# Patient Record
Sex: MALE | Race: WHITE | NOT HISPANIC OR LATINO | Employment: OTHER | URBAN - METROPOLITAN AREA
[De-identification: names, ages, dates, MRNs, and addresses within clinical notes are randomized per-mention and may not be internally consistent; named-entity substitution may affect disease eponyms.]

---

## 2017-01-06 ENCOUNTER — TELEPHONE (OUTPATIENT)
Dept: TRANSPLANT | Facility: CLINIC | Age: 67
End: 2017-01-06

## 2017-01-06 NOTE — TELEPHONE ENCOUNTER
----- Message from Naomi Sullivan sent at 1/6/2017  9:17 AM CST -----  Contact: pt  Want to know if you have all the info needed, please call back [phone]

## 2017-01-06 NOTE — TELEPHONE ENCOUNTER
Chart reviewed , pt outstanding for stress test, echo and card clearance . Patient stated that he has been cleared by Dr. Bass. Will request records.

## 2017-01-20 ENCOUNTER — HISTORICAL (OUTPATIENT)
Dept: LAB | Facility: HOSPITAL | Age: 67
End: 2017-01-20

## 2017-02-03 ENCOUNTER — COMMITTEE REVIEW (OUTPATIENT)
Dept: TRANSPLANT | Facility: CLINIC | Age: 67
End: 2017-02-03

## 2017-02-03 NOTE — COMMITTEE REVIEW
Native Organ Dx: Polycystic Kidneys      SELECTION COMMITTEE NOTE    Ronal Mazariegos was presented at selection committee on 2/3/2017.  Patient met selection criteria for kidney transplant related to CKD, Stage 5 due to    Polycystic Kidneys.  No absolute contraindications to transplant at this time.  Patient will be placed on the cadaveric wait list pending final financial approval from insurance company.  Patient will return to clinic for routine appointment in 1 year.          Note written by  Melly Jolly RN    ===============================================    I was present at the meeting and attest to the decision of the committee.    Carlos Barcenas  02/03/2017

## 2017-02-03 NOTE — LETTER
February 3, 2017    Alexis Zamorano  [address] [facility] [place] [address]         Dear Dr. Alexis Zamorano:    Patient: Ronal Mazariegos   MR Number: [id number]   YOB: 1950     Your patient, Ronal Mazariegos, was recently discussed at the [facility] Selection Committee meeting on 2/3/2017. I am happy to inform you that Ronal has been approved for transplantation.  He has met selection criteria for a kidney transplant related to CKD stage 5 secondary to primary diagnosis of Polycystic Kidneys. Your patient will be placed on the cadaveric wait list pending final financial approval from insurance company.     We appreciate your confidence in allowing us to participate in your patients care.  If you have any questions or concerns, please do not hesitate to contact me.    Sincerely,      Jaimie Walters MD  Medical Director, Kidney & Kidney/Pancreas Transplantation    Cc:  Ronal Mazariegos

## 2017-02-23 DIAGNOSIS — Z76.82 AWAITING ORGAN TRANSPLANT STATUS: Primary | ICD-10-CM

## 2017-02-28 ENCOUNTER — HISTORICAL (OUTPATIENT)
Dept: LAB | Facility: HOSPITAL | Age: 67
End: 2017-02-28

## 2017-03-06 ENCOUNTER — LAB VISIT (OUTPATIENT)
Dept: LAB | Facility: HOSPITAL | Age: 67
End: 2017-03-06
Payer: COMMERCIAL

## 2017-03-06 DIAGNOSIS — Z76.82 AWAITING ORGAN TRANSPLANT STATUS: ICD-10-CM

## 2017-03-06 PROCEDURE — 86832 HLA CLASS I HIGH DEFIN QUAL: CPT | Mod: PO

## 2017-03-06 PROCEDURE — 86833 HLA CLASS II HIGH DEFIN QUAL: CPT | Mod: PO

## 2017-03-15 ENCOUNTER — HISTORICAL (OUTPATIENT)
Dept: LAB | Facility: HOSPITAL | Age: 67
End: 2017-03-15

## 2017-03-17 LAB — HPRA INTERPRETATION: NORMAL

## 2017-03-30 LAB
CLASS I ANTIBODIES - LUMINEX: NEGATIVE
CLASS II ANTIBODIES - LUMINEX: NEGATIVE
CPRA %: 0
SERUM COLLECTION DT - LUMINEX CLASS I: NORMAL
SERUM COLLECTION DT - LUMINEX CLASS II: NORMAL
SPCL1 TESTING DATE: NORMAL
SPCL2 TESTING DATE: NORMAL

## 2017-04-05 ENCOUNTER — LAB VISIT (OUTPATIENT)
Dept: LAB | Facility: HOSPITAL | Age: 67
End: 2017-04-05
Payer: COMMERCIAL

## 2017-04-05 DIAGNOSIS — Z76.82 AWAITING ORGAN TRANSPLANT STATUS: ICD-10-CM

## 2017-04-05 PROCEDURE — 86829 HLA CLASS I/II ANTIBODY QUAL: CPT | Mod: 91,PO,TXP

## 2017-04-05 PROCEDURE — 86829 HLA CLASS I/II ANTIBODY QUAL: CPT | Mod: PO,TXP

## 2017-04-25 LAB — HPRA INTERPRETATION: NORMAL

## 2017-04-26 ENCOUNTER — HISTORICAL (OUTPATIENT)
Dept: LAB | Facility: HOSPITAL | Age: 67
End: 2017-04-26

## 2017-04-28 ENCOUNTER — LAB VISIT (OUTPATIENT)
Dept: LAB | Facility: HOSPITAL | Age: 67
End: 2017-04-28
Payer: COMMERCIAL

## 2017-04-28 DIAGNOSIS — Z76.82 AWAITING ORGAN TRANSPLANT STATUS: ICD-10-CM

## 2017-04-28 PROCEDURE — 86829 HLA CLASS I/II ANTIBODY QUAL: CPT | Mod: 91,PO,TXP

## 2017-04-28 PROCEDURE — 86829 HLA CLASS I/II ANTIBODY QUAL: CPT | Mod: PO,TXP

## 2017-04-28 PROCEDURE — 86832 HLA CLASS I HIGH DEFIN QUAL: CPT | Mod: PO,TXP

## 2017-05-22 LAB — HPRA INTERPRETATION: NORMAL

## 2017-06-02 ENCOUNTER — LAB VISIT (OUTPATIENT)
Dept: LAB | Facility: HOSPITAL | Age: 67
End: 2017-06-02
Payer: COMMERCIAL

## 2017-06-02 DIAGNOSIS — Z76.82 AWAITING ORGAN TRANSPLANT STATUS: ICD-10-CM

## 2017-06-06 LAB
CLASS I ANTIBODIES - LUMINEX: NEGATIVE
CPRA %: 0
SERUM COLLECTION DT - LUMINEX CLASS I: NORMAL
SPCL1 TESTING DATE: NORMAL

## 2017-06-15 ENCOUNTER — HISTORICAL (OUTPATIENT)
Dept: LAB | Facility: HOSPITAL | Age: 67
End: 2017-06-15

## 2017-06-15 LAB
ABS NEUT (OLG): 3 X10(3)/MCL
BUN SERPL-MCNC: 77 MG/DL (ref 7–18)
CALCIUM SERPL-MCNC: 8.8 MG/DL (ref 8.5–10.1)
CHLORIDE SERPL-SCNC: 109 MMOL/L (ref 98–107)
CO2 SERPL-SCNC: 21 MMOL/L (ref 21–32)
CREAT SERPL-MCNC: 5.93 MG/DL (ref 0.7–1.3)
ERYTHROCYTE [DISTWIDTH] IN BLOOD BY AUTOMATED COUNT: 13.3 % (ref 11.5–14.8)
GLUCOSE SERPL-MCNC: 97 MG/DL (ref 74–106)
HCT VFR BLD AUTO: 39.8 % (ref 36.2–49.3)
HGB BLD-MCNC: 13.7 GM/DL (ref 12.6–17.3)
MCH RBC QN AUTO: 28.8 PG (ref 28.5–33.8)
MCHC RBC AUTO-ENTMCNC: 34.4 % (ref 32.6–37)
MCV RBC AUTO: 83.6 FL (ref 80–99)
PLATELET # BLD AUTO: 194 X10(3)/MCL (ref 136–369)
PMV BLD AUTO: 9.9 FL (ref 7.4–10.4)
POTASSIUM SERPL-SCNC: 4.1 MMOL/L (ref 3.5–5.1)
RBC # BLD AUTO: 4.76 X10(6)/MCL (ref 4.19–5.75)
SODIUM SERPL-SCNC: 141 MMOL/L (ref 136–145)
WBC # SPEC AUTO: 5 X10(3)/MCL (ref 4–10.5)

## 2017-07-03 ENCOUNTER — LAB VISIT (OUTPATIENT)
Dept: LAB | Facility: HOSPITAL | Age: 67
End: 2017-07-03
Payer: COMMERCIAL

## 2017-07-03 DIAGNOSIS — Z76.82 AWAITING ORGAN TRANSPLANT STATUS: ICD-10-CM

## 2017-07-07 PROCEDURE — 86829 HLA CLASS I/II ANTIBODY QUAL: CPT | Mod: PO,TXP

## 2017-07-07 PROCEDURE — 86829 HLA CLASS I/II ANTIBODY QUAL: CPT | Mod: 91,PO,TXP

## 2017-07-12 LAB — HPRA INTERPRETATION: NORMAL

## 2017-07-28 ENCOUNTER — LAB VISIT (OUTPATIENT)
Dept: LAB | Facility: HOSPITAL | Age: 67
End: 2017-07-28
Payer: COMMERCIAL

## 2017-07-28 DIAGNOSIS — Z76.82 AWAITING ORGAN TRANSPLANT STATUS: ICD-10-CM

## 2017-07-28 PROCEDURE — 86829 HLA CLASS I/II ANTIBODY QUAL: CPT | Mod: 91,PO,TXP

## 2017-07-28 PROCEDURE — 86977 RBC SERUM PRETX INCUBJ/INHIB: CPT | Mod: PO,TXP

## 2017-08-01 DIAGNOSIS — Z76.82 ORGAN TRANSPLANT CANDIDATE: ICD-10-CM

## 2017-08-03 LAB — HPRA INTERPRETATION: NORMAL

## 2017-08-03 PROCEDURE — 86829 HLA CLASS I/II ANTIBODY QUAL: CPT | Mod: 91,PO,TXP

## 2017-08-03 PROCEDURE — 86829 HLA CLASS I/II ANTIBODY QUAL: CPT | Mod: PO,TXP

## 2017-08-21 LAB — HPRA INTERPRETATION: NORMAL

## 2017-08-22 LAB — LUMINEX SCREEN INTERPRETATION: NORMAL

## 2017-09-06 ENCOUNTER — LAB VISIT (OUTPATIENT)
Dept: LAB | Facility: HOSPITAL | Age: 67
End: 2017-09-06
Payer: COMMERCIAL

## 2017-09-06 DIAGNOSIS — Z76.82 AWAITING ORGAN TRANSPLANT STATUS: ICD-10-CM

## 2017-09-06 LAB
CLASS I LUMINEX SCREEN RESULT - ADSORBED: NEGATIVE
CLASS II LUMINEX SCREEN RESULT - ADSORBED: NEGATIVE
SCRLA TESTING DATE: NORMAL
SERUM COLLECTION DT - SCRLA: NORMAL

## 2017-09-06 PROCEDURE — 86829 HLA CLASS I/II ANTIBODY QUAL: CPT | Mod: 91,PO,TXP

## 2017-09-06 PROCEDURE — 86829 HLA CLASS I/II ANTIBODY QUAL: CPT | Mod: PO,TXP

## 2017-09-18 LAB — HPRA INTERPRETATION: NORMAL

## 2017-09-20 ENCOUNTER — HISTORICAL (OUTPATIENT)
Dept: LAB | Facility: HOSPITAL | Age: 67
End: 2017-09-20

## 2017-09-20 LAB
ABS NEUT (OLG): 2.99 X10(3)/MCL (ref 2.1–9.2)
BUN SERPL-MCNC: 84 MG/DL (ref 7–18)
CALCIUM SERPL-MCNC: 8.7 MG/DL (ref 8.5–10.1)
CHLORIDE SERPL-SCNC: 107 MMOL/L (ref 98–107)
CO2 SERPL-SCNC: 20 MMOL/L (ref 21–32)
CREAT SERPL-MCNC: 6.28 MG/DL (ref 0.7–1.3)
ERYTHROCYTE [DISTWIDTH] IN BLOOD BY AUTOMATED COUNT: 13.2 % (ref 11.5–17)
GLUCOSE SERPL-MCNC: 96 MG/DL (ref 74–106)
HCT VFR BLD AUTO: 39.2 % (ref 42–52)
HGB BLD-MCNC: 13.2 GM/DL (ref 14–18)
MCH RBC QN AUTO: 28.3 PG (ref 27–31)
MCHC RBC AUTO-ENTMCNC: 33.7 GM/DL (ref 33–36)
MCV RBC AUTO: 84.1 FL (ref 80–94)
PHOSPHATE SERPL-MCNC: 5 MG/DL (ref 2.5–4.9)
PLATELET # BLD AUTO: 182 X10(3)/MCL (ref 130–400)
PMV BLD AUTO: 9.6 FL (ref 7.4–10.4)
POTASSIUM SERPL-SCNC: 4.2 MMOL/L (ref 3.5–5.1)
RBC # BLD AUTO: 4.66 X10(6)/MCL (ref 4.7–6.1)
SODIUM SERPL-SCNC: 139 MMOL/L (ref 136–145)
WBC # SPEC AUTO: 4.8 X10(3)/MCL (ref 4.5–11.5)

## 2017-10-02 ENCOUNTER — LAB VISIT (OUTPATIENT)
Dept: LAB | Facility: HOSPITAL | Age: 67
End: 2017-10-02
Payer: MEDICARE

## 2017-10-02 DIAGNOSIS — Z76.82 AWAITING ORGAN TRANSPLANT STATUS: ICD-10-CM

## 2017-10-02 PROCEDURE — 86829 HLA CLASS I/II ANTIBODY QUAL: CPT | Mod: 91,PO,TXP

## 2017-10-02 PROCEDURE — 86829 HLA CLASS I/II ANTIBODY QUAL: CPT | Mod: PO,TXP

## 2017-10-16 LAB — HPRA INTERPRETATION: NORMAL

## 2017-10-18 ENCOUNTER — HISTORICAL (OUTPATIENT)
Dept: LAB | Facility: HOSPITAL | Age: 67
End: 2017-10-18

## 2017-10-18 LAB
ABS NEUT (OLG): 2.66 X10(3)/MCL (ref 2.1–9.2)
BUN SERPL-MCNC: 80 MG/DL (ref 7–18)
CALCIUM SERPL-MCNC: 9.1 MG/DL (ref 8.5–10.1)
CHLORIDE SERPL-SCNC: 107 MMOL/L (ref 98–107)
CO2 SERPL-SCNC: 25 MMOL/L (ref 21–32)
CREAT SERPL-MCNC: 6.51 MG/DL (ref 0.7–1.3)
ERYTHROCYTE [DISTWIDTH] IN BLOOD BY AUTOMATED COUNT: 13.3 % (ref 11.5–17)
GLUCOSE SERPL-MCNC: 102 MG/DL (ref 74–106)
HCT VFR BLD AUTO: 39.1 % (ref 42–52)
HGB BLD-MCNC: 13.3 GM/DL (ref 14–18)
MCH RBC QN AUTO: 28.7 PG (ref 27–31)
MCHC RBC AUTO-ENTMCNC: 34 GM/DL (ref 33–36)
MCV RBC AUTO: 84.4 FL (ref 80–94)
PLATELET # BLD AUTO: 179 X10(3)/MCL (ref 130–400)
PMV BLD AUTO: 8.9 FL (ref 7.4–10.4)
POTASSIUM SERPL-SCNC: 4 MMOL/L (ref 3.5–5.1)
RBC # BLD AUTO: 4.63 X10(6)/MCL (ref 4.7–6.1)
SODIUM SERPL-SCNC: 140 MMOL/L (ref 136–145)
WBC # SPEC AUTO: 4.5 X10(3)/MCL (ref 4.5–11.5)

## 2017-11-06 ENCOUNTER — LAB VISIT (OUTPATIENT)
Dept: LAB | Facility: HOSPITAL | Age: 67
End: 2017-11-06
Payer: MEDICARE

## 2017-11-06 DIAGNOSIS — Z76.82 AWAITING ORGAN TRANSPLANT STATUS: ICD-10-CM

## 2017-11-06 PROCEDURE — 86829 HLA CLASS I/II ANTIBODY QUAL: CPT | Mod: 91,PO,TXP

## 2017-11-06 PROCEDURE — 86829 HLA CLASS I/II ANTIBODY QUAL: CPT | Mod: PO,TXP

## 2017-11-22 ENCOUNTER — HISTORICAL (OUTPATIENT)
Dept: LAB | Facility: HOSPITAL | Age: 67
End: 2017-11-22

## 2017-11-22 LAB
ABS NEUT (OLG): 2.95 X10(3)/MCL (ref 2.1–9.2)
BUN SERPL-MCNC: 83 MG/DL (ref 7–18)
CALCIUM SERPL-MCNC: 8.9 MG/DL (ref 8.5–10.1)
CHLORIDE SERPL-SCNC: 109 MMOL/L (ref 98–107)
CO2 SERPL-SCNC: 20 MMOL/L (ref 21–32)
CREAT SERPL-MCNC: 6.82 MG/DL (ref 0.7–1.3)
ERYTHROCYTE [DISTWIDTH] IN BLOOD BY AUTOMATED COUNT: 13.3 % (ref 11.5–17)
GLUCOSE SERPL-MCNC: 98 MG/DL (ref 74–106)
HCT VFR BLD AUTO: 39 % (ref 42–52)
HGB BLD-MCNC: 13 GM/DL (ref 14–18)
MCH RBC QN AUTO: 28.2 PG (ref 27–31)
MCHC RBC AUTO-ENTMCNC: 33.3 GM/DL (ref 33–36)
MCV RBC AUTO: 84.6 FL (ref 80–94)
PLATELET # BLD AUTO: 194 X10(3)/MCL (ref 130–400)
PMV BLD AUTO: 9.6 FL (ref 7.4–10.4)
POTASSIUM SERPL-SCNC: 4.3 MMOL/L (ref 3.5–5.1)
RBC # BLD AUTO: 4.61 X10(6)/MCL (ref 4.7–6.1)
SODIUM SERPL-SCNC: 141 MMOL/L (ref 136–145)
WBC # SPEC AUTO: 4.8 X10(3)/MCL (ref 4.5–11.5)

## 2017-11-30 LAB — HPRA INTERPRETATION: NORMAL

## 2017-12-04 ENCOUNTER — LAB VISIT (OUTPATIENT)
Dept: LAB | Facility: HOSPITAL | Age: 67
End: 2017-12-04
Payer: MEDICARE

## 2017-12-04 DIAGNOSIS — Z76.82 AWAITING ORGAN TRANSPLANT STATUS: ICD-10-CM

## 2017-12-04 PROCEDURE — 86829 HLA CLASS I/II ANTIBODY QUAL: CPT | Mod: 91,PO,TXP

## 2017-12-04 PROCEDURE — 86829 HLA CLASS I/II ANTIBODY QUAL: CPT | Mod: PO,TXP

## 2017-12-07 ENCOUNTER — HISTORICAL (OUTPATIENT)
Dept: LAB | Facility: HOSPITAL | Age: 67
End: 2017-12-07

## 2017-12-07 LAB
ABS NEUT (OLG): 3.17 X10(3)/MCL (ref 2.1–9.2)
ALBUMIN SERPL-MCNC: 3.7 GM/DL (ref 3.4–5)
ALBUMIN/GLOB SERPL: 1 {RATIO}
ALP SERPL-CCNC: 60 UNIT/L (ref 45–117)
ALT SERPL-CCNC: 16 UNIT/L (ref 16–61)
AST SERPL-CCNC: 7 UNIT/L (ref 15–37)
BILIRUB SERPL-MCNC: 0.6 MG/DL (ref 0.2–1)
BILIRUBIN DIRECT+TOT PNL SERPL-MCNC: 0.2 MG/DL (ref 0–0.2)
BILIRUBIN DIRECT+TOT PNL SERPL-MCNC: 0.4 MG/DL (ref 0–1)
BUN SERPL-MCNC: 79 MG/DL (ref 7–18)
CALCIUM SERPL-MCNC: 8.9 MG/DL (ref 8.5–10.1)
CHLORIDE SERPL-SCNC: 108 MMOL/L (ref 98–107)
CO2 SERPL-SCNC: 24 MMOL/L (ref 21–32)
CREAT SERPL-MCNC: 6.37 MG/DL (ref 0.7–1.3)
ERYTHROCYTE [DISTWIDTH] IN BLOOD BY AUTOMATED COUNT: 13.4 % (ref 11.5–17)
GLOBULIN SER-MCNC: 4 GM/DL (ref 2–4)
GLUCOSE SERPL-MCNC: 94 MG/DL (ref 74–106)
HCT VFR BLD AUTO: 38.9 % (ref 42–52)
HGB BLD-MCNC: 13 GM/DL (ref 14–18)
MCH RBC QN AUTO: 28.7 PG (ref 27–31)
MCHC RBC AUTO-ENTMCNC: 33.4 GM/DL (ref 33–36)
MCV RBC AUTO: 85.9 FL (ref 80–94)
PLATELET # BLD AUTO: 200 X10(3)/MCL (ref 130–400)
PMV BLD AUTO: 10.4 FL (ref 7.4–10.4)
POTASSIUM SERPL-SCNC: 4.1 MMOL/L (ref 3.5–5.1)
PROT SERPL-MCNC: 7.6 GM/DL (ref 6.4–8.2)
RBC # BLD AUTO: 4.53 X10(6)/MCL (ref 4.7–6.1)
SODIUM SERPL-SCNC: 142 MMOL/L (ref 136–145)
WBC # SPEC AUTO: 5.1 X10(3)/MCL (ref 4.5–11.5)

## 2017-12-20 LAB — HPRA INTERPRETATION: NORMAL

## 2018-01-02 ENCOUNTER — LAB VISIT (OUTPATIENT)
Dept: LAB | Facility: HOSPITAL | Age: 68
End: 2018-01-02
Payer: MEDICARE

## 2018-01-02 DIAGNOSIS — Z76.82 AWAITING ORGAN TRANSPLANT STATUS: ICD-10-CM

## 2018-01-02 PROCEDURE — 86833 HLA CLASS II HIGH DEFIN QUAL: CPT | Mod: PO,TXP

## 2018-01-02 PROCEDURE — 86832 HLA CLASS I HIGH DEFIN QUAL: CPT | Mod: PO,TXP

## 2018-01-02 PROCEDURE — 86829 HLA CLASS I/II ANTIBODY QUAL: CPT | Mod: PO,TXP

## 2018-01-02 PROCEDURE — 86829 HLA CLASS I/II ANTIBODY QUAL: CPT | Mod: 91,PO,TXP

## 2018-01-04 LAB — HPRA INTERPRETATION: NORMAL

## 2018-01-18 ENCOUNTER — HISTORICAL (OUTPATIENT)
Dept: LAB | Facility: HOSPITAL | Age: 68
End: 2018-01-18

## 2018-01-18 LAB
ABS NEUT (OLG): 3.33 X10(3)/MCL (ref 2.1–9.2)
ALBUMIN SERPL-MCNC: 3.7 GM/DL (ref 3.4–5)
ALBUMIN/GLOB SERPL: 1 {RATIO}
ALP SERPL-CCNC: 62 UNIT/L (ref 45–117)
ALT SERPL-CCNC: 19 UNIT/L (ref 16–61)
AST SERPL-CCNC: 7 UNIT/L (ref 15–37)
BILIRUB SERPL-MCNC: 0.5 MG/DL (ref 0.2–1)
BILIRUBIN DIRECT+TOT PNL SERPL-MCNC: 0.1 MG/DL (ref 0–0.2)
BILIRUBIN DIRECT+TOT PNL SERPL-MCNC: 0.4 MG/DL (ref 0–1)
BUN SERPL-MCNC: 88 MG/DL (ref 7–18)
CALCIUM SERPL-MCNC: 9 MG/DL (ref 8.5–10.1)
CHLORIDE SERPL-SCNC: 106 MMOL/L (ref 98–107)
CO2 SERPL-SCNC: 23 MMOL/L (ref 21–32)
CREAT SERPL-MCNC: 7.03 MG/DL (ref 0.7–1.3)
ERYTHROCYTE [DISTWIDTH] IN BLOOD BY AUTOMATED COUNT: 13.2 % (ref 11.5–17)
GLOBULIN SER-MCNC: 4 GM/DL (ref 2–4)
GLUCOSE SERPL-MCNC: 99 MG/DL (ref 74–106)
HCT VFR BLD AUTO: 38.5 % (ref 42–52)
HGB BLD-MCNC: 12.7 GM/DL (ref 14–18)
MAGNESIUM SERPL-MCNC: 2.4 MG/DL (ref 1.8–2.4)
MCH RBC QN AUTO: 28.7 PG (ref 27–31)
MCHC RBC AUTO-ENTMCNC: 33 GM/DL (ref 33–36)
MCV RBC AUTO: 87.1 FL (ref 80–94)
PHOSPHATE SERPL-MCNC: 4.9 MG/DL (ref 2.5–4.9)
PLATELET # BLD AUTO: 198 X10(3)/MCL (ref 130–400)
PMV BLD AUTO: 9.3 FL (ref 7.4–10.4)
POTASSIUM SERPL-SCNC: 4.1 MMOL/L (ref 3.5–5.1)
PROT SERPL-MCNC: 7.6 GM/DL (ref 6.4–8.2)
RBC # BLD AUTO: 4.42 X10(6)/MCL (ref 4.7–6.1)
SODIUM SERPL-SCNC: 141 MMOL/L (ref 136–145)
WBC # SPEC AUTO: 5.3 X10(3)/MCL (ref 4.5–11.5)

## 2018-01-23 DIAGNOSIS — Z76.82 ORGAN TRANSPLANT CANDIDATE: Primary | ICD-10-CM

## 2018-01-23 LAB
CLASS I ANTIBODIES - LUMINEX: NEGATIVE
CLASS II ANTIBODIES - LUMINEX: NEGATIVE
CPRA %: 0
SERUM COLLECTION DT - LUMINEX CLASS I: NORMAL
SERUM COLLECTION DT - LUMINEX CLASS II: NORMAL
SPCL1 TESTING DATE: NORMAL
SPCL2 TESTING DATE: NORMAL
SPCLU TESTING DATE: NORMAL

## 2018-02-09 ENCOUNTER — LAB VISIT (OUTPATIENT)
Dept: LAB | Facility: HOSPITAL | Age: 68
End: 2018-02-09
Payer: MEDICARE

## 2018-02-09 DIAGNOSIS — Z76.82 AWAITING ORGAN TRANSPLANT STATUS: ICD-10-CM

## 2018-02-09 PROCEDURE — 86829 HLA CLASS I/II ANTIBODY QUAL: CPT | Mod: PO,TXP

## 2018-02-09 PROCEDURE — 86829 HLA CLASS I/II ANTIBODY QUAL: CPT | Mod: 91,PO,TXP

## 2018-02-12 ENCOUNTER — TELEPHONE (OUTPATIENT)
Dept: TRANSPLANT | Facility: CLINIC | Age: 68
End: 2018-02-12

## 2018-02-12 NOTE — TELEPHONE ENCOUNTER
----- Message from Josette Ann sent at 2/12/2018  8:57 AM CST -----  Contact: Pt  Pt wanted to inform the office that he stated dialysis last Wednesday Feb 7th    Pt contact number [phone]  Thanks

## 2018-02-15 LAB — HPRA INTERPRETATION: NORMAL

## 2018-02-20 ENCOUNTER — TELEPHONE (OUTPATIENT)
Dept: TRANSPLANT | Facility: CLINIC | Age: 68
End: 2018-02-20

## 2018-02-20 NOTE — TELEPHONE ENCOUNTER
Requested 2728 form from DANIS. Sent email to HLA lab requesting HLA kits forwarded to DANIS. Pt reports that he will have surgery on dialysis access 2/21/18 at [facility].

## 2018-02-21 ENCOUNTER — HISTORICAL (OUTPATIENT)
Dept: ADMINISTRATIVE | Facility: HOSPITAL | Age: 68
End: 2018-02-21

## 2018-02-21 LAB
ABS NEUT (OLG): 4.81 X10(3)/MCL (ref 2.1–9.2)
BASOPHILS # BLD AUTO: 0 X10(3)/MCL (ref 0–0.2)
BASOPHILS NFR BLD AUTO: 0 %
BUN SERPL-MCNC: 64 MG/DL (ref 7–18)
CALCIUM SERPL-MCNC: 9.1 MG/DL (ref 8.5–10.1)
CHLORIDE SERPL-SCNC: 102 MMOL/L (ref 98–107)
CO2 SERPL-SCNC: 22 MMOL/L (ref 21–32)
CREAT SERPL-MCNC: 7.32 MG/DL (ref 0.7–1.3)
EOSINOPHIL # BLD AUTO: 0.1 X10(3)/MCL (ref 0–0.9)
EOSINOPHIL NFR BLD AUTO: 2 %
ERYTHROCYTE [DISTWIDTH] IN BLOOD BY AUTOMATED COUNT: 13 % (ref 11.5–17)
GLUCOSE SERPL-MCNC: 98 MG/DL (ref 74–106)
HCT VFR BLD AUTO: 34.2 % (ref 42–52)
HGB BLD-MCNC: 11 GM/DL (ref 14–18)
LYMPHOCYTES # BLD AUTO: 1 X10(3)/MCL (ref 0.6–4.6)
LYMPHOCYTES NFR BLD AUTO: 16 %
MCH RBC QN AUTO: 29.4 PG (ref 27–31)
MCHC RBC AUTO-ENTMCNC: 32.2 GM/DL (ref 33–36)
MCV RBC AUTO: 91.4 FL (ref 80–94)
MONOCYTES # BLD AUTO: 0.5 X10(3)/MCL (ref 0.1–1.3)
MONOCYTES NFR BLD AUTO: 8 %
NEUTROPHILS # BLD AUTO: 4.81 X10(3)/MCL (ref 1.4–7.9)
NEUTROPHILS NFR BLD AUTO: 73 %
PLATELET # BLD AUTO: 174 X10(3)/MCL (ref 130–400)
PMV BLD AUTO: 10.2 FL (ref 9.4–12.4)
POTASSIUM SERPL-SCNC: 3.5 MMOL/L (ref 3.5–5.1)
RBC # BLD AUTO: 3.74 X10(6)/MCL (ref 4.7–6.1)
SODIUM SERPL-SCNC: 139 MMOL/L (ref 136–145)
WBC # SPEC AUTO: 6.6 X10(3)/MCL (ref 4.5–11.5)

## 2018-03-15 ENCOUNTER — DOCUMENTATION ONLY (OUTPATIENT)
Dept: CARDIOLOGY | Facility: CLINIC | Age: 68
End: 2018-03-15

## 2018-03-15 DIAGNOSIS — Z76.82 ORGAN TRANSPLANT CANDIDATE: Primary | ICD-10-CM

## 2018-03-16 ENCOUNTER — HOSPITAL ENCOUNTER (OUTPATIENT)
Dept: RADIOLOGY | Facility: HOSPITAL | Age: 68
Discharge: HOME | End: 2018-03-16
Attending: NURSE PRACTITIONER
Payer: MEDICARE

## 2018-03-16 ENCOUNTER — HOSPITAL ENCOUNTER (OUTPATIENT)
Dept: CARDIOLOGY | Facility: CLINIC | Age: 68
Discharge: HOME | End: 2018-03-16
Attending: NURSE PRACTITIONER
Payer: MEDICARE

## 2018-03-16 ENCOUNTER — DOCUMENTATION ONLY (OUTPATIENT)
Dept: CARDIOLOGY | Facility: CLINIC | Age: 68
End: 2018-03-16

## 2018-03-16 ENCOUNTER — OFFICE VISIT (OUTPATIENT)
Dept: TRANSPLANT | Facility: CLINIC | Age: 68
End: 2018-03-16
Payer: MEDICARE

## 2018-03-16 ENCOUNTER — HOSPITAL ENCOUNTER (OUTPATIENT)
Dept: RADIOLOGY | Facility: HOSPITAL | Age: 68
Discharge: HOME | End: 2018-03-16
Attending: TRANSPLANT SURGERY
Payer: MEDICARE

## 2018-03-16 VITALS
HEART RATE: 60 BPM | DIASTOLIC BLOOD PRESSURE: 97 MMHG | BODY MASS INDEX: 29.02 KG/M2 | SYSTOLIC BLOOD PRESSURE: 167 MMHG | RESPIRATION RATE: 17 BRPM | TEMPERATURE: 97 F | WEIGHT: 218.94 LBS | OXYGEN SATURATION: 96 % | HEIGHT: 73 IN

## 2018-03-16 DIAGNOSIS — Z76.82 ORGAN TRANSPLANT CANDIDATE: ICD-10-CM

## 2018-03-16 DIAGNOSIS — Z99.2 ESRD ON DIALYSIS: ICD-10-CM

## 2018-03-16 DIAGNOSIS — Z79.01 LONG TERM (CURRENT) USE OF ANTICOAGULANTS: Chronic | ICD-10-CM

## 2018-03-16 DIAGNOSIS — D63.8 ANEMIA OF CHRONIC DISEASE: ICD-10-CM

## 2018-03-16 DIAGNOSIS — I48.20 CHRONIC ATRIAL FIBRILLATION: Chronic | ICD-10-CM

## 2018-03-16 DIAGNOSIS — N18.6 ESRD ON DIALYSIS: ICD-10-CM

## 2018-03-16 DIAGNOSIS — I10 HYPERTENSION, UNSPECIFIED TYPE: ICD-10-CM

## 2018-03-16 DIAGNOSIS — Q61.3 POLYCYSTIC KIDNEY DISEASE: Primary | ICD-10-CM

## 2018-03-16 DIAGNOSIS — N40.0 BENIGN PROSTATIC HYPERPLASIA WITHOUT LOWER URINARY TRACT SYMPTOMS: ICD-10-CM

## 2018-03-16 DIAGNOSIS — Z76.82 PATIENT ON WAITING LIST FOR KIDNEY TRANSPLANT: Chronic | ICD-10-CM

## 2018-03-16 LAB — DIASTOLIC DYSFUNCTION: NO

## 2018-03-16 PROCEDURE — A9502 TC99M TETROFOSMIN: HCPCS | Mod: TXP

## 2018-03-16 PROCEDURE — 93018 CV STRESS TEST I&R ONLY: CPT | Mod: S$PBB,TXP,, | Performed by: INTERNAL MEDICINE

## 2018-03-16 PROCEDURE — 76770 US EXAM ABDO BACK WALL COMP: CPT | Mod: 26,TXP,, | Performed by: INTERNAL MEDICINE

## 2018-03-16 PROCEDURE — 99215 OFFICE O/P EST HI 40 MIN: CPT | Mod: S$PBB,TXP,, | Performed by: INTERNAL MEDICINE

## 2018-03-16 PROCEDURE — 99214 OFFICE O/P EST MOD 30 MIN: CPT | Mod: PBBFAC,25,TXP

## 2018-03-16 PROCEDURE — 93016 CV STRESS TEST SUPVJ ONLY: CPT | Mod: S$PBB,TXP,, | Performed by: INTERNAL MEDICINE

## 2018-03-16 PROCEDURE — 71046 X-RAY EXAM CHEST 2 VIEWS: CPT | Mod: 26,TXP,, | Performed by: RADIOLOGY

## 2018-03-16 PROCEDURE — 78452 HT MUSCLE IMAGE SPECT MULT: CPT | Mod: 26,TXP,, | Performed by: RADIOLOGY

## 2018-03-16 PROCEDURE — 93978 VASCULAR STUDY: CPT | Mod: 26,TXP,, | Performed by: INTERNAL MEDICINE

## 2018-03-16 PROCEDURE — 99999 PR PBB SHADOW E&M-EST. PATIENT-LVL IV: CPT | Mod: PBBFAC,TXP,,

## 2018-03-16 PROCEDURE — 71046 X-RAY EXAM CHEST 2 VIEWS: CPT | Mod: TC,TXP

## 2018-03-16 PROCEDURE — 76770 US EXAM ABDO BACK WALL COMP: CPT | Mod: TC,TXP

## 2018-03-16 PROCEDURE — 93978 VASCULAR STUDY: CPT | Mod: TC,TXP

## 2018-03-16 RX ORDER — WARFARIN 2 MG/1
2 TABLET ORAL DAILY
Status: ON HOLD | COMMUNITY
End: 2021-05-06 | Stop reason: HOSPADM

## 2018-03-16 NOTE — PROGRESS NOTES
Patient verified by 2 identifiers and allergies reviewed.  22g IV in place to Rt AC.  Lexiscan testing explained to patient, patient verbalized understanding, consent obtained & testing completed.  Pt tolerated testing well without complaints.  IV removed post testing.  Post study discharge instructions reviewed with patient, patient verbalized understanding.  Patient taken to Nuclear Med by [facility] for completion of pictures.

## 2018-03-16 NOTE — PROGRESS NOTES
Transplant Recipient Adult Psychosocial Assessment    Ronal Mazariegos  [id number]  [place] [address]    Telephone Information:   Mobile [phone]   Home  [phone] (home)  Work  There is no work phone number on file.  E-mail  [email or web address]    Sex: male  YOB: 1950  Age: 67 y.o.    Encounter Date: 3/16/2018  [place] Citizen: yes  Primary Language: English   Needed: no    Emergency Contact:  Name: Becky Mazariegos  Relationship: wife  Address: Same as pt.  Phone Numbers:  [phone] (home), [phone] (work), [phone] (mobile)    Family/Social Support:   Number of dependents/: Pt reports no dependents.  Marital history: Pt reports 1 marriage of 42 years to Becky Mazariegos.  Other family dynamics: Pt reports 1 adult son: Bong; 2 sisters: Charlee Harrison (transplanted at [facility]) and Huong Mendez. Pt identifies all family members as supportive.    Household Composition:  Name: Ronal Mazariegos  Age: 66  Relationship: patient  Does person drive? yes    Name: Becky Mazariegos  Age: 65  Relationship: wife  Does person drive? yes    Do you and your caregivers have access to reliable transportation? yes     PRIMARY CAREGIVER: Becky Mazariegos (wife) will be primary caregiver, phone number [phone].      provided in-depth information to patient and caregiver regarding pre- and post-transplant caregiver role.   strongly encourages patient and caregiver to have concrete plan regarding post-transplant care giving, including back-up caregiver(s) to ensure care giving needs are met as needed.    Patient and Caregiver states understanding all aspects of caregiver role/commitment and is able/willing/committed to being caregiver to the fullest extent necessary.    Patient and Caregiver verbalizes understanding of the education provided today and caregiver responsibilities.         remains available. Patient and Caregiver agree to contact   in a timely manner if concerns arise.      Able to take time off work without financial concerns: yes.     Additional Significant Others who will Assist with Transplant:  Name: Bong Mazariegos  Age: 28  Phone: [phone]  City: [place] State: [place]  Relationship: son  Does person drive? yes    Name: Iván Ward(previously transplanted at [facility])  Age: 47  Phone: [phone]  City: [facility] State: [place]  Relationship: sister  Does person drive? yes    Living Will: no Education and information provided to pt.  Healthcare Power of : no Education and information provided to pt.  Advance Directives on file: <<no information> per medical record.  Verbally reviewed LW/HCPA information.   provided patient with copy of LW/HCPA documents and provided education on completion of forms.    Living Donors: No. Education and resource information given to patient.    Highest Education Level: Associate/Bachelor Degree  Reading Ability: college  Reports difficulty with: seeing and hearing. Pt reports wearing glasses and reports possibly having a cataract in right eye. Pt reports hard of hearing more in left ear than right and has to watch people's mouths.  Learns Best By:  Pt reports learning best by verbal and visual instruction.     Status: no  VA Benefits: no     Working for Income: No  If no, reason not working: Patient Choice - Retired.  Patient is retired from being the  for [employer].    Spouse/Significant Other Employment: Pt's wife reports that she works full-time as an  for [email or web address].    Disabled: no. Pt reports that he has applied for disability with the social security office.     Monthly Income:  Other: $8100 (gross from pt's shelter, wife's income, farm property, 401k, and wife's incentives at work     Able to afford all costs now and if transplanted, including medications: yes  Patient and Caregiver verbalizes  understanding of personal responsibilities related to transplant costs and the importance of having a financial plan to ensure that patients transplant costs are fully covered.       provided fundraising information/education. Patient and Caregiververbalizes understanding.   remains available.    Insurance:   Payor/Plan Subscr  Sex Relation Sub. Ins. ID Effective Group Num   1. MEDICARE - ME* ANUJ WARREN 1950 Male  [id number]                                    [address]   2. PHYSICIANS MU* ANUJ WARREN 1950 Male  [id number]                                    [address]     Primary Insurance (for UNOS reporting): Public Insurance - Medicare FFS (Fee For Service)  Secondary Insurance (for UNOS reporting): Private Insurance  Patient and Caregiver verbalizes clear understanding that patient may experience difficulty obtaining and/or be denied insurance coverage post-surgery. This includes and is not limited to disability insurance, life insurance, health insurance, burial insurance, long term care insurance, and other insurances.      Patient and Caregiver also reports understanding that future health concerns related to or unrelated to transplantation may not be covered by patient's insurance.  Resources and information provided and reviewed.     Patient and Caregiver provides verbal permission to release any necessary information to outside resources for patient care and discharge planning.  Resources and information provided are reviewed.      Dialysis Adherence: Patient reports he just started dialysis 4 weeks ago. Please see adherence form in separate note.    Infusion Service: patient utilizing? no  Home Health: patient utilizing? no  DME: yes BP Cuff and CPAP (pt says it gives him trouble and uses it rarely. Pt reports discussing with doctor)  Pulmonary/Cardiac Rehab: Pt denies.   ADLS:  Pt reports no difficulties with driving, walking, bathing,  cooking, housekeeping, eating, shopping, and taking medication.    Adherence:   Pt reports good adherence with medications, dialysis, and health regimen (except for CPAP machine).  Adherence education and counseling provided.     Per History Section:  Past Medical History:   Diagnosis Date    Anemia of chronic disease     Atrial fibrillation     BPH (benign prostatic hypertrophy)     HTN (hypertension)     Polycystic kidney disease      Social History   Substance Use Topics    Smoking status: Former Smoker     Packs/day: 2.00     Years: 10.00     Types: Cigarettes     Start date: 12/1/1972     Quit date: 12/7/1984    Smokeless tobacco: Never Used    Alcohol use No      Comment: Pt reportsbeing a former beer drinker (2-3 cans per day) and quitting in 2014.     History   Drug Use No     History   Sexual Activity    Sexual activity: Yes       Per Today's Psychosocial:  Tobacco: Pt reports quitting in 1984 and reports smoking 2 ppd for 10 years.  Alcohol: Pt reportsbeing a former beer drinker (2-3 cans per day) and quitting in 2014..  Illicit Drugs/Non-prescribed Medications: none, patient denies any use.    Patient and Caregiver states clear understanding of the potential impact of substance use as it relates to transplant candidacy and is aware of possible random substance screening.  Substance abstinence/cessation counseling, education and resources provided and reviewed.     Arrests/DWI/Treatment/Rehab: patient denies    Psychiatric History:    Mental Health: Pt reports feeling depressed at first, but now it is a way of life and realizes that it could be worse.  Psychiatrist/Counselor: Pt denies seeing a mental health professional and reports being open to seeing the psych department for talk therapy if necessary.  Medications:  Pt denies taking medications for mental health reasons.  Suicide/Homicide Issues: Pt denies any history of or current suicidal or homicidal ideations.    Safety at home: Pt reports  that his parents had a difficult relationship and stayed . Pt reports that his father  at a young age due to excessive smoking and drinking. Pt reports that he also had polycystic kidney disease.    Knowledge: Patient and Caregiver states having clear understanding and realistic expectations regarding the potential risks and potential benefits of organ transplantation and organ donation and agrees to discuss with health care team members and support system members, as well as to utilize available resources and express questions and/or concerns in order to further facilitate the pt informed decision-making.  Resources and information provided and reviewed.    Patient and Caregiver is aware of [facility]'s affiliation and/or partnership with agencies in home health care, LTAC, SNF, [facility], and other hospitals and clinics.    Understanding: Patient and Caregiver reports having a clear understanding of the many lifetime commitments involved with being a transplant recipient, including costs, compliance, medications, lab work, procedures, appointments, concrete and financial planning, preparedness, timely and appropriate communication of concerns, abstinence (ETOH, tobacco, illicit non-prescribed drugs), adherence to all health care team recommendations, support system and caregiver involvement, appropriate and timely resource utilization and follow-through, mental health counseling as needed/recommended, and patient and caregiver responsibilities.  Social Service Handbook, resources and detailed educational information provided and reviewed.  Educational information provided.    Patient and Caregiver also reports current and expected compliance with health care regime and states having a clear understanding of the importance of compliance.      Patient and Caregiver reports a clear understanding that risks and benefits may be involved with organ transplantation and with organ donation.       Patient and Caregiver  "also reports clear understanding that psychosocial risk factors may affect patient, and include but are not limited to feelings of depression, generalized anxiety, anxiety regarding dependence on others, post traumatic stress disorder, feelings of guilt and other emotional and/or mental concerns, and/or exacerbation of existing mental health concerns.  Detailed resources provided and discussed.      Patient and Caregiver agrees to access appropriate resources in a timely manner as needed and/or as recommended, and to communicate concerns appropriately.  Patient and Caregiver also reports a clear understanding of treatment options available.     Patient and Caregiver received education in a group setting.   reviewed education, provided additional information, and answered questions.    Feelings or Concerns: Patient and Caregiver did not express any concerns at this time.    Coping: Pt reports coping well with the transplant process at this time and reports taking a walk, hunting, fishing, camping, cooking, sitting in the woods, or on his boat as ways to cope. Pt also reports being a part of the [employer].    Goals: Pt reports "doing a better job of caring for myself",having a better quality of life, "live long for my son", not have to be on dialysis, and being healthy as goals for after transplant.    Interview Behavior: Patient and Caregiver presents as alert and oriented x 4, pleasant, good eye contact, well groomed, recall good, concentration/judgement good, average intelligence, calm, communicative, cooperative and asking and answering questions appropriately. Pt presents with wife: Becky Mazariegos at pt's request.         Transplant Social Work - Candidacy  Assessment/Plan:     Psychosocial Suitability: Patient presents as a suitable candidate for kidney transplant at this time. Based on psychosocial risk factors, patient presents as low risk, due to adequate caregiver plan, suitable " adherence, and financial plan in place..    Recommendations/Additional Comments: YOVANY recommends that pt conduct fundraising to assist pt with pay for cost of medication, food,gas, and other transplant related expenses. YOVANY recommends that pt remain free of all tobacco,ETOH, and substance use. SW remains available to assist with any concerns that may arise as pt navigates through the transplant process.    Sarita García LMSW

## 2018-03-16 NOTE — PROGRESS NOTES
Kidney Transplant Recipient Reevalulation    Referring Physician: Alexis Zamorano  Current Nephrologist: Alexis Zamorano  Waitlist Status: active  Dialysis Start Date: 2/7/2018    Subjective:     CC:  Six month reassessment of kidney transplant candidacy.    HPI:  Mr. Mazariegos is a 67 y.o. year old White male with ESRD secondary to polycystic kidney disease.  He has been on the wait list for a kidney transplant at [facility] since 2/20/2017. Patient is currently on hemodialysis started on 2/7/2018. Patient is dialyzing on MWF schedule.  Patient reports that he is tolerating dialysis well.. He has a LUE AV fistula and right chest catheter. Dialyzing for 4 hours.  Recent hospitalizations or ED visits.      Reports starting dialysis in 2/2017 while hospitalized at [facility]. After a few days he began to feel bad and was diagnosed with afib.Currently on Coumadin and ASA  He is followed b y DR. Valentine/ cardiology in [place]. See notes below:    Presented with C/O SOB , work up  Included CXR, TTE and EKG Holter Monitor   (Care every where)  XR Chest 1 View (02/09/2018 9:08 AM)  XR Chest 1 View (02/09/2018 9:08 AM)   Specimen Performing Laboratory     P[facility]ERS[facility] [address]       XR Chest 1 View (02/09/2018 9:08 AM)   Impressions   Poor inspiratory effort. No acute cardiopulmonary process detected.       XR Chest 1 View (02/09/2018 9:08 AM)   Narrative   Single view chest        INDICATION: Shortness of breath        COMPARISON: Chest x-ray 2/9/2018        FINDINGS: The cardiomediastinal shadow is midline and is unaltered in contour allowing for differences in pulmonary inflation. Low lung volumes. Accentuated interstitial markings. No effusion.         Interface, Rad Results In - 02/09/2018 9:16 AM CST    Single view chest    INDICATION: Shortness of breath  COMPARISON: Chest x-ray 2/9/2018  FINDINGS: The cardiomediastinal shadow is midline and is unaltered in contour allowing for differences in pulmonary inflation.  "Low lung volumes. Accentuated interstitial markings. No effusion.  IMPRESSION:  Poor inspiratory effort. No acute cardiopulmonary process detected.        2/27/2018  Holter monitor and TTE completed showing underlying AFIB      3/16/2018 NM stress  Impression     1.  Scintigraphically negative for ischemia or infarct.  2. The global left ventricular systolic function is normal with an LV ejection fraction of 77 % and no evidence of LV dilatation. Wall motion is normal.     Chest xray, Iliac doppler studies and renal US scheduled today       Review of Systems   Constitutional: Negative for activity change, appetite change, chills, fatigue, fever and unexpected weight change.   HENT: Negative for congestion, facial swelling, postnasal drip, rhinorrhea, sinus pressure, sore throat and trouble swallowing.    Eyes: Negative for pain, redness and visual disturbance.   Respiratory: Negative for cough, chest tightness, shortness of breath and wheezing.    Cardiovascular: Positive for palpitations. Negative for chest pain and leg swelling.        No problems since stating coumadin    Gastrointestinal: Negative for abdominal pain, diarrhea, nausea and vomiting.   Genitourinary: Negative for dysuria, flank pain and urgency.   Musculoskeletal: Negative for gait problem, neck pain and neck stiffness.   Skin: Negative for rash.   Allergic/Immunologic: Negative for environmental allergies, food allergies and immunocompromised state.   Neurological: Negative for dizziness, weakness, light-headedness and headaches.   Psychiatric/Behavioral: Negative for agitation and confusion. The patient is not nervous/anxious.        Objective:   body mass index is 28.67 kg/m².  BP (!) 167/97 (BP Location: Right arm, Patient Position: Sitting, BP Method: Medium (Automatic))   Pulse 60   Temp 97.2 °F (36.2 °C) (Oral)   Resp 17   Ht 6' 1.27" (1.861 m)   Wt 99.3 kg (218 lb 14.7 oz)   SpO2 96%   BMI 28.67 kg/m²     Physical Exam "   Constitutional: He is oriented to person, place, and time. He appears well-developed and well-nourished.   HENT:   Head: Normocephalic.   Mouth/Throat: Oropharynx is clear and moist. No oropharyngeal exudate.   Eyes: Conjunctivae and EOM are normal. Pupils are equal, round, and reactive to light. No scleral icterus.   Neck: Normal range of motion. Neck supple.   Cardiovascular: Normal rate, regular rhythm and normal heart sounds.    Pulmonary/Chest: Effort normal and breath sounds normal.       Abdominal: Soft. Normal appearance and bowel sounds are normal. He exhibits no distension and no mass. There is no splenomegaly or hepatomegaly. There is no tenderness. There is no rebound, no guarding, no CVA tenderness, no tenderness at McBurney's point and negative Mcclendon's sign.   Musculoskeletal: Normal range of motion. He exhibits edema.        Arms:  Bilateral trace edema    Lymphadenopathy:     He has no cervical adenopathy.   Neurological: He is alert and oriented to person, place, and time. He exhibits normal muscle tone. Coordination normal.   Skin: Skin is warm and dry.   Psychiatric: He has a normal mood and affect. His behavior is normal.   Vitals reviewed.      Labs:  Lab Results   Component Value Date    PSA 2.2 03/16/2018    PSA 1.7 12/07/2016       Lab Results   Component Value Date    WBC 4.29 12/07/2016    HGB 13.6 (L) 12/07/2016    HCT 40.8 12/07/2016     12/07/2016    K 4.4 12/07/2016     12/07/2016    CO2 23 12/07/2016    BUN 65 (H) 12/07/2016    CREATININE 4.7 (H) 12/07/2016    EGFRNONAA 12.2 (A) 12/07/2016    CALCIUM 9.4 12/07/2016    PHOS 4.3 12/07/2016    ALBUMIN 3.7 12/07/2016    AST 12 12/07/2016    ALT 11 12/07/2016    .0 (H) 12/07/2016       No results found for: PREALBUMIN, BILIRUBINUA, GGT, AMYLASE, LIPASE, PROTEINUA, NITRITE, RBCUA, WBCUA    No results found for: HLAABCTYPE    Lab Results   Component Value Date    CPRA 0 12/28/2017    BT8KPIU Negative 12/28/2017    CIIAB  Negative 12/28/2017       Labs were reviewed with the patient.    Pre-transplant Workup:   Reviewed with the patient.    Assessment:     1. Polycystic kidney disease    2. Patient on waiting list for kidney transplant    3. Anemia of chronic disease    4. Benign prostatic hyperplasia without lower urinary tract symptoms    5. ESRD on dialysis    6. Hypertension, unspecified type    7. Chronic atrial fibrillation    8. Long term (current) use of anticoagulants--coumadin, ASA        Plan:   Chest xray, Iliac doppler studies and renal US scheduled today     Needs cardiology f/u, Dr Bass in [place]  Recent diagnosis afib and on coumadin and ASA       Transplant Candidacy:   Mr. Mazariegos is a suitable kidney transplant candidate.  He remains in overall stable health, and will remain active on the transplant list.    Jessy England NP       Follow-up:   In addition to the tests noted in the plan, Mr. Mazariegos will continue to have reevaluation as per the standing pre-kidney transplant protocol:  1. Monthly blood for PRA  2. Annual return to clinic, except HIV positive, > 65 years of age, or pancreas transplant candidates who will be scheduled to see transplant every 6 months while in pre-transplant phase  3. Annual re-testing: CXR, EKG, yearly mammograms for women over 40 and PSA for males over 40, cardiology follow-up as recommended by initial cardiology pre-transplant evaluation  4. Renal ultrasound every 2 years  5. Baseline colonoscopy after age 50 and repeated as recommended    UNOS Patient Status  Functional Status: 60% - Requires occasional assistance but is able to care for needs  Physical Capacity: No Limitations

## 2018-03-16 NOTE — PROGRESS NOTES
PRE-TRANSPLANT NOTE:    This patient's medication therapy was evaluated as part of his pre-transplant evaluation.    The following pharmacologic concerns were noted: Patient is taking aspirin and warfarin.     Current Outpatient Prescriptions   Medication Sig Dispense Refill    aspirin (ECOTRIN) 81 MG EC tablet Take 81 mg by mouth once daily.      cyanocobalamin, vitamin B-12, (VITAMIN B-12) 50 mcg tablet Take 50 mcg by mouth once daily.      doxazosin (CARDURA) 4 MG tablet Take 4 mg by mouth every evening.      finasteride (PROSCAR) 5 mg tablet Take 5 mg by mouth once daily.      nebivolol (BYSTOLIC) 10 MG Tab Take 20 mg by mouth once daily.      omega-3 acid ethyl esters (LOVAZA) 1 gram capsule Take 2 g by mouth once daily.      sodium bicarbonate 325 MG tablet Take 325 mg by mouth 4 (four) times daily.      warfarin (COUMADIN) 2 MG tablet Take 2 mg by mouth Daily.       No current facility-administered medications for this visit.        I am available for consultation and can be contacted, as needed by the other members of the Kidney Transplant team.

## 2018-03-19 NOTE — PROGRESS NOTES
E-fax request sent to Dr. Bass's office regarding cardiology clearance, [phone]. Patient's chart reviewed today. Patient due for follow-up in September 2018. Appointments will be scheduled per protocol. HLA sample current, in lab, and being received on a regular basis.

## 2018-03-19 NOTE — PROGRESS NOTES
LISTED PATIENT EDUCATION NOTE    Mr. Ronal Mazariegos was seen in listed pre-kidney transplant clinic for evaluation for kidney, kidney/pancreas or pancreas only transplant.  The patient attended a group education session that discussed/reviewed the following aspects of transplantation: evaluation and selection committee process, UNOS waitlist management/multiple listings, types of organs offered (KDPI < 85%, KDPI > 85%, PHS increased risk, DCD), financial aspects, surgical procedures, dietary instruction pre- and post-transplant, health maintenance pre- and post-transplant, post-transplant hospitalization and outpatient follow-up, potential to participate in a research protocol, and medication management and side effects.  A question and answer session was provided after the presentation.    The patient was seen by all members of the multi-disciplinary team to include: Nephrologist/PA, Surgeon, , Transplant Coordinator, , Pharmacist and Dietician (if applicable).    The patient reviewed and signed all consents for evaluation which were witnessed and sent to scanning into the EPIC chart.    The patient was given an education book and plan for further evaluation based on his individual assessment.      The patient was encouraged to call with any questions or concerns.

## 2018-03-19 NOTE — LETTER
March 19, 2018    Ronal Mazariegos   [address]  [facility] [address]             Dear Dr. Bass:    Patient: Ronal Mazariegos   MR Number: [id number]   YOB: 1950     This patient is being considered for kidney transplantation at [facility].  As part of our protocol, we require that this patient receive a letter of clearance and/or recommendations from you regarding this patients disease process as it relates to recent diagnosis of afib, prescribed medications (Coumadin and aspirin) and transplantation.  If this patient is cleared from your standpoint to undergo transplantation, please fax to our office any test you performed including echocardiogram, most recent clinic note, and your letter of clearance to [phone].    If you have any questions or concerns, please feel free to contact us at [phone].    Sincerely,      Jung Whitmore RN  [facility]  [address] [place] [id number]  [phone]

## 2018-04-05 ENCOUNTER — LAB VISIT (OUTPATIENT)
Dept: LAB | Facility: HOSPITAL | Age: 68
End: 2018-04-05
Payer: MEDICARE

## 2018-04-05 DIAGNOSIS — Z76.82 ORGAN TRANSPLANT CANDIDATE: ICD-10-CM

## 2018-04-05 PROCEDURE — 86829 HLA CLASS I/II ANTIBODY QUAL: CPT | Mod: PO,TXP

## 2018-04-05 PROCEDURE — 86829 HLA CLASS I/II ANTIBODY QUAL: CPT | Mod: 91,PO,TXP

## 2018-04-10 LAB — HPRA INTERPRETATION: NORMAL

## 2018-08-30 DIAGNOSIS — Z76.82 ORGAN TRANSPLANT CANDIDATE: Primary | ICD-10-CM

## 2018-10-02 ENCOUNTER — OFFICE VISIT (OUTPATIENT)
Dept: TRANSPLANT | Facility: CLINIC | Age: 68
End: 2018-10-02
Payer: MEDICARE

## 2018-10-02 VITALS
SYSTOLIC BLOOD PRESSURE: 126 MMHG | BODY MASS INDEX: 28.78 KG/M2 | OXYGEN SATURATION: 96 % | RESPIRATION RATE: 18 BRPM | DIASTOLIC BLOOD PRESSURE: 72 MMHG | TEMPERATURE: 98 F | WEIGHT: 217.13 LBS | HEIGHT: 73 IN | HEART RATE: 53 BPM

## 2018-10-02 DIAGNOSIS — N18.6 ESRD ON DIALYSIS: Primary | ICD-10-CM

## 2018-10-02 DIAGNOSIS — Z99.2 ESRD ON DIALYSIS: Primary | ICD-10-CM

## 2018-10-02 DIAGNOSIS — Z79.01 LONG TERM (CURRENT) USE OF ANTICOAGULANTS: Chronic | ICD-10-CM

## 2018-10-02 DIAGNOSIS — Z76.82 AWAITING TRANSPLANTATION OF KIDNEY: ICD-10-CM

## 2018-10-02 DIAGNOSIS — Q61.3 POLYCYSTIC KIDNEY DISEASE: ICD-10-CM

## 2018-10-02 DIAGNOSIS — I48.20 CHRONIC ATRIAL FIBRILLATION: Chronic | ICD-10-CM

## 2018-10-02 PROCEDURE — 99215 OFFICE O/P EST HI 40 MIN: CPT | Mod: S$PBB,TXP,, | Performed by: INTERNAL MEDICINE

## 2018-10-02 PROCEDURE — 99999 PR PBB SHADOW E&M-EST. PATIENT-LVL III: CPT | Mod: PBBFAC,TXP,, | Performed by: INTERNAL MEDICINE

## 2018-10-02 PROCEDURE — 99213 OFFICE O/P EST LOW 20 MIN: CPT | Mod: PBBFAC,TXP | Performed by: INTERNAL MEDICINE

## 2018-10-02 RX ORDER — CINACALCET 30 MG/1
30 TABLET, FILM COATED ORAL
Status: ON HOLD | COMMUNITY
End: 2021-05-06 | Stop reason: HOSPADM

## 2018-10-02 RX ORDER — AMOXICILLIN 500 MG
1 CAPSULE ORAL NIGHTLY
COMMUNITY

## 2018-10-02 RX ORDER — AMLODIPINE BESYLATE 10 MG/1
10 TABLET ORAL DAILY
Status: ON HOLD | COMMUNITY
End: 2021-05-06 | Stop reason: HOSPADM

## 2018-10-02 NOTE — PROGRESS NOTES
Kidney Transplant Recipient Reevalulation    Referring Physician: Alexis Zamorano  Current Nephrologist: Alexis Zamorano  Waitlist Status: active  Dialysis Start Date: 2/7/2018    Subjective:     CC:  Six month reassessment of kidney transplant candidacy.    HPI:  Mr. Mazariegos is a 68 y.o. year old White male with advanced kidney disease secondary to polycystic kidney disease.  He has been on the wait list for a kidney transplant at [facility] since 2/20/2017. Patient is currently on hemodialysis started on Feb 2018. Patient is dialyzing on MWF schedule.  Patient reports that he is tolerating dialysis well.. He has a LUE AV fistula. Patient denies any recent hospitalizations or ED visits.    Ronal started dialysis in February 2000 18.  He initially felt rough, but has adapted well.  He states he looks forward his dialysis and usually feels better after the treatment.  He remains very active hunting and fishing, push mowing his yd and easily walks greater than 6 blocks.  He used to walk stairs, but has not done stairs of since starting dialysis.   He quit smoking decades ago.    Review of Systems   Respiratory: Negative for shortness of breath.    Cardiovascular: Negative for chest pain and leg swelling.   Gastrointestinal: Negative for abdominal pain, nausea and vomiting.   Genitourinary: Negative for difficulty urinating.   Skin: Negative for rash.       Objective:   body mass index is 28.47 kg/m².    Physical Exam   Constitutional: He is oriented to person, place, and time. He appears well-developed and well-nourished.   HENT:   Head: Normocephalic.   Eyes: Conjunctivae are normal.   Cardiovascular: Normal rate and regular rhythm.   Pulmonary/Chest: Effort normal and breath sounds normal.   Abdominal: Soft. He exhibits no mass. There is no tenderness.   Musculoskeletal: He exhibits no edema.   Neurological: He is alert and oriented to person, place, and time.   Skin: Skin is dry. No rash noted.   Psychiatric: He  has a normal mood and affect. Judgment normal.     Labs:  Lab Results   Component Value Date    WBC 4.29 2016    HGB 13.6 (L) 2016    HCT 40.8 2016     2016    K 4.4 2016     2016    CO2 23 2016    BUN 65 (H) 2016    CREATININE 4.7 (H) 2016    EGFRNONAA 12.2 (A) 2016    CALCIUM 9.4 2016    PHOS 4.3 2016    ALBUMIN 3.7 2016    AST 12 2016    ALT 11 2016    .0 (H) 2016     Lab Results   Component Value Date    CPRA 0 2017    SI2TJWQ Negative 2017    CIIAB Negative 2017     Labs were reviewed with the patient.    Pre-transplant Workup:   Reviewed with the patient.    Assessment:     1. ESRD on dialysis    2. Polycystic kidney disease    3. Chronic atrial fibrillation    4. Long term (current) use of anticoagulants--coumadin, ASA    5. Awaiting transplantation of kidney        Plan:     Transplant Candidacy:   Mr. Mazariegos is a suitable kidney transplant candidate. Patient is candidate for HCV+ Jerri donor offer - yes. He remains in overall stable health, and will remain active on the transplant list.  Management of kidney disease and related issues should continue by community nephrologist.   The difference between living and  donors was briefly discussed with him.   The process of evaluating a living donor was reviewed.   Importance of remaining physically active was reinforced.    Jaimie Menon MD       Follow-up:   In addition to the tests noted in the plan, Mr. Mazariegos will continue to have reevaluation as per the standing pre-kidney transplant protocol:  1. Monthly blood for PRA  2. Annual return to clinic, except HIV positive, > 65 years of age, or pancreas transplant candidates who will be scheduled to see transplant every 6 months while in pre-transplant phase  3. Annual re-testing: CXR, EKG, yearly mammograms for women over 40 and PSA for males over 40,  cardiology follow-up as recommended by initial cardiology pre-transplant evaluation  4. Renal ultrasound every 2 years  5. Baseline colonoscopy after age 50 and repeated as recommended    UNOS Patient Status  Functional Status: 60% - Requires occasional assistance but is able to care for needs  Physical Capacity: No Limitations

## 2018-10-02 NOTE — LETTER
October 2, 2018        Alexis Zamorano  [address] [place] [address]  Phone: [phone]  Fax: [phone]             [facility]  [address]  [facility] [address]  Phone: [phone]   Patient: Ronal Mazariegos   MR Number: [id number]   YOB: 1950   Date of Visit: 10/2/2018       Dear Dr. Alexis Zamorano    Thank you for referring Ronal Mazariegos to me for evaluation. Attached you will find relevant portions of my assessment and plan of care.    If you have questions, please do not hesitate to call me. I look forward to following Ronal Mazariegos along with you.    Sincerely,    Jaimie Menon MD    Enclosure    If you would like to receive this communication electronically, please contact [email or web address] or [phone] to request [employer] access.    [employer] is a tool which provides read-only access to select patient information with whom you have a relationship. Its easy to use and provides real time access to review your patients record including encounter summaries, notes, results, and demographic information.    If you feel you have received this communication in error or would no longer like to receive these types of communications, please e-mail [email or web address]

## 2018-10-02 NOTE — PATIENT INSTRUCTIONS
From your doctor:  Thank you for visiting us today and considering kidney transplantation.  Please feel free to contact us with any questions or concerns.  Regards,  Dr. Porsha Pereyra

## 2018-10-05 NOTE — PROGRESS NOTES
LISTED PATIENT EDUCATION NOTE    Mr. Ronal Mazariegos was seen in LISTED kidney transplant clinic for evaluation for kidney, kidney/pancreas or pancreas only transplant.  The patient attended a group education session that discussed/reviewed the following aspects of transplantation: evaluation and selection committee process, UNOS waitlist management/multiple listings, types of organs offered (KDPI < 85%, KDPI > 85%, PHS increased risk, DCD), financial aspects, surgical procedures, dietary instruction pre- and post-transplant, health maintenance pre- and post-transplant, post-transplant hospitalization and outpatient follow-up, potential to participate in a research protocol, and medication management and side effects.  A question and answer session was provided after the presentation.    The patient was seen by all members of the multi-disciplinary team to include: Nephrologist/PA, Surgeon, , Transplant Coordinator, , Pharmacist and Dietician (if applicable).    The patient reviewed and signed all consents for evaluation which were witnessed and sent to scanning into the EPIC chart.    The patient was given an education book and plan for further evaluation based on his individual assessment.      The patient was encouraged to call with any questions or concerns.

## 2018-10-10 DIAGNOSIS — Z76.82 ORGAN TRANSPLANT CANDIDATE: ICD-10-CM

## 2018-10-16 NOTE — PROGRESS NOTES
Transplant Recipient Adult Psychosocial Assessment (Last Assessment completed on 03/16/2018)    Ronal Mazariegos  [address]  [place] [address]    Telephone Information:   Mobile [phone]   Home  [phone] (home)  Work  There is no work phone number on file.  E-mail  [email or web address]    Sex: male  YOB: 1950  Age: 68 y.o.    Encounter Date: 10/2/2018  [place] Citizen: yes  Primary Language: English   Needed: no    Emergency Contact:  Name: Becky Mazariegos  Relationship: wife  Address: Same as pt.  Phone Numbers:  [phone] (home), [phone] (work), [phone] (mobile)    Family/Social Support:   Number of dependents/: Pt reports no dependents.  Marital history: Pt reports 1 marriage of 42 years to Becky Mazariegos.  Other family dynamics: Pt reports 1 adult son: Bong; 2 sisters: Charlee Harrison (transplanted at [facility]) and Huong Mendez. Pt also reports that his nephew: Keena was also transplanted at [facility] Pt identifies all family members as supportive.    Household Composition:  Name: Ronal Mazariegos  Age: 68  Relationship: patient  Does person drive? yes    Name: Becky Mazariegos  Age: 67  Relationship: wife  Does person drive? yes    Do you and your caregivers have access to reliable transportation? yes     PRIMARY CAREGIVER: Becky Mazariegos (wife) will be primary caregiver, phone number [phone].      provided in-depth information to patient and caregiver regarding pre- and post-transplant caregiver role.   strongly encourages patient and caregiver to have concrete plan regarding post-transplant care giving, including back-up caregiver(s) to ensure care giving needs are met as needed.    Patient and Caregiver states understanding all aspects of caregiver role/commitment and is able/willing/committed to being caregiver to the fullest extent necessary.    Patient and Caregiver verbalizes understanding of the education provided  today and caregiver responsibilities.         remains available. Patient and Caregiver agree to contact  in a timely manner if concerns arise.      Able to take time off work without financial concerns: yes.     Additional Significant Others who will Assist with Transplant:  Name: Bong Mazariegos  Age: 30  Phone: [phone]  City: [place] State: [place]  Relationship: son  Does person drive? yes    Name: Charlee Ward(previously transplanted at [facility])  Age: 49  Phone: [phone]  [facility]: [facility] State: [place]  Relationship: sister  Does person drive? yes    Living Will: no Education and information provided to pt.  Healthcare Power of : no Education and information provided to pt.  Advance Directives on file: <<no information> per medical record.  Verbally reviewed LW/HCPA information.   provided patient with copy of LW/HCPA documents and provided education on completion of forms.    Living Donors: No. Education and resource information given to patient.    Highest Education Level: Associate/Bachelor Degree  Reading Ability: college  Reports difficulty with: seeing and hearing. Pt reports wearing glasses and reports having implants in both eyes. Pt reports hard of hearing more in left ear than right and has to watch people's mouths.  Learns Best By:  Pt reports learning best by verbal and visual instruction.     Status: no  VA Benefits: no     Working for Income: No  If no, reason not working: Patient Choice - Retired.  Patient is retired from being the  for [employer].    Spouse/Significant Other Employment: Pt's wife reports that she works full-time as an  for [employer].    Disabled: yes: date disability began: Feb. 2016, due to: ESRD.     Monthly Income:  Other: $8300 (gross from pt's [facility], pt's SSI, and wife's income.     Able to afford all costs now and if transplanted, including medications:  yes  Patient and Caregiver verbalizes understanding of personal responsibilities related to transplant costs and the importance of having a financial plan to ensure that patients transplant costs are fully covered.       provided fundraising information/education. Patient and Caregiververbalizes understanding.   remains available.    Insurance:   Payor/Plan Subscr  Sex Relation Sub. Ins. ID Effective Group Num   1. MEDICARE - ME* ANUJ WARREN 1950 Male  [id number]                                    [address]   2. PHYSICIANS MU* ANUJ WARREN 1950 Male  [id number]                                    [address]     Primary Insurance (for UNOS reporting): Public Insurance - Medicare FFS (Fee For Service)  Secondary Insurance (for UNOS reporting): Private Insurance (Pt pays). Pt also reports having Part D with [employer]  Patient and Caregiver verbalizes clear understanding that patient may experience difficulty obtaining and/or be denied insurance coverage post-surgery. This includes and is not limited to disability insurance, life insurance, health insurance, burial insurance, long term care insurance, and other insurances.      Patient and Caregiver also reports understanding that future health concerns related to or unrelated to transplantation may not be covered by patient's insurance.  Resources and information provided and reviewed.     Patient and Caregiver provides verbal permission to release any necessary information to outside resources for patient care and discharge planning.  Resources and information provided are reviewed.      Dialysis Adherence: Patient reports being on hemodialysis in center, attending all dialysis appointments, and staying for the entire course of treatment. Pending adherence check    Infusion Service: patient utilizing? no  Home Health: patient utilizing? no  DME: yes BP Cuff  Pulmonary/Cardiac Rehab: Pt denies.   ADLS:   Pt reports no difficulties with driving, walking, bathing, cooking, housekeeping, eating, shopping, and taking medication.    Adherence:   Pt reports good adherence with medications, dialysis, and health regimen (except for CPAP machine).  Adherence education and counseling provided.     Per History Section:  Past Medical History:   Diagnosis Date    Anemia of chronic disease     Atrial fibrillation     BPH (benign prostatic hypertrophy)     HTN (hypertension)     Polycystic kidney disease      Social History     Tobacco Use    Smoking status: Former Smoker     Packs/day: 2.00     Years: 10.00     Pack years: 20.00     Types: Cigarettes     Start date: 1972     Last attempt to quit: 1984     Years since quittin.8    Smokeless tobacco: Never Used   Substance Use Topics    Alcohol use: No     Comment: Pt reportsbeing a former beer drinker (2-3 cans per day) and quitting in .     Social History     Substance and Sexual Activity   Drug Use No     Social History     Substance and Sexual Activity   Sexual Activity Yes       Per Today's Psychosocial:  Tobacco: Pt reports quitting in  and reports smoking 1-2 ppd for 6-8 years.  Alcohol: Pt reportsbeing a former beer drinker (2-3 cans per day) and quitting in ..  Illicit Drugs/Non-prescribed Medications: none, patient denies any use.    Patient and Caregiver states clear understanding of the potential impact of substance use as it relates to transplant candidacy and is aware of possible random substance screening.  Substance abstinence/cessation counseling, education and resources provided and reviewed.     Arrests/DWI/Treatment/Rehab: patient denies    Psychiatric History:    Mental Health: Pt reports feeling depressed at first, but now it is a way of life and realizes that it could be worse. Pt reports no current mental health issues or concerns.   Psychiatrist/Counselor: Pt denies seeing a mental health professional and reports being  open to seeing the psych department for talk therapy if necessary.  Medications:  Pt denies taking medications for mental health reasons.  Suicide/Homicide Issues: Pt denies any history of or current suicidal or homicidal ideations.    Safety at home: Pt reports that his parents had a difficult relationship and stayed . Pt reports that his father  at a young age due to excessive smoking and drinking. Pt reports that he also had polycystic kidney disease. Pt reports no current or history of safety concerns in household; including mental, physical, verbal, or sexual abuse.    Knowledge: Patient and Caregiver states having clear understanding and realistic expectations regarding the potential risks and potential benefits of organ transplantation and organ donation and agrees to discuss with health care team members and support system members, as well as to utilize available resources and express questions and/or concerns in order to further facilitate the pt informed decision-making.  Resources and information provided and reviewed.    Patient and Caregiver is aware of [facility]'s affiliation and/or partnership with agencies in home health care, LTAC, SNF, [facility], and other hospitals and clinics.    Understanding: Patient and Caregiver reports having a clear understanding of the many lifetime commitments involved with being a transplant recipient, including costs, compliance, medications, lab work, procedures, appointments, concrete and financial planning, preparedness, timely and appropriate communication of concerns, abstinence (ETOH, tobacco, illicit non-prescribed drugs), adherence to all health care team recommendations, support system and caregiver involvement, appropriate and timely resource utilization and follow-through, mental health counseling as needed/recommended, and patient and caregiver responsibilities.  Social Service Handbook, resources and detailed educational information provided and  "reviewed.  Educational information provided.    Patient and Caregiver also reports current and expected compliance with health care regime and states having a clear understanding of the importance of compliance.      Patient and Caregiver reports a clear understanding that risks and benefits may be involved with organ transplantation and with organ donation.       Patient and Caregiver also reports clear understanding that psychosocial risk factors may affect patient, and include but are not limited to feelings of depression, generalized anxiety, anxiety regarding dependence on others, post traumatic stress disorder, feelings of guilt and other emotional and/or mental concerns, and/or exacerbation of existing mental health concerns.  Detailed resources provided and discussed.      Patient and Caregiver agrees to access appropriate resources in a timely manner as needed and/or as recommended, and to communicate concerns appropriately.  Patient and Caregiver also reports a clear understanding of treatment options available.     Patient and Caregiver received education in a group setting.   reviewed education, provided additional information, and answered questions.    Feelings or Concerns: Patient and Caregiver did not express any concerns at this time.    Coping: Pt reports coping well with the transplant process at this time and reports taking a walk, hunting, fishing, camping, cooking, sitting in the woods, or on his boat as ways to cope. Pt also reports being a part of the [employer].    Goals: Pt reports "doing a better job of caring for myself",having a better quality of life, "live long for my grandson", getting off dialysis, and being healthy as goals for after transplant.    Interview Behavior: Patient and Caregiver presents as alert and oriented x 4, pleasant, good eye contact, well groomed, recall good, concentration/judgement good, average intelligence, calm, communicative, " cooperative and asking and answering questions appropriately. Pt presents with Becky Mazariegos, pt's wife at pt's request.         Transplant Social Work - Candidacy  Assessment/Plan:     Psychosocial Suitability: Patient presents as a suitable candidate for kidney transplant at this time. Based on psychosocial risk factors, patient presents as low risk, due to adequate caregiver plan, suitable adherence, and financial plan in place..    Recommendations/Additional Comments: SW recommends that pt remain aware of potential mental health concerns and contact the team if any concerns arise. SW recommends that pt remain abstinent from tobacco, ETOH, and drug use. SW supports pt's continued adherence. SW remains available to answer any questions or concerns that arise as the pt moves through the transplant process.   Verna Swan, VARUN

## 2018-10-29 ENCOUNTER — TELEPHONE (OUTPATIENT)
Dept: TRANSPLANT | Facility: CLINIC | Age: 68
End: 2018-10-29

## 2018-10-29 NOTE — TELEPHONE ENCOUNTER
Dialysis Adherence:     Mery, Clinic manager at [facility] dialysis center reports over last three months that the patient has had no AMAs, not had any no-shows, and no issues with caregivers, transportation, or any other psychosocial concerns.

## 2019-03-15 DIAGNOSIS — Z76.82 AWAITING ORGAN TRANSPLANT STATUS: Primary | ICD-10-CM

## 2019-04-16 DIAGNOSIS — Z76.82 ORGAN TRANSPLANT CANDIDATE: Primary | ICD-10-CM

## 2019-04-26 ENCOUNTER — NURSE TRIAGE (OUTPATIENT)
Dept: ADMINISTRATIVE | Facility: CLINIC | Age: 69
End: 2019-04-26

## 2019-04-26 ENCOUNTER — TELEPHONE (OUTPATIENT)
Dept: TRANSPLANT | Facility: CLINIC | Age: 69
End: 2019-04-26

## 2019-04-27 NOTE — TELEPHONE ENCOUNTER
Reason for Disposition   Caller has already spoken to PCP or another triager   [1] Follow-up call from patient regarding patient's clinical status AND [2] information urgent    Protocols used: INFORMATION ONLY CALL-A-AH, PCP CALL - NO TRIAGE-A-AH    Listed Kidney, NO BPA     Patient missed a call from the transplant coordinator about the availability of a kidney. The  paged her and will call the patient back when she returns the page. Patient verbalized understanding.

## 2019-04-27 NOTE — TELEPHONE ENCOUNTER
ON-CALL NOTE    UNOS# [id number]    Notified by Shaggy Hogue, , that Ronal Mazariegos is eligible for kidney offer.  Spoke with patient and identified no acute medical issues in telephone assessment. Protocol script read to patient regarding PHS increased risk and HCV+ donor offer. Patient verbalized understanding, all questions answered, patient declines organ offer.  Current sample of blood is available for crossmatch.  Patient reports no sensitizing event since last blood sample for PRA received.    As pt declined offer, I asked Dr Yen to call the pt. After considerable discussion, pt declines offer at this time.  Pt will RTC in the next few weeks and will discuss further with the team.

## 2019-04-28 ENCOUNTER — TELEPHONE (OUTPATIENT)
Dept: TRANSPLANT | Facility: CLINIC | Age: 69
End: 2019-04-28

## 2019-04-28 NOTE — TELEPHONE ENCOUNTER
4/27/19 at 0900:  Case assumed from Princess Gao.  Patient updated throughout day on progess of the case.  1700:  Notified patient that organ will now be going to a higher ranked recipient.  Pt advised to resume normal activities.  Verbalized understanding.

## 2019-05-09 ENCOUNTER — HOSPITAL ENCOUNTER (OUTPATIENT)
Dept: RADIOLOGY | Facility: HOSPITAL | Age: 69
Discharge: HOME | End: 2019-05-09
Attending: NURSE PRACTITIONER
Payer: MEDICARE

## 2019-05-09 ENCOUNTER — HOSPITAL ENCOUNTER (OUTPATIENT)
Dept: CARDIOLOGY | Facility: CLINIC | Age: 69
Discharge: HOME | End: 2019-05-09
Attending: NURSE PRACTITIONER
Payer: MEDICARE

## 2019-05-09 ENCOUNTER — OFFICE VISIT (OUTPATIENT)
Dept: TRANSPLANT | Facility: CLINIC | Age: 69
End: 2019-05-09
Payer: MEDICARE

## 2019-05-09 ENCOUNTER — HOSPITAL ENCOUNTER (OUTPATIENT)
Dept: RADIOLOGY | Facility: HOSPITAL | Age: 69
Discharge: HOME | End: 2019-05-09
Attending: TRANSPLANT SURGERY
Payer: MEDICARE

## 2019-05-09 ENCOUNTER — TELEPHONE (OUTPATIENT)
Dept: TRANSPLANT | Facility: CLINIC | Age: 69
End: 2019-05-09

## 2019-05-09 VITALS
BODY MASS INDEX: 27.59 KG/M2 | RESPIRATION RATE: 16 BRPM | SYSTOLIC BLOOD PRESSURE: 134 MMHG | TEMPERATURE: 98 F | DIASTOLIC BLOOD PRESSURE: 83 MMHG | OXYGEN SATURATION: 98 % | HEIGHT: 74 IN | WEIGHT: 214.94 LBS | HEART RATE: 60 BPM

## 2019-05-09 VITALS
WEIGHT: 215 LBS | SYSTOLIC BLOOD PRESSURE: 134 MMHG | HEART RATE: 60 BPM | HEIGHT: 74 IN | DIASTOLIC BLOOD PRESSURE: 83 MMHG | BODY MASS INDEX: 27.59 KG/M2

## 2019-05-09 DIAGNOSIS — Z76.82 PATIENT ON WAITING LIST FOR KIDNEY TRANSPLANT: Chronic | ICD-10-CM

## 2019-05-09 DIAGNOSIS — Z76.82 ORGAN TRANSPLANT CANDIDATE: ICD-10-CM

## 2019-05-09 DIAGNOSIS — I48.20 CHRONIC ATRIAL FIBRILLATION: Primary | Chronic | ICD-10-CM

## 2019-05-09 DIAGNOSIS — I10 HYPERTENSION, UNSPECIFIED TYPE: ICD-10-CM

## 2019-05-09 DIAGNOSIS — Z79.01 LONG TERM (CURRENT) USE OF ANTICOAGULANTS: Chronic | ICD-10-CM

## 2019-05-09 LAB
ASCENDING AORTA: 2.85 CM
AV INDEX (PROSTH): 0.58
AV MEAN GRADIENT: 5.54 MMHG
AV PEAK GRADIENT: 11.02 MMHG
AV VALVE AREA: 2.17 CM2
AV VELOCITY RATIO: 0.61
BSA FOR ECHO PROCEDURE: 2.26 M2
CV ECHO LV RWT: 0.35 CM
DOP CALC AO PEAK VEL: 1.66 M/S
DOP CALC AO VTI: 37.59 CM
DOP CALC LVOT AREA: 3.73 CM2
DOP CALC LVOT DIAMETER: 2.18 CM
DOP CALC LVOT PEAK VEL: 1.01 M/S
DOP CALC LVOT STROKE VOLUME: 81.4 CM3
DOP CALCLVOT PEAK VEL VTI: 21.82 CM
E WAVE DECELERATION TIME: 202.39 MSEC
E/A RATIO: 1.76
E/E' RATIO: 11.88
ECHO LV POSTERIOR WALL: 0.94 CM (ref 0.6–1.1)
FRACTIONAL SHORTENING: 32 % (ref 28–44)
INTERVENTRICULAR SEPTUM: 0.94 CM (ref 0.6–1.1)
LA MAJOR: 5.86 CM
LA MINOR: 6.23 CM
LA WIDTH: 4.57 CM
LEFT ATRIUM SIZE: 4.87 CM
LEFT ATRIUM VOLUME INDEX: 51 ML/M2
LEFT ATRIUM VOLUME: 114.25 CM3
LEFT INTERNAL DIMENSION IN SYSTOLE: 3.62 CM (ref 2.1–4)
LEFT VENTRICLE DIASTOLIC VOLUME INDEX: 61.4 ML/M2
LEFT VENTRICLE DIASTOLIC VOLUME: 137.59 ML
LEFT VENTRICLE MASS INDEX: 83.5 G/M2
LEFT VENTRICLE SYSTOLIC VOLUME INDEX: 24.7 ML/M2
LEFT VENTRICLE SYSTOLIC VOLUME: 55.32 ML
LEFT VENTRICULAR INTERNAL DIMENSION IN DIASTOLE: 5.34 CM (ref 3.5–6)
LEFT VENTRICULAR MASS: 187.05 G
LV LATERAL E/E' RATIO: 11.88
LV SEPTAL E/E' RATIO: 11.88
MV PEAK A VEL: 0.54 M/S
MV PEAK E VEL: 0.95 M/S
PISA TR MAX VEL: 3.07 M/S
PULM VEIN S/D RATIO: 1.27
PV PEAK D VEL: 0.55 M/S
PV PEAK S VEL: 0.7 M/S
RA MAJOR: 5.23 CM
RA PRESSURE: 3 MMHG
RA WIDTH: 3.54 CM
RIGHT VENTRICULAR END-DIASTOLIC DIMENSION: 3.66 CM
SINUS: 3.69 CM
STJ: 2.78 CM
TDI LATERAL: 0.08
TDI SEPTAL: 0.08
TDI: 0.08
TR MAX PG: 37.7 MMHG
TRICUSPID ANNULAR PLANE SYSTOLIC EXCURSION: 2.91 CM
TV REST PULMONARY ARTERY PRESSURE: 41 MMHG

## 2019-05-09 PROCEDURE — 99999 PR PBB SHADOW E&M-EST. PATIENT-LVL III: CPT | Mod: PBBFAC,TXP,, | Performed by: INTERNAL MEDICINE

## 2019-05-09 PROCEDURE — 99215 OFFICE O/P EST HI 40 MIN: CPT | Mod: S$PBB,TXP,, | Performed by: INTERNAL MEDICINE

## 2019-05-09 PROCEDURE — 76770 US EXAM ABDO BACK WALL COMP: CPT | Mod: TC,TXP

## 2019-05-09 PROCEDURE — 71046 X-RAY EXAM CHEST 2 VIEWS: CPT | Mod: 26,TXP,, | Performed by: RADIOLOGY

## 2019-05-09 PROCEDURE — 99215 PR OFFICE/OUTPT VISIT, EST, LEVL V, 40-54 MIN: ICD-10-PCS | Mod: S$PBB,TXP,, | Performed by: INTERNAL MEDICINE

## 2019-05-09 PROCEDURE — 71046 XR CHEST PA AND LATERAL: ICD-10-PCS | Mod: 26,TXP,, | Performed by: RADIOLOGY

## 2019-05-09 PROCEDURE — 72170 X-RAY EXAM OF PELVIS: CPT | Mod: 26,TXP,, | Performed by: RADIOLOGY

## 2019-05-09 PROCEDURE — 76770 US EXAM ABDO BACK WALL COMP: CPT | Mod: 26,TXP,, | Performed by: RADIOLOGY

## 2019-05-09 PROCEDURE — 72170 X-RAY EXAM OF PELVIS: CPT | Mod: TC,TXP

## 2019-05-09 PROCEDURE — 93306 TRANSTHORACIC ECHO (TTE) COMPLETE (CUPID ONLY): ICD-10-PCS | Mod: 26,S$PBB,TXP, | Performed by: INTERNAL MEDICINE

## 2019-05-09 PROCEDURE — 76770 US RETROPERITONEAL COMPLETE: ICD-10-PCS | Mod: 26,TXP,, | Performed by: RADIOLOGY

## 2019-05-09 PROCEDURE — 99999 PR PBB SHADOW E&M-EST. PATIENT-LVL III: ICD-10-PCS | Mod: PBBFAC,TXP,, | Performed by: INTERNAL MEDICINE

## 2019-05-09 PROCEDURE — 72170 XR PELVIS ROUTINE AP: ICD-10-PCS | Mod: 26,TXP,, | Performed by: RADIOLOGY

## 2019-05-09 PROCEDURE — 71046 X-RAY EXAM CHEST 2 VIEWS: CPT | Mod: TC,TXP

## 2019-05-09 PROCEDURE — 99213 OFFICE O/P EST LOW 20 MIN: CPT | Mod: PBBFAC,25,TXP | Performed by: INTERNAL MEDICINE

## 2019-05-09 PROCEDURE — 93306 TTE W/DOPPLER COMPLETE: CPT | Mod: PBBFAC,TXP | Performed by: INTERNAL MEDICINE

## 2019-05-09 RX ORDER — SEVELAMER CARBONATE 800 MG/1
TABLET, FILM COATED ORAL
Refills: 4 | Status: ON HOLD | COMMUNITY
Start: 2019-04-30 | End: 2021-05-06 | Stop reason: HOSPADM

## 2019-05-09 RX ORDER — DRONEDARONE 400 MG/1
1 TABLET, FILM COATED ORAL 2 TIMES DAILY
Refills: 5 | COMMUNITY
Start: 2019-04-26 | End: 2021-01-21

## 2019-05-09 NOTE — PROGRESS NOTES
Ronal Mazariegos medical records reviewed with Dr. Yen. Provider determined that due to having potential safety issues that the patient will be made inactive on waitlist at this time.     Ronal Mazariegos notified of safety concerns related to transplant at this time. Patients dialysis unit and Nephrologist notified of above concerns by letter. Pt states he will schedule a follow-up Urology consult & CT abd/pelvis with Dr. Bong Silva, P [phone], F [phone]. Pt also reports that he is scheduled for a hernia repair on 5/28/19 and will be restricted for at least 4 wks.

## 2019-05-09 NOTE — TELEPHONE ENCOUNTER
This  (YOVANY) was approached by the patients (pts) wife who frantically asked SW to fax over the pts ultra sound provided by Dr. Cronin.  The pt was accompanying his wife with the request.  The wife of the patient asked SW if the test required could be scheduled here.  YOVANY redirected the pt and his wife to his physician and his pre coordinator Jung.  YOVANY faxed results to Dr. Silva Office fax# [phone] at the request of the patient.  YOVANY will forward transmission of fax to the pts coordinator for review. SW remains available.

## 2019-05-09 NOTE — LETTER
May 9, 2019        Alexis Zamorano  [address] [place] [address]  Phone: [phone]  Fax: [phone]             [facility]  [address]  [facility] [address]  Phone: [phone]   Patient: Ronal Mazariegos   MR Number: [id number]   YOB: 1950   Date of Visit: 5/9/2019       Dear Dr. Alexis Zamorano    Thank you for referring Ronal Mazariegos to me for evaluation. Attached you will find relevant portions of my assessment and plan of care.    If you have questions, please do not hesitate to call me. I look forward to following Ronal Mazariegos along with you.    Sincerely,    Celeste Yen MD    Enclosure    If you would like to receive this communication electronically, please contact [email or web address] or [phone] to request [employer] access.    [employer] is a tool which provides read-only access to select patient information with whom you have a relationship. Its easy to use and provides real time access to review your patients record including encounter summaries, notes, results, and demographic information.    If you feel you have received this communication in error or would no longer like to receive these types of communications, please e-mail [email or web address]

## 2019-05-09 NOTE — PROGRESS NOTES
KIDNEY, KIDNEY/PANCREAS, PANCREAS TRANSPLANT RECIPIENT REEVALUATION    Requesting Physician: dr. Jaun FARRELL     CC:   Six monthly/Annual reassessment of kidney transplant candidacy.    HPI:  Mr. Mazariegos is a 68 y.o. year old White male with advanced kidney disease secondary to polycystic kidney disease.  He has been on the wait list for a kidney transplant at [facility] since 2/20/2017. Patient is currently on hemodialysis started on Feb 2018. Patient is dialyzing on MWF schedule.  Patient reports that he is tolerating dialysis well. He has a LUE AV fistula. Patient denies any recent hospitalizations or ED visits.     - Afib since 2017, on warfarin  - Plan for right inguinal hernia repair next week    Patient denies any recent history of coronary artery disease, stroke, seizure disorder,deep venous thrombosis, pulmonary embolism, recurrent urinary tract infections or malignancies. Patient states that he is doing well. he denies any CP,nausea, vomiting, diarrhea, abdominal pain,  cough, fever, chills, weight loss or night sweats.        Functional Status: He  Walks 2-3 blocks without any symptoms of chest pain, SOB, claudication. He does gardening, painting and cut trees  Previous Transplant: no  Previous Blood Transfusion: no  Anticoagulation/ antiplatelet therapy and reason: ASA 81 mg every other day, warfarin for afib   Potential Donor: no  High KDPI candidate: no  Meets center eligibility for accepting HCV+ donor offer: yes        Past Medical History:  Past Medical History:   Diagnosis Date    Anemia of chronic disease     Atrial fibrillation     BPH (benign prostatic hypertrophy)     HTN (hypertension)     Polycystic kidney disease        Past Surgical History:  Past Surgical History:   Procedure Laterality Date    AV FISTULA PLACEMENT Left 2016    CATARACT EXTRACTION, BILATERAL Bilateral          Family History:  Family History   Problem Relation Age of Onset    Polycystic kidney disease Father      Hypertension Father     Kidney disease Father     Polycystic kidney disease Sister     Hypertension Sister     Kidney disease Sister        Social History:  Social History     Socioeconomic History    Marital status:      Spouse name: Not on file    Number of children: 1    Years of education: Not on file    Highest education level: Not on file   Occupational History    Not on file   Social Needs    Financial resource strain: Not on file    Food insecurity:     Worry: Not on file     Inability: Not on file    Transportation needs:     Medical: Not on file     Non-medical: Not on file   Tobacco Use    Smoking status: Former Smoker     Packs/day: 2.00     Years: 10.00     Pack years: 20.00     Types: Cigarettes     Start date: 1972     Last attempt to quit: 1984     Years since quittin.4    Smokeless tobacco: Never Used   Substance and Sexual Activity    Alcohol use: No     Comment: Pt reportsbeing a former beer drinker (2-3 cans per day) and quitting in .    Drug use: No    Sexual activity: Yes   Lifestyle    Physical activity:     Days per week: Not on file     Minutes per session: Not on file    Stress: Not on file   Relationships    Social connections:     Talks on phone: Not on file     Gets together: Not on file     Attends [employer] service: Not on file     Active member of club or organization: Not on file     Attends meetings of clubs or organizations: Not on file     Relationship status: Not on file   Other Topics Concern    Not on file   Social History Narrative    Not on file           Current Medication  Current Outpatient Medications   Medication Sig Dispense Refill    amLODIPine (NORVASC) 10 MG tablet Take 10 mg by mouth once daily.      aspirin (ECOTRIN) 81 MG EC tablet Take 81 mg by mouth once daily.      cinacalcet (SENSIPAR) 30 MG Tab Take 30 mg by mouth daily with breakfast.      cyanocobalamin, vitamin B-12, (VITAMIN B-12) 50 mcg tablet Take  50 mcg by mouth once daily.      finasteride (PROSCAR) 5 mg tablet Take 5 mg by mouth once daily.      fish oil-omega-3 fatty acids 300-1,000 mg capsule Take by mouth once daily.      MULTAQ 400 mg Tab Take 1 tablet by mouth 2 (two) times daily.  5    multivitamin capsule Take 1 capsule by mouth once daily.      nebivolol (BYSTOLIC) 10 MG Tab Take 20 mg by mouth once daily.      RENVELA 800 mg Tab TAKE 2 TABLETS BY MOUTH WITH EVERY MEAL  4    sodium bicarbonate 325 MG tablet Take 325 mg by mouth 4 (four) times daily.      warfarin (COUMADIN) 2 MG tablet Take 2 mg by mouth Daily.      sucroferric oxyhydroxide (VELPHORO) 500 mg Chew Take by mouth.       No current facility-administered medications for this visit.              Review of Systems    Respiratory: Negative for shortness of breath.    Cardiovascular: Negative for chest pain and leg swelling.   Gastrointestinal: Negative for abdominal pain, nausea and vomiting.   Genitourinary: Negative for difficulty urinating.   Skin: Negative for rash.              OBJECTIVE       Body mass index is 27.6 kg/m².    Vitals:    05/09/19 1218   BP: 134/83   Pulse: 60   Resp: 16   Temp: 98.2 °F (36.8 °C)       Physical Exam  Constitutional: He is oriented to person, place, and time. He appears well-developed and well-nourished.   Eyes: Conjunctivae are normal.   Cardiovascular: Normal rate and regular rhythm.   Pulmonary/Chest: Effort normal and breath sounds normal.   Abdominal: Soft. He exhibits no mass. There is no tenderness.   Musculoskeletal: He exhibits no edema.   Neurological: He is alert and oriented to person, place, and time.   Skin: Skin is dry. No rash noted.   Psychiatric: He has a normal mood and affect. Judgment normal.             Labs:  Reviewed with the patient      ASSESSMENT     1. Chronic atrial fibrillation    2. Hypertension, unspecified type    3. Long term (current) use of anticoagulants--coumadin, ASA    4. Patient on waiting list for  kidney transplant        PLAN       Transplant Candidacy:    Mr. Mazariegos is a a suitable for kidney transplantation.   Meets center eligibility for accepting HCV+ donor offer - yes.  Patient educated on HCV+ donors. Ronal is willing to accept HCV+ donor offer - yes   Patient is a candidate for KDPI > 85 kidney donor offer - no.   He has experienced health changes that warrant further investigation.  Patient will be placed in an inactive status at present.      - Please Inactivate him till he gets his CT scan : CT per renal mass protocol and urology consult: 4.1 cm left mid renal complex lesion, not identified on prior imaging.   - Pending echocardiogram

## 2019-05-09 NOTE — LETTER
May 9, 2019    Ronal Yair  [address]  [place] [address]    Dear Ronal Mazariegos:  MRN: [id number]    The [facility] team reviewed your transplant candidacy.  It is our programs opinion that you are temporarily not a transplant candidate at our facility as of 5/9/19 because of renal lesion requiring further evaluation such as cat scan of abdomen and pelvis and Urology consult.  You will remain listed on the wait list, but will not be able to receive a transplant until this issue is resolved.      Attached is a letter from the [employer] Organ [employer] (UNOS).  It describes the services and information offered to patients by UNOS and the Organ Procurement and Transplant Network.    The [facility] team remains available to answer any questions.  Should you have any questions regarding this decision, please do not hesitate to contact our pre-transplant office.      Sincerely,        Jaimie Walters MD  Medical Director, Kidney & Kidney/Pancreas Transplantation      Faxed to:  Alexis Zamorano MD                   [facility]                                     OPTN/UNOS: Your Resource for Organ Transplant Information        If you have a question regarding your own medical care, you always should call your transplant center first. However, for general organ transplant-related information, you can call the [employer] (UNOS) toll-free patient services line at [phone].    Anyone, including potential transplant candidates, recipients, family members/friends, living donors, and/or donor family members can call this number to:    · talk about organ donation, living donation, how transplant and donation work, the donation process, transplant policies, and transplant/donor information;  · get a free patient information kit with helpful booklets, waiting list and transplant information, and a list of all transplant centers;  · ask questions  about the Organ Procurement and Transplantation Network (OPTN) web site (www.optn.transplant.hrsa.gov); the UNOS Web site (www.unos.org); or the UNOS web site for living donors and transplant recipients (www.transplantliving.org);  · learn how UNOS and the OPTN can help you;  · talk about any concerns that you may have with a transplant center and how they perform    [facility] is a not-for-profit organization that provides all of the administrative services for the national OPTN under federal contract to the Health Resources and Services Administration (HRSA), an agency under the U.S. Department of Health and Human Services (HHS).     UNOS and OPTN responsibilities include:    · writing educational material for patients, the public and professionals;  · helping to make people aware of the need for donated organs and tissue;  · writing organ transplant policy with help from doctors, nurses, transplant patients/candidates, donor families, living donors, and the public;  · coordinating the organ matching and placement process;  · collecting information about every organ transplant and donation that occurs in the [place].    Remember, you should contact your transplant center directly if you have questions or concerns about your own medical care including medical records, work-up progress and test reports. [facility] is not your transplant center, and staff at [facility] will not be able to transfer you to your transplant center, so keep your transplant centers phone number handy. But, while you research your transplant needs and learn as much as you can about transplantation and donation, we welcome your call to our toll-free patient services line at [phone].

## 2019-06-10 ENCOUNTER — TELEPHONE (OUTPATIENT)
Dept: TRANSPLANT | Facility: CLINIC | Age: 69
End: 2019-06-10

## 2019-06-10 NOTE — TELEPHONE ENCOUNTER
Returned call to Fernanda at Dr. Dudley's office & requested op report for hernia repair & progress note. Spoke w/pt who will have Dr. Silva (urology) fax clearance & CT abd/pelvis.

## 2019-06-24 ENCOUNTER — TELEPHONE (OUTPATIENT)
Dept: TRANSPLANT | Facility: CLINIC | Age: 69
End: 2019-06-24

## 2019-06-24 NOTE — PROGRESS NOTES
Ronal Mazariegos medical records reviewed with . Provider determined that the patient will be re-activated on waitlist at this time.       Ronal Mazariegos notified of re-activation. Patients dialysis unit and Nephrologist notified by letter.

## 2019-06-24 NOTE — LETTER
June 24, 2019    Ronal Mazariegos  [address]  [place] [address]    Dear Ronal Mazariegos:  MRN: [id number]    The [facility] transplant team reviewed your transplant candidacy.  It is our programs opinion that you are a transplant candidate and are re-activated at our facility as of 6/24/19.  You are now eligible to receive a transplant.  Your status is now Status 1.    Attached is a letter from the [employer] Organ [employer]aring (UNOS).  It describes the services and information offered to patients by UNOS and the Organ Procurement and Transplant Network.    The [facility] transplant team remains available to answer any questions.  Should you have any questions regarding this decision, please do not hesitate to contact our pre-transplant office.      Sincerely,    KAYLENE BarnhartN, RN, MPH  Pre-Kidney Coordinators  [facility]  [address] [place] [id number]  [phone]    Faxed to:  Dr. Alexis Zamorano                   [facility]           OPTN/UNOS: Your Resource for Organ Transplant Information        If you have a question regarding your own medical care, you always should call your transplant center first. However, for general organ transplant-related information, you can call the [employer] Organ [employer]aring (UNOS) toll-free patient services line at [phone].    Anyone, including potential transplant candidates, recipients, family members/friends, living donors, and/or donor family members can call this number to:    · talk about organ donation, living donation, how transplant and donation work, the donation process, transplant policies, and transplant/donor information;  · get a free patient information kit with helpful booklets, waiting list and transplant information, and a list of all transplant centers;  · ask questions about the Organ Procurement and Transplantation Network (OPTN) web site (www.optn.transplant.hrsa.gov); the UNOS  Web site (www.unos.org); or the UNOS web site for living donors and transplant recipients (www.transplantliving.org);  · learn how UNOS and the OPTN can help you;  · talk about any concerns that you may have with a transplant center and how they perform    UNOS is a not-for-profit organization that provides all of the administrative services for the national OPTN under federal contract to the Health Resources and Services Administration (HRSA), an agency under the U.S. Department of Health and Human Services (HHS).     UNOS and OPTN responsibilities include:    · writing educational material for patients, the public and professionals;  · helping to make people aware of the need for donated organs and tissue;  · writing organ transplant policy with help from doctors, nurses, transplant patients/candidates, donor families, living donors, and the public;  · coordinating the organ matching and placement process;  · collecting information about every organ transplant and donation that occurs in the [place].    Remember, you should contact your transplant center directly if you have questions or concerns about your own medical care including medical records, work-up progress and test reports. [facility] is not your transplant center, and staff at [facility] will not be able to transfer you to your transplant center, so keep your transplant centers phone number handy. But, while you research your transplant needs and learn as much as you can about transplantation and donation, we welcome your call to our toll-free patient services line at [phone].

## 2019-07-09 NOTE — PROGRESS NOTES
Transplant Recipient Adult Psychosocial Assessment (Last Assessment completed on 10/02/2018)    Ronal Mazariegos  [address]  [place] [address]  Telephone Information:   Mobile [phone]   Home  [phone] (home)  Work  There is no work phone number on file.  E-mail  [email or web address]    Sex: male  YOB: 1950  Age: 68 y.o.    Encounter Date: 5/9/2019  U[place] Citizen: yes  Primary Language: English   Needed: no    Emergency Contact:  Name: Becky Mazariegos  Relationship: wife  Address: Same as pt.  Phone Numbers:  [phone] (home), [phone] (work), [phone] (mobile)    Family/Social Support:   Number of dependents/: Pt reports no dependents.  Marital history: Pt reports 1 marriage of 42 years to Becky Mazariegos.  Other family dynamics: Pt reports 1 adult son: Bong; 2 sisters: Charlee Harrison (transplanted at [facility]) and Huong Mendez. Pt also reports that his nephew: Keena was also transplanted at [facility] Pt identifies all family members as supportive.    Household Composition:  Name: Ronal Mazariegos  Age: 68  Relationship: patient  Does person drive? yes    Name: Becky Mazariegos  Age: 67  Relationship: wife  Does person drive? yes    Do you and your caregivers have access to reliable transportation? yes     PRIMARY CAREGIVER: Becky Mazariegos (wife) will be primary caregiver, phone number [phone].      provided in-depth information to patient and caregiver regarding pre- and post-transplant caregiver role.   strongly encourages patient and caregiver to have concrete plan regarding post-transplant care giving, including back-up caregiver(s) to ensure care giving needs are met as needed.    Patient and Caregiver states understanding all aspects of caregiver role/commitment and is able/willing/committed to being caregiver to the fullest extent necessary.    Patient and Caregiver verbalizes understanding of the education provided today  and caregiver responsibilities.         remains available. Patient and Caregiver agree to contact  in a timely manner if concerns arise.      Able to take time off work without financial concerns: yes.     Additional Significant Others who will Assist with Transplant:  Name: Bong Mazariegos  Age: 30  Phone: [phone]  City: [place] State: [place]  Relationship: son  Does person drive? yes    Name: Charlee Ward(previously transplanted at [facility])  Age: 49  Phone: [phone]  City: [facility] State: [place]  Relationship: sister  Does person drive? yes    Living Will: no Education and information provided to pt.  Healthcare Power of : no Education and information provided to pt.  Advance Directives on file: <<no information> per medical record.  Verbally reviewed LW/HCPA information.   provided patient with copy of LW/HCPA documents and provided education on completion of forms.    Living Donors: No. Education and resource information given to patient.    Highest Education Level: Associate/Bachelor Degree  Reading Ability: college  Reports difficulty with: seeing and hearing. Pt reports wearing glasses and reports having implants in both eyes. Pt reports hard of hearing more in left ear than right and has to watch people's mouths.  Learns Best By:  Pt reports learning best by verbal and visual instruction.     Status: no  VA Benefits: no     Working for Income: No  If no, reason not working: Patient Choice - Retired.  Patient is retired from being the  for [email or web address].    Spouse/Significant Other Employment: Pt's wife reports that she works full-time as an  for [email or web address].    Disabled: yes: date disability began: Feb. 2016, due to: ESRD.     Monthly Income:  Other: $9000 (gross from pt's halfway, pt's SSI, and wife's income.     Able to afford all costs now and if transplanted, including medications: yes  Patient and  Caregiver verbalizes understanding of personal responsibilities related to transplant costs and the importance of having a financial plan to ensure that patients transplant costs are fully covered.       provided fundraising information/education. Patient and Caregiververbalizes understanding.   remains available.    Insurance:   Payor/Plan Subscr  Sex Relation Sub. Ins. ID Effective Group Num   1. MEDICARE - ME* ANUJ WARREN 1950 Male  [id number]                                    [address]   2. PHYSICIANS MU* ANUJ WARREN 1950 Male  [id number]                                    [address]     Primary Insurance (for UNOS reporting): Public Insurance - Medicare FFS (Fee For Service)  Secondary Insurance (for UNOS reporting): Private Insurance (Pt pays). Pt also reports having Part D with [employer]  Patient and Caregiver verbalizes clear understanding that patient may experience difficulty obtaining and/or be denied insurance coverage post-surgery. This includes and is not limited to disability insurance, life insurance, health insurance, burial insurance, long term care insurance, and other insurances.      Patient and Caregiver also reports understanding that future health concerns related to or unrelated to transplantation may not be covered by patient's insurance.  Resources and information provided and reviewed.     Patient and Caregiver provides verbal permission to release any necessary information to outside resources for patient care and discharge planning.  Resources and information provided are reviewed.      Dialysis Adherence: Patient reports being on hemodialysis in center, attending all dialysis appointments, and staying for the entire course of treatment. SW received a faxed adherence form from pt's dialysis center indicates over last three months that the patient has had no AMAs, nit had any no-shows, and no issues with caregivers,  transportation, or any other psychosocial concerns..    Infusion Service: patient utilizing? no  Home Health: patient utilizing? no  DME: yes BP Cuff  Pulmonary/Cardiac Rehab: Pt denies.   ADLS:  Pt reports no difficulties with driving, walking, bathing, cooking, housekeeping, eating, shopping, and taking medication.    Adherence:   Pt reports good adherence with medications, dialysis, and health regimen (except for CPAP machine).  Adherence education and counseling provided.     Per History Section:  Past Medical History:   Diagnosis Date    Anemia of chronic disease     Atrial fibrillation     BPH (benign prostatic hypertrophy)     HTN (hypertension)     Polycystic kidney disease      Social History     Tobacco Use    Smoking status: Former Smoker     Packs/day: 2.00     Years: 10.00     Pack years: 20.00     Types: Cigarettes     Start date: 1972     Last attempt to quit: 1984     Years since quittin.6    Smokeless tobacco: Never Used   Substance Use Topics    Alcohol use: No     Comment: Pt reportsbeing a former beer drinker (2-3 cans per day) and quitting in .     Social History     Substance and Sexual Activity   Drug Use No     Social History     Substance and Sexual Activity   Sexual Activity Yes       Per Today's Psychosocial:  Tobacco: Pt reports quitting in  and reports smoking 1-2 ppd for 6-8 years.  Alcohol: Pt reportsbeing a former beer drinker (2-3 cans per day) and quitting in ..  Illicit Drugs/Non-prescribed Medications: none, patient denies any use.    Patient and Caregiver states clear understanding of the potential impact of substance use as it relates to transplant candidacy and is aware of possible random substance screening.  Substance abstinence/cessation counseling, education and resources provided and reviewed.     Arrests/DWI/Treatment/Rehab: patient denies    Psychiatric History:    Mental Health: Pt reports feeling depressed at first, but now it is a  way of life and realizes that it could be worse. Pt reports no current mental health issues or concerns.   Psychiatrist/Counselor: Pt denies seeing a mental health professional and reports being open to seeing the psych department for talk therapy if necessary.  Medications:  Pt denies taking medications for mental health reasons.  Suicide/Homicide Issues: Pt denies any history of or current suicidal or homicidal ideations.    Safety at home: Pt reports that his parents had a difficult relationship and stayed . Pt reports that his father  at a young age due to excessive smoking and drinking. Pt reports that he also had polycystic kidney disease. Pt reports no current or history of safety concerns in household; including mental, physical, verbal, or sexual abuse.    Knowledge: Patient and Caregiver states having clear understanding and realistic expectations regarding the potential risks and potential benefits of organ transplantation and organ donation and agrees to discuss with health care team members and support system members, as well as to utilize available resources and express questions and/or concerns in order to further facilitate the pt informed decision-making.  Resources and information provided and reviewed.    Patient and Caregiver is aware of [facility]'s affiliation and/or partnership with agencies in home health care, LTAC, SNF, [facility], and other hospitals and clinics.    Understanding: Patient and Caregiver reports having a clear understanding of the many lifetime commitments involved with being a transplant recipient, including costs, compliance, medications, lab work, procedures, appointments, concrete and financial planning, preparedness, timely and appropriate communication of concerns, abstinence (ETOH, tobacco, illicit non-prescribed drugs), adherence to all health care team recommendations, support system and caregiver involvement, appropriate and timely resource utilization and  "follow-through, mental health counseling as needed/recommended, and patient and caregiver responsibilities.  Social Service Handbook, resources and detailed educational information provided and reviewed.  Educational information provided.    Patient and Caregiver also reports current and expected compliance with health care regime and states having a clear understanding of the importance of compliance.      Patient and Caregiver reports a clear understanding that risks and benefits may be involved with organ transplantation and with organ donation.       Patient and Caregiver also reports clear understanding that psychosocial risk factors may affect patient, and include but are not limited to feelings of depression, generalized anxiety, anxiety regarding dependence on others, post traumatic stress disorder, feelings of guilt and other emotional and/or mental concerns, and/or exacerbation of existing mental health concerns.  Detailed resources provided and discussed.      Patient and Caregiver agrees to access appropriate resources in a timely manner as needed and/or as recommended, and to communicate concerns appropriately.  Patient and Caregiver also reports a clear understanding of treatment options available.     Patient and Caregiver received education in a group setting.   reviewed education, provided additional information, and answered questions.    Feelings or Concerns: Patient and Caregiver did not express any concerns at this time.    Coping: Pt reports coping well with the transplant process at this time and reports taking a walk, hunting, fishing, camping, cooking, cleaning, sitting in the woods, or on his boat as ways to cope. Pt also reports being a part of the [employer].    Goals: Pt reports "doing a better job of caring for myself",having a better quality of life, "live long for my grandson", getting off dialysis, and being healthy as goals for after " transplant.    Interview Behavior: Patient and Caregiver presents as alert and oriented x 4, pleasant, good eye contact, well groomed, recall good, concentration/judgement good, average intelligence, calm, communicative, cooperative and asking and answering questions appropriately. Pt presents with Becky Mazariegos, pt's wife at pt's request.         Transplant Social Work - Candidacy  Assessment/Plan:     Psychosocial Suitability: Patient presents as a suitable candidate for kidney transplant at this time. Based on psychosocial risk factors, patient presents as low risk, due to adequate caregiver plan, suitable adherence, and financial plan in place..    Recommendations/Additional Comments: SW recommends that pt remain aware of potential mental health concerns and contact the team if any concerns arise. SW recommends that pt remain abstinent from tobacco, ETOH, and drug use. SW supports pt's continued adherence. SW remains available to answer any questions or concerns that arise as the pt moves through the transplant process.         Verna Swan LCSW

## 2019-10-02 ENCOUNTER — PATIENT MESSAGE (OUTPATIENT)
Dept: TRANSPLANT | Facility: CLINIC | Age: 69
End: 2019-10-02

## 2019-10-02 DIAGNOSIS — Z76.82 ORGAN TRANSPLANT CANDIDATE: Primary | ICD-10-CM

## 2019-12-10 ENCOUNTER — OFFICE VISIT (OUTPATIENT)
Dept: TRANSPLANT | Facility: CLINIC | Age: 69
End: 2019-12-10
Payer: MEDICARE

## 2019-12-10 VITALS
HEIGHT: 73 IN | DIASTOLIC BLOOD PRESSURE: 78 MMHG | BODY MASS INDEX: 27.41 KG/M2 | SYSTOLIC BLOOD PRESSURE: 150 MMHG | OXYGEN SATURATION: 99 % | HEART RATE: 52 BPM | TEMPERATURE: 98 F | RESPIRATION RATE: 18 BRPM | WEIGHT: 206.81 LBS

## 2019-12-10 DIAGNOSIS — Z76.82 PATIENT ON WAITING LIST FOR KIDNEY TRANSPLANT: Chronic | ICD-10-CM

## 2019-12-10 DIAGNOSIS — Z79.01 LONG TERM (CURRENT) USE OF ANTICOAGULANTS: Chronic | ICD-10-CM

## 2019-12-10 DIAGNOSIS — Q61.3 POLYCYSTIC KIDNEY DISEASE: ICD-10-CM

## 2019-12-10 DIAGNOSIS — Z99.2 DEPENDENCE ON RENAL DIALYSIS: ICD-10-CM

## 2019-12-10 DIAGNOSIS — N18.5 CHRONIC KIDNEY DISEASE (CKD), STAGE V: Primary | ICD-10-CM

## 2019-12-10 PROCEDURE — 1126F PR PAIN SEVERITY QUANTIFIED, NO PAIN PRESENT: ICD-10-PCS | Mod: TXP,,, | Performed by: INTERNAL MEDICINE

## 2019-12-10 PROCEDURE — 99999 PR PBB SHADOW E&M-EST. PATIENT-LVL IV: CPT | Mod: PBBFAC,TXP,, | Performed by: INTERNAL MEDICINE

## 2019-12-10 PROCEDURE — 99214 OFFICE O/P EST MOD 30 MIN: CPT | Mod: S$PBB,GC,TXP, | Performed by: INTERNAL MEDICINE

## 2019-12-10 PROCEDURE — 1159F MED LIST DOCD IN RCRD: CPT | Mod: TXP,,, | Performed by: INTERNAL MEDICINE

## 2019-12-10 PROCEDURE — 1159F PR MEDICATION LIST DOCUMENTED IN MEDICAL RECORD: ICD-10-PCS | Mod: TXP,,, | Performed by: INTERNAL MEDICINE

## 2019-12-10 PROCEDURE — 99214 OFFICE O/P EST MOD 30 MIN: CPT | Mod: PBBFAC,TXP | Performed by: INTERNAL MEDICINE

## 2019-12-10 PROCEDURE — 99214 PR OFFICE/OUTPT VISIT, EST, LEVL IV, 30-39 MIN: ICD-10-PCS | Mod: S$PBB,GC,TXP, | Performed by: INTERNAL MEDICINE

## 2019-12-10 PROCEDURE — 1126F AMNT PAIN NOTED NONE PRSNT: CPT | Mod: TXP,,, | Performed by: INTERNAL MEDICINE

## 2019-12-10 PROCEDURE — 99999 PR PBB SHADOW E&M-EST. PATIENT-LVL IV: ICD-10-PCS | Mod: PBBFAC,TXP,, | Performed by: INTERNAL MEDICINE

## 2019-12-10 RX ORDER — OXYBUTYNIN CHLORIDE 5 MG/1
10 TABLET ORAL DAILY
Status: ON HOLD | COMMUNITY
End: 2021-05-06 | Stop reason: HOSPADM

## 2019-12-10 NOTE — PROGRESS NOTES
PHARM.D. PRE-TRANSPLANT NOTE:    This patient's medication therapy was evaluated as part of his pre-transplant evaluation.      The following general pharmacologic concerns were noted: warfarin marni aspirin - patient currently anticoagulated for AFib    The following pharmacologic concerns related to HCV therapy were noted: see below (AFib)      This patient's medication profile was reviewed for considerations for DAA Hepatitis C therapy:    [x]  No current inducers of CYP 3A4 or PGP  [x]  No amiodarone on this patient's EMR profile in the last 24 months  [YES]  Past or current atrial fibrillation on this patient's EMR profile       Current Outpatient Medications   Medication Sig Dispense Refill    amLODIPine (NORVASC) 10 MG tablet Take 10 mg by mouth once daily.      aspirin (ECOTRIN) 81 MG EC tablet Take 81 mg by mouth every Mon, Wed, Fri.       cinacalcet (SENSIPAR) 30 MG Tab Take 30 mg by mouth daily with breakfast.      cyanocobalamin, vitamin B-12, (VITAMIN B-12) 50 mcg tablet Take 50 mcg by mouth once daily.      finasteride (PROSCAR) 5 mg tablet Take 5 mg by mouth once daily.      fish oil-omega-3 fatty acids 300-1,000 mg capsule Take by mouth once daily.      MULTAQ 400 mg Tab Take 1 tablet by mouth 2 (two) times daily.  5    nebivolol (BYSTOLIC) 10 MG Tab Take 10 mg by mouth once daily.       oxybutynin (DITROPAN) 5 MG Tab Take 5 mg by mouth once daily.      RENVELA 800 mg Tab TAKE 2 TABLETS BY MOUTH WITH EVERY MEAL  4    sodium bicarbonate 325 MG tablet Take 650 mg by mouth 2 (two) times daily.       warfarin (COUMADIN) 2 MG tablet Take 2 mg by mouth Daily.       No current facility-administered medications for this visit.          Currently Mr Mazariegos is responsible for preparing / administering this patient's medications on a daily basis.  I am available for consultation and can be contacted, as needed by the other members of the Kidney Transplant team.

## 2019-12-10 NOTE — LETTER
December 16, 2019        Alexis Zamorano  [address] [place] [address]  Phone: [phone]  Fax: [phone]             [facility]- Transplant  [address]  [facility] [address]  Phone: [phone]   Patient: Ronal Mazariegos   MR Number: [id number]   YOB: 1950   Date of Visit: 12/10/2019       Dear Dr. Alexis Zamorano    Thank you for referring Ronal Mazariegos to me for evaluation. Attached you will find relevant portions of my assessment and plan of care.    If you have questions, please do not hesitate to call me. I look forward to following Ronal Mazariegos along with you.    Sincerely,    Jaimie Menon MD    Enclosure    If you would like to receive this communication electronically, please contact [email or web address] or [phone] to request [employer] Link access.    [employer] Link is a tool which provides read-only access to select patient information with whom you have a relationship. Its easy to use and provides real time access to review your patients record including encounter summaries, notes, results, and demographic information.    If you feel you have received this communication in error or would no longer like to receive these types of communications, please e-mail [email or web address]

## 2019-12-10 NOTE — PROGRESS NOTES
KIDNEY, KIDNEY/PANCREAS, PANCREAS TRANSPLANT RECIPIENT REEVALUATION    Requesting Physician: dr. Jaun FARRELL     CC:   Six monthly/Annual reassessment of kidney transplant candidacy.    HPI:  Mr. Mazariegos is a 68 y.o. year old White male with advanced kidney disease secondary to polycystic kidney disease.  He has been on the wait list for a kidney transplant at [facility] since 2/20/2017. Patient is currently on hemodialysis started on Feb 2018. Patient is dialyzing on MWF schedule.  Patient reports that he is tolerating dialysis well. He has a LUE AV fistula. Patient denies any recent hospitalizations or ED visits.     - Afib since 2017, on warfarin  - s/p right inguinal hernia repair with out complications and he has healed.  - US with pancreatic was 4.1 cm reported and he was placed on Status 7. He was had CT scan with contrast which ruled out a mass that US had showed. He mentioned that he was told it could have been a ruptured cyst.  - pA-Fib on Multaq and Warfarin  - Last ECHO  Done 5/9/2019 with EF 65%, Grade II diastolic dysfunction. PAP 41 mmHg.   -  Last Ischemic work up Lexiscan neg 3/16/2019 up to date.  - Last Colonoscopy 3/2016 normal and recs for follow up in 10 years.    Patient denies any recent history of coronary artery disease, stroke, seizure disorder,deep venous thrombosis, pulmonary embolism, recurrent urinary tract infections or malignancies. Patient states that he is doing well. he denies any CP,nausea, vomiting, diarrhea, abdominal pain,  cough, fever, chills, weight loss or night sweats.        Functional Status: He is very active he belongs to a hunting club and goes hunting and fishing . He fishes and hunting and fishing. He has los 30 lbs since starting dialysis and feels better now than he did when he started dialysis. Walks about a mile now and he is doing his lawn care chores as well as some home chores without any symptoms of chest pain, SOB, claudication. He does gardening,  painting and cut trees and lawn care rides tractors   Previous Transplant: no  Previous Blood Transfusion: no  Anticoagulation/ antiplatelet therapy and reason: ASA 81 mg every other day, warfarin for afib   Potential Donor: no but he will try  High KDPI candidate: no  Meets center eligibility for accepting HCV+ donor offer: yes        Past Medical History:  Past Medical History:   Diagnosis Date    Anemia of chronic disease     Atrial fibrillation     BPH (benign prostatic hypertrophy)     HTN (hypertension)     Polycystic kidney disease        Past Surgical History:  Past Surgical History:   Procedure Laterality Date    AV FISTULA PLACEMENT Left 2016    CATARACT EXTRACTION, BILATERAL Bilateral          Family History:  Family History   Problem Relation Age of Onset    Polycystic kidney disease Father     Hypertension Father     Kidney disease Father     Polycystic kidney disease Sister     Hypertension Sister     Kidney disease Sister        Social History:  Social History     Socioeconomic History    Marital status:      Spouse name: Not on file    Number of children: 1    Years of education: Not on file    Highest education level: Not on file   Occupational History    Not on file   Social Needs    Financial resource strain: Not on file    Food insecurity:     Worry: Not on file     Inability: Not on file    Transportation needs:     Medical: Not on file     Non-medical: Not on file   Tobacco Use    Smoking status: Former Smoker     Packs/day: 2.00     Years: 10.00     Pack years: 20.00     Types: Cigarettes     Start date: 1972     Last attempt to quit: 1984     Years since quittin.0    Smokeless tobacco: Never Used   Substance and Sexual Activity    Alcohol use: No     Comment: Pt reportsbeing a former beer drinker (2-3 cans per day) and quitting in .    Drug use: No    Sexual activity: Yes   Lifestyle    Physical activity:     Days per week: Not on file      Minutes per session: Not on file    Stress: Not on file   Relationships    Social connections:     Talks on phone: Not on file     Gets together: Not on file     Attends Zoroastrianism service: Not on file     Active member of club or organization: Not on file     Attends meetings of clubs or organizations: Not on file     Relationship status: Not on file   Other Topics Concern    Not on file   Social History Narrative    Not on file           Current Medication  Current Outpatient Medications   Medication Sig Dispense Refill    amLODIPine (NORVASC) 10 MG tablet Take 10 mg by mouth once daily.      aspirin (ECOTRIN) 81 MG EC tablet Take 81 mg by mouth every Mon, Wed, Fri.       cinacalcet (SENSIPAR) 30 MG Tab Take 30 mg by mouth daily with breakfast.      cyanocobalamin, vitamin B-12, (VITAMIN B-12) 50 mcg tablet Take 50 mcg by mouth once daily.      finasteride (PROSCAR) 5 mg tablet Take 5 mg by mouth once daily.      fish oil-omega-3 fatty acids 300-1,000 mg capsule Take by mouth once daily.      MULTAQ 400 mg Tab Take 1 tablet by mouth 2 (two) times daily.  5    nebivolol (BYSTOLIC) 10 MG Tab Take 10 mg by mouth once daily.       oxybutynin (DITROPAN) 5 MG Tab Take 5 mg by mouth once daily.      RENVELA 800 mg Tab TAKE 2 TABLETS BY MOUTH WITH EVERY MEAL  4    sodium bicarbonate 325 MG tablet Take 650 mg by mouth 2 (two) times daily.       warfarin (COUMADIN) 2 MG tablet Take 2 mg by mouth Daily.       No current facility-administered medications for this visit.              Review of Systems    Respiratory: Negative for shortness of breath.    Cardiovascular: Negative for chest pain and leg swelling.   Gastrointestinal: Negative for abdominal pain, nausea and vomiting.   Genitourinary: Negative for difficulty urinating.   Skin: Negative for rash.              OBJECTIVE       Body mass index is 27.35 kg/m².    Vitals:    12/10/19 1223   BP: (!) 150/78   Pulse: (!) 52   Resp: 18   Temp: 98.1 °F (36.7 °C)        Physical Exam  Constitutional: He is oriented to person, place, and time. He appears well-developed and well-nourished.   Eyes: Conjunctivae are normal.   Cardiovascular: Normal rate and regular rhythm.   Pulmonary/Chest: Effort normal and breath sounds normal.   Abdominal: Soft. He exhibits no mass. There is no tenderness.   Musculoskeletal: He exhibits no edema.   Neurological: He is alert and oriented to person, place, and time.   Skin: Skin is dry. No rash noted.   Psychiatric: He has a normal mood and affect. Judgment normal.             Labs:  Reviewed with the patient      ASSESSMENT     1. Chronic kidney disease (CKD), stage V    2. Dependence on renal dialysis    3. Polycystic kidney disease    4. Long term (current) use of anticoagulants--coumadin, ASA    5. Patient on waiting list for kidney transplant        PLAN       Transplant Candidacy:    Mr. Mazariegos is a a suitable for kidney transplantation.   Meets center eligibility for accepting HCV+ donor offer - yes.  Patient educated on HCV+ donors. Ronal is willing to accept HCV+ donor offer - yes   Patient is a candidate for KDPI > 85 kidney donor offer - no. Secondary to weight and Afib on warfarin  He remains in overall stable health, and will remain active on the transplant list.    Discussed with Dr Pereyra-Lynsey Curry MD  Transplant Nephrology Fellow

## 2019-12-10 NOTE — PATIENT INSTRUCTIONS
Thank you very much for visiting us today. It is our privilege to participate in your transplant care!   We would like to recommend the followin. Keep up the good work  2. Stay active  3. Happy Holidays and Merry [place]   Please be sure to let us know if you have any questions or concerns about your health care - we cannot help you if we do not know. Don't forget we are on call  for any emergencies.    Best Wishes,  Dr Earnest Curry

## 2019-12-10 NOTE — PROGRESS NOTES
Seen and agree with  Dr. Curry as documented in his attached note.  I have verified the history and examined the patient.  I concur with the Assessment and plan as outlined.      Pertinent History:  ESRD from [facility] since 2/2018  Afib, on warfarin and Multaq  Grade II diastolic dysfunction  BPH  UO > 1 liter 12/2019    He remains medically active and w/o changes in health or hospitalizations.    1. Chronic kidney disease (CKD), stage V     2. Dependence on renal dialysis     3. Polycystic kidney disease     4. Long term (current) use of anticoagulants--coumadin, ASA     5. Patient on waiting list for kidney transplant        OK to keep actively listed.

## 2019-12-10 NOTE — LETTER
Date: 1/2/2020          Listed Patient      To: Dialysis Unit  and Charge RN From: Verna Swan LCSW    RE: Ronal Mazariegos, 1950, [id number]     At [facility], we conduct adherence checks as an important part of transplant care. Initial and listed patient assessments are not complete without adherence information.        Please complete the following information:     Current Dry Weight: ___________         Most Recent Pre-Treatment Weight: ___________ /date: _________                    Data from the last 3 months:  (data from last 3 months preferred):    Number of AMAs with dates, time, and reasons: ____________________________________________________    ______________________________________________________________________________________________    ______________________________________________________________________________________________    Number of No-Shows with dates and reasons: ______________________________________________________      ______________________________________________________________________________________________    Last intact PTH:  ___________/date: __________      Any concerns with Labs:  YES / NO      If yes, please explain:  ___________________________________________________________________________    ______________________________________________________________________________________________    Any concerns with Caregivers:  YES / NO    If yes, please explain:  ___________________________________________________________________________    ______________________________________________________________________________________________     Any concerns with Transportation:  YES / NO    If yes, please explain:  ___________________________________________________________________________    ______________________________________________________________________________________________    Any Psychiatric and/or Psychosocial concerns:  YES  / NO     If yes, please explain: ___________________________________________________________________________    ______________________________________________________________________________________________      PLEASE RETURN TO: FAX: [phone]     Thank you for collaborating with us in the care of this patient.           [address]  ?  [place] [address]  ?  phone [phone]  ?  fax [phone] ?  [email or web address]  Confidentiality notice: The accompanying facsimile is intended solely for the use of the recipient designated above. Document(s) transmitted herewith may contain information that is confidential and privileged. Delivery, distribution or dissemination of this communication other than to the intended recipient is strictly prohibited. If you have received this facsimile in error, please notify us immediately by telephone.

## 2019-12-17 ENCOUNTER — DOCUMENTATION ONLY (OUTPATIENT)
Dept: TRANSPLANT | Facility: CLINIC | Age: 69
End: 2019-12-17

## 2019-12-19 NOTE — PROGRESS NOTES
YEARLY LIST MANAGEMENT NOTE    Ronal Mazariegos's kidney transplant listing status reviewed.  Patient is due for follow-up appointments on 6/10/20.  Appointments will be scheduled per protocol.

## 2020-01-02 NOTE — PROGRESS NOTES
Transplant Recipient Adult Psychosocial Assessment (Last Assessment completed on 05/09/2019)    Ronal Mazariegos  [address]  [place] [address]  Telephone Information:   Mobile [phone]   Home  [phone] (home)  Work  There is no work phone number on file.  E-mail  [email or web address]    Sex: male  YOB: 1950  Age: 69 y.o.    Encounter Date: 12/10/2019  [place] Citizen: yes  Primary Language: English   Needed: no    Emergency Contact:  Name: Becky Mazariegos  Relationship: wife  Address: Same as pt.  Phone Numbers:  [phone] (home), [phone] (work), [phone] (mobile)    Family/Social Support:   Number of dependents/: Pt reports no dependents.  Marital history: Pt reports 1 marriage of 42 years to Becky Mazariegos.  Other family dynamics: Pt reports 1 adult son: Bong; 2 sisters: Charlee Harrison (transplanted at [facility]) and Huong Mendez. Pt also reports that his nephew: Keena was also transplanted at [facility] Pt identifies all family members as supportive.    Household Composition:  Name: Ronal Mazariegos  Age: 68  Relationship: patient  Does person drive? yes    Name: Becky Mazariegos  Age: 65  Relationship: wife  Does person drive? yes    Do you and your caregivers have access to reliable transportation? yes     PRIMARY CAREGIVER: Becky Mazariegos (wife) will be primary caregiver, phone number [phone].      provided in-depth information to patient and caregiver regarding pre- and post-transplant caregiver role.   strongly encourages patient and caregiver to have concrete plan regarding post-transplant care giving, including back-up caregiver(s) to ensure care giving needs are met as needed.    Patient and Caregiver states understanding all aspects of caregiver role/commitment and is able/willing/committed to being caregiver to the fullest extent necessary.    Patient and Caregiver verbalizes understanding of the education provided today  and caregiver responsibilities.         remains available. Patient and Caregiver agree to contact  in a timely manner if concerns arise.      Able to take time off work without financial concerns: yes.     Additional Significant Others who will Assist with Transplant:  Name: Bong Mazariegos  Age: 30  Phone: [phone]  City: [place] State: [place]  Relationship: son  Does person drive? yes    Name: Charlee Ward (son previously transplanted at [facility])  Age: 49  Phone: [phone]  City: [facility] State: [place]  Relationship: sister  Does person drive? yes    Name: Chalo Paniagua  Age: 50  Phone: [phone]  City: [place] State: [place]  Relationship: nephew  Does person drive? yes     Living Will: no Education and information provided to pt.  Healthcare Power of : no Education and information provided to pt.  Advance Directives on file: <<no information> per medical record.  Verbally reviewed LW/HCPA information.   provided patient with copy of LW/HCPA documents and provided education on completion of forms.    Living Donors: No. Education and resource information given to patient.    Highest Education Level: Associate/Bachelor Degree  Reading Ability: college  Reports difficulty with: seeing and hearing. Pt reports wearing glasses and reports having implants in both eyes. Pt reports hard of hearing more in left ear than right and has to watch people's mouths.  Learns Best By:  Pt reports learning best by verbal and visual instruction.     Status: no  VA Benefits: no     Working for Income: No  If no, reason not working: Patient Choice - Retired.  Patient is retired from being the  for [employer].    Spouse/Significant Other Employment: Pt's wife reports that she works full-time as an  for [employer].    Disabled: yes: date disability began: Feb. 2016, due to: ESRD.     Monthly Income:  Other: $7300 (net from pt's  long term, pt's SSI, and wife's income.     Able to afford all costs now and if transplanted, including medications: yes  Patient and Caregiver verbalizes understanding of personal responsibilities related to transplant costs and the importance of having a financial plan to ensure that patients transplant costs are fully covered.       provided fundraising information/education. Patient and Caregiververbalizes understanding.   remains available.    Insurance:   Payor/Plan Subscr  Sex Relation Sub. Ins. ID Effective Group Num   1. MEDICARE - ME* ANUJ WARREN 1950 Male  [id number]                                    [address]   2. PHYSICIANS MU* ANUJ WARREN 1950 Male  [id number]                                    [address]     Primary Insurance (for UNOS reporting): Public Insurance - Medicare FFS (Fee For Service)  Secondary Insurance (for UNOS reporting): Private Insurance (Pt pays). Pt also reports having Part D with [employer]  Patient and Caregiver verbalizes clear understanding that patient may experience difficulty obtaining and/or be denied insurance coverage post-surgery. This includes and is not limited to disability insurance, life insurance, health insurance, burial insurance, long term care insurance, and other insurances.      Patient and Caregiver also reports understanding that future health concerns related to or unrelated to transplantation may not be covered by patient's insurance.  Resources and information provided and reviewed.     Patient and Caregiver provides verbal permission to release any necessary information to outside resources for patient care and discharge planning.  Resources and information provided are reviewed.      Dialysis Adherence: Patient reports being on hemodialysis in center, attending all dialysis appointments, and staying for the entire course of treatment. SW was not able to complete an adherence check at  this time and will complete one at a later date. SW faxed an adherence form (see Letters section) and is awaiting a fax back.     Infusion Service: patient utilizing? no  Home Health: patient utilizing? no  DME: yes BP Cuff  Pulmonary/Cardiac Rehab: Pt denies.   ADLS:  Pt reports no difficulties with driving, walking, bathing, cooking, housekeeping, eating, shopping, and taking medication.    Adherence:   Pt reports good adherence with medications, dialysis, and health regimen (except for CPAP machine).  Adherence education and counseling provided.     Per History Section:  Past Medical History:   Diagnosis Date    Anemia of chronic disease     Atrial fibrillation     BPH (benign prostatic hypertrophy)     HTN (hypertension)     Polycystic kidney disease      Social History     Tobacco Use    Smoking status: Former Smoker     Packs/day: 2.00     Years: 10.00     Pack years: 20.00     Types: Cigarettes     Start date: 1972     Last attempt to quit: 1984     Years since quittin.0    Smokeless tobacco: Never Used   Substance Use Topics    Alcohol use: No     Comment: Pt reportsbeing a former beer drinker (2-3 cans per day) and quitting in .     Social History     Substance and Sexual Activity   Drug Use No     Social History     Substance and Sexual Activity   Sexual Activity Yes       Per Today's Psychosocial:  Tobacco: Pt reports quitting in  and reports smoking 1-2 ppd for 6-8 years.  Alcohol: Pt reportsbeing a former beer drinker (2-3 cans per day) and quitting in ..  Illicit Drugs/Non-prescribed Medications: none, patient denies any use.    Patient and Caregiver states clear understanding of the potential impact of substance use as it relates to transplant candidacy and is aware of possible random substance screening.  Substance abstinence/cessation counseling, education and resources provided and reviewed.     Arrests/DWI/Treatment/Rehab: patient denies    Psychiatric History:     Mental Health: Pt reports feeling depressed at first, but now it is a way of life and realizes that it could be worse. Pt reports no current mental health issues or concerns.   Psychiatrist/Counselor: Pt denies seeing a mental health professional and reports being open to seeing the psych department for talk therapy if necessary.  Medications:  Pt denies taking medications for mental health reasons.  Suicide/Homicide Issues: Pt denies any history of or current suicidal or homicidal ideations.    Safety at home: Pt reports that his parents had a difficult relationship and stayed . Pt reports that his father  at a young age due to excessive smoking and drinking. Pt reports that he also had polycystic kidney disease. Pt reports no current or history of safety concerns in household; including mental, physical, verbal, or sexual abuse.    Knowledge: Patient and Caregiver states having clear understanding and realistic expectations regarding the potential risks and potential benefits of organ transplantation and organ donation and agrees to discuss with health care team members and support system members, as well as to utilize available resources and express questions and/or concerns in order to further facilitate the pt informed decision-making.  Resources and information provided and reviewed.    Patient and Caregiver is aware of [facility]'s affiliation and/or partnership with agencies in home health care, LTAC, SNF, [facility], and other hospitals and clinics.    Understanding: Patient and Caregiver reports having a clear understanding of the many lifetime commitments involved with being a transplant recipient, including costs, compliance, medications, lab work, procedures, appointments, concrete and financial planning, preparedness, timely and appropriate communication of concerns, abstinence (ETOH, tobacco, illicit non-prescribed drugs), adherence to all health care team recommendations, support system and  "caregiver involvement, appropriate and timely resource utilization and follow-through, mental health counseling as needed/recommended, and patient and caregiver responsibilities.  Social Service Handbook, resources and detailed educational information provided and reviewed.  Educational information provided.    Patient and Caregiver also reports current and expected compliance with health care regime and states having a clear understanding of the importance of compliance.      Patient and Caregiver reports a clear understanding that risks and benefits may be involved with organ transplantation and with organ donation.       Patient and Caregiver also reports clear understanding that psychosocial risk factors may affect patient, and include but are not limited to feelings of depression, generalized anxiety, anxiety regarding dependence on others, post traumatic stress disorder, feelings of guilt and other emotional and/or mental concerns, and/or exacerbation of existing mental health concerns.  Detailed resources provided and discussed.      Patient and Caregiver agrees to access appropriate resources in a timely manner as needed and/or as recommended, and to communicate concerns appropriately.  Patient and Caregiver also reports a clear understanding of treatment options available.     Patient and Caregiver received education in a group setting.   reviewed education, provided additional information, and answered questions.    Feelings or Concerns: Patient and Caregiver did not express any concerns at this time.    Coping: Pt reports coping well with the transplant process at this time and reports taking a walk, hunting, fishing, camping, cooking, cleaning, sitting in the woods, or on his boat as ways to cope. Pt also reports being a part of the [employer].    Goals: Pt reports "doing a better job of caring for myself",having a better quality of life, "live long for my grandson", getting off " dialysis, and being healthy as goals for after transplant.    Interview Behavior: Patient and Caregiver presents as alert and oriented x 4, pleasant, good eye contact, well groomed, recall good, concentration/judgement good, average intelligence, calm, communicative, cooperative and asking and answering questions appropriately. Pt presents with Becky Mazariegos, pt's wife at pt's request.         Transplant Social Work - Candidacy  Assessment/Plan:     Psychosocial Suitability: Patient presents as a suitable candidate for kidney transplant at this time. Based on psychosocial risk factors, patient presents as low risk, due to adequate caregiver plan, suitable adherence, and financial plan in place..    Recommendations/Additional Comments: SW recommends that pt remain aware of potential mental health concerns and contact the team if any concerns arise. SW recommends that pt remain abstinent from tobacco, ETOH, and drug use. SW supports pt's continued adherence. SW remains available to answer any questions or concerns that arise as the pt moves through the transplant process.         Verna Swan LCSW

## 2020-01-14 ENCOUNTER — SOCIAL WORK (OUTPATIENT)
Dept: TRANSPLANT | Facility: CLINIC | Age: 70
End: 2020-01-14

## 2020-01-14 NOTE — PROGRESS NOTES
Dialysis Adherence:    SW received a faxed adherence form from 's dialysis center indicates over last three months that the patient has had no AMAs, not had any no-shows, and no issues with caregivers, transportation, or any other psychosocial concerns..          Form also indicates:    Last intact PTH from 12/9/19 is reported as 236.    Current dry weight is reported as 92.5kg and Most Recent Pre-treatment weight is reported as 94kg on 12/31/19.

## 2020-04-14 DIAGNOSIS — Z01.810 ENCOUNTER FOR PREPROCEDURAL CARDIOVASCULAR EXAMINATION: ICD-10-CM

## 2020-04-14 DIAGNOSIS — Z12.5 ENCOUNTER FOR SCREENING FOR MALIGNANT NEOPLASM OF PROSTATE: ICD-10-CM

## 2020-04-14 DIAGNOSIS — Z76.82 ORGAN TRANSPLANT CANDIDATE: Primary | ICD-10-CM

## 2020-04-24 ENCOUNTER — TELEPHONE (OUTPATIENT)
Dept: TRANSPLANT | Facility: CLINIC | Age: 70
End: 2020-04-24

## 2020-04-24 ENCOUNTER — PATIENT MESSAGE (OUTPATIENT)
Dept: TRANSPLANT | Facility: CLINIC | Age: 70
End: 2020-04-24

## 2020-05-11 DIAGNOSIS — Z76.82 ORGAN TRANSPLANT CANDIDATE: Primary | ICD-10-CM

## 2020-07-07 ENCOUNTER — TELEPHONE (OUTPATIENT)
Dept: CARDIOLOGY | Facility: CLINIC | Age: 70
End: 2020-07-07

## 2020-07-09 ENCOUNTER — HOSPITAL ENCOUNTER (OUTPATIENT)
Dept: RADIOLOGY | Facility: HOSPITAL | Age: 70
Discharge: HOME | End: 2020-07-09
Attending: NURSE PRACTITIONER
Payer: MEDICARE

## 2020-07-09 ENCOUNTER — CLINICAL SUPPORT (OUTPATIENT)
Dept: CARDIOLOGY | Facility: CLINIC | Age: 70
End: 2020-07-09
Attending: NURSE PRACTITIONER
Payer: MEDICARE

## 2020-07-09 ENCOUNTER — OFFICE VISIT (OUTPATIENT)
Dept: TRANSPLANT | Facility: CLINIC | Age: 70
End: 2020-07-09
Payer: MEDICARE

## 2020-07-09 VITALS
TEMPERATURE: 97 F | OXYGEN SATURATION: 100 % | BODY MASS INDEX: 25.88 KG/M2 | HEIGHT: 73 IN | WEIGHT: 195.31 LBS | DIASTOLIC BLOOD PRESSURE: 86 MMHG | RESPIRATION RATE: 16 BRPM | HEART RATE: 70 BPM | SYSTOLIC BLOOD PRESSURE: 141 MMHG

## 2020-07-09 VITALS — BODY MASS INDEX: 27.9 KG/M2 | WEIGHT: 206 LBS | HEIGHT: 72 IN

## 2020-07-09 DIAGNOSIS — Z76.82 ORGAN TRANSPLANT CANDIDATE: ICD-10-CM

## 2020-07-09 DIAGNOSIS — I48.20 CHRONIC ATRIAL FIBRILLATION: Chronic | ICD-10-CM

## 2020-07-09 DIAGNOSIS — N18.6 ESRD ON DIALYSIS: Primary | ICD-10-CM

## 2020-07-09 DIAGNOSIS — I10 ESSENTIAL HYPERTENSION: ICD-10-CM

## 2020-07-09 DIAGNOSIS — Q61.3 POLYCYSTIC KIDNEY DISEASE: ICD-10-CM

## 2020-07-09 DIAGNOSIS — Z79.01 LONG TERM (CURRENT) USE OF ANTICOAGULANTS: Chronic | ICD-10-CM

## 2020-07-09 DIAGNOSIS — N40.0 BENIGN PROSTATIC HYPERPLASIA WITHOUT LOWER URINARY TRACT SYMPTOMS: ICD-10-CM

## 2020-07-09 DIAGNOSIS — Z99.2 ESRD ON DIALYSIS: Primary | ICD-10-CM

## 2020-07-09 LAB
CV PHARM DOSE: 0.4 MG
CV STRESS BASE HR: 68 BPM
DIASTOLIC BLOOD PRESSURE: 83 MMHG
END DIASTOLIC INDEX-HIGH: 170 ML/M2
END SYSTOLIC INDEX-HIGH: 70 ML/M2
NUC REST DIASTOLIC VOLUME INDEX: 110
NUC REST EJECTION FRACTION: 61
NUC REST SYSTOLIC VOLUME INDEX: 43
NUC STRESS DIASTOLIC VOLUME INDEX: 125
NUC STRESS EJECTION FRACTION: 71 %
NUC STRESS SYSTOLIC VOLUME INDEX: 36
OHS CV CPX 85 PERCENT MAX PREDICTED HEART RATE MALE: 130
OHS CV CPX MAX PREDICTED HEART RATE: 153
OHS CV CPX PATIENT IS FEMALE: 0
OHS CV CPX PATIENT IS MALE: 1
OHS CV CPX PEAK DIASTOLIC BLOOD PRESSURE: 83 MMHG
OHS CV CPX PEAK HEAR RATE: 71 BPM
OHS CV CPX PEAK RATE PRESSURE PRODUCT: 9869
OHS CV CPX PEAK SYSTOLIC BLOOD PRESSURE: 139 MMHG
OHS CV CPX PERCENT MAX PREDICTED HEART RATE ACHIEVED: 46
OHS CV CPX RATE PRESSURE PRODUCT PRESENTING: 9452
RETIRED EF AND QEF - SEE NOTES: 51 %
SYSTOLIC BLOOD PRESSURE: 139 MMHG

## 2020-07-09 PROCEDURE — 71046 X-RAY EXAM CHEST 2 VIEWS: CPT | Mod: TC,TXP

## 2020-07-09 PROCEDURE — 99215 PR OFFICE/OUTPT VISIT, EST, LEVL V, 40-54 MIN: ICD-10-PCS | Mod: S$PBB,TXP,, | Performed by: INTERNAL MEDICINE

## 2020-07-09 PROCEDURE — 93018 CV STRESS TEST I&R ONLY: CPT | Mod: S$PBB,TXP,, | Performed by: INTERNAL MEDICINE

## 2020-07-09 PROCEDURE — 93018 STRESS TEST WITH MYOCARDIAL PERFUSION (CUPID ONLY): ICD-10-PCS | Mod: S$PBB,TXP,, | Performed by: INTERNAL MEDICINE

## 2020-07-09 PROCEDURE — 93016 CV STRESS TEST SUPVJ ONLY: CPT | Mod: S$PBB,TXP,, | Performed by: INTERNAL MEDICINE

## 2020-07-09 PROCEDURE — 78452 HT MUSCLE IMAGE SPECT MULT: CPT | Mod: PBBFAC,TXP | Performed by: INTERNAL MEDICINE

## 2020-07-09 PROCEDURE — 99215 OFFICE O/P EST HI 40 MIN: CPT | Mod: PBBFAC,25,27,TXP | Performed by: INTERNAL MEDICINE

## 2020-07-09 PROCEDURE — 78452 STRESS TEST WITH MYOCARDIAL PERFUSION (CUPID ONLY): ICD-10-PCS | Mod: 26,S$PBB,TXP, | Performed by: INTERNAL MEDICINE

## 2020-07-09 PROCEDURE — 99999 PR PBB SHADOW E&M-EST. PATIENT-LVL II: CPT | Mod: PBBFAC,TXP,,

## 2020-07-09 PROCEDURE — 99999 PR PBB SHADOW E&M-EST. PATIENT-LVL V: CPT | Mod: PBBFAC,TXP,, | Performed by: INTERNAL MEDICINE

## 2020-07-09 PROCEDURE — 99212 OFFICE O/P EST SF 10 MIN: CPT | Mod: PBBFAC,25,TXP

## 2020-07-09 PROCEDURE — 99999 PR PBB SHADOW E&M-EST. PATIENT-LVL II: ICD-10-PCS | Mod: PBBFAC,TXP,,

## 2020-07-09 PROCEDURE — 76700 US EXAM ABDOM COMPLETE: CPT | Mod: 26,TXP,, | Performed by: RADIOLOGY

## 2020-07-09 PROCEDURE — 93016 STRESS TEST WITH MYOCARDIAL PERFUSION (CUPID ONLY): ICD-10-PCS | Mod: S$PBB,TXP,, | Performed by: INTERNAL MEDICINE

## 2020-07-09 PROCEDURE — 71046 XR CHEST PA AND LATERAL: ICD-10-PCS | Mod: 26,TXP,, | Performed by: RADIOLOGY

## 2020-07-09 PROCEDURE — 71046 X-RAY EXAM CHEST 2 VIEWS: CPT | Mod: 26,TXP,, | Performed by: RADIOLOGY

## 2020-07-09 PROCEDURE — 76700 US EXAM ABDOM COMPLETE: CPT | Mod: TC,TXP

## 2020-07-09 PROCEDURE — 99999 PR PBB SHADOW E&M-EST. PATIENT-LVL V: ICD-10-PCS | Mod: PBBFAC,TXP,, | Performed by: INTERNAL MEDICINE

## 2020-07-09 PROCEDURE — 76700 US ABDOMEN COMPLETE: ICD-10-PCS | Mod: 26,TXP,, | Performed by: RADIOLOGY

## 2020-07-09 PROCEDURE — 99215 OFFICE O/P EST HI 40 MIN: CPT | Mod: S$PBB,TXP,, | Performed by: INTERNAL MEDICINE

## 2020-07-09 RX ORDER — REGADENOSON 0.08 MG/ML
0.4 INJECTION, SOLUTION INTRAVENOUS
Status: COMPLETED | OUTPATIENT
Start: 2020-07-09 | End: 2020-07-09

## 2020-07-09 RX ORDER — FLUTICASONE PROPIONATE 50 MCG
1 SPRAY, SUSPENSION (ML) NASAL DAILY PRN
Status: ON HOLD | COMMUNITY
Start: 2019-04-22 | End: 2022-02-23 | Stop reason: HOSPADM

## 2020-07-09 RX ORDER — AMINOPHYLLINE 25 MG/ML
75 INJECTION, SOLUTION INTRAVENOUS
Status: COMPLETED | OUTPATIENT
Start: 2020-07-09 | End: 2020-07-09

## 2020-07-09 RX ADMIN — AMINOPHYLLINE 75 MG: 25 INJECTION, SOLUTION INTRAVENOUS at 09:07

## 2020-07-09 RX ADMIN — REGADENOSON 0.4 MG: 0.08 INJECTION, SOLUTION INTRAVENOUS at 09:07

## 2020-07-09 NOTE — PROGRESS NOTES
INITIAL PATIENT EDUCATION NOTE    Mr. Ronal Mazariegos was seen in pre-kidney transplant clinic for evaluation for kidney, kidney/pancreas or pancreas only transplant.  The patient attended a an individual video education session that discussed/reviewed the following aspects of transplantation: evaluation and selection committee process, UNOS waitlist management/multiple listings, types of organs offered (KDPI < 85%, KDPI > 85%, PHS increased risk, DCD, HCV+, HIV+ for HIV+ recipients and enbloc/dual), financial aspects, surgical procedures, dietary instruction pre- and post-transplant, health maintenance pre- and post-transplant, post-transplant hospitalization and outpatient follow-up, potential to participate in a research protocol, and medication management and side effects.  A question and answer session was provided after the presentation.    The patient was seen by all members of the multi-disciplinary team to include: Nephrologist/PA, Surgeon, , Transplant Coordinator, , Pharmacist and Dietician (if applicable).    The patient reviewed and signed all consents for evaluation which were witnessed and sent to scanning into the [facility] chart.    The patient was given an education book and plan for further evaluation based on his individual assessment.      The patient was encouraged to call with any questions or concerns.

## 2020-07-09 NOTE — LETTER
July 12, 2020        Alexis Zamorano  [address] [place] [place] [address]  Phone: [phone]  Fax: [phone]             [facility]- Transplant  [address]  [facility] [address]  Phone: [phone]   Patient: Ronal Mazariegos   MR Number: [id number]   YOB: 1950   Date of Visit: 7/9/2020       Dear Dr. Alexis Zamorano    Thank you for referring Ronal Mazariegos to me for evaluation. Attached you will find relevant portions of my assessment and plan of care.    If you have questions, please do not hesitate to call me. I look forward to following Ronal Mazariegos along with you.    Sincerely,    Carlos Barcenas MD    Enclosure    If you would like to receive this communication electronically, please contact [email or web address] or [phone] to request [facility] [employer] access.    [facility] [employer] [facility] is a tool which provides read-only access to select patient information with whom you have a relationship. Its easy to use and provides real time access to review your patients record including encounter summaries, notes, results, and demographic information.    If you feel you have received this communication in error or would no longer like to receive these types of communications, please e-mail [email or web address]

## 2020-07-09 NOTE — LETTER
Date: 7/10/2020          Listed Patient      To: Dialysis Unit  and Charge RN From: [facility] Social Workers and      Kidney Transplant Nurse Coordinators    RE: Ronal Mazariegos, 1950, [id number]     At [facility], we conduct adherence checks as an important part of transplant care. Initial and listed patient assessments are not complete without adherence information.        Please complete the following information:     Current Dry Weight: ___________         Most Recent Pre-Treatment Weight: ___________ /date: _________                    Data from the last 3 months:  (data from last 3 months preferred):    Number of AMAs with dates, time, and reasons: ____________________________________________________    ______________________________________________________________________________________________    ______________________________________________________________________________________________    Number of No-Shows with dates and reasons: ______________________________________________________      ______________________________________________________________________________________________    Last intact PTH:  ___________/date: __________      Any concerns with Labs:  YES / NO      If yes, please explain:  ___________________________________________________________________________    ______________________________________________________________________________________________    Any concerns with Caregivers:  YES / NO    If yes, please explain:  ___________________________________________________________________________    ______________________________________________________________________________________________     Any concerns with Transportation:  YES / NO    If yes, please explain:   ___________________________________________________________________________    ______________________________________________________________________________________________    Any Psychiatric and/or Psychosocial concerns:  YES / NO     If yes, please explain: ___________________________________________________________________________    ______________________________________________________________________________________________      PLEASE RETURN TO: FAX: [phone]     Thank you for collaborating with us in the care of this patient.           [address]  ?  [place] [address]  ?  phone [phone]  ?  fax [phone]  ?  [email or web address]  Confidentiality notice: The accompanying facsimile is intended solely for the use of the recipient designated above. Document(s) transmitted herewith may contain information that is confidential and privileged. Delivery, distribution or dissemination of this communication other than to the intended recipient is strictly prohibited. If you have received this facsimile in error, please notify us immediately by telephone.

## 2020-07-09 NOTE — PROGRESS NOTES
Transplant Recipient Adult Psychosocial Assessment    Ronal Mazariegos  [address]  [facility] [address]  Telephone Information:   Mobile [phone]   Home  [phone] (home)  Work  There is no work phone number on file.  E-mail  [email or web address]    Sex: male  YOB: 1950  Age: 69 y.o.    Encounter Date: 7/9/2020  U[place] Citizen: yes   Primary Language: English   Needed: no    Emergency Contact:  Name: Becky Mazariegos  Relationship: wife  Address: [address] [place] [id number]  Phone Numbers:  [phone] (home), [phone](work), [phone] (mobile)    Family/Social Support:   Number of dependents/: None.   Marital history: Pt reported to his wife Becky for 46 years.   Other family dynamics: Pt reports has 1 adult son: Bong; 2 sister: Charlee Harrison (transplanted from [facility]) and Jose Mendez. Pt's nephew Keena was also transplanted at [facility]. Pt's sisters and nephew all reside in [place]. Pt expressed having a supportive family. Pt also reported having a pet dog that passed away a couple years ago     Household Composition:  Name: Ronal Mazariegos  Age: 69  Relationship: patient  Does person drive? yes    Name: Becky Mazariegos  Age: 67  Relationship: wife  Does person drive? yes      Do you and your caregivers have access to reliable transportation? yes  PRIMARY CAREGIVER: Becky Mazariegos will be primary caregiver, phone number [phone]. Caregiver reported does work full time, however has already discussed with employer time off for transplant. Caregiver reported was also offered the flexibility of working from home during pt's recovery period. Caregiver denied having prior committment or obligations that would conflict with assisting pt at the time of transplant. SW provided education on caregiver roles, caregiver verbalized understanding.      provided in-depth information to patient and caregiver regarding pre- and post-transplant caregiver  role.   strongly encourages patient and caregiver to have concrete plan regarding post-transplant care giving, including back-up caregiver(s) to ensure care giving needs are met as needed.    Patient and Caregiver states understanding all aspects of caregiver role/commitment and is able/willing/committed to being caregiver to the fullest extent necessary.    Patient and Caregiver verbalizes understanding of the education provided today and caregiver responsibilities.         remains available. Patient and Caregiver agree to contact  in a timely manner if concerns arise.      Able to take time off work without financial concerns: yes.     Additional Significant Others who will Assist with Transplant:  Name: Charlee Ward  Age: 69  City: [facility] State: [place]  Relationship: sister  Does person drive? yes    Name: Chalo Jade(SW contacted caregiver, unable to reach caregiver. SW left a voice message requesting call back)   Age: 50  City: [facility] State: [place]  Relationship: son  Does person drive? yes     Name: Bong Mazariegos  Age: 32  City: [place]  State: [place]  Relationship: Son  Does person drive? Yes    Living Will: no  Healthcare Power of : no  Advance Directives on file: <<no information> per medical record.  Verbally reviewed LW/HCPA information.   provided patient with copy of LW/HCPA documents and provided education on completion of forms.    Living Donors: N/A Education and resource information given to patient.    Highest Education Level: Associate/Bachelor Degree. Pt reported graduated with a bachelors degree from [employer](currently [employer]) with a degree in Business and Accounting.   Reading Ability: college  Reports difficulty with: seeing and hearing. Pt reported having cataract surgery couple of years ago and now has 20/20 vision. Both pt and pt's wife reported pt does have trouble hearing. Pt reported has informed his PCP, and was informed pt does  not have trouble with hearing.   Learns Best By: verbal and visual instruction.       Status: no  VA Benefits: no     Working for Income: No  If no, reason not working: Patient Choice - Retired  Patient is retired from being a  at [employer].    Spouse/Significant Other Employment: Pt's wife reports that works full-time as an  for [employer].     Disabled: yes: date disability began: 2016, due to: ESRD.    Monthly Income:  Other: $8,500-9,000(net from pt's longterm, pt's SSI, and wife's income)  Able to afford all costs now and if transplanted, including medications: yes  Patient and Caregiver verbalizes understanding of personal responsibilities related to transplant costs and the importance of having a financial plan to ensure that patients transplant costs are fully covered.       provided fundraising information/education. Patient and Caregiververbalizes understanding.   remains available.    Insurance:   Payor/Plan Subscr  Sex Relation Sub. Ins. ID Effective Group Num   1. MEDICARE - ME* ANUJ WARREN 1950 Male  [id number]                                    [address]   2. PHYSICIANS MU* ANUJ WARREN 1950 Male  [id number]                                    [address]     Primary Insurance (for UNOS reporting): Public Insurance - Medicare FFS (Fee For Service)  Secondary Insurance (for UNOS reporting): Private Insurance. Pt also reports having a Part D with [employer].   Patient and Caregiver verbalizes clear understanding that patient may experience difficulty obtaining and/or be denied insurance coverage post-surgery. This includes and is not limited to disability insurance, life insurance, health insurance, burial insurance, long term care insurance, and other insurances.      Patient and Caregiver also reports understanding that future health concerns related to or unrelated to  transplantation may not be covered by patient's insurance.  Resources and information provided and reviewed.     Patient and Caregiver provides verbal permission to release any necessary information to outside resources for patient care and discharge planning.  Resources and information provided are reviewed.      Dialysis Adherence: Patient and Caregiver reports adherent and compliant to dialysis treatment and appts. SW faxed compliance and adherence check to dialysis unit.     Infusion Service: patient utilizing? no  Home Health: patient utilizing? no  DME: yes, BP cuff.   Pulmonary/Cardiac Rehab: Pt denies utilizing any past or recent pulmonary/cardiac rehab.    ADLS:  Pt reports no difficulties with driving, walking, bathing, cooking, housekeeping, shopping, and taking medication. Pt reported goes fishing, hunting, and does recreational activities independently.     Adherence:   Pt reported does follow up with medical appts and takes his medication as needed.  Adherence education and counseling provided.     Per History Section:  Past Medical History:   Diagnosis Date    Anemia of chronic disease     Atrial fibrillation     BPH (benign prostatic hypertrophy)     HTN (hypertension)     Polycystic kidney disease      Social History     Tobacco Use    Smoking status: Former Smoker     Packs/day: 2.00     Years: 10.00     Pack years: 20.00     Types: Cigarettes     Start date: 1972     Quit date: 1984     Years since quittin.6    Smokeless tobacco: Never Used   Substance Use Topics    Alcohol use: No     Comment: Pt reportsbeing a former beer drinker (2-3 cans per day) and quitting in .     Social History     Substance and Sexual Activity   Drug Use No     Social History     Substance and Sexual Activity   Sexual Activity Yes       Per Today's Psychosocial:  Tobacco: none, patient denies any use. Pt reports quit 36 years ago and reports smoking 1-2 ppd for 6-8 years.   Alcohol: none,  patient denies any use. Pt reported quit drinking in 2014, prior to 2014 pt would drink 2-3 cans of beer a day.   Illicit Drugs/Non-prescribed Medications: none, patient denies any use.    Patient and Caregiver states clear understanding of the potential impact of substance use as it relates to transplant candidacy and is aware of possible random substance screening.  Substance abstinence/cessation counseling, education and resources provided and reviewed.     Arrests/DWI/Treatment/Rehab: patient denies    Psychiatric History:    Mental Health: Pt denies any symptoms or diagnosis pertaining to mental health.   Psychiatrist/Counselor: Pt denies seeing a mental health professional.   Medications:  Pt denies taking medication for mental health reasons.   Suicide/Homicide Issues: Pt denies having any past or recent suicidal/homicidal ideations.   Safety at home: Pt reports resides in a safe space and safe environment at home.     Knowledge: Patient and Caregiver states having clear understanding and realistic expectations regarding the potential risks and potential benefits of organ transplantation and organ donation and agrees to discuss with health care team members and support system members, as well as to utilize available resources and express questions and/or concerns in order to further facilitate the pt informed decision-making.  Resources and information provided and reviewed.    Patient and Caregiver is aware of [facility]'s affiliation and/or partnership with agencies in home health care, LTAC, SNF, [facility], and other hospitals and clinics.    Understanding: Patient and Caregiver reports having a clear understanding of the many lifetime commitments involved with being a transplant recipient, including costs, compliance, medications, lab work, procedures, appointments, concrete and financial planning, preparedness, timely and appropriate communication of concerns, abstinence (ETOH, tobacco, illicit non-prescribed  "drugs), adherence to all health care team recommendations, support system and caregiver involvement, appropriate and timely resource utilization and follow-through, mental health counseling as needed/recommended, and patient and caregiver responsibilities.  Social Service Handbook, resources and detailed educational information provided and reviewed.  Educational information provided.    Patient and Caregiver also reports current and expected compliance with health care regime and states having a clear understanding of the importance of compliance.      Patient and Caregiver reports a clear understanding that risks and benefits may be involved with organ transplantation and with organ donation.       Patient and Caregiver also reports clear understanding that psychosocial risk factors may affect patient, and include but are not limited to feelings of depression, generalized anxiety, anxiety regarding dependence on others, post traumatic stress disorder, feelings of guilt and other emotional and/or mental concerns, and/or exacerbation of existing mental health concerns.  Detailed resources provided and discussed.      Patient and Caregiver agrees to access appropriate resources in a timely manner as needed and/or as recommended, and to communicate concerns appropriately.  Patient and Caregiver also reports a clear understanding of treatment options available.     Patient and Caregiver received education in a group setting.   reviewed education, provided additional information, and answered questions.    Feelings or Concerns: Pt and caregiver reported only concerns are pertaining to the waiting time for transplant. Pt continued to stated "hopefully I'll get one, and take care of myself". Pt and caregiver present acceptance of transplant.    Coping: Pt presents coping well with social support and outdoor activities he partakes in. Such as hunting, fishing, etc.      Identify Patient Strategies to " Falls:   1. Currently & Pre-transplant -  Hunting, walking, fishing, camping, cooking, sitting in the woods or boat.    2. At the time of surgery - read, crossword puzzles, and have social support   3. During post-Transplant & Recovery Period - Same activities at the time of surgery.     Goals: Pt reports goals are to travel more often.  Patient referred to Vocational Rehabilitation.    Interview Behavior: Patient and Caregiver presents as alert and oriented x 4, pleasant, good eye contact, well groomed, recall good, concentration/judgement good, average intelligence, calm, communicative, cooperative and asking and answering questions appropriately.          Transplant Social Work - Candidacy  Assessment/Plan:     Psychosocial Suitability: Patient presents as a suitable candidate for kidney transplant at this time. Based on psychosocial risk factors, patient presents as medium risk, due to SW unable to reach secondary caregiver for further verification and provide education on caregiver roles. SW left caregiver voice message requesting call back. Once secondary caregiver is confirmed, pt is low risk. Protective factors: confirmed primary caregiver, adequate finances, insurance, supportive family, no reported mental health or substance usage.     Recommendations/Additional Comments:   -Local lodging   -To inform transplant and SW team of any new changes or updates.   -Fundraising   -Remaining abstinent from substance usage     Margie Jimenez LMSW

## 2020-07-09 NOTE — PROGRESS NOTES
Kidney Transplant Recipient Reevalulation    Referring Physician: Alexis Zamorano  Current Nephrologist: Alexis Zamorano  Waitlist Status: active  Dialysis Start Date: 2/7/2018    Subjective:     CC:  Annual reassessment of kidney transplant candidacy.    HPI:  Mr. Mazariegos is a 69 y.o. year old White male with ESRD secondary to polycystic kidney disease.  He has been on the wait list for a kidney transplant at [facility] since 2/20/2017. Patient is currently on hemodialysis started on 2017. Patient is dialyzing on MWF schedule.  Patient reports that he is tolerating dialysis well.. He has a LUE AV fistula. Patient denies any recent hospitalizations or ED visits.    Patient feels great. Very active. Belongs to a hunting club    He is here with his care giver    No problems with dialysis    No admissions    No chest pain syncope episodes no signs of heart failure no nausea vomit or diarrhea, still some urine output. No gross hematuria or UTI's.    Patient ws working on wt loss and has lost since initiation of dialysis. No constitutional signs concerning to me for [employer] loss     Current Outpatient Medications on File Prior to Visit   Medication Sig Dispense Refill    amLODIPine (NORVASC) 10 MG tablet Take 10 mg by mouth once daily.      aspirin (ECOTRIN) 81 MG EC tablet Take 81 mg by mouth every Mon, Wed, Fri.       cinacalcet (SENSIPAR) 30 MG Tab Take 30 mg by mouth daily with breakfast.      cyanocobalamin, vitamin B-12, (VITAMIN B-12) 50 mcg tablet Take 50 mcg by mouth once daily.      finasteride (PROSCAR) 5 mg tablet Take 5 mg by mouth once daily.      fish oil-omega-3 fatty acids 300-1,000 mg capsule Take by mouth once daily.      fluticasone propionate (FLONASE) 50 mcg/actuation nasal spray 1 spray by Each Nostril route daily as needed.      MULTAQ 400 mg Tab Take 1 tablet by mouth 2 (two) times daily.  5    nebivolol (BYSTOLIC) 10 MG Tab Take 10 mg by mouth once daily.       oxybutynin (DITROPAN) 5 MG  "Tab Take 10 mg by mouth once daily.       RENVELA 800 mg Tab Take by mouth 3 (three) times daily with meals. 3 tabs with each meal  4    SODIUM BICARBONATE ORAL Take 650 mg by mouth 2 (two) times daily.       warfarin (COUMADIN) 2 MG tablet Take 2 mg by mouth Daily.       No current facility-administered medications on file prior to visit.         Review of Systems     Skin: no skin rash  CNS; no headaches, blurred vision, seizure, or syncope  ENT: No JVD,  Adenopathies,  nasal congestion. No oral lesions  Cardiac: No chest pain, dyspnea, claudication, edema or palpitations  Respiratory: No SOB, cough, hemoptysis   Gastro-intestinal: No diarrhea, constipation, abdominal pain, nausea, vomit. No ascitis  Genitourinary: no hematuria, dysuria, frequency, frequency  Musculoskeletal: joint pain, arthritis or vasculitic changes  Psych: alert awake, oriented, No cranial nerves deficit.      Objective:   body mass index is 25.69 kg/m².    Physical Exam     BP (!) 141/86 (BP Location: Right arm, Patient Position: Sitting, BP Method: Medium (Automatic))   Pulse 70   Temp 96.9 °F (36.1 °C) (Oral)   Resp 16   Ht 6' 1.11" (1.857 m)   Wt 88.6 kg (195 lb 5.2 oz)   SpO2 100%   BMI 25.69 kg/m²       Head: normocephalic  Neck: No JVD, cervical axillary, or femoral adenopathies  Heart: no murmurs, Normal s1 and s2, No gallops, no rubs, No murmurs  Lungs; CTA, good respiratory effort, no crackles  Abdomen: soft, non tender, no splenomegaly or hepatomegaly, no massess, no bruits  Extremities: No edema, skin rash, joint pain  SNC: awake, alert oriented. Cranial nerves are intact, no focalized, sensitivity and strength preserved      Labs:  Lab Results   Component Value Date    WBC 4.29 12/07/2016    HGB 13.6 (L) 12/07/2016    HCT 40.8 12/07/2016     12/07/2016    K 4.4 12/07/2016     12/07/2016    CO2 23 12/07/2016    BUN 65 (H) 12/07/2016    CREATININE 4.7 (H) 12/07/2016    EGFRNONAA 12.2 (A) 12/07/2016    CALCIUM " 9.4 12/07/2016    PHOS 4.3 12/07/2016    ALBUMIN 3.7 12/07/2016    AST 12 12/07/2016    ALT 11 12/07/2016    .0 (H) 07/09/2020       No results found for: PREALBUMIN, BILIRUBINUA, GGT, AMYLASE, LIPASE, PROTEINUA, NITRITE, RBCUA, WBCUA    No results found for: HLAABCTYPE    Lab Results   Component Value Date    CPRA 0 01/06/2020    CPRA 0 01/06/2020    CPRA 0 01/06/2020    NM4ELIO Negative 01/06/2020    CIIAB Negative 01/06/2020       Labs were reviewed with the patient.    Pre-transplant Workup:   Reviewed with the patient.    Assessment:     1. ESRD on dialysis    2. Essential hypertension    3. Polycystic kidney disease    4. Long term (current) use of anticoagulants--coumadin, ASA    5. Chronic atrial fibrillation    6. Benign prostatic hyperplasia without lower urinary tract symptoms        Plan:     Transplant Candidacy:   Mr. Mazariegos is a suitable kidney transplant candidate.  Meets center eligibility for accepting HCV+ donor offer - yes.  Patient educated on HCV+ donors. Ronal is willing  to accept HCV+ donor offer -  yes   Patient is a candidate for KDPI > 85 kidney donor offer - no.  He remains in overall stable health, and will remain active on the transplant list.    Education provided    We discussed hep C kidney and willing to accept this offer    We also discussed KDPI concept. He does not qualify due to wt and use of coumadin.     We discussed living donation.    All questions answered. No contra-indication for kidney transplant        Carlos Barcenas MD       Follow-up:   In addition to the tests noted in the plan, Mr. Mazariegos will continue to have reevaluation as per the standing pre-kidney transplant protocol:  1. Monthly blood for PRA  2. Annual return to clinic, except HIV positive, > 65 years of age, or pancreas transplant candidates who will be scheduled to see transplant every 6 months while in pre-transplant phase  3. Annual re-testing: CXR, EKG, yearly mammograms for women over 40  and PSA for males over 40, cardiology follow-up as recommended by initial cardiology pre-transplant evaluation  4. Renal ultrasound every 2 years  5. Baseline colonoscopy after age 50 and repeated as recommended    UNOS Patient Status  Functional Status: 70% - Cares for self: unable to carry on normal activity or active work  Physical Capacity: No Limitations

## 2020-07-09 NOTE — LETTER
Date: 7/10/2020    To:  Dialysis Social Worker From:   Margie Jimenez LMSW          Thank you for your referral on Ronal Mazariegos [id number].  As part of the transplant assessment process, a transplant  conducts an adherence check.  Please provide the following information by completing and faxing this form back to us at [phone].    In the last 3 months:    Number of AMAs: ___     Number of No-Show appointments:___  Last intact PTH:  ____    Any concerns with Labs:  yes or no              If yes, please explain:  ________________________________________________________      __________________________________________________________________________      Any concerns with Caregivers:  yes or no    If yes, please explain:  ________________________________________________________       Any concerns with Transportations:  yes or no    If yes, please explain:  ________________________________________________________    __________________________________________________________________________      Any psychiatric and/or psychosocial concerns:  yes or no    If yes, please explain:  _________________________________________________________    ___________________________________________________________________________          [address]  ?  [place] [address]  ?  phone [phone]  ?  fax [phone]  ?  [email or web address]  Confidentiality notice: The accompanying facsimile is intended solely for the use of the recipient designated above. Document(s) transmitted herewith may contain information that is confidential and privileged. Delivery, distribution or dissemination of this communication other than to the intended recipient is strictly prohibited. If you have received this facsimile in error, please notify us immediately by telephone.

## 2020-07-09 NOTE — LETTER
Date: 7/10/2020          Listed Patient      To: Dialysis Unit  and Charge RN From: [facility] Workers and      Kidney Transplant Nurse Coordinators    RE: Ronal Mazariegos, 1950, [id number]     At [facility], we conduct adherence checks as an important part of transplant care. Initial and listed patient assessments are not complete without adherence information.        Please complete the following information:     Current Dry Weight: ___________         Most Recent Pre-Treatment Weight: ___________ /date: _________                    Data from the last 3 months:  (data from last 3 months preferred):    Number of AMAs with dates, time, and reasons: ____________________________________________________    ______________________________________________________________________________________________    ______________________________________________________________________________________________    Number of No-Shows with dates and reasons: ______________________________________________________      ______________________________________________________________________________________________    Last intact PTH:  ___________/date: __________      Any concerns with Labs:  YES / NO      If yes, please explain:  ___________________________________________________________________________    ______________________________________________________________________________________________    Any concerns with Caregivers:  YES / NO    If yes, please explain:  ___________________________________________________________________________    ______________________________________________________________________________________________     Any concerns with Transportation:  YES / NO    If yes, please explain:   ___________________________________________________________________________    ______________________________________________________________________________________________    Any Psychiatric and/or Psychosocial concerns:  YES / NO     If yes, please explain: ___________________________________________________________________________    ______________________________________________________________________________________________      PLEASE RETURN TO: FAX: [phone]     Thank you for collaborating with us in the care of this patient.           [address]  ?  [place] [address]  ?  phone [phone]  ?  fax [phone]  ?  [email or web address]  Confidentiality notice: The accompanying facsimile is intended solely for the use of the recipient designated above. Document(s) transmitted herewith may contain information that is confidential and privileged. Delivery, distribution or dissemination of this communication other than to the intended recipient is strictly prohibited. If you have received this facsimile in error, please notify us immediately by telephone.

## 2020-07-10 ENCOUNTER — TELEPHONE (OUTPATIENT)
Dept: TRANSPLANT | Facility: CLINIC | Age: 70
End: 2020-07-10

## 2020-07-10 NOTE — TELEPHONE ENCOUNTER
----- Message from Nicholas Whitmore RN sent at 7/10/2020  1:10 PM CDT -----  Contact: Mr Isabel Siva    ----- Message -----  From: Astrid Dawkins  Sent: 7/10/2020  12:30 PM CDT  To: Kidney Waitlisted Coordinator    Mr Isabel stated he is returning a call his voicemail isnt clear so he didn't get a name      Chalo contact [phone]

## 2020-07-10 NOTE — TELEPHONE ENCOUNTER
Returned call, voicemail picked up. Not sure who called patient initially, it was not transplant.

## 2020-07-13 ENCOUNTER — SOCIAL WORK (OUTPATIENT)
Dept: TRANSPLANT | Facility: HOSPITAL | Age: 70
End: 2020-07-13

## 2020-07-13 DIAGNOSIS — Z76.82 PRE-KIDNEY TRANSPLANT, LISTED: Primary | ICD-10-CM

## 2020-07-13 NOTE — PROGRESS NOTES
According to dialysis unit medical record, in the last three months pt has, 0 AMAs, 0  No Shows and denies issues with transportation and labs. Pt's last PTH was 73 on 6/8/2020.       Confirmed by dialysis unit-LMYOVANY or RN

## 2020-09-25 ENCOUNTER — PATIENT MESSAGE (OUTPATIENT)
Dept: TRANSPLANT | Facility: CLINIC | Age: 70
End: 2020-09-25

## 2020-10-26 DIAGNOSIS — Z76.82 PRE-KIDNEY TRANSPLANT, LISTED: Primary | ICD-10-CM

## 2020-10-26 DIAGNOSIS — Z76.82 ORGAN TRANSPLANT CANDIDATE: Primary | ICD-10-CM

## 2020-11-03 ENCOUNTER — TELEPHONE (OUTPATIENT)
Dept: TRANSPLANT | Facility: CLINIC | Age: 70
End: 2020-11-03

## 2021-01-08 ENCOUNTER — PATIENT MESSAGE (OUTPATIENT)
Dept: TRANSPLANT | Facility: CLINIC | Age: 71
End: 2021-01-08

## 2021-01-14 ENCOUNTER — PATIENT MESSAGE (OUTPATIENT)
Dept: TRANSPLANT | Facility: CLINIC | Age: 71
End: 2021-01-14

## 2021-01-21 ENCOUNTER — OFFICE VISIT (OUTPATIENT)
Dept: TRANSPLANT | Facility: CLINIC | Age: 71
End: 2021-01-21
Payer: MEDICARE

## 2021-01-21 ENCOUNTER — HOSPITAL ENCOUNTER (OUTPATIENT)
Dept: RADIOLOGY | Facility: HOSPITAL | Age: 71
Discharge: HOME | End: 2021-01-21
Attending: NURSE PRACTITIONER
Payer: MEDICARE

## 2021-01-21 ENCOUNTER — HOSPITAL ENCOUNTER (OUTPATIENT)
Dept: CARDIOLOGY | Facility: HOSPITAL | Age: 71
Discharge: HOME | End: 2021-01-21
Attending: NURSE PRACTITIONER
Payer: MEDICARE

## 2021-01-21 VITALS
HEIGHT: 73 IN | HEART RATE: 71 BPM | OXYGEN SATURATION: 99 % | BODY MASS INDEX: 25.56 KG/M2 | DIASTOLIC BLOOD PRESSURE: 79 MMHG | SYSTOLIC BLOOD PRESSURE: 132 MMHG | WEIGHT: 192.88 LBS | RESPIRATION RATE: 16 BRPM | TEMPERATURE: 98 F

## 2021-01-21 VITALS
SYSTOLIC BLOOD PRESSURE: 140 MMHG | BODY MASS INDEX: 25.84 KG/M2 | HEART RATE: 96 BPM | HEIGHT: 73 IN | WEIGHT: 195 LBS | DIASTOLIC BLOOD PRESSURE: 78 MMHG

## 2021-01-21 DIAGNOSIS — Z99.2 ESRD ON DIALYSIS: ICD-10-CM

## 2021-01-21 DIAGNOSIS — Z79.01 LONG TERM (CURRENT) USE OF ANTICOAGULANTS: Chronic | ICD-10-CM

## 2021-01-21 DIAGNOSIS — I48.91 ATRIAL FIBRILLATION, UNSPECIFIED TYPE: ICD-10-CM

## 2021-01-21 DIAGNOSIS — Q61.3 POLYCYSTIC KIDNEY DISEASE: ICD-10-CM

## 2021-01-21 DIAGNOSIS — N18.6 ESRD ON DIALYSIS: ICD-10-CM

## 2021-01-21 DIAGNOSIS — Z76.82 ORGAN TRANSPLANT CANDIDATE: ICD-10-CM

## 2021-01-21 DIAGNOSIS — I10 HYPERTENSION, UNSPECIFIED TYPE: ICD-10-CM

## 2021-01-21 DIAGNOSIS — D63.8 ANEMIA OF CHRONIC DISEASE: ICD-10-CM

## 2021-01-21 DIAGNOSIS — Z76.82 PATIENT ON WAITING LIST FOR KIDNEY TRANSPLANT: Primary | Chronic | ICD-10-CM

## 2021-01-21 LAB
ASCENDING AORTA: 3.17 CM
AV INDEX (PROSTH): 0.46
AV MEAN GRADIENT: 4 MMHG
AV PEAK GRADIENT: 9 MMHG
AV VALVE AREA: 1.88 CM2
AV VELOCITY RATIO: 0.46
BSA FOR ECHO PROCEDURE: 2.13 M2
CV ECHO LV RWT: 0.44 CM
DOP CALC AO PEAK VEL: 1.53 M/S
DOP CALC AO VTI: 28.77 CM
DOP CALC LVOT AREA: 4.1 CM2
DOP CALC LVOT DIAMETER: 2.29 CM
DOP CALC LVOT PEAK VEL: 0.7 M/S
DOP CALC LVOT STROKE VOLUME: 54.22 CM3
DOP CALCLVOT PEAK VEL VTI: 13.17 CM
E WAVE DECELERATION TIME: 174.45 MSEC
E/A RATIO: 2.71
E/E' RATIO: 13.73 M/S
ECHO LV POSTERIOR WALL: 1.04 CM (ref 0.6–1.1)
FRACTIONAL SHORTENING: 30 % (ref 28–44)
INTERVENTRICULAR SEPTUM: 1.02 CM (ref 0.6–1.1)
LA MAJOR: 7.41 CM
LA MINOR: 7.79 CM
LA WIDTH: 4.94 CM
LEFT ATRIUM SIZE: 5.7 CM
LEFT ATRIUM VOLUME INDEX MOD: 55 ML/M2
LEFT ATRIUM VOLUME INDEX: 85.4 ML/M2
LEFT ATRIUM VOLUME MOD: 117.1 CM3
LEFT ATRIUM VOLUME: 181.79 CM3
LEFT INTERNAL DIMENSION IN SYSTOLE: 3.36 CM (ref 2.1–4)
LEFT VENTRICLE DIASTOLIC VOLUME INDEX: 49.93 ML/M2
LEFT VENTRICLE DIASTOLIC VOLUME: 106.28 ML
LEFT VENTRICLE MASS INDEX: 83 G/M2
LEFT VENTRICLE SYSTOLIC VOLUME INDEX: 21.7 ML/M2
LEFT VENTRICLE SYSTOLIC VOLUME: 46.17 ML
LEFT VENTRICULAR INTERNAL DIMENSION IN DIASTOLE: 4.78 CM (ref 3.5–6)
LEFT VENTRICULAR MASS: 175.98 G
LV LATERAL E/E' RATIO: 11.44 M/S
LV SEPTAL E/E' RATIO: 17.17 M/S
MV PEAK A VEL: 0.38 M/S
MV PEAK E VEL: 1.03 M/S
PISA MRMAX VEL: 0.05 M/S
PISA TR MAX VEL: 2.65 M/S
RA MAJOR: 6.78 CM
RA PRESSURE: 8 MMHG
RA WIDTH: 4.24 CM
RIGHT VENTRICULAR END-DIASTOLIC DIMENSION: 4.21 CM
SINUS: 3.88 CM
STJ: 2.55 CM
TDI LATERAL: 0.09 M/S
TDI SEPTAL: 0.06 M/S
TDI: 0.08 M/S
TR MAX PG: 28 MMHG
TRICUSPID ANNULAR PLANE SYSTOLIC EXCURSION: 1.73 CM
TV REST PULMONARY ARTERY PRESSURE: 36 MMHG

## 2021-01-21 PROCEDURE — 93306 ECHO (CUPID ONLY): ICD-10-PCS | Mod: 26,TXP,, | Performed by: INTERNAL MEDICINE

## 2021-01-21 PROCEDURE — 76770 US EXAM ABDO BACK WALL COMP: CPT | Mod: TC,TXP

## 2021-01-21 PROCEDURE — 93306 TTE W/DOPPLER COMPLETE: CPT | Mod: TXP

## 2021-01-21 PROCEDURE — 76770 US EXAM ABDO BACK WALL COMP: CPT | Mod: 26,TXP,, | Performed by: RADIOLOGY

## 2021-01-21 PROCEDURE — 99215 OFFICE O/P EST HI 40 MIN: CPT | Mod: S$PBB,TXP,, | Performed by: NURSE PRACTITIONER

## 2021-01-21 PROCEDURE — 99214 OFFICE O/P EST MOD 30 MIN: CPT | Mod: PBBFAC,25,TXP | Performed by: NURSE PRACTITIONER

## 2021-01-21 PROCEDURE — 99999 PR PBB SHADOW E&M-EST. PATIENT-LVL IV: ICD-10-PCS | Mod: PBBFAC,TXP,, | Performed by: NURSE PRACTITIONER

## 2021-01-21 PROCEDURE — 76770 US RETROPERITONEAL COMPLETE: ICD-10-PCS | Mod: 26,TXP,, | Performed by: RADIOLOGY

## 2021-01-21 PROCEDURE — 99215 PR OFFICE/OUTPT VISIT, EST, LEVL V, 40-54 MIN: ICD-10-PCS | Mod: S$PBB,TXP,, | Performed by: NURSE PRACTITIONER

## 2021-01-21 PROCEDURE — 93306 TTE W/DOPPLER COMPLETE: CPT | Mod: 26,TXP,, | Performed by: INTERNAL MEDICINE

## 2021-01-21 PROCEDURE — 99999 PR PBB SHADOW E&M-EST. PATIENT-LVL IV: CPT | Mod: PBBFAC,TXP,, | Performed by: NURSE PRACTITIONER

## 2021-01-21 RX ORDER — BISOPROLOL FUMARATE 10 MG/1
10 TABLET, FILM COATED ORAL DAILY
Status: ON HOLD | COMMUNITY
End: 2021-05-06 | Stop reason: HOSPADM

## 2021-01-21 RX ORDER — DOXAZOSIN 2 MG/1
2 TABLET ORAL NIGHTLY
Status: ON HOLD | COMMUNITY
End: 2021-05-06 | Stop reason: HOSPADM

## 2021-01-22 DIAGNOSIS — Z76.82 ORGAN TRANSPLANT CANDIDATE: Primary | ICD-10-CM

## 2021-05-03 ENCOUNTER — TELEPHONE (OUTPATIENT)
Dept: TRANSPLANT | Facility: CLINIC | Age: 71
End: 2021-05-03

## 2021-05-03 PROCEDURE — 86829 HLA CLASS I/II ANTIBODY QUAL: CPT | Mod: 91,PO | Performed by: NURSE PRACTITIONER

## 2021-05-03 PROCEDURE — 86829 HLA CLASS I/II ANTIBODY QUAL: CPT | Mod: PO | Performed by: NURSE PRACTITIONER

## 2021-05-04 ENCOUNTER — ANESTHESIA EVENT (OUTPATIENT)
Dept: SURGERY | Facility: HOSPITAL | Age: 71
DRG: 652 | End: 2021-05-04
Payer: MEDICARE

## 2021-05-04 ENCOUNTER — TELEPHONE (OUTPATIENT)
Dept: TRANSPLANT | Facility: CLINIC | Age: 71
End: 2021-05-04

## 2021-05-04 ENCOUNTER — HOSPITAL ENCOUNTER (INPATIENT)
Facility: HOSPITAL | Age: 71
LOS: 3 days | Discharge: HOME | DRG: 652 | End: 2021-05-07
Attending: TRANSPLANT SURGERY | Admitting: TRANSPLANT SURGERY
Payer: MEDICARE

## 2021-05-04 ENCOUNTER — ANESTHESIA (OUTPATIENT)
Dept: SURGERY | Facility: HOSPITAL | Age: 71
DRG: 652 | End: 2021-05-04
Payer: MEDICARE

## 2021-05-04 DIAGNOSIS — Z94.0 S/P KIDNEY TRANSPLANT: ICD-10-CM

## 2021-05-04 DIAGNOSIS — I48.91 ATRIAL FIBRILLATION, UNSPECIFIED TYPE: ICD-10-CM

## 2021-05-04 DIAGNOSIS — Z79.60 LONG-TERM USE OF IMMUNOSUPPRESSANT MEDICATION: ICD-10-CM

## 2021-05-04 DIAGNOSIS — Z29.89 PROPHYLACTIC IMMUNOTHERAPY: ICD-10-CM

## 2021-05-04 DIAGNOSIS — R00.0 TACHYARRHYTHMIA: ICD-10-CM

## 2021-05-04 DIAGNOSIS — I48.91 A-FIB: ICD-10-CM

## 2021-05-04 DIAGNOSIS — R73.9 HYPERGLYCEMIA: ICD-10-CM

## 2021-05-04 DIAGNOSIS — I48.20 CHRONIC ATRIAL FIBRILLATION: ICD-10-CM

## 2021-05-04 DIAGNOSIS — Z91.89 AT RISK FOR OPPORTUNISTIC INFECTIONS: ICD-10-CM

## 2021-05-04 DIAGNOSIS — Z94.0 S/P KIDNEY TRANSPLANT: Primary | ICD-10-CM

## 2021-05-04 DIAGNOSIS — N18.6 END STAGE KIDNEY DISEASE: ICD-10-CM

## 2021-05-04 DIAGNOSIS — Z76.82 AWAITING ORGAN TRANSPLANT STATUS: Primary | ICD-10-CM

## 2021-05-04 DIAGNOSIS — Z01.811 PRE-OP CHEST EXAM: ICD-10-CM

## 2021-05-04 LAB
25(OH)D3+25(OH)D2 SERPL-MCNC: 26 NG/ML (ref 30–96)
ABO + RH BLD: NORMAL
ALBUMIN SERPL BCP-MCNC: 3.7 G/DL (ref 3.5–5.2)
ALP SERPL-CCNC: 65 U/L (ref 55–135)
ALT SERPL W/O P-5'-P-CCNC: 15 U/L (ref 10–44)
ANION GAP SERPL CALC-SCNC: 9 MMOL/L (ref 8–16)
APTT BLDCRRT: 39.5 SEC (ref 21–32)
AST SERPL-CCNC: 15 U/L (ref 10–40)
BASOPHILS # BLD AUTO: 0.03 K/UL (ref 0–0.2)
BASOPHILS NFR BLD: 1 % (ref 0–1.9)
BILIRUB SERPL-MCNC: 1.2 MG/DL (ref 0.1–1)
BLD GP AB SCN CELLS X3 SERPL QL: NORMAL
BUN SERPL-MCNC: 39 MG/DL (ref 8–23)
CALCIUM SERPL-MCNC: 10.1 MG/DL (ref 8.7–10.5)
CHLORIDE SERPL-SCNC: 96 MMOL/L (ref 95–110)
CHOLEST SERPL-MCNC: 107 MG/DL (ref 120–199)
CHOLEST/HDLC SERPL: 2.2 {RATIO} (ref 2–5)
CO2 SERPL-SCNC: 33 MMOL/L (ref 23–29)
CREAT SERPL-MCNC: 8.1 MG/DL (ref 0.5–1.4)
DIFFERENTIAL METHOD: ABNORMAL
EOSINOPHIL # BLD AUTO: 0.1 K/UL (ref 0–0.5)
EOSINOPHIL NFR BLD: 4.3 % (ref 0–8)
ERYTHROCYTE [DISTWIDTH] IN BLOOD BY AUTOMATED COUNT: 15.4 % (ref 11.5–14.5)
EST. GFR  (AFRICAN AMERICAN): 7.1 ML/MIN/1.73 M^2
EST. GFR  (NON AFRICAN AMERICAN): 6.1 ML/MIN/1.73 M^2
GLUCOSE SERPL-MCNC: 90 MG/DL (ref 70–110)
HCT VFR BLD AUTO: 37.6 % (ref 40–54)
HCT VFR BLD AUTO: 39.9 % (ref 40–54)
HDLC SERPL-MCNC: 48 MG/DL (ref 40–75)
HDLC SERPL: 44.9 % (ref 20–50)
HGB BLD-MCNC: 12.9 G/DL (ref 14–18)
IMM GRANULOCYTES # BLD AUTO: 0.01 K/UL (ref 0–0.04)
IMM GRANULOCYTES NFR BLD AUTO: 0.3 % (ref 0–0.5)
INR PPP: 2.2 (ref 0.8–1.2)
LDH SERPL L TO P-CCNC: 178 U/L (ref 110–260)
LDLC SERPL CALC-MCNC: 50 MG/DL (ref 63–159)
LYMPHOCYTES # BLD AUTO: 0.9 K/UL (ref 1–4.8)
LYMPHOCYTES NFR BLD: 30.2 % (ref 18–48)
MCH RBC QN AUTO: 28 PG (ref 27–31)
MCHC RBC AUTO-ENTMCNC: 32.3 G/DL (ref 32–36)
MCV RBC AUTO: 87 FL (ref 82–98)
MONOCYTES # BLD AUTO: 0.3 K/UL (ref 0.3–1)
MONOCYTES NFR BLD: 10.8 % (ref 4–15)
NEUTROPHILS # BLD AUTO: 1.6 K/UL (ref 1.8–7.7)
NEUTROPHILS NFR BLD: 53.4 % (ref 38–73)
NONHDLC SERPL-MCNC: 59 MG/DL
NRBC BLD-RTO: 0 /100 WBC
PHOSPHATE SERPL-MCNC: 4.3 MG/DL (ref 2.7–4.5)
PLATELET # BLD AUTO: 202 K/UL (ref 150–450)
PMV BLD AUTO: 10 FL (ref 9.2–12.9)
POTASSIUM SERPL-SCNC: 3.7 MMOL/L (ref 3.5–5.1)
PROT SERPL-MCNC: 7.5 G/DL (ref 6–8.4)
PROTHROMBIN TIME: 23.3 SEC (ref 9–12.5)
PTH-INTACT SERPL-MCNC: 216 PG/ML (ref 9–77)
RBC # BLD AUTO: 4.61 M/UL (ref 4.6–6.2)
SARS-COV-2 RDRP RESP QL NAA+PROBE: NEGATIVE
SODIUM SERPL-SCNC: 138 MMOL/L (ref 136–145)
TRIGL SERPL-MCNC: 45 MG/DL (ref 30–150)
URATE SERPL-MCNC: 4.1 MG/DL (ref 3.4–7)
WBC # BLD AUTO: 3.05 K/UL (ref 3.9–12.7)

## 2021-05-04 PROCEDURE — 80069 RENAL FUNCTION PANEL: CPT | Performed by: SURGERY

## 2021-05-04 PROCEDURE — 63600175 PHARM REV CODE 636 W HCPCS: Performed by: REGISTERED NURSE

## 2021-05-04 PROCEDURE — 25000003 PHARM REV CODE 250: Performed by: SURGERY

## 2021-05-04 PROCEDURE — 36415 COLL VENOUS BLD VENIPUNCTURE: CPT | Mod: NTX | Performed by: PHYSICIAN ASSISTANT

## 2021-05-04 PROCEDURE — 86704 HEP B CORE ANTIBODY TOTAL: CPT | Mod: NTX | Performed by: PHYSICIAN ASSISTANT

## 2021-05-04 PROCEDURE — 63600175 PHARM REV CODE 636 W HCPCS: Mod: JG,NTX | Performed by: PHYSICIAN ASSISTANT

## 2021-05-04 PROCEDURE — 84100 ASSAY OF PHOSPHORUS: CPT | Mod: NTX | Performed by: PHYSICIAN ASSISTANT

## 2021-05-04 PROCEDURE — 11000001 HC ACUTE MED/SURG PRIVATE ROOM

## 2021-05-04 PROCEDURE — 36000930 HC OR TIME LEV VII 1ST 15 MIN: Performed by: TRANSPLANT SURGERY

## 2021-05-04 PROCEDURE — 99223 PR INITIAL HOSPITAL CARE,LEVL III: ICD-10-PCS | Mod: NTX,,, | Performed by: PHYSICIAN ASSISTANT

## 2021-05-04 PROCEDURE — 83615 LACTATE (LD) (LDH) ENZYME: CPT | Mod: NTX | Performed by: PHYSICIAN ASSISTANT

## 2021-05-04 PROCEDURE — U0002 COVID-19 LAB TEST NON-CDC: HCPCS | Mod: NTX | Performed by: PHYSICIAN ASSISTANT

## 2021-05-04 PROCEDURE — 50360 PR TRANSPLANTATION OF KIDNEY: ICD-10-PCS | Mod: RT,GC,, | Performed by: TRANSPLANT SURGERY

## 2021-05-04 PROCEDURE — 86706 HEP B SURFACE ANTIBODY: CPT | Mod: NTX | Performed by: PHYSICIAN ASSISTANT

## 2021-05-04 PROCEDURE — 84550 ASSAY OF BLOOD/URIC ACID: CPT | Mod: NTX | Performed by: PHYSICIAN ASSISTANT

## 2021-05-04 PROCEDURE — 71000033 HC RECOVERY, INTIAL HOUR: Performed by: TRANSPLANT SURGERY

## 2021-05-04 PROCEDURE — 80053 COMPREHEN METABOLIC PANEL: CPT | Mod: NTX | Performed by: PHYSICIAN ASSISTANT

## 2021-05-04 PROCEDURE — 85025 COMPLETE CBC W/AUTO DIFF WBC: CPT | Mod: NTX | Performed by: PHYSICIAN ASSISTANT

## 2021-05-04 PROCEDURE — 86803 HEPATITIS C AB TEST: CPT | Mod: NTX | Performed by: PHYSICIAN ASSISTANT

## 2021-05-04 PROCEDURE — 27201423 OPTIME MED/SURG SUP & DEVICES STERILE SUPPLY: Performed by: TRANSPLANT SURGERY

## 2021-05-04 PROCEDURE — 85014 HEMATOCRIT: CPT | Performed by: SURGERY

## 2021-05-04 PROCEDURE — 85730 THROMBOPLASTIN TIME PARTIAL: CPT | Mod: NTX | Performed by: PHYSICIAN ASSISTANT

## 2021-05-04 PROCEDURE — 99222 PR INITIAL HOSPITAL CARE,LEVL II: ICD-10-PCS | Mod: NTX,,, | Performed by: NURSE PRACTITIONER

## 2021-05-04 PROCEDURE — 86703 HIV-1/HIV-2 1 RESULT ANTBDY: CPT | Mod: NTX | Performed by: PHYSICIAN ASSISTANT

## 2021-05-04 PROCEDURE — 87522 HEPATITIS C REVRS TRNSCRPJ: CPT | Mod: NTX | Performed by: PHYSICIAN ASSISTANT

## 2021-05-04 PROCEDURE — 99223 1ST HOSP IP/OBS HIGH 75: CPT | Mod: NTX,,, | Performed by: PHYSICIAN ASSISTANT

## 2021-05-04 PROCEDURE — 50605 INSERT URETERAL SUPPORT: CPT | Mod: 51,RT,GC, | Performed by: TRANSPLANT SURGERY

## 2021-05-04 PROCEDURE — 83970 ASSAY OF PARATHORMONE: CPT | Mod: NTX | Performed by: PHYSICIAN ASSISTANT

## 2021-05-04 PROCEDURE — S5010 5% DEXTROSE AND 0.45% SALINE: HCPCS | Performed by: SURGERY

## 2021-05-04 PROCEDURE — 71000016 HC POSTOP RECOV ADDL HR: Performed by: TRANSPLANT SURGERY

## 2021-05-04 PROCEDURE — 80061 LIPID PANEL: CPT | Mod: NTX | Performed by: PHYSICIAN ASSISTANT

## 2021-05-04 PROCEDURE — 50323 PR TRANSPLANT,PREP CADAVER RENAL GRAFT: ICD-10-PCS | Mod: 51,RT,GC, | Performed by: TRANSPLANT SURGERY

## 2021-05-04 PROCEDURE — 87340 HEPATITIS B SURFACE AG IA: CPT | Mod: NTX | Performed by: PHYSICIAN ASSISTANT

## 2021-05-04 PROCEDURE — 63600175 PHARM REV CODE 636 W HCPCS: Mod: NTX | Performed by: PHYSICIAN ASSISTANT

## 2021-05-04 PROCEDURE — 99222 1ST HOSP IP/OBS MODERATE 55: CPT | Mod: NTX,,, | Performed by: NURSE PRACTITIONER

## 2021-05-04 PROCEDURE — 50605 PR URETEROTOMY TO INSERT STENT: ICD-10-PCS | Mod: 51,RT,GC, | Performed by: TRANSPLANT SURGERY

## 2021-05-04 PROCEDURE — 63600175 PHARM REV CODE 636 W HCPCS: Performed by: TRANSPLANT SURGERY

## 2021-05-04 PROCEDURE — 71000015 HC POSTOP RECOV 1ST HR: Performed by: TRANSPLANT SURGERY

## 2021-05-04 PROCEDURE — 93005 ELECTROCARDIOGRAM TRACING: CPT | Mod: NTX

## 2021-05-04 PROCEDURE — D9220A PRA ANESTHESIA: ICD-10-PCS | Mod: ANES,,, | Performed by: ANESTHESIOLOGY

## 2021-05-04 PROCEDURE — 86900 BLOOD TYPING SEROLOGIC ABO: CPT | Mod: NTX | Performed by: PHYSICIAN ASSISTANT

## 2021-05-04 PROCEDURE — 37000009 HC ANESTHESIA EA ADD 15 MINS: Performed by: TRANSPLANT SURGERY

## 2021-05-04 PROCEDURE — 81200001 HC KIDNEY ACQUISITION - CADAVER

## 2021-05-04 PROCEDURE — 93010 ELECTROCARDIOGRAM REPORT: CPT | Mod: ,,, | Performed by: INTERNAL MEDICINE

## 2021-05-04 PROCEDURE — 25000003 PHARM REV CODE 250: Mod: NTX | Performed by: PHYSICIAN ASSISTANT

## 2021-05-04 PROCEDURE — 25000003 PHARM REV CODE 250: Performed by: REGISTERED NURSE

## 2021-05-04 PROCEDURE — C2617 STENT, NON-COR, TEM W/O DEL: HCPCS | Performed by: TRANSPLANT SURGERY

## 2021-05-04 PROCEDURE — 85610 PROTHROMBIN TIME: CPT | Mod: NTX | Performed by: PHYSICIAN ASSISTANT

## 2021-05-04 PROCEDURE — D9220A PRA ANESTHESIA: Mod: ANES,,, | Performed by: ANESTHESIOLOGY

## 2021-05-04 PROCEDURE — 86705 HEP B CORE ANTIBODY IGM: CPT | Mod: NTX | Performed by: PHYSICIAN ASSISTANT

## 2021-05-04 PROCEDURE — 93010 EKG 12-LEAD: ICD-10-PCS | Mod: ,,, | Performed by: INTERNAL MEDICINE

## 2021-05-04 PROCEDURE — 82306 VITAMIN D 25 HYDROXY: CPT | Mod: NTX | Performed by: PHYSICIAN ASSISTANT

## 2021-05-04 PROCEDURE — D9220A PRA ANESTHESIA: ICD-10-PCS | Mod: CRNA,,, | Performed by: REGISTERED NURSE

## 2021-05-04 PROCEDURE — 50360 RNL ALTRNSPLJ W/O RCP NFRCT: CPT | Mod: RT,GC,, | Performed by: TRANSPLANT SURGERY

## 2021-05-04 PROCEDURE — 37000008 HC ANESTHESIA 1ST 15 MINUTES: Performed by: TRANSPLANT SURGERY

## 2021-05-04 PROCEDURE — 36000931 HC OR TIME LEV VII EA ADD 15 MIN: Performed by: TRANSPLANT SURGERY

## 2021-05-04 PROCEDURE — 63600175 PHARM REV CODE 636 W HCPCS: Performed by: ANESTHESIOLOGY

## 2021-05-04 PROCEDURE — D9220A PRA ANESTHESIA: Mod: CRNA,,, | Performed by: REGISTERED NURSE

## 2021-05-04 DEVICE — STENT DOUBLE J 7FRX12CM
Type: IMPLANTABLE DEVICE | Site: URETER | Status: NON-FUNCTIONAL
Removed: 2021-05-20

## 2021-05-04 RX ORDER — PROPOFOL 10 MG/ML
VIAL (ML) INTRAVENOUS
Status: DISCONTINUED | OUTPATIENT
Start: 2021-05-04 | End: 2021-05-04

## 2021-05-04 RX ORDER — INSULIN ASPART 100 [IU]/ML
0-5 INJECTION, SOLUTION INTRAVENOUS; SUBCUTANEOUS EVERY 6 HOURS PRN
Status: DISCONTINUED | OUTPATIENT
Start: 2021-05-04 | End: 2021-05-05

## 2021-05-04 RX ORDER — MUPIROCIN 20 MG/G
OINTMENT TOPICAL
Status: DISCONTINUED | OUTPATIENT
Start: 2021-05-04 | End: 2021-05-07 | Stop reason: HOSPADM

## 2021-05-04 RX ORDER — ACETAMINOPHEN 650 MG/20.3ML
LIQUID ORAL
Status: DISCONTINUED | OUTPATIENT
Start: 2021-05-04 | End: 2021-05-04

## 2021-05-04 RX ORDER — IBUPROFEN 200 MG
24 TABLET ORAL
Status: DISCONTINUED | OUTPATIENT
Start: 2021-05-04 | End: 2021-05-05

## 2021-05-04 RX ORDER — DIPHENHYDRAMINE HYDROCHLORIDE 50 MG/ML
INJECTION INTRAMUSCULAR; INTRAVENOUS
Status: DISCONTINUED | OUTPATIENT
Start: 2021-05-04 | End: 2021-05-04

## 2021-05-04 RX ORDER — MUPIROCIN 20 MG/G
1 OINTMENT TOPICAL 2 TIMES DAILY
Status: DISCONTINUED | OUTPATIENT
Start: 2021-05-04 | End: 2021-05-07 | Stop reason: HOSPADM

## 2021-05-04 RX ORDER — PREGABALIN 75 MG/1
75 CAPSULE ORAL
Status: COMPLETED | OUTPATIENT
Start: 2021-05-04 | End: 2021-05-04

## 2021-05-04 RX ORDER — HEPARIN SODIUM 5000 [USP'U]/ML
5000 INJECTION, SOLUTION INTRAVENOUS; SUBCUTANEOUS EVERY 8 HOURS
Status: DISCONTINUED | OUTPATIENT
Start: 2021-05-05 | End: 2021-05-07

## 2021-05-04 RX ORDER — ONDANSETRON 2 MG/ML
INJECTION INTRAMUSCULAR; INTRAVENOUS
Status: DISCONTINUED | OUTPATIENT
Start: 2021-05-04 | End: 2021-05-04

## 2021-05-04 RX ORDER — TACROLIMUS 1 MG/1
3 CAPSULE ORAL 2 TIMES DAILY
Status: DISCONTINUED | OUTPATIENT
Start: 2021-05-05 | End: 2021-05-06

## 2021-05-04 RX ORDER — VALGANCICLOVIR 450 MG/1
450 TABLET, FILM COATED ORAL EVERY MORNING
Status: DISCONTINUED | OUTPATIENT
Start: 2021-05-14 | End: 2021-05-07 | Stop reason: HOSPADM

## 2021-05-04 RX ORDER — IBUPROFEN 200 MG
16 TABLET ORAL
Status: DISCONTINUED | OUTPATIENT
Start: 2021-05-04 | End: 2021-05-06

## 2021-05-04 RX ORDER — OXYCODONE HYDROCHLORIDE 5 MG/1
5 TABLET ORAL EVERY 6 HOURS PRN
Status: DISCONTINUED | OUTPATIENT
Start: 2021-05-04 | End: 2021-05-07 | Stop reason: HOSPADM

## 2021-05-04 RX ORDER — HEPARIN SODIUM 10000 [USP'U]/ML
INJECTION, SOLUTION INTRAVENOUS; SUBCUTANEOUS
Status: DISCONTINUED | OUTPATIENT
Start: 2021-05-04 | End: 2021-05-04 | Stop reason: HOSPADM

## 2021-05-04 RX ORDER — DOCUSATE SODIUM 100 MG/1
100 CAPSULE, LIQUID FILLED ORAL 3 TIMES DAILY
Status: DISCONTINUED | OUTPATIENT
Start: 2021-05-05 | End: 2021-05-07 | Stop reason: HOSPADM

## 2021-05-04 RX ORDER — SULFAMETHOXAZOLE AND TRIMETHOPRIM 400; 80 MG/1; MG/1
1 TABLET ORAL EVERY MORNING
Status: DISCONTINUED | OUTPATIENT
Start: 2021-05-05 | End: 2021-05-05

## 2021-05-04 RX ORDER — METOPROLOL TARTRATE 25 MG/1
12.5 TABLET ORAL 2 TIMES DAILY
Status: DISCONTINUED | OUTPATIENT
Start: 2021-05-04 | End: 2021-05-06

## 2021-05-04 RX ORDER — OXYBUTYNIN CHLORIDE 5 MG/1
5 TABLET ORAL DAILY
Status: DISCONTINUED | OUTPATIENT
Start: 2021-05-05 | End: 2021-05-05

## 2021-05-04 RX ORDER — KETAMINE HYDROCHLORIDE 100 MG/ML
INJECTION, SOLUTION INTRAMUSCULAR; INTRAVENOUS
Status: DISCONTINUED | OUTPATIENT
Start: 2021-05-04 | End: 2021-05-04

## 2021-05-04 RX ORDER — ACETAMINOPHEN 500 MG
1000 TABLET ORAL EVERY 8 HOURS
Status: COMPLETED | OUTPATIENT
Start: 2021-05-05 | End: 2021-05-06

## 2021-05-04 RX ORDER — LIDOCAINE HYDROCHLORIDE 20 MG/ML
INJECTION, SOLUTION EPIDURAL; INFILTRATION; INTRACAUDAL; PERINEURAL
Status: DISCONTINUED | OUTPATIENT
Start: 2021-05-04 | End: 2021-05-04

## 2021-05-04 RX ORDER — PREDNISONE 20 MG/1
20 TABLET ORAL DAILY
Status: DISCONTINUED | OUTPATIENT
Start: 2021-05-07 | End: 2021-05-07 | Stop reason: HOSPADM

## 2021-05-04 RX ORDER — FAMOTIDINE 20 MG/1
20 TABLET, FILM COATED ORAL NIGHTLY
Status: DISCONTINUED | OUTPATIENT
Start: 2021-05-04 | End: 2021-05-07 | Stop reason: HOSPADM

## 2021-05-04 RX ORDER — GLUCAGON 1 MG
1 KIT INJECTION CONTINUOUS PRN
Status: DISCONTINUED | OUTPATIENT
Start: 2021-05-04 | End: 2021-05-05

## 2021-05-04 RX ORDER — BISACODYL 5 MG
10 TABLET, DELAYED RELEASE (ENTERIC COATED) ORAL NIGHTLY
Status: DISCONTINUED | OUTPATIENT
Start: 2021-05-04 | End: 2021-05-07 | Stop reason: HOSPADM

## 2021-05-04 RX ORDER — NEOSTIGMINE METHYLSULFATE 0.5 MG/ML
INJECTION, SOLUTION INTRAVENOUS
Status: DISCONTINUED | OUTPATIENT
Start: 2021-05-04 | End: 2021-05-04

## 2021-05-04 RX ORDER — METHYLPREDNISOLONE SOD SUCC 125 MG
125 VIAL (EA) INJECTION ONCE
Status: COMPLETED | OUTPATIENT
Start: 2021-05-06 | End: 2021-05-06

## 2021-05-04 RX ORDER — SODIUM CHLORIDE 0.9 % (FLUSH) 0.9 %
10 SYRINGE (ML) INJECTION
Status: DISCONTINUED | OUTPATIENT
Start: 2021-05-04 | End: 2021-05-07 | Stop reason: HOSPADM

## 2021-05-04 RX ORDER — CISATRACURIUM BESYLATE 2 MG/ML
INJECTION, SOLUTION INTRAVENOUS
Status: DISCONTINUED | OUTPATIENT
Start: 2021-05-04 | End: 2021-05-04

## 2021-05-04 RX ORDER — MIDAZOLAM HYDROCHLORIDE 1 MG/ML
INJECTION, SOLUTION INTRAMUSCULAR; INTRAVENOUS
Status: DISCONTINUED | OUTPATIENT
Start: 2021-05-04 | End: 2021-05-04

## 2021-05-04 RX ORDER — HYDROMORPHONE HYDROCHLORIDE 2 MG/ML
INJECTION, SOLUTION INTRAMUSCULAR; INTRAVENOUS; SUBCUTANEOUS
Status: DISCONTINUED | OUTPATIENT
Start: 2021-05-04 | End: 2021-05-04

## 2021-05-04 RX ORDER — FENTANYL CITRATE 50 UG/ML
INJECTION, SOLUTION INTRAMUSCULAR; INTRAVENOUS
Status: DISCONTINUED | OUTPATIENT
Start: 2021-05-04 | End: 2021-05-04

## 2021-05-04 RX ORDER — NYSTATIN 100000 [USP'U]/ML
500000 SUSPENSION ORAL
Status: DISCONTINUED | OUTPATIENT
Start: 2021-05-05 | End: 2021-05-05

## 2021-05-04 RX ORDER — GLUCAGON 1 MG
1 KIT INJECTION
Status: DISCONTINUED | OUTPATIENT
Start: 2021-05-04 | End: 2021-05-05

## 2021-05-04 RX ORDER — CEFAZOLIN SODIUM 1 G/3ML
2 INJECTION, POWDER, FOR SOLUTION INTRAMUSCULAR; INTRAVENOUS
Status: COMPLETED | OUTPATIENT
Start: 2021-05-04 | End: 2021-05-04

## 2021-05-04 RX ORDER — TRAMADOL HYDROCHLORIDE 50 MG/1
50 TABLET ORAL EVERY 6 HOURS PRN
Status: DISCONTINUED | OUTPATIENT
Start: 2021-05-04 | End: 2021-05-07 | Stop reason: HOSPADM

## 2021-05-04 RX ORDER — PHENYLEPHRINE HYDROCHLORIDE 10 MG/ML
INJECTION INTRAVENOUS
Status: DISCONTINUED | OUTPATIENT
Start: 2021-05-04 | End: 2021-05-04

## 2021-05-04 RX ORDER — DEXTROSE MONOHYDRATE AND SODIUM CHLORIDE 5; .45 G/100ML; G/100ML
INJECTION, SOLUTION INTRAVENOUS CONTINUOUS
Status: DISCONTINUED | OUTPATIENT
Start: 2021-05-04 | End: 2021-05-05

## 2021-05-04 RX ORDER — MYCOPHENOLATE MOFETIL 250 MG/1
1000 CAPSULE ORAL 2 TIMES DAILY
Status: DISCONTINUED | OUTPATIENT
Start: 2021-05-04 | End: 2021-05-07 | Stop reason: HOSPADM

## 2021-05-04 RX ORDER — HYDROMORPHONE HYDROCHLORIDE 1 MG/ML
0.2 INJECTION, SOLUTION INTRAMUSCULAR; INTRAVENOUS; SUBCUTANEOUS EVERY 5 MIN PRN
Status: DISCONTINUED | OUTPATIENT
Start: 2021-05-04 | End: 2021-05-05

## 2021-05-04 RX ORDER — MANNITOL 250 MG/ML
INJECTION, SOLUTION INTRAVENOUS
Status: DISCONTINUED | OUTPATIENT
Start: 2021-05-04 | End: 2021-05-04

## 2021-05-04 RX ORDER — SODIUM CHLORIDE 9 MG/ML
INJECTION, SOLUTION INTRAVENOUS CONTINUOUS
Status: DISCONTINUED | OUTPATIENT
Start: 2021-05-04 | End: 2021-05-05

## 2021-05-04 RX ORDER — CEFAZOLIN SODIUM 1 G/3ML
2 INJECTION, POWDER, FOR SOLUTION INTRAMUSCULAR; INTRAVENOUS
Status: COMPLETED | OUTPATIENT
Start: 2021-05-05 | End: 2021-05-05

## 2021-05-04 RX ORDER — HEPARIN SODIUM 5000 [USP'U]/ML
5000 INJECTION, SOLUTION INTRAVENOUS; SUBCUTANEOUS ONCE
Status: COMPLETED | OUTPATIENT
Start: 2021-05-04 | End: 2021-05-04

## 2021-05-04 RX ORDER — FUROSEMIDE 10 MG/ML
INJECTION INTRAMUSCULAR; INTRAVENOUS
Status: DISCONTINUED | OUTPATIENT
Start: 2021-05-04 | End: 2021-05-04

## 2021-05-04 RX ORDER — ONDANSETRON 2 MG/ML
4 INJECTION INTRAMUSCULAR; INTRAVENOUS ONCE AS NEEDED
Status: COMPLETED | OUTPATIENT
Start: 2021-05-04 | End: 2021-05-05

## 2021-05-04 RX ORDER — DEXAMETHASONE SODIUM PHOSPHATE 4 MG/ML
INJECTION, SOLUTION INTRA-ARTICULAR; INTRALESIONAL; INTRAMUSCULAR; INTRAVENOUS; SOFT TISSUE
Status: DISCONTINUED | OUTPATIENT
Start: 2021-05-04 | End: 2021-05-04

## 2021-05-04 RX ADMIN — FUROSEMIDE 100 MG: 10 INJECTION, SOLUTION INTRAMUSCULAR; INTRAVENOUS at 08:05

## 2021-05-04 RX ADMIN — METHYLPREDNISOLONE SODIUM SUCCINATE 500 MG: 1 INJECTION, POWDER, LYOPHILIZED, FOR SOLUTION INTRAMUSCULAR; INTRAVENOUS at 06:05

## 2021-05-04 RX ADMIN — FENTANYL CITRATE 50 MCG: 50 INJECTION, SOLUTION INTRAMUSCULAR; INTRAVENOUS at 06:05

## 2021-05-04 RX ADMIN — SODIUM CHLORIDE 1000 ML: 0.9 INJECTION, SOLUTION INTRAVENOUS at 10:05

## 2021-05-04 RX ADMIN — CISATRACURIUM BESYLATE 2 MG: 2 INJECTION INTRAVENOUS at 09:05

## 2021-05-04 RX ADMIN — CEFAZOLIN 2 G: 330 INJECTION, POWDER, FOR SOLUTION INTRAMUSCULAR; INTRAVENOUS at 07:05

## 2021-05-04 RX ADMIN — DEXTROSE AND SODIUM CHLORIDE: 5; .45 INJECTION, SOLUTION INTRAVENOUS at 10:05

## 2021-05-04 RX ADMIN — KETAMINE HYDROCHLORIDE 10 MG: 100 INJECTION, SOLUTION, CONCENTRATE INTRAMUSCULAR; INTRAVENOUS at 09:05

## 2021-05-04 RX ADMIN — METOPROLOL TARTRATE 12.5 MG: 25 TABLET, FILM COATED ORAL at 11:05

## 2021-05-04 RX ADMIN — KETAMINE HYDROCHLORIDE 20 MG: 100 INJECTION, SOLUTION, CONCENTRATE INTRAMUSCULAR; INTRAVENOUS at 06:05

## 2021-05-04 RX ADMIN — PHENYLEPHRINE HYDROCHLORIDE 100 MCG: 10 INJECTION INTRAVENOUS at 08:05

## 2021-05-04 RX ADMIN — MUPIROCIN 1 G: 20 OINTMENT TOPICAL at 11:05

## 2021-05-04 RX ADMIN — NEOSTIGMINE METHYLSULFATE 5 MG: 0.5 INJECTION INTRAVENOUS at 10:05

## 2021-05-04 RX ADMIN — GLYCOPYRROLATE 0.2 MG: 0.2 INJECTION, SOLUTION INTRAMUSCULAR; INTRAVITREAL at 08:05

## 2021-05-04 RX ADMIN — HYDROMORPHONE HYDROCHLORIDE 0.2 MG: 2 INJECTION, SOLUTION INTRAMUSCULAR; INTRAVENOUS; SUBCUTANEOUS at 08:05

## 2021-05-04 RX ADMIN — MIDAZOLAM HYDROCHLORIDE 2 MG: 1 INJECTION, SOLUTION INTRAMUSCULAR; INTRAVENOUS at 06:05

## 2021-05-04 RX ADMIN — PHENYLEPHRINE HYDROCHLORIDE 100 MCG: 10 INJECTION INTRAVENOUS at 09:05

## 2021-05-04 RX ADMIN — MUPIROCIN: 20 OINTMENT TOPICAL at 05:05

## 2021-05-04 RX ADMIN — DEXAMETHASONE SODIUM PHOSPHATE 12 MG: 4 INJECTION, SOLUTION INTRAMUSCULAR; INTRAVENOUS at 07:05

## 2021-05-04 RX ADMIN — MANNITOL 25 G: 250 INJECTION, SOLUTION INTRAVENOUS at 08:05

## 2021-05-04 RX ADMIN — ONDANSETRON 4 MG: 2 INJECTION, SOLUTION INTRAMUSCULAR; INTRAVENOUS at 10:05

## 2021-05-04 RX ADMIN — CISATRACURIUM BESYLATE 10 MG: 2 INJECTION INTRAVENOUS at 06:05

## 2021-05-04 RX ADMIN — OXYCODONE 5 MG: 5 TABLET ORAL at 11:05

## 2021-05-04 RX ADMIN — ACETAMINOPHEN ORAL SOLUTION 650 MG: 650 SOLUTION ORAL at 07:05

## 2021-05-04 RX ADMIN — SODIUM CHLORIDE: 9 INJECTION, SOLUTION INTRAVENOUS at 06:05

## 2021-05-04 RX ADMIN — FENTANYL CITRATE 50 MCG: 50 INJECTION, SOLUTION INTRAMUSCULAR; INTRAVENOUS at 07:05

## 2021-05-04 RX ADMIN — KETAMINE HYDROCHLORIDE 10 MG: 100 INJECTION, SOLUTION, CONCENTRATE INTRAMUSCULAR; INTRAVENOUS at 08:05

## 2021-05-04 RX ADMIN — FAMOTIDINE 20 MG: 20 TABLET ORAL at 11:05

## 2021-05-04 RX ADMIN — TRAMADOL HYDROCHLORIDE 50 MG: 50 TABLET ORAL at 11:05

## 2021-05-04 RX ADMIN — HYDROMORPHONE HYDROCHLORIDE 0.2 MG: 1 INJECTION, SOLUTION INTRAMUSCULAR; INTRAVENOUS; SUBCUTANEOUS at 11:05

## 2021-05-04 RX ADMIN — CISATRACURIUM BESYLATE 4 MG: 2 INJECTION INTRAVENOUS at 09:05

## 2021-05-04 RX ADMIN — PHENYLEPHRINE HYDROCHLORIDE 150 MCG: 10 INJECTION INTRAVENOUS at 08:05

## 2021-05-04 RX ADMIN — GLYCOPYRROLATE 0.8 MG: 0.2 INJECTION, SOLUTION INTRAMUSCULAR; INTRAVITREAL at 10:05

## 2021-05-04 RX ADMIN — PHENYLEPHRINE HYDROCHLORIDE 100 MCG: 10 INJECTION INTRAVENOUS at 10:05

## 2021-05-04 RX ADMIN — PROPOFOL 200 MG: 10 INJECTION, EMULSION INTRAVENOUS at 06:05

## 2021-05-04 RX ADMIN — HYDROMORPHONE HYDROCHLORIDE 0.2 MG: 2 INJECTION, SOLUTION INTRAMUSCULAR; INTRAVENOUS; SUBCUTANEOUS at 09:05

## 2021-05-04 RX ADMIN — PREGABALIN 75 MG: 75 CAPSULE ORAL at 05:05

## 2021-05-04 RX ADMIN — LIDOCAINE HYDROCHLORIDE 100 MG: 20 INJECTION, SOLUTION EPIDURAL; INFILTRATION; INTRACAUDAL at 06:05

## 2021-05-04 RX ADMIN — CISATRACURIUM BESYLATE 4 MG: 2 INJECTION INTRAVENOUS at 08:05

## 2021-05-04 RX ADMIN — HEPARIN SODIUM 5000 UNITS: 5000 INJECTION INTRAVENOUS; SUBCUTANEOUS at 03:05

## 2021-05-04 RX ADMIN — DIPHENHYDRAMINE HYDROCHLORIDE 50 MG: 50 INJECTION, SOLUTION INTRAMUSCULAR; INTRAVENOUS at 07:05

## 2021-05-04 RX ADMIN — CISATRACURIUM BESYLATE 4 MG: 2 INJECTION INTRAVENOUS at 07:05

## 2021-05-04 RX ADMIN — PHENYLEPHRINE HYDROCHLORIDE 100 MCG: 10 INJECTION INTRAVENOUS at 07:05

## 2021-05-04 RX ADMIN — SODIUM CHLORIDE 20 MG: 9 INJECTION, SOLUTION INTRAVENOUS at 07:05

## 2021-05-05 PROBLEM — Z29.89 PROPHYLACTIC IMMUNOTHERAPY: Status: ACTIVE | Noted: 2021-05-05

## 2021-05-05 PROBLEM — Z94.0 S/P KIDNEY TRANSPLANT: Status: ACTIVE | Noted: 2021-05-05

## 2021-05-05 PROBLEM — Z79.60 LONG-TERM USE OF IMMUNOSUPPRESSANT MEDICATION: Status: ACTIVE | Noted: 2021-05-05

## 2021-05-05 LAB
ALBUMIN SERPL BCP-MCNC: 3.3 G/DL (ref 3.5–5.2)
ALBUMIN SERPL BCP-MCNC: 3.3 G/DL (ref 3.5–5.2)
ANION GAP SERPL CALC-SCNC: 15 MMOL/L (ref 8–16)
ANION GAP SERPL CALC-SCNC: 19 MMOL/L (ref 8–16)
ANION GAP SERPL CALC-SCNC: 21 MMOL/L (ref 8–16)
ANISOCYTOSIS BLD QL SMEAR: SLIGHT
APTT BLDCRRT: 31.2 SEC (ref 21–32)
BASOPHILS # BLD AUTO: 0 K/UL (ref 0–0.2)
BASOPHILS # BLD AUTO: 0.02 K/UL (ref 0–0.2)
BASOPHILS NFR BLD: 0 % (ref 0–1.9)
BASOPHILS NFR BLD: 0.3 % (ref 0–1.9)
BUN SERPL-MCNC: 35 MG/DL (ref 8–23)
BUN SERPL-MCNC: 40 MG/DL (ref 8–23)
BUN SERPL-MCNC: 41 MG/DL (ref 8–23)
BURR CELLS BLD QL SMEAR: ABNORMAL
CALCIUM SERPL-MCNC: 8.5 MG/DL (ref 8.7–10.5)
CALCIUM SERPL-MCNC: 8.7 MG/DL (ref 8.7–10.5)
CALCIUM SERPL-MCNC: 9 MG/DL (ref 8.7–10.5)
CHLORIDE SERPL-SCNC: 102 MMOL/L (ref 95–110)
CHLORIDE SERPL-SCNC: 98 MMOL/L (ref 95–110)
CHLORIDE SERPL-SCNC: 99 MMOL/L (ref 95–110)
CO2 SERPL-SCNC: 19 MMOL/L (ref 23–29)
CO2 SERPL-SCNC: 21 MMOL/L (ref 23–29)
CO2 SERPL-SCNC: 22 MMOL/L (ref 23–29)
CREAT SERPL-MCNC: 5.3 MG/DL (ref 0.5–1.4)
CREAT SERPL-MCNC: 7.2 MG/DL (ref 0.5–1.4)
CREAT SERPL-MCNC: 7.5 MG/DL (ref 0.5–1.4)
DIFFERENTIAL METHOD: ABNORMAL
DIFFERENTIAL METHOD: ABNORMAL
EOSINOPHIL # BLD AUTO: 0 K/UL (ref 0–0.5)
EOSINOPHIL # BLD AUTO: 0 K/UL (ref 0–0.5)
EOSINOPHIL NFR BLD: 0 % (ref 0–8)
EOSINOPHIL NFR BLD: 0.3 % (ref 0–8)
ERYTHROCYTE [DISTWIDTH] IN BLOOD BY AUTOMATED COUNT: 15.4 % (ref 11.5–14.5)
ERYTHROCYTE [DISTWIDTH] IN BLOOD BY AUTOMATED COUNT: 15.5 % (ref 11.5–14.5)
EST. GFR  (AFRICAN AMERICAN): 11.9 ML/MIN/1.73 M^2
EST. GFR  (AFRICAN AMERICAN): 7.8 ML/MIN/1.73 M^2
EST. GFR  (AFRICAN AMERICAN): 8.2 ML/MIN/1.73 M^2
EST. GFR  (NON AFRICAN AMERICAN): 10.3 ML/MIN/1.73 M^2
EST. GFR  (NON AFRICAN AMERICAN): 6.7 ML/MIN/1.73 M^2
EST. GFR  (NON AFRICAN AMERICAN): 7.1 ML/MIN/1.73 M^2
GLUCOSE SERPL-MCNC: 123 MG/DL (ref 70–110)
GLUCOSE SERPL-MCNC: 184 MG/DL (ref 70–110)
GLUCOSE SERPL-MCNC: 195 MG/DL (ref 70–110)
GLUCOSE SERPL-MCNC: 97 MG/DL (ref 70–110)
HBV CORE AB SERPL QL IA: NEGATIVE
HBV CORE IGM SERPL QL IA: NEGATIVE
HBV SURFACE AG SERPL QL IA: NEGATIVE
HCO3 UR-SCNC: 24.2 MMOL/L (ref 24–28)
HCT VFR BLD AUTO: 39.4 % (ref 40–54)
HCT VFR BLD AUTO: 41.2 % (ref 40–54)
HCT VFR BLD CALC: 32 %PCV (ref 36–54)
HCV AB SERPL QL IA: NEGATIVE
HGB BLD-MCNC: 12.7 G/DL (ref 14–18)
HGB BLD-MCNC: 12.8 G/DL (ref 14–18)
HIV 1+2 AB+HIV1 P24 AG SERPL QL IA: NEGATIVE
IMM GRANULOCYTES # BLD AUTO: 0.03 K/UL (ref 0–0.04)
IMM GRANULOCYTES # BLD AUTO: 0.05 K/UL (ref 0–0.04)
IMM GRANULOCYTES NFR BLD AUTO: 0.4 % (ref 0–0.5)
IMM GRANULOCYTES NFR BLD AUTO: 0.7 % (ref 0–0.5)
INR PPP: 2.6 (ref 0.8–1.2)
LYMPHOCYTES # BLD AUTO: 0.4 K/UL (ref 1–4.8)
LYMPHOCYTES # BLD AUTO: 0.4 K/UL (ref 1–4.8)
LYMPHOCYTES NFR BLD: 5.3 % (ref 18–48)
LYMPHOCYTES NFR BLD: 5.7 % (ref 18–48)
MAGNESIUM SERPL-MCNC: 1.7 MG/DL (ref 1.6–2.6)
MCH RBC QN AUTO: 28.3 PG (ref 27–31)
MCH RBC QN AUTO: 28.6 PG (ref 27–31)
MCHC RBC AUTO-ENTMCNC: 31.1 G/DL (ref 32–36)
MCHC RBC AUTO-ENTMCNC: 32.2 G/DL (ref 32–36)
MCV RBC AUTO: 89 FL (ref 82–98)
MCV RBC AUTO: 91 FL (ref 82–98)
MONOCYTES # BLD AUTO: 0.2 K/UL (ref 0.3–1)
MONOCYTES # BLD AUTO: 0.3 K/UL (ref 0.3–1)
MONOCYTES NFR BLD: 2.5 % (ref 4–15)
MONOCYTES NFR BLD: 3.7 % (ref 4–15)
NEUTROPHILS # BLD AUTO: 6.1 K/UL (ref 1.8–7.7)
NEUTROPHILS # BLD AUTO: 6.3 K/UL (ref 1.8–7.7)
NEUTROPHILS NFR BLD: 90.3 % (ref 38–73)
NEUTROPHILS NFR BLD: 90.8 % (ref 38–73)
NRBC BLD-RTO: 0 /100 WBC
NRBC BLD-RTO: 0 /100 WBC
OVALOCYTES BLD QL SMEAR: ABNORMAL
PCO2 BLDA: 35.7 MMHG (ref 35–45)
PH SMN: 7.44 [PH] (ref 7.35–7.45)
PHOSPHATE SERPL-MCNC: 4.2 MG/DL (ref 2.7–4.5)
PHOSPHATE SERPL-MCNC: 4.3 MG/DL (ref 2.7–4.5)
PHOSPHATE SERPL-MCNC: 4.3 MG/DL (ref 2.7–4.5)
PLATELET # BLD AUTO: 162 K/UL (ref 150–450)
PLATELET # BLD AUTO: 192 K/UL (ref 150–450)
PLATELET BLD QL SMEAR: ABNORMAL
PMV BLD AUTO: 10.2 FL (ref 9.2–12.9)
PMV BLD AUTO: 10.9 FL (ref 9.2–12.9)
PO2 BLDA: 123 MMHG (ref 80–100)
POC BE: 0 MMOL/L
POC IONIZED CALCIUM: 1.07 MMOL/L (ref 1.06–1.42)
POC SATURATED O2: 99 % (ref 95–100)
POC TCO2: 25 MMOL/L (ref 23–27)
POCT GLUCOSE: 191 MG/DL (ref 70–110)
POCT GLUCOSE: 203 MG/DL (ref 70–110)
POCT GLUCOSE: 207 MG/DL (ref 70–110)
POCT GLUCOSE: 223 MG/DL (ref 70–110)
POCT GLUCOSE: 232 MG/DL (ref 70–110)
POIKILOCYTOSIS BLD QL SMEAR: SLIGHT
POTASSIUM BLD-SCNC: 3.7 MMOL/L (ref 3.5–5.1)
POTASSIUM SERPL-SCNC: 3.7 MMOL/L (ref 3.5–5.1)
POTASSIUM SERPL-SCNC: 3.8 MMOL/L (ref 3.5–5.1)
POTASSIUM SERPL-SCNC: 3.8 MMOL/L (ref 3.5–5.1)
PROTHROMBIN TIME: 26.9 SEC (ref 9–12.5)
RBC # BLD AUTO: 4.44 M/UL (ref 4.6–6.2)
RBC # BLD AUTO: 4.52 M/UL (ref 4.6–6.2)
SAMPLE: ABNORMAL
SODIUM BLD-SCNC: 135 MMOL/L (ref 136–145)
SODIUM SERPL-SCNC: 136 MMOL/L (ref 136–145)
SODIUM SERPL-SCNC: 138 MMOL/L (ref 136–145)
SODIUM SERPL-SCNC: 142 MMOL/L (ref 136–145)
TACROLIMUS BLD-MCNC: <2 NG/ML (ref 5–15)
WBC # BLD AUTO: 6.74 K/UL (ref 3.9–12.7)
WBC # BLD AUTO: 6.88 K/UL (ref 3.9–12.7)

## 2021-05-05 PROCEDURE — 93010 EKG 12-LEAD: ICD-10-PCS | Mod: 76,,, | Performed by: INTERNAL MEDICINE

## 2021-05-05 PROCEDURE — 25000003 PHARM REV CODE 250: Performed by: SURGERY

## 2021-05-05 PROCEDURE — 99233 PR SUBSEQUENT HOSPITAL CARE,LEVL III: ICD-10-PCS | Mod: ,,, | Performed by: PHYSICIAN ASSISTANT

## 2021-05-05 PROCEDURE — 63600175 PHARM REV CODE 636 W HCPCS: Performed by: SURGERY

## 2021-05-05 PROCEDURE — 25000003 PHARM REV CODE 250: Performed by: PHYSICIAN ASSISTANT

## 2021-05-05 PROCEDURE — 94799 UNLISTED PULMONARY SVC/PX: CPT

## 2021-05-05 PROCEDURE — 93010 ELECTROCARDIOGRAM REPORT: CPT | Mod: ,,, | Performed by: INTERNAL MEDICINE

## 2021-05-05 PROCEDURE — 85730 THROMBOPLASTIN TIME PARTIAL: CPT | Performed by: TRANSPLANT SURGERY

## 2021-05-05 PROCEDURE — 93005 ELECTROCARDIOGRAM TRACING: CPT

## 2021-05-05 PROCEDURE — 80069 RENAL FUNCTION PANEL: CPT | Performed by: SURGERY

## 2021-05-05 PROCEDURE — 80197 ASSAY OF TACROLIMUS: CPT | Performed by: SURGERY

## 2021-05-05 PROCEDURE — 85025 COMPLETE CBC W/AUTO DIFF WBC: CPT | Mod: 91 | Performed by: SURGERY

## 2021-05-05 PROCEDURE — 80048 BASIC METABOLIC PNL TOTAL CA: CPT | Performed by: PHYSICIAN ASSISTANT

## 2021-05-05 PROCEDURE — 36415 COLL VENOUS BLD VENIPUNCTURE: CPT | Performed by: SURGERY

## 2021-05-05 PROCEDURE — 93010 ELECTROCARDIOGRAM REPORT: CPT | Mod: 76,,, | Performed by: INTERNAL MEDICINE

## 2021-05-05 PROCEDURE — 20600001 HC STEP DOWN PRIVATE ROOM

## 2021-05-05 PROCEDURE — 85025 COMPLETE CBC W/AUTO DIFF WBC: CPT | Performed by: PHYSICIAN ASSISTANT

## 2021-05-05 PROCEDURE — 99233 SBSQ HOSP IP/OBS HIGH 50: CPT | Mod: ,,, | Performed by: PHYSICIAN ASSISTANT

## 2021-05-05 PROCEDURE — 99900035 HC TECH TIME PER 15 MIN (STAT)

## 2021-05-05 PROCEDURE — 36415 COLL VENOUS BLD VENIPUNCTURE: CPT | Performed by: PHYSICIAN ASSISTANT

## 2021-05-05 PROCEDURE — 85610 PROTHROMBIN TIME: CPT | Performed by: TRANSPLANT SURGERY

## 2021-05-05 PROCEDURE — 63600175 PHARM REV CODE 636 W HCPCS: Performed by: PHYSICIAN ASSISTANT

## 2021-05-05 PROCEDURE — 83735 ASSAY OF MAGNESIUM: CPT | Performed by: SURGERY

## 2021-05-05 RX ORDER — PREDNISONE 5 MG/1
TABLET ORAL
Qty: 120 TABLET | Refills: 11 | Status: SHIPPED | OUTPATIENT
Start: 2021-05-05 | End: 2022-05-19 | Stop reason: SDUPTHER

## 2021-05-05 RX ORDER — IBUPROFEN 200 MG
16 TABLET ORAL
Status: DISCONTINUED | OUTPATIENT
Start: 2021-05-05 | End: 2021-05-07 | Stop reason: HOSPADM

## 2021-05-05 RX ORDER — VALGANCICLOVIR 450 MG/1
900 TABLET, FILM COATED ORAL EVERY MORNING
Qty: 60 TABLET | Refills: 5 | Status: SHIPPED | OUTPATIENT
Start: 2021-05-04 | End: 2021-08-02 | Stop reason: SINTOL

## 2021-05-05 RX ORDER — ERGOCALCIFEROL 1.25 MG/1
50000 CAPSULE ORAL
Qty: 4 CAPSULE | Refills: 5 | Status: SHIPPED | OUTPATIENT
Start: 2021-05-12 | End: 2021-10-28 | Stop reason: SDUPTHER

## 2021-05-05 RX ORDER — NYSTATIN 100000 [USP'U]/ML
500000 SUSPENSION ORAL
Status: DISCONTINUED | OUTPATIENT
Start: 2021-05-05 | End: 2021-05-07 | Stop reason: HOSPADM

## 2021-05-05 RX ORDER — IBUPROFEN 200 MG
24 TABLET ORAL
Status: DISCONTINUED | OUTPATIENT
Start: 2021-05-05 | End: 2021-05-07 | Stop reason: HOSPADM

## 2021-05-05 RX ORDER — CALCITRIOL 0.25 UG/1
0.25 CAPSULE ORAL DAILY
Status: DISCONTINUED | OUTPATIENT
Start: 2021-05-05 | End: 2021-05-07 | Stop reason: HOSPADM

## 2021-05-05 RX ORDER — SULFAMETHOXAZOLE AND TRIMETHOPRIM 400; 80 MG/1; MG/1
1 TABLET ORAL DAILY
Qty: 30 TABLET | Refills: 5 | Status: SHIPPED | OUTPATIENT
Start: 2021-05-04 | End: 2021-08-02 | Stop reason: SINTOL

## 2021-05-05 RX ORDER — MYCOPHENOLATE MOFETIL 250 MG/1
1000 CAPSULE ORAL 2 TIMES DAILY
Qty: 240 CAPSULE | Refills: 11 | Status: SHIPPED | OUTPATIENT
Start: 2021-05-05 | End: 2021-08-02 | Stop reason: SINTOL

## 2021-05-05 RX ORDER — BISACODYL 5 MG
10 TABLET, DELAYED RELEASE (ENTERIC COATED) ORAL DAILY PRN
Refills: 0 | COMMUNITY
Start: 2021-05-05 | End: 2021-06-09

## 2021-05-05 RX ORDER — ERGOCALCIFEROL 1.25 MG/1
50000 CAPSULE ORAL
Status: DISCONTINUED | OUTPATIENT
Start: 2021-05-05 | End: 2021-05-07 | Stop reason: HOSPADM

## 2021-05-05 RX ORDER — TACROLIMUS 1 MG/1
6 CAPSULE ORAL EVERY 12 HOURS
Qty: 360 CAPSULE | Refills: 11 | Status: SHIPPED | OUTPATIENT
Start: 2021-05-05 | End: 2021-05-07

## 2021-05-05 RX ORDER — CALCITRIOL 0.25 UG/1
0.25 CAPSULE ORAL DAILY
Qty: 30 CAPSULE | Refills: 11 | Status: SHIPPED | OUTPATIENT
Start: 2021-05-06 | End: 2021-09-13

## 2021-05-05 RX ORDER — SULFAMETHOXAZOLE AND TRIMETHOPRIM 400; 80 MG/1; MG/1
1 TABLET ORAL
Status: DISCONTINUED | OUTPATIENT
Start: 2021-05-07 | End: 2021-05-06

## 2021-05-05 RX ORDER — NYSTATIN 100000 [USP'U]/ML
500000 SUSPENSION ORAL
Qty: 400 ML | Refills: 0 | Status: SHIPPED | OUTPATIENT
Start: 2021-05-04 | End: 2021-06-04

## 2021-05-05 RX ORDER — SODIUM CHLORIDE 9 MG/ML
INJECTION, SOLUTION INTRAVENOUS CONTINUOUS
Status: CANCELLED | OUTPATIENT
Start: 2021-05-05

## 2021-05-05 RX ORDER — FAMOTIDINE 20 MG/1
20 TABLET, FILM COATED ORAL NIGHTLY
Qty: 30 TABLET | Refills: 1 | Status: SHIPPED | OUTPATIENT
Start: 2021-05-05 | End: 2022-05-05

## 2021-05-05 RX ORDER — DOCUSATE SODIUM 100 MG/1
100 CAPSULE, LIQUID FILLED ORAL 3 TIMES DAILY PRN
Refills: 0 | COMMUNITY
Start: 2021-05-05 | End: 2021-07-07

## 2021-05-05 RX ORDER — GLUCAGON 1 MG
1 KIT INJECTION
Status: DISCONTINUED | OUTPATIENT
Start: 2021-05-05 | End: 2021-05-07 | Stop reason: HOSPADM

## 2021-05-05 RX ORDER — INSULIN ASPART 100 [IU]/ML
0-5 INJECTION, SOLUTION INTRAVENOUS; SUBCUTANEOUS
Status: DISCONTINUED | OUTPATIENT
Start: 2021-05-05 | End: 2021-05-07 | Stop reason: HOSPADM

## 2021-05-05 RX ORDER — SODIUM CHLORIDE 9 MG/ML
INJECTION, SOLUTION INTRAVENOUS CONTINUOUS
Status: DISCONTINUED | OUTPATIENT
Start: 2021-05-05 | End: 2021-05-05

## 2021-05-05 RX ADMIN — MUPIROCIN 1 G: 20 OINTMENT TOPICAL at 09:05

## 2021-05-05 RX ADMIN — BISACODYL 10 MG: 5 TABLET, COATED ORAL at 09:05

## 2021-05-05 RX ADMIN — METOPROLOL TARTRATE 12.5 MG: 25 TABLET, FILM COATED ORAL at 09:05

## 2021-05-05 RX ADMIN — HEPARIN SODIUM 5000 UNITS: 5000 INJECTION INTRAVENOUS; SUBCUTANEOUS at 05:05

## 2021-05-05 RX ADMIN — SULFAMETHOXAZOLE AND TRIMETHOPRIM 1 TABLET: 400; 80 TABLET ORAL at 09:05

## 2021-05-05 RX ADMIN — MYCOPHENOLATE MOFETIL 1000 MG: 250 CAPSULE ORAL at 09:05

## 2021-05-05 RX ADMIN — ACETAMINOPHEN 1000 MG: 500 TABLET, FILM COATED ORAL at 05:05

## 2021-05-05 RX ADMIN — SODIUM CHLORIDE 1000 ML: 0.9 INJECTION, SOLUTION INTRAVENOUS at 12:05

## 2021-05-05 RX ADMIN — HEPARIN SODIUM 5000 UNITS: 5000 INJECTION INTRAVENOUS; SUBCUTANEOUS at 02:05

## 2021-05-05 RX ADMIN — MYCOPHENOLATE MOFETIL 1000 MG: 250 CAPSULE ORAL at 12:05

## 2021-05-05 RX ADMIN — SODIUM CHLORIDE: 0.9 INJECTION, SOLUTION INTRAVENOUS at 10:05

## 2021-05-05 RX ADMIN — HEPARIN SODIUM 5000 UNITS: 5000 INJECTION INTRAVENOUS; SUBCUTANEOUS at 09:05

## 2021-05-05 RX ADMIN — ERGOCALCIFEROL 50000 UNITS: 1.25 CAPSULE ORAL at 11:05

## 2021-05-05 RX ADMIN — INSULIN ASPART 2 UNITS: 100 INJECTION, SOLUTION INTRAVENOUS; SUBCUTANEOUS at 05:05

## 2021-05-05 RX ADMIN — TRAMADOL HYDROCHLORIDE 50 MG: 50 TABLET ORAL at 05:05

## 2021-05-05 RX ADMIN — NYSTATIN 500000 UNITS: 100000 SUSPENSION ORAL at 02:05

## 2021-05-05 RX ADMIN — DEXTROSE 250 MG: 50 INJECTION, SOLUTION INTRAVENOUS at 02:05

## 2021-05-05 RX ADMIN — FAMOTIDINE 20 MG: 20 TABLET ORAL at 09:05

## 2021-05-05 RX ADMIN — THERA TABS 1 TABLET: TAB at 09:05

## 2021-05-05 RX ADMIN — DOCUSATE SODIUM 100 MG: 100 CAPSULE, LIQUID FILLED ORAL at 09:05

## 2021-05-05 RX ADMIN — ONDANSETRON 4 MG: 2 INJECTION INTRAMUSCULAR; INTRAVENOUS at 01:05

## 2021-05-05 RX ADMIN — TRAMADOL HYDROCHLORIDE 50 MG: 50 TABLET ORAL at 08:05

## 2021-05-05 RX ADMIN — NYSTATIN 500000 UNITS: 100000 SUSPENSION ORAL at 09:05

## 2021-05-05 RX ADMIN — ACETAMINOPHEN 1000 MG: 500 TABLET, FILM COATED ORAL at 02:05

## 2021-05-05 RX ADMIN — SODIUM CHLORIDE 1000 ML: 0.9 INJECTION, SOLUTION INTRAVENOUS at 06:05

## 2021-05-05 RX ADMIN — SODIUM CHLORIDE 1000 ML: 0.9 INJECTION, SOLUTION INTRAVENOUS at 09:05

## 2021-05-05 RX ADMIN — DOCUSATE SODIUM 100 MG: 100 CAPSULE, LIQUID FILLED ORAL at 02:05

## 2021-05-05 RX ADMIN — ACETAMINOPHEN 1000 MG: 500 TABLET, FILM COATED ORAL at 09:05

## 2021-05-05 RX ADMIN — INSULIN ASPART 2 UNITS: 100 INJECTION, SOLUTION INTRAVENOUS; SUBCUTANEOUS at 02:05

## 2021-05-05 RX ADMIN — CEFAZOLIN 2 G: 330 INJECTION, POWDER, FOR SOLUTION INTRAMUSCULAR; INTRAVENOUS at 11:05

## 2021-05-05 RX ADMIN — TACROLIMUS 3 MG: 1 CAPSULE ORAL at 05:05

## 2021-05-05 RX ADMIN — SODIUM CHLORIDE 1000 ML: 0.9 INJECTION, SOLUTION INTRAVENOUS at 08:05

## 2021-05-05 RX ADMIN — INSULIN ASPART 1 UNITS: 100 INJECTION, SOLUTION INTRAVENOUS; SUBCUTANEOUS at 09:05

## 2021-05-05 RX ADMIN — BISACODYL 10 MG: 5 TABLET, COATED ORAL at 01:05

## 2021-05-05 RX ADMIN — CEFAZOLIN 2 G: 330 INJECTION, POWDER, FOR SOLUTION INTRAMUSCULAR; INTRAVENOUS at 02:05

## 2021-05-05 RX ADMIN — OXYBUTYNIN CHLORIDE 5 MG: 5 TABLET ORAL at 09:05

## 2021-05-05 RX ADMIN — CALCITRIOL CAPSULES 0.25 MCG 0.25 MCG: 0.25 CAPSULE ORAL at 11:05

## 2021-05-05 RX ADMIN — TACROLIMUS 3 MG: 1 CAPSULE ORAL at 08:05

## 2021-05-06 DIAGNOSIS — Z94.0 KIDNEY REPLACED BY TRANSPLANT: Primary | ICD-10-CM

## 2021-05-06 PROBLEM — R73.9 HYPERGLYCEMIA: Status: ACTIVE | Noted: 2021-05-06

## 2021-05-06 LAB
ALBUMIN SERPL BCP-MCNC: 2.9 G/DL (ref 3.5–5.2)
ANION GAP SERPL CALC-SCNC: 9 MMOL/L (ref 8–16)
BASOPHILS # BLD AUTO: 0.01 K/UL (ref 0–0.2)
BASOPHILS NFR BLD: 0.1 % (ref 0–1.9)
BUN SERPL-MCNC: 22 MG/DL (ref 8–23)
CALCIUM SERPL-MCNC: 9 MG/DL (ref 8.7–10.5)
CHLORIDE SERPL-SCNC: 101 MMOL/L (ref 95–110)
CO2 SERPL-SCNC: 28 MMOL/L (ref 23–29)
CREAT SERPL-MCNC: 1.8 MG/DL (ref 0.5–1.4)
DIFFERENTIAL METHOD: ABNORMAL
EOSINOPHIL # BLD AUTO: 0 K/UL (ref 0–0.5)
EOSINOPHIL NFR BLD: 0 % (ref 0–8)
ERYTHROCYTE [DISTWIDTH] IN BLOOD BY AUTOMATED COUNT: 15.1 % (ref 11.5–14.5)
EST. GFR  (AFRICAN AMERICAN): 43.7 ML/MIN/1.73 M^2
EST. GFR  (NON AFRICAN AMERICAN): 37.8 ML/MIN/1.73 M^2
GLUCOSE SERPL-MCNC: 128 MG/DL (ref 70–110)
HBV SURFACE AB SER QL IA: POSITIVE
HBV SURFACE AB SERPL IA-ACNC: 978 MIU/ML
HCT VFR BLD AUTO: 39.1 % (ref 40–54)
HEPATITIS C VIRUS (HCV) RNA DETECTION/QUANTIFICATION RT-PCR: NORMAL IU/ML
HGB BLD-MCNC: 12.5 G/DL (ref 14–18)
IMM GRANULOCYTES # BLD AUTO: 0.1 K/UL (ref 0–0.04)
IMM GRANULOCYTES NFR BLD AUTO: 0.9 % (ref 0–0.5)
INR PPP: 2.4 (ref 0.8–1.2)
LYMPHOCYTES # BLD AUTO: 0.8 K/UL (ref 1–4.8)
LYMPHOCYTES NFR BLD: 7.6 % (ref 18–48)
MAGNESIUM SERPL-MCNC: 1.7 MG/DL (ref 1.6–2.6)
MCH RBC QN AUTO: 28 PG (ref 27–31)
MCHC RBC AUTO-ENTMCNC: 32 G/DL (ref 32–36)
MCV RBC AUTO: 88 FL (ref 82–98)
MONOCYTES # BLD AUTO: 0.6 K/UL (ref 0.3–1)
MONOCYTES NFR BLD: 5.3 % (ref 4–15)
NEUTROPHILS # BLD AUTO: 9.1 K/UL (ref 1.8–7.7)
NEUTROPHILS NFR BLD: 86.1 % (ref 38–73)
NRBC BLD-RTO: 0 /100 WBC
PHOSPHATE SERPL-MCNC: 3 MG/DL (ref 2.7–4.5)
PLATELET # BLD AUTO: 197 K/UL (ref 150–450)
PMV BLD AUTO: 9.7 FL (ref 9.2–12.9)
POCT GLUCOSE: 141 MG/DL (ref 70–110)
POCT GLUCOSE: 166 MG/DL (ref 70–110)
POCT GLUCOSE: 167 MG/DL (ref 70–110)
POCT GLUCOSE: 197 MG/DL (ref 70–110)
POTASSIUM SERPL-SCNC: 3.8 MMOL/L (ref 3.5–5.1)
PROTHROMBIN TIME: 25.1 SEC (ref 9–12.5)
RBC # BLD AUTO: 4.46 M/UL (ref 4.6–6.2)
SODIUM SERPL-SCNC: 138 MMOL/L (ref 136–145)
TACROLIMUS BLD-MCNC: 3.3 NG/ML (ref 5–15)
WBC # BLD AUTO: 10.59 K/UL (ref 3.9–12.7)

## 2021-05-06 PROCEDURE — 20600001 HC STEP DOWN PRIVATE ROOM

## 2021-05-06 PROCEDURE — 99232 PR SUBSEQUENT HOSPITAL CARE,LEVL II: ICD-10-PCS | Mod: ,,, | Performed by: NURSE PRACTITIONER

## 2021-05-06 PROCEDURE — 99232 SBSQ HOSP IP/OBS MODERATE 35: CPT | Mod: ,,, | Performed by: NURSE PRACTITIONER

## 2021-05-06 PROCEDURE — 36415 COLL VENOUS BLD VENIPUNCTURE: CPT | Performed by: PHYSICIAN ASSISTANT

## 2021-05-06 PROCEDURE — 99233 PR SUBSEQUENT HOSPITAL CARE,LEVL III: ICD-10-PCS | Mod: ,,, | Performed by: CLINICAL NURSE SPECIALIST

## 2021-05-06 PROCEDURE — 25000003 PHARM REV CODE 250: Performed by: CLINICAL NURSE SPECIALIST

## 2021-05-06 PROCEDURE — 99233 SBSQ HOSP IP/OBS HIGH 50: CPT | Mod: ,,, | Performed by: CLINICAL NURSE SPECIALIST

## 2021-05-06 PROCEDURE — 25000003 PHARM REV CODE 250: Performed by: TRANSPLANT SURGERY

## 2021-05-06 PROCEDURE — 80197 ASSAY OF TACROLIMUS: CPT | Performed by: SURGERY

## 2021-05-06 PROCEDURE — 85025 COMPLETE CBC W/AUTO DIFF WBC: CPT | Performed by: SURGERY

## 2021-05-06 PROCEDURE — 94761 N-INVAS EAR/PLS OXIMETRY MLT: CPT

## 2021-05-06 PROCEDURE — 63600175 PHARM REV CODE 636 W HCPCS: Performed by: SURGERY

## 2021-05-06 PROCEDURE — 25000003 PHARM REV CODE 250: Performed by: PHYSICIAN ASSISTANT

## 2021-05-06 PROCEDURE — 83735 ASSAY OF MAGNESIUM: CPT | Performed by: SURGERY

## 2021-05-06 PROCEDURE — 63600175 PHARM REV CODE 636 W HCPCS: Performed by: CLINICAL NURSE SPECIALIST

## 2021-05-06 PROCEDURE — 94799 UNLISTED PULMONARY SVC/PX: CPT

## 2021-05-06 PROCEDURE — 80069 RENAL FUNCTION PANEL: CPT | Performed by: SURGERY

## 2021-05-06 PROCEDURE — 85610 PROTHROMBIN TIME: CPT | Performed by: PHYSICIAN ASSISTANT

## 2021-05-06 PROCEDURE — 86644 CMV ANTIBODY: CPT | Performed by: PHYSICIAN ASSISTANT

## 2021-05-06 PROCEDURE — 99900035 HC TECH TIME PER 15 MIN (STAT)

## 2021-05-06 PROCEDURE — 25000003 PHARM REV CODE 250: Performed by: SURGERY

## 2021-05-06 RX ORDER — SULFAMETHOXAZOLE AND TRIMETHOPRIM 400; 80 MG/1; MG/1
1 TABLET ORAL DAILY
Status: DISCONTINUED | OUTPATIENT
Start: 2021-05-06 | End: 2021-05-07 | Stop reason: HOSPADM

## 2021-05-06 RX ORDER — SODIUM CHLORIDE 9 MG/ML
INJECTION, SOLUTION INTRAVENOUS CONTINUOUS
Status: DISCONTINUED | OUTPATIENT
Start: 2021-05-06 | End: 2021-05-06

## 2021-05-06 RX ORDER — METOPROLOL TARTRATE 25 MG/1
12.5 TABLET ORAL ONCE
Status: COMPLETED | OUTPATIENT
Start: 2021-05-07 | End: 2021-05-06

## 2021-05-06 RX ORDER — TACROLIMUS 1 MG/1
2 CAPSULE ORAL ONCE
Status: COMPLETED | OUTPATIENT
Start: 2021-05-06 | End: 2021-05-06

## 2021-05-06 RX ORDER — POTASSIUM CHLORIDE 20 MEQ/1
20 TABLET, EXTENDED RELEASE ORAL ONCE
Status: COMPLETED | OUTPATIENT
Start: 2021-05-06 | End: 2021-05-06

## 2021-05-06 RX ORDER — SODIUM CHLORIDE 9 MG/ML
INJECTION, SOLUTION INTRAVENOUS
Status: DISCONTINUED | OUTPATIENT
Start: 2021-05-06 | End: 2021-05-07 | Stop reason: HOSPADM

## 2021-05-06 RX ORDER — MAGNESIUM SULFATE HEPTAHYDRATE 40 MG/ML
2 INJECTION, SOLUTION INTRAVENOUS ONCE
Status: COMPLETED | OUTPATIENT
Start: 2021-05-06 | End: 2021-05-06

## 2021-05-06 RX ORDER — METOPROLOL TARTRATE 25 MG/1
25 TABLET, FILM COATED ORAL 2 TIMES DAILY
Status: DISCONTINUED | OUTPATIENT
Start: 2021-05-07 | End: 2021-05-07

## 2021-05-06 RX ORDER — TACROLIMUS 1 MG/1
5 CAPSULE ORAL 2 TIMES DAILY
Status: DISCONTINUED | OUTPATIENT
Start: 2021-05-06 | End: 2021-05-07 | Stop reason: HOSPADM

## 2021-05-06 RX ORDER — LANOLIN ALCOHOL/MO/W.PET/CERES
400 CREAM (GRAM) TOPICAL 2 TIMES DAILY
Status: DISCONTINUED | OUTPATIENT
Start: 2021-05-06 | End: 2021-05-07 | Stop reason: HOSPADM

## 2021-05-06 RX ORDER — LIDOCAINE HYDROCHLORIDE 10 MG/ML
1 INJECTION INFILTRATION; PERINEURAL ONCE
Status: COMPLETED | OUTPATIENT
Start: 2021-05-06 | End: 2021-05-06

## 2021-05-06 RX ADMIN — MAGNESIUM SULFATE 2 G: 2 INJECTION INTRAVENOUS at 12:05

## 2021-05-06 RX ADMIN — NYSTATIN 500000 UNITS: 100000 SUSPENSION ORAL at 05:05

## 2021-05-06 RX ADMIN — THERA TABS 1 TABLET: TAB at 08:05

## 2021-05-06 RX ADMIN — FAMOTIDINE 20 MG: 20 TABLET ORAL at 09:05

## 2021-05-06 RX ADMIN — Medication 400 MG: at 11:05

## 2021-05-06 RX ADMIN — TACROLIMUS 3 MG: 1 CAPSULE ORAL at 08:05

## 2021-05-06 RX ADMIN — LIDOCAINE HYDROCHLORIDE 1 ML: 10 INJECTION, SOLUTION INFILTRATION; PERINEURAL at 02:05

## 2021-05-06 RX ADMIN — METOPROLOL TARTRATE 12.5 MG: 25 TABLET, FILM COATED ORAL at 08:05

## 2021-05-06 RX ADMIN — HEPARIN SODIUM 5000 UNITS: 5000 INJECTION INTRAVENOUS; SUBCUTANEOUS at 05:05

## 2021-05-06 RX ADMIN — METHYLPREDNISOLONE SODIUM SUCCINATE 125 MG: 125 INJECTION, POWDER, FOR SOLUTION INTRAMUSCULAR; INTRAVENOUS at 08:05

## 2021-05-06 RX ADMIN — DOCUSATE SODIUM 100 MG: 100 CAPSULE, LIQUID FILLED ORAL at 09:05

## 2021-05-06 RX ADMIN — SULFAMETHOXAZOLE AND TRIMETHOPRIM 1 TABLET: 400; 80 TABLET ORAL at 11:05

## 2021-05-06 RX ADMIN — ACETAMINOPHEN 1000 MG: 500 TABLET, FILM COATED ORAL at 03:05

## 2021-05-06 RX ADMIN — HEPARIN SODIUM 5000 UNITS: 5000 INJECTION INTRAVENOUS; SUBCUTANEOUS at 09:05

## 2021-05-06 RX ADMIN — METOPROLOL TARTRATE 12.5 MG: 25 TABLET, FILM COATED ORAL at 11:05

## 2021-05-06 RX ADMIN — MYCOPHENOLATE MOFETIL 1000 MG: 250 CAPSULE ORAL at 09:05

## 2021-05-06 RX ADMIN — SODIUM CHLORIDE: 0.9 INJECTION, SOLUTION INTRAVENOUS at 12:05

## 2021-05-06 RX ADMIN — DOCUSATE SODIUM 100 MG: 100 CAPSULE, LIQUID FILLED ORAL at 08:05

## 2021-05-06 RX ADMIN — NYSTATIN 500000 UNITS: 100000 SUSPENSION ORAL at 08:05

## 2021-05-06 RX ADMIN — MUPIROCIN 1 G: 20 OINTMENT TOPICAL at 08:05

## 2021-05-06 RX ADMIN — POTASSIUM CHLORIDE 20 MEQ: 1500 TABLET, EXTENDED RELEASE ORAL at 11:05

## 2021-05-06 RX ADMIN — DOCUSATE SODIUM 100 MG: 100 CAPSULE, LIQUID FILLED ORAL at 03:05

## 2021-05-06 RX ADMIN — METOPROLOL TARTRATE 12.5 MG: 25 TABLET, FILM COATED ORAL at 09:05

## 2021-05-06 RX ADMIN — BISACODYL 10 MG: 5 TABLET, COATED ORAL at 09:05

## 2021-05-06 RX ADMIN — TRAMADOL HYDROCHLORIDE 50 MG: 50 TABLET ORAL at 05:05

## 2021-05-06 RX ADMIN — MYCOPHENOLATE MOFETIL 1000 MG: 250 CAPSULE ORAL at 08:05

## 2021-05-06 RX ADMIN — HEPARIN SODIUM 5000 UNITS: 5000 INJECTION INTRAVENOUS; SUBCUTANEOUS at 03:05

## 2021-05-06 RX ADMIN — ACETAMINOPHEN 1000 MG: 500 TABLET, FILM COATED ORAL at 09:05

## 2021-05-06 RX ADMIN — MUPIROCIN 1 G: 20 OINTMENT TOPICAL at 09:05

## 2021-05-06 RX ADMIN — ACETAMINOPHEN 1000 MG: 500 TABLET, FILM COATED ORAL at 05:05

## 2021-05-06 RX ADMIN — NYSTATIN 500000 UNITS: 100000 SUSPENSION ORAL at 03:05

## 2021-05-06 RX ADMIN — CALCITRIOL CAPSULES 0.25 MCG 0.25 MCG: 0.25 CAPSULE ORAL at 08:05

## 2021-05-06 RX ADMIN — TACROLIMUS 5 MG: 1 CAPSULE ORAL at 05:05

## 2021-05-06 RX ADMIN — TACROLIMUS 2 MG: 1 CAPSULE ORAL at 11:05

## 2021-05-06 RX ADMIN — Medication 400 MG: at 09:05

## 2021-05-07 VITALS
DIASTOLIC BLOOD PRESSURE: 88 MMHG | OXYGEN SATURATION: 96 % | BODY MASS INDEX: 25.5 KG/M2 | HEART RATE: 97 BPM | TEMPERATURE: 98 F | HEIGHT: 73 IN | SYSTOLIC BLOOD PRESSURE: 158 MMHG | WEIGHT: 192.44 LBS | RESPIRATION RATE: 20 BRPM

## 2021-05-07 DIAGNOSIS — Z94.0 KIDNEY REPLACED BY TRANSPLANT: Primary | ICD-10-CM

## 2021-05-07 LAB
ALBUMIN SERPL BCP-MCNC: 3 G/DL (ref 3.5–5.2)
ANION GAP SERPL CALC-SCNC: 10 MMOL/L (ref 8–16)
BASOPHILS # BLD AUTO: 0.01 K/UL (ref 0–0.2)
BASOPHILS NFR BLD: 0.1 % (ref 0–1.9)
BUN SERPL-MCNC: 22 MG/DL (ref 8–23)
CALCIUM SERPL-MCNC: 9.5 MG/DL (ref 8.7–10.5)
CHLORIDE SERPL-SCNC: 101 MMOL/L (ref 95–110)
CMV IGG SERPL QL IA: NORMAL
CO2 SERPL-SCNC: 24 MMOL/L (ref 23–29)
CREAT SERPL-MCNC: 1.3 MG/DL (ref 0.5–1.4)
DIFFERENTIAL METHOD: ABNORMAL
EOSINOPHIL # BLD AUTO: 0 K/UL (ref 0–0.5)
EOSINOPHIL NFR BLD: 0.1 % (ref 0–8)
ERYTHROCYTE [DISTWIDTH] IN BLOOD BY AUTOMATED COUNT: 15.1 % (ref 11.5–14.5)
EST. GFR  (AFRICAN AMERICAN): >60 ML/MIN/1.73 M^2
EST. GFR  (NON AFRICAN AMERICAN): 56.1 ML/MIN/1.73 M^2
GLUCOSE SERPL-MCNC: 89 MG/DL (ref 70–110)
HCT VFR BLD AUTO: 41.1 % (ref 40–54)
HGB BLD-MCNC: 13.2 G/DL (ref 14–18)
IMM GRANULOCYTES # BLD AUTO: 0.07 K/UL (ref 0–0.04)
IMM GRANULOCYTES NFR BLD AUTO: 0.8 % (ref 0–0.5)
INR PPP: 1.4 (ref 0.8–1.2)
LYMPHOCYTES # BLD AUTO: 1 K/UL (ref 1–4.8)
LYMPHOCYTES NFR BLD: 10.7 % (ref 18–48)
MAGNESIUM SERPL-MCNC: 1.7 MG/DL (ref 1.6–2.6)
MCH RBC QN AUTO: 28.6 PG (ref 27–31)
MCHC RBC AUTO-ENTMCNC: 32.1 G/DL (ref 32–36)
MCV RBC AUTO: 89 FL (ref 82–98)
MONOCYTES # BLD AUTO: 0.4 K/UL (ref 0.3–1)
MONOCYTES NFR BLD: 4.8 % (ref 4–15)
NEUTROPHILS # BLD AUTO: 7.5 K/UL (ref 1.8–7.7)
NEUTROPHILS NFR BLD: 83.5 % (ref 38–73)
NRBC BLD-RTO: 0 /100 WBC
PHOSPHATE SERPL-MCNC: 1.5 MG/DL (ref 2.7–4.5)
PLATELET # BLD AUTO: 196 K/UL (ref 150–450)
PMV BLD AUTO: 10.8 FL (ref 9.2–12.9)
POCT GLUCOSE: 71 MG/DL (ref 70–110)
POTASSIUM SERPL-SCNC: 3.7 MMOL/L (ref 3.5–5.1)
PROTHROMBIN TIME: 15 SEC (ref 9–12.5)
RBC # BLD AUTO: 4.61 M/UL (ref 4.6–6.2)
SODIUM SERPL-SCNC: 135 MMOL/L (ref 136–145)
TACROLIMUS BLD-MCNC: 7 NG/ML (ref 5–15)
WBC # BLD AUTO: 8.96 K/UL (ref 3.9–12.7)

## 2021-05-07 PROCEDURE — 99239 HOSP IP/OBS DSCHRG MGMT >30: CPT | Mod: 24,,, | Performed by: PHYSICIAN ASSISTANT

## 2021-05-07 PROCEDURE — 63600175 PHARM REV CODE 636 W HCPCS: Performed by: SURGERY

## 2021-05-07 PROCEDURE — 83735 ASSAY OF MAGNESIUM: CPT | Performed by: SURGERY

## 2021-05-07 PROCEDURE — 25000003 PHARM REV CODE 250: Performed by: CLINICAL NURSE SPECIALIST

## 2021-05-07 PROCEDURE — 25000003 PHARM REV CODE 250: Performed by: PHYSICIAN ASSISTANT

## 2021-05-07 PROCEDURE — 99239 PR HOSPITAL DISCHARGE DAY,>30 MIN: ICD-10-PCS | Mod: 24,,, | Performed by: PHYSICIAN ASSISTANT

## 2021-05-07 PROCEDURE — 80069 RENAL FUNCTION PANEL: CPT | Performed by: SURGERY

## 2021-05-07 PROCEDURE — 85025 COMPLETE CBC W/AUTO DIFF WBC: CPT | Performed by: SURGERY

## 2021-05-07 PROCEDURE — 80197 ASSAY OF TACROLIMUS: CPT | Performed by: SURGERY

## 2021-05-07 PROCEDURE — 85610 PROTHROMBIN TIME: CPT | Performed by: PHYSICIAN ASSISTANT

## 2021-05-07 PROCEDURE — 63600175 PHARM REV CODE 636 W HCPCS: Mod: JG | Performed by: PHYSICIAN ASSISTANT

## 2021-05-07 PROCEDURE — 25000003 PHARM REV CODE 250: Performed by: SURGERY

## 2021-05-07 PROCEDURE — 63600175 PHARM REV CODE 636 W HCPCS: Performed by: CLINICAL NURSE SPECIALIST

## 2021-05-07 PROCEDURE — 36415 COLL VENOUS BLD VENIPUNCTURE: CPT | Performed by: PHYSICIAN ASSISTANT

## 2021-05-07 PROCEDURE — 25000003 PHARM REV CODE 250: Performed by: NURSE PRACTITIONER

## 2021-05-07 RX ORDER — TRAMADOL HYDROCHLORIDE 50 MG/1
50 TABLET ORAL EVERY 8 HOURS PRN
Qty: 21 TABLET | Refills: 0 | Status: SHIPPED | OUTPATIENT
Start: 2021-05-07 | End: 2021-06-09

## 2021-05-07 RX ORDER — METOPROLOL TARTRATE 25 MG/1
25 TABLET, FILM COATED ORAL ONCE
Status: COMPLETED | OUTPATIENT
Start: 2021-05-07 | End: 2021-05-07

## 2021-05-07 RX ORDER — METOPROLOL TARTRATE 25 MG/1
25 TABLET, FILM COATED ORAL 2 TIMES DAILY
Qty: 60 TABLET | Refills: 11 | Status: SHIPPED | OUTPATIENT
Start: 2021-05-07 | End: 2021-05-07

## 2021-05-07 RX ORDER — METOPROLOL TARTRATE 50 MG/1
50 TABLET ORAL 2 TIMES DAILY
Status: DISCONTINUED | OUTPATIENT
Start: 2021-05-07 | End: 2021-05-07 | Stop reason: HOSPADM

## 2021-05-07 RX ORDER — METOPROLOL TARTRATE 50 MG/1
50 TABLET ORAL 2 TIMES DAILY
Qty: 60 TABLET | Refills: 11 | Status: SHIPPED | OUTPATIENT
Start: 2021-05-07 | End: 2021-09-13

## 2021-05-07 RX ORDER — SIMETHICONE 80 MG
1 TABLET,CHEWABLE ORAL 3 TIMES DAILY PRN
Status: DISCONTINUED | OUTPATIENT
Start: 2021-05-07 | End: 2021-05-07 | Stop reason: HOSPADM

## 2021-05-07 RX ORDER — LANOLIN ALCOHOL/MO/W.PET/CERES
400 CREAM (GRAM) TOPICAL 2 TIMES DAILY
Qty: 60 TABLET | Refills: 11 | Status: SHIPPED | OUTPATIENT
Start: 2021-05-07 | End: 2021-05-10

## 2021-05-07 RX ORDER — TACROLIMUS 1 MG/1
5 CAPSULE ORAL EVERY 12 HOURS
Qty: 300 CAPSULE | Refills: 11 | Status: SHIPPED | OUTPATIENT
Start: 2021-05-07 | End: 2021-05-13

## 2021-05-07 RX ADMIN — Medication 400 MG: at 08:05

## 2021-05-07 RX ADMIN — HEPARIN SODIUM 5000 UNITS: 5000 INJECTION INTRAVENOUS; SUBCUTANEOUS at 05:05

## 2021-05-07 RX ADMIN — SULFAMETHOXAZOLE AND TRIMETHOPRIM 1 TABLET: 400; 80 TABLET ORAL at 08:05

## 2021-05-07 RX ADMIN — MYCOPHENOLATE MOFETIL 1000 MG: 250 CAPSULE ORAL at 08:05

## 2021-05-07 RX ADMIN — NYSTATIN 500000 UNITS: 100000 SUSPENSION ORAL at 08:05

## 2021-05-07 RX ADMIN — PREDNISONE 20 MG: 20 TABLET ORAL at 08:05

## 2021-05-07 RX ADMIN — DIBASIC SODIUM PHOSPHATE, MONOBASIC POTASSIUM PHOSPHATE AND MONOBASIC SODIUM PHOSPHATE 2 TABLET: 852; 155; 130 TABLET ORAL at 08:05

## 2021-05-07 RX ADMIN — SODIUM CHLORIDE 20 MG: 9 INJECTION, SOLUTION INTRAVENOUS at 01:05

## 2021-05-07 RX ADMIN — POTASSIUM PHOSPHATE, MONOBASIC AND POTASSIUM PHOSPHATE, DIBASIC 30 MMOL: 224; 236 INJECTION, SOLUTION, CONCENTRATE INTRAVENOUS at 10:05

## 2021-05-07 RX ADMIN — DOCUSATE SODIUM 100 MG: 100 CAPSULE, LIQUID FILLED ORAL at 08:05

## 2021-05-07 RX ADMIN — THERA TABS 1 TABLET: TAB at 08:05

## 2021-05-07 RX ADMIN — CALCITRIOL CAPSULES 0.25 MCG 0.25 MCG: 0.25 CAPSULE ORAL at 08:05

## 2021-05-07 RX ADMIN — APIXABAN 5 MG: 5 TABLET, FILM COATED ORAL at 10:05

## 2021-05-07 RX ADMIN — SIMETHICONE 80 MG: 80 TABLET, CHEWABLE ORAL at 05:05

## 2021-05-07 RX ADMIN — TACROLIMUS 5 MG: 1 CAPSULE ORAL at 08:05

## 2021-05-07 RX ADMIN — MUPIROCIN 1 G: 20 OINTMENT TOPICAL at 08:05

## 2021-05-07 RX ADMIN — METOPROLOL TARTRATE 25 MG: 25 TABLET, FILM COATED ORAL at 10:05

## 2021-05-07 RX ADMIN — METOPROLOL TARTRATE 25 MG: 25 TABLET, FILM COATED ORAL at 08:05

## 2021-05-10 ENCOUNTER — LAB VISIT (OUTPATIENT)
Dept: LAB | Facility: HOSPITAL | Age: 71
End: 2021-05-10
Attending: INTERNAL MEDICINE
Payer: MEDICARE

## 2021-05-10 ENCOUNTER — CLINICAL SUPPORT (OUTPATIENT)
Dept: TRANSPLANT | Facility: CLINIC | Age: 71
End: 2021-05-10
Attending: INTERNAL MEDICINE
Payer: MEDICARE

## 2021-05-10 ENCOUNTER — CLINICAL SUPPORT (OUTPATIENT)
Dept: TRANSPLANT | Facility: CLINIC | Age: 71
End: 2021-05-10
Payer: MEDICARE

## 2021-05-10 VITALS
OXYGEN SATURATION: 100 % | RESPIRATION RATE: 18 BRPM | TEMPERATURE: 98 F | WEIGHT: 180.56 LBS | RESPIRATION RATE: 18 BRPM | BODY MASS INDEX: 23.93 KG/M2 | HEART RATE: 56 BPM | DIASTOLIC BLOOD PRESSURE: 90 MMHG | HEIGHT: 73 IN | DIASTOLIC BLOOD PRESSURE: 90 MMHG | WEIGHT: 180.56 LBS | HEART RATE: 56 BPM | HEIGHT: 73 IN | SYSTOLIC BLOOD PRESSURE: 127 MMHG | OXYGEN SATURATION: 100 % | TEMPERATURE: 98 F | BODY MASS INDEX: 23.93 KG/M2 | SYSTOLIC BLOOD PRESSURE: 127 MMHG

## 2021-05-10 DIAGNOSIS — B25.9 CYTOMEGALOVIRUS INFECTION, UNSPECIFIED CYTOMEGALOVIRAL INFECTION TYPE: ICD-10-CM

## 2021-05-10 DIAGNOSIS — N25.81 SECONDARY HYPERPARATHYROIDISM OF RENAL ORIGIN: ICD-10-CM

## 2021-05-10 DIAGNOSIS — Z94.0 KIDNEY REPLACED BY TRANSPLANT: Primary | ICD-10-CM

## 2021-05-10 DIAGNOSIS — N28.9 KIDNEY DISEASE: ICD-10-CM

## 2021-05-10 DIAGNOSIS — Z94.0 IMMUNOSUPPRESSIVE MANAGEMENT ENCOUNTER FOLLOWING KIDNEY TRANSPLANT: ICD-10-CM

## 2021-05-10 DIAGNOSIS — Z79.899 IMMUNOSUPPRESSIVE MANAGEMENT ENCOUNTER FOLLOWING KIDNEY TRANSPLANT: ICD-10-CM

## 2021-05-10 DIAGNOSIS — Z20.6 EXPOSURE TO HIV: ICD-10-CM

## 2021-05-10 DIAGNOSIS — E83.52 HYPERCALCEMIA: ICD-10-CM

## 2021-05-10 DIAGNOSIS — Z57.8 EMPLOYEE EXPOSURE TO BLOOD: ICD-10-CM

## 2021-05-10 DIAGNOSIS — T38.0X5A STEROID-INDUCED HYPERLIPIDEMIA: ICD-10-CM

## 2021-05-10 DIAGNOSIS — T86.10 COMPLICATION OF TRANSPLANTED KIDNEY, UNSPECIFIED COMPLICATION: ICD-10-CM

## 2021-05-10 DIAGNOSIS — Z94.0 KIDNEY REPLACED BY TRANSPLANT: ICD-10-CM

## 2021-05-10 DIAGNOSIS — N39.0 URINARY TRACT INFECTION WITHOUT HEMATURIA, SITE UNSPECIFIED: ICD-10-CM

## 2021-05-10 DIAGNOSIS — E78.49 STEROID-INDUCED HYPERLIPIDEMIA: ICD-10-CM

## 2021-05-10 DIAGNOSIS — R80.9 PROTEINURIA, UNSPECIFIED TYPE: ICD-10-CM

## 2021-05-10 LAB
ALBUMIN SERPL BCP-MCNC: 3 G/DL (ref 3.5–5.2)
ANION GAP SERPL CALC-SCNC: 8 MMOL/L (ref 8–16)
BASOPHILS # BLD AUTO: 0.04 K/UL (ref 0–0.2)
BASOPHILS NFR BLD: 0.6 % (ref 0–1.9)
BUN SERPL-MCNC: 18 MG/DL (ref 8–23)
CALCIUM SERPL-MCNC: 9.8 MG/DL (ref 8.7–10.5)
CHLORIDE SERPL-SCNC: 105 MMOL/L (ref 95–110)
CO2 SERPL-SCNC: 25 MMOL/L (ref 23–29)
CREAT SERPL-MCNC: 1.2 MG/DL (ref 0.5–1.4)
DIFFERENTIAL METHOD: ABNORMAL
EOSINOPHIL # BLD AUTO: 0.5 K/UL (ref 0–0.5)
EOSINOPHIL NFR BLD: 6.9 % (ref 0–8)
ERYTHROCYTE [DISTWIDTH] IN BLOOD BY AUTOMATED COUNT: 15.3 % (ref 11.5–14.5)
EST. GFR  (AFRICAN AMERICAN): >60 ML/MIN/1.73 M^2
EST. GFR  (NON AFRICAN AMERICAN): >60 ML/MIN/1.73 M^2
GLUCOSE SERPL-MCNC: 106 MG/DL (ref 70–110)
HCT VFR BLD AUTO: 43.1 % (ref 40–54)
HGB BLD-MCNC: 13.9 G/DL (ref 14–18)
IMM GRANULOCYTES # BLD AUTO: 0.03 K/UL (ref 0–0.04)
IMM GRANULOCYTES NFR BLD AUTO: 0.5 % (ref 0–0.5)
LYMPHOCYTES # BLD AUTO: 1.5 K/UL (ref 1–4.8)
LYMPHOCYTES NFR BLD: 22.5 % (ref 18–48)
MAGNESIUM SERPL-MCNC: 1.4 MG/DL (ref 1.6–2.6)
MCH RBC QN AUTO: 28.7 PG (ref 27–31)
MCHC RBC AUTO-ENTMCNC: 32.3 G/DL (ref 32–36)
MCV RBC AUTO: 89 FL (ref 82–98)
MONOCYTES # BLD AUTO: 0.6 K/UL (ref 0.3–1)
MONOCYTES NFR BLD: 9.5 % (ref 4–15)
NEUTROPHILS # BLD AUTO: 3.9 K/UL (ref 1.8–7.7)
NEUTROPHILS NFR BLD: 60 % (ref 38–73)
NRBC BLD-RTO: 0 /100 WBC
PHOSPHATE SERPL-MCNC: 2.1 MG/DL (ref 2.7–4.5)
PLATELET # BLD AUTO: 249 K/UL (ref 150–450)
PMV BLD AUTO: 10.1 FL (ref 9.2–12.9)
POTASSIUM SERPL-SCNC: 4.2 MMOL/L (ref 3.5–5.1)
RBC # BLD AUTO: 4.85 M/UL (ref 4.6–6.2)
SODIUM SERPL-SCNC: 138 MMOL/L (ref 136–145)
TACROLIMUS BLD-MCNC: 11.7 NG/ML (ref 5–15)
WBC # BLD AUTO: 6.5 K/UL (ref 3.9–12.7)

## 2021-05-10 PROCEDURE — 99999 PR PBB SHADOW E&M-EST. PATIENT-LVL IV: CPT | Mod: PBBFAC,,,

## 2021-05-10 PROCEDURE — 99999 PR PBB SHADOW E&M-EST. PATIENT-LVL IV: ICD-10-PCS | Mod: PBBFAC,,,

## 2021-05-10 PROCEDURE — 80069 RENAL FUNCTION PANEL: CPT | Performed by: INTERNAL MEDICINE

## 2021-05-10 PROCEDURE — 99214 OFFICE O/P EST MOD 30 MIN: CPT | Mod: PBBFAC

## 2021-05-10 PROCEDURE — 36415 COLL VENOUS BLD VENIPUNCTURE: CPT | Performed by: INTERNAL MEDICINE

## 2021-05-10 PROCEDURE — 99999 PR PBB SHADOW E&M-EST. PATIENT-LVL III: CPT | Mod: PBBFAC,,,

## 2021-05-10 PROCEDURE — 83735 ASSAY OF MAGNESIUM: CPT | Performed by: INTERNAL MEDICINE

## 2021-05-10 PROCEDURE — 80197 ASSAY OF TACROLIMUS: CPT | Performed by: INTERNAL MEDICINE

## 2021-05-10 PROCEDURE — 99999 PR PBB SHADOW E&M-EST. PATIENT-LVL III: ICD-10-PCS | Mod: PBBFAC,,,

## 2021-05-10 PROCEDURE — 85025 COMPLETE CBC W/AUTO DIFF WBC: CPT | Performed by: INTERNAL MEDICINE

## 2021-05-10 PROCEDURE — 99213 OFFICE O/P EST LOW 20 MIN: CPT | Mod: PBBFAC,27

## 2021-05-10 RX ORDER — LANOLIN ALCOHOL/MO/W.PET/CERES
800 CREAM (GRAM) TOPICAL 2 TIMES DAILY
Qty: 120 TABLET | Refills: 11 | Status: SHIPPED | OUTPATIENT
Start: 2021-05-10 | End: 2021-05-19 | Stop reason: DRUGHIGH

## 2021-05-10 SDOH — SOCIAL DETERMINANTS OF HEALTH (SDOH): OCCUPATIONAL EXPOSURE TO OTHER RISK FACTORS: Z57.8

## 2021-05-10 NOTE — TELEPHONE ENCOUNTER
Notified patient of Dr. Yen's review of 5/10/21 lab results. Instructed patient to increase K Phos Neutral to 3 pills twice daily. Patient was instructed by Pharm D in clinic this AM to increase magnesium oxide dose to 800mg twice daily.     Instructed patient to decrease tacrolimus to 5mg in AM & 4mg in PM; next lab appointment is Thurs. 5/13/21. Patient verbalized understanding to all instructions given.       ----- Message from Celeste Yen MD sent at 5/10/2021  9:44 AM CDT -----  K -phos to 3 pills BID  Mg Oxide 800 mg BID  Pending tac level.

## 2021-05-10 NOTE — TELEPHONE ENCOUNTER
----- Message from Celeste Yen MD sent at 5/10/2021 10:23 AM CDT -----  Please tac to 5/4 if it is a true trough.

## 2021-05-11 ENCOUNTER — LAB VISIT (OUTPATIENT)
Dept: LAB | Facility: HOSPITAL | Age: 71
End: 2021-05-11
Payer: MEDICARE

## 2021-05-11 DIAGNOSIS — Z76.82 PRE-KIDNEY TRANSPLANT, LISTED: ICD-10-CM

## 2021-05-11 RX ORDER — TACROLIMUS 1 MG/1
CAPSULE ORAL
Qty: 270 CAPSULE | Refills: 11 | Status: CANCELLED | OUTPATIENT
Start: 2021-05-11 | End: 2022-05-11

## 2021-05-12 ENCOUNTER — CLINICAL SUPPORT (OUTPATIENT)
Dept: TRANSPLANT | Facility: CLINIC | Age: 71
End: 2021-05-12
Payer: MEDICARE

## 2021-05-13 ENCOUNTER — TELEPHONE (OUTPATIENT)
Dept: TRANSPLANT | Facility: CLINIC | Age: 71
End: 2021-05-13

## 2021-05-13 ENCOUNTER — LAB VISIT (OUTPATIENT)
Dept: LAB | Facility: HOSPITAL | Age: 71
End: 2021-05-13
Payer: MEDICARE

## 2021-05-13 ENCOUNTER — OFFICE VISIT (OUTPATIENT)
Dept: TRANSPLANT | Facility: CLINIC | Age: 71
End: 2021-05-13
Payer: MEDICARE

## 2021-05-13 VITALS
OXYGEN SATURATION: 99 % | HEIGHT: 74 IN | RESPIRATION RATE: 16 BRPM | TEMPERATURE: 98 F | DIASTOLIC BLOOD PRESSURE: 80 MMHG | HEART RATE: 98 BPM | BODY MASS INDEX: 22.75 KG/M2 | SYSTOLIC BLOOD PRESSURE: 111 MMHG | WEIGHT: 177.25 LBS

## 2021-05-13 DIAGNOSIS — I48.91 ATRIAL FIBRILLATION, UNSPECIFIED TYPE: ICD-10-CM

## 2021-05-13 DIAGNOSIS — Z94.0 KIDNEY REPLACED BY TRANSPLANT: ICD-10-CM

## 2021-05-13 DIAGNOSIS — Z79.01 LONG TERM (CURRENT) USE OF ANTICOAGULANTS: Chronic | ICD-10-CM

## 2021-05-13 DIAGNOSIS — Z94.0 S/P KIDNEY TRANSPLANT: Primary | ICD-10-CM

## 2021-05-13 DIAGNOSIS — Z79.60 LONG-TERM USE OF IMMUNOSUPPRESSANT MEDICATION: ICD-10-CM

## 2021-05-13 DIAGNOSIS — I10 HYPERTENSION, UNSPECIFIED TYPE: ICD-10-CM

## 2021-05-13 LAB
ALBUMIN SERPL BCP-MCNC: 3.1 G/DL (ref 3.5–5.2)
ANION GAP SERPL CALC-SCNC: 6 MMOL/L (ref 8–16)
BASOPHILS # BLD AUTO: 0.03 K/UL (ref 0–0.2)
BASOPHILS NFR BLD: 0.7 % (ref 0–1.9)
BUN SERPL-MCNC: 20 MG/DL (ref 8–23)
CALCIUM SERPL-MCNC: 9.7 MG/DL (ref 8.7–10.5)
CHLORIDE SERPL-SCNC: 104 MMOL/L (ref 95–110)
CO2 SERPL-SCNC: 26 MMOL/L (ref 23–29)
CREAT SERPL-MCNC: 1.3 MG/DL (ref 0.5–1.4)
DIFFERENTIAL METHOD: ABNORMAL
EOSINOPHIL # BLD AUTO: 0.2 K/UL (ref 0–0.5)
EOSINOPHIL NFR BLD: 5.3 % (ref 0–8)
ERYTHROCYTE [DISTWIDTH] IN BLOOD BY AUTOMATED COUNT: 15.8 % (ref 11.5–14.5)
EST. GFR  (AFRICAN AMERICAN): >60 ML/MIN/1.73 M^2
EST. GFR  (NON AFRICAN AMERICAN): 56.1 ML/MIN/1.73 M^2
GLUCOSE SERPL-MCNC: 106 MG/DL (ref 70–110)
HCT VFR BLD AUTO: 41.6 % (ref 40–54)
HGB BLD-MCNC: 13.3 G/DL (ref 14–18)
IMM GRANULOCYTES # BLD AUTO: 0.04 K/UL (ref 0–0.04)
IMM GRANULOCYTES NFR BLD AUTO: 1 % (ref 0–0.5)
LYMPHOCYTES # BLD AUTO: 1.2 K/UL (ref 1–4.8)
LYMPHOCYTES NFR BLD: 28.9 % (ref 18–48)
MAGNESIUM SERPL-MCNC: 1.5 MG/DL (ref 1.6–2.6)
MCH RBC QN AUTO: 27.9 PG (ref 27–31)
MCHC RBC AUTO-ENTMCNC: 32 G/DL (ref 32–36)
MCV RBC AUTO: 87 FL (ref 82–98)
MONOCYTES # BLD AUTO: 0.5 K/UL (ref 0.3–1)
MONOCYTES NFR BLD: 11.2 % (ref 4–15)
NEUTROPHILS # BLD AUTO: 2.2 K/UL (ref 1.8–7.7)
NEUTROPHILS NFR BLD: 52.9 % (ref 38–73)
NRBC BLD-RTO: 0 /100 WBC
PHOSPHATE SERPL-MCNC: 2.5 MG/DL (ref 2.7–4.5)
PLATELET # BLD AUTO: 264 K/UL (ref 150–450)
PMV BLD AUTO: 10 FL (ref 9.2–12.9)
POTASSIUM SERPL-SCNC: 4.5 MMOL/L (ref 3.5–5.1)
RBC # BLD AUTO: 4.76 M/UL (ref 4.6–6.2)
SODIUM SERPL-SCNC: 136 MMOL/L (ref 136–145)
TACROLIMUS BLD-MCNC: 12.1 NG/ML (ref 5–15)
WBC # BLD AUTO: 4.12 K/UL (ref 3.9–12.7)

## 2021-05-13 PROCEDURE — 99999 PR PBB SHADOW E&M-EST. PATIENT-LVL IV: CPT | Mod: PBBFAC,,, | Performed by: INTERNAL MEDICINE

## 2021-05-13 PROCEDURE — 99215 OFFICE O/P EST HI 40 MIN: CPT | Mod: S$PBB,,, | Performed by: INTERNAL MEDICINE

## 2021-05-13 PROCEDURE — 99999 PR PBB SHADOW E&M-EST. PATIENT-LVL IV: ICD-10-PCS | Mod: PBBFAC,,, | Performed by: INTERNAL MEDICINE

## 2021-05-13 PROCEDURE — 80197 ASSAY OF TACROLIMUS: CPT | Performed by: INTERNAL MEDICINE

## 2021-05-13 PROCEDURE — 36415 COLL VENOUS BLD VENIPUNCTURE: CPT | Performed by: INTERNAL MEDICINE

## 2021-05-13 PROCEDURE — 99215 PR OFFICE/OUTPT VISIT, EST, LEVL V, 40-54 MIN: ICD-10-PCS | Mod: S$PBB,,, | Performed by: INTERNAL MEDICINE

## 2021-05-13 PROCEDURE — 80069 RENAL FUNCTION PANEL: CPT | Performed by: INTERNAL MEDICINE

## 2021-05-13 PROCEDURE — 83735 ASSAY OF MAGNESIUM: CPT | Performed by: INTERNAL MEDICINE

## 2021-05-13 PROCEDURE — 85025 COMPLETE CBC W/AUTO DIFF WBC: CPT | Performed by: INTERNAL MEDICINE

## 2021-05-13 PROCEDURE — 99214 OFFICE O/P EST MOD 30 MIN: CPT | Mod: PBBFAC | Performed by: INTERNAL MEDICINE

## 2021-05-13 RX ORDER — TACROLIMUS 1 MG/1
4 CAPSULE ORAL EVERY 12 HOURS
Qty: 300 CAPSULE | Refills: 11 | Status: SHIPPED | OUTPATIENT
Start: 2021-05-13 | End: 2021-05-20 | Stop reason: DRUGHIGH

## 2021-05-17 ENCOUNTER — LAB VISIT (OUTPATIENT)
Dept: LAB | Facility: HOSPITAL | Age: 71
End: 2021-05-17
Payer: MEDICARE

## 2021-05-17 DIAGNOSIS — Z94.0 KIDNEY REPLACED BY TRANSPLANT: ICD-10-CM

## 2021-05-17 LAB
ALBUMIN SERPL BCP-MCNC: 3.3 G/DL (ref 3.5–5.2)
ANION GAP SERPL CALC-SCNC: 11 MMOL/L (ref 8–16)
BASOPHILS # BLD AUTO: 0.05 K/UL (ref 0–0.2)
BASOPHILS NFR BLD: 0.5 % (ref 0–1.9)
BUN SERPL-MCNC: 15 MG/DL (ref 8–23)
CALCIUM SERPL-MCNC: 9.7 MG/DL (ref 8.7–10.5)
CHLORIDE SERPL-SCNC: 103 MMOL/L (ref 95–110)
CO2 SERPL-SCNC: 20 MMOL/L (ref 23–29)
CREAT SERPL-MCNC: 1.2 MG/DL (ref 0.5–1.4)
DIFFERENTIAL METHOD: ABNORMAL
EOSINOPHIL # BLD AUTO: 0.2 K/UL (ref 0–0.5)
EOSINOPHIL NFR BLD: 1.8 % (ref 0–8)
ERYTHROCYTE [DISTWIDTH] IN BLOOD BY AUTOMATED COUNT: 15.9 % (ref 11.5–14.5)
EST. GFR  (AFRICAN AMERICAN): >60 ML/MIN/1.73 M^2
EST. GFR  (NON AFRICAN AMERICAN): >60 ML/MIN/1.73 M^2
GLUCOSE SERPL-MCNC: 107 MG/DL (ref 70–110)
HCT VFR BLD AUTO: 46 % (ref 40–54)
HGB BLD-MCNC: 14.6 G/DL (ref 14–18)
IMM GRANULOCYTES # BLD AUTO: 0.07 K/UL (ref 0–0.04)
IMM GRANULOCYTES NFR BLD AUTO: 0.7 % (ref 0–0.5)
LYMPHOCYTES # BLD AUTO: 1.1 K/UL (ref 1–4.8)
LYMPHOCYTES NFR BLD: 11.8 % (ref 18–48)
MAGNESIUM SERPL-MCNC: 1.3 MG/DL (ref 1.6–2.6)
MCH RBC QN AUTO: 28.1 PG (ref 27–31)
MCHC RBC AUTO-ENTMCNC: 31.7 G/DL (ref 32–36)
MCV RBC AUTO: 89 FL (ref 82–98)
MONOCYTES # BLD AUTO: 0.3 K/UL (ref 0.3–1)
MONOCYTES NFR BLD: 2.7 % (ref 4–15)
NEUTROPHILS # BLD AUTO: 7.7 K/UL (ref 1.8–7.7)
NEUTROPHILS NFR BLD: 82.5 % (ref 38–73)
NRBC BLD-RTO: 0 /100 WBC
PHOSPHATE SERPL-MCNC: 2.1 MG/DL (ref 2.7–4.5)
PLATELET # BLD AUTO: 264 K/UL (ref 150–450)
PMV BLD AUTO: 10.2 FL (ref 9.2–12.9)
POTASSIUM SERPL-SCNC: 4.8 MMOL/L (ref 3.5–5.1)
RBC # BLD AUTO: 5.19 M/UL (ref 4.6–6.2)
SODIUM SERPL-SCNC: 134 MMOL/L (ref 136–145)
TACROLIMUS BLD-MCNC: 8.9 NG/ML (ref 5–15)
TACROLIMUS, NORMALIZED: 7.9 NG/ML (ref 5–15)
WBC # BLD AUTO: 9.36 K/UL (ref 3.9–12.7)

## 2021-05-17 PROCEDURE — 83735 ASSAY OF MAGNESIUM: CPT | Performed by: INTERNAL MEDICINE

## 2021-05-17 PROCEDURE — 80197 ASSAY OF TACROLIMUS: CPT | Performed by: INTERNAL MEDICINE

## 2021-05-17 PROCEDURE — 85025 COMPLETE CBC W/AUTO DIFF WBC: CPT | Performed by: INTERNAL MEDICINE

## 2021-05-17 PROCEDURE — 36415 COLL VENOUS BLD VENIPUNCTURE: CPT | Performed by: INTERNAL MEDICINE

## 2021-05-17 PROCEDURE — 86803 HEPATITIS C AB TEST: CPT | Performed by: INTERNAL MEDICINE

## 2021-05-17 PROCEDURE — 80069 RENAL FUNCTION PANEL: CPT | Performed by: INTERNAL MEDICINE

## 2021-05-17 NOTE — PROGRESS NOTES
YEARLY LIST MANAGEMENT NOTE    Ronal Mazariegos's kidney transplant listing status reviewed.  Patient is due for follow-up appointments on 1/9/21.  Appointments will be scheduled per protocol.     yes

## 2021-05-18 ENCOUNTER — TELEPHONE (OUTPATIENT)
Dept: TRANSPLANT | Facility: CLINIC | Age: 71
End: 2021-05-18

## 2021-05-18 DIAGNOSIS — T86.19 RECEIVED KIDNEY FROM DONOR WITH HEPATITIS C: Primary | ICD-10-CM

## 2021-05-19 DIAGNOSIS — Z94.0 KIDNEY REPLACED BY TRANSPLANT: ICD-10-CM

## 2021-05-19 LAB — HCV AB SERPL QL IA: POSITIVE

## 2021-05-19 NOTE — TELEPHONE ENCOUNTER
----- Message from Celeste Yen MD sent at 5/17/2021  8:39 AM CDT -----  Please Mg oxide to 800 mg TID. Pending tac level.

## 2021-05-20 ENCOUNTER — PROCEDURE VISIT (OUTPATIENT)
Dept: UROLOGY | Facility: CLINIC | Age: 71
End: 2021-05-20
Payer: MEDICARE

## 2021-05-20 ENCOUNTER — LAB VISIT (OUTPATIENT)
Dept: LAB | Facility: HOSPITAL | Age: 71
End: 2021-05-20
Payer: MEDICARE

## 2021-05-20 VITALS
WEIGHT: 169.38 LBS | SYSTOLIC BLOOD PRESSURE: 128 MMHG | BODY MASS INDEX: 22.45 KG/M2 | TEMPERATURE: 98 F | DIASTOLIC BLOOD PRESSURE: 79 MMHG | HEART RATE: 100 BPM | HEIGHT: 73 IN | RESPIRATION RATE: 17 BRPM

## 2021-05-20 DIAGNOSIS — Z94.0 KIDNEY REPLACED BY TRANSPLANT: ICD-10-CM

## 2021-05-20 DIAGNOSIS — Z79.899 IMMUNOSUPPRESSIVE MANAGEMENT ENCOUNTER FOLLOWING KIDNEY TRANSPLANT: ICD-10-CM

## 2021-05-20 DIAGNOSIS — Z94.0 IMMUNOSUPPRESSIVE MANAGEMENT ENCOUNTER FOLLOWING KIDNEY TRANSPLANT: ICD-10-CM

## 2021-05-20 LAB
ALBUMIN SERPL BCP-MCNC: 3 G/DL (ref 3.5–5.2)
ALBUMIN SERPL BCP-MCNC: 3 G/DL (ref 3.5–5.2)
ALP SERPL-CCNC: 77 U/L (ref 55–135)
ALT SERPL W/O P-5'-P-CCNC: 17 U/L (ref 10–44)
ANION GAP SERPL CALC-SCNC: 8 MMOL/L (ref 8–16)
AST SERPL-CCNC: 14 U/L (ref 10–40)
BASOPHILS # BLD AUTO: 0.05 K/UL (ref 0–0.2)
BASOPHILS NFR BLD: 0.7 % (ref 0–1.9)
BILIRUB DIRECT SERPL-MCNC: 0.7 MG/DL (ref 0.1–0.3)
BILIRUB SERPL-MCNC: 1.6 MG/DL (ref 0.1–1)
BUN SERPL-MCNC: 22 MG/DL (ref 8–23)
CALCIUM SERPL-MCNC: 9.9 MG/DL (ref 8.7–10.5)
CHLORIDE SERPL-SCNC: 105 MMOL/L (ref 95–110)
CO2 SERPL-SCNC: 21 MMOL/L (ref 23–29)
CREAT SERPL-MCNC: 1.3 MG/DL (ref 0.5–1.4)
DIFFERENTIAL METHOD: ABNORMAL
EOSINOPHIL # BLD AUTO: 0.1 K/UL (ref 0–0.5)
EOSINOPHIL NFR BLD: 0.8 % (ref 0–8)
ERYTHROCYTE [DISTWIDTH] IN BLOOD BY AUTOMATED COUNT: 15.8 % (ref 11.5–14.5)
EST. GFR  (AFRICAN AMERICAN): >60 ML/MIN/1.73 M^2
EST. GFR  (NON AFRICAN AMERICAN): 56.1 ML/MIN/1.73 M^2
GLUCOSE SERPL-MCNC: 107 MG/DL (ref 70–110)
HCT VFR BLD AUTO: 43.2 % (ref 40–54)
HCV AB SERPL QL IA: POSITIVE
HGB BLD-MCNC: 13.7 G/DL (ref 14–18)
IMM GRANULOCYTES # BLD AUTO: 0.03 K/UL (ref 0–0.04)
IMM GRANULOCYTES NFR BLD AUTO: 0.4 % (ref 0–0.5)
LYMPHOCYTES # BLD AUTO: 1.1 K/UL (ref 1–4.8)
LYMPHOCYTES NFR BLD: 15 % (ref 18–48)
MAGNESIUM SERPL-MCNC: 1.6 MG/DL (ref 1.6–2.6)
MCH RBC QN AUTO: 27.4 PG (ref 27–31)
MCHC RBC AUTO-ENTMCNC: 31.7 G/DL (ref 32–36)
MCV RBC AUTO: 86 FL (ref 82–98)
MONOCYTES # BLD AUTO: 0.3 K/UL (ref 0.3–1)
MONOCYTES NFR BLD: 3.7 % (ref 4–15)
NEUTROPHILS # BLD AUTO: 5.6 K/UL (ref 1.8–7.7)
NEUTROPHILS NFR BLD: 79.4 % (ref 38–73)
NRBC BLD-RTO: 0 /100 WBC
PHOSPHATE SERPL-MCNC: 2.1 MG/DL (ref 2.7–4.5)
PLATELET # BLD AUTO: 251 K/UL (ref 150–450)
PMV BLD AUTO: 10.4 FL (ref 9.2–12.9)
POTASSIUM SERPL-SCNC: 5.4 MMOL/L (ref 3.5–5.1)
PROT SERPL-MCNC: 6.7 G/DL (ref 6–8.4)
RBC # BLD AUTO: 5 M/UL (ref 4.6–6.2)
SODIUM SERPL-SCNC: 134 MMOL/L (ref 136–145)
TACROLIMUS BLD-MCNC: 7.1 NG/ML (ref 5–15)
TACROLIMUS, NORMALIZED: 6.3 NG/ML (ref 5–15)
WBC # BLD AUTO: 7.07 K/UL (ref 3.9–12.7)

## 2021-05-20 PROCEDURE — 80069 RENAL FUNCTION PANEL: CPT | Performed by: INTERNAL MEDICINE

## 2021-05-20 PROCEDURE — 84075 ASSAY ALKALINE PHOSPHATASE: CPT | Performed by: INTERNAL MEDICINE

## 2021-05-20 PROCEDURE — 80197 ASSAY OF TACROLIMUS: CPT | Performed by: INTERNAL MEDICINE

## 2021-05-20 PROCEDURE — 82247 BILIRUBIN TOTAL: CPT | Performed by: INTERNAL MEDICINE

## 2021-05-20 PROCEDURE — 85025 COMPLETE CBC W/AUTO DIFF WBC: CPT | Performed by: INTERNAL MEDICINE

## 2021-05-20 PROCEDURE — 82960 TEST FOR G6PD ENZYME: CPT | Performed by: INTERNAL MEDICINE

## 2021-05-20 PROCEDURE — 86803 HEPATITIS C AB TEST: CPT | Performed by: INTERNAL MEDICINE

## 2021-05-20 PROCEDURE — 83735 ASSAY OF MAGNESIUM: CPT | Performed by: INTERNAL MEDICINE

## 2021-05-20 RX ORDER — TACROLIMUS 1 MG/1
CAPSULE ORAL
Qty: 270 CAPSULE | Refills: 11 | Status: SHIPPED | OUTPATIENT
Start: 2021-05-20 | End: 2021-05-27 | Stop reason: DRUGHIGH

## 2021-05-20 RX ORDER — LANOLIN ALCOHOL/MO/W.PET/CERES
800 CREAM (GRAM) TOPICAL 3 TIMES DAILY
Qty: 180 TABLET | Refills: 11 | Status: SHIPPED | OUTPATIENT
Start: 2021-05-20 | End: 2021-07-07

## 2021-05-20 RX ORDER — LIDOCAINE HYDROCHLORIDE 20 MG/ML
JELLY TOPICAL
Status: COMPLETED | OUTPATIENT
Start: 2021-05-20 | End: 2021-05-20

## 2021-05-20 RX ADMIN — LIDOCAINE HYDROCHLORIDE: 20 JELLY TOPICAL at 01:05

## 2021-05-20 NOTE — TELEPHONE ENCOUNTER
Notified patient of Dr. Pereyra's review of 5/20/21 lab results. Instructed patient to increase tacrolimus dose to 5mg in AM & 4mg in PM; maintain a low potassium diet due to elevated potassium. Patient verbalized understanding.   G6PD added to 5/20/21 labs; result pending.       ----- Message from Jaimie Menon MD sent at 5/20/2021  2:57 PM CDT -----  Potassium slightly elevated and trending upward.  Please emphasized importance of low-potassium diet.  Check G6PD qual as add on to these labs.  Tacrolimus has fallen a lot.  Please increase tacrolimus dose to 5 mg q.a.m. and 4 mg nightly

## 2021-05-20 NOTE — TELEPHONE ENCOUNTER
Coordinator returned patient's call. Patient called to report he noticed he's been coughing at night when sleeping in his [place] apt. Patient states when he's up out of the apt he doesn't have the coughing spells. Reports he's been taking Zyrtec & Mucinex for the cough & post nasal drip. Patient just wanted to inform transplant & will notify coordinator if symptoms are worse.     ----- Message from Nieves Landry sent at 5/20/2021  9:05 AM CDT -----  Regarding: Cough Advice  Contact: pt  Reason:Calling to speak with coordinator pertaining to cough he is experiencing that is not getting better    Communication:[phone]

## 2021-05-21 LAB — HEPATITIS C VIRUS (HCV) RNA DETECTION/QUANTIFICATION RT-PCR: NORMAL

## 2021-05-21 NOTE — TELEPHONE ENCOUNTER
Coordinator returned patient's call. Patient called to clarify medication doses. Informed patient of tacrolimus medication dose change to 5mg in AM & 4mg in PM. Reminded patient of magnesium oxide dose increase to 800mg 3 x daily. Informed patient he can take 1200mg (3 tabs) 2 x daily. Patient reports when he saw Dr. Yen on 5/13/21 she increased his KPN to 3 tabs 2 x daily. Patient verbalized understanding to all medication dose changes.     ----- Message from Tyrone Chan sent at 5/21/2021  8:45 AM CDT -----  Regarding: Medications  Contact: Ronal  Pt is calling about 3 Rx changes he was not aware of. Pt would like to speak with Tiara.      [phone]

## 2021-05-24 ENCOUNTER — TELEPHONE (OUTPATIENT)
Dept: TRANSPLANT | Facility: CLINIC | Age: 71
End: 2021-05-24

## 2021-05-24 ENCOUNTER — HISTORICAL (OUTPATIENT)
Dept: ADMINISTRATIVE | Facility: HOSPITAL | Age: 71
End: 2021-05-24

## 2021-05-24 LAB
ABS NEUT (OLG): 4.04 X10(3)/MCL (ref 2.1–9.2)
ALBUMIN SERPL-MCNC: 3 GM/DL (ref 3.4–4.8)
ALP SERPL-CCNC: 91 UNIT/L (ref 40–150)
ALT SERPL-CCNC: 25 UNIT/L (ref 0–55)
AST SERPL-CCNC: 19 UNIT/L (ref 5–34)
BASOPHILS # BLD AUTO: 0 X10(3)/MCL (ref 0–0.2)
BASOPHILS NFR BLD AUTO: 1 %
BILIRUB SERPL-MCNC: 1.2 MG/DL
BILIRUBIN DIRECT+TOT PNL SERPL-MCNC: 0.6 MG/DL (ref 0–0.5)
BILIRUBIN DIRECT+TOT PNL SERPL-MCNC: 0.6 MG/DL (ref 0–0.8)
BUN SERPL-MCNC: 16.1 MG/DL (ref 8.4–25.7)
CALCIUM SERPL-MCNC: 9.7 MG/DL (ref 8.8–10)
CHLORIDE SERPL-SCNC: 104 MMOL/L (ref 98–107)
CO2 SERPL-SCNC: 27 MMOL/L (ref 23–31)
CREAT SERPL-MCNC: 1.08 MG/DL (ref 0.73–1.18)
CREAT/UREA NIT SERPL: 15
EOSINOPHIL # BLD AUTO: 0.1 X10(3)/MCL (ref 0–0.9)
EOSINOPHIL NFR BLD AUTO: 1 %
ERYTHROCYTE [DISTWIDTH] IN BLOOD BY AUTOMATED COUNT: 15.5 % (ref 11.5–17)
GLUCOSE SERPL-MCNC: 91 MG/DL (ref 82–115)
GLUCOSE-6-PHOSPHATE DEHYDROGENASE QUAL: NORMAL
HCT VFR BLD AUTO: 44.5 % (ref 42–52)
HCV AB SERPL QL IA: REACTIVE
HGB BLD-MCNC: 13.7 GM/DL (ref 14–18)
LIVER PROFILE INTERP: ABNORMAL
LYMPHOCYTES # BLD AUTO: 1.1 X10(3)/MCL (ref 0.6–4.6)
LYMPHOCYTES NFR BLD AUTO: 20 %
MAGNESIUM SERPL-MCNC: 1.8 MG/DL (ref 1.6–2.6)
MCH RBC QN AUTO: 27.6 PG (ref 27–31)
MCHC RBC AUTO-ENTMCNC: 30.8 GM/DL (ref 33–36)
MCV RBC AUTO: 89.7 FL (ref 80–94)
MONOCYTES # BLD AUTO: 0.2 X10(3)/MCL (ref 0.1–1.3)
MONOCYTES NFR BLD AUTO: 4 %
NEUTROPHILS # BLD AUTO: 4.04 X10(3)/MCL (ref 2.1–9.2)
NEUTROPHILS NFR BLD AUTO: 73 %
PHOSPHATE SERPL-MCNC: 2.2 MG/DL (ref 2.3–4.7)
PLATELET # BLD AUTO: 408 X10(3)/MCL (ref 130–400)
PMV BLD AUTO: 9.9 FL (ref 9.4–12.4)
POTASSIUM SERPL-SCNC: 5.2 MMOL/L (ref 3.5–5.1)
PROT SERPL-MCNC: 6.1 GM/DL (ref 5.8–7.6)
RBC # BLD AUTO: 4.96 X10(6)/MCL (ref 4.7–6.1)
SODIUM SERPL-SCNC: 140 MMOL/L (ref 136–145)
WBC # SPEC AUTO: 5.6 X10(3)/MCL (ref 4.5–11.5)

## 2021-05-25 ENCOUNTER — TELEPHONE (OUTPATIENT)
Dept: TRANSPLANT | Facility: CLINIC | Age: 71
End: 2021-05-25

## 2021-05-25 LAB — HPRA INTERPRETATION: NORMAL

## 2021-05-26 ENCOUNTER — OFFICE VISIT (OUTPATIENT)
Dept: TRANSPLANT | Facility: CLINIC | Age: 71
End: 2021-05-26
Payer: MEDICARE

## 2021-05-26 ENCOUNTER — TELEPHONE (OUTPATIENT)
Dept: HEPATOLOGY | Facility: CLINIC | Age: 71
End: 2021-05-26

## 2021-05-26 ENCOUNTER — PATIENT MESSAGE (OUTPATIENT)
Dept: TRANSPLANT | Facility: CLINIC | Age: 71
End: 2021-05-26

## 2021-05-26 ENCOUNTER — DOCUMENTATION ONLY (OUTPATIENT)
Dept: TRANSPLANT | Facility: CLINIC | Age: 71
End: 2021-05-26

## 2021-05-26 ENCOUNTER — LAB VISIT (OUTPATIENT)
Dept: LAB | Facility: HOSPITAL | Age: 71
End: 2021-05-26
Payer: MEDICARE

## 2021-05-26 VITALS
BODY MASS INDEX: 22.1 KG/M2 | DIASTOLIC BLOOD PRESSURE: 78 MMHG | OXYGEN SATURATION: 98 % | SYSTOLIC BLOOD PRESSURE: 115 MMHG | TEMPERATURE: 98 F | RESPIRATION RATE: 20 BRPM | HEIGHT: 74 IN | WEIGHT: 172.19 LBS | HEART RATE: 108 BPM

## 2021-05-26 DIAGNOSIS — R76.8 HEPATITIS C ANTIBODY TEST POSITIVE: ICD-10-CM

## 2021-05-26 DIAGNOSIS — Z94.0 KIDNEY REPLACED BY TRANSPLANT: Primary | ICD-10-CM

## 2021-05-26 DIAGNOSIS — Z79.60 LONG-TERM USE OF IMMUNOSUPPRESSANT MEDICATION: ICD-10-CM

## 2021-05-26 DIAGNOSIS — T86.19 RECEIVED KIDNEY FROM DONOR WITH HEPATITIS C: ICD-10-CM

## 2021-05-26 DIAGNOSIS — T86.19 RECEIVED KIDNEY FROM DONOR WITH HEPATITIS C: Primary | ICD-10-CM

## 2021-05-26 LAB
EXT ALBUMIN: 3 (ref 3.4–4.8)
EXT ALKALINE PHOSPHATASE: 91 (ref 40–150)
EXT ALT: 25 (ref 0–55)
EXT ANC: ABNORMAL
EXT AST: 19 (ref 5–34)
EXT BILIRUBIN DIRECT: 0.6 MG/DL (ref 0–0.5)
EXT BILIRUBIN TOTAL: 1.2
EXT BUN: 16.1 (ref 8.4–25.7)
EXT CALCIUM: 9.7 (ref 8.8–10)
EXT CHLORIDE: 104 (ref 98–107)
EXT CO2: 27 (ref 23–31)
EXT CREATININE: 1.08 MG/DL (ref 0.73–1.18)
EXT EOSINOPHIL%: 1
EXT GFR MDRD NON AF AMER: >60
EXT GLUCOSE: 91 (ref 82–115)
EXT HCV AB: REACTIVE
EXT HCV QUANT: ABNORMAL
EXT HEMATOCRIT: 44.5 (ref 42–52)
EXT HEMOGLOBIN: 13.7 (ref 14–18)
EXT LYMPH%: 20
EXT MAGNESIUM: 1.8 (ref 1.6–2.6)
EXT MONOCYTES%: 4
EXT PHOSPHORUS: 2.2 (ref 2.3–4.7)
EXT PLATELETS: 408 (ref 130–408)
EXT POTASSIUM: 5.2 (ref 3.5–5.1)
EXT PROTEIN TOTAL: 6.1 (ref 5.8–7.6)
EXT SEGS%: 73
EXT SODIUM: 140 MMOL/L (ref 136–145)
EXT TACROLIMUS LVL: 5.2
EXT WBC: 5.6 (ref 4.5–11.5)

## 2021-05-26 PROCEDURE — 99999 PR PBB SHADOW E&M-EST. PATIENT-LVL IV: ICD-10-PCS | Mod: PBBFAC,,,

## 2021-05-26 PROCEDURE — 99214 OFFICE O/P EST MOD 30 MIN: CPT | Mod: PBBFAC

## 2021-05-26 PROCEDURE — 36415 COLL VENOUS BLD VENIPUNCTURE: CPT | Performed by: INTERNAL MEDICINE

## 2021-05-26 PROCEDURE — 99999 PR PBB SHADOW E&M-EST. PATIENT-LVL IV: CPT | Mod: PBBFAC,,,

## 2021-05-26 PROCEDURE — 99215 PR OFFICE/OUTPT VISIT, EST, LEVL V, 40-54 MIN: ICD-10-PCS | Mod: S$PBB,,, | Performed by: TRANSPLANT SURGERY

## 2021-05-26 PROCEDURE — 99215 OFFICE O/P EST HI 40 MIN: CPT | Mod: S$PBB,,, | Performed by: TRANSPLANT SURGERY

## 2021-05-26 PROCEDURE — 87902 NFCT AGT GNTYP ALYS HEP C: CPT | Performed by: INTERNAL MEDICINE

## 2021-05-26 RX ORDER — PROMETHAZINE HYDROCHLORIDE, PHENYLEPHRINE HYDROCHLORIDE AND CODEINE PHOSPHATE 6.25; 5; 1 MG/5ML; MG/5ML; MG/5ML
5 SOLUTION ORAL EVERY 6 HOURS PRN
COMMUNITY
Start: 2021-05-25 | End: 2021-06-01

## 2021-05-27 ENCOUNTER — HISTORICAL (OUTPATIENT)
Dept: ADMINISTRATIVE | Facility: HOSPITAL | Age: 71
End: 2021-05-27

## 2021-05-27 DIAGNOSIS — Z94.0 KIDNEY REPLACED BY TRANSPLANT: Primary | ICD-10-CM

## 2021-05-27 LAB
ABS NEUT (OLG): 4.02 X10(3)/MCL (ref 2.1–9.2)
ALBUMIN SERPL-MCNC: 2.9 GM/DL (ref 3.4–4.8)
BASOPHILS # BLD AUTO: 0 X10(3)/MCL (ref 0–0.2)
BASOPHILS NFR BLD AUTO: 1 %
BUN SERPL-MCNC: 20.5 MG/DL (ref 8.4–25.7)
CALCIUM SERPL-MCNC: 9.3 MG/DL (ref 8.8–10)
CHLORIDE SERPL-SCNC: 104 MMOL/L (ref 98–107)
CO2 SERPL-SCNC: 26 MMOL/L (ref 23–31)
CREAT SERPL-MCNC: 1.21 MG/DL (ref 0.73–1.18)
CREAT/UREA NIT SERPL: 17
EOSINOPHIL # BLD AUTO: 0.1 X10(3)/MCL (ref 0–0.9)
EOSINOPHIL NFR BLD AUTO: 1 %
ERYTHROCYTE [DISTWIDTH] IN BLOOD BY AUTOMATED COUNT: 15.6 % (ref 11.5–17)
GLUCOSE SERPL-MCNC: 88 MG/DL (ref 82–115)
HCT VFR BLD AUTO: 43 % (ref 42–52)
HGB BLD-MCNC: 13.3 GM/DL (ref 14–18)
LYMPHOCYTES # BLD AUTO: 1.4 X10(3)/MCL (ref 0.6–4.6)
LYMPHOCYTES NFR BLD AUTO: 24 %
MAGNESIUM SERPL-MCNC: 1.8 MG/DL (ref 1.6–2.6)
MCH RBC QN AUTO: 27.4 PG (ref 27–31)
MCHC RBC AUTO-ENTMCNC: 30.9 GM/DL (ref 33–36)
MCV RBC AUTO: 88.7 FL (ref 80–94)
MONOCYTES # BLD AUTO: 0.3 X10(3)/MCL (ref 0.1–1.3)
MONOCYTES NFR BLD AUTO: 5 %
NEUTROPHILS # BLD AUTO: 4.02 X10(3)/MCL (ref 2.1–9.2)
NEUTROPHILS NFR BLD AUTO: 68 %
PHOSPHATE SERPL-MCNC: 2.9 MG/DL (ref 2.3–4.7)
PLATELET # BLD AUTO: 454 X10(3)/MCL (ref 130–400)
PMV BLD AUTO: 9.7 FL (ref 9.4–12.4)
POTASSIUM SERPL-SCNC: 4.9 MMOL/L (ref 3.5–5.1)
RBC # BLD AUTO: 4.85 X10(6)/MCL (ref 4.7–6.1)
SODIUM SERPL-SCNC: 139 MMOL/L (ref 136–145)
WBC # SPEC AUTO: 5.9 X10(3)/MCL (ref 4.5–11.5)

## 2021-05-27 RX ORDER — TACROLIMUS 1 MG/1
5 CAPSULE ORAL EVERY 12 HOURS
Qty: 300 CAPSULE | Refills: 11 | Status: SHIPPED | OUTPATIENT
Start: 2021-05-27 | End: 2021-06-09 | Stop reason: DRUGHIGH

## 2021-05-27 NOTE — TELEPHONE ENCOUNTER
Notified patient of Dr. Yen's review of 5/24/21 lab results via [facility].     Patient read the message & verbalized understanding.   Hey Mr. Mazariegos     Dr. Yen reviewed your 5/24/21 tacrolimus level. The level is lower than it should be=5.2. She wants you to increase your tacrolimus/Prograf dose to 5mg twice daily starting with your dose tonight 5/26/21 (so please change it on your blue medication card to 5mg in AM & 5mg in PM).      Have a good evening!        Tiara     Last read by Ronal Mazariegos at 6:30 PM on 5/26/2021.      ----- Message from Celeste Yen MD sent at 5/26/2021  9:25 PM CDT -----  Please Tac to 5 mg BID if it is a true trough.

## 2021-05-31 ENCOUNTER — HISTORICAL (OUTPATIENT)
Dept: ADMINISTRATIVE | Facility: HOSPITAL | Age: 71
End: 2021-05-31

## 2021-05-31 ENCOUNTER — DOCUMENTATION ONLY (OUTPATIENT)
Dept: TRANSPLANT | Facility: CLINIC | Age: 71
End: 2021-05-31

## 2021-05-31 LAB
ABS NEUT (OLG): 4.28 X10(3)/MCL (ref 2.1–9.2)
ALBUMIN SERPL-MCNC: 3.1 GM/DL (ref 3.4–4.8)
ALP SERPL-CCNC: 88 UNIT/L (ref 40–150)
ALT SERPL-CCNC: 67 UNIT/L (ref 0–55)
AST SERPL-CCNC: 37 UNIT/L (ref 5–34)
BASOPHILS # BLD AUTO: 0 X10(3)/MCL (ref 0–0.2)
BASOPHILS NFR BLD AUTO: 1 %
BILIRUB SERPL-MCNC: 0.9 MG/DL
BILIRUBIN DIRECT+TOT PNL SERPL-MCNC: 0.4 MG/DL (ref 0–0.5)
BILIRUBIN DIRECT+TOT PNL SERPL-MCNC: 0.5 MG/DL (ref 0–0.8)
BUN SERPL-MCNC: 16.8 MG/DL (ref 8.4–25.7)
CALCIUM SERPL-MCNC: 9.4 MG/DL (ref 8.8–10)
CHLORIDE SERPL-SCNC: 102 MMOL/L (ref 98–107)
CO2 SERPL-SCNC: 24 MMOL/L (ref 23–31)
CREAT SERPL-MCNC: 1.14 MG/DL (ref 0.73–1.18)
CREAT/UREA NIT SERPL: 15
EOSINOPHIL # BLD AUTO: 0 X10(3)/MCL (ref 0–0.9)
EOSINOPHIL NFR BLD AUTO: 1 %
ERYTHROCYTE [DISTWIDTH] IN BLOOD BY AUTOMATED COUNT: 15.5 % (ref 11.5–17)
EXT ALBUMIN: 2.9 (ref 3.4–4.8)
EXT BUN: 20.5 (ref 8.4–25.7)
EXT CALCIUM: 9.3 (ref 8.8–10)
EXT CHLORIDE: 104 (ref 98–107)
EXT CO2: 26 (ref 23–31)
EXT CREATININE: 1.21 MG/DL (ref 0.73–1.18)
EXT EOSINOPHIL%: 1
EXT GFR MDRD NON AF AMER: >60
EXT HEMATOCRIT: 43 (ref 42–52)
EXT HEMOGLOBIN: 13.3 (ref 14–18)
EXT LYMPH%: 24
EXT MAGNESIUM: 1.8 (ref 1.6–2.6)
EXT MONOCYTES%: 5
EXT PHOSPHORUS: 2.9 (ref 2.3–4.7)
EXT PLATELETS: 454 (ref 130–400)
EXT POTASSIUM: 4.9 (ref 3.5–5.1)
EXT SEGS%: 68
EXT SODIUM: 139 MMOL/L (ref 136–145)
EXT TACROLIMUS LVL: 10.1
EXT WBC: 5.9 (ref 4.5–11.5)
GLUCOSE SERPL-MCNC: 91 MG/DL (ref 82–115)
HCT VFR BLD AUTO: 46.5 % (ref 42–52)
HCV AB SERPL QL IA: REACTIVE
HGB BLD-MCNC: 14.2 GM/DL (ref 14–18)
LIVER PROFILE INTERP: ABNORMAL
LYMPHOCYTES # BLD AUTO: 1.8 X10(3)/MCL (ref 0.6–4.6)
LYMPHOCYTES NFR BLD AUTO: 28 %
MAGNESIUM SERPL-MCNC: 1.7 MG/DL (ref 1.6–2.6)
MCH RBC QN AUTO: 26.9 PG (ref 27–31)
MCHC RBC AUTO-ENTMCNC: 30.5 GM/DL (ref 33–36)
MCV RBC AUTO: 88.2 FL (ref 80–94)
MONOCYTES # BLD AUTO: 0.3 X10(3)/MCL (ref 0.1–1.3)
MONOCYTES NFR BLD AUTO: 5 %
NEUTROPHILS # BLD AUTO: 4.28 X10(3)/MCL (ref 2.1–9.2)
NEUTROPHILS NFR BLD AUTO: 65 %
PHOSPHATE SERPL-MCNC: 2.9 MG/DL (ref 2.3–4.7)
PLATELET # BLD AUTO: 434 X10(3)/MCL (ref 130–400)
PMV BLD AUTO: 9 FL (ref 9.4–12.4)
POTASSIUM SERPL-SCNC: 4.7 MMOL/L (ref 3.5–5.1)
PROT SERPL-MCNC: 5.9 GM/DL (ref 5.8–7.6)
RBC # BLD AUTO: 5.27 X10(6)/MCL (ref 4.7–6.1)
SODIUM SERPL-SCNC: 135 MMOL/L (ref 136–145)
WBC # SPEC AUTO: 6.6 X10(3)/MCL (ref 4.5–11.5)

## 2021-06-02 ENCOUNTER — OFFICE VISIT (OUTPATIENT)
Dept: HEPATOLOGY | Facility: CLINIC | Age: 71
End: 2021-06-02
Payer: MEDICARE

## 2021-06-02 ENCOUNTER — PATIENT MESSAGE (OUTPATIENT)
Dept: HEPATOLOGY | Facility: CLINIC | Age: 71
End: 2021-06-02

## 2021-06-02 ENCOUNTER — SPECIALTY PHARMACY (OUTPATIENT)
Dept: PHARMACY | Facility: CLINIC | Age: 71
End: 2021-06-02

## 2021-06-02 VITALS
HEIGHT: 74 IN | RESPIRATION RATE: 16 BRPM | WEIGHT: 170.63 LBS | TEMPERATURE: 100 F | BODY MASS INDEX: 21.9 KG/M2 | HEART RATE: 107 BPM | DIASTOLIC BLOOD PRESSURE: 62 MMHG | SYSTOLIC BLOOD PRESSURE: 111 MMHG

## 2021-06-02 DIAGNOSIS — B19.20 HEPATITIS C VIRUS INFECTION WITHOUT HEPATIC COMA, UNSPECIFIED CHRONICITY: Primary | ICD-10-CM

## 2021-06-02 PROCEDURE — 99999 PR PBB SHADOW E&M-EST. PATIENT-LVL IV: ICD-10-PCS | Mod: PBBFAC,,, | Performed by: PHYSICIAN ASSISTANT

## 2021-06-02 PROCEDURE — 99214 OFFICE O/P EST MOD 30 MIN: CPT | Mod: PBBFAC | Performed by: PHYSICIAN ASSISTANT

## 2021-06-02 PROCEDURE — 99214 PR OFFICE/OUTPT VISIT, EST, LEVL IV, 30-39 MIN: ICD-10-PCS | Mod: S$PBB,,, | Performed by: PHYSICIAN ASSISTANT

## 2021-06-02 PROCEDURE — 99999 PR PBB SHADOW E&M-EST. PATIENT-LVL IV: CPT | Mod: PBBFAC,,, | Performed by: PHYSICIAN ASSISTANT

## 2021-06-02 PROCEDURE — 99214 OFFICE O/P EST MOD 30 MIN: CPT | Mod: S$PBB,,, | Performed by: PHYSICIAN ASSISTANT

## 2021-06-02 RX ORDER — VELPATASVIR AND SOFOSBUVIR 100; 400 MG/1; MG/1
1 TABLET, FILM COATED ORAL DAILY
Qty: 28 TABLET | Refills: 2 | Status: SHIPPED | OUTPATIENT
Start: 2021-06-02 | End: 2021-06-21

## 2021-06-03 ENCOUNTER — HISTORICAL (OUTPATIENT)
Dept: ADMINISTRATIVE | Facility: HOSPITAL | Age: 71
End: 2021-06-03

## 2021-06-03 ENCOUNTER — TELEPHONE (OUTPATIENT)
Dept: TRANSPLANT | Facility: CLINIC | Age: 71
End: 2021-06-03

## 2021-06-03 LAB
ABS NEUT (OLG): 3.45 X10(3)/MCL (ref 2.1–9.2)
ALBUMIN SERPL-MCNC: 3.2 GM/DL (ref 3.4–4.8)
ALP SERPL-CCNC: 89 UNIT/L (ref 40–150)
ALT SERPL-CCNC: 52 UNIT/L (ref 0–55)
APPEARANCE, UA: ABNORMAL
AST SERPL-CCNC: 27 UNIT/L (ref 5–34)
BACTERIA SPEC CULT: ABNORMAL /HPF
BASOPHILS # BLD AUTO: 0 X10(3)/MCL (ref 0–0.2)
BASOPHILS NFR BLD AUTO: 1 %
BILIRUB SERPL-MCNC: 0.9 MG/DL
BILIRUB UR QL STRIP: NEGATIVE
BILIRUBIN DIRECT+TOT PNL SERPL-MCNC: 0.4 MG/DL (ref 0–0.8)
BILIRUBIN DIRECT+TOT PNL SERPL-MCNC: 0.5 MG/DL (ref 0–0.5)
BUN SERPL-MCNC: 17.2 MG/DL (ref 8.4–25.7)
CALCIUM SERPL-MCNC: 9.5 MG/DL (ref 8.8–10)
CHLORIDE SERPL-SCNC: 104 MMOL/L (ref 98–107)
CO2 SERPL-SCNC: 26 MMOL/L (ref 23–31)
COLOR UR: YELLOW
CREAT SERPL-MCNC: 1.22 MG/DL (ref 0.73–1.18)
CREAT UR-MCNC: 90.3 MG/DL (ref 58–161)
CREAT/UREA NIT SERPL: 14
EOSINOPHIL # BLD AUTO: 0 X10(3)/MCL (ref 0–0.9)
EOSINOPHIL NFR BLD AUTO: 0 %
ERYTHROCYTE [DISTWIDTH] IN BLOOD BY AUTOMATED COUNT: 15.8 % (ref 11.5–17)
GLUCOSE (UA): NEGATIVE
GLUCOSE SERPL-MCNC: 91 MG/DL (ref 82–115)
HCT VFR BLD AUTO: 45.3 % (ref 42–52)
HCV GENTYP SERPL NAA+PROBE: ABNORMAL
HCV RNA SERPL NAA+PROBE-LOG IU: 6.11 LOG (10) IU/ML
HCV RNA SERPL QL NAA+PROBE: DETECTED
HCV RNA SPEC NAA+PROBE-ACNC: ABNORMAL IU/ML
HGB BLD-MCNC: 13.9 GM/DL (ref 14–18)
HGB UR QL STRIP: NEGATIVE
KETONES UR QL STRIP: NEGATIVE
LEUKOCYTE ESTERASE UR QL STRIP: NEGATIVE
LIVER PROFILE INTERP: ABNORMAL
LYMPHOCYTES # BLD AUTO: 1.8 X10(3)/MCL (ref 0.6–4.6)
LYMPHOCYTES NFR BLD AUTO: 32 %
MAGNESIUM SERPL-MCNC: 1.6 MG/DL (ref 1.6–2.6)
MCH RBC QN AUTO: 27.2 PG (ref 27–31)
MCHC RBC AUTO-ENTMCNC: 30.7 GM/DL (ref 33–36)
MCV RBC AUTO: 88.6 FL (ref 80–94)
MONOCYTES # BLD AUTO: 0.3 X10(3)/MCL (ref 0.1–1.3)
MONOCYTES NFR BLD AUTO: 6 %
NEUTROPHILS # BLD AUTO: 3.45 X10(3)/MCL (ref 2.1–9.2)
NEUTROPHILS NFR BLD AUTO: 60 %
NITRITE UR QL STRIP: NEGATIVE
PH UR STRIP: 7.5 [PH] (ref 5–9)
PHOSPHATE SERPL-MCNC: 2.5 MG/DL (ref 2.3–4.7)
PLATELET # BLD AUTO: 423 X10(3)/MCL (ref 130–400)
PMV BLD AUTO: 9.7 FL (ref 9.4–12.4)
POTASSIUM SERPL-SCNC: 4.7 MMOL/L (ref 3.5–5.1)
PROT SERPL-MCNC: 5.7 GM/DL (ref 5.8–7.6)
PROT UR QL STRIP: ABNORMAL
PROT UR STRIP-MCNC: 43.4 MG/DL
PROT/CREAT UR-RTO: 480.6 MG/GM CR
PTH-INTACT SERPL-MCNC: 195.3 PG/ML (ref 8.7–77.1)
RBC # BLD AUTO: 5.11 X10(6)/MCL (ref 4.7–6.1)
RBC #/AREA URNS HPF: ABNORMAL /[HPF]
SODIUM SERPL-SCNC: 139 MMOL/L (ref 136–145)
SP GR UR STRIP: 1.02 (ref 1–1.03)
SQUAMOUS EPITHELIAL, UA: ABNORMAL /HPF (ref 0–4)
URATE SERPL-MCNC: 4.9 MG/DL (ref 3.5–7.2)
UROBILINOGEN UR STRIP-ACNC: 1
WBC # SPEC AUTO: 5.7 X10(3)/MCL (ref 4.5–11.5)
WBC #/AREA URNS HPF: ABNORMAL /HPF

## 2021-06-03 NOTE — TELEPHONE ENCOUNTER
----- Message from Jake Otto sent at 6/3/2021  9:22 AM CDT -----  Regarding: Lab results  Contact: Pt  Pt would like to discuss recent blood work with coordinator      PT # [phone]

## 2021-06-04 ENCOUNTER — DOCUMENTATION ONLY (OUTPATIENT)
Dept: TRANSPLANT | Facility: CLINIC | Age: 71
End: 2021-06-04

## 2021-06-04 LAB
EXT ALBUMIN: 3.1 (ref 3.4–4.8)
EXT ALBUMIN: 3.2 (ref 3.4–4.8)
EXT ALKALINE PHOSPHATASE: 88 (ref 40–150)
EXT ALKALINE PHOSPHATASE: 89 (ref 40–150)
EXT ALT: 52 (ref 0–55)
EXT ALT: 67 (ref 0–55)
EXT ANC: ABNORMAL
EXT ANC: ABNORMAL
EXT AST: 27 (ref 5–34)
EXT AST: 37 (ref 5–34)
EXT BACTERIA UA: ABNORMAL
EXT BILIRUBIN DIRECT: 0.4 MG/DL (ref 0–0.5)
EXT BILIRUBIN DIRECT: 0.5 MG/DL (ref 0–0.8)
EXT BILIRUBIN TOTAL: 0.9
EXT BILIRUBIN TOTAL: 0.9
EXT BK VIRUS DNA QN PCR: ABNORMAL
EXT BUN: 16.8 (ref 8.4–25.7)
EXT BUN: 17.2 (ref 8.4–25.7)
EXT CALCIUM: 9.4 (ref 8.8–10)
EXT CALCIUM: 9.5 (ref 8.8–10)
EXT CHLORIDE: 102 (ref 98–107)
EXT CHLORIDE: 104 (ref 98–107)
EXT CO2: 24 (ref 23–31)
EXT CO2: 26 (ref 23–31)
EXT CREATININE: 1.14 MG/DL
EXT CREATININE: 1.22 MG/DL
EXT EOSINOPHIL%: 0
EXT EOSINOPHIL%: 1
EXT GFR MDRD NON AF AMER: >60
EXT GFR MDRD NON AF AMER: >60
EXT GLUCOSE UA: ABNORMAL
EXT GLUCOSE: 91 (ref 82–115)
EXT GLUCOSE: 91 (ref 82–115)
EXT HCV AB: REACTIVE
EXT HCV QUANT: ABNORMAL
EXT HEMATOCRIT: 45.3 (ref 42–52)
EXT HEMATOCRIT: 46.5 (ref 42–52)
EXT HEMOGLOBIN: 13.9 (ref 14–18)
EXT HEMOGLOBIN: 14.2 (ref 14–18)
EXT LYMPH%: 28
EXT LYMPH%: 32
EXT MAGNESIUM: 1.6 (ref 1.6–2.6)
EXT MAGNESIUM: 1.7 (ref 1.6–2.6)
EXT MONOCYTES%: 5
EXT MONOCYTES%: 6
EXT NITRITES UA: ABNORMAL
EXT PHOSPHORUS: 2.5 (ref 2.3–4.7)
EXT PHOSPHORUS: 2.9 (ref 2.3–4.7)
EXT PLATELETS: 423 (ref 130–400)
EXT PLATELETS: 434 (ref 130–400)
EXT POTASSIUM: 4.7 (ref 3.5–5.1)
EXT POTASSIUM: 4.7 (ref 3.5–5.1)
EXT PROT/CREAT RATIO UR: 0.48
EXT PROTEIN TOTAL: 5.7 (ref 5.8–7.6)
EXT PROTEIN TOTAL: 5.9 (ref 5.8–7.6)
EXT PROTEIN UA: ABNORMAL
EXT PTH, INTACT: 195.3 (ref 8.7–77.1)
EXT RBC UA: ABNORMAL
EXT SEGS%: 60
EXT SEGS%: 65
EXT SODIUM: 135 MMOL/L (ref 136–145)
EXT SODIUM: 139 MMOL/L (ref 136–145)
EXT TACROLIMUS LVL: 10.8
EXT TACROLIMUS LVL: 12.7
EXT URIC ACID: 4.9 (ref 3.5–7.2)
EXT WBC UA: ABNORMAL
EXT WBC: 5.7 (ref 4.5–11.5)
EXT WBC: 6.6 (ref 4.5–11.5)

## 2021-06-07 ENCOUNTER — HISTORICAL (OUTPATIENT)
Dept: ADMINISTRATIVE | Facility: HOSPITAL | Age: 71
End: 2021-06-07

## 2021-06-07 LAB
ABS NEUT (OLG): 3.31 X10(3)/MCL (ref 2.1–9.2)
ALBUMIN SERPL-MCNC: 3.2 GM/DL (ref 3.4–4.8)
ALP SERPL-CCNC: 81 UNIT/L (ref 40–150)
ALT SERPL-CCNC: 44 UNIT/L (ref 0–55)
AST SERPL-CCNC: 25 UNIT/L (ref 5–34)
BASOPHILS # BLD AUTO: 0 X10(3)/MCL (ref 0–0.2)
BASOPHILS NFR BLD AUTO: 1 %
BILIRUB SERPL-MCNC: 0.9 MG/DL
BILIRUBIN DIRECT+TOT PNL SERPL-MCNC: 0.4 MG/DL (ref 0–0.8)
BILIRUBIN DIRECT+TOT PNL SERPL-MCNC: 0.5 MG/DL (ref 0–0.5)
BUN SERPL-MCNC: 17.1 MG/DL (ref 8.4–25.7)
CALCIUM SERPL-MCNC: 9.4 MG/DL (ref 8.8–10)
CHLORIDE SERPL-SCNC: 105 MMOL/L (ref 98–107)
CO2 SERPL-SCNC: 27 MMOL/L (ref 23–31)
CREAT SERPL-MCNC: 1.26 MG/DL (ref 0.73–1.18)
CREAT/UREA NIT SERPL: 14
EOSINOPHIL # BLD AUTO: 0 X10(3)/MCL (ref 0–0.9)
EOSINOPHIL NFR BLD AUTO: 1 %
ERYTHROCYTE [DISTWIDTH] IN BLOOD BY AUTOMATED COUNT: 16.1 % (ref 11.5–17)
GLUCOSE SERPL-MCNC: 87 MG/DL (ref 82–115)
HCT VFR BLD AUTO: 45.5 % (ref 42–52)
HGB BLD-MCNC: 14.2 GM/DL (ref 14–18)
LIVER PROFILE INTERP: ABNORMAL
LYMPHOCYTES # BLD AUTO: 2.1 X10(3)/MCL (ref 0.6–4.6)
LYMPHOCYTES NFR BLD AUTO: 36 %
MAGNESIUM SERPL-MCNC: 1.6 MG/DL (ref 1.6–2.6)
MCH RBC QN AUTO: 27 PG (ref 27–31)
MCHC RBC AUTO-ENTMCNC: 31.2 GM/DL (ref 33–36)
MCV RBC AUTO: 86.5 FL (ref 80–94)
MONOCYTES # BLD AUTO: 0.3 X10(3)/MCL (ref 0.1–1.3)
MONOCYTES NFR BLD AUTO: 5 %
NEUTROPHILS # BLD AUTO: 3.31 X10(3)/MCL (ref 2.1–9.2)
NEUTROPHILS NFR BLD AUTO: 56 %
PHOSPHATE SERPL-MCNC: 2.9 MG/DL (ref 2.3–4.7)
PLATELET # BLD AUTO: 368 X10(3)/MCL (ref 130–400)
PMV BLD AUTO: 9.9 FL (ref 9.4–12.4)
POTASSIUM SERPL-SCNC: 4.8 MMOL/L (ref 3.5–5.1)
PROT SERPL-MCNC: 5.8 GM/DL (ref 5.8–7.6)
RBC # BLD AUTO: 5.26 X10(6)/MCL (ref 4.7–6.1)
SODIUM SERPL-SCNC: 141 MMOL/L (ref 136–145)
WBC # SPEC AUTO: 5.8 X10(3)/MCL (ref 4.5–11.5)

## 2021-06-08 ENCOUNTER — DOCUMENTATION ONLY (OUTPATIENT)
Dept: TRANSPLANT | Facility: CLINIC | Age: 71
End: 2021-06-08

## 2021-06-08 DIAGNOSIS — Z94.0 KIDNEY REPLACED BY TRANSPLANT: ICD-10-CM

## 2021-06-08 LAB
EXT ALBUMIN: 3.2 (ref 3.4–4.8)
EXT ALKALINE PHOSPHATASE: 81 (ref 40–150)
EXT ALT: 44 (ref 0–55)
EXT ANC: ABNORMAL
EXT AST: 25 (ref 5–34)
EXT BILIRUBIN DIRECT: 0.5 MG/DL (ref 0–0.5)
EXT BILIRUBIN TOTAL: 0.9
EXT BUN: 17.1 (ref 8.4–25.7)
EXT CALCIUM: 9.4 (ref 8.8–10)
EXT CHLORIDE: 105 (ref 98–107)
EXT CO2: 27 (ref 23–31)
EXT CREATININE: 1.26 MG/DL
EXT EOSINOPHIL%: 1
EXT GFR MDRD NON AF AMER: 60
EXT GLUCOSE: 87 (ref 82–115)
EXT HEMATOCRIT: 45.5 (ref 42–52)
EXT HEMOGLOBIN: 14.2 (ref 14–18)
EXT LYMPH%: 36
EXT MAGNESIUM: 1.6 (ref 1.6–2.6)
EXT MONOCYTES%: 5
EXT PHOSPHORUS: 2.9 (ref 2.3–4.7)
EXT PLATELETS: 368 (ref 130–400)
EXT POTASSIUM: 4.8 (ref 3.5–5.1)
EXT PROTEIN TOTAL: 5.8 (ref 5.8–7.6)
EXT SEGS%: 56
EXT SODIUM: 141 MMOL/L (ref 136–145)
EXT TACROLIMUS LVL: 14.6
EXT WBC: 5.8 (ref 4.5–11.5)

## 2021-06-08 NOTE — TELEPHONE ENCOUNTER
Notified patient of 's review of 6/3/21 lab results. Patient reports the Prograf level was a true 12 hour level. Instructed patient to HOLD tacro dose tonight & tonorrow AM & PM; resume on Thurs 6/10/21 with 4mg twice daily; next lab appointment is Mon 6/14/21. Patient verbalized understanding.     ----- Message from Celeste Yen MD sent at 6/8/2021  1:58 PM CDT -----  Tac level 12 from 8 days ago. Please ask him to hold progrf if it is a true trough. Resume at 4 mg BID at Thursday.   Any way we can get the result sooner than 8 days?

## 2021-06-09 ENCOUNTER — OFFICE VISIT (OUTPATIENT)
Dept: TRANSPLANT | Facility: CLINIC | Age: 71
End: 2021-06-09
Payer: MEDICARE

## 2021-06-09 VITALS
TEMPERATURE: 98 F | HEIGHT: 74 IN | HEART RATE: 79 BPM | OXYGEN SATURATION: 100 % | RESPIRATION RATE: 16 BRPM | WEIGHT: 177.94 LBS | DIASTOLIC BLOOD PRESSURE: 75 MMHG | SYSTOLIC BLOOD PRESSURE: 114 MMHG | BODY MASS INDEX: 22.84 KG/M2

## 2021-06-09 VITALS
RESPIRATION RATE: 16 BRPM | WEIGHT: 177.94 LBS | SYSTOLIC BLOOD PRESSURE: 114 MMHG | DIASTOLIC BLOOD PRESSURE: 75 MMHG | HEIGHT: 74 IN | OXYGEN SATURATION: 100 % | BODY MASS INDEX: 22.84 KG/M2 | TEMPERATURE: 98 F | HEART RATE: 79 BPM

## 2021-06-09 DIAGNOSIS — Z94.0 KIDNEY REPLACED BY TRANSPLANT: Primary | ICD-10-CM

## 2021-06-09 DIAGNOSIS — I48.91 ATRIAL FIBRILLATION, UNSPECIFIED TYPE: ICD-10-CM

## 2021-06-09 DIAGNOSIS — Z94.0 S/P KIDNEY TRANSPLANT: ICD-10-CM

## 2021-06-09 DIAGNOSIS — I10 HYPERTENSION, UNSPECIFIED TYPE: Primary | ICD-10-CM

## 2021-06-09 DIAGNOSIS — Z79.60 LONG-TERM USE OF IMMUNOSUPPRESSANT MEDICATION: ICD-10-CM

## 2021-06-09 PROBLEM — Z76.82 PATIENT ON WAITING LIST FOR KIDNEY TRANSPLANT: Chronic | Status: RESOLVED | Noted: 2018-03-16 | Resolved: 2021-06-09

## 2021-06-09 PROBLEM — Z79.01 LONG TERM (CURRENT) USE OF ANTICOAGULANTS: Chronic | Status: RESOLVED | Noted: 2018-03-16 | Resolved: 2021-06-09

## 2021-06-09 PROCEDURE — 99999 PR PBB SHADOW E&M-EST. PATIENT-LVL IV: ICD-10-PCS | Mod: PBBFAC,,, | Performed by: INTERNAL MEDICINE

## 2021-06-09 PROCEDURE — 99024 PR POST-OP FOLLOW-UP VISIT: ICD-10-PCS | Mod: POP,,, | Performed by: TRANSPLANT SURGERY

## 2021-06-09 PROCEDURE — 99215 OFFICE O/P EST HI 40 MIN: CPT | Mod: S$PBB,,, | Performed by: INTERNAL MEDICINE

## 2021-06-09 PROCEDURE — 99999 PR PBB SHADOW E&M-EST. PATIENT-LVL IV: CPT | Mod: PBBFAC,,, | Performed by: INTERNAL MEDICINE

## 2021-06-09 PROCEDURE — 99214 OFFICE O/P EST MOD 30 MIN: CPT | Mod: PBBFAC | Performed by: INTERNAL MEDICINE

## 2021-06-09 PROCEDURE — 99215 PR OFFICE/OUTPT VISIT, EST, LEVL V, 40-54 MIN: ICD-10-PCS | Mod: S$PBB,,, | Performed by: INTERNAL MEDICINE

## 2021-06-09 PROCEDURE — 99024 POSTOP FOLLOW-UP VISIT: CPT | Mod: POP,,, | Performed by: TRANSPLANT SURGERY

## 2021-06-09 RX ORDER — TACROLIMUS 1 MG/1
4 CAPSULE ORAL EVERY 12 HOURS
Qty: 240 CAPSULE | Refills: 11 | Status: SHIPPED | OUTPATIENT
Start: 2021-06-10 | End: 2021-07-09 | Stop reason: DRUGHIGH

## 2021-06-14 ENCOUNTER — HISTORICAL (OUTPATIENT)
Dept: ADMINISTRATIVE | Facility: HOSPITAL | Age: 71
End: 2021-06-14

## 2021-06-14 LAB
ABS NEUT (OLG): 3.38 X10(3)/MCL (ref 2.1–9.2)
ALBUMIN SERPL-MCNC: 2.9 GM/DL (ref 3.4–4.8)
BASOPHILS # BLD AUTO: 0 X10(3)/MCL (ref 0–0.2)
BASOPHILS NFR BLD AUTO: 1 %
BUN SERPL-MCNC: 15.9 MG/DL (ref 8.4–25.7)
CALCIUM SERPL-MCNC: 9.5 MG/DL (ref 8.8–10)
CHLORIDE SERPL-SCNC: 106 MMOL/L (ref 98–107)
CO2 SERPL-SCNC: 26 MMOL/L (ref 23–31)
CREAT SERPL-MCNC: 1.31 MG/DL (ref 0.73–1.18)
CREAT/UREA NIT SERPL: 12
EOSINOPHIL # BLD AUTO: 0.1 X10(3)/MCL (ref 0–0.9)
EOSINOPHIL NFR BLD AUTO: 1 %
ERYTHROCYTE [DISTWIDTH] IN BLOOD BY AUTOMATED COUNT: 16.4 % (ref 11.5–17)
GLUCOSE SERPL-MCNC: 101 MG/DL (ref 82–115)
HCT VFR BLD AUTO: 46.4 % (ref 42–52)
HGB BLD-MCNC: 14.3 GM/DL (ref 14–18)
LYMPHOCYTES # BLD AUTO: 2 X10(3)/MCL (ref 0.6–4.6)
LYMPHOCYTES NFR BLD AUTO: 34 %
MAGNESIUM SERPL-MCNC: 1.7 MG/DL (ref 1.6–2.6)
MCH RBC QN AUTO: 26.6 PG (ref 27–31)
MCHC RBC AUTO-ENTMCNC: 30.8 GM/DL (ref 33–36)
MCV RBC AUTO: 86.4 FL (ref 80–94)
MONOCYTES # BLD AUTO: 0.4 X10(3)/MCL (ref 0.1–1.3)
MONOCYTES NFR BLD AUTO: 6 %
NEUTROPHILS # BLD AUTO: 3.38 X10(3)/MCL (ref 2.1–9.2)
NEUTROPHILS NFR BLD AUTO: 58 %
PHOSPHATE SERPL-MCNC: 2.4 MG/DL (ref 2.3–4.7)
PLATELET # BLD AUTO: 267 X10(3)/MCL (ref 130–400)
PMV BLD AUTO: 9.6 FL (ref 9.4–12.4)
POTASSIUM SERPL-SCNC: 5 MMOL/L (ref 3.5–5.1)
RBC # BLD AUTO: 5.37 X10(6)/MCL (ref 4.7–6.1)
SODIUM SERPL-SCNC: 139 MMOL/L (ref 136–145)
WBC # SPEC AUTO: 5.8 X10(3)/MCL (ref 4.5–11.5)

## 2021-06-16 ENCOUNTER — DOCUMENTATION ONLY (OUTPATIENT)
Dept: TRANSPLANT | Facility: CLINIC | Age: 71
End: 2021-06-16

## 2021-06-16 LAB
EXT ALBUMIN: 2.9 (ref 3.4–4.8)
EXT BUN: 15.9 (ref 8.4–25.7)
EXT CALCIUM: 9.5 (ref 8.8–10)
EXT CHLORIDE: 106 (ref 98–107)
EXT CO2: 26 (ref 23–31)
EXT CREATININE: 1.3 MG/DL
EXT EOSINOPHIL%: 1
EXT GFR MDRD NON AF AMER: 58
EXT GLUCOSE: 101 (ref 82–115)
EXT HEMATOCRIT: 46.4 (ref 42–52)
EXT HEMOGLOBIN: 14.3 (ref 14–18)
EXT LYMPH%: 34
EXT MAGNESIUM: 1.7 (ref 1.6–2.6)
EXT MONOCYTES%: 6
EXT PHOSPHORUS: 2.4 (ref 2.3–4.7)
EXT PLATELETS: 267 (ref 130–400)
EXT POTASSIUM: 5 (ref 3.5–5.1)
EXT SEGS%: 58
EXT SODIUM: 139 MMOL/L (ref 136–145)
EXT TACROLIMUS LVL: 9.4
EXT WBC: 5.8 (ref 4.5–11.5)

## 2021-06-17 ENCOUNTER — PATIENT MESSAGE (OUTPATIENT)
Dept: TRANSPLANT | Facility: CLINIC | Age: 71
End: 2021-06-17

## 2021-06-17 ENCOUNTER — TELEPHONE (OUTPATIENT)
Dept: TRANSPLANT | Facility: CLINIC | Age: 71
End: 2021-06-17

## 2021-06-21 ENCOUNTER — SPECIALTY PHARMACY (OUTPATIENT)
Dept: PHARMACY | Facility: CLINIC | Age: 71
End: 2021-06-21

## 2021-06-21 ENCOUNTER — HISTORICAL (OUTPATIENT)
Dept: ADMINISTRATIVE | Facility: HOSPITAL | Age: 71
End: 2021-06-21

## 2021-06-21 DIAGNOSIS — B19.20 HEPATITIS C VIRUS INFECTION WITHOUT HEPATIC COMA, UNSPECIFIED CHRONICITY: Primary | ICD-10-CM

## 2021-06-21 LAB
ABS NEUT (OLG): 3.43 X10(3)/MCL (ref 2.1–9.2)
ALBUMIN SERPL-MCNC: 3 GM/DL (ref 3.4–4.8)
BASOPHILS NFR BLD AUTO: 1 %
BUN SERPL-MCNC: 16.1 MG/DL (ref 8.4–25.7)
CALCIUM SERPL-MCNC: 9.6 MG/DL (ref 8.8–10)
CHLORIDE SERPL-SCNC: 105 MMOL/L (ref 98–107)
CO2 SERPL-SCNC: 28 MMOL/L (ref 23–31)
CREAT SERPL-MCNC: 1.25 MG/DL (ref 0.73–1.18)
CREAT/UREA NIT SERPL: 13
EOSINOPHIL NFR BLD AUTO: 1 %
ERYTHROCYTE [DISTWIDTH] IN BLOOD BY AUTOMATED COUNT: 16.6 % (ref 11.5–17)
GLUCOSE SERPL-MCNC: 99 MG/DL (ref 82–115)
HCT VFR BLD AUTO: 44.7 % (ref 42–52)
HGB BLD-MCNC: 13.8 GM/DL (ref 14–18)
LYMPHOCYTES NFR BLD AUTO: 31 %
MAGNESIUM SERPL-MCNC: 1.7 MG/DL (ref 1.6–2.6)
MCH RBC QN AUTO: 26.5 PG (ref 27–31)
MCHC RBC AUTO-ENTMCNC: 30.9 GM/DL (ref 33–36)
MCV RBC AUTO: 85.8 FL (ref 80–94)
MONOCYTES NFR BLD AUTO: 6 %
NEUTROPHILS NFR BLD AUTO: 61 %
PHOSPHATE SERPL-MCNC: 2.5 MG/DL (ref 2.3–4.7)
PLATELET # BLD AUTO: 278 X10(3)/MCL (ref 130–400)
PMV BLD AUTO: 10.2 FL (ref 7.4–10.4)
POTASSIUM SERPL-SCNC: 4.9 MMOL/L (ref 3.5–5.1)
RBC # BLD AUTO: 5.21 X10(6)/MCL (ref 4.7–6.1)
SODIUM SERPL-SCNC: 137 MMOL/L (ref 136–145)
WBC # SPEC AUTO: 5.7 X10(3)/MCL (ref 4.5–11.5)

## 2021-06-21 RX ORDER — VELPATASVIR AND SOFOSBUVIR 100; 400 MG/1; MG/1
1 TABLET, FILM COATED ORAL DAILY
Qty: 28 TABLET | Refills: 2 | Status: SHIPPED | OUTPATIENT
Start: 2021-06-21 | End: 2021-10-01

## 2021-06-22 ENCOUNTER — DOCUMENTATION ONLY (OUTPATIENT)
Dept: TRANSPLANT | Facility: CLINIC | Age: 71
End: 2021-06-22

## 2021-06-22 LAB
EXT ALBUMIN: 3 (ref 3.4–4.8)
EXT ANC: ABNORMAL
EXT BUN: 16.1 (ref 8.4–25.7)
EXT CALCIUM: 9.6 (ref 8.8–10)
EXT CHLORIDE: 105 (ref 98–107)
EXT CO2: 28 (ref 23–31)
EXT CREATININE: 1.25 MG/DL
EXT EOSINOPHIL%: 1
EXT GFR MDRD NON AF AMER: >60
EXT GLUCOSE: 99 (ref 82–115)
EXT HEMATOCRIT: 44.7 (ref 42–52)
EXT HEMOGLOBIN: 13.8 (ref 14–18)
EXT LYMPH%: 31
EXT MAGNESIUM: 1.7 (ref 1.6–2.6)
EXT MONOCYTES%: 6
EXT PHOSPHORUS: 2.5 (ref 2.3–4.7)
EXT PLATELETS: 278 (ref 130–400)
EXT POTASSIUM: 4.9 (ref 3.5–5.1)
EXT SEGS%: 61
EXT SODIUM: 137 MMOL/L (ref 136–145)
EXT TACROLIMUS LVL: 9.3
EXT WBC: 5.7 (ref 4.5–11.5)

## 2021-06-23 ENCOUNTER — TELEPHONE (OUTPATIENT)
Dept: HEPATOLOGY | Facility: CLINIC | Age: 71
End: 2021-06-23

## 2021-06-23 DIAGNOSIS — B18.2 CHRONIC HEPATITIS C WITHOUT HEPATIC COMA: Primary | ICD-10-CM

## 2021-06-28 ENCOUNTER — HISTORICAL (OUTPATIENT)
Dept: ADMINISTRATIVE | Facility: HOSPITAL | Age: 71
End: 2021-06-28

## 2021-06-28 LAB
ABS NEUT (OLG): 3.1 X10(3)/MCL (ref 2.1–9.2)
ALBUMIN SERPL-MCNC: 3.1 GM/DL (ref 3.4–4.8)
BASOPHILS # BLD AUTO: 0 X10(3)/MCL (ref 0–0.2)
BASOPHILS NFR BLD AUTO: 1 %
BUN SERPL-MCNC: 17.6 MG/DL (ref 8.4–25.7)
CALCIUM SERPL-MCNC: 9.7 MG/DL (ref 8.8–10)
CHLORIDE SERPL-SCNC: 107 MMOL/L (ref 98–107)
CO2 SERPL-SCNC: 25 MMOL/L (ref 23–31)
CREAT SERPL-MCNC: 1.24 MG/DL (ref 0.73–1.18)
CREAT/UREA NIT SERPL: 14
EOSINOPHIL # BLD AUTO: 0 X10(3)/MCL (ref 0–0.9)
EOSINOPHIL NFR BLD AUTO: 1 %
ERYTHROCYTE [DISTWIDTH] IN BLOOD BY AUTOMATED COUNT: 16.5 % (ref 11.5–17)
GLUCOSE SERPL-MCNC: 91 MG/DL (ref 82–115)
HCT VFR BLD AUTO: 45 % (ref 42–52)
HGB BLD-MCNC: 13.7 GM/DL (ref 14–18)
LYMPHOCYTES # BLD AUTO: 2 X10(3)/MCL (ref 0.6–4.6)
LYMPHOCYTES NFR BLD AUTO: 36 %
MAGNESIUM SERPL-MCNC: 1.5 MG/DL (ref 1.6–2.6)
MCH RBC QN AUTO: 25.9 PG (ref 27–31)
MCHC RBC AUTO-ENTMCNC: 30.4 GM/DL (ref 33–36)
MCV RBC AUTO: 85.2 FL (ref 80–94)
MONOCYTES # BLD AUTO: 0.3 X10(3)/MCL (ref 0.1–1.3)
MONOCYTES NFR BLD AUTO: 6 %
NEUTROPHILS # BLD AUTO: 3.1 X10(3)/MCL (ref 2.1–9.2)
NEUTROPHILS NFR BLD AUTO: 56 %
PHOSPHATE SERPL-MCNC: 3 MG/DL (ref 2.3–4.7)
PLATELET # BLD AUTO: 309 X10(3)/MCL (ref 130–400)
PMV BLD AUTO: 10.1 FL (ref 9.4–12.4)
POTASSIUM SERPL-SCNC: 4.7 MMOL/L (ref 3.5–5.1)
RBC # BLD AUTO: 5.28 X10(6)/MCL (ref 4.7–6.1)
SODIUM SERPL-SCNC: 139 MMOL/L (ref 136–145)
WBC # SPEC AUTO: 5.5 X10(3)/MCL (ref 4.5–11.5)

## 2021-06-29 ENCOUNTER — DOCUMENTATION ONLY (OUTPATIENT)
Dept: TRANSPLANT | Facility: CLINIC | Age: 71
End: 2021-06-29

## 2021-06-29 LAB
EXT ALBUMIN: 3.1 (ref 3.4–4.8)
EXT ANC: ABNORMAL
EXT BUN: 17.6 (ref 8.4–25.7)
EXT CALCIUM: 9.7 (ref 8.8–10)
EXT CHLORIDE: 107 (ref 98–107)
EXT CO2: 25 (ref 23–31)
EXT CREATININE: 1.24 MG/DL
EXT EOSINOPHIL%: 1
EXT GFR MDRD NON AF AMER: >60
EXT GLUCOSE: 91 (ref 82–115)
EXT HBV DNA QUANT PCR: ABNORMAL
EXT HEMATOCRIT: 45 (ref 42–52)
EXT HEMOGLOBIN: 13.7 (ref 14–18)
EXT HIV RNA QUANT PCR: ABNORMAL
EXT LYMPH%: 36
EXT MAGNESIUM: 1.5 (ref 1.6–2.6)
EXT MONOCYTES%: 6
EXT PHOSPHORUS: 3 (ref 2.3–4.7)
EXT PLATELETS: 309 (ref 130–400)
EXT POTASSIUM: 4.7 (ref 3.5–5.1)
EXT SEGS%: 56
EXT SODIUM: 139 MMOL/L (ref 136–145)
EXT TACROLIMUS LVL: 11.3
EXT WBC: 5.5

## 2021-07-01 ENCOUNTER — SPECIALTY PHARMACY (OUTPATIENT)
Dept: PHARMACY | Facility: CLINIC | Age: 71
End: 2021-07-01

## 2021-07-06 ENCOUNTER — PATIENT MESSAGE (OUTPATIENT)
Dept: TRANSPLANT | Facility: CLINIC | Age: 71
End: 2021-07-06

## 2021-07-06 ENCOUNTER — HISTORICAL (OUTPATIENT)
Dept: ADMINISTRATIVE | Facility: HOSPITAL | Age: 71
End: 2021-07-06

## 2021-07-06 ENCOUNTER — TELEPHONE (OUTPATIENT)
Dept: TRANSPLANT | Facility: CLINIC | Age: 71
End: 2021-07-06

## 2021-07-06 LAB
ABS NEUT (OLG): 3.54 X10(3)/MCL (ref 2.1–9.2)
ALBUMIN SERPL-MCNC: 3.3 GM/DL (ref 3.4–4.8)
ALP SERPL-CCNC: 55 UNIT/L (ref 40–150)
ALT SERPL-CCNC: 19 UNIT/L (ref 0–55)
APPEARANCE, UA: ABNORMAL
AST SERPL-CCNC: 15 UNIT/L (ref 5–34)
BACTERIA #/AREA URNS AUTO: ABNORMAL /HPF
BASOPHILS # BLD AUTO: 0 X10(3)/MCL (ref 0–0.2)
BASOPHILS NFR BLD AUTO: 1 %
BILIRUB SERPL-MCNC: 1.1 MG/DL
BILIRUB UR QL STRIP: NEGATIVE
BILIRUBIN DIRECT+TOT PNL SERPL-MCNC: 0.5 MG/DL (ref 0–0.5)
BILIRUBIN DIRECT+TOT PNL SERPL-MCNC: 0.6 MG/DL (ref 0–0.8)
BUN SERPL-MCNC: 18.4 MG/DL (ref 8.4–25.7)
CALCIUM SERPL-MCNC: 9.6 MG/DL (ref 8.8–10)
CHLORIDE SERPL-SCNC: 107 MMOL/L (ref 98–107)
CO2 SERPL-SCNC: 23 MMOL/L (ref 23–31)
COLOR UR: YELLOW
CREAT SERPL-MCNC: 1.2 MG/DL (ref 0.73–1.18)
CREAT UR-MCNC: 80.1 MG/DL (ref 58–161)
CREAT/UREA NIT SERPL: 15
EOSINOPHIL # BLD AUTO: 0 X10(3)/MCL (ref 0–0.9)
EOSINOPHIL NFR BLD AUTO: 0 %
ERYTHROCYTE [DISTWIDTH] IN BLOOD BY AUTOMATED COUNT: 17 % (ref 11.5–17)
GLUCOSE (UA): NEGATIVE
GLUCOSE SERPL-MCNC: 91 MG/DL (ref 82–115)
HCT VFR BLD AUTO: 47.8 % (ref 42–52)
HGB BLD-MCNC: 14.8 GM/DL (ref 14–18)
HGB UR QL STRIP: NEGATIVE
KETONES UR QL STRIP: NEGATIVE
LEUKOCYTE ESTERASE UR QL STRIP: NEGATIVE
LIVER PROFILE INTERP: ABNORMAL
LYMPHOCYTES # BLD AUTO: 1.7 X10(3)/MCL (ref 0.6–4.6)
LYMPHOCYTES NFR BLD AUTO: 29 %
MAGNESIUM SERPL-MCNC: 1.6 MG/DL (ref 1.6–2.6)
MCH RBC QN AUTO: 26.3 PG (ref 27–31)
MCHC RBC AUTO-ENTMCNC: 31 GM/DL (ref 33–36)
MCV RBC AUTO: 85.1 FL (ref 80–94)
MONOCYTES # BLD AUTO: 0.3 X10(3)/MCL (ref 0.1–1.3)
MONOCYTES NFR BLD AUTO: 4 %
NEUTROPHILS # BLD AUTO: 3.54 X10(3)/MCL (ref 2.1–9.2)
NEUTROPHILS NFR BLD AUTO: 62 %
NITRITE UR QL STRIP.AUTO: NEGATIVE
PH UR STRIP: 7 [PH] (ref 5–9)
PHOSPHATE SERPL-MCNC: 2.9 MG/DL (ref 2.3–4.7)
PLATELET # BLD AUTO: 318 X10(3)/MCL (ref 130–400)
PMV BLD AUTO: 10.3 FL (ref 9.4–12.4)
POTASSIUM SERPL-SCNC: 4.6 MMOL/L (ref 3.5–5.1)
PROT SERPL-MCNC: 6.1 GM/DL (ref 5.8–7.6)
PROT UR QL STRIP: ABNORMAL
PROT UR STRIP-MCNC: 19.4 MG/DL
PROT/CREAT UR-RTO: 242.2 MG/GM CR
RBC # BLD AUTO: 5.62 X10(6)/MCL (ref 4.7–6.1)
RBC #/AREA URNS HPF: ABNORMAL /HPF
SODIUM SERPL-SCNC: 139 MMOL/L (ref 136–145)
SP GR UR STRIP: 1.01 (ref 1–1.03)
SQUAMOUS EPITHELIAL, UA: ABNORMAL /HPF (ref 0–4)
UROBILINOGEN UR STRIP-ACNC: 1
WBC # SPEC AUTO: 5.8 X10(3)/MCL (ref 4.5–11.5)
WBC #/AREA URNS AUTO: ABNORMAL /HPF

## 2021-07-07 ENCOUNTER — DOCUMENTATION ONLY (OUTPATIENT)
Dept: TRANSPLANT | Facility: CLINIC | Age: 71
End: 2021-07-07

## 2021-07-07 ENCOUNTER — LAB VISIT (OUTPATIENT)
Dept: LAB | Facility: HOSPITAL | Age: 71
End: 2021-07-07
Payer: MEDICARE

## 2021-07-07 ENCOUNTER — OFFICE VISIT (OUTPATIENT)
Dept: TRANSPLANT | Facility: CLINIC | Age: 71
End: 2021-07-07
Payer: MEDICARE

## 2021-07-07 VITALS
HEART RATE: 60 BPM | DIASTOLIC BLOOD PRESSURE: 76 MMHG | BODY MASS INDEX: 23.12 KG/M2 | OXYGEN SATURATION: 100 % | HEIGHT: 74 IN | TEMPERATURE: 98 F | SYSTOLIC BLOOD PRESSURE: 108 MMHG | WEIGHT: 180.13 LBS | RESPIRATION RATE: 16 BRPM

## 2021-07-07 DIAGNOSIS — Z57.8 EMPLOYEE EXPOSURE TO BLOOD: ICD-10-CM

## 2021-07-07 DIAGNOSIS — Z20.6 EXPOSURE TO HIV: ICD-10-CM

## 2021-07-07 DIAGNOSIS — Z94.0 KIDNEY REPLACED BY TRANSPLANT: ICD-10-CM

## 2021-07-07 LAB
EXT ALBUMIN: 3.3 (ref 3.4–4.8)
EXT ALKALINE PHOSPHATASE: 55 (ref 40–150)
EXT ALT: 19 (ref 0–55)
EXT ANC: ABNORMAL
EXT AST: 15 (ref 5–34)
EXT BACTERIA UA: ABNORMAL
EXT BILIRUBIN DIRECT: 0.5 MG/DL (ref 0–0.5)
EXT BILIRUBIN TOTAL: 1.1
EXT BK VIRUS DNA QN PCR: ABNORMAL
EXT BUN: 18.4 (ref 8.4–25.7)
EXT CALCIUM: 9.6 (ref 8.8–10)
EXT CHLORIDE: 107 (ref 98–107)
EXT CO2: 23 (ref 23–321)
EXT CREATININE: 1.2 MG/DL
EXT EOSINOPHIL%: 0
EXT GFR MDRD NON AF AMER: >60
EXT GLUCOSE UA: ABNORMAL
EXT GLUCOSE: 91 (ref 82–115)
EXT HEMATOCRIT: 47.8 (ref 42–52)
EXT HEMOGLOBIN: 14.8 (ref 14–18)
EXT LYMPH%: 29
EXT MAGNESIUM: 1.6 (ref 1.6–2.6)
EXT MONOCYTES%: 4
EXT NITRITES UA: ABNORMAL
EXT PHOSPHORUS: 2.9 (ref 2.3–4.7)
EXT PLATELETS: 318 (ref 130–400)
EXT POTASSIUM: 4.6 (ref 3.5–5.1)
EXT PROT/CREAT RATIO UR: 0.24
EXT PROTEIN TOTAL: 6.1 (ref 5.8–7.6)
EXT PROTEIN UA: ABNORMAL
EXT RBC UA: ABNORMAL
EXT SEGS%: 62
EXT SODIUM: 139 MMOL/L (ref 136–145)
EXT TACROLIMUS LVL: 14.7
EXT WBC UA: ABNORMAL
EXT WBC: 5.8 (ref 4.5–11.5)

## 2021-07-07 PROCEDURE — 99214 OFFICE O/P EST MOD 30 MIN: CPT | Mod: PBBFAC | Performed by: INTERNAL MEDICINE

## 2021-07-07 PROCEDURE — 87536 HIV-1 QUANT&REVRSE TRNSCRPJ: CPT | Performed by: INTERNAL MEDICINE

## 2021-07-07 PROCEDURE — 99215 OFFICE O/P EST HI 40 MIN: CPT | Mod: S$PBB,,, | Performed by: INTERNAL MEDICINE

## 2021-07-07 PROCEDURE — 99215 PR OFFICE/OUTPT VISIT, EST, LEVL V, 40-54 MIN: ICD-10-PCS | Mod: S$PBB,,, | Performed by: INTERNAL MEDICINE

## 2021-07-07 PROCEDURE — 99999 PR PBB SHADOW E&M-EST. PATIENT-LVL IV: CPT | Mod: PBBFAC,,, | Performed by: INTERNAL MEDICINE

## 2021-07-07 PROCEDURE — 36415 COLL VENOUS BLD VENIPUNCTURE: CPT | Performed by: INTERNAL MEDICINE

## 2021-07-07 PROCEDURE — 99999 PR PBB SHADOW E&M-EST. PATIENT-LVL IV: ICD-10-PCS | Mod: PBBFAC,,, | Performed by: INTERNAL MEDICINE

## 2021-07-07 PROCEDURE — 87517 HEPATITIS B DNA QUANT: CPT | Performed by: INTERNAL MEDICINE

## 2021-07-07 RX ORDER — LANOLIN ALCOHOL/MO/W.PET/CERES
1200 CREAM (GRAM) TOPICAL 3 TIMES DAILY
Qty: 270 TABLET | Refills: 11 | Status: ON HOLD | OUTPATIENT
Start: 2021-07-07 | End: 2022-02-23 | Stop reason: SDUPTHER

## 2021-07-07 SDOH — SOCIAL DETERMINANTS OF HEALTH (SDOH): OCCUPATIONAL EXPOSURE TO OTHER RISK FACTORS: Z57.8

## 2021-07-09 ENCOUNTER — PATIENT MESSAGE (OUTPATIENT)
Dept: TRANSPLANT | Facility: CLINIC | Age: 71
End: 2021-07-09

## 2021-07-09 DIAGNOSIS — Z94.0 KIDNEY REPLACED BY TRANSPLANT: ICD-10-CM

## 2021-07-09 LAB — HIV1 RNA # PLAS NAA DL=20: NORMAL COPIES/ML

## 2021-07-09 RX ORDER — TACROLIMUS 1 MG/1
3 CAPSULE ORAL EVERY 12 HOURS
Qty: 180 CAPSULE | Refills: 11 | Status: SHIPPED | OUTPATIENT
Start: 2021-07-09 | End: 2021-07-26 | Stop reason: DRUGHIGH

## 2021-07-09 NOTE — TELEPHONE ENCOUNTER
Notified patient of Dr. Yen's review of 7/6/21 lab results via telephone & [facility] message. Instructed patient to HOLD tacrolimus dose tonight & tomorrow AM & PM & restart Sunday on a lower dose of 3mg twice daily; next labs on 7/19/21. Patient verbalized understanding.       Hey Mr. Mazariegos,     Dr. Yen reviewed your 7/6/21 prograf level. Your level is elevated=14.7. She wants you to please HOLD your Prograf dose tonight & tomorrow AM & PM & restart Sunday on a lower dose of 3mg twice daily. Have labs drawn Monday after next, 7/19/21.    I'll also call you to make sure you received the instructions.     Tiara Anglin       ----- Message from Celeste Yen MD sent at 7/9/2021 11:36 AM CDT -----  Please hold tac and resume  tac at 3 mg BID on Sunday morning if it is a true trough.

## 2021-07-11 LAB
HBV DNA SERPL NAA+PROBE-ACNC: <10 IU/ML
HBV DNA SERPL NAA+PROBE-LOG IU: <1 LOG (10) IU/ML
HBV DNA SERPL QL NAA+PROBE: NOT DETECTED

## 2021-07-14 ENCOUNTER — SPECIALTY PHARMACY (OUTPATIENT)
Dept: PHARMACY | Facility: CLINIC | Age: 71
End: 2021-07-14

## 2021-07-16 ENCOUNTER — DOCUMENTATION ONLY (OUTPATIENT)
Dept: TRANSPLANT | Facility: CLINIC | Age: 71
End: 2021-07-16

## 2021-07-16 LAB
EXT ANC: NORMAL
EXT HBV DNA QUANT PCR: NOT DETECTED
EXT HIV RNA QUANT PCR: NOT DETECTED

## 2021-07-19 ENCOUNTER — HISTORICAL (OUTPATIENT)
Dept: ADMINISTRATIVE | Facility: HOSPITAL | Age: 71
End: 2021-07-19

## 2021-07-19 LAB
ABS NEUT (OLG): 2.65 X10(3)/MCL (ref 2.1–9.2)
ALBUMIN SERPL-MCNC: 3.1 GM/DL (ref 3.4–4.8)
BASOPHILS NFR BLD MANUAL: 2 % (ref 0–2)
BUN SERPL-MCNC: 18.1 MG/DL (ref 8.4–25.7)
CALCIUM SERPL-MCNC: 9.2 MG/DL (ref 8.8–10)
CHLORIDE SERPL-SCNC: 109 MMOL/L (ref 98–107)
CO2 SERPL-SCNC: 22 MMOL/L (ref 23–31)
CREAT SERPL-MCNC: 1.24 MG/DL (ref 0.73–1.18)
CREAT/UREA NIT SERPL: 15
EOSINOPHIL NFR BLD MANUAL: 2 % (ref 0–8)
ERYTHROCYTE [DISTWIDTH] IN BLOOD BY AUTOMATED COUNT: 17.2 % (ref 11.5–17)
GLUCOSE SERPL-MCNC: 89 MG/DL (ref 82–115)
HCT VFR BLD AUTO: 45.2 % (ref 42–52)
HGB BLD-MCNC: 13.4 GM/DL (ref 14–18)
LYMPHOCYTES NFR BLD MANUAL: 21 % (ref 13–40)
MACROCYTES BLD QL SMEAR: 1 /MCL
MAGNESIUM SERPL-MCNC: 1.6 MG/DL (ref 1.6–2.6)
MCH RBC QN AUTO: 25.5 PG (ref 27–31)
MCHC RBC AUTO-ENTMCNC: 29.6 GM/DL (ref 33–36)
MCV RBC AUTO: 85.9 FL (ref 80–94)
MONOCYTES NFR BLD MANUAL: 2 % (ref 2–11)
MYELOCYTES NFR BLD MANUAL: 1 %
NEUTROPHILS NFR BLD MANUAL: 72 % (ref 47–80)
PHOSPHATE SERPL-MCNC: 2.7 MG/DL (ref 2.3–4.7)
PLATELET # BLD AUTO: 268 X10(3)/MCL (ref 130–400)
PLATELET # BLD EST: NORMAL 10*3/UL
PMV BLD AUTO: 9.6 FL (ref 7.4–10.4)
POTASSIUM SERPL-SCNC: 5.2 MMOL/L (ref 3.5–5.1)
RBC # BLD AUTO: 5.26 X10(6)/MCL (ref 4.7–6.1)
SODIUM SERPL-SCNC: 141 MMOL/L (ref 136–145)
WBC # SPEC AUTO: 4.9 X10(3)/MCL (ref 4.5–11.5)

## 2021-07-21 ENCOUNTER — DOCUMENTATION ONLY (OUTPATIENT)
Dept: TRANSPLANT | Facility: CLINIC | Age: 71
End: 2021-07-21

## 2021-07-21 LAB
EXT ALBUMIN: 3.1 (ref 3.4–4.8)
EXT ANC: ABNORMAL
EXT ANC: NORMAL
EXT BUN: 89 (ref 8.4–25.7)
EXT CALCIUM: 9.2 (ref 8.8–10)
EXT CHLORIDE: 109 (ref 98–107)
EXT CO2: 22 (ref 23–31)
EXT CREATININE: 1.24 MG/DL
EXT EOSINOPHIL%: 2 (ref 0–8)
EXT GFR MDRD NON AF AMER: >60
EXT GLUCOSE: 89 (ref 82–115)
EXT HEMATOCRIT: 45.2 (ref 42–52)
EXT HEMOGLOBIN: 13.4 (ref 14–18)
EXT LYMPH%: 21 (ref 13–40)
EXT MAGNESIUM: 1.6 (ref 1.6–2.6)
EXT MONOCYTES%: 2 (ref 2–11)
EXT PHOSPHORUS: 2.7 (ref 2.3–4.7)
EXT PLATELETS: 268 (ref 130–400)
EXT POTASSIUM: 5.2 (ref 3.5–5.1)
EXT SEGS%: 72 (ref 47–80)
EXT SODIUM: 141 MMOL/L (ref 136–145)
EXT TACROLIMUS LVL: 4
EXT WBC: 4.9 (ref 4.5–11.5)

## 2021-07-26 ENCOUNTER — TELEPHONE (OUTPATIENT)
Dept: TRANSPLANT | Facility: CLINIC | Age: 71
End: 2021-07-26

## 2021-07-26 DIAGNOSIS — Z94.0 KIDNEY REPLACED BY TRANSPLANT: ICD-10-CM

## 2021-07-26 DIAGNOSIS — E87.5 HYPERKALEMIA: Primary | ICD-10-CM

## 2021-07-26 RX ORDER — TACROLIMUS 1 MG/1
CAPSULE ORAL
Qty: 210 CAPSULE | Refills: 11 | Status: SHIPPED | OUTPATIENT
Start: 2021-07-26 | End: 2021-07-26 | Stop reason: SDUPTHER

## 2021-07-26 RX ORDER — TACROLIMUS 1 MG/1
CAPSULE ORAL
Qty: 210 CAPSULE | Refills: 11 | Status: SHIPPED | OUTPATIENT
Start: 2021-07-26 | End: 2021-08-09 | Stop reason: SDUPTHER

## 2021-07-28 ENCOUNTER — LAB VISIT (OUTPATIENT)
Dept: LAB | Facility: HOSPITAL | Age: 71
End: 2021-07-28
Attending: INTERNAL MEDICINE
Payer: MEDICARE

## 2021-07-28 ENCOUNTER — OFFICE VISIT (OUTPATIENT)
Dept: TRANSPLANT | Facility: CLINIC | Age: 71
End: 2021-07-28
Payer: MEDICARE

## 2021-07-28 VITALS
TEMPERATURE: 97 F | HEART RATE: 90 BPM | DIASTOLIC BLOOD PRESSURE: 82 MMHG | HEIGHT: 74 IN | SYSTOLIC BLOOD PRESSURE: 122 MMHG | RESPIRATION RATE: 18 BRPM | OXYGEN SATURATION: 99 % | WEIGHT: 183.88 LBS | BODY MASS INDEX: 23.6 KG/M2

## 2021-07-28 DIAGNOSIS — Z94.0 S/P KIDNEY TRANSPLANT: Primary | ICD-10-CM

## 2021-07-28 DIAGNOSIS — I48.0 PAROXYSMAL ATRIAL FIBRILLATION: ICD-10-CM

## 2021-07-28 DIAGNOSIS — I15.0 RENOVASCULAR HYPERTENSION: ICD-10-CM

## 2021-07-28 DIAGNOSIS — Z29.89 PROPHYLACTIC IMMUNOTHERAPY: ICD-10-CM

## 2021-07-28 DIAGNOSIS — Z79.60 LONG-TERM USE OF IMMUNOSUPPRESSANT MEDICATION: ICD-10-CM

## 2021-07-28 DIAGNOSIS — E87.5 HYPERKALEMIA: ICD-10-CM

## 2021-07-28 DIAGNOSIS — Q61.3 POLYCYSTIC KIDNEY DISEASE: ICD-10-CM

## 2021-07-28 PROBLEM — N18.6 END STAGE KIDNEY DISEASE: Status: RESOLVED | Noted: 2021-05-04 | Resolved: 2021-07-28

## 2021-07-28 LAB — POTASSIUM SERPL-SCNC: 4.8 MMOL/L (ref 3.5–5.1)

## 2021-07-28 PROCEDURE — 84132 ASSAY OF SERUM POTASSIUM: CPT | Performed by: INTERNAL MEDICINE

## 2021-07-28 PROCEDURE — 99999 PR PBB SHADOW E&M-EST. PATIENT-LVL V: ICD-10-PCS | Mod: PBBFAC,,, | Performed by: NURSE PRACTITIONER

## 2021-07-28 PROCEDURE — 99215 PR OFFICE/OUTPT VISIT, EST, LEVL V, 40-54 MIN: ICD-10-PCS | Mod: S$PBB,,, | Performed by: NURSE PRACTITIONER

## 2021-07-28 PROCEDURE — 36415 COLL VENOUS BLD VENIPUNCTURE: CPT | Performed by: INTERNAL MEDICINE

## 2021-07-28 PROCEDURE — 99215 OFFICE O/P EST HI 40 MIN: CPT | Mod: PBBFAC | Performed by: NURSE PRACTITIONER

## 2021-07-28 PROCEDURE — 99215 OFFICE O/P EST HI 40 MIN: CPT | Mod: S$PBB,,, | Performed by: NURSE PRACTITIONER

## 2021-07-28 PROCEDURE — 99999 PR PBB SHADOW E&M-EST. PATIENT-LVL V: CPT | Mod: PBBFAC,,, | Performed by: NURSE PRACTITIONER

## 2021-08-02 ENCOUNTER — HISTORICAL (OUTPATIENT)
Dept: ADMINISTRATIVE | Facility: HOSPITAL | Age: 71
End: 2021-08-02

## 2021-08-02 ENCOUNTER — DOCUMENTATION ONLY (OUTPATIENT)
Dept: TRANSPLANT | Facility: CLINIC | Age: 71
End: 2021-08-02

## 2021-08-02 DIAGNOSIS — Z94.0 KIDNEY REPLACED BY TRANSPLANT: Primary | ICD-10-CM

## 2021-08-02 DIAGNOSIS — D70.2 DRUG-INDUCED NEUTROPENIA: ICD-10-CM

## 2021-08-02 LAB
ABS NEUT (OLG): 0.22 X10(3)/MCL (ref 2.1–9.2)
ALBUMIN SERPL-MCNC: 3.4 GM/DL (ref 3.4–4.8)
ALP SERPL-CCNC: 49 UNIT/L (ref 40–150)
ALT SERPL-CCNC: 14 UNIT/L (ref 0–55)
ANISOCYTOSIS BLD QL SMEAR: 1
APPEARANCE, UA: CLEAR
AST SERPL-CCNC: 14 UNIT/L (ref 5–34)
BACTERIA SPEC CULT: ABNORMAL /HPF
BASOPHILS NFR BLD MANUAL: 6 % (ref 0–2)
BILIRUB SERPL-MCNC: 1.4 MG/DL
BILIRUB UR QL STRIP: NEGATIVE
BILIRUBIN DIRECT+TOT PNL SERPL-MCNC: 0.6 MG/DL (ref 0–0.5)
BILIRUBIN DIRECT+TOT PNL SERPL-MCNC: 0.8 MG/DL (ref 0–0.8)
BUN SERPL-MCNC: 17.6 MG/DL (ref 8.4–25.7)
CALCIUM SERPL-MCNC: 9.4 MG/DL (ref 8.8–10)
CHLORIDE SERPL-SCNC: 108 MMOL/L (ref 98–107)
CHOLEST SERPL-MCNC: 157 MG/DL
CHOLEST/HDLC SERPL: 3 {RATIO} (ref 0–5)
CO2 SERPL-SCNC: 22 MMOL/L (ref 23–31)
COLOR UR: YELLOW
CREAT SERPL-MCNC: 1.18 MG/DL (ref 0.73–1.18)
CREAT UR-MCNC: 63.2 MG/DL (ref 58–161)
CREAT/UREA NIT SERPL: 15
DEPRECATED CALCIDIOL+CALCIFEROL SERPL-MC: 23.3 NG/ML (ref 30–80)
EOSINOPHIL NFR BLD MANUAL: 3 % (ref 0–8)
ERYTHROCYTE [DISTWIDTH] IN BLOOD BY AUTOMATED COUNT: 17.4 % (ref 11.5–17)
EXT ALBUMIN: 3.4
EXT ALKALINE PHOSPHATASE: 49
EXT ALT: 14
EXT AST: 14
EXT BACTERIA UA: ABNORMAL
EXT BILIRUBIN DIRECT: 0.6 MG/DL (ref 0–0.5)
EXT BILIRUBIN TOTAL: 1.4
EXT BK VIRUS DNA QN PCR: ABNORMAL
EXT BUN: 17.6
EXT CALCIUM: 9.4
EXT CHLORIDE: 108 (ref 98–107)
EXT CHOLESTEROL (LIPID PANEL): 157
EXT CO2: 22 (ref 23–31)
EXT CREATININE: 1.18 MG/DL
EXT EOSINOPHIL%: 3
EXT GFR MDRD NON AF AMER: >60
EXT GLUCOSE UA: ABNORMAL
EXT GLUCOSE: 90
EXT HCV RNA QUANT PCR: ABNORMAL
EXT HDL: 60
EXT HEMATOCRIT: 50.2
EXT HEMOGLOBIN: 14.8
EXT LDL CHOLESTEROL: 85
EXT LYMPH%: 64 (ref 13–40)
EXT MAGNESIUM: 1.5 (ref 1.6–2.6)
EXT MONOCYTES%: 5
EXT NITRITES UA: ABNORMAL
EXT PHOSPHORUS: 2.6
EXT PLATELETS: 296
EXT POTASSIUM: 4.7
EXT PROT/CREAT RATIO UR: 0.3
EXT PROTEIN TOTAL: 6
EXT PROTEIN UA: ABNORMAL
EXT PTH, INTACT: 244.9 (ref 8.7–77.1)
EXT RBC UA: ABNORMAL
EXT SEGS%: 22 (ref 47–80)
EXT SODIUM: 141 MMOL/L
EXT TACROLIMUS LVL: 14.2
EXT TRIGLYCERIDES: 60
EXT URIC ACID: 5.7
EXT VIT D 25 HYDROXY: 23.3 (ref 30–80)
EXT WBC UA: ABNORMAL
EXT WBC: 1.7 (ref 4.5–11.5)
GLUCOSE (UA): NEGATIVE
GLUCOSE SERPL-MCNC: 90 MG/DL (ref 82–115)
HCT VFR BLD AUTO: 50.2 % (ref 42–52)
HDLC SERPL-MCNC: 60 MG/DL (ref 35–60)
HGB BLD-MCNC: 14.8 GM/DL (ref 14–18)
HGB UR QL STRIP: NEGATIVE
KETONES UR QL STRIP: NEGATIVE
LDLC SERPL CALC-MCNC: 85 MG/DL (ref 50–140)
LEUKOCYTE ESTERASE UR QL STRIP: NEGATIVE
LIVER PROFILE INTERP: ABNORMAL
LYMPHOCYTES NFR BLD MANUAL: 64 % (ref 13–40)
MACROCYTES BLD QL SMEAR: 1 /MCL
MAGNESIUM SERPL-MCNC: 1.5 MG/DL (ref 1.6–2.6)
MCH RBC QN AUTO: 25 PG (ref 27–31)
MCHC RBC AUTO-ENTMCNC: 29.5 GM/DL (ref 33–36)
MCV RBC AUTO: 84.8 FL (ref 80–94)
MONOCYTES NFR BLD MANUAL: 5 % (ref 2–11)
NEUTROPHILS NFR BLD MANUAL: 22 % (ref 47–80)
NITRITE UR QL STRIP: NEGATIVE
NRBC BLD MANUAL-RTO: 1 %
PH UR STRIP: 6.5 [PH] (ref 5–9)
PHOSPHATE SERPL-MCNC: 2.6 MG/DL (ref 2.3–4.7)
PLATELET # BLD AUTO: 296 X10(3)/MCL (ref 130–400)
PLATELET # BLD EST: NORMAL 10*3/UL
PMV BLD AUTO: 9.6 FL (ref 9.4–12.4)
POLYCHROMASIA BLD QL SMEAR: 1
POTASSIUM SERPL-SCNC: 4.7 MMOL/L (ref 3.5–5.1)
PROT SERPL-MCNC: 6 GM/DL (ref 5.8–7.6)
PROT UR QL STRIP: ABNORMAL
PROT UR STRIP-MCNC: 18.7 MG/DL
PROT/CREAT UR-RTO: 295.9 MG/GM CR
PTH-INTACT SERPL-MCNC: 244.9 PG/ML (ref 8.7–77.1)
RBC # BLD AUTO: 5.92 X10(6)/MCL (ref 4.7–6.1)
RBC #/AREA URNS HPF: ABNORMAL /[HPF]
RBC MORPH BLD: ABNORMAL
SODIUM SERPL-SCNC: 141 MMOL/L (ref 136–145)
SP GR UR STRIP: 1.01 (ref 1–1.03)
SQUAMOUS EPITHELIAL, UA: ABNORMAL /HPF (ref 0–4)
TRIGL SERPL-MCNC: 60 MG/DL (ref 34–140)
URATE SERPL-MCNC: 5.7 MG/DL (ref 3.5–7.2)
UROBILINOGEN UR STRIP-ACNC: 0.2
VLDLC SERPL CALC-MCNC: 12 MG/DL
WBC # SPEC AUTO: 1.7 X10(3)/MCL (ref 4.5–11.5)
WBC #/AREA URNS HPF: ABNORMAL /HPF

## 2021-08-02 RX ORDER — ATOVAQUONE 750 MG/5ML
1500 SUSPENSION ORAL DAILY
Qty: 300 ML | Refills: 2 | Status: SHIPPED | OUTPATIENT
Start: 2021-08-02 | End: 2021-08-05 | Stop reason: SDUPTHER

## 2021-08-03 ENCOUNTER — TELEPHONE (OUTPATIENT)
Dept: TRANSPLANT | Facility: CLINIC | Age: 71
End: 2021-08-03

## 2021-08-03 ENCOUNTER — PATIENT MESSAGE (OUTPATIENT)
Dept: TRANSPLANT | Facility: CLINIC | Age: 71
End: 2021-08-03

## 2021-08-04 ENCOUNTER — INFUSION (OUTPATIENT)
Dept: INFUSION THERAPY | Facility: HOSPITAL | Age: 71
End: 2021-08-04
Attending: INTERNAL MEDICINE
Payer: MEDICARE

## 2021-08-04 VITALS
TEMPERATURE: 97 F | HEART RATE: 74 BPM | SYSTOLIC BLOOD PRESSURE: 133 MMHG | DIASTOLIC BLOOD PRESSURE: 90 MMHG | RESPIRATION RATE: 16 BRPM | OXYGEN SATURATION: 98 %

## 2021-08-04 DIAGNOSIS — D70.2 DRUG-INDUCED NEUTROPENIA: Primary | ICD-10-CM

## 2021-08-04 PROCEDURE — 96372 THER/PROPH/DIAG INJ SC/IM: CPT

## 2021-08-04 PROCEDURE — 63600175 PHARM REV CODE 636 W HCPCS: Mod: JG | Performed by: INTERNAL MEDICINE

## 2021-08-04 RX ADMIN — TBO-FILGRASTIM 480 MCG: 480 INJECTION, SOLUTION SUBCUTANEOUS at 02:08

## 2021-08-05 ENCOUNTER — TELEPHONE (OUTPATIENT)
Dept: PHARMACY | Facility: CLINIC | Age: 71
End: 2021-08-05

## 2021-08-05 ENCOUNTER — INFUSION (OUTPATIENT)
Dept: INFUSION THERAPY | Facility: HOSPITAL | Age: 71
End: 2021-08-05
Attending: INTERNAL MEDICINE
Payer: MEDICARE

## 2021-08-05 VITALS
TEMPERATURE: 98 F | SYSTOLIC BLOOD PRESSURE: 147 MMHG | DIASTOLIC BLOOD PRESSURE: 95 MMHG | OXYGEN SATURATION: 98 % | RESPIRATION RATE: 16 BRPM | HEART RATE: 97 BPM

## 2021-08-05 DIAGNOSIS — D70.2 DRUG-INDUCED NEUTROPENIA: Primary | ICD-10-CM

## 2021-08-05 DIAGNOSIS — Z94.0 KIDNEY REPLACED BY TRANSPLANT: ICD-10-CM

## 2021-08-05 PROCEDURE — 96372 THER/PROPH/DIAG INJ SC/IM: CPT

## 2021-08-05 PROCEDURE — 63600175 PHARM REV CODE 636 W HCPCS: Mod: JG | Performed by: INTERNAL MEDICINE

## 2021-08-05 RX ORDER — ATOVAQUONE 750 MG/5ML
1500 SUSPENSION ORAL DAILY
Qty: 300 ML | Refills: 2 | Status: SHIPPED | OUTPATIENT
Start: 2021-08-05 | End: 2021-11-03

## 2021-08-05 RX ADMIN — TBO-FILGRASTIM 480 MCG: 480 INJECTION, SOLUTION SUBCUTANEOUS at 01:08

## 2021-08-05 NOTE — TELEPHONE ENCOUNTER
----- Message from Harriett Hodgson sent at 8/5/2021  1:59 PM CDT -----  Regarding: coordinator  Pt would like to speak coordinator. Check on status of new rx      [phone]

## 2021-08-06 ENCOUNTER — PATIENT MESSAGE (OUTPATIENT)
Dept: HEPATOLOGY | Facility: CLINIC | Age: 71
End: 2021-08-06

## 2021-08-06 ENCOUNTER — HISTORICAL (OUTPATIENT)
Dept: ADMINISTRATIVE | Facility: HOSPITAL | Age: 71
End: 2021-08-06

## 2021-08-06 ENCOUNTER — TELEPHONE (OUTPATIENT)
Dept: HEPATOLOGY | Facility: CLINIC | Age: 71
End: 2021-08-06

## 2021-08-06 ENCOUNTER — SPECIALTY PHARMACY (OUTPATIENT)
Dept: PHARMACY | Facility: CLINIC | Age: 71
End: 2021-08-06

## 2021-08-06 LAB
ABS NEUT (OLG): 1.19 X10(3)/MCL (ref 2.1–9.2)
BASOPHILS NFR BLD MANUAL: 1 % (ref 0–2)
DACRYOCYTES BLD QL SMEAR: 1 /MCL (ref 0–1)
EOSINOPHIL NFR BLD MANUAL: 3 % (ref 0–8)
ERYTHROCYTE [DISTWIDTH] IN BLOOD BY AUTOMATED COUNT: 16.8 % (ref 11.5–17)
HCT VFR BLD AUTO: 46.9 % (ref 42–52)
HGB BLD-MCNC: 13.6 GM/DL (ref 14–18)
LYMPHOCYTES NFR BLD MANUAL: 33 % (ref 13–40)
MCH RBC QN AUTO: 24.6 PG (ref 27–31)
MCHC RBC AUTO-ENTMCNC: 29 GM/DL (ref 33–36)
MCV RBC AUTO: 85 FL (ref 80–94)
METAMYELOCYTES NFR BLD MANUAL: 10 %
MONOCYTES NFR BLD MANUAL: 23 % (ref 2–11)
MYELOCYTES NFR BLD MANUAL: 2 %
NEUTROPHILS NFR BLD MANUAL: 27 % (ref 47–80)
NRBC BLD MANUAL-RTO: 7 %
PLATELET # BLD AUTO: 234 X10(3)/MCL (ref 130–400)
PLATELET # BLD EST: NORMAL 10*3/UL
PMV BLD AUTO: 9.5 FL (ref 7.4–10.4)
POIKILOCYTOSIS BLD QL SMEAR: 1
RBC # BLD AUTO: 5.52 X10(6)/MCL (ref 4.7–6.1)
RBC MORPH BLD: ABNORMAL
WBC # SPEC AUTO: 4.2 X10(3)/MCL (ref 4.5–11.5)

## 2021-08-09 ENCOUNTER — TELEPHONE (OUTPATIENT)
Dept: TRANSPLANT | Facility: CLINIC | Age: 71
End: 2021-08-09

## 2021-08-09 ENCOUNTER — PATIENT MESSAGE (OUTPATIENT)
Dept: TRANSPLANT | Facility: CLINIC | Age: 71
End: 2021-08-09

## 2021-08-09 DIAGNOSIS — Z94.0 KIDNEY REPLACED BY TRANSPLANT: ICD-10-CM

## 2021-08-09 LAB
EXT ANC: 1134
EXT EOSINOPHIL%: 3
EXT HEMATOCRIT: 46.9
EXT HEMOGLOBIN: 13.6
EXT LYMPH%: 33
EXT MONOCYTES%: 23
EXT PLATELETS: 234
EXT SEGS%: 27
EXT WBC: 4.2

## 2021-08-09 RX ORDER — TACROLIMUS 1 MG/1
CAPSULE ORAL
Qty: 150 CAPSULE | Refills: 11 | Status: SHIPPED | OUTPATIENT
Start: 2021-08-09 | End: 2021-09-24 | Stop reason: DRUGHIGH

## 2021-08-09 NOTE — TELEPHONE ENCOUNTER
Notified patient of Dr. Yen's review of 8/2/21 lab results via telephone & [facility] message.     Hey Mr. Mazariegos,     Dr. Yen reviewed your 8/2/21 Prograf level. It was a little higher than it should be=14.2. She wants your to please decrease your Prograf dose to 3mg in AM & 2mg in PM, starting with your dose tonight.  Repeat your labs this week on Wed 8/11/21.     Thanks,     Tiara       ----- Message from Celeste Yen MD sent at 8/6/2021 12:49 PM CDT -----  CBC and CMV next week.  Tac to 3/2 if it is a true trough. Check tac level next week.

## 2021-08-11 ENCOUNTER — HISTORICAL (OUTPATIENT)
Dept: ADMINISTRATIVE | Facility: HOSPITAL | Age: 71
End: 2021-08-11

## 2021-08-11 LAB
ABS NEUT (OLG): 3.65 X10(3)/MCL (ref 2.1–9.2)
ACANTHOCYTES (OLG): 1 (ref 0–1)
ANISOCYTOSIS BLD QL SMEAR: 1
BASOPHILS NFR BLD MANUAL: 1 % (ref 0–2)
BURR CELLS BLD QL SMEAR: SLIGHT (ref 0–0)
EOSINOPHIL NFR BLD MANUAL: 2 % (ref 0–8)
ERYTHROCYTE [DISTWIDTH] IN BLOOD BY AUTOMATED COUNT: 17.5 % (ref 11.5–17)
HCT VFR BLD AUTO: 48 % (ref 42–52)
HGB BLD-MCNC: 13.8 GM/DL (ref 14–18)
LYMPHOCYTES NFR BLD MANUAL: 13 % (ref 13–40)
MACROCYTES BLD QL SMEAR: 1 /MCL
MCH RBC QN AUTO: 24 PG (ref 27–31)
MCHC RBC AUTO-ENTMCNC: 28.8 GM/DL (ref 33–36)
MCV RBC AUTO: 83.6 FL (ref 80–94)
MONOCYTES NFR BLD MANUAL: 8 % (ref 2–11)
MYELOCYTES NFR BLD MANUAL: 3 %
NEUTROPHILS NFR BLD MANUAL: 70 % (ref 47–80)
PLATELET # BLD AUTO: 226 X10(3)/MCL (ref 130–400)
PLATELET # BLD EST: NORMAL 10*3/UL
PMV BLD AUTO: 11.1 FL (ref 7.4–10.4)
POIKILOCYTOSIS BLD QL SMEAR: 1
RBC # BLD AUTO: 5.74 X10(6)/MCL (ref 4.7–6.1)
WBC # SPEC AUTO: 8.3 X10(3)/MCL (ref 4.5–11.5)

## 2021-08-14 ENCOUNTER — NURSE TRIAGE (OUTPATIENT)
Dept: ADMINISTRATIVE | Facility: CLINIC | Age: 71
End: 2021-08-14

## 2021-08-16 ENCOUNTER — TELEPHONE (OUTPATIENT)
Dept: TRANSPLANT | Facility: CLINIC | Age: 71
End: 2021-08-16

## 2021-08-16 ENCOUNTER — HISTORICAL (OUTPATIENT)
Dept: ADMINISTRATIVE | Facility: HOSPITAL | Age: 71
End: 2021-08-16

## 2021-08-16 LAB
ABS NEUT (OLG): 4.94 X10(3)/MCL (ref 2.1–9.2)
ALBUMIN SERPL-MCNC: 3.3 GM/DL (ref 3.4–4.8)
BASOPHILS # BLD AUTO: 0.1 X10(3)/MCL (ref 0–0.2)
BASOPHILS NFR BLD AUTO: 2 %
BUN SERPL-MCNC: 18.2 MG/DL (ref 8.4–25.7)
CALCIUM SERPL-MCNC: 9.2 MG/DL (ref 8.8–10)
CHLORIDE SERPL-SCNC: 104 MMOL/L (ref 98–107)
CO2 SERPL-SCNC: 26 MMOL/L (ref 23–31)
CREAT SERPL-MCNC: 1.49 MG/DL (ref 0.73–1.18)
CREAT/UREA NIT SERPL: 12
EOSINOPHIL # BLD AUTO: 0.1 X10(3)/MCL (ref 0–0.9)
EOSINOPHIL NFR BLD AUTO: 1 %
ERYTHROCYTE [DISTWIDTH] IN BLOOD BY AUTOMATED COUNT: 18.4 % (ref 11.5–17)
EXT ANC: ABNORMAL
EXT EOSINOPHIL%: 2 (ref 0–8)
EXT HEMATOCRIT: 48 (ref 42–52)
EXT HEMOGLOBIN: 13.8 (ref 14–18)
EXT LYMPH%: 13 (ref 13–40)
EXT MONOCYTES%: 8 (ref 2–11)
EXT PLATELETS: 226 (ref 130–400)
EXT SEGS%: 70 (ref 47–80)
EXT WBC: 8.3 (ref 4.5–11.5)
GLUCOSE SERPL-MCNC: 110 MG/DL (ref 82–115)
HCT VFR BLD AUTO: 46.8 % (ref 42–52)
HGB BLD-MCNC: 13.6 GM/DL (ref 14–18)
LYMPHOCYTES # BLD AUTO: 1.4 X10(3)/MCL (ref 0.6–4.6)
LYMPHOCYTES NFR BLD AUTO: 18 %
MAGNESIUM SERPL-MCNC: 1.5 MG/DL (ref 1.6–2.6)
MCH RBC QN AUTO: 24.6 PG (ref 27–31)
MCHC RBC AUTO-ENTMCNC: 29.1 GM/DL (ref 33–36)
MCV RBC AUTO: 84.6 FL (ref 80–94)
MONOCYTES # BLD AUTO: 0.8 X10(3)/MCL (ref 0.1–1.3)
MONOCYTES NFR BLD AUTO: 11 %
NEUTROPHILS # BLD AUTO: 4.94 X10(3)/MCL (ref 2.1–9.2)
NEUTROPHILS NFR BLD AUTO: 63 %
PHOSPHATE SERPL-MCNC: 3.1 MG/DL (ref 2.3–4.7)
PLATELET # BLD AUTO: 215 X10(3)/MCL (ref 130–400)
PMV BLD AUTO: 11.7 FL (ref 9.4–12.4)
POTASSIUM SERPL-SCNC: 4.9 MMOL/L (ref 3.5–5.1)
RBC # BLD AUTO: 5.53 X10(6)/MCL (ref 4.7–6.1)
SODIUM SERPL-SCNC: 141 MMOL/L (ref 136–145)
WBC # SPEC AUTO: 7.8 X10(3)/MCL (ref 4.5–11.5)

## 2021-08-19 ENCOUNTER — OFFICE VISIT (OUTPATIENT)
Dept: TRANSPLANT | Facility: CLINIC | Age: 71
End: 2021-08-19
Payer: MEDICARE

## 2021-08-19 ENCOUNTER — TELEPHONE (OUTPATIENT)
Dept: TRANSPLANT | Facility: CLINIC | Age: 71
End: 2021-08-19

## 2021-08-19 ENCOUNTER — DOCUMENTATION ONLY (OUTPATIENT)
Dept: TRANSPLANT | Facility: CLINIC | Age: 71
End: 2021-08-19

## 2021-08-19 ENCOUNTER — PATIENT MESSAGE (OUTPATIENT)
Dept: TRANSPLANT | Facility: CLINIC | Age: 71
End: 2021-08-19

## 2021-08-19 DIAGNOSIS — Z94.0 KIDNEY REPLACED BY TRANSPLANT: ICD-10-CM

## 2021-08-19 DIAGNOSIS — R80.9 PROTEINURIA, UNSPECIFIED TYPE: ICD-10-CM

## 2021-08-19 DIAGNOSIS — Z94.0 IMMUNOSUPPRESSIVE MANAGEMENT ENCOUNTER FOLLOWING KIDNEY TRANSPLANT: ICD-10-CM

## 2021-08-19 DIAGNOSIS — T86.10 COMPLICATION OF TRANSPLANTED KIDNEY, UNSPECIFIED COMPLICATION: Primary | ICD-10-CM

## 2021-08-19 DIAGNOSIS — Z94.0 KIDNEY REPLACED BY TRANSPLANT: Primary | ICD-10-CM

## 2021-08-19 DIAGNOSIS — Z79.899 IMMUNOSUPPRESSIVE MANAGEMENT ENCOUNTER FOLLOWING KIDNEY TRANSPLANT: ICD-10-CM

## 2021-08-19 LAB
EXT ALBUMIN: 3.3 (ref 3.4–4.8)
EXT ANC: ABNORMAL
EXT BK VIRUS DNA QN PCR: NORMAL
EXT BUN: 18.2 (ref 8.4–25.7)
EXT CALCIUM: 9.2 (ref 8.8–10)
EXT CHLORIDE: 104 (ref 98–107)
EXT CMV DNA QUANT. BY PCR: NORMAL
EXT CO2: 26 (ref 23–31)
EXT CREATININE: 1.49 MG/DL
EXT EOSINOPHIL%: 1
EXT GFR MDRD NON AF AMER: 50
EXT GLUCOSE: 110 (ref 82–115)
EXT HEMATOCRIT: 46.8 (ref 42–52)
EXT HEMOGLOBIN: 13.6 (ref 14–18)
EXT LYMPH%: 18
EXT MAGNESIUM: 1.5 (ref 1.6–2.6)
EXT MONOCYTES%: 11
EXT PHOSPHORUS: 3.1 (ref 2.3–4.7)
EXT PLATELETS: 215 (ref 130–400)
EXT POTASSIUM: 4.9 (ref 3.5–5.1)
EXT SEGS%: 63
EXT SODIUM: 141 MMOL/L (ref 136–145)
EXT TACROLIMUS LVL: 7.3
EXT WBC: 7.8 (ref 4.5–11.5)

## 2021-08-19 PROCEDURE — 99499 NO LOS: ICD-10-PCS | Mod: 95,,, | Performed by: INTERNAL MEDICINE

## 2021-08-19 PROCEDURE — 99499 UNLISTED E&M SERVICE: CPT | Mod: 95,,, | Performed by: INTERNAL MEDICINE

## 2021-08-24 ENCOUNTER — TELEPHONE (OUTPATIENT)
Dept: RADIOLOGY | Facility: HOSPITAL | Age: 71
End: 2021-08-24

## 2021-08-24 ENCOUNTER — TELEPHONE (OUTPATIENT)
Dept: TRANSPLANT | Facility: CLINIC | Age: 71
End: 2021-08-24

## 2021-08-24 NOTE — TELEPHONE ENCOUNTER
Coordinator returned patient's call. Patient called to inform coordinator he scheduled an appointment to see his general nephrologist 9/2/21. They are requesting Transplant records.     Coordinator contacted Dr. Ryan's office @ [phone] to obtain the fax # to send patient's medical records.  Records faxed to #[phone] as requested.     ----- Message from Harriett Hodgson sent at 8/24/2021  2:36 PM CDT -----  Regarding: coordinator  Contact: Ronal Iqbal would like to speak w coordinator regarding local nephrologist and records that are needed.    [phone] (O)

## 2021-08-25 ENCOUNTER — HOSPITAL ENCOUNTER (OUTPATIENT)
Dept: RADIOLOGY | Facility: HOSPITAL | Age: 71
Discharge: HOME | End: 2021-08-25
Attending: INTERNAL MEDICINE
Payer: MEDICARE

## 2021-08-25 DIAGNOSIS — T86.10 COMPLICATION OF TRANSPLANTED KIDNEY, UNSPECIFIED COMPLICATION: ICD-10-CM

## 2021-08-25 PROCEDURE — 76776 US EXAM K TRANSPL W/DOPPLER: CPT | Mod: 26,,, | Performed by: RADIOLOGY

## 2021-08-25 PROCEDURE — 76776 US EXAM K TRANSPL W/DOPPLER: CPT | Mod: TC

## 2021-08-25 PROCEDURE — 76776 US TRANSPLANT KIDNEY WITH DOPPLER: ICD-10-PCS | Mod: 26,,, | Performed by: RADIOLOGY

## 2021-08-27 ENCOUNTER — TELEPHONE (OUTPATIENT)
Dept: TRANSPLANT | Facility: CLINIC | Age: 71
End: 2021-08-27

## 2021-08-30 ENCOUNTER — NURSE TRIAGE (OUTPATIENT)
Dept: ADMINISTRATIVE | Facility: CLINIC | Age: 71
End: 2021-08-30

## 2021-08-30 ENCOUNTER — PATIENT MESSAGE (OUTPATIENT)
Dept: TRANSPLANT | Facility: CLINIC | Age: 71
End: 2021-08-30

## 2021-09-07 ENCOUNTER — HISTORICAL (OUTPATIENT)
Dept: ADMINISTRATIVE | Facility: HOSPITAL | Age: 71
End: 2021-09-07

## 2021-09-07 ENCOUNTER — TELEPHONE (OUTPATIENT)
Dept: TRANSPLANT | Facility: CLINIC | Age: 71
End: 2021-09-07

## 2021-09-07 LAB
ABS NEUT (OLG): 2.08 X10(3)/MCL (ref 2.1–9.2)
ALBUMIN SERPL-MCNC: 3 GM/DL (ref 3.4–4.8)
ANISOCYTOSIS BLD QL SMEAR: 1
BUN SERPL-MCNC: 17.9 MG/DL (ref 8.4–25.7)
BURR CELLS BLD QL SMEAR: 1 (ref 0–0)
CALCIUM SERPL-MCNC: 9.1 MG/DL (ref 8.8–10)
CHLORIDE SERPL-SCNC: 107 MMOL/L (ref 98–107)
CO2 SERPL-SCNC: 25 MMOL/L (ref 23–31)
CREAT SERPL-MCNC: 1.28 MG/DL (ref 0.73–1.18)
CREAT/UREA NIT SERPL: 14
EOSINOPHIL NFR BLD MANUAL: 2 % (ref 0–8)
ERYTHROCYTE [DISTWIDTH] IN BLOOD BY AUTOMATED COUNT: 18.5 % (ref 11.5–17)
GLUCOSE SERPL-MCNC: 110 MG/DL (ref 82–115)
HCT VFR BLD AUTO: 47.5 % (ref 42–52)
HGB BLD-MCNC: 13.6 GM/DL (ref 14–18)
LYMPHOCYTES NFR BLD MANUAL: 28 % (ref 13–40)
MAGNESIUM SERPL-MCNC: 1.6 MG/DL (ref 1.6–2.6)
MCH RBC QN AUTO: 23.2 PG (ref 27–31)
MCHC RBC AUTO-ENTMCNC: 28.6 GM/DL (ref 33–36)
MCV RBC AUTO: 81.2 FL (ref 80–94)
MONOCYTES NFR BLD MANUAL: 8 % (ref 2–11)
NEUTROPHILS NFR BLD MANUAL: 62 % (ref 47–80)
PHOSPHATE SERPL-MCNC: 2.7 MG/DL (ref 2.3–4.7)
PLATELET # BLD AUTO: 159 X10(3)/MCL (ref 130–400)
PLATELET # BLD EST: NORMAL 10*3/UL
PMV BLD AUTO: ABNORMAL FL (ref 7.4–10.4)
POIKILOCYTOSIS BLD QL SMEAR: 1
POTASSIUM SERPL-SCNC: 5 MMOL/L (ref 3.5–5.1)
RBC # BLD AUTO: 5.85 X10(6)/MCL (ref 4.7–6.1)
RBC MORPH BLD: ABNORMAL
SODIUM SERPL-SCNC: 140 MMOL/L (ref 136–145)
WBC # SPEC AUTO: 4.7 X10(3)/MCL (ref 4.5–11.5)

## 2021-09-08 ENCOUNTER — DOCUMENTATION ONLY (OUTPATIENT)
Dept: TRANSPLANT | Facility: CLINIC | Age: 71
End: 2021-09-08

## 2021-09-08 LAB
EXT ALBUMIN: 3 (ref 3.4–4.8)
EXT ANC: ABNORMAL
EXT BUN: 17.9 (ref 8.4–25.7)
EXT CALCIUM: 9.1 (ref 8.8–10)
EXT CHLORIDE: 107 (ref 98–107)
EXT CMV DNA QUANT. BY PCR: 702 IU/ML
EXT CO2: 25 (ref 23–31)
EXT CREATININE: 1.28 MG/DL
EXT EOSINOPHIL%: 2 (ref 0–8)
EXT GFR MDRD NON AF AMER: 59
EXT GLUCOSE: 110 (ref 82–115)
EXT HEMATOCRIT: 47.5 (ref 42–52)
EXT HEMOGLOBIN: 13.6 (ref 14–18)
EXT LYMPH%: 28 (ref 13–40)
EXT MAGNESIUM: 1.6 (ref 1.6–2.6)
EXT MONOCYTES%: 8 (ref 2–11)
EXT PHOSPHORUS: 2.7 (ref 2.3–4.7)
EXT PLATELETS: 159 (ref 130–400)
EXT POTASSIUM: 5 (ref 3.5–5.1)
EXT SEGS%: 62 (ref 47–80)
EXT SODIUM: 140 MMOL/L (ref 136–145)
EXT TACROLIMUS LVL: 7.3
EXT WBC: 4.7 (ref 4.5–11.5)

## 2021-09-10 ENCOUNTER — TELEPHONE (OUTPATIENT)
Dept: TRANSPLANT | Facility: CLINIC | Age: 71
End: 2021-09-10

## 2021-09-13 ENCOUNTER — TELEPHONE (OUTPATIENT)
Dept: TRANSPLANT | Facility: CLINIC | Age: 71
End: 2021-09-13

## 2021-09-14 ENCOUNTER — HISTORICAL (OUTPATIENT)
Dept: ADMINISTRATIVE | Facility: HOSPITAL | Age: 71
End: 2021-09-14

## 2021-09-14 ENCOUNTER — PATIENT MESSAGE (OUTPATIENT)
Dept: TRANSPLANT | Facility: CLINIC | Age: 71
End: 2021-09-14

## 2021-09-14 LAB
ABS NEUT (OLG): 2.16 X10(3)/MCL (ref 2.1–9.2)
ALBUMIN SERPL-MCNC: 3.2 GM/DL (ref 3.4–4.8)
BUN SERPL-MCNC: 20.5 MG/DL (ref 8.4–25.7)
BURR CELLS BLD QL SMEAR: 1 (ref 0–0)
CALCIUM SERPL-MCNC: 9.4 MG/DL (ref 8.8–10)
CHLORIDE SERPL-SCNC: 107 MMOL/L (ref 98–107)
CO2 SERPL-SCNC: 25 MMOL/L (ref 23–31)
CREAT SERPL-MCNC: 1.36 MG/DL (ref 0.73–1.18)
CREAT/UREA NIT SERPL: 15
EOSINOPHIL NFR BLD MANUAL: 1 % (ref 0–8)
ERYTHROCYTE [DISTWIDTH] IN BLOOD BY AUTOMATED COUNT: 20.3 % (ref 11.5–17)
GLUCOSE SERPL-MCNC: 112 MG/DL (ref 82–115)
HCT VFR BLD AUTO: 46.4 % (ref 42–52)
HGB BLD-MCNC: 13.5 GM/DL (ref 14–18)
LYMPHOCYTES NFR BLD MANUAL: 24 % (ref 13–40)
MAGNESIUM SERPL-MCNC: 1.9 MG/DL (ref 1.6–2.6)
MCH RBC QN AUTO: 23.4 PG (ref 27–31)
MCHC RBC AUTO-ENTMCNC: 29.1 GM/DL (ref 33–36)
MCV RBC AUTO: 80.3 FL (ref 80–94)
MONOCYTES NFR BLD MANUAL: 13 % (ref 2–11)
NEUTROPHILS NFR BLD MANUAL: 62 % (ref 47–80)
PHOSPHATE SERPL-MCNC: 2.6 MG/DL (ref 2.3–4.7)
PLATELET # BLD AUTO: 258 X10(3)/MCL (ref 130–400)
PLATELET # BLD EST: NORMAL 10*3/UL
PMV BLD AUTO: 10.7 FL (ref 7.4–10.4)
POIKILOCYTOSIS BLD QL SMEAR: 1
POTASSIUM SERPL-SCNC: 4.7 MMOL/L (ref 3.5–5.1)
RBC # BLD AUTO: 5.78 X10(6)/MCL (ref 4.7–6.1)
RBC MORPH BLD: ABNORMAL
SODIUM SERPL-SCNC: 140 MMOL/L (ref 136–145)
WBC # SPEC AUTO: 5.7 X10(3)/MCL (ref 4.5–11.5)

## 2021-09-15 ENCOUNTER — DOCUMENTATION ONLY (OUTPATIENT)
Dept: TRANSPLANT | Facility: CLINIC | Age: 71
End: 2021-09-15

## 2021-09-15 ENCOUNTER — TELEPHONE (OUTPATIENT)
Dept: HEPATOLOGY | Facility: CLINIC | Age: 71
End: 2021-09-15

## 2021-09-15 ENCOUNTER — PATIENT MESSAGE (OUTPATIENT)
Dept: HEPATOLOGY | Facility: CLINIC | Age: 71
End: 2021-09-15

## 2021-09-15 LAB
EXT ALBUMIN: 3.2 (ref 3.4–4.8)
EXT ANC: ABNORMAL
EXT BUN: 20.5 (ref 8.4–25.7)
EXT CALCIUM: 9.4 (ref 8.8–10)
EXT CHLORIDE: 107 (ref 98–107)
EXT CMV DNA QUANT. BY PCR: 4090 IU/ML
EXT CO2: 25 (ref 23–31)
EXT CREATININE: 1.36 MG/DL
EXT EOSINOPHIL%: 1 (ref 0–8)
EXT GFR MDRD NON AF AMER: 55
EXT GLUCOSE: 112 (ref 82–115)
EXT HEMATOCRIT: 46.4 (ref 42–52)
EXT HEMOGLOBIN: 13.5 (ref 14–18)
EXT LYMPH%: 24 (ref 13–40)
EXT MAGNESIUM: 1.9 (ref 1.6–2.6)
EXT MONOCYTES%: 13 (ref 2–11)
EXT PHOSPHORUS: 2.6 (ref 2.3–4.7)
EXT PLATELETS: 258 (ref 130–400)
EXT POTASSIUM: 4.7 (ref 3.5–5.1)
EXT SEGS%: 62 (ref 47–80)
EXT SODIUM: 140 MMOL/L (ref 136–145)
EXT TACROLIMUS LVL: 6.6
EXT WBC: 5.7

## 2021-09-15 RX ORDER — IRON,CARBONYL/ASCORBIC ACID 100-250 MG
1 TABLET ORAL DAILY
Status: ON HOLD | COMMUNITY
End: 2022-02-23 | Stop reason: HOSPADM

## 2021-09-15 RX ORDER — CARVEDILOL 25 MG/1
25 TABLET ORAL 2 TIMES DAILY
COMMUNITY
End: 2021-12-28

## 2021-09-16 ENCOUNTER — TELEPHONE (OUTPATIENT)
Dept: TRANSPLANT | Facility: CLINIC | Age: 71
End: 2021-09-16

## 2021-09-16 DIAGNOSIS — T86.10 COMPLICATION OF TRANSPLANTED KIDNEY, UNSPECIFIED COMPLICATION: Primary | ICD-10-CM

## 2021-09-20 ENCOUNTER — DOCUMENTATION ONLY (OUTPATIENT)
Dept: TRANSPLANT | Facility: CLINIC | Age: 71
End: 2021-09-20

## 2021-09-20 ENCOUNTER — HISTORICAL (OUTPATIENT)
Dept: ADMINISTRATIVE | Facility: HOSPITAL | Age: 71
End: 2021-09-20

## 2021-09-20 DIAGNOSIS — B25.9 CYTOMEGALOVIRUS INFECTION, UNSPECIFIED CYTOMEGALOVIRAL INFECTION TYPE: Primary | ICD-10-CM

## 2021-09-20 LAB
ABS NEUT (OLG): 1.32 X10(3)/MCL (ref 2.1–9.2)
ALBUMIN SERPL-MCNC: 2.9 GM/DL (ref 3.4–4.8)
BASOPHILS NFR BLD MANUAL: 1 % (ref 0–2)
BUN SERPL-MCNC: 18.6 MG/DL (ref 8.4–25.7)
CALCIUM SERPL-MCNC: 9.1 MG/DL (ref 8.8–10)
CHLORIDE SERPL-SCNC: 106 MMOL/L (ref 98–107)
CO2 SERPL-SCNC: 24 MMOL/L (ref 23–31)
CREAT SERPL-MCNC: 1.25 MG/DL (ref 0.73–1.18)
CREAT/UREA NIT SERPL: 15
EOSINOPHIL NFR BLD MANUAL: 2 % (ref 0–8)
ERYTHROCYTE [DISTWIDTH] IN BLOOD BY AUTOMATED COUNT: 21.8 % (ref 11.5–17)
EXT ALBUMIN: 2.9
EXT ANC: NORMAL
EXT BUN: 18.6
EXT CALCIUM: 9.1
EXT CHLORIDE: 106
EXT CMV DNA QUANT. BY PCR: NORMAL
EXT CO2: 24
EXT CREATININE: 1.25 MG/DL
EXT EOSINOPHIL%: 2
EXT GFR MDRD NON AF AMER: >60
EXT GLUCOSE: 109
EXT HEMATOCRIT: 49.6
EXT HEMOGLOBIN: 14.2
EXT LYMPH%: 30
EXT MAGNESIUM: 1.9
EXT MONOCYTES%: 12
EXT PHOSPHORUS: 2.7
EXT PLATELETS: 175
EXT POTASSIUM: 4.6
EXT SEGS%: 54
EXT SODIUM: 139 MMOL/L
EXT TACROLIMUS LVL: 11.8
EXT WBC: 6 (ref 4.5–?)
GLUCOSE SERPL-MCNC: 109 MG/DL (ref 82–115)
HCT VFR BLD AUTO: 49.6 % (ref 42–52)
HGB BLD-MCNC: 14.2 GM/DL (ref 14–18)
HYPOCHROMIA BLD QL SMEAR: 1
LYMPHOCYTES NFR BLD MANUAL: 30 % (ref 13–40)
MAGNESIUM SERPL-MCNC: 1.9 MG/DL (ref 1.6–2.6)
MCH RBC QN AUTO: 23.7 PG (ref 27–31)
MCHC RBC AUTO-ENTMCNC: 28.6 GM/DL (ref 33–36)
MCV RBC AUTO: 82.8 FL (ref 80–94)
MONOCYTES NFR BLD MANUAL: 12 % (ref 2–11)
NEUTROPHILS NFR BLD MANUAL: 54 % (ref 47–80)
PHOSPHATE SERPL-MCNC: 2.7 MG/DL (ref 2.3–4.7)
PLATELET # BLD AUTO: 175 X10(3)/MCL (ref 130–400)
PLATELET # BLD EST: NORMAL 10*3/UL
PMV BLD AUTO: ABNORMAL FL (ref 7.4–10.4)
POTASSIUM SERPL-SCNC: 4.6 MMOL/L (ref 3.5–5.1)
RBC # BLD AUTO: 5.99 X10(6)/MCL (ref 4.7–6.1)
RBC MORPH BLD: ABNORMAL
SODIUM SERPL-SCNC: 139 MMOL/L (ref 136–145)
WBC # SPEC AUTO: 6 X10(3)/MCL (ref 4.5–11.5)

## 2021-09-20 RX ORDER — VALGANCICLOVIR 450 MG/1
900 TABLET, FILM COATED ORAL 2 TIMES DAILY
Qty: 120 TABLET | Refills: 3 | Status: SHIPPED | OUTPATIENT
Start: 2021-09-20 | End: 2021-12-06

## 2021-09-20 NOTE — TELEPHONE ENCOUNTER
Coordinator returned patient's call. Notified patient of Dr. Yen's review of 9/14/21 lab results. Instructed patient to start Valcyte 900mg twice daily; take 1st dose now.Patient states he has enough Valcyte to take until new prescription is sent to him. Inquired if patient experiencing any symptoms/feeling bad. Patient reports he noticed he's been fatique, denies fever, diarrhea or joint pain. Instructed patient to have labs (CMV, pcr, cylex & CBC) drawn at an [facility] facility this week. Patient reports he will have labs drawn this Thurs 9/23/21. Patient verbalized understanding to all instructions given.     ---- Message from Harriett Hodgson sent at 9/20/2021 11:47 AM CDT -----  Regarding: coordinator  Pt would like to speak with coordinator regarding some health issues that he is experiencing.    [phone]

## 2021-09-23 ENCOUNTER — LAB VISIT (OUTPATIENT)
Dept: LAB | Facility: HOSPITAL | Age: 71
End: 2021-09-23
Attending: INTERNAL MEDICINE
Payer: MEDICARE

## 2021-09-23 DIAGNOSIS — T86.10 COMPLICATION OF TRANSPLANTED KIDNEY, UNSPECIFIED COMPLICATION: ICD-10-CM

## 2021-09-23 DIAGNOSIS — Z94.0 KIDNEY REPLACED BY TRANSPLANT: ICD-10-CM

## 2021-09-23 LAB
ANISOCYTOSIS BLD QL SMEAR: SLIGHT
BASOPHILS # BLD AUTO: 0.02 K/UL (ref 0–0.2)
BASOPHILS NFR BLD: 0.3 % (ref 0–1.9)
DIFFERENTIAL METHOD: ABNORMAL
EOSINOPHIL # BLD AUTO: 0.1 K/UL (ref 0–0.5)
EOSINOPHIL NFR BLD: 1.9 % (ref 0–8)
ERYTHROCYTE [DISTWIDTH] IN BLOOD BY AUTOMATED COUNT: 22.5 % (ref 11.5–14.5)
HCT VFR BLD AUTO: 49.9 % (ref 40–54)
HGB BLD-MCNC: 14.4 G/DL (ref 14–18)
IMM GRANULOCYTES # BLD AUTO: 0.01 K/UL (ref 0–0.04)
IMM GRANULOCYTES NFR BLD AUTO: 0.2 % (ref 0–0.5)
LYMPHOCYTES # BLD AUTO: 3.7 K/UL (ref 1–4.8)
LYMPHOCYTES NFR BLD: 62.6 % (ref 18–48)
MCH RBC QN AUTO: 23.8 PG (ref 27–31)
MCHC RBC AUTO-ENTMCNC: 28.9 G/DL (ref 32–36)
MCV RBC AUTO: 82 FL (ref 82–98)
MONOCYTES # BLD AUTO: 0.4 K/UL (ref 0.3–1)
MONOCYTES NFR BLD: 6.2 % (ref 4–15)
NEUTROPHILS # BLD AUTO: 1.7 K/UL (ref 1.8–7.7)
NEUTROPHILS NFR BLD: 28.8 % (ref 38–73)
NRBC BLD-RTO: 0 /100 WBC
OVALOCYTES BLD QL SMEAR: ABNORMAL
PLATELET # BLD AUTO: 187 K/UL (ref 150–450)
PLATELET BLD QL SMEAR: ABNORMAL
PMV BLD AUTO: 9.6 FL (ref 9.2–12.9)
POIKILOCYTOSIS BLD QL SMEAR: SLIGHT
RBC # BLD AUTO: 6.06 M/UL (ref 4.6–6.2)
WBC # BLD AUTO: 5.85 K/UL (ref 3.9–12.7)

## 2021-09-23 PROCEDURE — 85025 COMPLETE CBC W/AUTO DIFF WBC: CPT | Performed by: INTERNAL MEDICINE

## 2021-09-23 PROCEDURE — 86352 CELL FUNCTION ASSAY W/STIM: CPT | Performed by: INTERNAL MEDICINE

## 2021-09-23 PROCEDURE — 36415 COLL VENOUS BLD VENIPUNCTURE: CPT | Performed by: INTERNAL MEDICINE

## 2021-09-24 ENCOUNTER — PATIENT MESSAGE (OUTPATIENT)
Dept: TRANSPLANT | Facility: CLINIC | Age: 71
End: 2021-09-24

## 2021-09-24 DIAGNOSIS — Z94.0 KIDNEY REPLACED BY TRANSPLANT: ICD-10-CM

## 2021-09-25 LAB — CMV DNA SERPL NAA+PROBE-ACNC: ABNORMAL IU/ML

## 2021-09-27 ENCOUNTER — TELEPHONE (OUTPATIENT)
Dept: TRANSPLANT | Facility: CLINIC | Age: 71
End: 2021-09-27

## 2021-09-27 LAB — IMMUNKNOW (STIMULATED): 126 NG/ML (ref 226–524)

## 2021-09-27 RX ORDER — TACROLIMUS 1 MG/1
2 CAPSULE ORAL EVERY 12 HOURS
Qty: 120 CAPSULE | Refills: 11 | Status: SHIPPED | OUTPATIENT
Start: 2021-09-27 | End: 2021-10-06 | Stop reason: DRUGHIGH

## 2021-09-28 ENCOUNTER — PATIENT MESSAGE (OUTPATIENT)
Dept: TRANSPLANT | Facility: CLINIC | Age: 71
End: 2021-09-28

## 2021-09-29 ENCOUNTER — TELEPHONE (OUTPATIENT)
Dept: TRANSPLANT | Facility: CLINIC | Age: 71
End: 2021-09-29

## 2021-09-29 DIAGNOSIS — T86.10 COMPLICATION OF TRANSPLANTED KIDNEY, UNSPECIFIED COMPLICATION: Primary | ICD-10-CM

## 2021-09-30 ENCOUNTER — DOCUMENTATION ONLY (OUTPATIENT)
Dept: TRANSPLANT | Facility: CLINIC | Age: 71
End: 2021-09-30

## 2021-09-30 ENCOUNTER — TELEPHONE (OUTPATIENT)
Dept: TRANSPLANT | Facility: CLINIC | Age: 71
End: 2021-09-30

## 2021-09-30 ENCOUNTER — HISTORICAL (OUTPATIENT)
Dept: ADMINISTRATIVE | Facility: HOSPITAL | Age: 71
End: 2021-09-30

## 2021-09-30 DIAGNOSIS — R06.02 SOB (SHORTNESS OF BREATH): Primary | ICD-10-CM

## 2021-09-30 LAB
ABS NEUT (OLG): 1.5 X10(3)/MCL (ref 2.1–9.2)
ALBUMIN SERPL-MCNC: 2.9 GM/DL (ref 3.4–4.8)
ANISOCYTOSIS BLD QL SMEAR: 1
BASOPHILS NFR BLD MANUAL: 2 % (ref 0–2)
BUN SERPL-MCNC: 20 MG/DL (ref 8.4–25.7)
BURR CELLS BLD QL SMEAR: 1 (ref 0–0)
CALCIUM SERPL-MCNC: 9.7 MG/DL (ref 8.8–10)
CHLORIDE SERPL-SCNC: 106 MMOL/L (ref 98–107)
CO2 SERPL-SCNC: 24 MMOL/L (ref 23–31)
CREAT SERPL-MCNC: 1.38 MG/DL (ref 0.73–1.18)
CREAT UR-MCNC: 130.3 MG/DL (ref 58–161)
CREAT/UREA NIT SERPL: 14
EOSINOPHIL NFR BLD MANUAL: 2 % (ref 0–8)
ERYTHROCYTE [DISTWIDTH] IN BLOOD BY AUTOMATED COUNT: 22.8 % (ref 11.5–17)
EXT ALBUMIN: 2.9 (ref 3.4–4.8)
EXT ANC: ABNORMAL
EXT BK VIRUS DNA QN PCR: ABNORMAL
EXT BUN: 20 (ref 8.4–25.7)
EXT CALCIUM: 9.7 (ref 8.8–10)
EXT CHLORIDE: 106 (ref 98–107)
EXT CMV DNA QUANT. BY PCR: 5420 IU/ML
EXT CO2: 24 (ref 23–31)
EXT CREATININE: 1.38 MG/DL
EXT EOSINOPHIL%: 2 (ref 0–8)
EXT GFR MDRD NON AF AMER: 54
EXT GLUCOSE: 115 (ref 82–115)
EXT HEMATOCRIT: 47.2 (ref 42–52)
EXT HEMOGLOBIN: 13.8 (ref 14–18)
EXT LYMPH%: 24 (ref 13–40)
EXT MAGNESIUM: 1.5 (ref 1.6–2.6)
EXT MONOCYTES%: 1 (ref 2–11)
EXT PHOSPHORUS: 2.8 (ref 2.3–4.7)
EXT PLATELETS: 230 (ref 130–400)
EXT POTASSIUM: 4.2 (ref 3.5–5.1)
EXT PROT/CREAT RATIO UR: 0.2
EXT SEGS%: 70 (ref 47–80)
EXT SODIUM: 139 MMOL/L (ref 136–145)
EXT WBC: 3.2 (ref 4.5–11.5)
GLUCOSE SERPL-MCNC: 115 MG/DL (ref 82–115)
HCT VFR BLD AUTO: 47.2 % (ref 42–52)
HGB BLD-MCNC: 13.8 GM/DL (ref 14–18)
LYMPHOCYTES NFR BLD MANUAL: 24 % (ref 13–40)
MAGNESIUM SERPL-MCNC: 1.5 MG/DL (ref 1.6–2.6)
MCH RBC QN AUTO: 24.2 PG (ref 27–31)
MCHC RBC AUTO-ENTMCNC: 29.2 GM/DL (ref 33–36)
MCV RBC AUTO: 82.7 FL (ref 80–94)
MONOCYTES NFR BLD MANUAL: 1 % (ref 2–11)
NEUTROPHILS NFR BLD MANUAL: 70 % (ref 47–80)
PHOSPHATE SERPL-MCNC: 2.8 MG/DL (ref 2.3–4.7)
PLATELET # BLD AUTO: 230 X10(3)/MCL (ref 130–400)
PLATELET # BLD EST: NORMAL 10*3/UL
PMV BLD AUTO: 10.1 FL (ref 7.4–10.4)
POIKILOCYTOSIS BLD QL SMEAR: 1
POTASSIUM SERPL-SCNC: 4.2 MMOL/L (ref 3.5–5.1)
PROT UR STRIP-MCNC: 25.9 MG/DL
PROT/CREAT UR-RTO: 198.8 MG/GM CR
RBC # BLD AUTO: 5.71 X10(6)/MCL (ref 4.7–6.1)
RBC MORPH BLD: ABNORMAL
SODIUM SERPL-SCNC: 139 MMOL/L (ref 136–145)
WBC # SPEC AUTO: 3.2 X10(3)/MCL (ref 4.5–11.5)

## 2021-10-01 ENCOUNTER — HOSPITAL ENCOUNTER (OUTPATIENT)
Dept: CARDIOLOGY | Facility: HOSPITAL | Age: 71
Discharge: HOME | End: 2021-10-01
Attending: INTERNAL MEDICINE
Payer: MEDICARE

## 2021-10-01 ENCOUNTER — OFFICE VISIT (OUTPATIENT)
Dept: TRANSPLANT | Facility: CLINIC | Age: 71
End: 2021-10-01
Payer: MEDICARE

## 2021-10-01 ENCOUNTER — HOSPITAL ENCOUNTER (OUTPATIENT)
Dept: RADIOLOGY | Facility: HOSPITAL | Age: 71
Discharge: HOME | End: 2021-10-01
Attending: INTERNAL MEDICINE
Payer: MEDICARE

## 2021-10-01 VITALS
BODY MASS INDEX: 26.51 KG/M2 | OXYGEN SATURATION: 98 % | HEART RATE: 100 BPM | TEMPERATURE: 97 F | SYSTOLIC BLOOD PRESSURE: 132 MMHG | HEIGHT: 74 IN | DIASTOLIC BLOOD PRESSURE: 89 MMHG | WEIGHT: 206.56 LBS | RESPIRATION RATE: 16 BRPM

## 2021-10-01 VITALS
HEART RATE: 97 BPM | HEIGHT: 74 IN | WEIGHT: 183.88 LBS | DIASTOLIC BLOOD PRESSURE: 84 MMHG | SYSTOLIC BLOOD PRESSURE: 136 MMHG | BODY MASS INDEX: 23.6 KG/M2

## 2021-10-01 DIAGNOSIS — I15.8 OTHER SECONDARY HYPERTENSION: ICD-10-CM

## 2021-10-01 DIAGNOSIS — Z79.60 LONG-TERM USE OF IMMUNOSUPPRESSANT MEDICATION: ICD-10-CM

## 2021-10-01 DIAGNOSIS — Z94.0 S/P KIDNEY TRANSPLANT: ICD-10-CM

## 2021-10-01 DIAGNOSIS — R60.1 ANASARCA: ICD-10-CM

## 2021-10-01 DIAGNOSIS — R06.02 SOB (SHORTNESS OF BREATH): ICD-10-CM

## 2021-10-01 DIAGNOSIS — R16.0 HEPATOMEGALY: Primary | ICD-10-CM

## 2021-10-01 DIAGNOSIS — T86.10 COMPLICATION OF TRANSPLANTED KIDNEY, UNSPECIFIED COMPLICATION: ICD-10-CM

## 2021-10-01 DIAGNOSIS — Z91.89 AT RISK FOR OPPORTUNISTIC INFECTIONS: ICD-10-CM

## 2021-10-01 DIAGNOSIS — Q61.3 POLYCYSTIC KIDNEY DISEASE: ICD-10-CM

## 2021-10-01 DIAGNOSIS — I50.22 CHRONIC SYSTOLIC HEART FAILURE: ICD-10-CM

## 2021-10-01 LAB
ASCENDING AORTA: 3.25 CM
AV INDEX (PROSTH): 0.5
AV MEAN GRADIENT: 5 MMHG
AV PEAK GRADIENT: 8 MMHG
AV VALVE AREA: 1.73 CM2
AV VELOCITY RATIO: 0.51
BSA FOR ECHO PROCEDURE: 2.09 M2
CV ECHO LV RWT: 0.55 CM
DOP CALC AO PEAK VEL: 1.38 M/S
DOP CALC AO VTI: 23.74 CM
DOP CALC LVOT AREA: 3.4 CM2
DOP CALC LVOT DIAMETER: 2.09 CM
DOP CALC LVOT PEAK VEL: 0.71 M/S
DOP CALC LVOT STROKE VOLUME: 41.01 CM3
DOP CALCLVOT PEAK VEL VTI: 11.96 CM
E WAVE DECELERATION TIME: 147.85 MSEC
E/A RATIO: 3.24
E/E' RATIO: 13.43 M/S
ECHO LV POSTERIOR WALL: 1.32 CM (ref 0.6–1.1)
EJECTION FRACTION: 45 %
FRACTIONAL SHORTENING: 28 % (ref 28–44)
INTERVENTRICULAR SEPTUM: 1.28 CM (ref 0.6–1.1)
IVRT: 91.34 MSEC
LA MAJOR: 7.25 CM
LA MINOR: 8.13 CM
LA WIDTH: 4.29 CM
LEFT ATRIUM SIZE: 5.68 CM
LEFT ATRIUM VOLUME INDEX: 75.6 ML/M2
LEFT ATRIUM VOLUME: 158.75 CM3
LEFT INTERNAL DIMENSION IN SYSTOLE: 3.44 CM (ref 2.1–4)
LEFT VENTRICLE DIASTOLIC VOLUME INDEX: 50.81 ML/M2
LEFT VENTRICLE DIASTOLIC VOLUME: 106.7 ML
LEFT VENTRICLE MASS INDEX: 116 G/M2
LEFT VENTRICLE SYSTOLIC VOLUME INDEX: 23.3 ML/M2
LEFT VENTRICLE SYSTOLIC VOLUME: 48.86 ML
LEFT VENTRICULAR INTERNAL DIMENSION IN DIASTOLE: 4.78 CM (ref 3.5–6)
LEFT VENTRICULAR MASS: 244.15 G
LV LATERAL E/E' RATIO: 10.44 M/S
LV SEPTAL E/E' RATIO: 18.8 M/S
MV PEAK A VEL: 0.29 M/S
MV PEAK E VEL: 0.94 M/S
MV STENOSIS PRESSURE HALF TIME: 42.88 MS
MV VALVE AREA P 1/2 METHOD: 5.13 CM2
PISA MRMAX VEL: 0.06 M/S
PISA TR MAX VEL: 3.31 M/S
QEF: 45 %
RA MAJOR: 7.01 CM
RA PRESSURE: 8 MMHG
RA WIDTH: 4.83 CM
RIGHT VENTRICULAR END-DIASTOLIC DIMENSION: 3.65 CM
SINUS: 4.06 CM
STJ: 2.95 CM
TDI LATERAL: 0.09 M/S
TDI SEPTAL: 0.05 M/S
TDI: 0.07 M/S
TR MAX PG: 44 MMHG
TRICUSPID ANNULAR PLANE SYSTOLIC EXCURSION: 1.44 CM
TV REST PULMONARY ARTERY PRESSURE: 52 MMHG

## 2021-10-01 PROCEDURE — 71046 X-RAY EXAM CHEST 2 VIEWS: CPT | Mod: 26,,, | Performed by: RADIOLOGY

## 2021-10-01 PROCEDURE — 71046 XR CHEST PA AND LATERAL: ICD-10-PCS | Mod: 26,,, | Performed by: RADIOLOGY

## 2021-10-01 PROCEDURE — 99215 OFFICE O/P EST HI 40 MIN: CPT | Mod: PBBFAC,25 | Performed by: INTERNAL MEDICINE

## 2021-10-01 PROCEDURE — 76776 US TRANSPLANT KIDNEY WITH DOPPLER: ICD-10-PCS | Mod: 26,,, | Performed by: RADIOLOGY

## 2021-10-01 PROCEDURE — 93356 ECHO (CUPID ONLY): ICD-10-PCS | Mod: ,,, | Performed by: INTERNAL MEDICINE

## 2021-10-01 PROCEDURE — 99999 PR PBB SHADOW E&M-EST. PATIENT-LVL V: CPT | Mod: PBBFAC,,, | Performed by: INTERNAL MEDICINE

## 2021-10-01 PROCEDURE — 93356 MYOCRD STRAIN IMG SPCKL TRCK: CPT | Mod: ,,, | Performed by: INTERNAL MEDICINE

## 2021-10-01 PROCEDURE — 99215 PR OFFICE/OUTPT VISIT, EST, LEVL V, 40-54 MIN: ICD-10-PCS | Mod: S$PBB,,, | Performed by: INTERNAL MEDICINE

## 2021-10-01 PROCEDURE — 93306 TTE W/DOPPLER COMPLETE: CPT

## 2021-10-01 PROCEDURE — 76776 US EXAM K TRANSPL W/DOPPLER: CPT | Mod: TC

## 2021-10-01 PROCEDURE — 76776 US EXAM K TRANSPL W/DOPPLER: CPT | Mod: 26,,, | Performed by: RADIOLOGY

## 2021-10-01 PROCEDURE — 99999 PR PBB SHADOW E&M-EST. PATIENT-LVL V: ICD-10-PCS | Mod: PBBFAC,,, | Performed by: INTERNAL MEDICINE

## 2021-10-01 PROCEDURE — 99215 OFFICE O/P EST HI 40 MIN: CPT | Mod: S$PBB,,, | Performed by: INTERNAL MEDICINE

## 2021-10-01 PROCEDURE — 93306 ECHO (CUPID ONLY): ICD-10-PCS | Mod: 26,,, | Performed by: INTERNAL MEDICINE

## 2021-10-01 PROCEDURE — 93306 TTE W/DOPPLER COMPLETE: CPT | Mod: 26,,, | Performed by: INTERNAL MEDICINE

## 2021-10-01 PROCEDURE — 71046 X-RAY EXAM CHEST 2 VIEWS: CPT | Mod: TC

## 2021-10-01 RX ORDER — DOXAZOSIN 2 MG/1
2 TABLET ORAL NIGHTLY
Status: ON HOLD | COMMUNITY
Start: 2021-09-14 | End: 2022-02-23 | Stop reason: HOSPADM

## 2021-10-02 LAB — FINAL CULTURE: NORMAL

## 2021-10-04 ENCOUNTER — HISTORICAL (OUTPATIENT)
Dept: ADMINISTRATIVE | Facility: HOSPITAL | Age: 71
End: 2021-10-04

## 2021-10-04 LAB
ABS NEUT (OLG): 2.28 X10(3)/MCL (ref 2.1–9.2)
ALBUMIN SERPL-MCNC: 3.1 GM/DL (ref 3.4–4.8)
BASOPHILS # BLD AUTO: 0 X10(3)/MCL (ref 0–0.2)
BASOPHILS NFR BLD AUTO: 0 %
BUN SERPL-MCNC: 20.1 MG/DL (ref 8.4–25.7)
CALCIUM SERPL-MCNC: 9 MG/DL (ref 8.8–10)
CHLORIDE SERPL-SCNC: 103 MMOL/L (ref 98–107)
CO2 SERPL-SCNC: 25 MMOL/L (ref 23–31)
CREAT SERPL-MCNC: 1.23 MG/DL (ref 0.73–1.18)
CREAT/UREA NIT SERPL: 16
EOSINOPHIL # BLD AUTO: 0.1 X10(3)/MCL (ref 0–0.9)
EOSINOPHIL NFR BLD AUTO: 2 %
ERYTHROCYTE [DISTWIDTH] IN BLOOD BY AUTOMATED COUNT: 24.2 % (ref 11.5–17)
GLUCOSE SERPL-MCNC: 115 MG/DL (ref 82–115)
HCT VFR BLD AUTO: 52.3 % (ref 42–52)
HGB BLD-MCNC: 15 GM/DL (ref 14–18)
LYMPHOCYTES # BLD AUTO: 1.4 X10(3)/MCL (ref 0.6–4.6)
LYMPHOCYTES NFR BLD AUTO: 37 %
MAGNESIUM SERPL-MCNC: 1.5 MG/DL (ref 1.6–2.6)
MCH RBC QN AUTO: 24.4 PG (ref 27–31)
MCHC RBC AUTO-ENTMCNC: 28.7 GM/DL (ref 33–36)
MCV RBC AUTO: 84.9 FL (ref 80–94)
MONOCYTES # BLD AUTO: 0.1 X10(3)/MCL (ref 0.1–1.3)
MONOCYTES NFR BLD AUTO: 2 %
NEUTROPHILS # BLD AUTO: 2.28 X10(3)/MCL (ref 2.1–9.2)
NEUTROPHILS NFR BLD AUTO: 59 %
PHOSPHATE SERPL-MCNC: 2.9 MG/DL (ref 2.3–4.7)
PLATELET # BLD AUTO: 213 X10(3)/MCL (ref 130–400)
PMV BLD AUTO: 9.6 FL (ref 9.4–12.4)
POTASSIUM SERPL-SCNC: 3.9 MMOL/L (ref 3.5–5.1)
RBC # BLD AUTO: 6.16 X10(6)/MCL (ref 4.7–6.1)
SODIUM SERPL-SCNC: 141 MMOL/L (ref 136–145)
WBC # SPEC AUTO: 3.9 X10(3)/MCL (ref 4.5–11.5)

## 2021-10-06 ENCOUNTER — DOCUMENTATION ONLY (OUTPATIENT)
Dept: TRANSPLANT | Facility: CLINIC | Age: 71
End: 2021-10-06

## 2021-10-06 DIAGNOSIS — Z94.0 KIDNEY REPLACED BY TRANSPLANT: ICD-10-CM

## 2021-10-06 LAB
EXT ALBUMIN: 3.1 (ref 3.4–4.8)
EXT BUN: 20.1 (ref 8.4–25.7)
EXT CHLORIDE: 103 (ref 98–107)
EXT CMV DNA QUANT. BY PCR: ABNORMAL
EXT CO2: 25 (ref 23–31)
EXT CREATININE: 1.23 MG/DL
EXT EOSINOPHIL%: 2
EXT GFR MDRD NON AF AMER: >60
EXT GLUCOSE: 115 (ref 82–115)
EXT HEMATOCRIT: 52.3 (ref 42–52)
EXT HEMOGLOBIN: 15 (ref 14–18)
EXT LYMPH%: 37
EXT MAGNESIUM: 1.5 (ref 1.6–2.6)
EXT MONOCYTES%: 2
EXT PHOSPHORUS: 2.9 (ref 2.3–4.7)
EXT PLATELETS: 213 (ref 130–400)
EXT POTASSIUM: 3.9 (ref 3.5–5.1)
EXT SEGS%: 59
EXT SODIUM: 141 MMOL/L (ref 136–145)
EXT TACROLIMUS LVL: 11.2
EXT WBC: 3.9 (ref 4.5–11.5)

## 2021-10-06 RX ORDER — TACROLIMUS 1 MG/1
1 CAPSULE ORAL EVERY 12 HOURS
Qty: 60 CAPSULE | Refills: 11 | Status: SHIPPED | OUTPATIENT
Start: 2021-10-06 | End: 2021-10-26 | Stop reason: SDUPTHER

## 2021-10-06 NOTE — TELEPHONE ENCOUNTER
Notified patient of Dr. Yen's review of 10/4/21 lab results via [facility].     That's good the swelling is going down. Dr. Yen reviewed your lab results. Your kidney function looks better. She wants you to continue taking the lasix.   Your Prograf level is higher than it should be. She wants you to decrease your Prograf dose to 1mg twice daily. We'll recheck your labs next Wed 10/13/21.     Thanks,     Tiara       ----- Message from Celeste Yen MD sent at 10/6/2021  3:48 PM CDT -----  WBC improving. Pending CMV PCR.   Please Tac to 1 mg BID if it is a true trough. Check tac level in a week

## 2021-10-11 ENCOUNTER — TELEPHONE (OUTPATIENT)
Dept: HEPATOLOGY | Facility: CLINIC | Age: 71
End: 2021-10-11

## 2021-10-11 ENCOUNTER — TELEPHONE (OUTPATIENT)
Dept: TRANSPLANT | Facility: CLINIC | Age: 71
End: 2021-10-11

## 2021-10-11 DIAGNOSIS — B19.20 HEPATITIS C VIRUS INFECTION WITHOUT HEPATIC COMA, UNSPECIFIED CHRONICITY: Primary | ICD-10-CM

## 2021-10-11 NOTE — TELEPHONE ENCOUNTER
Coordinator contacted patient to review results of 10/1/21 fibrosure results.     ----- Message from Sherry Garcia MD sent at 10/8/2021  5:48 PM CDT -----  Regarding: Lab follow Up - fibrosure  This patient was seen in clinic earlier with Dr. Barcenas. He had some hepatomegaly and swelling. So we ordered fibrosure to check status on any underlying cirrhosis. It is elevated . He will need followup with Hepatology to give some direction to what we should be doing moving forward .     Thanks

## 2021-10-13 ENCOUNTER — PATIENT MESSAGE (OUTPATIENT)
Dept: TRANSPLANT | Facility: CLINIC | Age: 71
End: 2021-10-13
Payer: MEDICARE

## 2021-10-14 ENCOUNTER — HISTORICAL (OUTPATIENT)
Dept: ADMINISTRATIVE | Facility: HOSPITAL | Age: 71
End: 2021-10-14

## 2021-10-14 LAB
ABS NEUT (OLG): 0.81 X10(3)/MCL (ref 2.1–9.2)
ALBUMIN SERPL-MCNC: 3.3 GM/DL (ref 3.4–4.8)
BASOPHILS # BLD AUTO: 0 X10(3)/MCL (ref 0–0.2)
BASOPHILS NFR BLD AUTO: 2 %
BUN SERPL-MCNC: 23.9 MG/DL (ref 8.4–25.7)
CALCIUM SERPL-MCNC: 10.1 MG/DL (ref 8.8–10)
CHLORIDE SERPL-SCNC: 104 MMOL/L (ref 98–107)
CO2 SERPL-SCNC: 33 MMOL/L (ref 23–31)
CREAT SERPL-MCNC: 1.38 MG/DL (ref 0.73–1.18)
CREAT/UREA NIT SERPL: 17
EOSINOPHIL # BLD AUTO: 0 X10(3)/MCL (ref 0–0.9)
EOSINOPHIL NFR BLD AUTO: 1 %
ERYTHROCYTE [DISTWIDTH] IN BLOOD BY AUTOMATED COUNT: 26.5 % (ref 11.5–17)
GLUCOSE SERPL-MCNC: 108 MG/DL (ref 82–115)
HCT VFR BLD AUTO: 51.7 % (ref 42–52)
HGB BLD-MCNC: 15.4 GM/DL (ref 14–18)
LYMPHOCYTES # BLD AUTO: 1.3 X10(3)/MCL (ref 0.6–4.6)
LYMPHOCYTES NFR BLD AUTO: 59 %
MAGNESIUM SERPL-MCNC: 2.1 MG/DL (ref 1.6–2.6)
MCH RBC QN AUTO: 25.7 PG (ref 27–31)
MCHC RBC AUTO-ENTMCNC: 29.8 GM/DL (ref 33–36)
MCV RBC AUTO: 86.2 FL (ref 80–94)
MONOCYTES # BLD AUTO: 0 X10(3)/MCL (ref 0.1–1.3)
MONOCYTES NFR BLD AUTO: 2 %
NEUTROPHILS # BLD AUTO: 0.81 X10(3)/MCL (ref 2.1–9.2)
NEUTROPHILS NFR BLD AUTO: 36 %
PHOSPHATE SERPL-MCNC: 3 MG/DL (ref 2.3–4.7)
PLATELET # BLD AUTO: 217 X10(3)/MCL (ref 130–400)
PMV BLD AUTO: 9.5 FL (ref 9.4–12.4)
POTASSIUM SERPL-SCNC: 4.1 MMOL/L (ref 3.5–5.1)
RBC # BLD AUTO: 6 X10(6)/MCL (ref 4.7–6.1)
SODIUM SERPL-SCNC: 142 MMOL/L (ref 136–145)
WBC # SPEC AUTO: 2.3 X10(3)/MCL (ref 4.5–11.5)

## 2021-10-18 ENCOUNTER — PROCEDURE VISIT (OUTPATIENT)
Dept: HEPATOLOGY | Facility: CLINIC | Age: 71
End: 2021-10-18
Payer: MEDICARE

## 2021-10-18 ENCOUNTER — DOCUMENTATION ONLY (OUTPATIENT)
Dept: TRANSPLANT | Facility: CLINIC | Age: 71
End: 2021-10-18

## 2021-10-18 ENCOUNTER — HISTORICAL (OUTPATIENT)
Dept: ADMINISTRATIVE | Facility: HOSPITAL | Age: 71
End: 2021-10-18

## 2021-10-18 ENCOUNTER — OFFICE VISIT (OUTPATIENT)
Dept: HEPATOLOGY | Facility: CLINIC | Age: 71
End: 2021-10-18
Payer: MEDICARE

## 2021-10-18 VITALS
SYSTOLIC BLOOD PRESSURE: 138 MMHG | HEART RATE: 79 BPM | HEIGHT: 74 IN | DIASTOLIC BLOOD PRESSURE: 85 MMHG | RESPIRATION RATE: 18 BRPM | TEMPERATURE: 98 F | BODY MASS INDEX: 23.06 KG/M2 | OXYGEN SATURATION: 98 % | WEIGHT: 179.69 LBS

## 2021-10-18 DIAGNOSIS — K74.00 LIVER FIBROSIS: Primary | ICD-10-CM

## 2021-10-18 DIAGNOSIS — B19.20 HEPATITIS C VIRUS INFECTION WITHOUT HEPATIC COMA, UNSPECIFIED CHRONICITY: ICD-10-CM

## 2021-10-18 LAB
ABS NEUT (OLG): 0.85 X10(3)/MCL (ref 2.1–9.2)
ALBUMIN SERPL-MCNC: 3.2 GM/DL (ref 3.4–4.8)
BASOPHILS # BLD AUTO: 0.1 X10(3)/MCL (ref 0–0.2)
BASOPHILS NFR BLD AUTO: 3 %
BUN SERPL-MCNC: 19 MG/DL (ref 8.4–25.7)
CALCIUM SERPL-MCNC: 9.7 MG/DL (ref 8.8–10)
CHLORIDE SERPL-SCNC: 106 MMOL/L (ref 98–107)
CO2 SERPL-SCNC: 28 MMOL/L (ref 23–31)
CREAT SERPL-MCNC: 1.19 MG/DL (ref 0.73–1.18)
CREAT/UREA NIT SERPL: 16
EOSINOPHIL # BLD AUTO: 0 X10(3)/MCL (ref 0–0.9)
EOSINOPHIL NFR BLD AUTO: 2 %
ERYTHROCYTE [DISTWIDTH] IN BLOOD BY AUTOMATED COUNT: 26.9 % (ref 11.5–17)
EXT ALBUMIN: 3.2 (ref 3.4–4.8)
EXT ALBUMIN: 3.3 (ref 3.4–4.8)
EXT ANC: ABNORMAL
EXT ANC: ABNORMAL
EXT BUN: 19 (ref 8.4–25.7)
EXT BUN: 23.9 (ref 8.4–25.7)
EXT CALCIUM: 10.1 (ref 8.8–10)
EXT CALCIUM: 9.7 (ref 8.8–10)
EXT CHLORIDE: 104 (ref 98–107)
EXT CHLORIDE: 106 (ref 98–107)
EXT CMV DNA QUANT. BY PCR: 220 IU/ML
EXT CMV DNA QUANT. BY PCR: ABNORMAL
EXT CO2: 28 (ref 23–31)
EXT CO2: 33 (ref 23–31)
EXT CREATININE: 1.19 MG/DL
EXT CREATININE: 1.38 MG/DL
EXT EOSINOPHIL%: 1
EXT EOSINOPHIL%: 2
EXT GFR MDRD NON AF AMER: 54
EXT GFR MDRD NON AF AMER: >60
EXT GLUCOSE: 100 (ref 82–115)
EXT GLUCOSE: 108 (ref 82–115)
EXT HEMATOCRIT: 51.7 (ref 42–52)
EXT HEMATOCRIT: 54.7 (ref 42–52)
EXT HEMOGLOBIN: 15.4 (ref 14–18)
EXT HEMOGLOBIN: 16.4 (ref 14–18)
EXT LYMPH%: 55
EXT LYMPH%: 59
EXT MAGNESIUM: 2 (ref 1.6–2.6)
EXT MAGNESIUM: 2.1 (ref 1.6–2.6)
EXT MONOCYTES%: 2
EXT MONOCYTES%: 4
EXT PHOSPHORUS: 2.8 (ref 2.3–4.7)
EXT PHOSPHORUS: 3 (ref 2.3–4.7)
EXT PLATELETS: 217 (ref 130–400)
EXT PLATELETS: 233 (ref 130–400)
EXT POTASSIUM: 4.1 (ref 3.5–5.1)
EXT POTASSIUM: 4.2 (ref 3.5–5.1)
EXT SEGS%: 36
EXT SEGS%: 37
EXT SODIUM: 141 MMOL/L (ref 136–145)
EXT SODIUM: 142 MMOL/L (ref 136–145)
EXT TACROLIMUS LVL: 5
EXT TACROLIMUS LVL: ABNORMAL
EXT WBC: 2.3 (ref 4.5–11.5)
EXT WBC: 2.3 (ref 4.5–11.5)
GLUCOSE SERPL-MCNC: 100 MG/DL (ref 82–115)
HCT VFR BLD AUTO: 54.7 % (ref 42–52)
HGB BLD-MCNC: 16.4 GM/DL (ref 14–18)
LYMPHOCYTES # BLD AUTO: 1.3 X10(3)/MCL (ref 0.6–4.6)
LYMPHOCYTES NFR BLD AUTO: 55 %
MAGNESIUM SERPL-MCNC: 2 MG/DL (ref 1.6–2.6)
MCH RBC QN AUTO: 26.2 PG (ref 27–31)
MCHC RBC AUTO-ENTMCNC: 30 GM/DL (ref 33–36)
MCV RBC AUTO: 87.4 FL (ref 80–94)
MONOCYTES # BLD AUTO: 0.1 X10(3)/MCL (ref 0.1–1.3)
MONOCYTES NFR BLD AUTO: 4 %
NEUTROPHILS # BLD AUTO: 0.85 X10(3)/MCL (ref 2.1–9.2)
NEUTROPHILS NFR BLD AUTO: 37 %
PHOSPHATE SERPL-MCNC: 2.8 MG/DL (ref 2.3–4.7)
PLATELET # BLD AUTO: 233 X10(3)/MCL (ref 130–400)
PMV BLD AUTO: 9.5 FL (ref 9.4–12.4)
POTASSIUM SERPL-SCNC: 4.2 MMOL/L (ref 3.5–5.1)
RBC # BLD AUTO: 6.26 X10(6)/MCL (ref 4.7–6.1)
SODIUM SERPL-SCNC: 141 MMOL/L (ref 136–145)
WBC # SPEC AUTO: 2.3 X10(3)/MCL (ref 4.5–11.5)

## 2021-10-18 PROCEDURE — 91200 FIBROSCAN (VIBRATION CONTROLLED TRANSIENT ELASTOGRAPHY): ICD-10-PCS | Mod: 26,S$PBB,, | Performed by: PHYSICIAN ASSISTANT

## 2021-10-18 PROCEDURE — 99214 PR OFFICE/OUTPT VISIT, EST, LEVL IV, 30-39 MIN: ICD-10-PCS | Mod: S$PBB,,, | Performed by: PHYSICIAN ASSISTANT

## 2021-10-18 PROCEDURE — 99214 OFFICE O/P EST MOD 30 MIN: CPT | Mod: S$PBB,,, | Performed by: PHYSICIAN ASSISTANT

## 2021-10-18 PROCEDURE — 99999 PR PBB SHADOW E&M-EST. PATIENT-LVL V: CPT | Mod: PBBFAC,,, | Performed by: PHYSICIAN ASSISTANT

## 2021-10-18 PROCEDURE — 91200 LIVER ELASTOGRAPHY: CPT | Mod: PBBFAC | Performed by: PHYSICIAN ASSISTANT

## 2021-10-18 PROCEDURE — 99999 PR PBB SHADOW E&M-EST. PATIENT-LVL V: ICD-10-PCS | Mod: PBBFAC,,, | Performed by: PHYSICIAN ASSISTANT

## 2021-10-18 PROCEDURE — 91200 LIVER ELASTOGRAPHY: CPT | Mod: 26,S$PBB,, | Performed by: PHYSICIAN ASSISTANT

## 2021-10-18 PROCEDURE — 99215 OFFICE O/P EST HI 40 MIN: CPT | Mod: PBBFAC | Performed by: PHYSICIAN ASSISTANT

## 2021-10-18 RX ORDER — FUROSEMIDE 40 MG/1
40 TABLET ORAL DAILY
Status: ON HOLD | COMMUNITY
Start: 2021-09-27 | End: 2022-02-23 | Stop reason: HOSPADM

## 2021-10-26 ENCOUNTER — TELEPHONE (OUTPATIENT)
Dept: TRANSPLANT | Facility: CLINIC | Age: 71
End: 2021-10-26
Payer: MEDICARE

## 2021-10-26 ENCOUNTER — PATIENT MESSAGE (OUTPATIENT)
Dept: TRANSPLANT | Facility: CLINIC | Age: 71
End: 2021-10-26
Payer: MEDICARE

## 2021-10-26 DIAGNOSIS — Z94.0 KIDNEY REPLACED BY TRANSPLANT: ICD-10-CM

## 2021-10-27 ENCOUNTER — PATIENT MESSAGE (OUTPATIENT)
Dept: TRANSPLANT | Facility: CLINIC | Age: 71
End: 2021-10-27
Payer: MEDICARE

## 2021-10-28 ENCOUNTER — PATIENT MESSAGE (OUTPATIENT)
Dept: TRANSPLANT | Facility: CLINIC | Age: 71
End: 2021-10-28
Payer: MEDICARE

## 2021-10-29 RX ORDER — TACROLIMUS 1 MG/1
CAPSULE ORAL
Qty: 90 CAPSULE | Refills: 11 | Status: SHIPPED | OUTPATIENT
Start: 2021-10-29 | End: 2021-12-16 | Stop reason: DRUGHIGH

## 2021-11-01 ENCOUNTER — HISTORICAL (OUTPATIENT)
Dept: ADMINISTRATIVE | Facility: HOSPITAL | Age: 71
End: 2021-11-01

## 2021-11-01 LAB
ABS NEUT (OLG): 2.27 X10(3)/MCL (ref 2.1–9.2)
ALBUMIN SERPL-MCNC: 3.5 GM/DL (ref 3.4–4.8)
ALP SERPL-CCNC: 62 UNIT/L (ref 40–150)
ALT SERPL-CCNC: 40 UNIT/L (ref 0–55)
APPEARANCE, UA: CLEAR
AST SERPL-CCNC: 27 UNIT/L (ref 5–34)
BACTERIA SPEC CULT: ABNORMAL /HPF
BASOPHILS # BLD AUTO: 0 X10(3)/MCL (ref 0–0.2)
BASOPHILS NFR BLD AUTO: 0 %
BILIRUB SERPL-MCNC: 2.1 MG/DL
BILIRUB UR QL STRIP: NEGATIVE
BILIRUBIN DIRECT+TOT PNL SERPL-MCNC: 0.8 MG/DL (ref 0–0.5)
BILIRUBIN DIRECT+TOT PNL SERPL-MCNC: 1.3 MG/DL (ref 0–0.8)
BUN SERPL-MCNC: 24.5 MG/DL (ref 8.4–25.7)
CALCIUM SERPL-MCNC: 9.7 MG/DL (ref 8.7–10.5)
CHLORIDE SERPL-SCNC: 105 MMOL/L (ref 98–107)
CO2 SERPL-SCNC: 27 MMOL/L (ref 23–31)
COLOR UR: ABNORMAL
CREAT SERPL-MCNC: 0.97 MG/DL (ref 0.73–1.18)
CREAT UR-MCNC: 82.3 MG/DL (ref 58–161)
CREAT/UREA NIT SERPL: 25
ERYTHROCYTE [DISTWIDTH] IN BLOOD BY AUTOMATED COUNT: 27.3 % (ref 11.5–17)
GLUCOSE (UA): ABNORMAL
GLUCOSE SERPL-MCNC: 155 MG/DL (ref 82–115)
HCT VFR BLD AUTO: 53.6 % (ref 42–52)
HGB BLD-MCNC: 16.8 GM/DL (ref 14–18)
HGB UR QL STRIP: NEGATIVE
KETONES UR QL STRIP: NEGATIVE
LEUKOCYTE ESTERASE UR QL STRIP: NEGATIVE
LIVER PROFILE INTERP: ABNORMAL
LYMPHOCYTES # BLD AUTO: 1 X10(3)/MCL (ref 0.6–4.6)
LYMPHOCYTES NFR BLD AUTO: 29 %
MAGNESIUM SERPL-MCNC: 2.1 MG/DL (ref 1.6–2.6)
MCH RBC QN AUTO: 28 PG (ref 27–31)
MCHC RBC AUTO-ENTMCNC: 31.3 GM/DL (ref 33–36)
MCV RBC AUTO: 89.5 FL (ref 80–94)
MONOCYTES # BLD AUTO: 0.1 X10(3)/MCL (ref 0.1–1.3)
MONOCYTES NFR BLD AUTO: 3 %
NEUTROPHILS # BLD AUTO: 2.27 X10(3)/MCL (ref 2.1–9.2)
NEUTROPHILS NFR BLD AUTO: 66 %
NITRITE UR QL STRIP: NEGATIVE
PH UR STRIP: 7 [PH] (ref 5–9)
PHOSPHATE SERPL-MCNC: 3.2 MG/DL (ref 2.3–4.7)
PLATELET # BLD AUTO: 197 X10(3)/MCL (ref 130–400)
PMV BLD AUTO: 9.6 FL (ref 9.4–12.4)
POTASSIUM SERPL-SCNC: 4.8 MMOL/L (ref 3.5–5.1)
PROT SERPL-MCNC: 6.1 GM/DL (ref 5.8–7.6)
PROT UR QL STRIP: ABNORMAL
PROT UR STRIP-MCNC: 30.5 MG/DL
PROT/CREAT UR-RTO: 370.6 MG/GM CR
RBC # BLD AUTO: 5.99 X10(6)/MCL (ref 4.7–6.1)
RBC #/AREA URNS HPF: ABNORMAL /[HPF]
SODIUM SERPL-SCNC: 140 MMOL/L (ref 136–145)
SP GR UR STRIP: 1.02 (ref 1–1.03)
SQUAMOUS EPITHELIAL, UA: ABNORMAL /HPF (ref 0–4)
UROBILINOGEN UR STRIP-ACNC: 1
WBC # SPEC AUTO: 3.4 X10(3)/MCL (ref 4.5–11.5)
WBC #/AREA URNS HPF: ABNORMAL /HPF

## 2021-11-01 RX ORDER — ERGOCALCIFEROL 1.25 MG/1
50000 CAPSULE ORAL
Qty: 4 CAPSULE | Refills: 5 | Status: ON HOLD | OUTPATIENT
Start: 2021-11-01 | End: 2022-02-23 | Stop reason: HOSPADM

## 2021-11-03 ENCOUNTER — TELEPHONE (OUTPATIENT)
Dept: TRANSPLANT | Facility: CLINIC | Age: 71
End: 2021-11-03
Payer: MEDICARE

## 2021-11-07 ENCOUNTER — PATIENT MESSAGE (OUTPATIENT)
Dept: TRANSPLANT | Facility: CLINIC | Age: 71
End: 2021-11-07
Payer: MEDICARE

## 2021-11-08 ENCOUNTER — HISTORICAL (OUTPATIENT)
Dept: ADMINISTRATIVE | Facility: HOSPITAL | Age: 71
End: 2021-11-08

## 2021-11-08 LAB
ABS NEUT (OLG): 2.5 X10(3)/MCL (ref 2.1–9.2)
ALBUMIN SERPL-MCNC: 3.2 GM/DL (ref 3.4–4.8)
BASOPHILS # BLD AUTO: 0 X10(3)/MCL (ref 0–0.2)
BASOPHILS NFR BLD AUTO: 1 %
BUN SERPL-MCNC: 19 MG/DL (ref 8.4–25.7)
CALCIUM SERPL-MCNC: 9.2 MG/DL (ref 8.7–10.5)
CHLORIDE SERPL-SCNC: 107 MMOL/L (ref 98–107)
CO2 SERPL-SCNC: 26 MMOL/L (ref 23–31)
CREAT SERPL-MCNC: 1.11 MG/DL (ref 0.73–1.18)
CREAT/UREA NIT SERPL: 17
EOSINOPHIL # BLD AUTO: 0 X10(3)/MCL (ref 0–0.9)
EOSINOPHIL NFR BLD AUTO: 0 %
ERYTHROCYTE [DISTWIDTH] IN BLOOD BY AUTOMATED COUNT: 27 % (ref 11.5–17)
GLUCOSE SERPL-MCNC: 125 MG/DL (ref 82–115)
HCT VFR BLD AUTO: 51.7 % (ref 42–52)
HGB BLD-MCNC: 16.4 GM/DL (ref 14–18)
LYMPHOCYTES # BLD AUTO: 1.2 X10(3)/MCL (ref 0.6–4.6)
LYMPHOCYTES NFR BLD AUTO: 30 %
MAGNESIUM SERPL-MCNC: 1.9 MG/DL (ref 1.6–2.6)
MCH RBC QN AUTO: 28.8 PG (ref 27–31)
MCHC RBC AUTO-ENTMCNC: 31.7 GM/DL (ref 33–36)
MCV RBC AUTO: 90.7 FL (ref 80–94)
MONOCYTES # BLD AUTO: 0.2 X10(3)/MCL (ref 0.1–1.3)
MONOCYTES NFR BLD AUTO: 6 %
NEUTROPHILS # BLD AUTO: 2.5 X10(3)/MCL (ref 2.1–9.2)
NEUTROPHILS NFR BLD AUTO: 61 %
PHOSPHATE SERPL-MCNC: 2.5 MG/DL (ref 2.3–4.7)
PLATELET # BLD AUTO: 131 X10(3)/MCL (ref 130–400)
PMV BLD AUTO: 10.1 FL (ref 9.4–12.4)
POTASSIUM SERPL-SCNC: 4.7 MMOL/L (ref 3.5–5.1)
RBC # BLD AUTO: 5.7 X10(6)/MCL (ref 4.7–6.1)
SODIUM SERPL-SCNC: 140 MMOL/L (ref 136–145)
WBC # SPEC AUTO: 4.1 X10(3)/MCL (ref 4.5–11.5)

## 2021-11-10 ENCOUNTER — PATIENT MESSAGE (OUTPATIENT)
Dept: TRANSPLANT | Facility: CLINIC | Age: 71
End: 2021-11-10
Payer: MEDICARE

## 2021-11-10 ENCOUNTER — DOCUMENTATION ONLY (OUTPATIENT)
Dept: TRANSPLANT | Facility: CLINIC | Age: 71
End: 2021-11-10
Payer: MEDICARE

## 2021-11-10 LAB
EXT ALBUMIN: 3.2 (ref 3.4–4.8)
EXT ANC: ABNORMAL
EXT BUN: 19 (ref 8.4–25.7)
EXT CALCIUM: 9.2 (ref 8.7–10.5)
EXT CHLORIDE: 107 (ref 98–107)
EXT CMV DNA QUANT. BY PCR: ABNORMAL
EXT CO2: 26 (ref 23–31)
EXT CREATININE: 1.1 MG/DL
EXT EOSINOPHIL%: 0
EXT GFR MDRD NON AF AMER: >60
EXT GLUCOSE: 125 (ref 82–115)
EXT HEMATOCRIT: 51.7 (ref 42–52)
EXT HEMOGLOBIN: 16.4 (ref 14–18)
EXT LYMPH%: 30
EXT MAGNESIUM: 1.9 (ref 1.6–2.6)
EXT MONOCYTES%: 6
EXT PHOSPHORUS: 2.5 (ref 2.3–4.7)
EXT PLATELETS: 131 (ref 130–400)
EXT POTASSIUM: 4.7 (ref 3.5–5.1)
EXT SEGS%: 61
EXT SODIUM: 140 MMOL/L (ref 136–145)
EXT TACROLIMUS LVL: 8
EXT WBC: 4.1 (ref 4.5–11.5)

## 2021-11-11 ENCOUNTER — OFFICE VISIT (OUTPATIENT)
Dept: TRANSPLANT | Facility: CLINIC | Age: 71
End: 2021-11-11
Payer: MEDICARE

## 2021-11-11 ENCOUNTER — PATIENT MESSAGE (OUTPATIENT)
Dept: TRANSPLANT | Facility: CLINIC | Age: 71
End: 2021-11-11

## 2021-11-11 DIAGNOSIS — Z94.0 S/P KIDNEY TRANSPLANT: ICD-10-CM

## 2021-11-11 DIAGNOSIS — Z79.60 LONG-TERM USE OF IMMUNOSUPPRESSANT MEDICATION: ICD-10-CM

## 2021-11-11 DIAGNOSIS — I15.8 OTHER SECONDARY HYPERTENSION: Primary | ICD-10-CM

## 2021-11-11 DIAGNOSIS — I48.0 PAROXYSMAL ATRIAL FIBRILLATION: ICD-10-CM

## 2021-11-11 DIAGNOSIS — B25.9 CYTOMEGALOVIRUS INFECTION, UNSPECIFIED CYTOMEGALOVIRAL INFECTION TYPE: ICD-10-CM

## 2021-11-11 DIAGNOSIS — R73.9 HYPERGLYCEMIA: ICD-10-CM

## 2021-11-11 PROCEDURE — 99215 OFFICE O/P EST HI 40 MIN: CPT | Mod: 95,,, | Performed by: INTERNAL MEDICINE

## 2021-11-11 PROCEDURE — 99215 PR OFFICE/OUTPT VISIT, EST, LEVL V, 40-54 MIN: ICD-10-PCS | Mod: 95,,, | Performed by: INTERNAL MEDICINE

## 2021-11-15 ENCOUNTER — HISTORICAL (OUTPATIENT)
Dept: ADMINISTRATIVE | Facility: HOSPITAL | Age: 71
End: 2021-11-15

## 2021-11-15 LAB
ABS NEUT (OLG): 2.58 X10(3)/MCL (ref 2.1–9.2)
ALBUMIN SERPL-MCNC: 3.2 GM/DL (ref 3.4–4.8)
ANISOCYTOSIS BLD QL SMEAR: 1
BASOPHILS NFR BLD MANUAL: 1 % (ref 0–2)
BUN SERPL-MCNC: 25 MG/DL (ref 8.4–25.7)
BURR CELLS BLD QL SMEAR: 1 (ref 0–0)
CALCIUM SERPL-MCNC: 9.5 MG/DL (ref 8.7–10.5)
CHLORIDE SERPL-SCNC: 105 MMOL/L (ref 98–107)
CO2 SERPL-SCNC: 30 MMOL/L (ref 23–31)
CREAT SERPL-MCNC: 1.23 MG/DL (ref 0.73–1.18)
CREAT/UREA NIT SERPL: 20
EOSINOPHIL NFR BLD MANUAL: 2 % (ref 0–8)
ERYTHROCYTE [DISTWIDTH] IN BLOOD BY AUTOMATED COUNT: 26.8 % (ref 11.5–17)
GLUCOSE SERPL-MCNC: 134 MG/DL (ref 82–115)
HCT VFR BLD AUTO: 53.6 % (ref 42–52)
HGB BLD-MCNC: 16.7 GM/DL (ref 14–18)
LYMPHOCYTES NFR BLD MANUAL: 21 % (ref 13–40)
MACROCYTES BLD QL SMEAR: 1 /MCL
MAGNESIUM SERPL-MCNC: 2 MG/DL (ref 1.6–2.6)
MCH RBC QN AUTO: 29.6 PG (ref 27–31)
MCHC RBC AUTO-ENTMCNC: 31.2 GM/DL (ref 33–36)
MCV RBC AUTO: 95 FL (ref 80–94)
MONOCYTES NFR BLD MANUAL: 1 % (ref 2–11)
NEUTROPHILS NFR BLD MANUAL: 75 % (ref 47–80)
PHOSPHATE SERPL-MCNC: 3 MG/DL (ref 2.3–4.7)
PLATELET # BLD AUTO: 122 X10(3)/MCL (ref 130–400)
PLATELET # BLD EST: ABNORMAL 10*3/UL
PMV BLD AUTO: 10.1 FL (ref 7.4–10.4)
POIKILOCYTOSIS BLD QL SMEAR: 1
POTASSIUM SERPL-SCNC: 5.2 MMOL/L (ref 3.5–5.1)
RBC # BLD AUTO: 5.64 X10(6)/MCL (ref 4.7–6.1)
RBC MORPH BLD: ABNORMAL
SODIUM SERPL-SCNC: 143 MMOL/L (ref 136–145)
WBC # SPEC AUTO: 3.9 X10(3)/MCL (ref 4.5–11.5)

## 2021-11-17 ENCOUNTER — PATIENT MESSAGE (OUTPATIENT)
Dept: TRANSPLANT | Facility: CLINIC | Age: 71
End: 2021-11-17
Payer: MEDICARE

## 2021-11-17 ENCOUNTER — DOCUMENTATION ONLY (OUTPATIENT)
Dept: TRANSPLANT | Facility: CLINIC | Age: 71
End: 2021-11-17
Payer: MEDICARE

## 2021-11-17 LAB
EXT ALBUMIN: 3.2 (ref 3.4–4.8)
EXT ANC: ABNORMAL
EXT BUN: 25 (ref 8.4–25.7)
EXT CHLORIDE: 105 (ref 98–107)
EXT CMV DNA QUANT. BY PCR: ABNORMAL
EXT CO2: 30 (ref 23–31)
EXT CREATININE: 1.23 MG/DL
EXT EOSINOPHIL%: 2 (ref 0–8)
EXT GFR MDRD AF AMER: >60
EXT GLUCOSE: 134 (ref 82–115)
EXT HEMATOCRIT: 53.6 (ref 42–52)
EXT HEMOGLOBIN: 16.7 (ref 14–18)
EXT LYMPH%: 21 (ref 13–40)
EXT MAGNESIUM: 2 (ref 1.6–2.6)
EXT MONOCYTES%: 1 (ref 2–11)
EXT PHOSPHORUS: 3 (ref 2.3–4.7)
EXT PLATELETS: 122 (ref 130–400)
EXT POTASSIUM: 5.2 (ref 3.5–5.1)
EXT SEGS%: 75 (ref 47–80)
EXT SODIUM: 143 MMOL/L (ref 136–145)
EXT TACROLIMUS LVL: 6.5
EXT WBC: 3.9 (ref 4.5–11.5)

## 2021-11-22 ENCOUNTER — HISTORICAL (OUTPATIENT)
Dept: ADMINISTRATIVE | Facility: HOSPITAL | Age: 71
End: 2021-11-22

## 2021-11-22 LAB
ABS NEUT (OLG): 1.54 X10(3)/MCL (ref 2.1–9.2)
ALBUMIN SERPL-MCNC: 3.4 GM/DL (ref 3.4–4.8)
ANISOCYTOSIS BLD QL SMEAR: 1
BASOPHILS NFR BLD MANUAL: 2 % (ref 0–2)
BUN SERPL-MCNC: 20.1 MG/DL (ref 8.4–25.7)
CALCIUM SERPL-MCNC: 9.4 MG/DL (ref 8.7–10.5)
CHLORIDE SERPL-SCNC: 106 MMOL/L (ref 98–107)
CO2 SERPL-SCNC: 28 MMOL/L (ref 23–31)
CREAT SERPL-MCNC: 1.22 MG/DL (ref 0.73–1.18)
CREAT/UREA NIT SERPL: 16
EOSINOPHIL NFR BLD MANUAL: 6 % (ref 0–8)
ERYTHROCYTE [DISTWIDTH] IN BLOOD BY AUTOMATED COUNT: 25.1 % (ref 11.5–17)
GLUCOSE SERPL-MCNC: 117 MG/DL (ref 82–115)
HCT VFR BLD AUTO: 55 % (ref 42–52)
HGB BLD-MCNC: 17.3 GM/DL (ref 14–18)
LYMPHOCYTES NFR BLD MANUAL: 28 % (ref 13–40)
MAGNESIUM SERPL-MCNC: 1.9 MG/DL (ref 1.6–2.6)
MCH RBC QN AUTO: 30.4 PG (ref 27–31)
MCHC RBC AUTO-ENTMCNC: 31.5 GM/DL (ref 33–36)
MCV RBC AUTO: 96.7 FL (ref 80–94)
MICROCYTES BLD QL SMEAR: 1
MONOCYTES NFR BLD MANUAL: 3 % (ref 2–11)
NEUTROPHILS NFR BLD MANUAL: 61 % (ref 47–80)
OVALOCYTES BLD QL SMEAR: 1 (ref 0–0)
PHOSPHATE SERPL-MCNC: 2.6 MG/DL (ref 2.3–4.7)
PLATELET # BLD AUTO: 132 X10(3)/MCL (ref 130–400)
PLATELET # BLD EST: ABNORMAL 10*3/UL
PMV BLD AUTO: 9.6 FL (ref 7.4–10.4)
POIKILOCYTOSIS BLD QL SMEAR: 1
POLYCHROMASIA BLD QL SMEAR: 1
POTASSIUM SERPL-SCNC: 5.1 MMOL/L (ref 3.5–5.1)
RBC # BLD AUTO: 5.69 X10(6)/MCL (ref 4.7–6.1)
RBC MORPH BLD: ABNORMAL
SODIUM SERPL-SCNC: 141 MMOL/L (ref 136–145)
WBC # SPEC AUTO: 2.9 X10(3)/MCL (ref 4.5–11.5)

## 2021-11-23 ENCOUNTER — PATIENT MESSAGE (OUTPATIENT)
Dept: TRANSPLANT | Facility: CLINIC | Age: 71
End: 2021-11-23
Payer: MEDICARE

## 2021-11-23 ENCOUNTER — DOCUMENTATION ONLY (OUTPATIENT)
Dept: TRANSPLANT | Facility: CLINIC | Age: 71
End: 2021-11-23
Payer: MEDICARE

## 2021-11-23 LAB
EXT ALBUMIN: 3.4 (ref 3.4–4.8)
EXT ANC: ABNORMAL
EXT BUN: 20.1 (ref 8.4–25.7)
EXT CALCIUM: 9.4 (ref 8.7–10.5)
EXT CHLORIDE: 106 (ref 98–107)
EXT CMV DNA QUANT. BY PCR: ABNORMAL
EXT CO2: 28 (ref 23–31)
EXT CREATININE: 1.2 MG/DL
EXT EOSINOPHIL%: 6 (ref 0–8)
EXT GFR MDRD NON AF AMER: >60
EXT GLUCOSE: 117 (ref 82–115)
EXT HEMATOCRIT: 55 (ref 42–52)
EXT HEMOGLOBIN: 17.3 (ref 14–18)
EXT LYMPH%: 28 (ref 13–40)
EXT MAGNESIUM: 1.9 (ref 1.6–2.6)
EXT MONOCYTES%: 3 (ref 2–11)
EXT PHOSPHORUS: 2.6 (ref 2.3–4.7)
EXT PLATELETS: 132 (ref 130–400)
EXT POTASSIUM: 5.1 (ref 3.5–5.1)
EXT SEGS%: 61 (ref 47–80)
EXT SODIUM: 141 MMOL/L (ref 136–145)
EXT TACROLIMUS LVL: 6.2
EXT WBC: 2.9 (ref 4.5–11.5)

## 2021-11-26 ENCOUNTER — TELEPHONE (OUTPATIENT)
Dept: TRANSPLANT | Facility: CLINIC | Age: 71
End: 2021-11-26
Payer: MEDICARE

## 2021-11-29 ENCOUNTER — HISTORICAL (OUTPATIENT)
Dept: ADMINISTRATIVE | Facility: HOSPITAL | Age: 71
End: 2021-11-29

## 2021-11-29 LAB
ABS NEUT (OLG): 1.2 X10(3)/MCL (ref 2.1–9.2)
ALBUMIN SERPL-MCNC: 3.5 GM/DL (ref 3.4–4.8)
ANISOCYTOSIS BLD QL SMEAR: 1
BASOPHILS NFR BLD MANUAL: 3 % (ref 0–2)
BUN SERPL-MCNC: 19.1 MG/DL (ref 8.4–25.7)
CALCIUM SERPL-MCNC: 9.4 MG/DL (ref 8.7–10.5)
CHLORIDE SERPL-SCNC: 106 MMOL/L (ref 98–107)
CO2 SERPL-SCNC: 26 MMOL/L (ref 23–31)
CREAT SERPL-MCNC: 1.19 MG/DL (ref 0.73–1.18)
CREAT/UREA NIT SERPL: 16
DACRYOCYTES BLD QL SMEAR: 1 /MCL (ref 0–1)
EOSINOPHIL NFR BLD MANUAL: 1 % (ref 0–8)
ERYTHROCYTE [DISTWIDTH] IN BLOOD BY AUTOMATED COUNT: 23 % (ref 11.5–17)
GLUCOSE SERPL-MCNC: 104 MG/DL (ref 82–115)
HCT VFR BLD AUTO: 57.4 % (ref 42–52)
HGB BLD-MCNC: 18.2 GM/DL (ref 14–18)
LYMPHOCYTES NFR BLD MANUAL: 32 % (ref 13–40)
MACROCYTES BLD QL SMEAR: 2 /MCL
MAGNESIUM SERPL-MCNC: 1.9 MG/DL (ref 1.6–2.6)
MCH RBC QN AUTO: 30.7 PG (ref 27–31)
MCHC RBC AUTO-ENTMCNC: 31.7 GM/DL (ref 33–36)
MCV RBC AUTO: 97 FL (ref 80–94)
MONOCYTES NFR BLD MANUAL: 4 % (ref 2–11)
NEUTROPHILS NFR BLD MANUAL: 60 % (ref 47–80)
PHOSPHATE SERPL-MCNC: 2.4 MG/DL (ref 2.3–4.7)
PLATELET # BLD AUTO: 145 X10(3)/MCL (ref 130–400)
PLATELET # BLD EST: NORMAL 10*3/UL
PMV BLD AUTO: 9.2 FL (ref 7.4–10.4)
POIKILOCYTOSIS BLD QL SMEAR: 1
POTASSIUM SERPL-SCNC: 5 MMOL/L (ref 3.5–5.1)
RBC # BLD AUTO: 5.92 X10(6)/MCL (ref 4.7–6.1)
RBC MORPH BLD: ABNORMAL
SODIUM SERPL-SCNC: 141 MMOL/L (ref 136–145)
WBC # SPEC AUTO: 2.5 X10(3)/MCL (ref 4.5–11.5)

## 2021-12-01 ENCOUNTER — DOCUMENTATION ONLY (OUTPATIENT)
Dept: TRANSPLANT | Facility: CLINIC | Age: 71
End: 2021-12-01
Payer: MEDICARE

## 2021-12-01 LAB
EXT ALBUMIN: 3.5 (ref 3.4–4.8)
EXT ANC: ABNORMAL
EXT BUN: 19.1 (ref 8.4–25.7)
EXT CALCIUM: 9.4 (ref 8.7–10.5)
EXT CHLORIDE: 106 (ref 98–107)
EXT CMV DNA QUANT. BY PCR: ABNORMAL
EXT CO2: 26 (ref 23–31)
EXT CREATININE: 1.19 MG/DL
EXT EOSINOPHIL%: 1 (ref 0–8)
EXT GFR MDRD NON AF AMER: >60
EXT GLUCOSE: 104 (ref 82–115)
EXT HEMATOCRIT: 57.4 (ref 42–52)
EXT HEMOGLOBIN: 18.2 (ref 14–18)
EXT LYMPH%: 32 (ref 13–40)
EXT MAGNESIUM: 1.9 (ref 1.6–2.6)
EXT MONOCYTES%: 4 (ref 2–11)
EXT PHOSPHORUS: 2.4 (ref 2.3–4.7)
EXT PLATELETS: 145 (ref 130–400)
EXT POTASSIUM: 5 (ref 3.5–5.1)
EXT SEGS%: 60 (ref 47–80)
EXT SODIUM: 141 MMOL/L (ref 136–145)
EXT TACROLIMUS LVL: 6.3
EXT WBC: 2.5 (ref 4.5–11.5)

## 2021-12-06 ENCOUNTER — HISTORICAL (OUTPATIENT)
Dept: ADMINISTRATIVE | Facility: HOSPITAL | Age: 71
End: 2021-12-06

## 2021-12-06 ENCOUNTER — PATIENT MESSAGE (OUTPATIENT)
Dept: TRANSPLANT | Facility: CLINIC | Age: 71
End: 2021-12-06
Payer: MEDICARE

## 2021-12-06 ENCOUNTER — DOCUMENTATION ONLY (OUTPATIENT)
Dept: TRANSPLANT | Facility: CLINIC | Age: 71
End: 2021-12-06
Payer: MEDICARE

## 2021-12-06 ENCOUNTER — TELEPHONE (OUTPATIENT)
Dept: TRANSPLANT | Facility: CLINIC | Age: 71
End: 2021-12-06

## 2021-12-06 LAB
ABS NEUT (OLG): 0.54 X10(3)/MCL (ref 2.1–9.2)
ALBUMIN SERPL-MCNC: 3.3 GM/DL (ref 3.4–4.8)
ANISOCYTOSIS BLD QL SMEAR: 1
BUN SERPL-MCNC: 14.8 MG/DL (ref 8.4–25.7)
BURR CELLS BLD QL SMEAR: 2 (ref 0–0)
CALCIUM SERPL-MCNC: 10.2 MG/DL (ref 8.7–10.5)
CHLORIDE SERPL-SCNC: 105 MMOL/L (ref 98–107)
CO2 SERPL-SCNC: 26 MMOL/L (ref 23–31)
CREAT SERPL-MCNC: 0.94 MG/DL (ref 0.73–1.18)
CREAT/UREA NIT SERPL: 16
ERYTHROCYTE [DISTWIDTH] IN BLOOD BY AUTOMATED COUNT: 21.3 % (ref 11.5–17)
EXT ALBUMIN: 3.3 (ref 3.4–4.8)
EXT ANC: 702
EXT BUN: 14.8 (ref 8.4–25.7)
EXT CHLORIDE: 105 (ref 98–107)
EXT CMV DNA QUANT. BY PCR: ABNORMAL
EXT CO2: 26 (ref 23–31)
EXT CREATININE: 0.94 MG/DL
EXT GLUCOSE: 115 (ref 82–115)
EXT HEMATOCRIT: 56.8 (ref 42–52)
EXT HEMOGLOBIN: 18 (ref 14–18)
EXT LYMPH%: 60 (ref 13–40)
EXT MONOCYTES%: 2 (ref 2–11)
EXT PHOSPHORUS: 2.2 (ref 2.3–4.7)
EXT PLATELETS: 173 (ref 130–400)
EXT POTASSIUM: 4.8 (ref 3.5–5.1)
EXT SEGS%: 39 (ref 47–80)
EXT SODIUM: 140 MMOL/L (ref 136–145)
EXT TACROLIMUS LVL: 7.7
EXT WBC: 1.8 (ref 4.5–11.5)
GLUCOSE SERPL-MCNC: 115 MG/DL (ref 82–115)
HCT VFR BLD AUTO: 56.8 % (ref 42–52)
HGB BLD-MCNC: 18 GM/DL (ref 14–18)
LYMPHOCYTES NFR BLD MANUAL: 60 % (ref 13–40)
MACROCYTES BLD QL SMEAR: 1 /MCL
MAGNESIUM SERPL-MCNC: 2 MG/DL (ref 1.6–2.6)
MCH RBC QN AUTO: 30.8 PG (ref 27–31)
MCHC RBC AUTO-ENTMCNC: 31.7 GM/DL (ref 33–36)
MCV RBC AUTO: 97.1 FL (ref 80–94)
MONOCYTES NFR BLD MANUAL: 2 % (ref 2–11)
NEUTROPHILS # BLD AUTO: 0.56 X10(3)/MCL (ref 2.1–9.2)
NEUTROPHILS NFR BLD MANUAL: 39 % (ref 47–80)
NRBC BLD MANUAL-RTO: 2 %
PHOSPHATE SERPL-MCNC: 2.2 MG/DL (ref 2.3–4.7)
PLATELET # BLD AUTO: 173 X10(3)/MCL (ref 130–400)
PLATELET # BLD EST: NORMAL 10*3/UL
PMV BLD AUTO: 9.1 FL (ref 9.4–12.4)
POIKILOCYTOSIS BLD QL SMEAR: 1
POLYCHROMASIA BLD QL SMEAR: 1
POTASSIUM SERPL-SCNC: 4.8 MMOL/L (ref 3.5–5.1)
RBC # BLD AUTO: 5.85 X10(6)/MCL (ref 4.7–6.1)
RBC MORPH BLD: ABNORMAL
SODIUM SERPL-SCNC: 140 MMOL/L (ref 136–145)
WBC # SPEC AUTO: 1.8 X10(3)/MCL (ref 4.5–11.5)

## 2021-12-06 NOTE — TELEPHONE ENCOUNTER
Notified patient of Dr. Yen's review of 12/6/21 lab results via [facility].     Hey Mr. Mazariegos,      Please disregard the 1st message I sent you.     I meant to tell you to STOP taking your Valcyte. I have documented on your medication list that your Cellcept is already on HOLD.   Dr. Yen reviewed the 12/6/21 lab results we received. Your white blood cell count is lower than 2.  She wants to recheck your CBC this Thursday.      I'll fax a lab order for 12/9/21 to [facility].      Thanks,      Tiara   ______________________________________________________    Romero Mazariegos,    Dr. Yen reviewed the 12/6/21 lab results we received. Your white blood cell count is lower than 2. She wants you to please STOP taking your Cellcept for now until we instruct you to restart. She wants to recheck your CBC this Thursday.      No need to be alarmed, sometimes that happens when you're on Valcyte & Cellcept for a while. If your 11/29/21 CMV level comes back negative then she'll stop the Valcyte. But for now continue taking the Valcyte.     I'll fax a lab order for 12/9/21 to [facility].     Thanks,     Tiara       ---- Message from Celeste Yen MD sent at 12/6/2021 12:36 PM CST -----  Please hold MMF, repeat CBC on Thursday. Check CMV PCR with next lab,  ----- Message -----  From: Naomi Anthony RN  Sent: 12/6/2021  12:19 PM CST  To: Tiara Ferrell RN, #    This pt had a critical WBC count of 1.8 called in from [facility] lab.  Documented in epic.  Thanks.

## 2021-12-09 ENCOUNTER — TELEPHONE (OUTPATIENT)
Dept: TRANSPLANT | Facility: CLINIC | Age: 71
End: 2021-12-09
Payer: MEDICARE

## 2021-12-09 ENCOUNTER — HISTORICAL (OUTPATIENT)
Dept: ADMINISTRATIVE | Facility: HOSPITAL | Age: 71
End: 2021-12-09

## 2021-12-09 DIAGNOSIS — D75.1 POLYCYTHEMIA: Primary | ICD-10-CM

## 2021-12-09 DIAGNOSIS — D72.9 ABNORMAL WBC COUNT: ICD-10-CM

## 2021-12-09 LAB
ABS NEUT (OLG): 0.55 X10(3)/MCL (ref 2.1–9.2)
ANISOCYTOSIS BLD QL SMEAR: 1
EOSINOPHIL NFR BLD MANUAL: 1 % (ref 0–8)
ERYTHROCYTE [DISTWIDTH] IN BLOOD BY AUTOMATED COUNT: 20.6 % (ref 11.5–17)
HCT VFR BLD AUTO: 59 % (ref 42–52)
HGB BLD-MCNC: 18.5 GM/DL (ref 14–18)
LYMPHOCYTES NFR BLD MANUAL: 56 % (ref 13–40)
MACROCYTES BLD QL SMEAR: 1 /MCL
MCH RBC QN AUTO: 30.7 PG (ref 27–31)
MCHC RBC AUTO-ENTMCNC: 31.4 GM/DL (ref 33–36)
MCV RBC AUTO: 98 FL (ref 80–94)
MONOCYTES NFR BLD MANUAL: 7 % (ref 2–11)
NEUTROPHILS NFR BLD MANUAL: 36 % (ref 47–80)
OVALOCYTES BLD QL SMEAR: 1 (ref 0–0)
PLATELET # BLD AUTO: 185 X10(3)/MCL (ref 130–400)
PLATELET # BLD EST: NORMAL 10*3/UL
PMV BLD AUTO: 9.4 FL (ref 7.4–10.4)
POIKILOCYTOSIS BLD QL SMEAR: 1
RBC # BLD AUTO: 6.02 X10(6)/MCL (ref 4.7–6.1)
WBC # SPEC AUTO: 2.1 X10(3)/MCL (ref 4.5–11.5)

## 2021-12-09 NOTE — TELEPHONE ENCOUNTER
Please neupogen 480 mcg daily X 2 times. Off MMF and valcyte. CMV PCR was negative on Monday. Check CBC  and EBV PCR On Monday   Erythrocytosis: please phlebotomy as needed. Native kidney US to r/o kidney mass

## 2021-12-10 ENCOUNTER — PATIENT MESSAGE (OUTPATIENT)
Dept: TRANSPLANT | Facility: CLINIC | Age: 71
End: 2021-12-10
Payer: MEDICARE

## 2021-12-13 ENCOUNTER — TELEPHONE (OUTPATIENT)
Dept: TRANSPLANT | Facility: CLINIC | Age: 71
End: 2021-12-13
Payer: MEDICARE

## 2021-12-13 ENCOUNTER — HISTORICAL (OUTPATIENT)
Dept: ADMINISTRATIVE | Facility: HOSPITAL | Age: 71
End: 2021-12-13

## 2021-12-13 ENCOUNTER — DOCUMENTATION ONLY (OUTPATIENT)
Dept: TRANSPLANT | Facility: CLINIC | Age: 71
End: 2021-12-13
Payer: MEDICARE

## 2021-12-13 DIAGNOSIS — D45 PRIMARY POLYCYTHEMIA: Primary | ICD-10-CM

## 2021-12-13 LAB
ABS NEUT (OLG): 0.7 X10(3)/MCL (ref 2.1–9.2)
ALBUMIN SERPL-MCNC: 3.6 GM/DL (ref 3.4–4.8)
ALBUMIN SERPL-MCNC: 3.6 GM/DL (ref 3.4–4.8)
ALP SERPL-CCNC: 64 UNIT/L (ref 40–150)
ALT SERPL-CCNC: 33 UNIT/L (ref 0–55)
ANISOCYTOSIS BLD QL SMEAR: 1
AST SERPL-CCNC: 22 UNIT/L (ref 5–34)
BASOPHILS NFR BLD MANUAL: 3 % (ref 0–2)
BILIRUB SERPL-MCNC: 1.5 MG/DL
BILIRUBIN DIRECT+TOT PNL SERPL-MCNC: 0.7 MG/DL (ref 0–0.5)
BILIRUBIN DIRECT+TOT PNL SERPL-MCNC: 0.8 MG/DL (ref 0–0.8)
BUN SERPL-MCNC: 16.6 MG/DL (ref 8.4–25.7)
CALCIUM SERPL-MCNC: 9.6 MG/DL (ref 8.7–10.5)
CHLORIDE SERPL-SCNC: 105 MMOL/L (ref 98–107)
CO2 SERPL-SCNC: 27 MMOL/L (ref 23–31)
CREAT SERPL-MCNC: 1.05 MG/DL (ref 0.73–1.18)
CREAT/UREA NIT SERPL: 16
ERYTHROCYTE [DISTWIDTH] IN BLOOD BY AUTOMATED COUNT: 19.7 % (ref 11.5–17)
EXT ALBUMIN: NORMAL
EXT ALKALINE PHOSPHATASE: NORMAL
EXT ALLOSURE: NORMAL
EXT ALT: NORMAL
EXT AMYLASE: NORMAL
EXT ANC: NORMAL
EXT AST: NORMAL
EXT BACTERIA UA: NORMAL
EXT BANDS%: NORMAL
EXT BILIRUBIN DIRECT: NORMAL
EXT BILIRUBIN TOTAL: NORMAL
EXT BK VIRUS DNA QN PCR: NORMAL
EXT BK VIRUS DNA QUANT, PCR, URINE: NORMAL
EXT BUN: NORMAL
EXT C PEPTIDE: NORMAL
EXT CALCIUM: NORMAL
EXT CHLORIDE: NORMAL
EXT CHOLESTEROL (LIPID PANEL): NORMAL
EXT CHOLESTEROL: NORMAL
EXT CMV DNA QUANT. BY PCR: NORMAL
EXT CO2: NORMAL
EXT CREATININE: NORMAL
EXT CYCLOSPORINE LVL: NORMAL
EXT EBV DNA BY PCR: NORMAL
EXT EBV DNA-COPIES/ML: NORMAL
EXT EOSINOPHIL%: 1
EXT FERRITIN: NORMAL
EXT GFR MDRD AF AMER: NORMAL
EXT GFR MDRD NON AF AMER: NORMAL
EXT GLUCOSE UA: NORMAL
EXT GLUCOSE: NORMAL
EXT HBV DNA QUANT PCR: NORMAL
EXT HCV QUANT: NORMAL
EXT HDL: NORMAL
EXT HEMATOCRIT: 59
EXT HEMOGLOBIN A1C: NORMAL
EXT HEMOGLOBIN: 18.5
EXT HEP B S AG: NORMAL
EXT HIV RNA QUANT PCR: NORMAL
EXT HIV: NORMAL
EXT IMMUNKNOW (STIMULATED): NORMAL
EXT INR: NORMAL
EXT IRON SATURATION: NORMAL
EXT LDH, TOTAL: NORMAL
EXT LDL CHOLESTEROL: NORMAL
EXT LIPASE: NORMAL
EXT LYMPH%: 56
EXT MAGNESIUM: NORMAL
EXT MONOCYTES%: 7
EXT NITRITES UA: NORMAL
EXT PHOSPHORUS: NORMAL
EXT PLATELETS: 185
EXT POTASSIUM: NORMAL
EXT PROT/CREAT RATIO UR: NORMAL
EXT PROTEIN TOTAL: NORMAL
EXT PROTEIN UA: NORMAL
EXT PT: NORMAL
EXT PTH, INTACT: NORMAL
EXT RBC UA: NORMAL
EXT SARS COV-2 (COVID-19): NORMAL
EXT SEGS%: 36
EXT SERUM IRON: NORMAL
EXT SIROLIMUS LVL: NORMAL
EXT SODIUM: NORMAL
EXT STOOL CDIFF: NORMAL
EXT STOOL CMV: NORMAL
EXT STOOL CULTURE: NORMAL
EXT STOOL OCP: NORMAL
EXT TACROLIMUS LVL: NORMAL
EXT TIBC: NORMAL
EXT TRIGLYCERIDES: NORMAL
EXT UNSATURATED IRON BINDING CAP.: NORMAL
EXT URIC ACID: NORMAL
EXT URINE CULTURE: NORMAL
EXT VIT D 25 HYDROXY: NORMAL
EXT WBC UA: NORMAL
EXT WBC: 2.1
GLUCOSE SERPL-MCNC: 112 MG/DL (ref 82–115)
HCT VFR BLD AUTO: 59 % (ref 42–52)
HGB BLD-MCNC: 18.9 GM/DL (ref 14–18)
LIVER PROFILE INTERP: ABNORMAL
LYMPHOCYTES NFR BLD MANUAL: 40 % (ref 13–40)
MACROCYTES BLD QL SMEAR: 1 /MCL
MAGNESIUM SERPL-MCNC: 2.1 MG/DL (ref 1.6–2.6)
MCH RBC QN AUTO: 31 PG (ref 27–31)
MCHC RBC AUTO-ENTMCNC: 32 GM/DL (ref 33–36)
MCV RBC AUTO: 96.9 FL (ref 80–94)
METAMYELOCYTES NFR BLD MANUAL: 3 %
MONOCYTES NFR BLD MANUAL: 26 % (ref 2–11)
NEUTROPHILS NFR BLD MANUAL: 28 % (ref 47–80)
PHOSPHATE SERPL-MCNC: 2.6 MG/DL (ref 2.3–4.7)
PLATELET # BLD AUTO: 183 X10(3)/MCL (ref 130–400)
PLATELET # BLD EST: NORMAL 10*3/UL
PMV BLD AUTO: 9.4 FL (ref 9.4–12.4)
POTASSIUM SERPL-SCNC: 4.9 MMOL/L (ref 3.5–5.1)
PROT SERPL-MCNC: 6 GM/DL (ref 5.8–7.6)
RBC # BLD AUTO: 6.09 X10(6)/MCL (ref 4.7–6.1)
RBC MORPH BLD: ABNORMAL
SODIUM SERPL-SCNC: 140 MMOL/L (ref 136–145)
WBC # SPEC AUTO: 2.5 X10(3)/MCL (ref 4.5–11.5)

## 2021-12-15 ENCOUNTER — INFUSION (OUTPATIENT)
Dept: INFUSION THERAPY | Facility: HOSPITAL | Age: 71
End: 2021-12-15
Attending: INTERNAL MEDICINE
Payer: MEDICARE

## 2021-12-15 ENCOUNTER — PATIENT MESSAGE (OUTPATIENT)
Dept: TRANSPLANT | Facility: CLINIC | Age: 71
End: 2021-12-15
Payer: MEDICARE

## 2021-12-15 VITALS
SYSTOLIC BLOOD PRESSURE: 140 MMHG | DIASTOLIC BLOOD PRESSURE: 95 MMHG | OXYGEN SATURATION: 98 % | RESPIRATION RATE: 16 BRPM | TEMPERATURE: 98 F | HEART RATE: 91 BPM

## 2021-12-15 DIAGNOSIS — D70.2 DRUG-INDUCED NEUTROPENIA: Primary | ICD-10-CM

## 2021-12-15 PROCEDURE — 63600175 PHARM REV CODE 636 W HCPCS: Mod: JG | Performed by: INTERNAL MEDICINE

## 2021-12-15 PROCEDURE — 96372 THER/PROPH/DIAG INJ SC/IM: CPT

## 2021-12-15 RX ADMIN — FILGRASTIM-SNDZ 480 MCG: 480 INJECTION, SOLUTION INTRAVENOUS; SUBCUTANEOUS at 02:12

## 2021-12-16 ENCOUNTER — PATIENT MESSAGE (OUTPATIENT)
Dept: TRANSPLANT | Facility: CLINIC | Age: 71
End: 2021-12-16
Payer: MEDICARE

## 2021-12-16 ENCOUNTER — DOCUMENTATION ONLY (OUTPATIENT)
Dept: TRANSPLANT | Facility: CLINIC | Age: 71
End: 2021-12-16
Payer: MEDICARE

## 2021-12-16 ENCOUNTER — INFUSION (OUTPATIENT)
Dept: INFUSION THERAPY | Facility: HOSPITAL | Age: 71
End: 2021-12-16
Attending: INTERNAL MEDICINE
Payer: MEDICARE

## 2021-12-16 VITALS
OXYGEN SATURATION: 99 % | DIASTOLIC BLOOD PRESSURE: 79 MMHG | SYSTOLIC BLOOD PRESSURE: 127 MMHG | HEART RATE: 99 BPM | RESPIRATION RATE: 14 BRPM | TEMPERATURE: 98 F

## 2021-12-16 DIAGNOSIS — D70.2 DRUG-INDUCED NEUTROPENIA: Primary | ICD-10-CM

## 2021-12-16 DIAGNOSIS — Z94.0 KIDNEY REPLACED BY TRANSPLANT: ICD-10-CM

## 2021-12-16 DIAGNOSIS — D63.8 ANEMIA OF CHRONIC DISEASE: ICD-10-CM

## 2021-12-16 LAB
EXT ALBUMIN: 3.6 (ref 3.4–4.8)
EXT ALKALINE PHOSPHATASE: 64 (ref 40–150)
EXT ALT: 33 (ref 0–55)
EXT ANC: 700
EXT AST: 22 (ref 5–34)
EXT BILIRUBIN DIRECT: 0.7 MG/DL (ref 0–0.5)
EXT BILIRUBIN TOTAL: 1.5
EXT BUN: 16.6 (ref 8.4–25.7)
EXT CALCIUM: 9.6 (ref 8.07–10.5)
EXT CHLORIDE: 105 (ref 98–107)
EXT CMV DNA QUANT. BY PCR: ABNORMAL
EXT CO2: 27 (ref 23–31)
EXT CREATININE: 1.05 MG/DL
EXT GFR MDRD NON AF AMER: >60
EXT GLUCOSE: 112 (ref 82–115)
EXT HEMATOCRIT: 59 (ref 42–52)
EXT HEMOGLOBIN: 18.9 (ref 14–18)
EXT LYMPH%: 40
EXT MAGNESIUM: 2.1 (ref 1.6–2.6)
EXT MONOCYTES%: 26
EXT PHOSPHORUS: 2.6 (ref 2.3–4.7)
EXT PLATELETS: 183
EXT POTASSIUM: 4.9 (ref 3.5–5.1)
EXT PROTEIN TOTAL: 6 (ref 5.8–7.6)
EXT SEGS%: 28 (ref 47–80)
EXT SODIUM: 140 MMOL/L (ref 136–145)
EXT TACROLIMUS LVL: 5.6
EXT WBC: 2.5 (ref 4.5–11.5)

## 2021-12-16 PROCEDURE — 96372 THER/PROPH/DIAG INJ SC/IM: CPT

## 2021-12-16 PROCEDURE — 63600175 PHARM REV CODE 636 W HCPCS: Mod: JG | Performed by: INTERNAL MEDICINE

## 2021-12-16 PROCEDURE — 99195 PHLEBOTOMY: CPT

## 2021-12-16 RX ADMIN — FILGRASTIM-SNDZ 480 MCG: 480 INJECTION, SOLUTION INTRAVENOUS; SUBCUTANEOUS at 02:12

## 2021-12-16 NOTE — TELEPHONE ENCOUNTER
Notified patient of Dr. Barcenas review of 12/13/21 lab results via [facility]. Instructions given to patient.     Hey Mr. Villeda,     Dr. Barcenas reviewed your 12/13/21 lab results. Your Prograf level is lower than it should be. He wants you to increase your Prograf dose to 2mg twice daily starting with tonight's dose.     I hope you made out OK with getting your injection & phlebotomy. We'll review your labs on Tues 12/21/21 when you have labs for Hepatology too.     Thanks,     Tiara       ----- Message from Carlos Barcenas MD sent at 12/16/2021  2:46 PM CST -----  Please increase prograf to 2 mg PO bid repeat a level in 2 weeks

## 2021-12-17 ENCOUNTER — TELEPHONE (OUTPATIENT)
Dept: HEPATOLOGY | Facility: CLINIC | Age: 71
End: 2021-12-17
Payer: MEDICARE

## 2021-12-17 ENCOUNTER — PATIENT MESSAGE (OUTPATIENT)
Dept: HEPATOLOGY | Facility: CLINIC | Age: 71
End: 2021-12-17
Payer: MEDICARE

## 2021-12-17 DIAGNOSIS — Z86.19 HEPATITIS C VIRUS INFECTION CURED AFTER ANTIVIRAL DRUG THERAPY: Primary | ICD-10-CM

## 2021-12-17 LAB — EXT HCV RNA QUANT PCR: <15 IU/ML

## 2021-12-17 RX ORDER — TACROLIMUS 1 MG/1
2 CAPSULE ORAL EVERY 12 HOURS
Qty: 120 CAPSULE | Refills: 11 | Status: SHIPPED | OUTPATIENT
Start: 2021-12-17 | End: 2022-06-13 | Stop reason: DRUGHIGH

## 2021-12-20 ENCOUNTER — HISTORICAL (OUTPATIENT)
Dept: ADMINISTRATIVE | Facility: HOSPITAL | Age: 71
End: 2021-12-20

## 2021-12-20 LAB
ABS NEUT (OLG): 4.26 X10(3)/MCL (ref 2.1–9.2)
ALBUMIN SERPL-MCNC: 4 GM/DL (ref 3.4–4.8)
BUN SERPL-MCNC: 12.3 MG/DL (ref 8.4–25.7)
CALCIUM SERPL-MCNC: 10.4 MG/DL (ref 8.7–10.5)
CHLORIDE SERPL-SCNC: 103 MMOL/L (ref 98–107)
CO2 SERPL-SCNC: 29 MMOL/L (ref 23–31)
CREAT SERPL-MCNC: 1.27 MG/DL (ref 0.73–1.18)
CREAT/UREA NIT SERPL: 10
ERYTHROCYTE [DISTWIDTH] IN BLOOD BY AUTOMATED COUNT: 18.7 % (ref 11.5–17)
GLUCOSE SERPL-MCNC: 117 MG/DL (ref 82–115)
HCT VFR BLD AUTO: 61.2 % (ref 42–52)
HGB BLD-MCNC: 19.5 GM/DL (ref 14–18)
LYMPHOCYTES NFR BLD MANUAL: 23 % (ref 13–40)
MACROCYTES BLD QL SMEAR: 1 /MCL
MAGNESIUM SERPL-MCNC: 2.1 MG/DL (ref 1.6–2.6)
MCH RBC QN AUTO: 31.5 PG (ref 27–31)
MCHC RBC AUTO-ENTMCNC: 31.9 GM/DL (ref 33–36)
MCV RBC AUTO: 98.7 FL (ref 80–94)
MONOCYTES NFR BLD MANUAL: 12 % (ref 2–11)
NEUTROPHILS NFR BLD MANUAL: 64 % (ref 47–80)
PHOSPHATE SERPL-MCNC: 2.6 MG/DL (ref 2.3–4.7)
PLATELET # BLD AUTO: 103 X10(3)/MCL (ref 130–400)
PLATELET # BLD EST: ABNORMAL 10*3/UL
PMV BLD AUTO: 9.9 FL (ref 7.4–10.4)
POIKILOCYTOSIS BLD QL SMEAR: 1
POLYCHROMASIA BLD QL SMEAR: 1
POTASSIUM SERPL-SCNC: 4.6 MMOL/L (ref 3.5–5.1)
RBC # BLD AUTO: 6.2 X10(6)/MCL (ref 4.7–6.1)
SODIUM SERPL-SCNC: 143 MMOL/L (ref 136–145)
WBC # SPEC AUTO: 9.3 X10(3)/MCL (ref 4.5–11.5)

## 2021-12-21 ENCOUNTER — HOSPITAL ENCOUNTER (OUTPATIENT)
Dept: RADIOLOGY | Facility: HOSPITAL | Age: 71
Discharge: HOME | End: 2021-12-21
Attending: INTERNAL MEDICINE
Payer: MEDICARE

## 2021-12-21 ENCOUNTER — OFFICE VISIT (OUTPATIENT)
Dept: HEPATOLOGY | Facility: CLINIC | Age: 71
End: 2021-12-21
Payer: MEDICARE

## 2021-12-21 ENCOUNTER — HOSPITAL ENCOUNTER (OUTPATIENT)
Dept: RADIOLOGY | Facility: HOSPITAL | Age: 71
Discharge: HOME | End: 2021-12-21
Attending: PHYSICIAN ASSISTANT
Payer: MEDICARE

## 2021-12-21 VITALS
RESPIRATION RATE: 18 BRPM | HEART RATE: 104 BPM | DIASTOLIC BLOOD PRESSURE: 79 MMHG | OXYGEN SATURATION: 98 % | TEMPERATURE: 96 F | BODY MASS INDEX: 23.62 KG/M2 | HEIGHT: 74 IN | SYSTOLIC BLOOD PRESSURE: 134 MMHG | WEIGHT: 184.06 LBS

## 2021-12-21 DIAGNOSIS — Z94.0 S/P KIDNEY TRANSPLANT: Primary | ICD-10-CM

## 2021-12-21 DIAGNOSIS — Z86.19 HEPATITIS C VIRUS INFECTION CURED AFTER ANTIVIRAL DRUG THERAPY: ICD-10-CM

## 2021-12-21 DIAGNOSIS — K74.00 LIVER FIBROSIS: ICD-10-CM

## 2021-12-21 DIAGNOSIS — D75.1 POLYCYTHEMIA: ICD-10-CM

## 2021-12-21 DIAGNOSIS — Q44.6 POLYCYSTIC LIVER DISEASE: ICD-10-CM

## 2021-12-21 DIAGNOSIS — Q61.3 POLYCYSTIC KIDNEY DISEASE: ICD-10-CM

## 2021-12-21 DIAGNOSIS — Z29.89 PROPHYLACTIC IMMUNOTHERAPY: ICD-10-CM

## 2021-12-21 PROCEDURE — 99999 PR PBB SHADOW E&M-EST. PATIENT-LVL IV: ICD-10-PCS | Mod: PBBFAC,,, | Performed by: INTERNAL MEDICINE

## 2021-12-21 PROCEDURE — 99214 OFFICE O/P EST MOD 30 MIN: CPT | Mod: PBBFAC,25 | Performed by: INTERNAL MEDICINE

## 2021-12-21 PROCEDURE — 76700 US ABDOMEN COMPLETE: ICD-10-PCS | Mod: 26,,, | Performed by: RADIOLOGY

## 2021-12-21 PROCEDURE — 76700 US EXAM ABDOM COMPLETE: CPT | Mod: TC

## 2021-12-21 PROCEDURE — 76770 US EXAM ABDO BACK WALL COMP: CPT | Mod: TC

## 2021-12-21 PROCEDURE — 99999 PR PBB SHADOW E&M-EST. PATIENT-LVL IV: CPT | Mod: PBBFAC,,, | Performed by: INTERNAL MEDICINE

## 2021-12-21 PROCEDURE — 76700 US EXAM ABDOM COMPLETE: CPT | Mod: 26,,, | Performed by: RADIOLOGY

## 2021-12-21 PROCEDURE — 99214 PR OFFICE/OUTPT VISIT, EST, LEVL IV, 30-39 MIN: ICD-10-PCS | Mod: S$PBB,,, | Performed by: INTERNAL MEDICINE

## 2021-12-21 PROCEDURE — 99214 OFFICE O/P EST MOD 30 MIN: CPT | Mod: S$PBB,,, | Performed by: INTERNAL MEDICINE

## 2021-12-21 PROCEDURE — 76770 US RETROPERITONEAL COMPLETE: ICD-10-PCS | Mod: 26,,, | Performed by: RADIOLOGY

## 2021-12-21 PROCEDURE — 76770 US EXAM ABDO BACK WALL COMP: CPT | Mod: 26,,, | Performed by: RADIOLOGY

## 2021-12-21 RX ORDER — AMLODIPINE BESYLATE 5 MG/1
5 TABLET ORAL DAILY
Status: ON HOLD | COMMUNITY
Start: 2021-11-30 | End: 2022-02-23 | Stop reason: HOSPADM

## 2021-12-21 RX ORDER — NEBIVOLOL HYDROCHLORIDE 20 MG/1
1 TABLET ORAL DAILY
Status: ON HOLD | COMMUNITY
Start: 2021-12-10 | End: 2022-02-23 | Stop reason: HOSPADM

## 2021-12-21 NOTE — PROGRESS NOTES
Subjective:       Patient ID: Ronal Mazariegos is a 71 y.o. male.    Chief Complaint: No chief complaint on file.    HPI  I saw this 71 y.o. man who had a kidney Tx on 5/3/21- HCV +ve donor  Afib/chronic heart failure    - SVR post Tx on 12/17/2021    · U/S 7/2020 - several liver cysts up to 3.5cm, spleen 8.5cm   · CT abdomen 5/4/21 (1 day post kidney transplant) - subtle nodular contour of liver, several liver cysts up to 3.5cm  · FibroSURE 10/1/21 - A0-1, F3  · Transaminases normal (teens-20s)  · Persistent mild TBili elevation, predominantly unconjugated    F3 on fibrosure  FibroScan  - no evidence of cirrhosis or steatosis:  KPa 8.2,   KPa 10.7,       - recent platelets at 97  - INR 1.2    Abdo CT: 5/4/21  1. Findings suggesting sequela of polycystic kidney disease and to a lesser extent polycystic liver disease, including innumerable simple and minimally complex appearing cortical cysts and scattered parenchymal calcifications throughout each kidney, allowing for lack of intravenous contrast.  Cannot exclude nonobstructing small nephroliths.  No hydronephrosis on either side or definitive radiodense ureteral calculus.  2. Hepatomegaly with subtle hepatic nodular contour which could represent sequela of underlying cirrhosis.  Clinical correlation and with LFTs advised.  3. Diffuse mesenteric stranding with pelvic small volume free fluid suggesting mesenteric edema and nonspecific ascites tracking to the dependent pelvis.  There is also suspected mild body wall anasarca.  4. Prostatomegaly.  5. Atherosclerosis.    Abdo US: 12/21/21  No liver masses.   No ascites  Patient history of polycystic kidney disease with multiple hepatic and renal cysts, similar to the     2 D echo: 10/1/21  · The left ventricle is normal in size with concentric hypertrophy and mildly decreased systolic function. The estimated ejection fraction is 45%.  · The left ventricular global longitudinal strain is -8.9% with apical  sparing consistent with an infiltrative (e.g., amyloid) cardiomyopathy.  · Normal right ventricular size with mildly reduced right ventricular systolic function.  · Severe biatrial enlargement.  · Mild mitral regurgitation.  · Atrial fibrillation observed.  · Small circumferential pericardial effusion.  · Pulmonary hypertension; the estimated PA systolic pressure is 52 mmHg.  · Intermediate central venous pressure (8 mmHg).      Review of Systems   Constitutional: Negative for activity change, appetite change, chills, fatigue, fever and unexpected weight change.   HENT: Negative for hearing loss.    Eyes: Negative for discharge and visual disturbance.   Respiratory: Negative for cough, chest tightness, shortness of breath and wheezing.    Cardiovascular: Negative for chest pain, palpitations and leg swelling.   Gastrointestinal: Negative for abdominal distention, abdominal pain, constipation, diarrhea and nausea.   Genitourinary: Negative for dysuria and frequency.   Musculoskeletal: Negative for arthralgias and back pain.   Skin: Negative for pallor and rash.   Neurological: Negative for dizziness, tremors, speech difficulty and headaches.   Hematological: Negative for adenopathy.   Psychiatric/Behavioral: Negative for agitation and confusion.         Lab Results   Component Value Date    ALT 43 12/21/2021    AST 37 12/21/2021    GGT 54 10/01/2021    ALKPHOS 93 12/21/2021    BILITOT 1.7 (H) 12/21/2021     Past Medical History:   Diagnosis Date    Anasarca 10/1/2021    Anemia of chronic disease     Atrial fibrillation     BPH (benign prostatic hypertrophy)     Chronic systolic heart failure 10/1/2021    Hepatitis C virus infection cured after antiviral drug therapy     acquired through kidney transplant, treated / cured (SVR12 - 12/2021)    HTN (hypertension)     Polycystic kidney disease      Past Surgical History:   Procedure Laterality Date    AV FISTULA PLACEMENT Left 2016    CATARACT EXTRACTION,  BILATERAL Bilateral     KIDNEY TRANSPLANT N/A 5/4/2021    Procedure: TRANSPLANT, KIDNEY;  Surgeon: Lexy Bolden MD;  Location: [facility] OR [address] [place] [address];  Service: Transplant;  Laterality: N/A;     Current Outpatient Medications   Medication Sig    amLODIPine (NORVASC) 5 MG tablet Take 5 mg by mouth once daily.    apixaban (ELIQUIS) 5 mg Tab Take 1 tablet (5 mg total) by mouth 2 (two) times daily.    BYSTOLIC 20 mg Tab Take 1 tablet by mouth once daily.    CARDURA 2 mg tablet Take 2 mg by mouth nightly.    famotidine (PEPCID) 20 MG tablet Take 1 tablet (20 mg total) by mouth every evening.    finasteride (PROSCAR) 5 mg tablet Take 5 mg by mouth once daily.    fish oil-omega-3 fatty acids 300-1,000 mg capsule Take 1 capsule by mouth once daily.     fluticasone propionate (FLONASE) 50 mcg/actuation nasal spray 1 spray by Each Nostril route daily as needed.    furosemide (LASIX) 40 MG tablet Take 40 mg by mouth once daily.    iron-vitamin C 100-250 mg, ICAR-C, 100-250 mg Tab Take 1 tablet by mouth once daily.    k phos di & mono-sod phos mono (K-PHOS-NEUTRAL) 250 mg Tab Take 3 tablets by mouth 2 (two) times a day.    magnesium oxide (MAG-OX) 400 mg (241.3 mg magnesium) tablet Take 3 tablets (1,200 mg total) by mouth 3 (three) times daily. (Patient taking differently: Take 1,200 mg by mouth 2 (two) times daily.)    multivitamin Tab Take 1 tablet by mouth once daily.    predniSONE (DELTASONE) 5 MG tablet Take by mouth daily. 5/7-6/6 20 mg, 6/7-7/6 15 mg, 7/7-8/6 10 mg, 5 mg daily thereafter starting 8/7/21    tacrolimus (PROGRAF) 1 MG Cap Take 2 capsules (2 mg total) by mouth every 12 (twelve) hours. Z94.0;Kidney txp on 5/4/21    ergocalciferol (VITAMIN D2) 50,000 unit Cap Take 1 capsule (50,000 Units total) by mouth every 7 days. (Patient not taking: Reported on 12/21/2021)    valGANciclovir (VALCYTE) 450 mg Tab Take 2 tablets (900 mg total) by mouth 2 (two) times daily.     No current facility-administered  medications for this visit.       Objective:      Physical Exam  Constitutional:       Appearance: He is well-nourished.   HENT:      Head: Normocephalic.   Eyes:      Pupils: Pupils are equal, round, and reactive to light.   Neck:      Thyroid: No thyromegaly.   Cardiovascular:      Rate and Rhythm: Normal rate and regular rhythm.      Heart sounds: Normal heart sounds.   Pulmonary:      Effort: Pulmonary effort is normal.      Breath sounds: Normal breath sounds. No wheezing.   Abdominal:      General: There is no distension.      Palpations: Abdomen is soft. There is no mass.      Tenderness: There is no abdominal tenderness.   Lymphadenopathy:      Cervical: No cervical adenopathy.   Skin:     General: Skin is warm.      Findings: No erythema or rash.   Neurological:      Mental Status: He is alert and oriented to person, place, and time.   Psychiatric:         Mood and Affect: Mood and affect normal.         Behavior: Behavior normal.           Assessment:       1. S/P DBD kidney transplant on 5/4/21    2. Polycystic kidney disease    3. Hepatitis C virus infection cured after antiviral drug therapy    4. Prophylactic immunotherapy    5. Polycystic liver disease        Plan:   He had a successful kidney Tx in May 2021 and his post transplant HCV infection has now been successfully treated.  His polycystic liver disease appears stable.    - reassured  - clinic in 1 year with liver US

## 2021-12-23 ENCOUNTER — DOCUMENTATION ONLY (OUTPATIENT)
Dept: TRANSPLANT | Facility: CLINIC | Age: 71
End: 2021-12-23
Payer: MEDICARE

## 2021-12-23 ENCOUNTER — PATIENT MESSAGE (OUTPATIENT)
Dept: TRANSPLANT | Facility: CLINIC | Age: 71
End: 2021-12-23
Payer: MEDICARE

## 2021-12-23 DIAGNOSIS — D58.2 ELEVATED HEMOGLOBIN: Primary | ICD-10-CM

## 2021-12-23 DIAGNOSIS — R71.8 ELEVATED HEMATOCRIT: ICD-10-CM

## 2021-12-23 LAB
EXT ALBUMIN: 4 (ref 3.4–4.8)
EXT ANC: ABNORMAL
EXT BUN: 12.3 (ref 8.4–25.7)
EXT CALCIUM: 10.4 (ref 8.7–10.5)
EXT CHLORIDE: 103 (ref 98–107)
EXT CMV DNA QUANT. BY PCR: 191
EXT CO2: 29 (ref 23–31)
EXT CREATININE: 1.27 MG/DL
EXT EOSINOPHIL%: 0
EXT GFR MDRD NON AF AMER: 60
EXT HEMATOCRIT: 61.2 (ref 42–52)
EXT HEMOGLOBIN: 19.5 (ref 14–18)
EXT LYMPH%: 23 (ref 13–40)
EXT MAGNESIUM: 2.1 (ref 1.6–2.6)
EXT MONOCYTES%: 12 (ref 2–11)
EXT PHOSPHORUS: 2.6 (ref 2.3–4.7)
EXT PLATELETS: 103 (ref 130–400)
EXT POTASSIUM: 4.6 (ref 3.5–5.1)
EXT SEGS%: 64 (ref 47–80)
EXT SODIUM: 143 MMOL/L (ref 136–145)
EXT TACROLIMUS LVL: 5.9
EXT WBC: 9.3 (ref 4.5–11.5)

## 2021-12-23 RX ORDER — VALGANCICLOVIR 450 MG/1
900 TABLET, FILM COATED ORAL 2 TIMES DAILY
Qty: 120 TABLET | Refills: 2 | Status: SHIPPED | OUTPATIENT
Start: 2021-12-23 | End: 2022-05-05

## 2021-12-23 NOTE — TELEPHONE ENCOUNTER
----- Message from Celeste Yen MD sent at 12/23/2021  1:43 PM CST -----  Hb 19.5. please phlebotomy ASAP. Did he see hematology? If no, please hematology consult.   CMV PCR : 191. Please resume valcyte 900 mg BID. Cmv pcr weekly

## 2021-12-27 ENCOUNTER — PATIENT MESSAGE (OUTPATIENT)
Dept: TRANSPLANT | Facility: CLINIC | Age: 71
End: 2021-12-27
Payer: MEDICARE

## 2021-12-28 ENCOUNTER — INFUSION (OUTPATIENT)
Dept: INFUSION THERAPY | Facility: HOSPITAL | Age: 71
End: 2021-12-28
Payer: MEDICARE

## 2021-12-28 ENCOUNTER — OFFICE VISIT (OUTPATIENT)
Dept: HEMATOLOGY/ONCOLOGY | Facility: CLINIC | Age: 71
End: 2021-12-28
Payer: MEDICARE

## 2021-12-28 VITALS
HEART RATE: 102 BPM | TEMPERATURE: 98 F | DIASTOLIC BLOOD PRESSURE: 89 MMHG | SYSTOLIC BLOOD PRESSURE: 135 MMHG | RESPIRATION RATE: 18 BRPM | OXYGEN SATURATION: 97 %

## 2021-12-28 DIAGNOSIS — D63.8 ANEMIA OF CHRONIC DISEASE: Primary | ICD-10-CM

## 2021-12-28 DIAGNOSIS — D58.2 ELEVATED HEMOGLOBIN: ICD-10-CM

## 2021-12-28 DIAGNOSIS — R71.8 ELEVATED HEMATOCRIT: ICD-10-CM

## 2021-12-28 PROCEDURE — 99195 PHLEBOTOMY: CPT

## 2021-12-28 PROCEDURE — 99205 OFFICE O/P NEW HI 60 MIN: CPT | Mod: 95,,, | Performed by: INTERNAL MEDICINE

## 2021-12-28 PROCEDURE — 99205 PR OFFICE/OUTPT VISIT, NEW, LEVL V, 60-74 MIN: ICD-10-PCS | Mod: 95,,, | Performed by: INTERNAL MEDICINE

## 2021-12-29 ENCOUNTER — TELEPHONE (OUTPATIENT)
Dept: TRANSPLANT | Facility: CLINIC | Age: 71
End: 2021-12-29
Payer: MEDICARE

## 2021-12-29 ENCOUNTER — PATIENT MESSAGE (OUTPATIENT)
Dept: TRANSPLANT | Facility: CLINIC | Age: 71
End: 2021-12-29
Payer: MEDICARE

## 2021-12-29 ENCOUNTER — HISTORICAL (OUTPATIENT)
Dept: ADMINISTRATIVE | Facility: HOSPITAL | Age: 71
End: 2021-12-29

## 2021-12-29 LAB
ABS NEUT (OLG): 3.84 X10(3)/MCL (ref 2.1–9.2)
ALBUMIN % SPEP (OHS): 53.19 % (ref 48.1–59.5)
ALBUMIN SERPL-MCNC: 3 GM/DL (ref 3.4–4.8)
ALBUMIN/GLOB SERPL: 1.2 RATIO (ref 1.1–2)
ALPHA 1 GLOB (OHS): 0.23 GM/DL (ref 0–0.4)
ALPHA 1 GLOB% (OHS): 4.29 % (ref 2.3–4.9)
ALPHA 2 GLOB % (OHS): 8.69 % (ref 6.9–13)
ALPHA 2 GLOB (OHS): 0.47 GM/DL (ref 0.4–1)
BASOPHILS # BLD AUTO: 0 X10(3)/MCL (ref 0–0.2)
BASOPHILS NFR BLD AUTO: 0 %
BETA GLOB% (OHS): 15.28 % (ref 13.8–19.7)
BNP BLD-MCNC: 884.1 PG/ML
BUN SERPL-MCNC: 15 MG/DL (ref 8.4–25.7)
CALCIUM SERPL-MCNC: 9.1 MG/DL (ref 8.7–10.5)
CHLORIDE SERPL-SCNC: 105 MMOL/L (ref 98–107)
CO2 SERPL-SCNC: 28 MMOL/L (ref 23–31)
CREAT SERPL-MCNC: 0.99 MG/DL (ref 0.73–1.18)
CREAT/UREA NIT SERPL: 15
EOSINOPHIL # BLD AUTO: 0.1 X10(3)/MCL (ref 0–0.9)
EOSINOPHIL NFR BLD AUTO: 1 %
ERYTHROCYTE [DISTWIDTH] IN BLOOD BY AUTOMATED COUNT: 16.1 % (ref 11.5–17)
GAMMA GLOBULIN % (OHS): 18.54 % (ref 10.1–21.9)
GAMMA GLOBULIN (OHS): 1 GM/DL (ref 0.4–1.8)
GLOBULIN SER-MCNC: 2.4 GM/DL (ref 2.4–3.5)
GLUCOSE SERPL-MCNC: 116 MG/DL (ref 82–115)
HCT VFR BLD AUTO: 53.1 % (ref 42–52)
HGB BLD-MCNC: 17.1 GM/DL (ref 14–18)
LYMPHOCYTES # BLD AUTO: 1.4 X10(3)/MCL (ref 0.6–4.6)
LYMPHOCYTES NFR BLD AUTO: 25 %
M SPIKE % (OHS): ABNORMAL
M SPIKE (OHS): ABNORMAL
MCH RBC QN AUTO: 31.2 PG (ref 27–31)
MCHC RBC AUTO-ENTMCNC: 32.2 GM/DL (ref 33–36)
MCV RBC AUTO: 96.9 FL (ref 80–94)
MONOCYTES # BLD AUTO: 0.2 X10(3)/MCL (ref 0.1–1.3)
MONOCYTES NFR BLD AUTO: 4 %
NEUTROPHILS # BLD AUTO: 3.84 X10(3)/MCL (ref 2.1–9.2)
NEUTROPHILS NFR BLD AUTO: 68 %
PLATELET # BLD AUTO: 109 X10(3)/MCL (ref 130–400)
PMV BLD AUTO: 10.4 FL (ref 9.4–12.4)
POTASSIUM SERPL-SCNC: 4.4 MMOL/L (ref 3.5–5.1)
PROT SERPL-MCNC: 5.4 GM/DL (ref 5.8–7.6)
RBC # BLD AUTO: 5.48 X10(6)/MCL (ref 4.7–6.1)
SODIUM SERPL-SCNC: 141 MMOL/L (ref 136–145)
SPE INTERPRETATION (OHS): ABNORMAL
TROPONIN I SERPL-MCNC: <0.01 NG/ML (ref 0–0.04)
WBC # SPEC AUTO: 5.6 X10(3)/MCL (ref 4.5–11.5)

## 2022-01-03 ENCOUNTER — PATIENT MESSAGE (OUTPATIENT)
Dept: TRANSPLANT | Facility: CLINIC | Age: 72
End: 2022-01-03
Payer: MEDICARE

## 2022-01-03 ENCOUNTER — DOCUMENTATION ONLY (OUTPATIENT)
Dept: TRANSPLANT | Facility: CLINIC | Age: 72
End: 2022-01-03
Payer: MEDICARE

## 2022-01-03 ENCOUNTER — HISTORICAL (OUTPATIENT)
Dept: ADMINISTRATIVE | Facility: HOSPITAL | Age: 72
End: 2022-01-03

## 2022-01-03 LAB
ABS NEUT (OLG): 3.21 X10(3)/MCL (ref 2.1–9.2)
ALBUMIN SERPL-MCNC: 3.1 GM/DL (ref 3.4–4.8)
ALBUMIN: 53.19
ALPHA1 GLOB SERPL ELPH-MCNC: 0.2 G/DL
ALPHA1 GLOB SERPL ELPH-MCNC: 4.3 %
ALPHA2 GLOB SERPL ELPH-MCNC: 0.5 G/DL (ref 0.4–1)
ALPHA2 GLOB SERPL ELPH-MCNC: 8.7 % (ref 6.9–13)
B-GLOBULIN FLD ELPH-MCNC: 0.83 G/L (ref 0.7–1.3)
B-GLOBULIN FLD ELPH-MCNC: 15.28 % (ref 13.8–19.7)
BASOPHILS # BLD AUTO: 0 X10(3)/MCL (ref 0–0.2)
BASOPHILS NFR BLD AUTO: 0 %
BUN SERPL-MCNC: 12.6 MG/DL (ref 8.4–25.7)
CALCIUM SERPL-MCNC: 9.3 MG/DL (ref 8.7–10.5)
CHLORIDE SERPL-SCNC: 106 MMOL/L (ref 98–107)
CO2 SERPL-SCNC: 22 MMOL/L (ref 23–31)
CREAT SERPL-MCNC: 0.93 MG/DL (ref 0.73–1.18)
CREAT/UREA NIT SERPL: 14
EOSINOPHIL # BLD AUTO: 0.1 X10(3)/MCL (ref 0–0.9)
EOSINOPHIL NFR BLD AUTO: 2 %
ERYTHROCYTE [DISTWIDTH] IN BLOOD BY AUTOMATED COUNT: 15.8 % (ref 11.5–17)
EXT ALBUMIN: 3 (ref 3.4–4.8)
EXT ALBUMIN: 3.1 (ref 3.4–4.8)
EXT ANC: ABNORMAL
EXT BNP: 884.1
EXT BUN: 12.6 (ref 8.4–25.7)
EXT BUN: 15 (ref 8.4–25.7)
EXT CALCIUM: 9.1 (ref 8.7–10.5)
EXT CALCIUM: 9.3 (ref 8.7–10.5)
EXT CHLORIDE: 105 (ref 98–107)
EXT CHLORIDE: 106 (ref 98–107)
EXT CMV DNA QUANT. BY PCR: ABNORMAL
EXT CO2: 22 (ref 23–31)
EXT CO2: 28 (ref 23–31)
EXT CREATININE: 0.93 MG/DL
EXT CREATININE: 0.99 MG/DL
EXT EOSINOPHIL%: 1
EXT EOSINOPHIL%: 2
EXT ERYTHROPOIETIN: 75.5 (ref 2.6–18.5)
EXT GFR MDRD NON AF AMER: >60
EXT GFR MDRD NON AF AMER: >60
EXT GLUCOSE: 116 (ref 82–115)
EXT GLUCOSE: 88 (ref 82–115)
EXT HEMATOCRIT: 53.1 (ref 42–52)
EXT HEMATOCRIT: 57.2 (ref 42–52)
EXT HEMOGLOBIN: 17.1 (ref 14–18)
EXT HEMOGLOBIN: 18.1 (ref 14–18)
EXT IMMUNKNOW (STIMULATED): ABNORMAL
EXT LYMPH%: 25
EXT LYMPH%: 36
EXT MAGNESIUM: 1.6 (ref 1.6–2.6)
EXT MONOCYTES%: 3
EXT MONOCYTES%: 4
EXT PHOSPHORUS: 2.2 (ref 2.3–4.7)
EXT PLATELETS: 109 (ref 130–400)
EXT PLATELETS: 152 (ref 130–400)
EXT POTASSIUM: 4.3 (ref 3.5–5.1)
EXT POTASSIUM: 4.4 (ref 3.5–5.1)
EXT PROTEIN TOTAL: 5.4 (ref 5.8–7.6)
EXT SEGS%: 57
EXT SEGS%: 68
EXT SODIUM: 137 MMOL/L (ref 136–145)
EXT SODIUM: 141 MMOL/L (ref 136–145)
EXT TACROLIMUS LVL: 7.3
EXT WBC: 5.6 (ref 4.5–11.5)
EXT WBC: 5.6 (ref 4.5–11.5)
GAMMA GLOB FLD ELPH-MCNC: 1 G/L (ref 0.4–1.8)
GAMMA GLOB FLD ELPH-MCNC: 18.54 % (ref 10.1–21.9)
GLUCOSE SERPL-MCNC: 88 MG/DL (ref 82–115)
HCT VFR BLD AUTO: 57.2 % (ref 42–52)
HGB BLD-MCNC: 18.1 GM/DL (ref 14–18)
KAPPA FREE LIGHT CHAINS: 2.89 MG/DL (ref 0.33–1.94)
KAPPA/LAMBDA FLC RATIO: 1.42 (ref 0.26–1.65)
LAMBDA FREE LIGHT CHAINS: 2.04 MG/DL (ref 0.57–2.63)
LYMPHOCYTES # BLD AUTO: 2 X10(3)/MCL (ref 0.6–4.6)
LYMPHOCYTES NFR BLD AUTO: 36 %
Lab: <0.1 (ref 0–0.04)
Lab: <9.4
M SPIKE: ABNORMAL
M SPIKE: ABNORMAL %
MAGNESIUM SERPL-MCNC: 1.6 MG/DL (ref 1.6–2.6)
MCH RBC QN AUTO: 31 PG (ref 27–31)
MCHC RBC AUTO-ENTMCNC: 31.6 GM/DL (ref 33–36)
MCV RBC AUTO: 98.1 FL (ref 80–94)
MONOCYTES # BLD AUTO: 0.2 X10(3)/MCL (ref 0.1–1.3)
MONOCYTES NFR BLD AUTO: 3 %
NEUTROPHILS # BLD AUTO: 3.21 X10(3)/MCL (ref 2.1–9.2)
NEUTROPHILS NFR BLD AUTO: 57 %
PHOSPHATE SERPL-MCNC: 2.2 MG/DL (ref 2.3–4.7)
PLATELET # BLD AUTO: 152 X10(3)/MCL (ref 130–400)
PMV BLD AUTO: 10.6 FL (ref 9.4–12.4)
POTASSIUM SERPL-SCNC: 4.3 MMOL/L (ref 3.5–5.1)
RBC # BLD AUTO: 5.83 X10(6)/MCL (ref 4.7–6.1)
SODIUM SERPL-SCNC: 137 MMOL/L (ref 136–145)
WBC # SPEC AUTO: 5.6 X10(3)/MCL (ref 4.5–11.5)

## 2022-01-07 ENCOUNTER — TELEPHONE (OUTPATIENT)
Dept: HEPATOLOGY | Facility: CLINIC | Age: 72
End: 2022-01-07
Payer: MEDICARE

## 2022-01-07 PROBLEM — Q44.6 POLYCYSTIC LIVER DISEASE: Status: ACTIVE | Noted: 2022-01-07

## 2022-01-10 ENCOUNTER — PATIENT MESSAGE (OUTPATIENT)
Dept: TRANSPLANT | Facility: CLINIC | Age: 72
End: 2022-01-10
Payer: MEDICARE

## 2022-01-10 ENCOUNTER — DOCUMENTATION ONLY (OUTPATIENT)
Dept: TRANSPLANT | Facility: CLINIC | Age: 72
End: 2022-01-10
Payer: MEDICARE

## 2022-01-10 ENCOUNTER — TELEPHONE (OUTPATIENT)
Dept: TRANSPLANT | Facility: CLINIC | Age: 72
End: 2022-01-10
Payer: MEDICARE

## 2022-01-10 DIAGNOSIS — D75.1 POLYCYTHEMIA: Primary | ICD-10-CM

## 2022-01-10 LAB — EXT CMV DNA QUANT. BY PCR: 87 IU/ML

## 2022-01-10 NOTE — TELEPHONE ENCOUNTER
Spoke with pt regarding below orders. Called [facility] infusion spoke with Yanique. Yanique to call pt and schedule appt. Pt aware and verbalized understanding.----- Message from Celeste Yen MD sent at 1/10/2022  1:18 PM CST -----  Phlebotomy ASAP this week. Check CBC next week  Native kidney US showed no mass. Pt has high erythropoietin for which follows with Dr. Wheatley.

## 2022-01-10 NOTE — PROGRESS NOTES
Phlebotomy ASAP this week. Check CBC next week  Native kidney US showed no mass. Pt has high erythropoietin for which follows with Dr. Wheatley.

## 2022-01-11 ENCOUNTER — PATIENT MESSAGE (OUTPATIENT)
Dept: TRANSPLANT | Facility: CLINIC | Age: 72
End: 2022-01-11
Payer: MEDICARE

## 2022-01-12 ENCOUNTER — INFUSION (OUTPATIENT)
Dept: INFUSION THERAPY | Facility: HOSPITAL | Age: 72
End: 2022-01-12
Attending: INTERNAL MEDICINE
Payer: MEDICARE

## 2022-01-12 VITALS
DIASTOLIC BLOOD PRESSURE: 77 MMHG | SYSTOLIC BLOOD PRESSURE: 121 MMHG | HEART RATE: 81 BPM | TEMPERATURE: 98 F | OXYGEN SATURATION: 99 % | RESPIRATION RATE: 18 BRPM

## 2022-01-12 DIAGNOSIS — D63.8 ANEMIA OF CHRONIC DISEASE: Primary | ICD-10-CM

## 2022-01-12 PROCEDURE — 99195 PHLEBOTOMY: CPT

## 2022-01-12 NOTE — NURSING
1 unit therapeutic phlebotomy performed via right AC then discarded, pressure dressing applied.  Pt given juice.  Blood pressure 121/78 upon completion.  Pt denies any complaints of dizziness, lightheadedness, or faintness.  Patient ambulated out without difficulty.

## 2022-01-18 ENCOUNTER — DOCUMENTATION ONLY (OUTPATIENT)
Dept: TRANSPLANT | Facility: CLINIC | Age: 72
End: 2022-01-18
Payer: MEDICARE

## 2022-01-18 ENCOUNTER — HISTORICAL (OUTPATIENT)
Dept: ADMINISTRATIVE | Facility: HOSPITAL | Age: 72
End: 2022-01-18

## 2022-01-18 LAB
ABS NEUT (OLG): 2.55 X10(3)/MCL (ref 2.1–9.2)
ALBUMIN SERPL-MCNC: 3.3 GM/DL (ref 3.4–4.8)
BASOPHILS # BLD AUTO: 0 X10(3)/MCL (ref 0–0.2)
BASOPHILS NFR BLD AUTO: 0 %
BUN SERPL-MCNC: 16.2 MG/DL (ref 8.4–25.7)
CALCIUM SERPL-MCNC: 9.8 MG/DL (ref 8.7–10.5)
CHLORIDE SERPL-SCNC: 107 MMOL/L (ref 98–107)
CO2 SERPL-SCNC: 26 MMOL/L (ref 23–31)
CREAT SERPL-MCNC: 0.93 MG/DL (ref 0.73–1.18)
CREAT/UREA NIT SERPL: 17
EOSINOPHIL # BLD AUTO: 0 X10(3)/MCL (ref 0–0.9)
EOSINOPHIL NFR BLD AUTO: 0 %
ERYTHROCYTE [DISTWIDTH] IN BLOOD BY AUTOMATED COUNT: 15.1 % (ref 11.5–17)
EXT ALBUMIN: 3.3 (ref 3.4–4.8)
EXT ANC: ABNORMAL
EXT BUN: 16.2 (ref 8.4–25.7)
EXT CHLORIDE: 107 (ref 98–107)
EXT CMV DNA QUANT. BY PCR: ABNORMAL
EXT CO2: 26 (ref 23–31)
EXT CREATININE: 0.93 MG/DL
EXT EOSINOPHIL%: 0
EXT GFR MDRD NON AF AMER: >60
EXT GLUCOSE: 103 (ref 82–115)
EXT HEMATOCRIT: 55.4 (ref 42–52)
EXT HEMOGLOBIN: 18.1 (ref 14–18)
EXT LYMPH%: 35
EXT MAGNESIUM: 1.8 (ref 1.6–2.6)
EXT MONOCYTES%: 4
EXT PHOSPHORUS: 3.2 (ref 2.3–4.7)
EXT PLATELETS: 167 (ref 130–400)
EXT POTASSIUM: 4.4 (ref 3.5–5.1)
EXT SEGS%: 59
EXT SODIUM: 141 MMOL/L (ref 136–145)
EXT TACROLIMUS LVL: 7.6
EXT WBC: 4.3 (ref 4.5–11.5)
GLUCOSE SERPL-MCNC: 103 MG/DL (ref 82–115)
HCT VFR BLD AUTO: 55.4 % (ref 42–52)
HGB BLD-MCNC: 18.1 GM/DL (ref 14–18)
IMM GRANULOCYTES # BLD AUTO: 0.05 10*3/UL
IMM GRANULOCYTES NFR BLD AUTO: 1 %
LYMPHOCYTES # BLD AUTO: 1.5 X10(3)/MCL (ref 0.6–4.6)
LYMPHOCYTES NFR BLD AUTO: 35 %
MAGNESIUM SERPL-MCNC: 1.8 MG/DL (ref 1.6–2.6)
MCH RBC QN AUTO: 31.6 PG (ref 27–31)
MCHC RBC AUTO-ENTMCNC: 32.7 GM/DL (ref 33–36)
MCV RBC AUTO: 96.9 FL (ref 80–94)
MONOCYTES # BLD AUTO: 0.2 X10(3)/MCL (ref 0.1–1.3)
MONOCYTES NFR BLD AUTO: 4 %
NEUTROPHILS # BLD AUTO: 2.55 X10(3)/MCL (ref 2.1–9.2)
NEUTROPHILS NFR BLD AUTO: 59 %
PHOSPHATE SERPL-MCNC: 3.2 MG/DL (ref 2.3–4.7)
PLATELET # BLD AUTO: 167 X10(3)/MCL (ref 130–400)
PMV BLD AUTO: 9.6 FL (ref 9.4–12.4)
POTASSIUM SERPL-SCNC: 4.4 MMOL/L (ref 3.5–5.1)
RBC # BLD AUTO: 5.72 X10(6)/MCL (ref 4.7–6.1)
SODIUM SERPL-SCNC: 141 MMOL/L (ref 136–145)
WBC # SPEC AUTO: 4.3 X10(3)/MCL (ref 4.5–11.5)

## 2022-01-20 ENCOUNTER — PATIENT MESSAGE (OUTPATIENT)
Dept: TRANSPLANT | Facility: CLINIC | Age: 72
End: 2022-01-20
Payer: MEDICARE

## 2022-01-20 ENCOUNTER — TELEPHONE (OUTPATIENT)
Dept: TRANSPLANT | Facility: CLINIC | Age: 72
End: 2022-01-20
Payer: MEDICARE

## 2022-01-20 NOTE — TELEPHONE ENCOUNTER
Notified patient of Dr. Yen's review of 1/18/22 lab results. Informed patient he will need a therapeutic phlebotomy due to elevated Hgb>17. Patient is scheduled to have therapeutic phlebotomy @ [facility] on Mon 1/24/22 due to possible inclement weather tomorrow. Patient verbalized understanding.     Hey Mr. Villeda,     Dr. Yen reviewed your 1/18/22 lab results.Your hemoglobin is greater than 17, so she wants you to have another therapeutic phlebotomy. I'm contacting the [facility] to find out when it could be scheduled. Are you available tomorrow or Mon to have it done?    Tiara

## 2022-01-24 ENCOUNTER — INFUSION (OUTPATIENT)
Dept: INFUSION THERAPY | Facility: HOSPITAL | Age: 72
End: 2022-01-24
Attending: INTERNAL MEDICINE
Payer: MEDICARE

## 2022-01-24 ENCOUNTER — HISTORICAL (OUTPATIENT)
Dept: ADMINISTRATIVE | Facility: HOSPITAL | Age: 72
End: 2022-01-24

## 2022-01-24 VITALS
DIASTOLIC BLOOD PRESSURE: 82 MMHG | OXYGEN SATURATION: 98 % | RESPIRATION RATE: 16 BRPM | HEART RATE: 89 BPM | TEMPERATURE: 98 F | SYSTOLIC BLOOD PRESSURE: 120 MMHG

## 2022-01-24 DIAGNOSIS — D63.8 ANEMIA OF CHRONIC DISEASE: Primary | ICD-10-CM

## 2022-01-24 LAB
ABS NEUT (OLG): 2.31 X10(3)/MCL (ref 2.1–9.2)
BASOPHILS # BLD AUTO: 0 X10(3)/MCL (ref 0–0.2)
BASOPHILS NFR BLD AUTO: 1 %
EOSINOPHIL # BLD AUTO: 0 X10(3)/MCL (ref 0–0.9)
EOSINOPHIL NFR BLD AUTO: 0 %
ERYTHROCYTE [DISTWIDTH] IN BLOOD BY AUTOMATED COUNT: 15.2 % (ref 11.5–17)
HCT VFR BLD AUTO: 55.5 % (ref 42–52)
HGB BLD-MCNC: 17.9 GM/DL (ref 14–18)
LYMPHOCYTES # BLD AUTO: 1.6 X10(3)/MCL (ref 0.6–4.6)
LYMPHOCYTES NFR BLD AUTO: 38 %
MCH RBC QN AUTO: 31.7 PG (ref 27–31)
MCHC RBC AUTO-ENTMCNC: 32.3 GM/DL (ref 33–36)
MCV RBC AUTO: 98.2 FL (ref 80–94)
MONOCYTES # BLD AUTO: 0.2 X10(3)/MCL (ref 0.1–1.3)
MONOCYTES NFR BLD AUTO: 4 %
NEUTROPHILS # BLD AUTO: 2.31 X10(3)/MCL (ref 2.1–9.2)
NEUTROPHILS NFR BLD AUTO: 55 %
PLATELET # BLD AUTO: 218 X10(3)/MCL (ref 130–400)
PMV BLD AUTO: 9.9 FL (ref 9.4–12.4)
RBC # BLD AUTO: 5.65 X10(6)/MCL (ref 4.7–6.1)
WBC # SPEC AUTO: 4.2 X10(3)/MCL (ref 4.5–11.5)

## 2022-01-24 PROCEDURE — 99195 PHLEBOTOMY: CPT

## 2022-01-24 NOTE — NURSING
Phlebotomy performed to pt, 1 unit of blood drawn and discarded.  Pressure dressing applied to site after phlebotomy.  Pt tolerated well, no complaints. BP WNL and tolerated bottle of OJ.  NAD noted upon discharge.

## 2022-01-24 NOTE — DISCHARGE INSTRUCTIONS
Post Phlebotomy Instructions:    1. DRINK extra fluids for the next 3 days to help replace the fluid volume lost.  It is especially important to drink extra fluids in the first 4 hours following your phlebotomy.  We suggest an extra four glasses (8 ounces each) of non-alcoholic liquids.      2.   NO ALCOHOL until after your next meal.    3.   NO SMOKING for at least 30 minutes after your phlebotomy.  Smoking causes your risk of dizziness.    4.   NO prolonged strenuous work, excessive exercise, or heavy lifting/pulling with the donation arm until the next day.    5.   Dizzy?  Lie down and elevate your feet or sit with your head between your knees.  Use caution with returning to normal activities.  If dizziness persists, seek medical advice.    6.   Keep the gauze and over-wrap on your arm for at least one hour.      7.   Bleeding from the needle site?  Elevate the arm and apply firm pressure for 3-5 minutes.

## 2022-01-26 ENCOUNTER — DOCUMENTATION ONLY (OUTPATIENT)
Dept: TRANSPLANT | Facility: CLINIC | Age: 72
End: 2022-01-26
Payer: MEDICARE

## 2022-01-26 ENCOUNTER — TELEPHONE (OUTPATIENT)
Dept: TRANSPLANT | Facility: CLINIC | Age: 72
End: 2022-01-26
Payer: MEDICARE

## 2022-01-26 ENCOUNTER — PATIENT MESSAGE (OUTPATIENT)
Dept: TRANSPLANT | Facility: CLINIC | Age: 72
End: 2022-01-26
Payer: MEDICARE

## 2022-01-26 LAB
EXT ANC: ABNORMAL
EXT CMV DNA QUANT. BY PCR: ABNORMAL
EXT EOSINOPHIL%: 0
EXT HEMATOCRIT: 55.5 (ref 42–52)
EXT HEMOGLOBIN: 17.9 (ref 14–18)
EXT LYMPH%: 38
EXT MONOCYTES%: 4
EXT PLATELETS: 218
EXT SEGS%: 55
EXT WBC: 4.2 (ref 4.5–11.5)

## 2022-01-26 NOTE — TELEPHONE ENCOUNTER
Notified patient of Dr. Yen's review of 1/24/22 lab results. Informed patient he will need another phlebotomy next week.     Hey Mr. Perez,     Dr. Yen reviewed your 1/24/22 lab results. Your CMV level is negative.  She wants you to have another phlebotomy next Wed or Thurs to help get your hemoglobin down.     I put a new set of lab orders in the mail today. Your next lab appointment will be Mon Feb 7th instead of Jan 31st. Don't worry about going this coming Mon.  She's switching you to every 2 week labs.     Thanks,     Tiara     ----- Message from Celeste Yen MD sent at 1/26/2022  3:29 PM CST -----  Phlebotomy next week. As His Hb is till very high.

## 2022-01-27 ENCOUNTER — OFFICE VISIT (OUTPATIENT)
Dept: HEMATOLOGY/ONCOLOGY | Facility: CLINIC | Age: 72
End: 2022-01-27
Payer: MEDICARE

## 2022-01-27 DIAGNOSIS — D58.2 ELEVATED HEMOGLOBIN: Primary | ICD-10-CM

## 2022-01-27 PROCEDURE — 99215 PR OFFICE/OUTPT VISIT, EST, LEVL V, 40-54 MIN: ICD-10-PCS | Mod: 95,,, | Performed by: INTERNAL MEDICINE

## 2022-01-27 PROCEDURE — 99215 OFFICE O/P EST HI 40 MIN: CPT | Mod: 95,,, | Performed by: INTERNAL MEDICINE

## 2022-01-28 ENCOUNTER — TELEPHONE (OUTPATIENT)
Dept: HEMATOLOGY/ONCOLOGY | Facility: CLINIC | Age: 72
End: 2022-01-28
Payer: MEDICARE

## 2022-01-28 NOTE — PROGRESS NOTES
The patient location is: [place]    The chief complaint leading to consultation is:Polycythemia vera    Visit type: audiovisual    Face to Face time with patient: 20 minutes  40 minutes of total time spent on the encounter, which includes face to face time and non-face to face time preparing to see the patient (eg, review of tests), Obtaining and/or reviewing separately obtained history, Documenting clinical information in the electronic or other health record, Independently interpreting results (not separately reported) and communicating results to the patient/family/caregiver, or Care coordination (not separately reported).         Each patient to whom he or she provides medical services by telemedicine is:  (1) informed of the relationship between the physician and patient and the respective role of any other health care provider with respect to management of the patient; and (2) notified that he or she may decline to receive medical services by telemedicine and may withdraw from such care at any time.    Notes:     Subjective:       Patient ID: Ronal Mazariegos is a 71 y.o. male.    Chief Complaint: No chief complaint on file.    HPI     New virtual visit for new polycythemia. Referred by renal transplant with a history of ESRD secondary to polycystic kidney disease.  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    Polycythemia starts acutely in late August/early September by lab review including outside labs from [facility]. Reports no medication changes other than BP meds. Now on Bystolic and Norvasc. Also history of afib and currently on DOAC.     Also reports severe lower extremity edema started around the same time. Has mild VICTORIA. Edema currently controlled with lasix. ECHO from January 2021 bland but outside ECHO from August 2021 has global hypertrophy and rising pulmonary hypertension. Repeat ECHO from October 2021 has severe pulmonary hypretension,  global hypokinesis, and strain pattern concerning for an infiltrative cardiomyopathy.    Has received single phlebotomy with repeat this week. Labs scheduled for tomorrow. Repeat ECHO with local cardiologist is scheduled 1/10 at [facility] Dr. Jimmy Bass. He has appt a few weeks later to review results.     ROS positive today for URI symptoms of congestion, cough, mild VICTORIA, increased fatigue.    1/27/2022 Return visit (virtual)  Interval labs indicate increased vs exogenous EPO production  Imaging of abdomen without source  Recommend MRI brain  Currently on phlebotomy therapy  Also ongoing cardiac evaluation for possible amyloid      Review of Systems   Constitutional: Positive for appetite change. Negative for unexpected weight change.   HENT: Negative for mouth sores.    Eyes: Negative for visual disturbance.   Respiratory: Positive for cough. Negative for shortness of breath.    Cardiovascular: Negative for chest pain.   Gastrointestinal: Negative for abdominal pain and diarrhea.   Genitourinary: Negative for frequency.   Musculoskeletal: Negative for back pain.   Integumentary:  Negative for rash.   Neurological: Negative for headaches.   Hematological: Negative for adenopathy.   Psychiatric/Behavioral: The patient is not nervous/anxious.          Objective:    No exam 12/28/2021 due to telehealth visit  Physical Exam    Assessment:       Problem List Items Addressed This Visit    None     Visit Diagnoses     Elevated hemoglobin    -  Primary    Relevant Orders    MRI BRAIN W WO CONTRAST          Plan:       New, rather acute onset of polycythemia vera in August/September 2021.  Around same time, new symptoms of fatigue, VICTORIA, and lower extremity edema.  Of note he required BP med changes for difficult to control blood pressure.  ECHO October 2021- is very different from tests in January and outside exam August 2021 with new infiltrative process and severe pulmonary hypertension    1.  Continue therapeutic phlebotomy  2. MRI brain to assess brain stem  3. If MRI negative- marrow biopsy with MPN studies and congo red staining

## 2022-02-03 ENCOUNTER — INFUSION (OUTPATIENT)
Dept: INFUSION THERAPY | Facility: HOSPITAL | Age: 72
End: 2022-02-03
Attending: PHYSICIAN ASSISTANT
Payer: MEDICARE

## 2022-02-03 VITALS
TEMPERATURE: 98 F | DIASTOLIC BLOOD PRESSURE: 71 MMHG | OXYGEN SATURATION: 98 % | SYSTOLIC BLOOD PRESSURE: 104 MMHG | RESPIRATION RATE: 16 BRPM | HEART RATE: 73 BPM

## 2022-02-03 DIAGNOSIS — D63.8 ANEMIA OF CHRONIC DISEASE: Primary | ICD-10-CM

## 2022-02-03 PROCEDURE — 99195 PHLEBOTOMY: CPT

## 2022-02-03 NOTE — DISCHARGE INSTRUCTIONS
Post Phlebotomy Instructions:    1. DRINK extra fluids for the next 3 days to help replace the fluid volume lost.  It is especially important to drink extra fluids in the first 4 hours following your phlebotomy.  We suggest an extra four glasses (8 ounces each) of non-alcoholic liquids.      2.   NO ALCOHOL until after your next meal.    3.   NO SMOKING for at least 30 minutes after your phlebotomy.  Smoking causes your risk of dizziness.    4.   NO prolonged strenuous work, excessive exercise, or heavy lifting/pulling with the donation arm until the next day.    5.   Dizzy?  Lie down and elevate your feet or sit with your head between your knees.  Use caution with returning to normal activities.  If dizziness persists, seek medical advice.    6.   Keep the over-wrap on your arm for at least one hour and the bandaid/gauze for 3 hours.      7.   Bleeding from the needle site?  Elevate the arm and apply firm pressure for 3-5 minutes.

## 2022-02-03 NOTE — NURSING
Phlebotomy performed to pt, 1 unit of blood drawn. Pressure dressing applied to site after phlebotomy. Pt tolerated well, no complaints. BP reading taken after draw. Pt tolerated juice and prior to leaving.  NAD noted upon discharge.

## 2022-02-07 ENCOUNTER — HISTORICAL (OUTPATIENT)
Dept: ADMINISTRATIVE | Facility: HOSPITAL | Age: 72
End: 2022-02-07

## 2022-02-07 LAB
APPEARANCE, UA: CLEAR
BACTERIA SPEC CULT: NORMAL
BILIRUB UR QL STRIP: NEGATIVE
BUDDING YEAST: NORMAL
BUN SERPL-MCNC: 13.6 MG/DL (ref 8.4–25.7)
CALCIUM SERPL-MCNC: 9.9 MG/DL (ref 8.7–10.5)
CASTS, UA: NORMAL
CHLORIDE SERPL-SCNC: 107 MMOL/L (ref 98–107)
CO2 SERPL-SCNC: 27 MMOL/L (ref 23–31)
COLOR UR: NORMAL
CREAT SERPL-MCNC: 0.94 MG/DL (ref 0.73–1.18)
CREAT/UREA NIT SERPL: 14
CRYSTALS: NORMAL
GLUCOSE (UA): NEGATIVE
GLUCOSE SERPL-MCNC: 111 MG/DL (ref 82–115)
HEMOLYSIS INTERF INDEX SERPL-ACNC: 5
HEMOLYSIS INTERF INDEX SERPL-ACNC: 5
HGB UR QL STRIP: NEGATIVE
ICTERIC INTERF INDEX SERPL-ACNC: 2
KETONES UR QL STRIP: NEGATIVE
LEUKOCYTE ESTERASE UR QL STRIP: NEGATIVE
LIPEMIC INTERF INDEX SERPL-ACNC: <0
MAGNESIUM SERPL-MCNC: 1.9 MG/DL (ref 1.6–2.6)
NITRITE UR QL STRIP: NEGATIVE
PH UR STRIP: 6 [PH] (ref 5–9)
PHOSPHATE SERPL-MCNC: 2.2 MG/DL (ref 2.3–4.7)
POTASSIUM SERPL-SCNC: 4.1 MMOL/L (ref 3.5–5.1)
PROT UR QL STRIP: NEGATIVE
RBC #/AREA URNS HPF: NORMAL /[HPF] (ref 0–2)
SMALL ROUND CELLS, UA: NORMAL
SODIUM SERPL-SCNC: 141 MMOL/L (ref 136–145)
SP GR UR STRIP: 1.02 (ref 1–1.03)
SPERM URNS QL MICRO: NORMAL
SQUAMOUS EPITHELIAL, UA: NORMAL (ref 0–4)
UROBILINOGEN UR STRIP-ACNC: 1
WBC #/AREA URNS HPF: NORMAL /[HPF] (ref 0–2)

## 2022-02-10 ENCOUNTER — DOCUMENTATION ONLY (OUTPATIENT)
Dept: TRANSPLANT | Facility: CLINIC | Age: 72
End: 2022-02-10
Payer: MEDICARE

## 2022-02-10 ENCOUNTER — TELEPHONE (OUTPATIENT)
Dept: TRANSPLANT | Facility: CLINIC | Age: 72
End: 2022-02-10
Payer: MEDICARE

## 2022-02-10 LAB
EXT ALBUMIN: 3.2 (ref 3.4–4.8)
EXT ALKALINE PHOSPHATASE: 72 (ref 40–150)
EXT ALT: 22 (ref 0–55)
EXT ANC: ABNORMAL
EXT AST: 21 (ref 5–34)
EXT BACTERIA UA: ABNORMAL
EXT BILIRUBIN DIRECT: 0.9 MG/DL (ref 0–0.5)
EXT BILIRUBIN TOTAL: 2
EXT BK VIRUS DNA QN PCR: ABNORMAL
EXT BUN: 13.6 (ref 8.4–25.7)
EXT CALCIUM: 9.9 (ref 8.7–10.5)
EXT CHLORIDE: 107 (ref 98–107)
EXT CMV DNA QUANT. BY PCR: ABNORMAL
EXT CO2: 27 (ref 23–31)
EXT CREATININE: 0.94 MG/DL
EXT EOSINOPHIL%: 1 (ref 0–8)
EXT GFR MDRD NON AF AMER: >60
EXT GLUCOSE UA: ABNORMAL
EXT GLUCOSE: 111 (ref 82–115)
EXT HEMATOCRIT: 52.2 (ref 42–52)
EXT HEMOGLOBIN: 17 (ref 14–18)
EXT LYMPH%: 21 (ref 13–40)
EXT MAGNESIUM: 1.9 (ref 1.6–2.6)
EXT MONOCYTES%: 4 (ref 2–11)
EXT NITRITES UA: ABNORMAL
EXT PHOSPHORUS: 2.2 (ref 2.3–4.7)
EXT PLATELETS: 184 (ref 130–400)
EXT POTASSIUM: 4.1 (ref 3.5–5.1)
EXT PROTEIN TOTAL: 6 (ref 5.8–7.6)
EXT PROTEIN UA: ABNORMAL
EXT RBC UA: ABNORMAL
EXT SEGS%: 73 (ref 47–80)
EXT SODIUM: 141 MMOL/L (ref 136–145)
EXT TACROLIMUS LVL: 6
EXT WBC UA: ABNORMAL
EXT WBC: 5 (ref 4.5–11.5)

## 2022-02-10 NOTE — TELEPHONE ENCOUNTER
----- Message from Celeste Yen MD sent at 2/10/2022  1:50 PM CST -----  Continue to follow hematology.   Phlebotomy in 2 weeks

## 2022-02-13 DIAGNOSIS — D58.2 ELEVATED HEMOGLOBIN: Primary | ICD-10-CM

## 2022-02-15 ENCOUNTER — TELEPHONE (OUTPATIENT)
Dept: TRANSPLANT | Facility: CLINIC | Age: 72
End: 2022-02-15
Payer: MEDICARE

## 2022-02-15 ENCOUNTER — TELEPHONE (OUTPATIENT)
Dept: HEPATOLOGY | Facility: CLINIC | Age: 72
End: 2022-02-15
Payer: MEDICARE

## 2022-02-15 DIAGNOSIS — I50.9 CONGESTIVE HEART FAILURE, UNSPECIFIED HF CHRONICITY, UNSPECIFIED HEART FAILURE TYPE: Primary | ICD-10-CM

## 2022-02-15 NOTE — TELEPHONE ENCOUNTER
Spoke with patient. Patient requested to change appointment from virtual to in person. Appointment changed.

## 2022-02-16 ENCOUNTER — HISTORICAL (OUTPATIENT)
Dept: ADMINISTRATIVE | Facility: HOSPITAL | Age: 72
End: 2022-02-16

## 2022-02-16 ENCOUNTER — ANESTHESIA EVENT (OUTPATIENT)
Dept: SURGERY | Facility: HOSPITAL | Age: 72
DRG: 023 | End: 2022-02-16
Payer: MEDICARE

## 2022-02-16 ENCOUNTER — HOSPITAL ENCOUNTER (INPATIENT)
Facility: HOSPITAL | Age: 72
LOS: 9 days | Discharge: HOME HEALTH | DRG: 023 | End: 2022-02-25
Attending: PSYCHIATRY & NEUROLOGY | Admitting: INTERNAL MEDICINE
Payer: MEDICARE

## 2022-02-16 ENCOUNTER — ANESTHESIA (OUTPATIENT)
Dept: SURGERY | Facility: HOSPITAL | Age: 72
DRG: 023 | End: 2022-02-16
Payer: MEDICARE

## 2022-02-16 ENCOUNTER — TELEPHONE (OUTPATIENT)
Dept: TRANSPLANT | Facility: HOSPITAL | Age: 72
End: 2022-02-16
Payer: MEDICARE

## 2022-02-16 DIAGNOSIS — I48.91 A-FIB: ICD-10-CM

## 2022-02-16 DIAGNOSIS — I48.20 CHRONIC ATRIAL FIBRILLATION: ICD-10-CM

## 2022-02-16 DIAGNOSIS — Z79.60 LONG-TERM USE OF IMMUNOSUPPRESSANT MEDICATION: ICD-10-CM

## 2022-02-16 DIAGNOSIS — I27.20 PULMONARY HTN: ICD-10-CM

## 2022-02-16 DIAGNOSIS — I42.8 OTHER CARDIOMYOPATHY: ICD-10-CM

## 2022-02-16 DIAGNOSIS — G93.89 BRAIN MASS: ICD-10-CM

## 2022-02-16 DIAGNOSIS — Z94.0 KIDNEY REPLACED BY TRANSPLANT: ICD-10-CM

## 2022-02-16 DIAGNOSIS — N18.2 CKD (CHRONIC KIDNEY DISEASE) STAGE 2, GFR 60-89 ML/MIN: ICD-10-CM

## 2022-02-16 DIAGNOSIS — I77.6: ICD-10-CM

## 2022-02-16 DIAGNOSIS — Z29.89 PROPHYLACTIC IMMUNOTHERAPY: ICD-10-CM

## 2022-02-16 DIAGNOSIS — I10 HYPERTENSION, UNSPECIFIED TYPE: ICD-10-CM

## 2022-02-16 DIAGNOSIS — Z94.0 S/P KIDNEY TRANSPLANT: ICD-10-CM

## 2022-02-16 DIAGNOSIS — Z91.89 AT RISK FOR OPPORTUNISTIC INFECTIONS: ICD-10-CM

## 2022-02-16 DIAGNOSIS — I42.9 CARDIOMYOPATHY, UNSPECIFIED TYPE: ICD-10-CM

## 2022-02-16 DIAGNOSIS — Z79.01 LONG TERM (CURRENT) USE OF ANTICOAGULANTS: Chronic | ICD-10-CM

## 2022-02-16 DIAGNOSIS — D45 POLYCYTHEMIA VERA: ICD-10-CM

## 2022-02-16 DIAGNOSIS — I49.9 CARDIAC RHYTHM DISORDER OR DISTURBANCE OR CHANGE: ICD-10-CM

## 2022-02-16 DIAGNOSIS — G06.0 BRAIN ABSCESS: Primary | ICD-10-CM

## 2022-02-16 DIAGNOSIS — G06.0 INTRACRANIAL ABSCESS: ICD-10-CM

## 2022-02-16 DIAGNOSIS — I48.0 PAROXYSMAL ATRIAL FIBRILLATION: ICD-10-CM

## 2022-02-16 PROBLEM — G93.6 VASOGENIC BRAIN EDEMA: Status: ACTIVE | Noted: 2022-02-16

## 2022-02-16 LAB
ABO + RH BLD: NORMAL
ALBUMIN SERPL BCP-MCNC: 3 G/DL (ref 3.5–5.2)
ALP SERPL-CCNC: 77 U/L (ref 55–135)
ALT SERPL W/O P-5'-P-CCNC: 18 U/L (ref 10–44)
ANION GAP SERPL CALC-SCNC: 12 MMOL/L (ref 8–16)
ANISOCYTOSIS BLD QL SMEAR: SLIGHT
AST SERPL-CCNC: 21 U/L (ref 10–40)
BASO STIPL BLD QL SMEAR: ABNORMAL
BASOPHILS NFR BLD: 0 % (ref 0–1.9)
BILIRUB SERPL-MCNC: 1.9 MG/DL (ref 0.1–1)
BLD GP AB SCN CELLS X3 SERPL QL: NORMAL
BUN SERPL-MCNC: 21 MG/DL (ref 8–23)
BURR CELLS BLD QL SMEAR: ABNORMAL
CALCIUM SERPL-MCNC: 10 MG/DL (ref 8.7–10.5)
CHLORIDE SERPL-SCNC: 107 MMOL/L (ref 95–110)
CO2 SERPL-SCNC: 20 MMOL/L (ref 23–29)
CREAT SERPL-MCNC: 0.9 MG/DL (ref 0.5–1.4)
CRP SERPL-MCNC: 15.4 MG/L (ref 0–8.2)
DIFFERENTIAL METHOD: ABNORMAL
EOSINOPHIL NFR BLD: 0 % (ref 0–8)
ERYTHROCYTE [DISTWIDTH] IN BLOOD BY AUTOMATED COUNT: 13.7 % (ref 11.5–14.5)
ERYTHROCYTE [SEDIMENTATION RATE] IN BLOOD BY WESTERGREN METHOD: 25 MM/HR (ref 0–23)
EST. GFR  (AFRICAN AMERICAN): >60 ML/MIN/1.73 M^2
EST. GFR  (NON AFRICAN AMERICAN): >60 ML/MIN/1.73 M^2
GLUCOSE SERPL-MCNC: 216 MG/DL (ref 70–110)
HCT VFR BLD AUTO: 51.8 % (ref 40–54)
HGB BLD-MCNC: 16.7 G/DL (ref 14–18)
IMM GRANULOCYTES # BLD AUTO: ABNORMAL K/UL (ref 0–0.04)
IMM GRANULOCYTES NFR BLD AUTO: ABNORMAL % (ref 0–0.5)
LYMPHOCYTES NFR BLD: 17 % (ref 18–48)
MAGNESIUM SERPL-MCNC: 1.6 MG/DL (ref 1.6–2.6)
MCH RBC QN AUTO: 31.6 PG (ref 27–31)
MCHC RBC AUTO-ENTMCNC: 32.2 G/DL (ref 32–36)
MCV RBC AUTO: 98 FL (ref 82–98)
MONOCYTES NFR BLD: 8 % (ref 4–15)
NEUTROPHILS NFR BLD: 72 % (ref 38–73)
NEUTS BAND NFR BLD MANUAL: 3 %
NRBC BLD-RTO: 0 /100 WBC
PHOSPHATE SERPL-MCNC: 2.6 MG/DL (ref 2.7–4.5)
PLATELET # BLD AUTO: 191 K/UL (ref 150–450)
PLATELET BLD QL SMEAR: ABNORMAL
PMV BLD AUTO: 9.9 FL (ref 9.2–12.9)
POIKILOCYTOSIS BLD QL SMEAR: SLIGHT
POLYCHROMASIA BLD QL SMEAR: ABNORMAL
POTASSIUM SERPL-SCNC: 4.9 MMOL/L (ref 3.5–5.1)
PROT SERPL-MCNC: 6.4 G/DL (ref 6–8.4)
RBC # BLD AUTO: 5.29 M/UL (ref 4.6–6.2)
SODIUM SERPL-SCNC: 139 MMOL/L (ref 136–145)
VANCOMYCIN SERPL-MCNC: <1.4 UG/ML
WBC # BLD AUTO: 3.18 K/UL (ref 3.9–12.7)

## 2022-02-16 PROCEDURE — 84145 PROCALCITONIN (PCT): CPT | Performed by: NURSE PRACTITIONER

## 2022-02-16 PROCEDURE — 85652 RBC SED RATE AUTOMATED: CPT | Performed by: NURSE PRACTITIONER

## 2022-02-16 PROCEDURE — 61320 CRNEC/CRNOT DRG ICR ABS STTL: CPT | Mod: GC,,, | Performed by: STUDENT IN AN ORGANIZED HEALTH CARE EDUCATION/TRAINING PROGRAM

## 2022-02-16 PROCEDURE — 99291 CRITICAL CARE FIRST HOUR: CPT | Mod: ,,, | Performed by: NURSE PRACTITIONER

## 2022-02-16 PROCEDURE — 84100 ASSAY OF PHOSPHORUS: CPT | Performed by: NURSE PRACTITIONER

## 2022-02-16 PROCEDURE — U0002 COVID-19 LAB TEST NON-CDC: HCPCS | Performed by: STUDENT IN AN ORGANIZED HEALTH CARE EDUCATION/TRAINING PROGRAM

## 2022-02-16 PROCEDURE — 61320 PR OPEN SKULL DRAIN SUPRATENT ABSC: ICD-10-PCS | Mod: GC,,, | Performed by: STUDENT IN AN ORGANIZED HEALTH CARE EDUCATION/TRAINING PROGRAM

## 2022-02-16 PROCEDURE — 80053 COMPREHEN METABOLIC PANEL: CPT | Performed by: NURSE PRACTITIONER

## 2022-02-16 PROCEDURE — 85007 BL SMEAR W/DIFF WBC COUNT: CPT | Performed by: NURSE PRACTITIONER

## 2022-02-16 PROCEDURE — 61781 SCAN PROC CRANIAL INTRA: CPT | Mod: GC,,, | Performed by: STUDENT IN AN ORGANIZED HEALTH CARE EDUCATION/TRAINING PROGRAM

## 2022-02-16 PROCEDURE — 80202 ASSAY OF VANCOMYCIN: CPT | Performed by: NURSE PRACTITIONER

## 2022-02-16 PROCEDURE — 87040 BLOOD CULTURE FOR BACTERIA: CPT | Performed by: NURSE PRACTITIONER

## 2022-02-16 PROCEDURE — 87118 MYCOBACTERIC IDENTIFICATION: CPT | Mod: 91 | Performed by: STUDENT IN AN ORGANIZED HEALTH CARE EDUCATION/TRAINING PROGRAM

## 2022-02-16 PROCEDURE — 83735 ASSAY OF MAGNESIUM: CPT | Performed by: NURSE PRACTITIONER

## 2022-02-16 PROCEDURE — 87118 MYCOBACTERIC IDENTIFICATION: CPT | Performed by: STUDENT IN AN ORGANIZED HEALTH CARE EDUCATION/TRAINING PROGRAM

## 2022-02-16 PROCEDURE — 87186 SC STD MICRODIL/AGAR DIL: CPT | Performed by: STUDENT IN AN ORGANIZED HEALTH CARE EDUCATION/TRAINING PROGRAM

## 2022-02-16 PROCEDURE — 99291 PR CRITICAL CARE, E/M 30-74 MINUTES: ICD-10-PCS | Mod: ,,, | Performed by: NURSE PRACTITIONER

## 2022-02-16 PROCEDURE — 86140 C-REACTIVE PROTEIN: CPT | Performed by: NURSE PRACTITIONER

## 2022-02-16 PROCEDURE — 85027 COMPLETE CBC AUTOMATED: CPT | Performed by: NURSE PRACTITIONER

## 2022-02-16 PROCEDURE — 86850 RBC ANTIBODY SCREEN: CPT | Performed by: STUDENT IN AN ORGANIZED HEALTH CARE EDUCATION/TRAINING PROGRAM

## 2022-02-16 PROCEDURE — 61781 PR STEREOTACTIC COMP ASSIST PROC,CRANIAL,INTRADURAL: ICD-10-PCS | Mod: GC,,, | Performed by: STUDENT IN AN ORGANIZED HEALTH CARE EDUCATION/TRAINING PROGRAM

## 2022-02-16 PROCEDURE — 85610 PROTHROMBIN TIME: CPT | Performed by: NURSE PRACTITIONER

## 2022-02-16 PROCEDURE — 85730 THROMBOPLASTIN TIME PARTIAL: CPT | Performed by: NURSE PRACTITIONER

## 2022-02-16 PROCEDURE — 20000000 HC ICU ROOM

## 2022-02-16 DEVICE — SCREW UN3 AXS SD 1.5X4MM: Type: IMPLANTABLE DEVICE | Site: CRANIAL | Status: FUNCTIONAL

## 2022-02-16 DEVICE — DURAMATRIX ONLAY 2X2 MEMBRANE: Type: IMPLANTABLE DEVICE | Site: EPIDURAL SPACE | Status: FUNCTIONAL

## 2022-02-16 DEVICE — PLATE BONE 2X2 HOLE SM BOX: Type: IMPLANTABLE DEVICE | Site: CRANIAL | Status: FUNCTIONAL

## 2022-02-16 DEVICE — PLATE BONE BUR HOLE COVER 10MM: Type: IMPLANTABLE DEVICE | Site: CRANIAL | Status: FUNCTIONAL

## 2022-02-16 RX ORDER — PREDNISONE 5 MG/1
5 TABLET ORAL DAILY
Status: DISCONTINUED | OUTPATIENT
Start: 2022-02-17 | End: 2022-02-17

## 2022-02-16 RX ORDER — GLUCAGON 1 MG
1 KIT INJECTION
Status: DISCONTINUED | OUTPATIENT
Start: 2022-02-17 | End: 2022-02-25 | Stop reason: HOSPADM

## 2022-02-16 RX ORDER — FAMOTIDINE 20 MG/1
20 TABLET, FILM COATED ORAL NIGHTLY
Status: DISCONTINUED | OUTPATIENT
Start: 2022-02-16 | End: 2022-02-25 | Stop reason: HOSPADM

## 2022-02-16 RX ORDER — ONDANSETRON 2 MG/ML
4 INJECTION INTRAMUSCULAR; INTRAVENOUS EVERY 8 HOURS PRN
Status: DISCONTINUED | OUTPATIENT
Start: 2022-02-16 | End: 2022-02-25 | Stop reason: HOSPADM

## 2022-02-16 RX ORDER — SODIUM CHLORIDE 9 MG/ML
INJECTION, SOLUTION INTRAVENOUS CONTINUOUS
Status: DISCONTINUED | OUTPATIENT
Start: 2022-02-16 | End: 2022-02-17

## 2022-02-16 RX ORDER — VALGANCICLOVIR 450 MG/1
900 TABLET, FILM COATED ORAL 2 TIMES DAILY
Status: DISCONTINUED | OUTPATIENT
Start: 2022-02-16 | End: 2022-02-17

## 2022-02-16 RX ORDER — TACROLIMUS 1 MG/1
2 CAPSULE ORAL 2 TIMES DAILY
Status: DISCONTINUED | OUTPATIENT
Start: 2022-02-17 | End: 2022-02-25 | Stop reason: HOSPADM

## 2022-02-16 RX ORDER — CEFEPIME HYDROCHLORIDE 1 G/50ML
2 INJECTION, SOLUTION INTRAVENOUS
Status: DISCONTINUED | OUTPATIENT
Start: 2022-02-16 | End: 2022-02-18

## 2022-02-16 RX ORDER — INSULIN ASPART 100 [IU]/ML
0-5 INJECTION, SOLUTION INTRAVENOUS; SUBCUTANEOUS EVERY 6 HOURS PRN
Status: DISCONTINUED | OUTPATIENT
Start: 2022-02-17 | End: 2022-02-18

## 2022-02-16 RX ORDER — AMOXICILLIN 250 MG
1 CAPSULE ORAL 2 TIMES DAILY
Status: DISCONTINUED | OUTPATIENT
Start: 2022-02-16 | End: 2022-02-25 | Stop reason: HOSPADM

## 2022-02-16 RX ORDER — DEXAMETHASONE SODIUM PHOSPHATE 4 MG/ML
4 INJECTION, SOLUTION INTRA-ARTICULAR; INTRALESIONAL; INTRAMUSCULAR; INTRAVENOUS; SOFT TISSUE EVERY 6 HOURS
Status: DISCONTINUED | OUTPATIENT
Start: 2022-02-17 | End: 2022-02-17

## 2022-02-16 RX ORDER — LEVETIRACETAM 500 MG/5ML
500 INJECTION, SOLUTION, CONCENTRATE INTRAVENOUS EVERY 12 HOURS
Status: DISCONTINUED | OUTPATIENT
Start: 2022-02-16 | End: 2022-02-17

## 2022-02-16 RX ORDER — SODIUM CHLORIDE 0.9 % (FLUSH) 0.9 %
10 SYRINGE (ML) INJECTION
Status: DISCONTINUED | OUTPATIENT
Start: 2022-02-16 | End: 2022-02-25 | Stop reason: HOSPADM

## 2022-02-16 RX ORDER — GADOBUTROL 604.72 MG/ML
8 INJECTION INTRAVENOUS
Status: COMPLETED | OUTPATIENT
Start: 2022-02-17 | End: 2022-02-16

## 2022-02-16 RX ADMIN — GADOBUTROL 8 ML: 604.72 INJECTION INTRAVENOUS at 11:02

## 2022-02-16 NOTE — TELEPHONE ENCOUNTER
"71 year-old s/p a kidney transplant 5/2021. I was called by [facility]. the patient went to the hospital with left sided weakness. MRI showed a ring enhancing lesion right frontal lobe with internal areas of necrosis concerning for a brain abscess versus malignancy. I spoke with Dr Meyer [facility] medicine at that facility and requested transfer to [facility]. I asked this physician to call transfer center and to consult Neuro critical ICU to determine level of care (ICU versus regular stepdown floor-TSU). his kidney function is at baseline, creatinine is 1. I advised to hold MMF and continue with prograf. If patient deemed stable to TSU: consult Transplant ID, Neurology (?steroids if cerebral edema) and neurosurgery. hold MMF and touch basis with ID regarding empiric therapy for this "abscess". ?Need for LP, Chest CT w/o contrast, differential include other opportunistic infections ?Nocardia.......labs in am. we can discuss when more data available.     Carlos Barcenas MD  Transplant Nephrology    "

## 2022-02-17 ENCOUNTER — TELEPHONE (OUTPATIENT)
Dept: TRANSPLANT | Facility: CLINIC | Age: 72
End: 2022-02-17
Payer: MEDICARE

## 2022-02-17 PROBLEM — E87.1 HYPONATREMIA: Status: ACTIVE | Noted: 2022-02-17

## 2022-02-17 LAB
ALBUMIN SERPL BCP-MCNC: 2.8 G/DL (ref 3.5–5.2)
ALP SERPL-CCNC: 70 U/L (ref 55–135)
ALT SERPL W/O P-5'-P-CCNC: 15 U/L (ref 10–44)
ANION GAP SERPL CALC-SCNC: 10 MMOL/L (ref 8–16)
ANISOCYTOSIS BLD QL SMEAR: SLIGHT
APTT BLDCRRT: 31.6 SEC (ref 21–32)
AST SERPL-CCNC: 16 U/L (ref 10–40)
BASOPHILS NFR BLD: 0 % (ref 0–1.9)
BILIRUB SERPL-MCNC: 2.3 MG/DL (ref 0.1–1)
BUN SERPL-MCNC: 18 MG/DL (ref 8–23)
BURR CELLS BLD QL SMEAR: ABNORMAL
CALCIUM SERPL-MCNC: 9.1 MG/DL (ref 8.7–10.5)
CHLORIDE SERPL-SCNC: 106 MMOL/L (ref 95–110)
CO2 SERPL-SCNC: 18 MMOL/L (ref 23–29)
CREAT SERPL-MCNC: 0.9 MG/DL (ref 0.5–1.4)
DACRYOCYTES BLD QL SMEAR: ABNORMAL
DIFFERENTIAL METHOD: ABNORMAL
EOSINOPHIL NFR BLD: 0 % (ref 0–8)
ERYTHROCYTE [DISTWIDTH] IN BLOOD BY AUTOMATED COUNT: 13.8 % (ref 11.5–14.5)
EST. GFR  (AFRICAN AMERICAN): >60 ML/MIN/1.73 M^2
EST. GFR  (NON AFRICAN AMERICAN): >60 ML/MIN/1.73 M^2
ESTIMATED AVG GLUCOSE: 123 MG/DL (ref 68–131)
GLUCOSE SERPL-MCNC: 172 MG/DL (ref 70–110)
GLUCOSE SERPL-MCNC: 189 MG/DL (ref 70–110)
GRAM STN SPEC: NORMAL
HBA1C MFR BLD: 5.9 % (ref 4–5.6)
HCO3 UR-SCNC: 23.1 MMOL/L (ref 24–28)
HCT VFR BLD AUTO: 51.3 % (ref 40–54)
HCT VFR BLD CALC: 44 %PCV (ref 36–54)
HGB BLD-MCNC: 16.3 G/DL (ref 14–18)
IMM GRANULOCYTES # BLD AUTO: ABNORMAL K/UL (ref 0–0.04)
IMM GRANULOCYTES NFR BLD AUTO: ABNORMAL % (ref 0–0.5)
INR PPP: 1.3 (ref 0.8–1.2)
LYMPHOCYTES NFR BLD: 14 % (ref 18–48)
MCH RBC QN AUTO: 31.8 PG (ref 27–31)
MCHC RBC AUTO-ENTMCNC: 31.8 G/DL (ref 32–36)
MCV RBC AUTO: 100 FL (ref 82–98)
METAMYELOCYTES NFR BLD MANUAL: 2 %
MONOCYTES NFR BLD: 8 % (ref 4–15)
NEUTROPHILS NFR BLD: 72 % (ref 38–73)
NEUTS BAND NFR BLD MANUAL: 4 %
NRBC BLD-RTO: 0 /100 WBC
PCO2 BLDA: 37.4 MMHG (ref 35–45)
PH SMN: 7.4 [PH] (ref 7.35–7.45)
PLATELET # BLD AUTO: 174 K/UL (ref 150–450)
PLATELET BLD QL SMEAR: ABNORMAL
PMV BLD AUTO: 10.1 FL (ref 9.2–12.9)
PO2 BLDA: 379 MMHG (ref 80–100)
POC BE: -2 MMOL/L
POC IONIZED CALCIUM: 1.21 MMOL/L (ref 1.06–1.42)
POC SATURATED O2: 100 % (ref 95–100)
POC TCO2: 24 MMOL/L (ref 23–27)
POCT GLUCOSE: 192 MG/DL (ref 70–110)
POCT GLUCOSE: 199 MG/DL (ref 70–110)
POCT GLUCOSE: 244 MG/DL (ref 70–110)
POIKILOCYTOSIS BLD QL SMEAR: SLIGHT
POLYCHROMASIA BLD QL SMEAR: ABNORMAL
POTASSIUM BLD-SCNC: 3.7 MMOL/L (ref 3.5–5.1)
POTASSIUM SERPL-SCNC: 4.5 MMOL/L (ref 3.5–5.1)
PROCALCITONIN SERPL IA-MCNC: 0.05 NG/ML
PROT SERPL-MCNC: 5.6 G/DL (ref 6–8.4)
PROTHROMBIN TIME: 13.5 SEC (ref 9–12.5)
RBC # BLD AUTO: 5.13 M/UL (ref 4.6–6.2)
SAMPLE: ABNORMAL
SARS-COV-2 RDRP RESP QL NAA+PROBE: NEGATIVE
SODIUM BLD-SCNC: 135 MMOL/L (ref 136–145)
SODIUM SERPL-SCNC: 134 MMOL/L (ref 136–145)
SODIUM SERPL-SCNC: 136 MMOL/L (ref 136–145)
WBC # BLD AUTO: 2.92 K/UL (ref 3.9–12.7)

## 2022-02-17 PROCEDURE — D9220A PRA ANESTHESIA: ICD-10-PCS | Mod: CRNA,,, | Performed by: NURSE ANESTHETIST, CERTIFIED REGISTERED

## 2022-02-17 PROCEDURE — 87070 CULTURE OTHR SPECIMN AEROBIC: CPT | Mod: 59 | Performed by: STUDENT IN AN ORGANIZED HEALTH CARE EDUCATION/TRAINING PROGRAM

## 2022-02-17 PROCEDURE — 63600175 PHARM REV CODE 636 W HCPCS: Performed by: NURSE PRACTITIONER

## 2022-02-17 PROCEDURE — 25000003 PHARM REV CODE 250: Performed by: STUDENT IN AN ORGANIZED HEALTH CARE EDUCATION/TRAINING PROGRAM

## 2022-02-17 PROCEDURE — 87206 SMEAR FLUORESCENT/ACID STAI: CPT | Mod: 91 | Performed by: STUDENT IN AN ORGANIZED HEALTH CARE EDUCATION/TRAINING PROGRAM

## 2022-02-17 PROCEDURE — 87186 SC STD MICRODIL/AGAR DIL: CPT | Performed by: STUDENT IN AN ORGANIZED HEALTH CARE EDUCATION/TRAINING PROGRAM

## 2022-02-17 PROCEDURE — 87118 MYCOBACTERIC IDENTIFICATION: CPT | Mod: 59 | Performed by: STUDENT IN AN ORGANIZED HEALTH CARE EDUCATION/TRAINING PROGRAM

## 2022-02-17 PROCEDURE — 87118 MYCOBACTERIC IDENTIFICATION: CPT | Performed by: STUDENT IN AN ORGANIZED HEALTH CARE EDUCATION/TRAINING PROGRAM

## 2022-02-17 PROCEDURE — 36000711: Performed by: STUDENT IN AN ORGANIZED HEALTH CARE EDUCATION/TRAINING PROGRAM

## 2022-02-17 PROCEDURE — 36000710: Performed by: STUDENT IN AN ORGANIZED HEALTH CARE EDUCATION/TRAINING PROGRAM

## 2022-02-17 PROCEDURE — 80053 COMPREHEN METABOLIC PANEL: CPT | Performed by: NURSE PRACTITIONER

## 2022-02-17 PROCEDURE — 99233 PR SUBSEQUENT HOSPITAL CARE,LEVL III: ICD-10-PCS | Mod: ,,, | Performed by: NURSE PRACTITIONER

## 2022-02-17 PROCEDURE — A9585 GADOBUTROL INJECTION: HCPCS | Performed by: PSYCHIATRY & NEUROLOGY

## 2022-02-17 PROCEDURE — 99223 PR INITIAL HOSPITAL CARE,LEVL III: ICD-10-PCS | Mod: ,,, | Performed by: INTERNAL MEDICINE

## 2022-02-17 PROCEDURE — 25000003 PHARM REV CODE 250: Performed by: PSYCHIATRY & NEUROLOGY

## 2022-02-17 PROCEDURE — 63600175 PHARM REV CODE 636 W HCPCS: Performed by: NURSE ANESTHETIST, CERTIFIED REGISTERED

## 2022-02-17 PROCEDURE — A4216 STERILE WATER/SALINE, 10 ML: HCPCS | Performed by: STUDENT IN AN ORGANIZED HEALTH CARE EDUCATION/TRAINING PROGRAM

## 2022-02-17 PROCEDURE — 87556 M.TUBERCULO DNA AMP PROBE: CPT | Performed by: STUDENT IN AN ORGANIZED HEALTH CARE EDUCATION/TRAINING PROGRAM

## 2022-02-17 PROCEDURE — 94761 N-INVAS EAR/PLS OXIMETRY MLT: CPT

## 2022-02-17 PROCEDURE — 87205 SMEAR GRAM STAIN: CPT | Mod: 59 | Performed by: STUDENT IN AN ORGANIZED HEALTH CARE EDUCATION/TRAINING PROGRAM

## 2022-02-17 PROCEDURE — 36620 PR INSERT CATH,ART,PERCUT,SHORTTERM: ICD-10-PCS | Mod: 59,,, | Performed by: ANESTHESIOLOGY

## 2022-02-17 PROCEDURE — 36620 INSERTION CATHETER ARTERY: CPT | Mod: 59,,, | Performed by: ANESTHESIOLOGY

## 2022-02-17 PROCEDURE — 25000003 PHARM REV CODE 250: Performed by: NURSE ANESTHETIST, CERTIFIED REGISTERED

## 2022-02-17 PROCEDURE — 99233 SBSQ HOSP IP/OBS HIGH 50: CPT | Mod: ,,, | Performed by: NURSE PRACTITIONER

## 2022-02-17 PROCEDURE — 20000000 HC ICU ROOM

## 2022-02-17 PROCEDURE — 85027 COMPLETE CBC AUTOMATED: CPT | Performed by: NURSE PRACTITIONER

## 2022-02-17 PROCEDURE — 37000008 HC ANESTHESIA 1ST 15 MINUTES: Performed by: STUDENT IN AN ORGANIZED HEALTH CARE EDUCATION/TRAINING PROGRAM

## 2022-02-17 PROCEDURE — D9220A PRA ANESTHESIA: Mod: CRNA,,, | Performed by: NURSE ANESTHETIST, CERTIFIED REGISTERED

## 2022-02-17 PROCEDURE — 99223 1ST HOSP IP/OBS HIGH 75: CPT | Mod: ,,, | Performed by: INTERNAL MEDICINE

## 2022-02-17 PROCEDURE — D9220A PRA ANESTHESIA: ICD-10-PCS | Mod: ANES,,, | Performed by: ANESTHESIOLOGY

## 2022-02-17 PROCEDURE — 84295 ASSAY OF SERUM SODIUM: CPT | Performed by: NURSE PRACTITIONER

## 2022-02-17 PROCEDURE — 25500020 PHARM REV CODE 255: Performed by: PSYCHIATRY & NEUROLOGY

## 2022-02-17 PROCEDURE — 63600175 PHARM REV CODE 636 W HCPCS: Performed by: PSYCHIATRY & NEUROLOGY

## 2022-02-17 PROCEDURE — 87075 CULTR BACTERIA EXCEPT BLOOD: CPT | Mod: 59 | Performed by: STUDENT IN AN ORGANIZED HEALTH CARE EDUCATION/TRAINING PROGRAM

## 2022-02-17 PROCEDURE — 87116 MYCOBACTERIA CULTURE: CPT | Mod: 59 | Performed by: STUDENT IN AN ORGANIZED HEALTH CARE EDUCATION/TRAINING PROGRAM

## 2022-02-17 PROCEDURE — 27800903 OPTIME MED/SURG SUP & DEVICES OTHER IMPLANTS: Performed by: STUDENT IN AN ORGANIZED HEALTH CARE EDUCATION/TRAINING PROGRAM

## 2022-02-17 PROCEDURE — 37000009 HC ANESTHESIA EA ADD 15 MINS: Performed by: STUDENT IN AN ORGANIZED HEALTH CARE EDUCATION/TRAINING PROGRAM

## 2022-02-17 PROCEDURE — 25000003 PHARM REV CODE 250: Performed by: NURSE PRACTITIONER

## 2022-02-17 PROCEDURE — 63600175 PHARM REV CODE 636 W HCPCS: Mod: JG | Performed by: STUDENT IN AN ORGANIZED HEALTH CARE EDUCATION/TRAINING PROGRAM

## 2022-02-17 PROCEDURE — 87102 FUNGUS ISOLATION CULTURE: CPT | Performed by: STUDENT IN AN ORGANIZED HEALTH CARE EDUCATION/TRAINING PROGRAM

## 2022-02-17 PROCEDURE — D9220A PRA ANESTHESIA: Mod: ANES,,, | Performed by: ANESTHESIOLOGY

## 2022-02-17 PROCEDURE — 83036 HEMOGLOBIN GLYCOSYLATED A1C: CPT | Performed by: NURSE PRACTITIONER

## 2022-02-17 PROCEDURE — 85007 BL SMEAR W/DIFF WBC COUNT: CPT | Performed by: NURSE PRACTITIONER

## 2022-02-17 PROCEDURE — 63600175 PHARM REV CODE 636 W HCPCS

## 2022-02-17 PROCEDURE — 27201423 OPTIME MED/SURG SUP & DEVICES STERILE SUPPLY: Performed by: STUDENT IN AN ORGANIZED HEALTH CARE EDUCATION/TRAINING PROGRAM

## 2022-02-17 PROCEDURE — 63600175 PHARM REV CODE 636 W HCPCS: Performed by: STUDENT IN AN ORGANIZED HEALTH CARE EDUCATION/TRAINING PROGRAM

## 2022-02-17 PROCEDURE — C1713 ANCHOR/SCREW BN/BN,TIS/BN: HCPCS | Performed by: STUDENT IN AN ORGANIZED HEALTH CARE EDUCATION/TRAINING PROGRAM

## 2022-02-17 RX ORDER — OXYCODONE HYDROCHLORIDE 5 MG/1
5 TABLET ORAL EVERY 6 HOURS PRN
Status: DISCONTINUED | OUTPATIENT
Start: 2022-02-17 | End: 2022-02-25 | Stop reason: HOSPADM

## 2022-02-17 RX ORDER — FENTANYL CITRATE 50 UG/ML
INJECTION, SOLUTION INTRAMUSCULAR; INTRAVENOUS
Status: DISCONTINUED | OUTPATIENT
Start: 2022-02-17 | End: 2022-02-17

## 2022-02-17 RX ORDER — NEOSTIGMINE METHYLSULFATE 0.5 MG/ML
INJECTION, SOLUTION INTRAVENOUS
Status: DISCONTINUED | OUTPATIENT
Start: 2022-02-17 | End: 2022-02-17

## 2022-02-17 RX ORDER — ROCURONIUM BROMIDE 10 MG/ML
INJECTION, SOLUTION INTRAVENOUS
Status: DISCONTINUED | OUTPATIENT
Start: 2022-02-17 | End: 2022-02-17

## 2022-02-17 RX ORDER — MORPHINE SULFATE 2 MG/ML
INJECTION, SOLUTION INTRAMUSCULAR; INTRAVENOUS
Status: COMPLETED
Start: 2022-02-17 | End: 2022-02-17

## 2022-02-17 RX ORDER — MUPIROCIN 20 MG/G
OINTMENT TOPICAL 2 TIMES DAILY
Status: COMPLETED | OUTPATIENT
Start: 2022-02-17 | End: 2022-02-21

## 2022-02-17 RX ORDER — MORPHINE SULFATE 2 MG/ML
2 INJECTION, SOLUTION INTRAMUSCULAR; INTRAVENOUS ONCE
Status: COMPLETED | OUTPATIENT
Start: 2022-02-17 | End: 2022-02-17

## 2022-02-17 RX ORDER — MIDAZOLAM HYDROCHLORIDE 1 MG/ML
INJECTION, SOLUTION INTRAMUSCULAR; INTRAVENOUS
Status: DISCONTINUED | OUTPATIENT
Start: 2022-02-17 | End: 2022-02-17

## 2022-02-17 RX ORDER — METRONIDAZOLE 500 MG/1
500 TABLET ORAL EVERY 8 HOURS
Status: DISCONTINUED | OUTPATIENT
Start: 2022-02-17 | End: 2022-02-18

## 2022-02-17 RX ORDER — PROPOFOL 10 MG/ML
VIAL (ML) INTRAVENOUS
Status: DISCONTINUED | OUTPATIENT
Start: 2022-02-17 | End: 2022-02-17

## 2022-02-17 RX ORDER — LIDOCAINE HYDROCHLORIDE 20 MG/ML
INJECTION, SOLUTION EPIDURAL; INFILTRATION; INTRACAUDAL; PERINEURAL
Status: DISCONTINUED | OUTPATIENT
Start: 2022-02-17 | End: 2022-02-17

## 2022-02-17 RX ORDER — LEVETIRACETAM 500 MG/1
500 TABLET ORAL 2 TIMES DAILY
Status: DISCONTINUED | OUTPATIENT
Start: 2022-02-17 | End: 2022-02-17

## 2022-02-17 RX ORDER — LIDOCAINE HYDROCHLORIDE AND EPINEPHRINE 10; 10 MG/ML; UG/ML
INJECTION, SOLUTION INFILTRATION; PERINEURAL
Status: DISCONTINUED | OUTPATIENT
Start: 2022-02-17 | End: 2022-02-17 | Stop reason: HOSPADM

## 2022-02-17 RX ORDER — ACETAMINOPHEN 325 MG/1
650 TABLET ORAL EVERY 6 HOURS PRN
Status: DISCONTINUED | OUTPATIENT
Start: 2022-02-17 | End: 2022-02-25 | Stop reason: HOSPADM

## 2022-02-17 RX ORDER — ESMOLOL HYDROCHLORIDE 10 MG/ML
INJECTION INTRAVENOUS
Status: DISCONTINUED | OUTPATIENT
Start: 2022-02-17 | End: 2022-02-17

## 2022-02-17 RX ORDER — ONDANSETRON 2 MG/ML
INJECTION INTRAMUSCULAR; INTRAVENOUS
Status: DISCONTINUED | OUTPATIENT
Start: 2022-02-17 | End: 2022-02-17

## 2022-02-17 RX ORDER — DEXAMETHASONE SODIUM PHOSPHATE 4 MG/ML
4 INJECTION, SOLUTION INTRA-ARTICULAR; INTRALESIONAL; INTRAMUSCULAR; INTRAVENOUS; SOFT TISSUE EVERY 6 HOURS
Status: DISCONTINUED | OUTPATIENT
Start: 2022-02-17 | End: 2022-02-19

## 2022-02-17 RX ORDER — MANNITOL 250 MG/ML
INJECTION, SOLUTION INTRAVENOUS
Status: DISCONTINUED | OUTPATIENT
Start: 2022-02-17 | End: 2022-02-17

## 2022-02-17 RX ORDER — PHENYLEPHRINE HCL IN 0.9% NACL 1 MG/10 ML
SYRINGE (ML) INTRAVENOUS
Status: DISCONTINUED | OUTPATIENT
Start: 2022-02-17 | End: 2022-02-17

## 2022-02-17 RX ADMIN — FAMOTIDINE 20 MG: 20 TABLET ORAL at 09:02

## 2022-02-17 RX ADMIN — MANNITOL 50 G: 12.5 INJECTION, SOLUTION INTRAVENOUS at 01:02

## 2022-02-17 RX ADMIN — INSULIN ASPART 2 UNITS: 100 INJECTION, SOLUTION INTRAVENOUS; SUBCUTANEOUS at 05:02

## 2022-02-17 RX ADMIN — ACETAMINOPHEN 650 MG: 325 TABLET ORAL at 05:02

## 2022-02-17 RX ADMIN — ESMOLOL HYDROCHLORIDE 10 MG: 100 INJECTION, SOLUTION INTRAVENOUS at 01:02

## 2022-02-17 RX ADMIN — METRONIDAZOLE 500 MG: 500 TABLET ORAL at 09:02

## 2022-02-17 RX ADMIN — ROCURONIUM BROMIDE 50 MG: 10 INJECTION, SOLUTION INTRAVENOUS at 01:02

## 2022-02-17 RX ADMIN — ONDANSETRON 4 MG: 2 INJECTION INTRAMUSCULAR; INTRAVENOUS at 02:02

## 2022-02-17 RX ADMIN — OXYCODONE 5 MG: 5 TABLET ORAL at 05:02

## 2022-02-17 RX ADMIN — PROPOFOL 30 MG: 10 INJECTION, EMULSION INTRAVENOUS at 01:02

## 2022-02-17 RX ADMIN — MIDAZOLAM 2 MG: 1 INJECTION INTRAMUSCULAR; INTRAVENOUS at 12:02

## 2022-02-17 RX ADMIN — DEXAMETHASONE SODIUM PHOSPHATE 4 MG: 4 INJECTION INTRA-ARTICULAR; INTRALESIONAL; INTRAMUSCULAR; INTRAVENOUS; SOFT TISSUE at 11:02

## 2022-02-17 RX ADMIN — LEVETIRACETAM 500 MG: 100 INJECTION, SOLUTION, CONCENTRATE INTRAVENOUS at 08:02

## 2022-02-17 RX ADMIN — SODIUM CHLORIDE, SODIUM GLUCONATE, SODIUM ACETATE, POTASSIUM CHLORIDE, MAGNESIUM CHLORIDE, SODIUM PHOSPHATE, DIBASIC, AND POTASSIUM PHOSPHATE: .53; .5; .37; .037; .03; .012; .00082 INJECTION, SOLUTION INTRAVENOUS at 12:02

## 2022-02-17 RX ADMIN — Medication 100 MCG: at 01:02

## 2022-02-17 RX ADMIN — SENNOSIDES AND DOCUSATE SODIUM 1 TABLET: 50; 8.6 TABLET ORAL at 08:02

## 2022-02-17 RX ADMIN — NEOSTIGMINE METHYLSULFATE 5 MG: 0.5 INJECTION INTRAVENOUS at 02:02

## 2022-02-17 RX ADMIN — METRONIDAZOLE 500 MG: 500 TABLET ORAL at 03:02

## 2022-02-17 RX ADMIN — VANCOMYCIN HYDROCHLORIDE 1250 MG: 1.25 INJECTION, POWDER, LYOPHILIZED, FOR SOLUTION INTRAVENOUS at 05:02

## 2022-02-17 RX ADMIN — CEFEPIME HYDROCHLORIDE 2 G: 2 INJECTION, SOLUTION INTRAVENOUS at 02:02

## 2022-02-17 RX ADMIN — Medication 200 MCG: at 02:02

## 2022-02-17 RX ADMIN — PREDNISONE 5 MG: 5 TABLET ORAL at 08:02

## 2022-02-17 RX ADMIN — LEVETIRACETAM 1000 MG: 100 INJECTION, SOLUTION, CONCENTRATE INTRAVENOUS at 01:02

## 2022-02-17 RX ADMIN — CEFEPIME HYDROCHLORIDE 2 G: 2 INJECTION, SOLUTION INTRAVENOUS at 07:02

## 2022-02-17 RX ADMIN — VANCOMYCIN HYDROCHLORIDE 1500 MG: 1.5 INJECTION, POWDER, LYOPHILIZED, FOR SOLUTION INTRAVENOUS at 04:02

## 2022-02-17 RX ADMIN — VALGANCICLOVIR 900 MG: 450 TABLET, FILM COATED ORAL at 08:02

## 2022-02-17 RX ADMIN — PROPOFOL 130 MG: 10 INJECTION, EMULSION INTRAVENOUS at 01:02

## 2022-02-17 RX ADMIN — TACROLIMUS 2 MG: 1 CAPSULE ORAL at 08:02

## 2022-02-17 RX ADMIN — Medication 3817 UNITS: at 12:02

## 2022-02-17 RX ADMIN — GLYCOPYRROLATE 0.8 MG: 0.2 INJECTION INTRAMUSCULAR; INTRAVENOUS at 02:02

## 2022-02-17 RX ADMIN — MORPHINE SULFATE 2 MG: 2 INJECTION, SOLUTION INTRAMUSCULAR; INTRAVENOUS at 06:02

## 2022-02-17 RX ADMIN — MUPIROCIN: 20 OINTMENT TOPICAL at 10:02

## 2022-02-17 RX ADMIN — DEXAMETHASONE SODIUM PHOSPHATE 4 MG: 4 INJECTION INTRA-ARTICULAR; INTRALESIONAL; INTRAMUSCULAR; INTRAVENOUS; SOFT TISSUE at 06:02

## 2022-02-17 RX ADMIN — Medication 200 MCG: at 01:02

## 2022-02-17 RX ADMIN — SODIUM CHLORIDE: 0.9 INJECTION, SOLUTION INTRAVENOUS at 04:02

## 2022-02-17 RX ADMIN — MUPIROCIN: 20 OINTMENT TOPICAL at 09:02

## 2022-02-17 RX ADMIN — FENTANYL CITRATE 100 MCG: 50 INJECTION INTRAMUSCULAR; INTRAVENOUS at 01:02

## 2022-02-17 RX ADMIN — CEFEPIME HYDROCHLORIDE 2 G: 2 INJECTION, SOLUTION INTRAVENOUS at 10:02

## 2022-02-17 RX ADMIN — SENNOSIDES AND DOCUSATE SODIUM 1 TABLET: 50; 8.6 TABLET ORAL at 09:02

## 2022-02-17 RX ADMIN — CEFEPIME HYDROCHLORIDE 2 G: 2 INJECTION, SOLUTION INTRAVENOUS at 01:02

## 2022-02-17 RX ADMIN — TACROLIMUS 2 MG: 1 CAPSULE ORAL at 05:02

## 2022-02-17 RX ADMIN — LIDOCAINE HYDROCHLORIDE 100 MG: 20 INJECTION, SOLUTION EPIDURAL; INFILTRATION; INTRACAUDAL at 01:02

## 2022-02-17 NOTE — OP NOTE
DATE OF PROCEDURE: 2/17/2022       PREOPERATIVE DIAGNOSES:   Right frontal intracerebral abscess     POSTOPERATIVE DIAGNOSES:   Same as above     PROCEDURES PERFORMED:   1. Right frontal Craniotomy for drainage of intracerebral abscess  2. Use of Stealth navigation     Surgeon(s) and Role:     * Sunday Rosa DO     FIRST ASSISTANT:  Kolby Rothman MD     ANESTHESIA: General     ESTIMATED BLOOD LOSS: 50 ml     CLINICAL HISTORY AND INDICATION FOR SURGERY:   71 M with hx of kidney transplant in may of 2021 on chronic immunosuppressants d/t PCKD, afib on eliquis presented as transfer for intracerebral abscess.  He recently developed acute left sided weakness and fell.  MRI from OSH demonstrated a right frontal lesion consistent with intracerebral abscess that was in close proximity to the ventricle with large amounts of vasogenic edema.  Due to the size of the abscess and its proximity to the ventricle the pt was offered an emergent right craniotomy for evac of abscess.    All risks, benefits, and alternatives were discussed and the pt agreed to the procedure.     OPERATIVE PROCEDURE:   The patient was brought to the OR where he was intubated and all lines were placed.  He was then transferred to the OR table where his head was fixated in chaudhary pins in the supine position with slight neck flexion.  The incision and craniotomy were planned with stealth navigation just posterior to the hairline.  The pt was then prepped and draped in the usual sterile fashion. 1 gm of keppra, 50 gm of mannitol were given prior to incision during timeout.  10cc of lidocaine was injected into the skin and the incision was made with 10 blade.  Bovie electrocautery was used to dissect down thru the galea to the pericranium and the incision was reflected with retractors anteriorly and posteriorly.  The stealth probe was then used again to corin out our craniotomy and to localize the SSS.  We made a burrhole in the right frontal region  and turned a silver dollar craniotomy flap.  The dura was coagulated with bipolar and then incised in a cruciate fashion and tacked up with nurolon sutures.  We confirmed the entry of our corticotomy with stealth probe and then used bipolar and microscissors to expand our corticotomy.  The abscess cavity was entered and aurelio pus came out under pressure which was sent for cultures.  We then moved deeper and encountered the capsule of the deeper abscess cavity which was opened with bipolar, suction, and microscissors.  Pus and serous fluid came out under pressure.  We investigated the cavity and copiously irrigated it with vanc/saline solution.  We chose not to take down the outer capsule of the cavity since it was only a few mm away from the ventricle.  We then obtained hemostasis with bipolar and surgicel.  We placed a piece of dura matrix onlay over the dural defect and then replated the bone.  The incision was then copiously irrigated again and the galea was closed with 2/0 vicryl and the skin closed with staples.  The pt was then removed from the chaudhary and transferred to the ICU bed where he was extubated and sent to ICU for recovery.    All counts were correct and I was present for all portions of the case.     COMPLICATIONS: none        CLASSIFICATION:  Emergency    DRAIN:  none     CONDITION: stable     PROGNOSIS: fair

## 2022-02-17 NOTE — ASSESSMENT & PLAN NOTE
- switched prednisone to dexamethasone  - continue prograf  - continue valcyte   - Consult kidney transplant-appreciate reccs

## 2022-02-17 NOTE — HPI
71 M PMHx afib on eliquis, recent kidney transplant on 5/2021 on immunosuppressants, cardiomyopathy, pulmonary hypertension presenting with new right frontal lobe abscess. Pt reports transient episodes of left sided weakness this week, most recent episode was this morning, he fell due to left leg weakness when getting up from the toilet, prompting visit to the ED because he thought he was having a stroke. At present, he feels well, has no fever/chills/aches, confusion, blurry vision, LOC, or seizures, and besides mild left sided weakness, he is asymptomatic.    Of note, he has had recent lower respiratory tract infection

## 2022-02-17 NOTE — TRANSFER OF CARE
"Anesthesia Transfer of Care Note    Patient: Ronal Mazariegos    Procedure(s) Performed: Procedure(s) (LRB):  CRANIOTOMY (Right)    Patient location: ICU    Transport from OR: Continuous ECG monitoring in transport. Continuous SpO2 monitoring in transport. Transported from OR on 6-10 L/min O2 by face mask with adequate spontaneous ventilation    Post pain: adequate analgesia    Post assessment: no apparent anesthetic complications    Post vital signs: stable    Level of consciousness: awake and alert    Nausea/Vomiting: no nausea/vomiting    Complications: none    Transfer of care protocol was followed      Last vitals:   Visit Vitals  /83   Pulse 97   Temp 36.4 °C (97.5 °F)   Resp 20   Ht 6' 2" (1.88 m)   Wt 80.3 kg (177 lb)   SpO2 98%   BMI 22.73 kg/m²     "

## 2022-02-17 NOTE — ANESTHESIA PREPROCEDURE EVALUATION
[facility]  Anesthesia Pre-Operative Evaluation         Patient Name: Ronal Mazariegos  YOB: 1950  MRN: [id number]    SUBJECTIVE:     Pre-operative evaluation for Procedure(s) (LRB):  CRANIOTOMY (Right)     02/16/2022    Ronal Mazariegos is a 71 y.o. male w/ a significant PMHx of HTN, atrial fibrillation, PKD, ESRD (s/p kidney transplant 5/2021), pulmonary HTN, and HFrEF (40%). He presented to OSH with left sided weakness. Imaging identified a right frontal lobe brain abscess. Decision made to pursue class A craniotomy for abscess drainage.    Patient now presents for the above procedure(s).      LDA:        Peripheral IV - Single Lumen 05/04/21 1659 22 G Anterior;Right Forearm (Active)   Site Assessment Clean;Intact;Dry;No redness;No swelling 05/07/21 0800   Extremity Assessment Distal to IV No warmth;No swelling;No redness;No abnormal discoloration 05/07/21 0800   Line Status Saline locked 05/07/21 0800   Dressing Status Clean;Dry;Intact 05/07/21 0800   Dressing Intervention Integrity maintained 05/07/21 0800   Dressing Change Due 05/08/21 05/06/21 2103   Site Change Due 05/08/21 05/06/21 2103   Reason Not Rotated Not due 05/06/21 2103   Number of days: 288            Hemodialysis AV Fistula Left forearm (Active)   Site Assessment Clean;Dry;Intact;No redness;No swelling 05/07/21 0800   Patency Present;Thrill;Bruit 05/07/21 0800   Status Deaccessed 05/07/21 0800   Dressing Open to air (None) 05/07/21 0800   Number of days:      Prev airway: 05/04/21; Method of Intubation: Direct laryngoscopy; Airway Device: Endotracheal Tube; Mask Ventilation: Easy - oral; Intubated: Postinduction; Blade: Jeoy #3; Airway Device Size: 7.5; Grade: Grade II; Complicating Factors: Anterior larynx    Drips:    sodium chloride 0.9%         Patient Active Problem List   Diagnosis    Polycystic kidney disease    HTN (hypertension)    Anemia of chronic disease    Benign prostatic hyperplasia    A-fib     Long term (current) use of anticoagulants--Eliquis    At risk for opportunistic infections    S/P DBD kidney transplant on 5/4/21    Long-term use of immunosuppressant medication    Prophylactic immunotherapy    Hyperglycemia    Drug-induced neutropenia    Chronic systolic heart failure    Anasarca    Hepatitis C virus infection cured after antiviral drug therapy    Polycystic liver disease       Review of patient's allergies indicates:  No Known Allergies    Current Inpatient Medications:   ceFEPime (MAXIPIME) IVPB  2 g Intravenous Q8H    [START ON 2/17/2022] dexamethasone  4 mg Intravenous Q6H    famotidine  20 mg Oral QHS    levetiracetam IV  500 mg Intravenous Q12H    [START ON 2/17/2022] predniSONE  5 mg Oral Daily    senna-docusate 8.6-50 mg  1 tablet Oral BID    [START ON 2/17/2022] tacrolimus  2 mg Oral BID    valGANciclovir  900 mg Oral BID       No current facility-administered medications on file prior to encounter.     Current Outpatient Medications on File Prior to Encounter   Medication Sig Dispense Refill    amLODIPine (NORVASC) 5 MG tablet Take 5 mg by mouth once daily.      apixaban (ELIQUIS) 5 mg Tab Take 1 tablet (5 mg total) by mouth 2 (two) times daily. 60 tablet 11    BYSTOLIC 20 mg Tab Take 1 tablet by mouth once daily.      CARDURA 2 mg tablet Take 2 mg by mouth nightly.      ergocalciferol (VITAMIN D2) 50,000 unit Cap Take 1 capsule (50,000 Units total) by mouth every 7 days. (Patient not taking: Reported on 12/21/2021) 4 capsule 5    famotidine (PEPCID) 20 MG tablet Take 1 tablet (20 mg total) by mouth every evening. 30 tablet 1    finasteride (PROSCAR) 5 mg tablet Take 5 mg by mouth once daily.      fish oil-omega-3 fatty acids 300-1,000 mg capsule Take 1 capsule by mouth once daily.       fluticasone propionate (FLONASE) 50 mcg/actuation nasal spray 1 spray by Each Nostril route daily as needed.      furosemide (LASIX) 40 MG tablet Take 40 mg by mouth once  daily.      iron-vitamin C 100-250 mg, ICAR-C, 100-250 mg Tab Take 1 tablet by mouth once daily.      k phos di & mono-sod phos mono (K-PHOS-NEUTRAL) 250 mg Tab Take 3 tablets by mouth 2 (two) times a day. 180 tablet 11    magnesium oxide (MAG-OX) 400 mg (241.3 mg magnesium) tablet Take 3 tablets (1,200 mg total) by mouth 3 (three) times daily. (Patient taking differently: Take 1,200 mg by mouth 2 (two) times daily.) 270 tablet 11    multivitamin Tab Take 1 tablet by mouth once daily.      predniSONE (DELTASONE) 5 MG tablet Take by mouth daily. 5/7-6/6 20 mg, 6/7-7/6 15 mg, 7/7-8/6 10 mg, 5 mg daily thereafter starting 8/7/21 120 tablet 11    tacrolimus (PROGRAF) 1 MG Cap Take 2 capsules (2 mg total) by mouth every 12 (twelve) hours. Z94.0;Kidney txp on 5/4/21 120 capsule 11    valGANciclovir (VALCYTE) 450 mg Tab Take 2 tablets (900 mg total) by mouth 2 (two) times daily. 120 tablet 2       Past Surgical History:   Procedure Laterality Date    AV FISTULA PLACEMENT Left 2016    CATARACT EXTRACTION, BILATERAL Bilateral     KIDNEY TRANSPLANT N/A 5/4/2021    Procedure: TRANSPLANT, KIDNEY;  Surgeon: Lexy Bolden MD;  Location: [address];  Service: Transplant;  Laterality: N/A;       Social History:  Tobacco Use: Medium Risk    Smoking Tobacco Use: Former Smoker    Smokeless Tobacco Use: Never Used      Alcohol Use: Not on file        OBJECTIVE:     Vital Signs Range (Last 24H):  Temp:  [36.5 °C (97.7 °F)]   Pulse:  [99]   Resp:  [17]   BP: (116)/(86)   SpO2:  [98 %]       Significant Labs:  Lab Results   Component Value Date    WBC 8.34 12/21/2021    HGB 19.8 (H) 12/21/2021    HCT 63.2 (H) 12/21/2021    PLT 97 (L) 12/21/2021    CHOL 107 (L) 05/04/2021    TRIG 45 05/04/2021    HDL 48 05/04/2021    ALT 43 12/21/2021    AST 37 12/21/2021     12/21/2021    K 4.6 12/21/2021     12/21/2021    CREATININE 1.3 12/21/2021    BUN 14 12/21/2021    CO2 29 12/21/2021    PSA 1.4 07/09/2020    INR 1.2  12/21/2021       Diagnostic Studies: No relevant studies.    EKG:   Results for orders placed or performed during the hospital encounter of 05/04/21   EKG 12-lead    Collection Time: 05/05/21  8:51 AM    Narrative    Test Reason : R00.0,    Vent. Rate : 094 BPM     Atrial Rate : 000 BPM     P-R Int : 000 ms          QRS Dur : 090 ms      QT Int : 380 ms       P-R-T Axes : 000 020 024 degrees     QTc Int : 475 ms    Atrial fibrillation with premature ventricular or aberrantly conducted  complexes  Abnormal ECG  When compared with ECG of 05-MAY-2021 01:26,  Atrial fibrillation has replaced Sinus rhythm  T wave amplitude has decreased in Lateral leads  Confirmed by Milton Marrufo MD ([id number]) on 5/5/2021 4:20:29 PM    Referred By: ROBINSON MATA           Confirmed By:Milton Marrufo MD       2D ECHO:  TTE:  Results for orders placed or performed during the hospital encounter of 10/01/21   Echo   Result Value Ref Range    BSA 2.09 m2    TDI SEPTAL 0.05 m/s    LV LATERAL E/E' RATIO 10.44 m/s    LV SEPTAL E/E' RATIO 18.80 m/s    LA WIDTH 4.29 cm    TDI LATERAL 0.09 m/s    LVIDd 4.78 3.5 - 6.0 cm    IVS 1.28 (A) 0.6 - 1.1 cm    Posterior Wall 1.32 (A) 0.6 - 1.1 cm    LVIDs 3.44 2.1 - 4.0 cm    FS 28 28 - 44 %    LA volume 158.75 cm3    Sinus 4.06 cm    STJ 2.95 cm    Ascending aorta 3.25 cm    LV mass 244.15 g    LA size 5.68 cm    RVDD 3.65 cm    TAPSE 1.44 cm    Left Ventricle Relative Wall Thickness 0.55 cm    AV mean gradient 5 mmHg    AV valve area 1.73 cm2    AV Velocity Ratio 0.51     AV index (prosthetic) 0.50     MV valve area p 1/2 method 5.13 cm2    E/A ratio 3.24     Mean e' 0.07 m/s    E wave deceleration time 147.85 msec    IVRT 91.34 msec    LVOT diameter 2.09 cm    LVOT area 3.4 cm2    LVOT peak bruce 0.71 m/s    LVOT peak VTI 11.96 cm    Ao peak bruce 1.38 m/s    Ao VTI 23.74 cm    Mr max bruce 0.06 m/s    LVOT stroke volume 41.01 cm3    AV peak gradient 8 mmHg    E/E' ratio 13.43 m/s    MV Peak E Bruce 0.94 m/s    TR Max  Bruce 3.31 m/s    MV stenosis pressure 1/2 time 42.88 ms    MV Peak A Bruce 0.29 m/s    LV Systolic Volume 48.86 mL    LV Systolic Volume Index 23.3 mL/m2    LV Diastolic Volume 106.70 mL    LV Diastolic Volume Index 50.81 mL/m2    LA Volume Index 75.6 mL/m2    LV Mass Index 116 g/m2    RA Major Axis 7.01 cm    Left Atrium Minor Axis 8.13 cm    Left Atrium Major Axis 7.25 cm    Triscuspid Valve Regurgitation Peak Gradient 44 mmHg    RA Width 4.83 cm    Right Atrial Pressure (from IVC) 8 mmHg    QEF 45 %    EF 45 %    TV rest pulmonary artery pressure 52 mmHg    Narrative    · The left ventricle is normal in size with concentric hypertrophy and   mildly decreased systolic function. The estimated ejection fraction is   45%.  · The left ventricular global longitudinal strain is -8.9% with apical   sparing consistent with an infiltrative (e.g., amyloid) cardiomyopathy.  · Normal right ventricular size with mildly reduced right ventricular   systolic function.  · Severe biatrial enlargement.  · Mild mitral regurgitation.  · Atrial fibrillation observed.  · Small circumferential pericardial effusion.  · Pulmonary hypertension; the estimated PA systolic pressure is 52 mmHg.  · Intermediate central venous pressure (8 mmHg).          VERITO:  No results found for this or any previous visit.     Regadenoson Stress Test (7/9/2020):    Normal myocardial perfusion study.    The perfusion scan is free of evidence from myocardial ischemia or injury.    Gated perfusion images showed an ejection fraction of 61% at rest and 71% post stress. Normal ejection fraction is greater than 51%.    There is normal wall motion at rest and post stress.    LV cavity size is normal at rest and normal at stress.    The EKG portion of this study is negative for ischemia.    The patient reported no chest pain during the stress test.    There were no arrhythmias during stress.    When compared to the previous study from 1/23/2018, there are no  significant changes.    ASSESSMENT/PLAN:         Anesthesia Evaluation    I have reviewed the Patient Summary Reports.    I have reviewed the Nursing Notes. I have reviewed the NPO Status.   I have reviewed the Medications.   Steroids Taken In Past Year: Prednisone, Decadron    Review of Systems  Anesthesia Hx:  Denies Hx of Anesthetic complications  History of prior surgery of interest to airway management or planning: Denies Family Hx of Anesthesia complications.   Denies Personal Hx of Anesthesia complications.   Social:  Former Smoker    Hematology/Oncology:        Hematology Comments: New diagnosis of PCV 9/2021   Cardiovascular:   Hypertension Dysrhythmias atrial fibrillation CHF    Pulmonary:  Pulmonary Normal    Renal/:   Chronic Renal Disease, ESRD    Hepatic/GI:  Hepatic/GI Normal    Neurological:  Neurology Normal    Endocrine:  Endocrine Normal        Physical Exam  General:  Well nourished    Airway/Jaw/Neck:  Airway Findings: Mouth Opening: Normal Tongue: Normal  General Airway Assessment: Adult, Average  Mallampati: III  Improves to II with phonation.  TM Distance: Normal, at least 6 cm  Jaw/Neck Findings:  Neck ROM: Normal ROM      Dental:  Dental Findings: In tact, Periodontal disease, Mild   Chest/Lungs:  Chest/Lungs Findings: Clear to auscultation, Normal Respiratory Rate     Heart/Vascular:  Heart Findings: Rate: Tachycardia  Rhythm: Irregularly Irregular  Sounds: Normal        Mental Status:  Mental Status Findings:  Alert and Oriented, Cooperative         Anesthesia Plan  Type of Anesthesia, risks & benefits discussed:  Anesthesia Type:  general    Patient's Preference:   Plan Factors:          Intra-op Monitoring Plan: standard ASA monitors and arterial line  Intra-op Monitoring Plan Comments:   Post Op Pain Control Plan: per primary service following discharge from PACU, IV/PO Opioids PRN and multimodal analgesia  Post Op Pain Control Plan Comments:     Induction:   IV  Beta Blocker:   Patient is not currently on a Beta-Blocker (No further documentation required).       Informed Consent: Patient understands risks and agrees with Anesthesia plan.  Questions answered. Anesthesia consent signed with patient.  ASA Score: 3  emergent   Day of Surgery Review of History & Physical:    H&P update referred to the surgeon.         Ready For Surgery From Anesthesia Perspective.

## 2022-02-17 NOTE — HPI
71 M PMHx afib on eliquis, recent kidney transplant on 5/2021 on immunosuppressants, cardiomyopathy, pulmonary hypertension presenting to Neuro Surgery with new right frontal lobe abscess. Pt reports transient episodes of left sided weakness this week, most recent episode was this morning, he fell due to left leg weakness when getting up from the toilet, prompting visit to the ED because he thought he was having a stroke. At present, he feels well, has no fever/chills/aches, confusion, blurry vision, LOC, or seizures, and besides mild left sided weakness, he is asymptomatic.

## 2022-02-17 NOTE — NURSING
Patient arrived to [facility] from [facility] by ambulance    Type of stroke/diagnosis: R frontal tumor vs abscess    TPA start and end time N/A    Thrombectomy start and end time NA    Current symptoms: L sided weakness    Skin assessment done: Y  Wounds noted: Large skin tear to L arm, bruising/ scabs to BUE     *If wounds noted, was Wound Care consulted? Y    [place] Completed? Y    Patient Belongings on Admit: Cell phone, Blue and black suitcase, grey shirt, navy pajama pants, white briefs, white socks    NCC notified: Sunday Redding NP

## 2022-02-17 NOTE — ASSESSMENT & PLAN NOTE
11 M PMHx afib on eliquis, recent kidney transplant on 5/2021 on immunosuppressants, cardiomyopathy, pulmonary hypertension presenting with new right frontal lobe abscess. He has mild left sided weakness, but is otherwise intact. MRI demonstrates right frontal 2.2 cm ring enhancing lesion, restricting on DWI    -- Plan for OR Class A for craniotomy for abscess drainage.   -- MRI w/wo with stealth protocol for preop planning STAT  -- Consented for OR  -- Preop labs T/S, Coags, CBC pending  -- PCC for eliquis reversal stat  -- Vanc/Cefepime  -- Full infectious workup: blood cultures, ESR/CRP/Procal  -- Further recs to follow OR

## 2022-02-17 NOTE — ANESTHESIA PROCEDURE NOTES
Arterial    Diagnosis: Brain abscess    Patient location during procedure: done in OR  Procedure start time: 2/17/2022 1:15 AM  Timeout: 2/17/2022 1:14 AM  Procedure end time: 2/17/2022 1:17 AM    Staffing  Authorizing Provider: Phoebe Aleman MD  Performing Provider: Duke Caicedo MD    Anesthesiologist was present at the time of the procedure.    Preanesthetic Checklist  Completed: patient identified, IV checked, site marked, risks and benefits discussed, surgical consent, monitors and equipment checked, pre-op evaluation, timeout performed and anesthesia consent givenArterial  Skin Prep: chlorhexidine gluconate  Local Infiltration: lidocaine  Orientation: right  Location: radial    Catheter Size: 20 G  Catheter placement by Ultrasound guidance. Heme positive aspiration all ports.  Vessel Caliber: patent  Needle advanced into vessel with real time Ultrasound guidance.Insertion Attempts: 1  Assessment  Dressing: secured with tape and tegaderm  Patient: Tolerated well

## 2022-02-17 NOTE — BRIEF OP NOTE
[facility]  Brief Operative Note    SUMMARY     Surgery Date: 2/16/2022     Surgeon(s) and Role:     * Sunday Rosa,  - Primary     * Leif Rothman MD - Resident - Assisting     * Chaim Yeung MD - Resident - Assisting        Pre-op Diagnosis:  Brain abscess [G06.0]    Post-op Diagnosis:  Post-Op Diagnosis Codes:     * Brain abscess [G06.0]    Procedure(s) (LRB):  CRANIOTOMY (Right)    Anesthesia: General    Operative Findings: Abscess drained and sent for culture    Estimated Blood Loss: 100 mL    Estimated Blood Loss has been documented.         Specimens:   Specimen (24h ago, onward)            None          [id number]

## 2022-02-17 NOTE — ANESTHESIA PROCEDURE NOTES
Intubation    Date/Time: 2/17/2022 1:20 AM  Performed by: Lisa Conn CRNA  Authorized by: Phoebe Aleman MD     Intubation:     Induction:  Intravenous    Intubated:  Postinduction    Mask Ventilation:  Easy mask    Attempts:  1    Attempted By:  CRNA    Method of Intubation:  Direct    Blade:  Ponce 2    Laryngeal View Grade: Grade I - full view of cords      Difficult Airway Encountered?: No      Complications:  None    Airway Device:  Oral endotracheal tube    Airway Device Size:  7.5    Style/Cuff Inflation:  Cuffed (inflated to minimal occlusive pressure)    Tube secured:  23    Secured at:  The teeth    Placement Verified By:  Capnometry    Complicating Factors:  Anterior larynx    Findings Post-Intubation:  BS equal bilateral and atraumatic/condition of teeth unchanged

## 2022-02-17 NOTE — PROGRESS NOTES
Pharmacokinetic Initial Assessment: IV Vancomycin    Assessment/Plan:  Initiate intravenous vancomycin with loading dose of 1500 mg once followed by a maintenance dose of vancomycin 1250mg IV every 12 hours  Desired empiric serum trough concentration is 15 to 20 mcg/mL  Draw vancomycin trough level 60 min prior to fourth dose on 12/18 at approximately 1130    Pharmacy will continue to follow and monitor vancomycin.      Please contact pharmacy at extension [phone] with any questions regarding this assessment.     Thank you for the consult,   Ashleigh Liz       Patient brief summary:  Ronal Mazariegos is a 71 y.o. male initiated on antimicrobial therapy with IV Vancomycin for treatment of suspected cranial abscess    Drug Allergies:   Review of patient's allergies indicates:  No Known Allergies    Actual Body Weight:   80.6 kg    Renal Function:   Estimated CrCl 90 mL/min based on SCr of 0.9 mg/dL    Dialysis Method (if applicable):  N/A    CBC (last 72 hours):  Recent Labs   Lab Result Units 02/16/22  2246   WBC K/uL 3.18*   Hemoglobin g/dL 16.7   Hematocrit % 51.8   Platelets K/uL 191   Gran % % 72.0   Lymph % % 17.0*   Mono % % 8.0   Eosinophil % % 0.0   Basophil % % 0.0   Differential Method  Manual       Metabolic Panel (last 72 hours):  Recent Labs   Lab Result Units 02/16/22  2246   Sodium mmol/L 139   Potassium mmol/L 4.9   Chloride mmol/L 107   CO2 mmol/L 20*   Glucose mg/dL 216*   BUN mg/dL 21   Creatinine mg/dL 0.9   Albumin g/dL 3.0*   Total Bilirubin mg/dL 1.9*   Alkaline Phosphatase U/L 77   AST U/L 21   ALT U/L 18   Magnesium mg/dL 1.6   Phosphorus mg/dL 2.6*       Drug levels (last 3 results):  Recent Labs   Lab Result Units 02/16/22  2246   Vancomycin, Random ug/mL <1.4       Microbiologic Results:  Microbiology Results (last 7 days)     Procedure Component Value Units Date/Time    Blood culture [[id number]] Collected: 02/16/22 2307    Order Status: Sent Specimen: Blood from Peripheral, Ankle, Right Updated:  02/16/22 2311    Blood culture [[id number]] Collected: 02/16/22 4175    Order Status: Sent Specimen: Blood from Peripheral, Hand, Right Updated: 02/16/22 4756

## 2022-02-17 NOTE — SUBJECTIVE & OBJECTIVE
Review of Systems  Unable to obtain a complete ROS due to level of consciousness.  Objective:     Vitals:  Temp: 97.8 °F (36.6 °C)  Pulse: 101  Rhythm: atrial rhythm  BP: 120/63  MAP (mmHg): 83  Resp: 19  SpO2: 96 %  O2 Device (Oxygen Therapy): room air    Temp  Min: 97.5 °F (36.4 °C)  Max: 97.8 °F (36.6 °C)  Pulse  Min: 94  Max: 109  BP  Min: 113/86  Max: 158/84  MAP (mmHg)  Min: 83  Max: 115  Resp  Min: 17  Max: 24  SpO2  Min: 95 %  Max: 98 %    02/16 0701 - 02/17 0700  In: 1770.2 [I.V.:120.2]  Out: 1760 [Urine:1660]   Unmeasured Output  Stool Occurrence: 0       Physical Exam  GA: Alert, comfortable, no acute distress.   HEENT: No scleral icterus or JVD.   Pulmonary: Clear to auscultation A/P/L. No wheezing, crackles, or rhonchi.  Cardiac: RRR S1 & S2 w/o rubs/murmurs/gallops.   Abdominal: Bowel sounds present x 4. No appreciable hepatosplenomegaly.  Skin: No jaundice, rashes, or visible lesions.  Neuro:  --GCS: E4 V5 M6  --Mental Status:  Awake, oriented X4, follows simple command,s up in bed eating breakfast, fluent speech  --CN II-XII grossly intact.   --Xkgofn5dz, PERRL.   --Corneal reflex, gag, cough intact.  --MULLIGAN spont       Medications:  Continuous ScheduledceFEPime (MAXIPIME) IVPB, 2 g, Q8H  dexamethasone, 4 mg, Q6H  famotidine, 20 mg, QHS  mupirocin, , BID  senna-docusate 8.6-50 mg, 1 tablet, BID  tacrolimus, 2 mg, BID  valGANciclovir, 900 mg, BID  vancomycin (VANCOCIN) IVPB, 15 mg/kg (Dosing Weight), Q12H    PRNacetaminophen, 650 mg, Q6H PRN  dextrose 10%, 12.5 g, PRN  glucagon (human recombinant), 1 mg, PRN  insulin aspart U-100, 0-5 Units, Q6H PRN  ondansetron, 4 mg, Q8H PRN  oxyCODONE, 5 mg, Q6H PRN  sodium chloride 0.9%, 10 mL, PRN      Today I personally reviewed pertinent medications, lines/drains/airways, imaging, cardiology results, laboratory results,     Diet  Diet Adult Regular (IDDSI Level 7)

## 2022-02-17 NOTE — PROGRESS NOTES
[facility] - Neuro Critical Care  Neurosurgery  Progress Note    Subjective:     History of Present Illness: 71 M PMHx afib on eliquis, recent kidney transplant on 5/2021 on immunosuppressants, cardiomyopathy, pulmonary hypertension presenting with new right frontal lobe abscess. Pt reports transient episodes of left sided weakness this week, most recent episode was this morning, he fell due to left leg weakness when getting up from the toilet, prompting visit to the ED because he thought he was having a stroke. At present, he feels well, has no fever/chills/aches, confusion, blurry vision, LOC, or seizures, and besides mild left sided weakness, he is asymptomatic.    Of note, he has had recent lower respiratory tract infection      Post-Op Info:  Procedure(s) (LRB):  CRANIOTOMY (Right)   1 Day Post-Op     Interval History: OR yesterday for crani for abscess drainage. Tolerated procedure well, headaches this AM, but back to baseline strength and mentation    Medications:  Continuous Infusions:   sodium chloride 0.9% 75 mL/hr at 02/17/22 0705     Scheduled Meds:   ceFEPime (MAXIPIME) IVPB  2 g Intravenous Q8H    famotidine  20 mg Oral QHS    levetiracetam IV  500 mg Intravenous Q12H    predniSONE  5 mg Oral Daily    senna-docusate 8.6-50 mg  1 tablet Oral BID    tacrolimus  2 mg Oral BID    valGANciclovir  900 mg Oral BID    vancomycin (VANCOCIN) IVPB  15 mg/kg (Dosing Weight) Intravenous Q12H     PRN Meds:acetaminophen, dextrose 10%, glucagon (human recombinant), insulin aspart U-100, ondansetron, oxyCODONE, sodium chloride 0.9%     Review of Systems   Constitutional: Negative for chills and fever.   Respiratory: Negative for shortness of breath.    Gastrointestinal: Negative for nausea and vomiting.   Musculoskeletal: Negative for back pain.   Neurological: Positive for weakness. Negative for headaches.   Psychiatric/Behavioral: Negative for confusion.     Objective:     Weight: 80.3 kg (177 lb)  Body mass  "index is 22.73 kg/m².  Vital Signs (Most Recent):  Temp: 97.8 °F (36.6 °C) (02/17/22 0705)  Pulse: 103 (02/17/22 0705)  Resp: (!) 21 (02/17/22 0705)  BP: (!) 144/95 (02/17/22 0705)  SpO2: 95 % (02/17/22 0705) Vital Signs (24h Range):  Temp:  [97.5 °F (36.4 °C)-97.8 °F (36.6 °C)] 97.8 °F (36.6 °C)  Pulse:  [] 103  Resp:  [17-24] 21  SpO2:  [95 %-98 %] 95 %  BP: (113-158)/(75-95) 144/95  Arterial Line BP: (145-158)/(75-84) 145/76     Date 02/17/22 0700 - 02/18/22 0659   Shift 8748-7424 8771-6434 4416-7208 24 Hour Total   INTAKE   I.V.(mL/kg) 80(1)   80(1)   IV Piggyback 50   50   Shift Total(mL/kg) 130(1.6)   130(1.6)   OUTPUT   Urine(mL/kg/hr) 130   130   Shift Total(mL/kg) 130(1.6)   130(1.6)   Weight (kg) 80.3 80.3 80.3 80.3                        Urethral Catheter 02/17/22 0111 Non-latex;Straight-tip 16 Fr. (Active)   Site Assessment Clean;Intact 02/17/22 0301   Collection Container Urimeter 02/17/22 0301   Securement Method secured to top of thigh w/ adhesive device 02/17/22 0301   Catheter Care Performed yes 02/17/22 0301   Reason for Continuing Urinary Catheterization Post operative 02/17/22 0301   CAUTI Prevention Bundle Securement Device in place with 1" slack;Drainage bag/urimeter off the floor;Sheeting clip in use;Intact seal between catheter & drainage tubing;No dependent loops or kinks;Drainage bag/urimeter not overfilled (<2/3 full);Drainage bag/urimeter below bladder 02/17/22 0301   Output (mL) 130 mL 02/17/22 0705            Hemodialysis AV Fistula Left forearm (Active)   Site Assessment Clean;Dry 02/17/22 0301   Patency Thrill;Bruit;Present 02/17/22 0301   Status Deaccessed 02/17/22 0301   Site Condition No complications 02/17/22 0301   Dressing Open to air (None) 02/17/22 0301       Physical Exam  Constitutional:       General: He is not in acute distress.  Eyes:      Extraocular Movements: EOM normal.      Pupils: Pupils are equal, round, and reactive to light.   Cardiovascular:      Rate and " Rhythm: Normal rate and regular rhythm.   Neurological:      Mental Status: He is alert.         Physical Exam:    Constitutional: No distress.     Eyes: Pupils are equal, round, and reactive to light. EOM are normal.     Cardiovascular: Normal rate and regular rhythm.     Psych/Behavior: He is alert.     E4V5M6  AOx3  PERRL  EOMI  Face Symmetric  Right 5/5  Left 4+/5      Significant Labs:  Recent Labs   Lab 02/16/22 2246 02/17/22 0430   * 172*    134*   K 4.9 4.5    106   CO2 20* 18*   BUN 21 18   CREATININE 0.9 0.9   CALCIUM 10.0 9.1   MG 1.6  --      Recent Labs   Lab 02/16/22 2246 02/17/22 0430   WBC 3.18* 2.92*   HGB 16.7 16.3   HCT 51.8 51.3    174     Recent Labs   Lab 02/16/22 2246   INR 1.3*   APTT 31.6     Microbiology Results (last 7 days)     Procedure Component Value Units Date/Time    Gram stain [[id number]] Collected: 02/17/22 0238    Order Status: Completed Specimen: Abscess from Brain Updated: 02/17/22 0753     Gram Stain Result Rare WBC's      No organisms seen      No epithelial cells    Narrative:      1) Intracerebral Abscess    Blood culture [[id number]] Collected: 02/16/22 2307    Order Status: Completed Specimen: Blood from Peripheral, Ankle, Right Updated: 02/17/22 0545     Blood Culture, Routine No Growth to date    Narrative:      Blood cultures from 2 different sites. 4 bottles total.  Please draw before starting antibiotics.    Blood culture [[id number]] Collected: 02/16/22 2247    Order Status: Completed Specimen: Blood from Peripheral, Hand, Right Updated: 02/17/22 0545     Blood Culture, Routine No Growth to date    Narrative:      Blood cultures x 2 different sites. 4 bottles total. Please  draw cultures before administering antibiotics.    AFB Culture & Smear [[id number]] Collected: 02/17/22 0238    Order Status: Sent Specimen: Abscess from Brain Updated: 02/17/22 0303    Culture, Anaerobe [[id number]] Collected: 02/17/22 0238    Order Status: Sent  Specimen: Abscess from Brain Updated: 02/17/22 0303    Fungus culture [[id number]] Collected: 02/17/22 0238    Order Status: Sent Specimen: Abscess from Brain Updated: 02/17/22 0303    Gram stain [[id number]] Collected: 02/17/22 0238    Order Status: Sent Specimen: Abscess from Brain Updated: 02/17/22 0303    Aerobic culture [[id number]] Collected: 02/17/22 0238    Order Status: Sent Specimen: Abscess from Brain Updated: 02/17/22 0303    Culture, Anaerobe [[id number]] Collected: 02/17/22 0238    Order Status: Sent Specimen: Abscess from Brain Updated: 02/17/22 0301    AFB Culture & Smear [[id number]] Collected: 02/17/22 0238    Order Status: Sent Specimen: Abscess from Brain Updated: 02/17/22 0301    Aerobic culture [[id number]] Collected: 02/17/22 0238    Order Status: Sent Specimen: Abscess from Brain Updated: 02/17/22 0301    Fungus culture [[id number]] Collected: 02/17/22 0238    Order Status: Sent Specimen: Abscess from Brain Updated: 02/17/22 0301        All pertinent labs from the last 24 hours have been reviewed.    Significant Diagnostics:  I have reviewed and interpreted all pertinent imaging results/findings within the past 24 hours.    Assessment/Plan:     * Intracranial abscess  11 M PMHx afib on eliquis, recent kidney transplant on 5/2021 on immunosuppressants, cardiomyopathy, pulmonary hypertension presenting with new right frontal lobe abscess. He has mild left sided weakness, but is otherwise intact. MRI demonstrates right frontal 2.2 cm ring enhancing lesion, restricting on DWI    Now s/p R frontal crani for abscess drainage 2/17/22    --ICU status  -- q1 hour vitals/neurochecks  -- Post op CT reviewed: no detrimental changes  -- MRI today for evaluation of abscess post op  -- PCC for eliquis reversal pre op: continue to hold now post op   -- Vanc/Cefepime  -- Recommend ID consult  -- Recommend transplant nephro consult for immunosuppresive medication recs         Chaim Yeung MD  Neurosurgery  [facility]  - Neuro Critical Care

## 2022-02-17 NOTE — PLAN OF CARE
[facility] - Neuro Critical Care  Initial Discharge Assessment       Primary Care Provider: Primary Doctor No    Admission Diagnosis: Brain mass [G93.89]    Admission Date: 2/16/2022  Expected Discharge Date: 2/24/2022    Discharge Barriers Identified: (P) None    Payor: MEDICARE / Plan: MEDICARE PART A & B / Product Type: Government /     Extended Emergency Contact Information  Primary Emergency Contact: Becky Mazariegos  Address: [address] [place] [address] [facility]  Home Phone: [phone]  Mobile Phone: [phone]  Relation: Spouse  Secondary Emergency Contact: Brown Mazariegos   [facility]  Relation: Spouse    Discharge Plan A: (P) Home  Discharge Plan B: (P) Home Health      [facility] [place]  [address]  [facility] [address]  Phone: [phone] Fax: [phone]    [id number] - [place], [place] - [address]  [address] [facility] [address]  Phone: [phone] Fax: [phone]    CM completed pt's assessment at the bedside. His wife was present. Pt alert and oriented. Pt plans to return home with his wife who can drive him home and assist after discharge. Prior to admit, he was ind with ADLs, and used no DME. Their home is a 1 story with 0 KENNY.     CM will cont to follow for discharge needs.     Initial Assessment (most recent)       Adult Discharge Assessment - 02/17/22 0954          Discharge Assessment    Assessment Type Discharge Planning Assessment (P)      Confirmed/corrected address, phone number and insurance Yes (P)      Confirmed Demographics Correct on Facesheet (P)      Source of Information patient;family (P)    sposue at bedside at time of assessment    Communicated ADRIAN with patient/caregiver Yes (P)      Reason For Admission Intracranial abscess (P)      Lives With spouse (P)      Do you expect to return to your current living situation? Yes (P)      Do you have help at home or someone to help you manage your care at home?  Yes (P)      Who are your caregiver(s) and their phone number(s)? Becky Mazariegos (spouse) [phone] (P)      Current cognitive status: Alert/Oriented (P)      Home Layout Able to live on 1st floor (P)    1 story home with 0 KENNY    Equipment Currently Used at Home none (P)      Readmission within 30 days? No (P)      Do you currently have service(s) that help you manage your care at home? No (P)      Do you take prescription medications? Yes (P)      Do you have prescription coverage? Yes (P)      Coverage MEDICARE/[employer] (P)      Do you have any problems affording any of your prescribed medications? No (P)      Is the patient taking medications as prescribed? yes (P)      Who is going to help you get home at discharge? Becky Mazariegos (spouse) [phone] (P)      How do you get to doctors appointments? family or friend will provide (P)      Are you on dialysis? No (P)      Do you take coumadin? No (P)      Discharge Plan A Home (P)      Discharge Plan B Home Health (P)      DME Needed Upon Discharge  -- (P)    TBD    Discharge Plan discussed with: Spouse/sig other;Patient (P)      Name(s) and Number(s) Becky Mazariegos (spouse) [phone] (P)      Discharge Barriers Identified None (P)         Relationship/Environment    Name(s) of Who Lives With Patient Becky Mazariegos (spouse) [phone] (P)                    Norma Delgadillo, RN Case Manager  [facility]  [phone]

## 2022-02-17 NOTE — HOSPITAL COURSE
02/17/2022: s/p  abscess drainage, consult KTM and ID - appreciate reccs. Switch prednisone to dexamethaosne, d/c IVF, plan to repeat Na, d/c dawson cath  02/18/2022: HERMINIO, plan to stepdwon to NSGY team, d/c A-line

## 2022-02-17 NOTE — ASSESSMENT & PLAN NOTE
11 M PMHx afib on eliquis, recent kidney transplant on 5/2021 on immunosuppressants, cardiomyopathy, pulmonary hypertension presenting with new right frontal lobe abscess. He has mild left sided weakness, but is otherwise intact. MRI demonstrates right frontal 2.2 cm ring enhancing lesion, restricting on DWI    Now s/p R frontal crani for abscess drainage 2/17/22    --ICU status  -- q1 hour vitals/neurochecks  -- Post op CT reviewed: no detrimental changes  -- MRI today for evaluation of abscess post op  -- PCC for eliquis reversal pre op: continue to hold now post op   -- Vanc/Cefepime  -- Recommend ID consult  -- Recommend transplant nephro consult for immunosuppresive medication recs

## 2022-02-17 NOTE — PLAN OF CARE
[facility] Care Plan    POC reviewed with Ronal Mazariegos and family at 0300. Pt verbalized understanding. Questions and concerns addressed. No acute events overnight. Pt progressing toward goals. Will continue to monitor. See below and flowsheets for full assessment and VS info.   - Blood cx drawn, vanc/ cefepime started  - MRI w stealth completed pre op   - pt went to OR ->abscess drained and cultured   - Morphine and Tylenolx 1 for pain control post op  - Post op CT complete   - NS @ 75  - Virk in place  - MRI scheduled for 1400     Neuro:  [place] Coma Scale  Best Eye Response: 3-->(E3) to speech  Best Motor Response: 6-->(M6) obeys commands  Best Verbal Response: 5-->(V5) oriented  [place]rice Coma Scale Score: 14  Assessment Qualifiers: patient not sedated/intubated        24hr Temp:  [97.5 °F (36.4 °C)-97.7 °F (36.5 °C)]     CV:   Rhythm: atrial rhythm  BP goals:   SBP < 180  MAP > 65    Resp:   O2 Device (Oxygen Therapy): room air       Plan: N/A    GI/:     Diet/Nutrition Received: NPO  Last Bowel Movement: 02/16/22  Voiding Characteristics: urethral catheter (bladder)    Intake/Output Summary (Last 24 hours) at 2/17/2022 0742  Last data filed at 2/17/2022 0601  Gross per 24 hour   Intake 1770.22 ml   Output 1760 ml   Net 10.22 ml     Unmeasured Output  Stool Occurrence: 0    Labs/Accuchecks:  Recent Labs   Lab 02/17/22  0430   WBC 2.92*   RBC 5.13   HGB 16.3   HCT 51.3         Recent Labs   Lab 02/17/22  0430   *   K 4.5   CO2 18*      BUN 18   CREATININE 0.9   ALKPHOS 70   ALT 15   AST 16   BILITOT 2.3*      Recent Labs   Lab 02/16/22  2246   INR 1.3*   APTT 31.6    No results for input(s): CPK, CPKMB, TROPONINI, MB in the last 168 hours.    Electrolytes: No replacement orders  Accuchecks: Q6H    Gtts:   sodium chloride 0.9% 75 mL/hr at 02/17/22 0601       LDA/Wounds:  Lines/Drains/Airways       Drain                   Hemodialysis AV Fistula Left forearm -- days         Urethral Catheter  02/17/22 0111 Non-latex;Straight-tip 16 Fr. <1 day              Arterial Line              Arterial Line 02/17/22 0115 Right Radial <1 day              Peripheral Intravenous Line                   Peripheral IV - Single Lumen 05/04/21 1659 22 G Anterior;Right Forearm 288 days         Peripheral IV - Single Lumen 02/17/22 0124 18 G Left Foot <1 day                  Wounds: Yes  Wound care consulted: Yes

## 2022-02-17 NOTE — ANESTHESIA POSTPROCEDURE EVALUATION
Anesthesia Post Evaluation    Patient: Ronal Mazariegos    Procedure(s) Performed: Procedure(s) (LRB):  CRANIOTOMY (Right)    Final Anesthesia Type: general      Patient location during evaluation: ICU  Patient participation: No - Unable to Participate, Sedation  Level of consciousness: sedated  Post-procedure vital signs: reviewed and stable  Pain management: adequate  Airway patency: patent    PONV status at discharge: No PONV  Anesthetic complications: no      Cardiovascular status: hemodynamically stable  Respiratory status: intubated            Vitals Value Taken Time   /80 02/17/22 0407   Temp 98.0 02/17/22 0553   Pulse 92 02/17/22 0552   Resp 20 02/17/22 0552   SpO2 98 % 02/17/22 0552   Vitals shown include unvalidated device data.      No case tracking events are documented in the log.      Pain/Marva Score: Pain Rating Prior to Med Admin: 6 (2/17/2022  5:46 AM)

## 2022-02-17 NOTE — CONSULTS
[facility] - Neuro Critical Care  Wound Care    Patient Name:  Ronal Mazariegos   MRN:  [id number]  Date: 2022  Diagnosis: Intracranial abscess    History:     Past Medical History:   Diagnosis Date    Anasarca 10/1/2021    Anemia of chronic disease     Atrial fibrillation     BPH (benign prostatic hypertrophy)     Chronic systolic heart failure 10/1/2021    Hepatitis C virus infection cured after antiviral drug therapy     acquired through kidney transplant, treated / cured (SVR12 - 2021)    HTN (hypertension)     Polycystic kidney disease        Social History     Socioeconomic History    Marital status:     Number of children: 1   Tobacco Use    Smoking status: Former Smoker     Packs/day: 2.00     Years: 10.00     Pack years: 20.00     Types: Cigarettes     Start date: 1972     Quit date: 1984     Years since quittin.2    Smokeless tobacco: Never Used   Substance and Sexual Activity    Alcohol use: No     Comment: Pt reportsbeing a former beer drinker (2-3 cans per day) and quitting in .    Drug use: No    Sexual activity: Yes       Precautions:     Allergies as of 2022    (No Known Allergies)       [facility] Assessment Details/Treatment     Wound care consult received for assessment of left arm. Patient present to OSH ED with L sided weakness s/p fall from standing position. MRI at OSH showed R sided Abscess vs mass with vasogenic edema. Patient was admitted to [facility] for a higher level of care. Spoke with nursing at bedside who reported that patient has a skin tear to left arm. Approved standing skin tear orders placed by nursing staff. Wound care to sign off. Please re-consult for any concerns.     2022

## 2022-02-17 NOTE — HPI
Mr Mazariegos is a 72 y/o M with a PMH of HTN, Afib on eliquis and polycystic kidney disease s/p renal transplant 5/21 who presents to [facility] with Brain abscess. He presented to OSH ED with L sided weakness s/p fall from standing position. MRI osh  showed R sided Abscess vs mass with vasogenic edema. He was admitted to [facility] for a higher level of care.

## 2022-02-17 NOTE — PROGRESS NOTES
[facility] - Neuro Critical Care  Neurocritical Care  Progress Note    Admit Date: 2/16/2022  Service Date: 02/17/2022  Length of Stay: 1    Subjective:     Chief Complaint: Intracranial abscess    History of Present Illness: Mr Mazariegos is a 70 y/o M with a PMH of HTN, Afib on eliquis and polycystic kidney disease s/p renal transplant 5/21 who presents to [facility] with Brain abscess. He presented to OSH ED with L sided weakness s/p fall from standing position. MRI osh  showed R sided Abscess vs mass with vasogenic edema. He was admitted to [facility] for a higher level of care.         Hospital Course: 02/17/2022: s/p  abscess drainage, consult KTM and ID - appreciate reccs. Switch prednisone to dexamethaosne, d/c IVF, plan to repeat Na, d/c dawson cath          Review of Systems  Unable to obtain a complete ROS due to level of consciousness.  Objective:     Vitals:  Temp: 97.8 °F (36.6 °C)  Pulse: 101  Rhythm: atrial rhythm  BP: 120/63  MAP (mmHg): 83  Resp: 19  SpO2: 96 %  O2 Device (Oxygen Therapy): room air    Temp  Min: 97.5 °F (36.4 °C)  Max: 97.8 °F (36.6 °C)  Pulse  Min: 94  Max: 109  BP  Min: 113/86  Max: 158/84  MAP (mmHg)  Min: 83  Max: 115  Resp  Min: 17  Max: 24  SpO2  Min: 95 %  Max: 98 %    02/16 0701 - 02/17 0700  In: 1770.2 [I.V.:120.2]  Out: 1760 [Urine:1660]   Unmeasured Output  Stool Occurrence: 0       Physical Exam  GA: Alert, comfortable, no acute distress.   HEENT: No scleral icterus or JVD.   Pulmonary: Clear to auscultation A/P/L. No wheezing, crackles, or rhonchi.  Cardiac: RRR S1 & S2 w/o rubs/murmurs/gallops.   Abdominal: Bowel sounds present x 4. No appreciable hepatosplenomegaly.  Skin: No jaundice, rashes, or visible lesions.  Neuro:  --GCS: E4 V5 M6  --Mental Status:  Awake, oriented X4, follows simple command,s up in bed eating breakfast, fluent speech  --CN II-XII grossly intact.   --[id number], PERRL.   --Corneal reflex, gag, cough intact.  --MULLIGAN spont       Medications:  Continuous  ScheduledceFEPime (MAXIPIME) IVPB, 2 g, Q8H  dexamethasone, 4 mg, Q6H  famotidine, 20 mg, QHS  mupirocin, , BID  senna-docusate 8.6-50 mg, 1 tablet, BID  tacrolimus, 2 mg, BID  valGANciclovir, 900 mg, BID  vancomycin (VANCOCIN) IVPB, 15 mg/kg (Dosing Weight), Q12H    PRNacetaminophen, 650 mg, Q6H PRN  dextrose 10%, 12.5 g, PRN  glucagon (human recombinant), 1 mg, PRN  insulin aspart U-100, 0-5 Units, Q6H PRN  ondansetron, 4 mg, Q8H PRN  oxyCODONE, 5 mg, Q6H PRN  sodium chloride 0.9%, 10 mL, PRN      Today I personally reviewed pertinent medications, lines/drains/airways, imaging, cardiology results, laboratory results,     Diet  Diet Adult Regular (IDDSI Level 7)        Assessment/Plan:     Neuro  Vasogenic brain edema  -monitor exam  -dexamethasone 4 mg q6h    Cardiac/Vascular  A-fib  - metoprolol for rate control   - hold anticoagulation    Renal/  Hyponatremia  Na 134 today- plan to repeat NA this afternoon    S/P DBD kidney transplant on 5/4/21  - switched prednisone to dexamethasone  - continue prograf  - continue valcyte   - Consult kidney transplant-appreciate [facility]    ID  * Intracranial abscess  - NSGY following   - Q 1 vitals   - Q 1 neuro checks   - Vanc and cefepime  - BC and inflammatory markers  - PCC for anticoagulation reversal prior to OR per nsgy  - MRI stealth per nsgy   2/17/2022: pending repeat MRI brain w w/o con today per NSGY team  -ID team was consulted- appreciate rec        Hematology  Long term (current) use of anticoagulants--Eliquis  - hold in setting of OR last dose 2/16 AM  - Received PCC for reversal prior to OR           The patient is being Prophylaxed for:  Venous Thromboembolism with: Mechanical  Stress Ulcer with: H2B  Ventilator Pneumonia with: not applicable    Activity Orders          Diet Adult Regular (IDDSI Level 7): Regular starting at 02/17 0903    Progressive Mobility Protocol (mobilize patient to their highest level of functioning at least twice daily) starting at  02/17 0800        Full Code    Linda Martinez NP  Neurocritical Care  [facility] - Neuro Critical Care

## 2022-02-17 NOTE — H&P
[facility] Neuro Critical Care  Neurocritical Care  History & Physical    Admit Date: 2/16/2022  Service Date: 02/16/2022  Length of Stay: 0    Subjective:     Chief Complaint: Intracranial abscess    History of Present Illness: Mr Mazariegos is a 70 y/o M with a PMH of HTN, Afib on eliquis and polycystic kidney disease s/p renal transplant 5/21 who presents to [facility] with Brain abscess. He presented to OSH ED with L sided weakness s/p fall from standing position. MRI osh  showed R sided Abscess vs mass with vasogenic edema. He was admitted to [facility] for a higher level of care.         Past Medical History:   Diagnosis Date    Anasarca 10/1/2021    Anemia of chronic disease     Atrial fibrillation     BPH (benign prostatic hypertrophy)     Chronic systolic heart failure 10/1/2021    Hepatitis C virus infection cured after antiviral drug therapy     acquired through kidney transplant, treated / cured (SVR12 - 12/2021)    HTN (hypertension)     Polycystic kidney disease      Past Surgical History:   Procedure Laterality Date    AV FISTULA PLACEMENT Left 2016    CATARACT EXTRACTION, BILATERAL Bilateral     KIDNEY TRANSPLANT N/A 5/4/2021    Procedure: TRANSPLANT, KIDNEY;  Surgeon: Lexy Bolden MD;  Location: [facility] OR [address] [place] [address];  Service: Transplant;  Laterality: N/A;      No current facility-administered medications on file prior to encounter.     Current Outpatient Medications on File Prior to Encounter   Medication Sig Dispense Refill    amLODIPine (NORVASC) 5 MG tablet Take 5 mg by mouth once daily.      apixaban (ELIQUIS) 5 mg Tab Take 1 tablet (5 mg total) by mouth 2 (two) times daily. 60 tablet 11    BYSTOLIC 20 mg Tab Take 1 tablet by mouth once daily.      CARDURA 2 mg tablet Take 2 mg by mouth nightly.      ergocalciferol (VITAMIN D2) 50,000 unit Cap Take 1 capsule (50,000 Units total) by mouth every 7 days. (Patient not taking: Reported on 12/21/2021) 4 capsule 5    famotidine (PEPCID) 20 MG tablet  Take 1 tablet (20 mg total) by mouth every evening. 30 tablet 1    finasteride (PROSCAR) 5 mg tablet Take 5 mg by mouth once daily.      fish oil-omega-3 fatty acids 300-1,000 mg capsule Take 1 capsule by mouth once daily.       fluticasone propionate (FLONASE) 50 mcg/actuation nasal spray 1 spray by Each Nostril route daily as needed.      furosemide (LASIX) 40 MG tablet Take 40 mg by mouth once daily.      iron-vitamin C 100-250 mg, ICAR-C, 100-250 mg Tab Take 1 tablet by mouth once daily.      k phos di & mono-sod phos mono (K-PHOS-NEUTRAL) 250 mg Tab Take 3 tablets by mouth 2 (two) times a day. 180 tablet 11    magnesium oxide (MAG-OX) 400 mg (241.3 mg magnesium) tablet Take 3 tablets (1,200 mg total) by mouth 3 (three) times daily. (Patient taking differently: Take 1,200 mg by mouth 2 (two) times daily.) 270 tablet 11    multivitamin Tab Take 1 tablet by mouth once daily.      predniSONE (DELTASONE) 5 MG tablet Take by mouth daily. -6/ 20 mg, -7/6 15 mg, -8/ 10 mg, 5 mg daily thereafter starting 21 120 tablet 11    tacrolimus (PROGRAF) 1 MG Cap Take 2 capsules (2 mg total) by mouth every 12 (twelve) hours. Z94.0;Kidney txp on 21 120 capsule 11    valGANciclovir (VALCYTE) 450 mg Tab Take 2 tablets (900 mg total) by mouth 2 (two) times daily. 120 tablet 2      Allergies: Patient has no known allergies.    Family History   Problem Relation Age of Onset    Polycystic kidney disease Father     Hypertension Father     Kidney disease Father     Polycystic kidney disease Sister     Hypertension Sister     Kidney disease Sister      Social History     Tobacco Use    Smoking status: Former Smoker     Packs/day: 2.00     Years: 10.00     Pack years: 20.00     Types: Cigarettes     Start date: 1972     Quit date: 1984     Years since quittin.2    Smokeless tobacco: Never Used   Substance Use Topics    Alcohol use: No     Comment: Pt reportsbeing a former beer drinker  (2-3 cans per day) and quitting in 2014.    Drug use: No     Review of Systems     Review of Symptoms:  Constitutional: Denies fevers, weight loss, chills, or weakness.  Eyes: Denies changes in vision.  ENT: Denies dysphagia, nasal discharge, ear pain or discharge.  Cardiovascular: Denies chest pain, palpitations, orthopnea, or claudication.  Respiratory: Denies shortness of breath, cough, hemoptysis, or wheezing.  GI: Denies nausea/vomitting, hematochezia, melena, abd pain, or changes in appetite.  : Denies dysuria, incontinence, or hematuria.  Musculoskeletal: Denies joint pain or myalgias. L sided weakness   Skin/breast: Denies rashes, lumps, lesions, or discharge.  Neurologic: Denies headache, dizziness, vertigo, or paresthesias.  Psychiatric: Denies changes in mood or hallucinations.  Endocrine: Denies polyuria, polydipsia, heat/cold intolerance.  Hematologic/Lymph: Denies lymphadenopathy, easy bruising or easy bleeding.  Allergic/Immunologic: Denies rash, rhinitis.     Objective:     Vitals:         Temp  Min: 97.7 °F (36.5 °C)  Max: 97.7 °F (36.5 °C)  Pulse  Min: 99  Max: 99  BP  Min: 116/86  Max: 116/86  Resp  Min: 17  Max: 17  SpO2  Min: 98 %  Max: 98 %    No intake/output data recorded.           Physical Exam      Physical Exam:  GA: Alert, comfortable, no acute distress.   HEENT: No scleral icterus or JVD.   Pulmonary: Clear to auscultation A/P/L.   Cardiac: RRR S1 & S2 w/o rubs/murmurs/gallops.   Abdominal: Bowel sounds present x 4.   Skin: No jaundice, rashes, or visible lesions.  Neuro:  --GCS: E4 V5 M6  --Mental Status:  Alert oriented follows pleasant   --CN II-XII grossly intact.   --Pupils 3mm, PERRL.   --Corneal reflex, gag, cough intact.  --LUE strength: 4/5  --RUE strength: 5/5  --LLE strength: 3/5  --RLE strength: 5/5      Unable to test gait due to level of consciousness.    Today I personally reviewed pertinent medications, lines/drains/airways, imaging, laboratory results, notably: MRI  brain     Assessment/Plan:     Neuro  Vasogenic brain edema  - dex 8 mg osh   - to OR     Cardiac/Vascular  A-fib  - metoprolol for rate control   - hold anticoagulation    Renal/  S/P DBD kidney transplant on 5/4/21  - continue prednisone  - continue prograf  - continue valcyte   - Consult transplant medicine     ID  * Intracranial abscess  - NSGY following   - Q 1 vitals   - Q 1 neuro checks   - Vanc and cefepime  - BC and inflammatory markers  - PCC for anticoagulation reversal prior to OR per nsgy  - MRI stealth per nsgy       Hematology  Long term (current) use of anticoagulants--Eliquis  - hold in setting of OR last dose 2/16 AM  - Received PCC for reversal prior to OR           The patient is being Prophylaxed for:  Venous Thromboembolism with: Mechanical  Stress Ulcer with: H2B  Ventilator Pneumonia with: not applicable    Activity Orders          Progressive Mobility Protocol (mobilize patient to their highest level of functioning at least twice daily) starting at 02/17 0800    Diet NPO: NPO starting at 02/16 2127        Full Code    Critical condition in that Patient has a condition that poses threat to life and bodily function: intracranial abscess, vasogenic brain edema, long term anticoagulation, A fib, renal tx hx     50 minutes of Critical care time was spent personally by me on the following activities: development of treatment plan with patient or surrogate and bedside caregivers, discussions with consultants, evaluation of patient's response to treatment, examination of patient, ordering and performing treatments and interventions, ordering and review of laboratory studies, ordering and review of radiographic studies, pulse oximetry, antibiotic titration if applicable, vasopressor titration if applicable, re-evaluation of patient's condition. This critical care time did not overlap with that of any other provider or involve time for any procedures. There is high probability for acute neurological  change leading to clinical and possibly life-threatening deterioration requiring highest level of physician preparedness for urgent intervention.      Sunday Redding, RENAN  Neurocritical Care  [facility] - Neuro Critical Care

## 2022-02-17 NOTE — SUBJECTIVE & OBJECTIVE
Interval History: OR yesterday for crani for abscess drainage. Tolerated procedure well, headaches this AM, but back to baseline strength and mentation    Medications:  Continuous Infusions:   sodium chloride 0.9% 75 mL/hr at 02/17/22 0705     Scheduled Meds:   ceFEPime (MAXIPIME) IVPB  2 g Intravenous Q8H    famotidine  20 mg Oral QHS    levetiracetam IV  500 mg Intravenous Q12H    predniSONE  5 mg Oral Daily    senna-docusate 8.6-50 mg  1 tablet Oral BID    tacrolimus  2 mg Oral BID    valGANciclovir  900 mg Oral BID    vancomycin (VANCOCIN) IVPB  15 mg/kg (Dosing Weight) Intravenous Q12H     PRN Meds:acetaminophen, dextrose 10%, glucagon (human recombinant), insulin aspart U-100, ondansetron, oxyCODONE, sodium chloride 0.9%     Review of Systems   Constitutional: Negative for chills and fever.   Respiratory: Negative for shortness of breath.    Gastrointestinal: Negative for nausea and vomiting.   Musculoskeletal: Negative for back pain.   Neurological: Positive for weakness. Negative for headaches.   Psychiatric/Behavioral: Negative for confusion.     Objective:     Weight: 80.3 kg (177 lb)  Body mass index is 22.73 kg/m².  Vital Signs (Most Recent):  Temp: 97.8 °F (36.6 °C) (02/17/22 0705)  Pulse: 103 (02/17/22 0705)  Resp: (!) 21 (02/17/22 0705)  BP: (!) 144/95 (02/17/22 0705)  SpO2: 95 % (02/17/22 0705) Vital Signs (24h Range):  Temp:  [97.5 °F (36.4 °C)-97.8 °F (36.6 °C)] 97.8 °F (36.6 °C)  Pulse:  [] 103  Resp:  [17-24] 21  SpO2:  [95 %-98 %] 95 %  BP: (113-158)/(75-95) 144/95  Arterial Line BP: (145-158)/(75-84) 145/76     Date 02/17/22 0700 - 02/18/22 0659   Shift 5045-2756 0125-8629 6402-7124 24 Hour Total   INTAKE   I.V.(mL/kg) 80(1)   80(1)   IV Piggyback 50   50   Shift Total(mL/kg) 130(1.6)   130(1.6)   OUTPUT   Urine(mL/kg/hr) 130   130   Shift Total(mL/kg) 130(1.6)   130(1.6)   Weight (kg) 80.3 80.3 80.3 80.3                        Urethral Catheter 02/17/22 0111  "Non-latex;Straight-tip 16 Fr. (Active)   Site Assessment Clean;Intact 02/17/22 0301   Collection Container Urimeter 02/17/22 0301   Securement Method secured to top of thigh w/ adhesive device 02/17/22 0301   Catheter Care Performed yes 02/17/22 0301   Reason for Continuing Urinary Catheterization Post operative 02/17/22 0301   CAUTI Prevention Bundle Securement Device in place with 1" slack;Drainage bag/urimeter off the floor;Sheeting clip in use;Intact seal between catheter & drainage tubing;No dependent loops or kinks;Drainage bag/urimeter not overfilled (<2/3 full);Drainage bag/urimeter below bladder 02/17/22 0301   Output (mL) 130 mL 02/17/22 0705            Hemodialysis AV Fistula Left forearm (Active)   Site Assessment Clean;Dry 02/17/22 0301   Patency Thrill;Bruit;Present 02/17/22 0301   Status Deaccessed 02/17/22 0301   Site Condition No complications 02/17/22 0301   Dressing Open to air (None) 02/17/22 0301       Physical Exam  Constitutional:       General: He is not in acute distress.  Eyes:      Extraocular Movements: EOM normal.      Pupils: Pupils are equal, round, and reactive to light.   Cardiovascular:      Rate and Rhythm: Normal rate and regular rhythm.   Neurological:      Mental Status: He is alert.         Physical Exam:    Constitutional: No distress.     Eyes: Pupils are equal, round, and reactive to light. EOM are normal.     Cardiovascular: Normal rate and regular rhythm.     Psych/Behavior: He is alert.     E4V5M6  AOx3  PERRL  EOMI  Face Symmetric  Right 5/5  Left 4+/5      Significant Labs:  Recent Labs   Lab 02/16/22 2246 02/17/22  0430   * 172*    134*   K 4.9 4.5    106   CO2 20* 18*   BUN 21 18   CREATININE 0.9 0.9   CALCIUM 10.0 9.1   MG 1.6  --      Recent Labs   Lab 02/16/22 2246 02/17/22  0430   WBC 3.18* 2.92*   HGB 16.7 16.3   HCT 51.8 51.3    174     Recent Labs   Lab 02/16/22 2246   INR 1.3*   APTT 31.6     Microbiology Results (last 7 days)     " Procedure Component Value Units Date/Time    Gram stain [[id number]] Collected: 02/17/22 0238    Order Status: Completed Specimen: Abscess from Brain Updated: 02/17/22 0753     Gram Stain Result Rare WBC's      No organisms seen      No epithelial cells    Narrative:      1) Intracerebral Abscess    Blood culture [[id number]] Collected: 02/16/22 2307    Order Status: Completed Specimen: Blood from Peripheral, Ankle, Right Updated: 02/17/22 0545     Blood Culture, Routine No Growth to date    Narrative:      Blood cultures from 2 different sites. 4 bottles total.  Please draw before starting antibiotics.    Blood culture [[id number]] Collected: 02/16/22 2247    Order Status: Completed Specimen: Blood from Peripheral, Hand, Right Updated: 02/17/22 0545     Blood Culture, Routine No Growth to date    Narrative:      Blood cultures x 2 different sites. 4 bottles total. Please  draw cultures before administering antibiotics.    AFB Culture & Smear [[id number]] Collected: 02/17/22 0238    Order Status: Sent Specimen: Abscess from Brain Updated: 02/17/22 0303    Culture, Anaerobe [[id number]] Collected: 02/17/22 0238    Order Status: Sent Specimen: Abscess from Brain Updated: 02/17/22 0303    Fungus culture [[id number]] Collected: 02/17/22 0238    Order Status: Sent Specimen: Abscess from Brain Updated: 02/17/22 0303    Gram stain [[id number]] Collected: 02/17/22 0238    Order Status: Sent Specimen: Abscess from Brain Updated: 02/17/22 0303    Aerobic culture [[id number]] Collected: 02/17/22 0238    Order Status: Sent Specimen: Abscess from Brain Updated: 02/17/22 0303    Culture, Anaerobe [[id number]] Collected: 02/17/22 0238    Order Status: Sent Specimen: Abscess from Brain Updated: 02/17/22 0301    AFB Culture & Smear [[id number]] Collected: 02/17/22 0238    Order Status: Sent Specimen: Abscess from Brain Updated: 02/17/22 0301    Aerobic culture [[id number]] Collected: 02/17/22 0238    Order Status: Sent Specimen: Abscess from  Brain Updated: 02/17/22 0301    Fungus culture [[id number]] Collected: 02/17/22 0238    Order Status: Sent Specimen: Abscess from Brain Updated: 02/17/22 0301        All pertinent labs from the last 24 hours have been reviewed.    Significant Diagnostics:  I have reviewed and interpreted all pertinent imaging results/findings within the past 24 hours.

## 2022-02-17 NOTE — PLAN OF CARE
[facility] Care Plan    POC reviewed with Ronal Mazariegos and family at 1400. Pt verbalized understanding. Questions and concerns addressed. No acute events today. Pt s/p brain abscess drainage POD 1. Diet advanced. Discontinued dawson. Pain management. PT/OT. Pt progressing toward goals. Will continue to monitor. See below and flowsheets for full assessment and VS info.       Neuro:  Beauty Coma Scale  Best Eye Response: 4-->(E4) spontaneous  Best Motor Response: 6-->(M6) obeys commands  Best Verbal Response: 5-->(V5) oriented  Patrice Coma Scale Score: 15  Assessment Qualifiers: patient not sedated/intubated        24 hr Temp:  [97.5 °F (36.4 °C)-98.2 °F (36.8 °C)]     CV:   Rhythm: atrial rhythm  BP goals:   SBP < 160  MAP > 65    Resp:   O2 Device (Oxygen Therapy): room air       Plan: N/A    GI/:     Diet/Nutrition Received: regular  Last Bowel Movement: 02/16/22  Voiding Characteristics: urethral catheter (bladder)    Intake/Output Summary (Last 24 hours) at 2/17/2022 1555  Last data filed at 2/17/2022 1505  Gross per 24 hour   Intake 2227.56 ml   Output 2250 ml   Net -22.44 ml     Unmeasured Output  Stool Occurrence: 0    Labs/Accuchecks:  Recent Labs   Lab 02/17/22  0430   WBC 2.92*   RBC 5.13   HGB 16.3   HCT 51.3         Recent Labs   Lab 02/17/22  0430 02/17/22  0430 02/17/22  1117   *   < > 136   K 4.5  --   --    CO2 18*  --   --      --   --    BUN 18  --   --    CREATININE 0.9  --   --    ALKPHOS 70  --   --    ALT 15  --   --    AST 16  --   --    BILITOT 2.3*  --   --     < > = values in this interval not displayed.      Recent Labs   Lab 02/16/22  2246   INR 1.3*   APTT 31.6    No results for input(s): CPK, CPKMB, TROPONINI, MB in the last 168 hours.    Electrolytes: N/A - electrolytes WDL  Accuchecks: Q6hr    Gtts:       LDA/Wounds:  Lines/Drains/Airways       Drain                   Hemodialysis AV Fistula Left forearm -- days              Arterial Line              Arterial Line  02/17/22 0115 Right Radial <1 day              Peripheral Intravenous Line                   Peripheral IV - Single Lumen 05/04/21 1659 22 G Anterior;Right Forearm 288 days         Peripheral IV - Single Lumen 02/17/22 0124 18 G Left Foot <1 day                  Wounds: Yes, skin tears  Wound care consulted: No

## 2022-02-17 NOTE — CONSULTS
[facility] - Neuro Critical Care  Neurosurgery  Consult Note    Subjective:     History of Present Illness: 71 M PMHx afib on eliquis, recent kidney transplant on 2021 on immunosuppressants, cardiomyopathy, pulmonary hypertension presenting with new right frontal lobe abscess. Pt reports transient episodes of left sided weakness this week, most recent episode was this morning, he fell due to left leg weakness when getting up from the toilet, prompting visit to the ED because he thought he was having a stroke. At present, he feels well, has no fever/chills/aches, confusion, blurry vision, LOC, or seizures, and besides mild left sided weakness, he is asymptomatic.    Of note, he has had recent lower respiratory tract infection      Post-Op Info:  Procedure(s) (LRB):  CRANIOTOMY (Right)   Day of Surgery     Past Medical History:   Diagnosis Date    Anasarca 10/1/2021    Anemia of chronic disease     Atrial fibrillation     BPH (benign prostatic hypertrophy)     Chronic systolic heart failure 10/1/2021    Hepatitis C virus infection cured after antiviral drug therapy     acquired through kidney transplant, treated / cured (SVR12 - 2021)    HTN (hypertension)     Polycystic kidney disease        Past Surgical History:   Procedure Laterality Date    AV FISTULA PLACEMENT Left 2016    CATARACT EXTRACTION, BILATERAL Bilateral     KIDNEY TRANSPLANT N/A 2021    Procedure: TRANSPLANT, KIDNEY;  Surgeon: Lexy Bolden MD;  Location: [facility] OR [address] [place] [address];  Service: Transplant;  Laterality: N/A;       Social History     Socioeconomic History    Marital status:     Number of children: 1   Tobacco Use    Smoking status: Former Smoker     Packs/day: 2.00     Years: 10.00     Pack years: 20.00     Types: Cigarettes     Start date: 1972     Quit date: 1984     Years since quittin.2    Smokeless tobacco: Never Used   Substance and Sexual Activity    Alcohol use: No     Comment: Pt reportsbeing  a former beer drinker (2-3 cans per day) and quitting in 2014.    Drug use: No    Sexual activity: Yes       Family History   Problem Relation Age of Onset    Polycystic kidney disease Father     Hypertension Father     Kidney disease Father     Polycystic kidney disease Sister     Hypertension Sister     Kidney disease Sister        Review of patient's allergies indicates:  No Known Allergies      Medications:  Continuous Infusions:   sodium chloride 0.9%       Scheduled Meds:   ceFEPime (MAXIPIME) IVPB  2 g Intravenous Q8H    [START ON 2/17/2022] dexamethasone  4 mg Intravenous Q6H    famotidine  20 mg Oral QHS    human prothrombin complex (PCC)  25 Units/kg (Dosing Weight) Intravenous Once    levetiracetam IV  500 mg Intravenous Q12H    [START ON 2/17/2022] predniSONE  5 mg Oral Daily    senna-docusate 8.6-50 mg  1 tablet Oral BID    [START ON 2/17/2022] tacrolimus  2 mg Oral BID    valGANciclovir  900 mg Oral BID     PRN Meds:ondansetron, sodium chloride 0.9%, Pharmacy to dose Vancomycin consult **AND** vancomycin - pharmacy to dose     Review of Systems   Constitutional: Negative for chills and fever.   Respiratory: Negative for shortness of breath.    Gastrointestinal: Negative for nausea and vomiting.   Musculoskeletal: Negative for back pain.   Neurological: Positive for weakness. Negative for headaches.   Psychiatric/Behavioral: Negative for confusion.     Objective:        There is no height or weight on file to calculate BMI.  Vital Signs (Most Recent):    Vital Signs (24h Range):  Temp:  [97.7 °F (36.5 °C)] 97.7 °F (36.5 °C)  Pulse:  [99] 99  Resp:  [17] 17  SpO2:  [98 %] 98 %  BP: (116)/(86) 116/86                          Hemodialysis AV Fistula Left forearm (Active)   Site Assessment Clean;Dry;Intact;No redness;No swelling 05/07/21 0800   Patency Present;Thrill;Bruit 05/07/21 0800   Status Deaccessed 05/07/21 0800   Dressing Open to air (None) 05/07/21 0800       Physical  Exam  Constitutional:       General: He is not in acute distress.  Eyes:      Extraocular Movements: EOM normal.      Pupils: Pupils are equal, round, and reactive to light.   Cardiovascular:      Rate and Rhythm: Normal rate and regular rhythm.   Abdominal:      Palpations: Abdomen is soft.   Neurological:      Mental Status: He is alert.       E4V5M6  AOx3  PERRL  EOMI  Face Symmetric  Tongue midline  Right 5/5  Left 4+/5  No drift      Significant Labs:  No results for input(s): GLU, NA, K, CL, CO2, BUN, CREATININE, CALCIUM, MG in the last 48 hours.  No results for input(s): WBC, HGB, HCT, PLT in the last 48 hours.  No results for input(s): LABPT, INR, APTT in the last 48 hours.  Microbiology Results (last 7 days)     Procedure Component Value Units Date/Time    Blood culture [[id number]]     Order Status: Sent Specimen: Blood     Blood culture [[id number]]     Order Status: Sent Specimen: Blood         All pertinent labs from the last 24 hours have been reviewed.    Significant Diagnostics:  I have reviewed and interpreted all pertinent imaging results/findings within the past 24 hours.    Assessment/Plan:     Intracranial abscess  11 M PMHx afib on eliquis, recent kidney transplant on 5/2021 on immunosuppressants, cardiomyopathy, pulmonary hypertension presenting with new right frontal lobe abscess. He has mild left sided weakness, but is otherwise intact. MRI demonstrates right frontal 2.2 cm ring enhancing lesion, restricting on DWI    -- Plan for OR Class A for craniotomy for abscess drainage.   -- MRI w/wo with [facility] protocol for preop planning STAT  -- Consented for OR  -- Preop labs T/S, Coags, CBC pending  -- PCC for eliquis reversal stat  -- Vanc/Cefepime  -- Full infectious workup: blood cultures, ESR/CRP/Procal  -- Further recs to follow OR        Chaim Yeung MD  Neurosurgery  [facility] - Neuro Critical Care

## 2022-02-17 NOTE — SUBJECTIVE & OBJECTIVE
Subjective:     History of Present Illness:   71 year old male with ESRD secondary to polycystic kidney disease.  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  Pt reports transient episodes of left sided weakness this week, most recent episode 1 day ago, he fell due to left leg weakness when getting up from the toilet, prompting visit to the ED because he thought he was having a stroke.  In ED, has no fever/chills/aches, confusion, blurry vision, LOC, or seizures, and besides mild left sided weakness, he is asymptomatic.  MRI osh showed R sided Abscess vs mass with vasogenic edema. Transferred here for higher level of care.   KTM consulted given history of renal transplant.     BL renal functions Cr 1 to 1.3. He is not on MMF  He has had prior CMV positivity as well.   He had phlebotomy last week for polycythemia.   Native kidney US showed no mass.    Interval History:  S/p right craniotomy last night   Doing ok overall     Past Medical and Surgical History: Mr. Mazariegos has a past medical history of Anasarca (10/1/2021), Anemia of chronic disease, Atrial fibrillation, BPH (benign prostatic hypertrophy), Chronic systolic heart failure (10/1/2021), Hepatitis C virus infection cured after antiviral drug therapy, HTN (hypertension), and Polycystic kidney disease.  He has a past surgical history that includes AV fistula placement (Left, 2016); Cataract extraction, bilateral (Bilateral); and Kidney transplant (N/A, 5/4/2021).    Past Social and Family History: Mr. Mazariegos reports that he quit smoking about 37 years ago. His smoking use included cigarettes. He started smoking about 49 years ago. He has a 20.00 pack-year smoking history. He has never used smokeless tobacco. He reports that he does not drink alcohol and does not use drugs.His family history includes Hypertension in his father and sister; Kidney disease in his father and sister;  "Polycystic kidney disease in his father and sister.    Intake/Output - Last 3 Shifts       02/15 0700  02/16 0659 02/16 0700  02/17 0659 02/17 0700 02/18 0659    P.O.   30    I.V. (mL/kg)  120.2 (1.5) 265.1 (3.3)    IV Piggyback  1650 99.8    Total Intake(mL/kg)  1770.2 (22) 394.9 (4.9)    Urine (mL/kg/hr)  1660 465 (1.3)    Stool  0     Blood  100     Total Output  1760 465    Net  +10.2 -70.1           Stool Occurrence  0 x            Review of Systems   Constitutional: Positive for activity change. Negative for fever.   HENT: Negative for facial swelling.    Eyes: Negative for itching.   Respiratory: Negative for shortness of breath and wheezing.    Cardiovascular: Negative for chest pain and palpitations.   Gastrointestinal: Negative for abdominal distention, abdominal pain, diarrhea and nausea.   Genitourinary: Negative for decreased urine volume, difficulty urinating and dysuria.   Skin: Negative for color change.   Allergic/Immunologic: Positive for immunocompromised state.   Neurological:        Left sided weakness in upper and lower extremity - improving    Psychiatric/Behavioral: Negative for agitation and confusion.     Objective:     Vital Signs (Most Recent):  Temp: 97.7 °F (36.5 °C) (02/17/22 1105)  Pulse: 101 (02/17/22 0905)  Resp: 19 (02/17/22 0905)  BP: 120/63 (02/17/22 1005)  SpO2: 96 % (02/17/22 0905) Vital Signs (24h Range):  Temp:  [97.5 °F (36.4 °C)-97.8 °F (36.6 °C)] 97.7 °F (36.5 °C)  Pulse:  [] 101  Resp:  [17-24] 19  SpO2:  [95 %-98 %] 96 %  BP: (113-158)/(63-95) 120/63  Arterial Line BP: (144-158)/(75-84) 144/79     Weight: 80.3 kg (177 lb)  Height: 6' 2" (188 cm)  Body mass index is 22.73 kg/m².    Physical Exam  Constitutional:       General: He is not in acute distress.     Appearance: He is not ill-appearing or toxic-appearing.   HENT:      Head:      Comments: Dressing right side of skull - post surgery   Eyes:      Extraocular Movements: Extraocular movements intact.      " Conjunctiva/sclera: Conjunctivae normal.   Cardiovascular:      Rate and Rhythm: Normal rate and regular rhythm.   Pulmonary:      Effort: No respiratory distress.      Breath sounds: No wheezing.   Abdominal:      General: There is no distension.      Palpations: Abdomen is soft.      Tenderness: There is no abdominal tenderness.   Musculoskeletal:      Right lower leg: No edema.      Left lower leg: No edema.   Skin:     General: Skin is warm.      Coloration: Skin is not jaundiced.   Neurological:      Mental Status: He is oriented to person, place, and time.      Comments: Left sided weakness upper and lower extremity - improving          Significant Labs:  BMP:   Recent Labs   Lab 02/16/22  2246 02/17/22  0430   * 172*    134*   K 4.9 4.5    106   CO2 20* 18*   BUN 21 18   CREATININE 0.9 0.9   CALCIUM 10.0 9.1       Diagnostics:  Reviewed

## 2022-02-17 NOTE — SUBJECTIVE & OBJECTIVE
Past Medical History:   Diagnosis Date    Anasarca 10/1/2021    Anemia of chronic disease     Atrial fibrillation     BPH (benign prostatic hypertrophy)     Chronic systolic heart failure 10/1/2021    Hepatitis C virus infection cured after antiviral drug therapy     acquired through kidney transplant, treated / cured (SVR12 - 12/2021)    HTN (hypertension)     Polycystic kidney disease      Past Surgical History:   Procedure Laterality Date    AV FISTULA PLACEMENT Left 2016    CATARACT EXTRACTION, BILATERAL Bilateral     KIDNEY TRANSPLANT N/A 5/4/2021    Procedure: TRANSPLANT, KIDNEY;  Surgeon: Lexy Bolden MD;  Location: [facility] OR [address] [place] [address];  Service: Transplant;  Laterality: N/A;      No current facility-administered medications on file prior to encounter.     Current Outpatient Medications on File Prior to Encounter   Medication Sig Dispense Refill    amLODIPine (NORVASC) 5 MG tablet Take 5 mg by mouth once daily.      apixaban (ELIQUIS) 5 mg Tab Take 1 tablet (5 mg total) by mouth 2 (two) times daily. 60 tablet 11    BYSTOLIC 20 mg Tab Take 1 tablet by mouth once daily.      CARDURA 2 mg tablet Take 2 mg by mouth nightly.      ergocalciferol (VITAMIN D2) 50,000 unit Cap Take 1 capsule (50,000 Units total) by mouth every 7 days. (Patient not taking: Reported on 12/21/2021) 4 capsule 5    famotidine (PEPCID) 20 MG tablet Take 1 tablet (20 mg total) by mouth every evening. 30 tablet 1    finasteride (PROSCAR) 5 mg tablet Take 5 mg by mouth once daily.      fish oil-omega-3 fatty acids 300-1,000 mg capsule Take 1 capsule by mouth once daily.       fluticasone propionate (FLONASE) 50 mcg/actuation nasal spray 1 spray by Each Nostril route daily as needed.      furosemide (LASIX) 40 MG tablet Take 40 mg by mouth once daily.      iron-vitamin C 100-250 mg, ICAR-C, 100-250 mg Tab Take 1 tablet by mouth once daily.      k phos di & mono-sod phos mono (K-PHOS-NEUTRAL) 250 mg Tab Take 3 tablets by  mouth 2 (two) times a day. 180 tablet 11    magnesium oxide (MAG-OX) 400 mg (241.3 mg magnesium) tablet Take 3 tablets (1,200 mg total) by mouth 3 (three) times daily. (Patient taking differently: Take 1,200 mg by mouth 2 (two) times daily.) 270 tablet 11    multivitamin Tab Take 1 tablet by mouth once daily.      predniSONE (DELTASONE) 5 MG tablet Take by mouth daily. - 20 mg, - 15 mg, - 10 mg, 5 mg daily thereafter starting 21 120 tablet 11    tacrolimus (PROGRAF) 1 MG Cap Take 2 capsules (2 mg total) by mouth every 12 (twelve) hours. Z94.0;Kidney txp on 21 120 capsule 11    valGANciclovir (VALCYTE) 450 mg Tab Take 2 tablets (900 mg total) by mouth 2 (two) times daily. 120 tablet 2      Allergies: Patient has no known allergies.    Family History   Problem Relation Age of Onset    Polycystic kidney disease Father     Hypertension Father     Kidney disease Father     Polycystic kidney disease Sister     Hypertension Sister     Kidney disease Sister      Social History     Tobacco Use    Smoking status: Former Smoker     Packs/day: 2.00     Years: 10.00     Pack years: 20.00     Types: Cigarettes     Start date: 1972     Quit date: 1984     Years since quittin.2    Smokeless tobacco: Never Used   Substance Use Topics    Alcohol use: No     Comment: Pt reportsbeing a former beer drinker (2-3 cans per day) and quitting in .    Drug use: No     Review of Systems     Review of Symptoms:  Constitutional: Denies fevers, weight loss, chills, or weakness.  Eyes: Denies changes in vision.  ENT: Denies dysphagia, nasal discharge, ear pain or discharge.  Cardiovascular: Denies chest pain, palpitations, orthopnea, or claudication.  Respiratory: Denies shortness of breath, cough, hemoptysis, or wheezing.  GI: Denies nausea/vomitting, hematochezia, melena, abd pain, or changes in appetite.  : Denies dysuria, incontinence, or hematuria.  Musculoskeletal: Denies joint  pain or myalgias. L sided weakness   Skin/breast: Denies rashes, lumps, lesions, or discharge.  Neurologic: Denies headache, dizziness, vertigo, or paresthesias.  Psychiatric: Denies changes in mood or hallucinations.  Endocrine: Denies polyuria, polydipsia, heat/cold intolerance.  Hematologic/Lymph: Denies lymphadenopathy, easy bruising or easy bleeding.  Allergic/Immunologic: Denies rash, rhinitis.     Objective:     Vitals:         Temp  Min: 97.7 °F (36.5 °C)  Max: 97.7 °F (36.5 °C)  Pulse  Min: 99  Max: 99  BP  Min: 116/86  Max: 116/86  Resp  Min: 17  Max: 17  SpO2  Min: 98 %  Max: 98 %    No intake/output data recorded.           Physical Exam      Physical Exam:  GA: Alert, comfortable, no acute distress.   HEENT: No scleral icterus or JVD.   Pulmonary: Clear to auscultation A/P/L.   Cardiac: RRR S1 & S2 w/o rubs/murmurs/gallops.   Abdominal: Bowel sounds present x 4.   Skin: No jaundice, rashes, or visible lesions.  Neuro:  --GCS: E4 V5 M6  --Mental Status:  Alert oriented follows pleasant   --CN II-XII grossly intact.   --Pupils 3mm, PERRL.   --Corneal reflex, gag, cough intact.  --LUE strength: 4/5  --RUE strength: 5/5  --LLE strength: 3/5  --RLE strength: 5/5      Unable to test gait due to level of consciousness.    Today I personally reviewed pertinent medications, lines/drains/airways, imaging, laboratory results, notably: MRI brain

## 2022-02-17 NOTE — CONSULTS
[facility]  Kidney Transplant  Consult Note    Inpatient consult to Kidney/Pancreas Transplant Medicine  Consult performed by: Sherry Garcia MD  Consult ordered by: Linda Martinez NP            Subjective:     History of Present Illness:   71 year old male with ESRD secondary to polycystic kidney disease.  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  Pt reports transient episodes of left sided weakness this week, most recent episode 1 day ago, he fell due to left leg weakness when getting up from the toilet, prompting visit to the ED because he thought he was having a stroke.  In ED, has no fever/chills/aches, confusion, blurry vision, LOC, or seizures, and besides mild left sided weakness, he is asymptomatic.  MRI osh showed R sided Abscess vs mass with vasogenic edema. Transferred here for higher level of care.   KTM consulted given history of renal transplant.     BL renal functions Cr 1 to 1.3. He is not on MMF  He has had prior CMV positivity as well.   He had phlebotomy last week for polycythemia.   Native kidney US showed no mass.    Interval History:  S/p right craniotomy last night   Doing ok overall     Past Medical and Surgical History: Mr. Mazariegos has a past medical history of Anasarca (10/1/2021), Anemia of chronic disease, Atrial fibrillation, BPH (benign prostatic hypertrophy), Chronic systolic heart failure (10/1/2021), Hepatitis C virus infection cured after antiviral drug therapy, HTN (hypertension), and Polycystic kidney disease.  He has a past surgical history that includes AV fistula placement (Left, 2016); Cataract extraction, bilateral (Bilateral); and Kidney transplant (N/A, 5/4/2021).    Past Social and Family History: Mr. Mazariegos reports that he quit smoking about 37 years ago. His smoking use included cigarettes. He started smoking about 49 years ago. He has a 20.00 pack-year smoking history.  "He has never used smokeless tobacco. He reports that he does not drink alcohol and does not use drugs.His family history includes Hypertension in his father and sister; Kidney disease in his father and sister; Polycystic kidney disease in his father and sister.    Intake/Output - Last 3 Shifts       02/15 0700  02/16 0659 02/16 0700  02/17 0659 02/17 0700  02/18 0659    P.O.   30    I.V. (mL/kg)  120.2 (1.5) 265.1 (3.3)    IV Piggyback  1650 99.8    Total Intake(mL/kg)  1770.2 (22) 394.9 (4.9)    Urine (mL/kg/hr)  1660 465 (1.3)    Stool  0     Blood  100     Total Output  1760 465    Net  +10.2 -70.1           Stool Occurrence  0 x            Review of Systems   Constitutional: Positive for activity change. Negative for fever.   HENT: Negative for facial swelling.    Eyes: Negative for itching.   Respiratory: Negative for shortness of breath and wheezing.    Cardiovascular: Negative for chest pain and palpitations.   Gastrointestinal: Negative for abdominal distention, abdominal pain, diarrhea and nausea.   Genitourinary: Negative for decreased urine volume, difficulty urinating and dysuria.   Skin: Negative for color change.   Allergic/Immunologic: Positive for immunocompromised state.   Neurological:        Left sided weakness in upper and lower extremity - improving    Psychiatric/Behavioral: Negative for agitation and confusion.     Objective:     Vital Signs (Most Recent):  Temp: 97.7 °F (36.5 °C) (02/17/22 1105)  Pulse: 101 (02/17/22 0905)  Resp: 19 (02/17/22 0905)  BP: 120/63 (02/17/22 1005)  SpO2: 96 % (02/17/22 0905) Vital Signs (24h Range):  Temp:  [97.5 °F (36.4 °C)-97.8 °F (36.6 °C)] 97.7 °F (36.5 °C)  Pulse:  [] 101  Resp:  [17-24] 19  SpO2:  [95 %-98 %] 96 %  BP: (113-158)/(63-95) 120/63  Arterial Line BP: (144-158)/(75-84) 144/79     Weight: 80.3 kg (177 lb)  Height: 6' 2" (188 cm)  Body mass index is 22.73 kg/m².    Physical Exam  Constitutional:       General: He is not in acute distress.     " Appearance: He is not ill-appearing or toxic-appearing.   HENT:      Head:      Comments: Dressing right side of skull - post surgery   Eyes:      Extraocular Movements: Extraocular movements intact.      Conjunctiva/sclera: Conjunctivae normal.   Cardiovascular:      Rate and Rhythm: Normal rate and regular rhythm.   Pulmonary:      Effort: No respiratory distress.      Breath sounds: No wheezing.   Abdominal:      General: There is no distension.      Palpations: Abdomen is soft.      Tenderness: There is no abdominal tenderness.   Musculoskeletal:      Right lower leg: No edema.      Left lower leg: No edema.   Skin:     General: Skin is warm.      Coloration: Skin is not jaundiced.   Neurological:      Mental Status: He is oriented to person, place, and time.      Comments: Left sided weakness upper and lower extremity - improving          Significant Labs:  BMP:   Recent Labs   Lab 02/16/22  2246 02/17/22  0430   * 172*    134*   K 4.9 4.5    106   CO2 20* 18*   BUN 21 18   CREATININE 0.9 0.9   CALCIUM 10.0 9.1       Diagnostics:  Reviewed     Assessment/Plan:     * Intracranial abscess  S/p craniotomy now   Cultures pending   On cefepime and vancomycin   ID eval       Drug-induced neutropenia  noticed leukopenia - likely related to valcyte   Last few cmv negative  Can stop valcyte now.       Long-term use of immunosuppressant medication  Continue prograf at current dose   Check levels daily to assess for adverse events   Hold prednisone while on dexamethasone   Continue holding MMF       S/P DBD kidney transplant on 5/4/21  Renal functions currently at baseline             Sherry Garcia MD  Kidney Transplant  [facility]

## 2022-02-17 NOTE — ASSESSMENT & PLAN NOTE
- NSGY following   - Q 1 vitals   - Q 1 neuro checks   - Vanc and cefepime  - BC and inflammatory markers  - PCC for anticoagulation reversal prior to OR per nsgy  - MRI stealth per nsgy

## 2022-02-17 NOTE — TELEPHONE ENCOUNTER
Coordinator received a call from [facility] Dr. Victoria; call was directed to in-patient physician on [facility].     ----- Message from Rey Domingo RN sent at 2/16/2022  4:51 PM CST -----  Regarding: FW: Speak to coordinator    ----- Message -----  From: Annamarie Swan  Sent: 2/16/2022   9:16 AM CST  To: [facility] Post-Kidney Transplant Clinical  Subject: Speak to coordinator                             Eliana at [facility] calling concerning patient status. Dr. Victoria would like to speak to coordinator regarding this patient.    Call: [phone]

## 2022-02-17 NOTE — SUBJECTIVE & OBJECTIVE
Past Medical History:   Diagnosis Date    Anasarca 10/1/2021    Anemia of chronic disease     Atrial fibrillation     BPH (benign prostatic hypertrophy)     Chronic systolic heart failure 10/1/2021    Hepatitis C virus infection cured after antiviral drug therapy     acquired through kidney transplant, treated / cured (SVR12 - 2021)    HTN (hypertension)     Polycystic kidney disease        Past Surgical History:   Procedure Laterality Date    AV FISTULA PLACEMENT Left 2016    CATARACT EXTRACTION, BILATERAL Bilateral     KIDNEY TRANSPLANT N/A 2021    Procedure: TRANSPLANT, KIDNEY;  Surgeon: Lexy Bolden MD;  Location: [facility] OR [address] [place] [address];  Service: Transplant;  Laterality: N/A;       Social History     Socioeconomic History    Marital status:     Number of children: 1   Tobacco Use    Smoking status: Former Smoker     Packs/day: 2.00     Years: 10.00     Pack years: 20.00     Types: Cigarettes     Start date: 1972     Quit date: 1984     Years since quittin.2    Smokeless tobacco: Never Used   Substance and Sexual Activity    Alcohol use: No     Comment: Pt reportsbeing a former beer drinker (2-3 cans per day) and quitting in .    Drug use: No    Sexual activity: Yes       Family History   Problem Relation Age of Onset    Polycystic kidney disease Father     Hypertension Father     Kidney disease Father     Polycystic kidney disease Sister     Hypertension Sister     Kidney disease Sister        Review of patient's allergies indicates:  No Known Allergies      Medications:  Continuous Infusions:   sodium chloride 0.9%       Scheduled Meds:   ceFEPime (MAXIPIME) IVPB  2 g Intravenous Q8H    [START ON 2022] dexamethasone  4 mg Intravenous Q6H    famotidine  20 mg Oral QHS    human prothrombin complex (PCC)  25 Units/kg (Dosing Weight) Intravenous Once    levetiracetam IV  500 mg Intravenous Q12H    [START ON 2022] predniSONE  5 mg Oral Daily     senna-docusate 8.6-50 mg  1 tablet Oral BID    [START ON 2/17/2022] tacrolimus  2 mg Oral BID    valGANciclovir  900 mg Oral BID     PRN Meds:ondansetron, sodium chloride 0.9%, Pharmacy to dose Vancomycin consult **AND** vancomycin - pharmacy to dose     Review of Systems   Constitutional: Negative for chills and fever.   Respiratory: Negative for shortness of breath.    Gastrointestinal: Negative for nausea and vomiting.   Musculoskeletal: Negative for back pain.   Neurological: Positive for weakness. Negative for headaches.   Psychiatric/Behavioral: Negative for confusion.     Objective:        There is no height or weight on file to calculate BMI.  Vital Signs (Most Recent):    Vital Signs (24h Range):  Temp:  [97.7 °F (36.5 °C)] 97.7 °F (36.5 °C)  Pulse:  [99] 99  Resp:  [17] 17  SpO2:  [98 %] 98 %  BP: (116)/(86) 116/86                          Hemodialysis AV Fistula Left forearm (Active)   Site Assessment Clean;Dry;Intact;No redness;No swelling 05/07/21 0800   Patency Present;Thrill;Bruit 05/07/21 0800   Status Deaccessed 05/07/21 0800   Dressing Open to air (None) 05/07/21 0800       Physical Exam  Constitutional:       General: He is not in acute distress.  Eyes:      Extraocular Movements: EOM normal.      Pupils: Pupils are equal, round, and reactive to light.   Cardiovascular:      Rate and Rhythm: Normal rate and regular rhythm.   Abdominal:      Palpations: Abdomen is soft.   Neurological:      Mental Status: He is alert.       E4V5M6  AOx3  PERRL  EOMI  Face Symmetric  Tongue midline  Right 5/5  Left 4+/5  No drift      Significant Labs:  No results for input(s): GLU, NA, K, CL, CO2, BUN, CREATININE, CALCIUM, MG in the last 48 hours.  No results for input(s): WBC, HGB, HCT, PLT in the last 48 hours.  No results for input(s): LABPT, INR, APTT in the last 48 hours.  Microbiology Results (last 7 days)     Procedure Component Value Units Date/Time    Blood culture [[id number]]     Order Status: Sent  Specimen: Blood     Blood culture [[id number]]     Order Status: Sent Specimen: Blood         All pertinent labs from the last 24 hours have been reviewed.    Significant Diagnostics:  I have reviewed and interpreted all pertinent imaging results/findings within the past 24 hours.

## 2022-02-17 NOTE — ASSESSMENT & PLAN NOTE
- NSGY following   - Q 1 vitals   - Q 1 neuro checks   - Vanc and cefepime  - BC and inflammatory markers  - PCC for anticoagulation reversal prior to OR per nsgy  - MRI stealth per nsgy   2/17/2022: pending repeat MRI brain w w/o con today per NSGY team  -ID team was consulted- appreciate reccs

## 2022-02-18 ENCOUNTER — TELEPHONE (OUTPATIENT)
Dept: TRANSPLANT | Facility: CLINIC | Age: 72
End: 2022-02-18
Payer: MEDICARE

## 2022-02-18 ENCOUNTER — PATIENT MESSAGE (OUTPATIENT)
Dept: TRANSPLANT | Facility: CLINIC | Age: 72
End: 2022-02-18
Payer: MEDICARE

## 2022-02-18 PROBLEM — I27.20 PULMONARY HTN: Status: ACTIVE | Noted: 2022-02-18

## 2022-02-18 PROBLEM — I42.9 CARDIOMYOPATHY: Status: ACTIVE | Noted: 2022-02-18

## 2022-02-18 LAB
ACID FAST MOD KINY STN SPEC: NORMAL
ALBUMIN SERPL BCP-MCNC: 2.7 G/DL (ref 3.5–5.2)
ALP SERPL-CCNC: 67 U/L (ref 55–135)
ALT SERPL W/O P-5'-P-CCNC: 17 U/L (ref 10–44)
ANION GAP SERPL CALC-SCNC: 7 MMOL/L (ref 8–16)
ANISOCYTOSIS BLD QL SMEAR: SLIGHT
AST SERPL-CCNC: 16 U/L (ref 10–40)
BASO STIPL BLD QL SMEAR: ABNORMAL
BASOPHILS # BLD AUTO: ABNORMAL K/UL (ref 0–0.2)
BASOPHILS NFR BLD: 0 % (ref 0–1.9)
BILIRUB SERPL-MCNC: 1.6 MG/DL (ref 0.1–1)
BUN SERPL-MCNC: 18 MG/DL (ref 8–23)
CALCIUM SERPL-MCNC: 9.4 MG/DL (ref 8.7–10.5)
CHLORIDE SERPL-SCNC: 105 MMOL/L (ref 95–110)
CO2 SERPL-SCNC: 22 MMOL/L (ref 23–29)
CREAT SERPL-MCNC: 0.8 MG/DL (ref 0.5–1.4)
DIFFERENTIAL METHOD: ABNORMAL
EOSINOPHIL # BLD AUTO: ABNORMAL K/UL (ref 0–0.5)
EOSINOPHIL NFR BLD: 0 % (ref 0–8)
ERYTHROCYTE [DISTWIDTH] IN BLOOD BY AUTOMATED COUNT: 13.6 % (ref 11.5–14.5)
EST. GFR  (AFRICAN AMERICAN): >60 ML/MIN/1.73 M^2
EST. GFR  (NON AFRICAN AMERICAN): >60 ML/MIN/1.73 M^2
GIANT PLATELETS BLD QL SMEAR: PRESENT
GLUCOSE SERPL-MCNC: 217 MG/DL (ref 70–110)
HCT VFR BLD AUTO: 50 % (ref 40–54)
HGB BLD-MCNC: 16.4 G/DL (ref 14–18)
HYPOCHROMIA BLD QL SMEAR: ABNORMAL
IMM GRANULOCYTES # BLD AUTO: ABNORMAL K/UL (ref 0–0.04)
IMM GRANULOCYTES NFR BLD AUTO: ABNORMAL % (ref 0–0.5)
LYMPHOCYTES # BLD AUTO: ABNORMAL K/UL (ref 1–4.8)
LYMPHOCYTES NFR BLD: 12 % (ref 18–48)
MCH RBC QN AUTO: 31.7 PG (ref 27–31)
MCHC RBC AUTO-ENTMCNC: 32.8 G/DL (ref 32–36)
MCV RBC AUTO: 97 FL (ref 82–98)
METAMYELOCYTES NFR BLD MANUAL: 1 %
MONOCYTES # BLD AUTO: ABNORMAL K/UL (ref 0.3–1)
MONOCYTES NFR BLD: 9 % (ref 4–15)
NEUTROPHILS NFR BLD: 69 % (ref 38–73)
NEUTS BAND NFR BLD MANUAL: 9 %
NRBC BLD-RTO: 0 /100 WBC
PLATELET # BLD AUTO: 168 K/UL (ref 150–450)
PLATELET BLD QL SMEAR: ABNORMAL
PMV BLD AUTO: 10.1 FL (ref 9.2–12.9)
POCT GLUCOSE: 193 MG/DL (ref 70–110)
POCT GLUCOSE: 231 MG/DL (ref 70–110)
POCT GLUCOSE: 251 MG/DL (ref 70–110)
POCT GLUCOSE: 259 MG/DL (ref 70–110)
POCT GLUCOSE: 274 MG/DL (ref 70–110)
POCT GLUCOSE: 319 MG/DL (ref 70–110)
POIKILOCYTOSIS BLD QL SMEAR: SLIGHT
POLYCHROMASIA BLD QL SMEAR: ABNORMAL
POTASSIUM SERPL-SCNC: 4.4 MMOL/L (ref 3.5–5.1)
PROT SERPL-MCNC: 5.6 G/DL (ref 6–8.4)
RBC # BLD AUTO: 5.17 M/UL (ref 4.6–6.2)
SODIUM SERPL-SCNC: 134 MMOL/L (ref 136–145)
TACROLIMUS BLD-MCNC: 6.9 NG/ML (ref 5–15)
WBC # BLD AUTO: 2.87 K/UL (ref 3.9–12.7)

## 2022-02-18 PROCEDURE — 99233 PR SUBSEQUENT HOSPITAL CARE,LEVL III: ICD-10-PCS | Mod: ,,, | Performed by: NURSE PRACTITIONER

## 2022-02-18 PROCEDURE — 80197 ASSAY OF TACROLIMUS: CPT | Performed by: STUDENT IN AN ORGANIZED HEALTH CARE EDUCATION/TRAINING PROGRAM

## 2022-02-18 PROCEDURE — 63600175 PHARM REV CODE 636 W HCPCS: Performed by: NURSE PRACTITIONER

## 2022-02-18 PROCEDURE — 25000003 PHARM REV CODE 250: Performed by: NURSE PRACTITIONER

## 2022-02-18 PROCEDURE — 87305 ASPERGILLUS AG IA: CPT | Performed by: STUDENT IN AN ORGANIZED HEALTH CARE EDUCATION/TRAINING PROGRAM

## 2022-02-18 PROCEDURE — 30000890 MAYO MISCELLANEOUS TEST (REFLEX): Performed by: NURSE PRACTITIONER

## 2022-02-18 PROCEDURE — 99233 PR SUBSEQUENT HOSPITAL CARE,LEVL III: ICD-10-PCS | Mod: ,,, | Performed by: INTERNAL MEDICINE

## 2022-02-18 PROCEDURE — 99233 SBSQ HOSP IP/OBS HIGH 50: CPT | Mod: ,,, | Performed by: INTERNAL MEDICINE

## 2022-02-18 PROCEDURE — 87798 DETECT AGENT NOS DNA AMP: CPT | Performed by: STUDENT IN AN ORGANIZED HEALTH CARE EDUCATION/TRAINING PROGRAM

## 2022-02-18 PROCEDURE — 36415 COLL VENOUS BLD VENIPUNCTURE: CPT | Performed by: INTERNAL MEDICINE

## 2022-02-18 PROCEDURE — 80053 COMPREHEN METABOLIC PANEL: CPT | Performed by: NURSE PRACTITIONER

## 2022-02-18 PROCEDURE — 63600175 PHARM REV CODE 636 W HCPCS: Performed by: STUDENT IN AN ORGANIZED HEALTH CARE EDUCATION/TRAINING PROGRAM

## 2022-02-18 PROCEDURE — 25000003 PHARM REV CODE 250: Performed by: STUDENT IN AN ORGANIZED HEALTH CARE EDUCATION/TRAINING PROGRAM

## 2022-02-18 PROCEDURE — 25000003 PHARM REV CODE 250: Performed by: PSYCHIATRY & NEUROLOGY

## 2022-02-18 PROCEDURE — 11000001 HC ACUTE MED/SURG PRIVATE ROOM

## 2022-02-18 PROCEDURE — 86403 PARTICLE AGGLUT ANTBDY SCRN: CPT | Performed by: INTERNAL MEDICINE

## 2022-02-18 PROCEDURE — A9585 GADOBUTROL INJECTION: HCPCS | Performed by: PSYCHIATRY & NEUROLOGY

## 2022-02-18 PROCEDURE — 94761 N-INVAS EAR/PLS OXIMETRY MLT: CPT

## 2022-02-18 PROCEDURE — 63600175 PHARM REV CODE 636 W HCPCS: Performed by: PSYCHIATRY & NEUROLOGY

## 2022-02-18 PROCEDURE — 85027 COMPLETE CBC AUTOMATED: CPT | Performed by: NURSE PRACTITIONER

## 2022-02-18 PROCEDURE — 85007 BL SMEAR W/DIFF WBC COUNT: CPT | Performed by: NURSE PRACTITIONER

## 2022-02-18 PROCEDURE — 99233 SBSQ HOSP IP/OBS HIGH 50: CPT | Mod: ,,, | Performed by: NURSE PRACTITIONER

## 2022-02-18 PROCEDURE — 87449 NOS EACH ORGANISM AG IA: CPT | Performed by: STUDENT IN AN ORGANIZED HEALTH CARE EDUCATION/TRAINING PROGRAM

## 2022-02-18 PROCEDURE — 25500020 PHARM REV CODE 255: Performed by: PSYCHIATRY & NEUROLOGY

## 2022-02-18 RX ORDER — INSULIN ASPART 100 [IU]/ML
1-10 INJECTION, SOLUTION INTRAVENOUS; SUBCUTANEOUS
Status: DISCONTINUED | OUTPATIENT
Start: 2022-02-18 | End: 2022-02-19

## 2022-02-18 RX ORDER — HEPARIN SODIUM 5000 [USP'U]/ML
5000 INJECTION, SOLUTION INTRAVENOUS; SUBCUTANEOUS EVERY 8 HOURS
Status: DISCONTINUED | OUTPATIENT
Start: 2022-02-18 | End: 2022-02-25 | Stop reason: HOSPADM

## 2022-02-18 RX ORDER — GADOBUTROL 604.72 MG/ML
10 INJECTION INTRAVENOUS
Status: COMPLETED | OUTPATIENT
Start: 2022-02-18 | End: 2022-02-18

## 2022-02-18 RX ORDER — METOPROLOL TARTRATE 25 MG/1
25 TABLET, FILM COATED ORAL 2 TIMES DAILY
Status: DISCONTINUED | OUTPATIENT
Start: 2022-02-18 | End: 2022-02-22

## 2022-02-18 RX ADMIN — GADOBUTROL 10 ML: 604.72 INJECTION INTRAVENOUS at 12:02

## 2022-02-18 RX ADMIN — HEPARIN SODIUM 5000 UNITS: 5000 INJECTION INTRAVENOUS; SUBCUTANEOUS at 09:02

## 2022-02-18 RX ADMIN — INSULIN ASPART 6 UNITS: 100 INJECTION, SOLUTION INTRAVENOUS; SUBCUTANEOUS at 11:02

## 2022-02-18 RX ADMIN — MUPIROCIN: 20 OINTMENT TOPICAL at 09:02

## 2022-02-18 RX ADMIN — INSULIN ASPART 1 UNITS: 100 INJECTION, SOLUTION INTRAVENOUS; SUBCUTANEOUS at 12:02

## 2022-02-18 RX ADMIN — SENNOSIDES AND DOCUSATE SODIUM 1 TABLET: 50; 8.6 TABLET ORAL at 09:02

## 2022-02-18 RX ADMIN — SENNOSIDES AND DOCUSATE SODIUM 1 TABLET: 50; 8.6 TABLET ORAL at 08:02

## 2022-02-18 RX ADMIN — VANCOMYCIN HYDROCHLORIDE 1250 MG: 1.25 INJECTION, POWDER, LYOPHILIZED, FOR SOLUTION INTRAVENOUS at 06:02

## 2022-02-18 RX ADMIN — DEXAMETHASONE SODIUM PHOSPHATE 4 MG: 4 INJECTION INTRA-ARTICULAR; INTRALESIONAL; INTRAMUSCULAR; INTRAVENOUS; SOFT TISSUE at 11:02

## 2022-02-18 RX ADMIN — TACROLIMUS 2 MG: 1 CAPSULE ORAL at 06:02

## 2022-02-18 RX ADMIN — FAMOTIDINE 20 MG: 20 TABLET ORAL at 09:02

## 2022-02-18 RX ADMIN — DEXAMETHASONE SODIUM PHOSPHATE 4 MG: 4 INJECTION INTRA-ARTICULAR; INTRALESIONAL; INTRAMUSCULAR; INTRAVENOUS; SOFT TISSUE at 05:02

## 2022-02-18 RX ADMIN — VANCOMYCIN HYDROCHLORIDE 1250 MG: 1.25 INJECTION, POWDER, LYOPHILIZED, FOR SOLUTION INTRAVENOUS at 05:02

## 2022-02-18 RX ADMIN — OXYCODONE 5 MG: 5 TABLET ORAL at 01:02

## 2022-02-18 RX ADMIN — METOPROLOL TARTRATE 25 MG: 25 TABLET, FILM COATED ORAL at 09:02

## 2022-02-18 RX ADMIN — CEFEPIME HYDROCHLORIDE 2 G: 2 INJECTION, SOLUTION INTRAVENOUS at 08:02

## 2022-02-18 RX ADMIN — MUPIROCIN: 20 OINTMENT TOPICAL at 08:02

## 2022-02-18 RX ADMIN — MEROPENEM 2 G: 1 INJECTION INTRAVENOUS at 11:02

## 2022-02-18 RX ADMIN — MEROPENEM 2 G: 1 INJECTION INTRAVENOUS at 04:02

## 2022-02-18 RX ADMIN — TACROLIMUS 2 MG: 1 CAPSULE ORAL at 08:02

## 2022-02-18 RX ADMIN — INSULIN ASPART 4 UNITS: 100 INJECTION, SOLUTION INTRAVENOUS; SUBCUTANEOUS at 08:02

## 2022-02-18 RX ADMIN — METRONIDAZOLE 500 MG: 500 TABLET ORAL at 05:02

## 2022-02-18 RX ADMIN — DEXAMETHASONE SODIUM PHOSPHATE 4 MG: 4 INJECTION INTRA-ARTICULAR; INTRALESIONAL; INTRAMUSCULAR; INTRAVENOUS; SOFT TISSUE at 06:02

## 2022-02-18 RX ADMIN — INSULIN ASPART 3 UNITS: 100 INJECTION, SOLUTION INTRAVENOUS; SUBCUTANEOUS at 11:02

## 2022-02-18 RX ADMIN — INSULIN ASPART 8 UNITS: 100 INJECTION, SOLUTION INTRAVENOUS; SUBCUTANEOUS at 07:02

## 2022-02-18 NOTE — SUBJECTIVE & OBJECTIVE
Review of Systems  Unable to obtain a complete ROS due to level of consciousness.  Objective:     Vitals:  Temp: 98.2 °F (36.8 °C)  Pulse: 97  Rhythm: atrial rhythm  BP: 133/78  MAP (mmHg): 98  Resp: 20  SpO2: 98 %  O2 Device (Oxygen Therapy): room air    Temp  Min: 97.5 °F (36.4 °C)  Max: 98.5 °F (36.9 °C)  Pulse  Min: 91  Max: 119  BP  Min: 100/62  Max: 152/88  MAP (mmHg)  Min: 73  Max: 108  Resp  Min: 16  Max: 30  SpO2  Min: 95 %  Max: 99 %    02/17 0701 - 02/18 0700  In: 1718.7 [P.O.:870; I.V.:247.6]  Out: 1685 [Urine:1685]   Unmeasured Output  Urine Occurrence: 1  Stool Occurrence: 0       Physical Exam  GA:comfortable, no acute distress.   HEENT: No scleral icterus or JVD.   Pulmonary: Clear to auscultation A/L.  Cardiac: RRR S1 & S2 w/o rubs/murmurs/gallops.   Abdominal: Bowel sounds present x 4. No appreciable hepatosplenomegaly.  Skin: No jaundice, rashes, or visible lesions.  Neuro:  --GCS: E4 V5M6  --Mental Status:  Awake, oriented X4, follows commands, fluent speech  --CN II-XII grossly intact.   --Pupils 3mm, PERRL.   --Corneal reflex, gag, cough intact.  --MULLIGAN spont  Medications:  Continuous ScheduledceFEPime (MAXIPIME) IVPB, 2 g, Q8H  dexamethasone, 4 mg, Q6H  famotidine, 20 mg, QHS  heparin (porcine), 5,000 Units, Q8H  metroNIDAZOLE, 500 mg, Q8H  mupirocin, , BID  senna-docusate 8.6-50 mg, 1 tablet, BID  tacrolimus, 2 mg, BID  vancomycin (VANCOCIN) IVPB, 15 mg/kg (Dosing Weight), Q12H    PRNacetaminophen, 650 mg, Q6H PRN  dextrose 10%, 12.5 g, PRN  glucagon (human recombinant), 1 mg, PRN  insulin aspart U-100, 1-10 Units, QID (AC + HS) PRN  ondansetron, 4 mg, Q8H PRN  oxyCODONE, 5 mg, Q6H PRN  sodium chloride 0.9%, 10 mL, PRN      Today I personally reviewed pertinent medications, lines/drains/airways, imaging, cardiology results, laboratory results,    Diet  Diet Adult Regular (IDDSI Level 7)

## 2022-02-18 NOTE — ASSESSMENT & PLAN NOTE
11 M PMHx afib on eliquis, recent kidney transplant on 5/2021 on immunosuppressants, cardiomyopathy, pulmonary hypertension presenting with new right frontal lobe abscess. He has mild left sided weakness, but is otherwise intact. MRI demonstrates right frontal 2.2 cm ring enhancing lesion, restricting on DWI    Now s/p R frontal crani for abscess drainage 2/17/22    --ICU status  -- q1 hour vitals/neurochecks  -- Post op CT reviewed: no detrimental changes  -- Post op MRI reviewed: good drainage of abscess  -- PCC for eliquis reversal pre op: continue to hold now post op   -- Vanc/Cefepime/flagyl  -- OR cultures: no growth on gram stain, pending final cultures  -- Recommend stop dexamethasone and resume home prednisone dosage for transplant  -- transplant nephro on board for immunosuppresive medication recs

## 2022-02-18 NOTE — PROGRESS NOTES
[facility]  Neurocritical Care  Progress Note    Admit Date: 2/16/2022  Service Date: 02/18/2022  Length of Stay: 2    Subjective:     Chief Complaint: Intracranial abscess    History of Present Illness: Mr Mazariegos is a 70 y/o M with a PMH of HTN, Afib on eliquis and polycystic kidney disease s/p renal transplant 5/21 who presents to [facility] with Brain abscess. He presented to OSH ED with L sided weakness s/p fall from standing position. MRI osh  showed R sided Abscess vs mass with vasogenic edema. He was admitted to [facility] for a higher level of care.         Hospital Course: 02/17/2022: s/p  abscess drainage, consult KTM and ID - appreciate reccs. Switch prednisone to dexamethaosne, d/c IVF, plan to repeat Na, d/c dawson cath  02/18/2022: NAEON, plan to stepdwon to NSGY team, d/c A-line        Review of Systems  Unable to obtain a complete ROS due to level of consciousness.  Objective:     Vitals:  Temp: 98.2 °F (36.8 °C)  Pulse: 97  Rhythm: atrial rhythm  BP: 133/78  MAP (mmHg): 98  Resp: 20  SpO2: 98 %  O2 Device (Oxygen Therapy): room air    Temp  Min: 97.5 °F (36.4 °C)  Max: 98.5 °F (36.9 °C)  Pulse  Min: 91  Max: 119  BP  Min: 100/62  Max: 152/88  MAP (mmHg)  Min: 73  Max: 108  Resp  Min: 16  Max: 30  SpO2  Min: 95 %  Max: 99 %    02/17 0701 - 02/18 0700  In: 1718.7 [P.O.:870; I.V.:247.6]  Out: 1685 [Urine:1685]   Unmeasured Output  Urine Occurrence: 1  Stool Occurrence: 0       Physical Exam  GA:comfortable, no acute distress.   HEENT: No scleral icterus or JVD.   Pulmonary: Clear to auscultation A/L.  Cardiac: RRR S1 & S2 w/o rubs/murmurs/gallops.   Abdominal: Bowel sounds present x 4. No appreciable hepatosplenomegaly.  Skin: No jaundice, rashes, or visible lesions.  Neuro:  --GCS: E4 V5M6  --Mental Status:  Awake, oriented X4, follows commands, fluent speech  --CN II-XII grossly intact.   --Pupils 3mm, PERRL.   --Corneal reflex, gag, cough intact.  --MULLIGAN spont  Medications:  Continuous  ScheduledceFEPime (MAXIPIME) IVPB, 2 g, Q8H  dexamethasone, 4 mg, Q6H  famotidine, 20 mg, QHS  heparin (porcine), 5,000 Units, Q8H  metroNIDAZOLE, 500 mg, Q8H  mupirocin, , BID  senna-docusate 8.6-50 mg, 1 tablet, BID  tacrolimus, 2 mg, BID  vancomycin (VANCOCIN) IVPB, 15 mg/kg (Dosing Weight), Q12H    PRNacetaminophen, 650 mg, Q6H PRN  dextrose 10%, 12.5 g, PRN  glucagon (human recombinant), 1 mg, PRN  insulin aspart U-100, 1-10 Units, QID (AC + HS) PRN  ondansetron, 4 mg, Q8H PRN  oxyCODONE, 5 mg, Q6H PRN  sodium chloride 0.9%, 10 mL, PRN      Today I personally reviewed pertinent medications, lines/drains/airways, imaging, cardiology results, laboratory results,    Diet  Diet Adult Regular (IDDSI Level 7)        Assessment/Plan:     Neuro  Vasogenic brain edema  -monitor exam  -dexamethasone 4 mg q6h    Cardiac/Vascular  A-fib  - metoprolol for rate control   - hold anticoagulation    Renal/  S/P DBD kidney transplant on 5/4/21  - switched prednisone to dexamethasone  - continue prograf  - continue valcyte   -  kidney transplant- following    ID  * Intracranial abscess  - NSGY following   - Q 1 vitals   - Q 1 neuro checks   - Vanc and cefepime  - BC and inflammatory markers  - PCC for anticoagulation reversal prior to OR per nsgy  - MRI stealth per nsgy   2/18/2022: MRI brain reviewed  -ID team following  -stepdown to NSGY team         Hematology  Long term (current) use of anticoagulants--Eliquis  - hold in setting of OR last dose 2/16 AM  - Received PCC for reversal prior to OR           The patient is being Prophylaxed for:  Venous Thromboembolism with: Chemical  Stress Ulcer with: H2B  Ventilator Pneumonia with: not applicable    Activity Orders          Diet Adult Regular (IDDSI Level 7): Regular starting at 02/17 0903    Progressive Mobility Protocol (mobilize patient to their highest level of functioning at least twice daily) starting at 02/17 0800        Full Code    Linda Martinez NP  Neurocritical  Care  [facility]

## 2022-02-18 NOTE — ASSESSMENT & PLAN NOTE
Takes nebivolol and eliquis at home.  CHADSVASC 3, ok to hold anticoagulation temporarily in post-op period    - Continue lopressor 25 mg BID   - Will consider transition back to Nebivolol prior to discharge  - Hold eliquis, resume per primary team when safe from post-op standpoint

## 2022-02-18 NOTE — SUBJECTIVE & OBJECTIVE
Subjective:   History of Present Illness:  71 year old male with ESRD secondary to polycystic kidney disease.  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  Pt reports transient episodes of left sided weakness this week, most recent episode 1 day ago, he fell due to left leg weakness when getting up from the toilet, prompting visit to the ED because he thought he was having a stroke.  In ED, has no fever/chills/aches, confusion, blurry vision, LOC, or seizures, and besides mild left sided weakness, he is asymptomatic.  MRI osh showed R sided Abscess vs mass with vasogenic edema. Transferred here for higher level of care.   KTM consulted given history of renal transplant.     BL renal functions Cr 1 to 1.3. He is not on MMF  He has had prior CMV positivity as well.   He had phlebotomy last week for polycythemia.   Native kidney US showed no mass.      Hospital Course:  S/p craniotomy now    Interval History:  Comfortable in the chair  Denies any acute issues   Repeat MRI showing improvement  BP steady, good urine output     Past Medical, Surgical, Family, and Social History:   Unchanged from H&P.    Scheduled Meds:   ceFEPime (MAXIPIME) IVPB  2 g Intravenous Q8H    dexamethasone  4 mg Intravenous Q6H    famotidine  20 mg Oral QHS    heparin (porcine)  5,000 Units Subcutaneous Q8H    metroNIDAZOLE  500 mg Oral Q8H    mupirocin   Nasal BID    senna-docusate 8.6-50 mg  1 tablet Oral BID    tacrolimus  2 mg Oral BID    vancomycin (VANCOCIN) IVPB  15 mg/kg (Dosing Weight) Intravenous Q12H     Continuous Infusions:  PRN Meds:acetaminophen, dextrose 10%, glucagon (human recombinant), insulin aspart U-100, ondansetron, oxyCODONE, sodium chloride 0.9%    Intake/Output - Last 3 Shifts       02/16 0700  02/17 0659 02/17 0700  02/18 0659 02/18 0700  02/19 0659    P.O.  630 240    I.V. (mL/kg) 120.2 (1.5) 247.6 (3.1) 10 (0.1)    IV Piggyback  "1650 462.5 188.3    Total Intake(mL/kg) 1770.2 (22) 1340 (16.7) 438.3 (5.5)    Urine (mL/kg/hr) 1660 1685 (0.9) 200 (0.4)    Stool 0 0     Blood 100      Total Output 1760 1685 200    Net +10.2 -345 +238.3           Urine Occurrence  1 x     Stool Occurrence 0 x 0 x            Review of Systems   Constitutional: Positive for activity change. Negative for appetite change.   HENT: Negative for facial swelling.    Respiratory: Negative for shortness of breath and wheezing.    Cardiovascular: Negative for chest pain and palpitations.   Gastrointestinal: Negative for abdominal distention, diarrhea and nausea.   Genitourinary: Negative for difficulty urinating and dysuria.   Skin: Negative for color change.   Allergic/Immunologic: Positive for immunocompromised state.   Neurological: Positive for weakness.   Psychiatric/Behavioral: Negative for agitation and confusion.      Objective:     Vital Signs (Most Recent):  Temp: 98.2 °F (36.8 °C) (02/18/22 1105)  Pulse: 97 (02/18/22 1105)  Resp: 20 (02/18/22 1105)  BP: 133/78 (02/18/22 1105)  SpO2: 98 % (02/18/22 1105) Vital Signs (24h Range):  Temp:  [97.5 °F (36.4 °C)-98.5 °F (36.9 °C)] 98.2 °F (36.8 °C)  Pulse:  [] 97  Resp:  [16-30] 20  SpO2:  [95 %-99 %] 98 %  BP: (100-152)/() 133/78  Arterial Line BP: (110-154)/(52-93) 125/63     Weight: 80.3 kg (177 lb)  Height: 6' 2" (188 cm)  Body mass index is 22.73 kg/m².    Physical Exam  Constitutional:       General: He is not in acute distress.     Appearance: He is not toxic-appearing.   Eyes:      Conjunctiva/sclera: Conjunctivae normal.   Cardiovascular:      Rate and Rhythm: Normal rate and regular rhythm.   Pulmonary:      Effort: No respiratory distress.      Breath sounds: Normal breath sounds. No wheezing.   Abdominal:      General: There is no distension.      Palpations: Abdomen is soft.      Tenderness: There is no abdominal tenderness.   Musculoskeletal:      Right lower leg: No edema.      Left lower leg: " No edema.   Neurological:      Mental Status: He is oriented to person, place, and time.      Comments: Strength and speech improving overall          Laboratory:  BMP:   Recent Labs   Lab 02/16/22 2246 02/16/22  2246 02/17/22  0430 02/17/22  1117 02/18/22  0234   *  --  172*  --  217*      < > 134* 136 134*   K 4.9  --  4.5  --  4.4     --  106  --  105   CO2 20*  --  18*  --  22*   BUN 21  --  18  --  18   CREATININE 0.9  --  0.9  --  0.8   CALCIUM 10.0  --  9.1  --  9.4    < > = values in this interval not displayed.       Diagnostic Results:  Reviewed

## 2022-02-18 NOTE — PROGRESS NOTES
[facility]  Neurosurgery  Progress Note    Subjective:     History of Present Illness: 71 M PMHx afib on eliquis, recent kidney transplant on 5/2021 on immunosuppressants, cardiomyopathy, pulmonary hypertension presenting with new right frontal lobe abscess. Pt reports transient episodes of left sided weakness this week, most recent episode was this morning, he fell due to left leg weakness when getting up from the toilet, prompting visit to the ED because he thought he was having a stroke. At present, he feels well, has no fever/chills/aches, confusion, blurry vision, LOC, or seizures, and besides mild left sided weakness, he is asymptomatic.    Of note, he has had recent lower respiratory tract infection      Post-Op Info:  Procedure(s) (LRB):  CRANIOTOMY (Right)   2 Days Post-Op     Interval History:     2/18: NAEON, strength on left side continuing to improve, up to EOB this morning. AF. MRI with good drainage of abscess.     Medications:  Continuous Infusions:    Scheduled Meds:   ceFEPime (MAXIPIME) IVPB  2 g Intravenous Q8H    dexamethasone  4 mg Intravenous Q6H    famotidine  20 mg Oral QHS    heparin (porcine)  5,000 Units Subcutaneous Q8H    metroNIDAZOLE  500 mg Oral Q8H    mupirocin   Nasal BID    senna-docusate 8.6-50 mg  1 tablet Oral BID    tacrolimus  2 mg Oral BID    vancomycin (VANCOCIN) IVPB  15 mg/kg (Dosing Weight) Intravenous Q12H     PRN Meds:acetaminophen, dextrose 10%, glucagon (human recombinant), insulin aspart U-100, ondansetron, oxyCODONE, sodium chloride 0.9%     Review of Systems   Constitutional: Negative for chills and fever.   Respiratory: Negative for shortness of breath.    Gastrointestinal: Negative for nausea and vomiting.   Musculoskeletal: Negative for back pain.   Neurological: Positive for weakness. Negative for headaches.   Psychiatric/Behavioral: Negative for confusion.     Objective:     Weight: 80.3 kg (177 lb)  Body mass index is 22.73  "kg/m².  Vital Signs (Most Recent):  Temp: 98 °F (36.7 °C) (02/18/22 0705)  Pulse: 92 (02/18/22 1000)  Resp: 20 (02/18/22 1000)  BP: 113/83 (02/18/22 1000)  SpO2: 98 % (02/18/22 1000) Vital Signs (24h Range):  Temp:  [97.5 °F (36.4 °C)-98.5 °F (36.9 °C)] 98 °F (36.7 °C)  Pulse:  [] 92  Resp:  [16-30] 20  SpO2:  [95 %-99 %] 98 %  BP: (100-152)/() 113/83  Arterial Line BP: (110-154)/(52-93) 129/67     Date 02/18/22 0700 - 02/19/22 0659   Shift 0522-1783 9388-6020 0650-7803 24 Hour Total   INTAKE   P.O. 240   240   IV Piggyback 188.3   188.3   Shift Total(mL/kg) 428.3(5.3)   428.3(5.3)   OUTPUT   Shift Total(mL/kg)       Weight (kg) 80.3 80.3 80.3 80.3                        Urethral Catheter 02/17/22 0111 Non-latex;Straight-tip 16 Fr. (Active)   Site Assessment Clean;Intact 02/17/22 0301   Collection Container Urimeter 02/17/22 0301   Securement Method secured to top of thigh w/ adhesive device 02/17/22 0301   Catheter Care Performed yes 02/17/22 0301   Reason for Continuing Urinary Catheterization Post operative 02/17/22 0301   CAUTI Prevention Bundle Securement Device in place with 1" slack;Drainage bag/urimeter off the floor;Sheeting clip in use;Intact seal between catheter & drainage tubing;No dependent loops or kinks;Drainage bag/urimeter not overfilled (<2/3 full);Drainage bag/urimeter below bladder 02/17/22 0301   Output (mL) 130 mL 02/17/22 0705            Hemodialysis AV Fistula Left forearm (Active)   Site Assessment Clean;Dry 02/17/22 0301   Patency Thrill;Bruit;Present 02/17/22 0301   Status Deaccessed 02/17/22 0301   Site Condition No complications 02/17/22 0301   Dressing Open to air (None) 02/17/22 0301       Physical Exam  Constitutional:       General: He is not in acute distress.  Eyes:      Extraocular Movements: EOM normal.      Pupils: Pupils are equal, round, and reactive to light.   Cardiovascular:      Rate and Rhythm: Normal rate and regular rhythm.   Neurological:      Mental " Status: He is alert.         Physical Exam:    Constitutional: No distress.     Eyes: Pupils are equal, round, and reactive to light. EOM are normal.     Cardiovascular: Normal rate and regular rhythm.     Psych/Behavior: He is alert.     E4V5M6  AOx3  PERRL  EOMI  Face Symmetric  Right 5/5  Left 4+/5      Significant Labs:  Recent Labs   Lab 02/16/22 2246 02/16/22 2246 02/17/22  0430 02/17/22  1117 02/18/22  0234   *  --  172*  --  217*      < > 134* 136 134*   K 4.9  --  4.5  --  4.4     --  106  --  105   CO2 20*  --  18*  --  22*   BUN 21  --  18  --  18   CREATININE 0.9  --  0.9  --  0.8   CALCIUM 10.0  --  9.1  --  9.4   MG 1.6  --   --   --   --     < > = values in this interval not displayed.     Recent Labs   Lab 02/16/22 2246 02/17/22 0211 02/17/22 0430 02/18/22 0234   WBC 3.18*  --  2.92* 2.87*   HGB 16.7  --  16.3 16.4   HCT 51.8 44 51.3 50.0     --  174 168     Recent Labs   Lab 02/16/22 2246   INR 1.3*   APTT 31.6     Microbiology Results (last 7 days)     Procedure Component Value Units Date/Time    AFB Culture & Smear [[id number]] Collected: 02/17/22 0238    Order Status: Completed Specimen: Abscess from Brain Updated: 02/18/22 0927     AFB Culture & Smear Culture in progress    Narrative:      1) Intracerebral Abscess    AFB Culture & Smear [[id number]] Collected: 02/17/22 0238    Order Status: Completed Specimen: Abscess from Brain Updated: 02/18/22 0927     AFB Culture & Smear Culture in progress    Narrative:      2) Intracerebral Abscess    Aerobic culture [[id number]] Collected: 02/17/22 0238    Order Status: Completed Specimen: Abscess from Brain Updated: 02/18/22 0900     Aerobic Bacterial Culture No growth    Narrative:      1) Intracerebral Abscess    Aerobic culture [[id number]] Collected: 02/17/22 0238    Order Status: Completed Specimen: Abscess from Brain Updated: 02/18/22 0858     Aerobic Bacterial Culture No growth    Narrative:      2) Intracerebral  Abscess    Culture, Anaerobe [[id number]] Collected: 02/17/22 0238    Order Status: Completed Specimen: Abscess from Brain Updated: 02/18/22 0854     Anaerobic Culture Culture in progress    Narrative:      1) Intracerebral Abscess    Culture, Anaerobe [[id number]] Collected: 02/17/22 0238    Order Status: Completed Specimen: Abscess from Brain Updated: 02/18/22 0854     Anaerobic Culture Culture in progress    Narrative:      2) Intracerebral Abscess    Blood culture [[id number]] Collected: 02/16/22 2307    Order Status: Completed Specimen: Blood from Peripheral, Ankle, Right Updated: 02/18/22 0613     Blood Culture, Routine No Growth to date      No Growth to date    Narrative:      Blood cultures from 2 different sites. 4 bottles total.  Please draw before starting antibiotics.    Blood culture [[id number]] Collected: 02/16/22 2247    Order Status: Completed Specimen: Blood from Peripheral, Hand, Right Updated: 02/18/22 0613     Blood Culture, Routine No Growth to date      No Growth to date    Narrative:      Blood cultures x 2 different sites. 4 bottles total. Please  draw cultures before administering antibiotics.    Toxoplasma Gondii, DNA (Qual) [[id number]] Collected: 02/18/22 0603    Order Status: Completed Specimen: Blood Updated: 02/18/22 0609     Toxoplasma gondii DNA, Source blood    Modified Acid Fast Stain For Nocardia [[id number]]     Order Status: Completed Specimen: Abscess     Gram stain [[id number]] Collected: 02/17/22 0238    Order Status: Completed Specimen: Abscess from Brain Updated: 02/17/22 1031     Gram Stain Result Many WBC's      No epithelial cells      No organisms seen    Narrative:      2) Intracerebral Abscess    Gram stain [[id number]] Collected: 02/17/22 0238    Order Status: Completed Specimen: Abscess from Brain Updated: 02/17/22 0753     Gram Stain Result Rare WBC's      No organisms seen      No epithelial cells    Narrative:      1) Intracerebral Abscess    Fungus culture [[id number]]  Collected: 02/17/22 0238    Order Status: Sent Specimen: Abscess from Brain Updated: 02/17/22 0303    Fungus culture [[id number]] Collected: 02/17/22 0238    Order Status: Sent Specimen: Abscess from Brain Updated: 02/17/22 0301        All pertinent labs from the last 24 hours have been reviewed.    Significant Diagnostics:  I have reviewed and interpreted all pertinent imaging results/findings within the past 24 hours.    Assessment/Plan:     * Intracranial abscess  11 M PMHx afib on eliquis, recent kidney transplant on 5/2021 on immunosuppressants, cardiomyopathy, pulmonary hypertension presenting with new right frontal lobe abscess. He has mild left sided weakness, but is otherwise intact. MRI demonstrates right frontal 2.2 cm ring enhancing lesion, restricting on DWI    Now s/p R frontal crani for abscess drainage 2/17/22    --ICU status  -- q1 hour vitals/neurochecks  -- Post op CT reviewed: no detrimental changes  -- Post op MRI reviewed: good drainage of abscess  -- PCC for eliquis reversal pre op: continue to hold now post op   -- Vanc/Cefepime/flagyl  -- OR cultures: no growth on gram stain, pending final cultures  -- Recommend stop dexamethasone and resume home prednisone dosage for transplant  -- transplant nephro on board for immunosuppresive medication recs         Chaim Yeung MD  Neurosurgery  [facility]

## 2022-02-18 NOTE — CONSULTS
[facility] Neuro Critical Care  Infectious Disease  Consult Note    Patient Name: Ronal Mazariegos  MRN: [id number]  Admission Date: 2/16/2022  Hospital Length of Stay: 1 days  Attending Physician: Alberto Harris MD  Primary Care Provider: Primary Doctor No     Isolation Status: No active isolations    Patient information was obtained from patient, spouse/SO, past medical records and ER records.      Inpatient consult to Infectious Diseases  Consult performed by: Aaron House MD  Consult ordered by: Ness Baltazar MD        Assessment/Plan:     * Intracranial abscess  72 y/o male with a hx of PCK s/p DBD 5/2021 (CMV D+/R=, HCV NAWAF +, Simulect induction, CIT ~ 8 hours) on tacro/pred), Afib on AC, Polycythemia vera (09/2021) ?infiltrative cardiomyopathy presented to ED following a fall found to have right frontal mass was transferred to [facility] [place] now s/p craniotomy (OP note aurelio pus) cultures sent pending (GS negative). No obvious source as no signs of sinusitis/dental disease- will follow up Blood and OR cultures.       Recommendations:    1. Continue empiric IV vancomycin and cefepime.  2. Start empiric metronidazole 500 mg PO Q8.  3. Will follow up cultures.        Thank you for your consult. I will follow-up with patient. Please contact us if you have any additional questions.    Aaron House MD  Infectious Disease  [facility] Critical Care    Subjective:     Principal Problem: Intracranial abscess    HPI: Ronal Mazariegos is a 72 y/o male with a hx of PCK s/p DBD 5/2021 (CMV D+/R=, HCV NAWAF +, Simulect induction, CIT ~ 8 hours) on tacro/pred), Afib on AC, Polycythemia vera (09/2021) ?infiltrative cardiomyopathy presented to ED following a fall found to have right frontal mass was transferred to [facility] now s/p craniotomy (OP note aurelio pus) cultures sent pending (GS negative).    Patient report feeling well until couple of days prior to his fall when he felt his coordination with left  side wasn't normal, until he had his fall - he denies any fever/chills/N/V/diarrhea- no sinus pain/ HA blurry vision.    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  No recent travel or sick contact.  Has not had any dental procedure since transplant- he does floss but no bleeding noticed.      ID consulted for brain abscess.        Past Medical History:   Diagnosis Date    Anasarca 10/1/2021    Anemia of chronic disease     Atrial fibrillation     BPH (benign prostatic hypertrophy)     Chronic systolic heart failure 10/1/2021    Hepatitis C virus infection cured after antiviral drug therapy     acquired through kidney transplant, treated / cured (SVR12 - 12/2021)    HTN (hypertension)     Polycystic kidney disease        Past Surgical History:   Procedure Laterality Date    AV FISTULA PLACEMENT Left 2016    CATARACT EXTRACTION, BILATERAL Bilateral     CRANIOTOMY Right 2/16/2022    Procedure: CRANIOTOMY;  Surgeon: Sunday Rosa DO;  Location: [facility] OR [address] [place] [address];  Service: Neurosurgery;  Laterality: Right;  R craniotomy for abscess drainage    KIDNEY TRANSPLANT N/A 5/4/2021    Procedure: TRANSPLANT, KIDNEY;  Surgeon: Lexy Bolden MD;  Location: [facility] OR [address] [place] [address];  Service: Transplant;  Laterality: N/A;       Review of patient's allergies indicates:  No Known Allergies    Medications:  Medications Prior to Admission   Medication Sig    amLODIPine (NORVASC) 5 MG tablet Take 5 mg by mouth once daily.    apixaban (ELIQUIS) 5 mg Tab Take 1 tablet (5 mg total) by mouth 2 (two) times daily.    BYSTOLIC 20 mg Tab Take 1 tablet by mouth once daily.    CARDURA 2 mg tablet Take 2 mg by mouth nightly.    ergocalciferol (VITAMIN D2) 50,000 unit Cap Take 1 capsule (50,000 Units total) by mouth every 7 days. (Patient not taking: Reported on 12/21/2021)    famotidine (PEPCID) 20 MG tablet Take 1 tablet (20 mg total) by mouth every evening.    finasteride (PROSCAR) 5 mg  tablet Take 5 mg by mouth once daily.    fish oil-omega-3 fatty acids 300-1,000 mg capsule Take 1 capsule by mouth once daily.     fluticasone propionate (FLONASE) 50 mcg/actuation nasal spray 1 spray by Each Nostril route daily as needed.    furosemide (LASIX) 40 MG tablet Take 40 mg by mouth once daily.    iron-vitamin C 100-250 mg, ICAR-C, 100-250 mg Tab Take 1 tablet by mouth once daily.    k phos di & mono-sod phos mono (K-PHOS-NEUTRAL) 250 mg Tab Take 3 tablets by mouth 2 (two) times a day.    magnesium oxide (MAG-OX) 400 mg (241.3 mg magnesium) tablet Take 3 tablets (1,200 mg total) by mouth 3 (three) times daily. (Patient taking differently: Take 1,200 mg by mouth 2 (two) times daily.)    multivitamin Tab Take 1 tablet by mouth once daily.    predniSONE (DELTASONE) 5 MG tablet Take by mouth daily. 5/7-6/6 20 mg, 6/7-7/6 15 mg, 7/7-8/6 10 mg, 5 mg daily thereafter starting 8/7/21    tacrolimus (PROGRAF) 1 MG Cap Take 2 capsules (2 mg total) by mouth every 12 (twelve) hours. Z94.0;Kidney txp on 5/4/21    valGANciclovir (VALCYTE) 450 mg Tab Take 2 tablets (900 mg total) by mouth 2 (two) times daily.     Antibiotics (From admission, onward)            Start     Stop Route Frequency Ordered    02/17/22 1700  vancomycin 1.25 g in dextrose 5% 250 mL IVPB (ready to mix)         -- IV Every 12 hours (non-standard times) 02/17/22 0000    02/17/22 1445  metroNIDAZOLE tablet 500 mg         -- Oral Every 8 hours 02/17/22 1432    02/17/22 1045  mupirocin 2 % ointment         02/22 0859 Nasl 2 times daily 02/17/22 0934    02/16/22 2315  cefepime in dextrose 5 % IVPB 2 g         -- IV Every 8 hours (non-standard times) 02/16/22 2203        Antifungals (From admission, onward)            None        Antivirals (From admission, onward)    None           Immunization History   Administered Date(s) Administered    Hepatitis A, Adult 12/07/2016, 05/10/2017    Hepatitis B, Adult 12/07/2016, 01/09/2017    Influenza  2018, 2019    Pneumococcal Conjugate - 13 Valent 2016    Tdap 2016    Zoster 2014    Zoster Recombinant 2019, 2019       Family History     Problem Relation (Age of Onset)    Hypertension Father, Sister    Kidney disease Father, Sister    Polycystic kidney disease Father, Sister        Social History     Socioeconomic History    Marital status:     Number of children: 1   Tobacco Use    Smoking status: Former Smoker     Packs/day: 2.00     Years: 10.00     Pack years: 20.00     Types: Cigarettes     Start date: 1972     Quit date: 1984     Years since quittin.2    Smokeless tobacco: Never Used   Substance and Sexual Activity    Alcohol use: No     Comment: Pt reportsbeing a former beer drinker (2-3 cans per day) and quitting in .    Drug use: No    Sexual activity: Yes     Review of Systems   Constitutional: Positive for activity change. Negative for appetite change, chills and fever.   HENT: Negative for congestion.    Eyes: Negative for pain and itching.   Respiratory: Negative for cough, chest tightness and shortness of breath.    Cardiovascular: Negative for palpitations and leg swelling.   Gastrointestinal: Negative for abdominal pain and nausea.   Endocrine: Negative for polyphagia and polyuria.   Genitourinary: Negative for dysuria and frequency.   Musculoskeletal: Negative for back pain.   Skin: Positive for rash and wound.   Neurological: Negative for numbness and headaches.   Hematological: Bruises/bleeds easily.   Psychiatric/Behavioral: Negative for agitation and behavioral problems.     Objective:     Vital Signs (Most Recent):  Temp: 98.2 °F (36.8 °C) (22 1505)  Pulse: 109 (22 1705)  Resp: 20 (22 1709)  BP: (!) 143/87 (22 1705)  SpO2: 97 % (22 1705) Vital Signs (24h Range):  Temp:  [97.5 °F (36.4 °C)-98.2 °F (36.8 °C)] 98.2 °F (36.8 °C)  Pulse:  [] 109  Resp:  [18-24] 20  SpO2:  [95 %-99  %] 97 %  BP: (113-158)/() 143/87  Arterial Line BP: (121-158)/(64-87) 143/87     Weight: 80.3 kg (177 lb)  Body mass index is 22.73 kg/m².    Estimated Creatinine Clearance: 88 mL/min (based on SCr of 0.9 mg/dL).    Physical Exam  Constitutional:       Appearance: He is well-developed.   HENT:      Head: Normocephalic.      Comments: craniotomy   Eyes:      Comments: Left eye closed bruised    Cardiovascular:      Rate and Rhythm: Normal rate and regular rhythm.      Heart sounds: Normal heart sounds. No murmur heard.      Pulmonary:      Effort: Pulmonary effort is normal.      Breath sounds: Normal breath sounds.   Abdominal:      General: Bowel sounds are normal. There is no distension.      Palpations: Abdomen is soft.      Tenderness: There is no abdominal tenderness.   Musculoskeletal:         General: Normal range of motion.      Comments: Left arm AVF   Skin:     General: Skin is warm and dry.      Findings: Bruising present.   Neurological:      Mental Status: He is alert and oriented to person, place, and time.   Psychiatric:         Mood and Affect: Mood and affect normal.         Behavior: Behavior normal.         Significant Labs: All pertinent labs within the past 24 hours have been reviewed.    Significant Imaging: I have reviewed all pertinent imaging results/findings within the past 24 hours.

## 2022-02-18 NOTE — ASSESSMENT & PLAN NOTE
- NSGY following   - Q 1 vitals   - Q 1 neuro checks   - Vanc and cefepime  - BC and inflammatory markers  - PCC for anticoagulation reversal prior to OR per nsgy  - MRI stealth per nsgy   2/18/2022: MRI brain reviewed  -ID team following  -stepdown to NSGY team

## 2022-02-18 NOTE — ASSESSMENT & PLAN NOTE
Transferred from OSH for R frontal lobe abscess.   S/p craniotomy and abscess drainage 2/17.  ID following. Abscess cultures growing Nocardia    - Continue vanc and meropenem per ID recs   - F/u remaining blood and surgical cultures  - prednisone taper back down to normal immunosuppressive dose of 5mg daily  - MDSSI for steroid induced hyperglycemia

## 2022-02-18 NOTE — ASSESSMENT & PLAN NOTE
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 Repeat MRI 2/18 There is small volume postoperative gas and fluid within the right frontal resection cavity with reduced sized peripheral enhancing focus concerning for improving abscess in light of history- Atypical presentation with no localizing source/ fever or leukocytosis concerning for atypical etiology.       Recommendations:    1. Discontinue Cefepime/metronidazole and start IV meropenem for broader coverage.  2. Will follow up cultures.  3. Will follow up Fungitell/Asp/QTB/MTB PCR from abscess/ EBV PCR.

## 2022-02-18 NOTE — PLAN OF CARE
[facility] Care Plan    POC reviewed with Ronal Mazariegos and wife at 0300. Pt verbalized understanding. Questions and concerns addressed. No acute events overnight. MRI completed. Afebrile overnight. SBP<160 maintained. Oxycodone given x1. Pt progressing toward goals. Will continue to monitor. See below and flowsheets for full assessment and VS info.       Neuro:  Patrice Coma Scale  Best Eye Response: 4-->(E4) spontaneous  Best Motor Response: 6-->(M6) obeys commands  Best Verbal Response: 5-->(V5) oriented  [place] Coma Scale Score: 15  Assessment Qualifiers: patient not sedated/intubated  Pupil PERRLA: yes     24hr Temp:  [97.5 °F (36.4 °C)-98.5 °F (36.9 °C)]     CV:   Rhythm: atrial rhythm  BP goals:   SBP < 160  MAP > 65    Resp:   O2 Device (Oxygen Therapy): room air       Plan: N/A    GI/:     Diet/Nutrition Received: regular  Last Bowel Movement: 02/16/22  Voiding Characteristics: urethral catheter (bladder)    Intake/Output Summary (Last 24 hours) at 2/18/2022 0556  Last data filed at 2/18/2022 0505  Gross per 24 hour   Intake 1284.99 ml   Output 1765 ml   Net -480.01 ml     Unmeasured Output  Urine Occurrence: 1  Stool Occurrence: 0    Labs/Accuchecks:  Recent Labs   Lab 02/18/22  0234   WBC 2.87*   RBC 5.17   HGB 16.4   HCT 50.0         Recent Labs   Lab 02/18/22  0234   *   K 4.4   CO2 22*      BUN 18   CREATININE 0.8   ALKPHOS 67   ALT 17   AST 16   BILITOT 1.6*      Recent Labs   Lab 02/16/22  2246   INR 1.3*   APTT 31.6    No results for input(s): CPK, CPKMB, TROPONINI, MB in the last 168 hours.    Electrolytes: N/A - electrolytes WDL  Accuchecks: Q6H    Gtts:       LDA/Wounds:  Lines/Drains/Airways       Drain                   Hemodialysis AV Fistula Left forearm -- days              Arterial Line              Arterial Line 02/17/22 0115 Right Radial 1 day              Peripheral Intravenous Line                   Peripheral IV - Single Lumen 05/04/21 1659 22 G Anterior;Right Forearm  289 days         Peripheral IV - Single Lumen 02/17/22 0124 18 G Left Foot 1 day                  Wounds: Yes  Wound care consulted: No

## 2022-02-18 NOTE — ASSESSMENT & PLAN NOTE
KTM following.    - continue tacrolimus and prednisone (weaning prednisone back down to home dose after brain surgery)

## 2022-02-18 NOTE — ASSESSMENT & PLAN NOTE
Noted on TTE 10/2021 with PA systolic pressure is 52 mmHg.     - f/u repeat TTE   - outpatient referral to cardiology

## 2022-02-18 NOTE — SUBJECTIVE & OBJECTIVE
Interval History: no acute events overnight    Review of Systems   Constitutional: Positive for activity change. Negative for appetite change, chills and fever.   HENT: Negative for congestion.    Eyes: Negative for pain and itching.   Respiratory: Negative for cough, chest tightness and shortness of breath.    Cardiovascular: Negative for palpitations and leg swelling.   Gastrointestinal: Negative for abdominal pain and nausea.   Endocrine: Negative for polyphagia and polyuria.   Genitourinary: Negative for dysuria and frequency.   Musculoskeletal: Negative for back pain.   Skin: Positive for wound.   Neurological: Negative for numbness and headaches.   Hematological: Bruises/bleeds easily.   Psychiatric/Behavioral: Negative for agitation and behavioral problems.     Objective:     Vital Signs (Most Recent):  Temp: 97.8 °F (36.6 °C) (02/18/22 0305)  Pulse: 91 (02/18/22 0756)  Resp: (!) 22 (02/18/22 0756)  BP: 132/83 (02/18/22 0605)  SpO2: 99 % (02/18/22 0756) Vital Signs (24h Range):  Temp:  [97.5 °F (36.4 °C)-98.5 °F (36.9 °C)] 97.8 °F (36.6 °C)  Pulse:  [] 91  Resp:  [16-30] 22  SpO2:  [95 %-99 %] 99 %  BP: (100-152)/() 132/83  Arterial Line BP: (121-154)/(64-93) 154/86     Weight: 80.3 kg (177 lb)  Body mass index is 22.73 kg/m².    Estimated Creatinine Clearance: 99 mL/min (based on SCr of 0.8 mg/dL).    Physical Exam  Constitutional:       Appearance: Normal appearance.   HENT:      Head: Normocephalic.      Comments: craniotomy   Cardiovascular:      Rate and Rhythm: Normal rate.   Pulmonary:      Effort: Pulmonary effort is normal.   Abdominal:      General: Abdomen is flat.      Palpations: Abdomen is soft.   Musculoskeletal:      Cervical back: Normal range of motion and neck supple.   Skin:     General: Skin is warm and dry.      Findings: Bruising present.   Neurological:      Mental Status: He is alert and oriented to person, place, and time.   Psychiatric:         Behavior: Behavior normal.          Significant Labs: All pertinent labs within the past 24 hours have been reviewed.    Significant Imaging: I have reviewed all pertinent imaging results/findings within the past 24 hours.

## 2022-02-18 NOTE — PROGRESS NOTES
[facility]  Kidney Transplant  Progress Note      Reason for Follow-up: Reassessment of Kidney Transplant - 5/4/2021  (#1) recipient and management of immunosuppression.      Subjective:   History of Present Illness:  71 year old male with ESRD secondary to polycystic kidney disease.  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  Pt reports transient episodes of left sided weakness this week, most recent episode 1 day ago, he fell due to left leg weakness when getting up from the toilet, prompting visit to the ED because he thought he was having a stroke.  In ED, has no fever/chills/aches, confusion, blurry vision, LOC, or seizures, and besides mild left sided weakness, he is asymptomatic.  MRI osh showed R sided Abscess vs mass with vasogenic edema. Transferred here for higher level of care.   KTM consulted given history of renal transplant.     BL renal functions Cr 1 to 1.3. He is not on MMF  He has had prior CMV positivity as well.   He had phlebotomy last week for polycythemia.   Native kidney US showed no mass.      Hospital Course:  S/p craniotomy now    Interval History:  Comfortable in the chair  Denies any acute issues   Repeat MRI showing improvement  BP steady, good urine output     Past Medical, Surgical, Family, and Social History:   Unchanged from H&P.    Scheduled Meds:   ceFEPime (MAXIPIME) IVPB  2 g Intravenous Q8H    dexamethasone  4 mg Intravenous Q6H    famotidine  20 mg Oral QHS    heparin (porcine)  5,000 Units Subcutaneous Q8H    metroNIDAZOLE  500 mg Oral Q8H    mupirocin   Nasal BID    senna-docusate 8.6-50 mg  1 tablet Oral BID    tacrolimus  2 mg Oral BID    vancomycin (VANCOCIN) IVPB  15 mg/kg (Dosing Weight) Intravenous Q12H     Continuous Infusions:  PRN Meds:acetaminophen, dextrose 10%, glucagon (human recombinant), insulin aspart U-100, ondansetron, oxyCODONE, sodium chloride  "0.9%    Intake/Output - Last 3 Shifts       02/16 0700  02/17 0659 02/17 0700  02/18 0659 02/18 0700 02/19 0659    P.O.  630 240    I.V. (mL/kg) 120.2 (1.5) 247.6 (3.1) 10 (0.1)    IV Piggyback 1650 462.5 188.3    Total Intake(mL/kg) 1770.2 (22) 1340 (16.7) 438.3 (5.5)    Urine (mL/kg/hr) 1660 1685 (0.9) 200 (0.4)    Stool 0 0     Blood 100      Total Output 1760 1685 200    Net +10.2 -345 +238.3           Urine Occurrence  1 x     Stool Occurrence 0 x 0 x            Review of Systems   Constitutional: Positive for activity change. Negative for appetite change.   HENT: Negative for facial swelling.    Respiratory: Negative for shortness of breath and wheezing.    Cardiovascular: Negative for chest pain and palpitations.   Gastrointestinal: Negative for abdominal distention, diarrhea and nausea.   Genitourinary: Negative for difficulty urinating and dysuria.   Skin: Negative for color change.   Allergic/Immunologic: Positive for immunocompromised state.   Neurological: Positive for weakness.   Psychiatric/Behavioral: Negative for agitation and confusion.      Objective:     Vital Signs (Most Recent):  Temp: 98.2 °F (36.8 °C) (02/18/22 1105)  Pulse: 97 (02/18/22 1105)  Resp: 20 (02/18/22 1105)  BP: 133/78 (02/18/22 1105)  SpO2: 98 % (02/18/22 1105) Vital Signs (24h Range):  Temp:  [97.5 °F (36.4 °C)-98.5 °F (36.9 °C)] 98.2 °F (36.8 °C)  Pulse:  [] 97  Resp:  [16-30] 20  SpO2:  [95 %-99 %] 98 %  BP: (100-152)/() 133/78  Arterial Line BP: (110-154)/(52-93) 125/63     Weight: 80.3 kg (177 lb)  Height: 6' 2" (188 cm)  Body mass index is 22.73 kg/m².    Physical Exam  Constitutional:       General: He is not in acute distress.     Appearance: He is not toxic-appearing.   Eyes:      Conjunctiva/sclera: Conjunctivae normal.   Cardiovascular:      Rate and Rhythm: Normal rate and regular rhythm.   Pulmonary:      Effort: No respiratory distress.      Breath sounds: Normal breath sounds. No wheezing.   Abdominal: "      General: There is no distension.      Palpations: Abdomen is soft.      Tenderness: There is no abdominal tenderness.   Musculoskeletal:      Right lower leg: No edema.      Left lower leg: No edema.   Neurological:      Mental Status: He is oriented to person, place, and time.      Comments: Strength and speech improving overall          Laboratory:  BMP:   Recent Labs   Lab 02/16/22  2246 02/16/22  2246 02/17/22  0430 02/17/22  1117 02/18/22  0234   *  --  172*  --  217*      < > 134* 136 134*   K 4.9  --  4.5  --  4.4     --  106  --  105   CO2 20*  --  18*  --  22*   BUN 21  --  18  --  18   CREATININE 0.9  --  0.9  --  0.8   CALCIUM 10.0  --  9.1  --  9.4    < > = values in this interval not displayed.       Diagnostic Results:  Reviewed     Assessment/Plan:     * Intracranial abscess  S/p craniotomy now   Cultures pending   On cefepime/flagyl and vancomycin   Seen by ID as well       Drug-induced neutropenia  noticed leukopenia - likely related to valcyte   Last few cmv negative  Valcyte stopped 2/17       Long-term use of immunosuppressant medication  Continue prograf at current dose   Check levels daily to assess for adverse events   Hold prednisone while on dexamethasone   Continue holding MMF       S/P DBD kidney transplant on 5/4/21  Renal functions currently at baseline       Sherry Garcia MD  Kidney Transplant  [facility]

## 2022-02-18 NOTE — HPI
Ronal Mazariegos is a 70 y/o male with a hx of PCK s/p DBD 5/2021 (CMV D+/R=, HCV NAWAF +, Simulect induction, CIT ~ 8 hours) on tacro/pred), Afib on AC, Polycythemia vera (09/2021) ?infiltrative cardiomyopathy presented to ED following a fall found to have right frontal mass was transferred to [facility] [place] now s/p craniotomy (OP note aurelio pus) cultures sent pending (GS negative).    Patient report feeling well until couple of days prior to his fall when he felt his coordination with left side wasn't normal, until he had his fall - he denies any fever/chills/N/V/diarrhea- no sinus pain/ HA blurry vision.    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  No recent travel or sick contact.  Has not had any dental procedure since transplant- he does floss but no bleeding noticed.      ID consulted for brain abscess.

## 2022-02-18 NOTE — TELEPHONE ENCOUNTER
----- Message from Annamarie Swan sent at 2/18/2022 10:58 AM CST -----  Regarding: Patient update  Patient calling to notify coordinator that he is in [facility] in ICU.    Call: [phone] (Mobile

## 2022-02-18 NOTE — ASSESSMENT & PLAN NOTE
72 y/o male with a hx of PCK s/p DBD 5/2021 (CMV D+/R=, HCV NAWAF +, Simulect induction, CIT ~ 8 hours) on tacro/pred), Afib on AC, Polycythemia vera (09/2021) ?infiltrative cardiomyopathy presented to ED following a fall found to have right frontal mass [place] [facility] [place]/p craniotomy (OP note aurelio pus) cultures sent pending (GS negative). No obvious source as no signs of sinusitis/dental disease- will follow up Blood and OR cultures.       Recommendations:    1. Continue empiric IV vancomycin and cefepime.  2. Start empiric metronidazole 500 mg PO Q8.  3. Will follow up cultures.

## 2022-02-18 NOTE — TELEPHONE ENCOUNTER
Coordinator replied to patient's [facility] message informing him that coordinator is aware of him being at [facility] in ICU.

## 2022-02-18 NOTE — ASSESSMENT & PLAN NOTE
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

## 2022-02-18 NOTE — SUBJECTIVE & OBJECTIVE
Interval History:     2/18: NAEON, strength on left side continuing to improve, up to EOB this morning. AF. MRI with good drainage of abscess.     Medications:  Continuous Infusions:    Scheduled Meds:   ceFEPime (MAXIPIME) IVPB  2 g Intravenous Q8H    dexamethasone  4 mg Intravenous Q6H    famotidine  20 mg Oral QHS    heparin (porcine)  5,000 Units Subcutaneous Q8H    metroNIDAZOLE  500 mg Oral Q8H    mupirocin   Nasal BID    senna-docusate 8.6-50 mg  1 tablet Oral BID    tacrolimus  2 mg Oral BID    vancomycin (VANCOCIN) IVPB  15 mg/kg (Dosing Weight) Intravenous Q12H     PRN Meds:acetaminophen, dextrose 10%, glucagon (human recombinant), insulin aspart U-100, ondansetron, oxyCODONE, sodium chloride 0.9%     Review of Systems   Constitutional: Negative for chills and fever.   Respiratory: Negative for shortness of breath.    Gastrointestinal: Negative for nausea and vomiting.   Musculoskeletal: Negative for back pain.   Neurological: Positive for weakness. Negative for headaches.   Psychiatric/Behavioral: Negative for confusion.     Objective:     Weight: 80.3 kg (177 lb)  Body mass index is 22.73 kg/m².  Vital Signs (Most Recent):  Temp: 98 °F (36.7 °C) (02/18/22 0705)  Pulse: 92 (02/18/22 1000)  Resp: 20 (02/18/22 1000)  BP: 113/83 (02/18/22 1000)  SpO2: 98 % (02/18/22 1000) Vital Signs (24h Range):  Temp:  [97.5 °F (36.4 °C)-98.5 °F (36.9 °C)] 98 °F (36.7 °C)  Pulse:  [] 92  Resp:  [16-30] 20  SpO2:  [95 %-99 %] 98 %  BP: (100-152)/() 113/83  Arterial Line BP: (110-154)/(52-93) 129/67     Date 02/18/22 0700 - 02/19/22 0659   Shift 4106-2963 0924-9747 5654-0624 24 Hour Total   INTAKE   P.O. 240   240   IV Piggyback 188.3   188.3   Shift Total(mL/kg) 428.3(5.3)   428.3(5.3)   OUTPUT   Shift Total(mL/kg)       Weight (kg) 80.3 80.3 80.3 80.3                        Urethral Catheter 02/17/22 0111 Non-latex;Straight-tip 16 Fr. (Active)   Site Assessment Clean;Intact 02/17/22 0301   Collection  "Container Urimeter 02/17/22 0301   Securement Method secured to top of thigh w/ adhesive device 02/17/22 0301   Catheter Care Performed yes 02/17/22 0301   Reason for Continuing Urinary Catheterization Post operative 02/17/22 0301   CAUTI Prevention Bundle Securement Device in place with 1" slack;Drainage bag/urimeter off the floor;Sheeting clip in use;Intact seal between catheter & drainage tubing;No dependent loops or kinks;Drainage bag/urimeter not overfilled (<2/3 full);Drainage bag/urimeter below bladder 02/17/22 0301   Output (mL) 130 mL 02/17/22 0705            Hemodialysis AV Fistula Left forearm (Active)   Site Assessment Clean;Dry 02/17/22 0301   Patency Thrill;Bruit;Present 02/17/22 0301   Status Deaccessed 02/17/22 0301   Site Condition No complications 02/17/22 0301   Dressing Open to air (None) 02/17/22 0301       Physical Exam  Constitutional:       General: He is not in acute distress.  Eyes:      Extraocular Movements: EOM normal.      Pupils: Pupils are equal, round, and reactive to light.   Cardiovascular:      Rate and Rhythm: Normal rate and regular rhythm.   Neurological:      Mental Status: He is alert.         Physical Exam:    Constitutional: No distress.     Eyes: Pupils are equal, round, and reactive to light. EOM are normal.     Cardiovascular: Normal rate and regular rhythm.     Psych/Behavior: He is alert.     E4V5M6  AOx3  PERRL  EOMI  Face Symmetric  Right 5/5  Left 4+/5      Significant Labs:  Recent Labs   Lab 02/16/22 2246 02/16/22  2246 02/17/22  0430 02/17/22  1117 02/18/22  0234   *  --  172*  --  217*      < > 134* 136 134*   K 4.9  --  4.5  --  4.4     --  106  --  105   CO2 20*  --  18*  --  22*   BUN 21  --  18  --  18   CREATININE 0.9  --  0.9  --  0.8   CALCIUM 10.0  --  9.1  --  9.4   MG 1.6  --   --   --   --     < > = values in this interval not displayed.     Recent Labs   Lab 02/16/22 2246 02/17/22  0211 02/17/22  0430 02/18/22  0234   WBC 3.18*  " --  2.92* 2.87*   HGB 16.7  --  16.3 16.4   HCT 51.8 44 51.3 50.0     --  174 168     Recent Labs   Lab 02/16/22  2246   INR 1.3*   APTT 31.6     Microbiology Results (last 7 days)     Procedure Component Value Units Date/Time    AFB Culture & Smear [[id number]] Collected: 02/17/22 0238    Order Status: Completed Specimen: Abscess from Brain Updated: 02/18/22 0927     AFB Culture & Smear Culture in progress    Narrative:      1) Intracerebral Abscess    AFB Culture & Smear [[id number]] Collected: 02/17/22 0238    Order Status: Completed Specimen: Abscess from Brain Updated: 02/18/22 0927     AFB Culture & Smear Culture in progress    Narrative:      2) Intracerebral Abscess    Aerobic culture [[id number]] Collected: 02/17/22 0238    Order Status: Completed Specimen: Abscess from Brain Updated: 02/18/22 0900     Aerobic Bacterial Culture No growth    Narrative:      1) Intracerebral Abscess    Aerobic culture [[id number]] Collected: 02/17/22 0238    Order Status: Completed Specimen: Abscess from Brain Updated: 02/18/22 0858     Aerobic Bacterial Culture No growth    Narrative:      2) Intracerebral Abscess    Culture, Anaerobe [[id number]] Collected: 02/17/22 0238    Order Status: Completed Specimen: Abscess from Brain Updated: 02/18/22 0854     Anaerobic Culture Culture in progress    Narrative:      1) Intracerebral Abscess    Culture, Anaerobe [[id number]] Collected: 02/17/22 0238    Order Status: Completed Specimen: Abscess from Brain Updated: 02/18/22 0854     Anaerobic Culture Culture in progress    Narrative:      2) Intracerebral Abscess    Blood culture [[id number]] Collected: 02/16/22 2307    Order Status: Completed Specimen: Blood from Peripheral, Ankle, Right Updated: 02/18/22 0613     Blood Culture, Routine No Growth to date      No Growth to date    Narrative:      Blood cultures from 2 different sites. 4 bottles total.  Please draw before starting antibiotics.    Blood culture [[id number]] Collected:  02/16/22 2247    Order Status: Completed Specimen: Blood from Peripheral, Hand, Right Updated: 02/18/22 0613     Blood Culture, Routine No Growth to date      No Growth to date    Narrative:      Blood cultures x 2 different sites. 4 bottles total. Please  draw cultures before administering antibiotics.    Toxoplasma Gondii, DNA (Qual) [[id number]] Collected: 02/18/22 0603    Order Status: Completed Specimen: Blood Updated: 02/18/22 0609     Toxoplasma gondii DNA, Source blood    Modified Acid Fast Stain For Nocardia [[id number]]     Order Status: Completed Specimen: Abscess     Gram stain [[id number]] Collected: 02/17/22 0238    Order Status: Completed Specimen: Abscess from Brain Updated: 02/17/22 1031     Gram Stain Result Many WBC's      No epithelial cells      No organisms seen    Narrative:      2) Intracerebral Abscess    Gram stain [[id number]] Collected: 02/17/22 0238    Order Status: Completed Specimen: Abscess from Brain Updated: 02/17/22 0753     Gram Stain Result Rare WBC's      No organisms seen      No epithelial cells    Narrative:      1) Intracerebral Abscess    Fungus culture [[id number]] Collected: 02/17/22 0238    Order Status: Sent Specimen: Abscess from Brain Updated: 02/17/22 0303    Fungus culture [[id number]] Collected: 02/17/22 0238    Order Status: Sent Specimen: Abscess from Brain Updated: 02/17/22 0301        All pertinent labs from the last 24 hours have been reviewed.    Significant Diagnostics:  I have reviewed and interpreted all pertinent imaging results/findings within the past 24 hours.

## 2022-02-18 NOTE — SUBJECTIVE & OBJECTIVE
Past Medical History:   Diagnosis Date    Anasarca 10/1/2021    Anemia of chronic disease     Atrial fibrillation     BPH (benign prostatic hypertrophy)     Chronic systolic heart failure 10/1/2021    Hepatitis C virus infection cured after antiviral drug therapy     acquired through kidney transplant, treated / cured (SVR12 - 12/2021)    HTN (hypertension)     Polycystic kidney disease        Past Surgical History:   Procedure Laterality Date    AV FISTULA PLACEMENT Left 2016    CATARACT EXTRACTION, BILATERAL Bilateral     CRANIOTOMY Right 2/16/2022    Procedure: CRANIOTOMY;  Surgeon: Sunday Rosa DO;  Location: [facility] OR [address] [place] [address];  Service: Neurosurgery;  Laterality: Right;  R craniotomy for abscess drainage    KIDNEY TRANSPLANT N/A 5/4/2021    Procedure: TRANSPLANT, KIDNEY;  Surgeon: Lexy Bolden MD;  Location: [facility] OR [address] [place] [address];  Service: Transplant;  Laterality: N/A;       Review of patient's allergies indicates:  No Known Allergies    Medications:  Medications Prior to Admission   Medication Sig    amLODIPine (NORVASC) 5 MG tablet Take 5 mg by mouth once daily.    apixaban (ELIQUIS) 5 mg Tab Take 1 tablet (5 mg total) by mouth 2 (two) times daily.    BYSTOLIC 20 mg Tab Take 1 tablet by mouth once daily.    CARDURA 2 mg tablet Take 2 mg by mouth nightly.    ergocalciferol (VITAMIN D2) 50,000 unit Cap Take 1 capsule (50,000 Units total) by mouth every 7 days. (Patient not taking: Reported on 12/21/2021)    famotidine (PEPCID) 20 MG tablet Take 1 tablet (20 mg total) by mouth every evening.    finasteride (PROSCAR) 5 mg tablet Take 5 mg by mouth once daily.    fish oil-omega-3 fatty acids 300-1,000 mg capsule Take 1 capsule by mouth once daily.     fluticasone propionate (FLONASE) 50 mcg/actuation nasal spray 1 spray by Each Nostril route daily as needed.    furosemide (LASIX) 40 MG tablet Take 40 mg by mouth once daily.    iron-vitamin C 100-250 mg, ICAR-C, 100-250 mg Tab Take 1  tablet by mouth once daily.    k phos di & mono-sod phos mono (K-PHOS-NEUTRAL) 250 mg Tab Take 3 tablets by mouth 2 (two) times a day.    magnesium oxide (MAG-OX) 400 mg (241.3 mg magnesium) tablet Take 3 tablets (1,200 mg total) by mouth 3 (three) times daily. (Patient taking differently: Take 1,200 mg by mouth 2 (two) times daily.)    multivitamin Tab Take 1 tablet by mouth once daily.    predniSONE (DELTASONE) 5 MG tablet Take by mouth daily. 5/7-6/6 20 mg, 6/7-7/6 15 mg, 7/7-8/6 10 mg, 5 mg daily thereafter starting 8/7/21    tacrolimus (PROGRAF) 1 MG Cap Take 2 capsules (2 mg total) by mouth every 12 (twelve) hours. Z94.0;Kidney txp on 5/4/21    valGANciclovir (VALCYTE) 450 mg Tab Take 2 tablets (900 mg total) by mouth 2 (two) times daily.     Antibiotics (From admission, onward)            Start     Stop Route Frequency Ordered    02/17/22 1700  vancomycin 1.25 g in dextrose 5% 250 mL IVPB (ready to mix)         -- IV Every 12 hours (non-standard times) 02/17/22 0000    02/17/22 1445  metroNIDAZOLE tablet 500 mg         -- Oral Every 8 hours 02/17/22 1432    02/17/22 1045  mupirocin 2 % ointment         02/22 0859 Nasl 2 times daily 02/17/22 0934    02/16/22 2315  cefepime in dextrose 5 % IVPB 2 g         -- IV Every 8 hours (non-standard times) 02/16/22 2203        Antifungals (From admission, onward)            None        Antivirals (From admission, onward)    None           Immunization History   Administered Date(s) Administered    Hepatitis A, Adult 12/07/2016, 05/10/2017    Hepatitis B, Adult 12/07/2016, 01/09/2017    Influenza 09/26/2018, 09/24/2019    Pneumococcal Conjugate - 13 Valent 12/07/2016    Tdap 12/07/2016    Zoster 02/27/2014    Zoster Recombinant 03/26/2019, 06/04/2019       Family History     Problem Relation (Age of Onset)    Hypertension Father, Sister    Kidney disease Father, Sister    Polycystic kidney disease Father, Sister        Social History     Socioeconomic History     Marital status:     Number of children: 1   Tobacco Use    Smoking status: Former Smoker     Packs/day: 2.00     Years: 10.00     Pack years: 20.00     Types: Cigarettes     Start date: 1972     Quit date: 1984     Years since quittin.2    Smokeless tobacco: Never Used   Substance and Sexual Activity    Alcohol use: No     Comment: Pt reportsbeing a former beer drinker (2-3 cans per day) and quitting in .    Drug use: No    Sexual activity: Yes     Review of Systems   Constitutional: Positive for activity change. Negative for appetite change, chills and fever.   HENT: Negative for congestion.    Eyes: Negative for pain and itching.   Respiratory: Negative for cough, chest tightness and shortness of breath.    Cardiovascular: Negative for palpitations and leg swelling.   Gastrointestinal: Negative for abdominal pain and nausea.   Endocrine: Negative for polyphagia and polyuria.   Genitourinary: Negative for dysuria and frequency.   Musculoskeletal: Negative for back pain.   Skin: Positive for rash and wound.   Neurological: Negative for numbness and headaches.   Hematological: Bruises/bleeds easily.   Psychiatric/Behavioral: Negative for agitation and behavioral problems.     Objective:     Vital Signs (Most Recent):  Temp: 98.2 °F (36.8 °C) (22 1505)  Pulse: 109 (22 1705)  Resp: 20 (22 1709)  BP: (!) 143/87 (22 1705)  SpO2: 97 % (22 1705) Vital Signs (24h Range):  Temp:  [97.5 °F (36.4 °C)-98.2 °F (36.8 °C)] 98.2 °F (36.8 °C)  Pulse:  [] 109  Resp:  [18-24] 20  SpO2:  [95 %-99 %] 97 %  BP: (113-158)/() 143/87  Arterial Line BP: (121-158)/(64-87) 143/87     Weight: 80.3 kg (177 lb)  Body mass index is 22.73 kg/m².    Estimated Creatinine Clearance: 88 mL/min (based on SCr of 0.9 mg/dL).    Physical Exam  Constitutional:       Appearance: He is well-developed.   HENT:      Head: Normocephalic.      Comments: craniotomy   Eyes:      Comments:  Left eye closed bruised    Cardiovascular:      Rate and Rhythm: Normal rate and regular rhythm.      Heart sounds: Normal heart sounds. No murmur heard.      Pulmonary:      Effort: Pulmonary effort is normal.      Breath sounds: Normal breath sounds.   Abdominal:      General: Bowel sounds are normal. There is no distension.      Palpations: Abdomen is soft.      Tenderness: There is no abdominal tenderness.   Musculoskeletal:         General: Normal range of motion.      Comments: Left arm AVF   Skin:     General: Skin is warm and dry.      Findings: Bruising present.   Neurological:      Mental Status: He is alert and oriented to person, place, and time.   Psychiatric:         Mood and Affect: Mood and affect normal.         Behavior: Behavior normal.         Significant Labs: All pertinent labs within the past 24 hours have been reviewed.    Significant Imaging: I have reviewed all pertinent imaging results/findings within the past 24 hours.

## 2022-02-18 NOTE — ASSESSMENT & PLAN NOTE
- switched prednisone to dexamethasone  - continue prograf  - continue valcyte   -  kidney transplant- following

## 2022-02-18 NOTE — NURSING TRANSFER
Nursing Transfer Note      2/18/2022     Reason patient is being transferred: stable for stepdown    Transfer To:  from [id number]     Transfer via bed    Transfer with cardiac monitoring    Transported by JIGAR Hua    Medicines sent: mupirocin ointment, insulin aspart    Any special needs or follow-up needed: high fall risk    Chart send with patient: Yes    Notified: spouse    Upon arrival to floor: cardiac monitor applied, patient oriented to room, call bell in reach and bed in lowest position

## 2022-02-18 NOTE — PROGRESS NOTES
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 Repeat MRI 2/18 There is small volume postoperative gas and fluid within the right frontal resection cavity with reduced sized peripheral enhancing focus concerning for improving abscess in light of history- Atypical presentation with no localizing source/ fever or leukocytosis concerning for atypical etiology.       Recommendations:    1. Discontinue Cefepime/metronidazole and start IV meropenem for broader coverage.  2. Will follow up cultures.  3. Will follow up Fungitell/Asp/QTB/MTB PCR from abscess/ EBV PCR.  4. TTE orderd.        Thank you for your consult. I will follow-up with patient. Please contact us if you have any additional questions.    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    Patient report feeling well until couple of days prior to his fall  when he felt his coordination with left side wasn't normal, until he had his fall - he denies any fever/chills/N/V/diarrhea- no sinus pain/ HA blurry vision.    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  No recent travel or sick contact.  Has not had any dental procedure since transplant- he does floss but no bleeding noticed.      ID consulted for brain abscess.      Interval History: no acute events overnight    Review of Systems   Constitutional: Positive for activity change. Negative for appetite change, chills and fever.   HENT: Negative for congestion.    Eyes: Negative for pain and itching.   Respiratory: Negative for cough, chest tightness and shortness of breath.    Cardiovascular: Negative for palpitations and leg swelling.   Gastrointestinal: Negative for abdominal pain and nausea.   Endocrine: Negative for polyphagia and polyuria.   Genitourinary: Negative for dysuria and frequency.   Musculoskeletal: Negative for back pain.   Skin: Positive for wound.   Neurological: Negative for numbness and headaches.   Hematological: Bruises/bleeds easily.   Psychiatric/Behavioral: Negative for agitation and behavioral problems.     Objective:     Vital Signs (Most Recent):  Temp: 97.8 °F (36.6 °C) (02/18/22 0305)  Pulse: 91 (02/18/22 0756)  Resp: (!) 22 (02/18/22 0756)  BP: 132/83 (02/18/22 0605)  SpO2: 99 % (02/18/22 0756) Vital Signs (24h Range):  Temp:  [97.5 °F (36.4 °C)-98.5 °F (36.9 °C)] 97.8 °F (36.6 °C)  Pulse:  [] 91  Resp:  [16-30] 22  SpO2:  [95 %-99 %] 99 %  BP: (100-152)/() 132/83  Arterial Line BP: (121-154)/(64-93) 154/86     Weight: 80.3 kg (177 lb)  Body mass index is 22.73 kg/m².    Estimated Creatinine Clearance: 99 mL/min (based on SCr of 0.8 mg/dL).    Physical Exam  Constitutional:       Appearance: Normal appearance.   HENT:      Head: Normocephalic.      Comments: craniotomy   Cardiovascular:      Rate and Rhythm: Normal rate.   Pulmonary:       Effort: Pulmonary effort is normal.   Abdominal:      General: Abdomen is flat.      Palpations: Abdomen is soft.   Musculoskeletal:      Cervical back: Normal range of motion and neck supple.   Skin:     General: Skin is warm and dry.      Findings: Bruising present.   Neurological:      Mental Status: He is alert and oriented to person, place, and time.   Psychiatric:         Behavior: Behavior normal.         Significant Labs: All pertinent labs within the past 24 hours have been reviewed.    Significant Imaging: I have reviewed all pertinent imaging results/findings within the past 24 hours.

## 2022-02-18 NOTE — HOSPITAL COURSE
2/18: NAEON, strength on left side continuing to improve, up to EOB this morning. AF. MRI with good drainage of abscess.   2/19: POD 2. NAEON. AFVSS. Exam stable. Pain controlled. Saturating well on room air. Tolerating PO without n/v. Voiding. Not passing flatus.   2/20: POD 3. NAEON. AFVSS. Exam stable.   2/21: POD 4. NAEON. Afebrile, VSS. Neuro exam is stable with continued left sided weakness. Denies new focal weakness, numbness, confusion, visual disturbances, vomiting. He c/o mild headaches relieved with PO medication and nausea after taking his antibiotics. Transferred to kidney transplant team service today for medical management.

## 2022-02-19 PROBLEM — D45 POLYCYTHEMIA VERA: Status: ACTIVE | Noted: 2022-02-19

## 2022-02-19 PROBLEM — R73.03 PREDIABETES: Status: ACTIVE | Noted: 2022-02-19

## 2022-02-19 LAB
ALBUMIN SERPL BCP-MCNC: 2.5 G/DL (ref 3.5–5.2)
ALP SERPL-CCNC: 77 U/L (ref 55–135)
ALT SERPL W/O P-5'-P-CCNC: 23 U/L (ref 10–44)
ANION GAP SERPL CALC-SCNC: 9 MMOL/L (ref 8–16)
ANISOCYTOSIS BLD QL SMEAR: SLIGHT
AST SERPL-CCNC: 18 U/L (ref 10–40)
BASOPHILS NFR BLD: 0 % (ref 0–1.9)
BILIRUB SERPL-MCNC: 1.5 MG/DL (ref 0.1–1)
BUN SERPL-MCNC: 21 MG/DL (ref 8–23)
CALCIUM SERPL-MCNC: 9.5 MG/DL (ref 8.7–10.5)
CHLORIDE SERPL-SCNC: 101 MMOL/L (ref 95–110)
CO2 SERPL-SCNC: 27 MMOL/L (ref 23–29)
CREAT SERPL-MCNC: 0.9 MG/DL (ref 0.5–1.4)
DIFFERENTIAL METHOD: ABNORMAL
EOSINOPHIL NFR BLD: 0 % (ref 0–8)
ERYTHROCYTE [DISTWIDTH] IN BLOOD BY AUTOMATED COUNT: 13.2 % (ref 11.5–14.5)
EST. GFR  (AFRICAN AMERICAN): >60 ML/MIN/1.73 M^2
EST. GFR  (NON AFRICAN AMERICAN): >60 ML/MIN/1.73 M^2
GIANT PLATELETS BLD QL SMEAR: PRESENT
GLUCOSE SERPL-MCNC: 188 MG/DL (ref 70–110)
HCT VFR BLD AUTO: 53.5 % (ref 40–54)
HGB BLD-MCNC: 17.3 G/DL (ref 14–18)
IMM GRANULOCYTES # BLD AUTO: ABNORMAL K/UL (ref 0–0.04)
IMM GRANULOCYTES NFR BLD AUTO: ABNORMAL % (ref 0–0.5)
LYMPHOCYTES NFR BLD: 14 % (ref 18–48)
MCH RBC QN AUTO: 31.4 PG (ref 27–31)
MCHC RBC AUTO-ENTMCNC: 32.3 G/DL (ref 32–36)
MCV RBC AUTO: 97 FL (ref 82–98)
METAMYELOCYTES NFR BLD MANUAL: 1 %
MONOCYTES NFR BLD: 8 % (ref 4–15)
NEUTROPHILS NFR BLD: 75 % (ref 38–73)
NEUTS BAND NFR BLD MANUAL: 2 %
NRBC BLD-RTO: 0 /100 WBC
PLATELET # BLD AUTO: 159 K/UL (ref 150–450)
PLATELET BLD QL SMEAR: ABNORMAL
PMV BLD AUTO: 10.1 FL (ref 9.2–12.9)
POCT GLUCOSE: 170 MG/DL (ref 70–110)
POCT GLUCOSE: 260 MG/DL (ref 70–110)
POCT GLUCOSE: 273 MG/DL (ref 70–110)
POCT GLUCOSE: 280 MG/DL (ref 70–110)
POTASSIUM SERPL-SCNC: 4.5 MMOL/L (ref 3.5–5.1)
PROT SERPL-MCNC: 5.7 G/DL (ref 6–8.4)
RBC # BLD AUTO: 5.51 M/UL (ref 4.6–6.2)
SODIUM SERPL-SCNC: 137 MMOL/L (ref 136–145)
TACROLIMUS BLD-MCNC: 6.7 NG/ML (ref 5–15)
VANCOMYCIN TROUGH SERPL-MCNC: 19.2 UG/ML (ref 10–22)
WBC # BLD AUTO: 3.67 K/UL (ref 3.9–12.7)

## 2022-02-19 PROCEDURE — 80197 ASSAY OF TACROLIMUS: CPT | Performed by: NURSE PRACTITIONER

## 2022-02-19 PROCEDURE — 86778 TOXOPLASMA ANTIBODY IGM: CPT | Performed by: NURSE PRACTITIONER

## 2022-02-19 PROCEDURE — 25500020 PHARM REV CODE 255: Performed by: STUDENT IN AN ORGANIZED HEALTH CARE EDUCATION/TRAINING PROGRAM

## 2022-02-19 PROCEDURE — 63600175 PHARM REV CODE 636 W HCPCS: Performed by: STUDENT IN AN ORGANIZED HEALTH CARE EDUCATION/TRAINING PROGRAM

## 2022-02-19 PROCEDURE — 25000003 PHARM REV CODE 250: Performed by: STUDENT IN AN ORGANIZED HEALTH CARE EDUCATION/TRAINING PROGRAM

## 2022-02-19 PROCEDURE — 99223 1ST HOSP IP/OBS HIGH 75: CPT | Mod: ,,, | Performed by: INTERNAL MEDICINE

## 2022-02-19 PROCEDURE — 99233 PR SUBSEQUENT HOSPITAL CARE,LEVL III: ICD-10-PCS | Mod: ,,, | Performed by: INTERNAL MEDICINE

## 2022-02-19 PROCEDURE — 99233 SBSQ HOSP IP/OBS HIGH 50: CPT | Mod: ,,, | Performed by: INTERNAL MEDICINE

## 2022-02-19 PROCEDURE — 63600175 PHARM REV CODE 636 W HCPCS: Performed by: NURSE PRACTITIONER

## 2022-02-19 PROCEDURE — 87799 DETECT AGENT NOS DNA QUANT: CPT | Performed by: STUDENT IN AN ORGANIZED HEALTH CARE EDUCATION/TRAINING PROGRAM

## 2022-02-19 PROCEDURE — 11000001 HC ACUTE MED/SURG PRIVATE ROOM

## 2022-02-19 PROCEDURE — 99223 PR INITIAL HOSPITAL CARE,LEVL III: ICD-10-PCS | Mod: ,,, | Performed by: INTERNAL MEDICINE

## 2022-02-19 PROCEDURE — 80053 COMPREHEN METABOLIC PANEL: CPT | Performed by: NURSE PRACTITIONER

## 2022-02-19 PROCEDURE — 63600175 PHARM REV CODE 636 W HCPCS: Performed by: INTERNAL MEDICINE

## 2022-02-19 PROCEDURE — 85027 COMPLETE CBC AUTOMATED: CPT | Performed by: NURSE PRACTITIONER

## 2022-02-19 PROCEDURE — 94761 N-INVAS EAR/PLS OXIMETRY MLT: CPT

## 2022-02-19 PROCEDURE — S0039 INJECTION, SULFAMETHOXAZOLE: HCPCS | Performed by: INTERNAL MEDICINE

## 2022-02-19 PROCEDURE — 25000003 PHARM REV CODE 250: Performed by: INTERNAL MEDICINE

## 2022-02-19 PROCEDURE — 86777 TOXOPLASMA ANTIBODY: CPT | Performed by: NURSE PRACTITIONER

## 2022-02-19 PROCEDURE — 25000003 PHARM REV CODE 250: Performed by: NURSE PRACTITIONER

## 2022-02-19 PROCEDURE — 80202 ASSAY OF VANCOMYCIN: CPT | Performed by: NURSE PRACTITIONER

## 2022-02-19 PROCEDURE — 85007 BL SMEAR W/DIFF WBC COUNT: CPT | Performed by: NURSE PRACTITIONER

## 2022-02-19 RX ORDER — PREDNISONE 5 MG/1
5 TABLET ORAL DAILY
Status: DISCONTINUED | OUTPATIENT
Start: 2022-02-23 | End: 2022-02-25 | Stop reason: HOSPADM

## 2022-02-19 RX ORDER — IBUPROFEN 200 MG
16 TABLET ORAL
Status: DISCONTINUED | OUTPATIENT
Start: 2022-02-19 | End: 2022-02-24

## 2022-02-19 RX ORDER — IBUPROFEN 200 MG
24 TABLET ORAL
Status: DISCONTINUED | OUTPATIENT
Start: 2022-02-19 | End: 2022-02-24

## 2022-02-19 RX ORDER — INSULIN ASPART 100 [IU]/ML
0-5 INJECTION, SOLUTION INTRAVENOUS; SUBCUTANEOUS
Status: DISCONTINUED | OUTPATIENT
Start: 2022-02-19 | End: 2022-02-25 | Stop reason: HOSPADM

## 2022-02-19 RX ORDER — INSULIN ASPART 100 [IU]/ML
3 INJECTION, SOLUTION INTRAVENOUS; SUBCUTANEOUS
Status: DISCONTINUED | OUTPATIENT
Start: 2022-02-19 | End: 2022-02-24

## 2022-02-19 RX ORDER — PREDNISONE 10 MG/1
10 TABLET ORAL DAILY
Status: COMPLETED | OUTPATIENT
Start: 2022-02-20 | End: 2022-02-21

## 2022-02-19 RX ORDER — FUROSEMIDE 40 MG/1
40 TABLET ORAL DAILY
Status: DISCONTINUED | OUTPATIENT
Start: 2022-02-20 | End: 2022-02-22

## 2022-02-19 RX ORDER — PREDNISONE 5 MG/1
5 TABLET ORAL 2 TIMES DAILY
Status: COMPLETED | OUTPATIENT
Start: 2022-02-21 | End: 2022-02-22

## 2022-02-19 RX ADMIN — INSULIN ASPART 3 UNITS: 100 INJECTION, SOLUTION INTRAVENOUS; SUBCUTANEOUS at 02:02

## 2022-02-19 RX ADMIN — METOPROLOL TARTRATE 25 MG: 25 TABLET, FILM COATED ORAL at 09:02

## 2022-02-19 RX ADMIN — INSULIN ASPART 3 UNITS: 100 INJECTION, SOLUTION INTRAVENOUS; SUBCUTANEOUS at 04:02

## 2022-02-19 RX ADMIN — INSULIN ASPART 1 UNITS: 100 INJECTION, SOLUTION INTRAVENOUS; SUBCUTANEOUS at 09:02

## 2022-02-19 RX ADMIN — FAMOTIDINE 20 MG: 20 TABLET ORAL at 09:02

## 2022-02-19 RX ADMIN — MUPIROCIN: 20 OINTMENT TOPICAL at 09:02

## 2022-02-19 RX ADMIN — IOHEXOL 100 ML: 350 INJECTION, SOLUTION INTRAVENOUS at 12:02

## 2022-02-19 RX ADMIN — PREDNISONE 25 MG: 20 TABLET ORAL at 09:02

## 2022-02-19 RX ADMIN — TACROLIMUS 2 MG: 1 CAPSULE ORAL at 06:02

## 2022-02-19 RX ADMIN — OXYCODONE 5 MG: 5 TABLET ORAL at 02:02

## 2022-02-19 RX ADMIN — SENNOSIDES AND DOCUSATE SODIUM 1 TABLET: 50; 8.6 TABLET ORAL at 09:02

## 2022-02-19 RX ADMIN — VANCOMYCIN HYDROCHLORIDE 1250 MG: 1.25 INJECTION, POWDER, LYOPHILIZED, FOR SOLUTION INTRAVENOUS at 05:02

## 2022-02-19 RX ADMIN — DEXAMETHASONE SODIUM PHOSPHATE 4 MG: 4 INJECTION INTRA-ARTICULAR; INTRALESIONAL; INTRAMUSCULAR; INTRAVENOUS; SOFT TISSUE at 05:02

## 2022-02-19 RX ADMIN — MEROPENEM 2 G: 1 INJECTION INTRAVENOUS at 09:02

## 2022-02-19 RX ADMIN — TACROLIMUS 2 MG: 1 CAPSULE ORAL at 09:02

## 2022-02-19 RX ADMIN — HEPARIN SODIUM 5000 UNITS: 5000 INJECTION INTRAVENOUS; SUBCUTANEOUS at 05:02

## 2022-02-19 RX ADMIN — SULFAMETHOXAZOLE AND TRIMETHOPRIM 403 MG: 80; 16 INJECTION, SOLUTION, CONCENTRATE INTRAVENOUS at 04:02

## 2022-02-19 RX ADMIN — IMIPENEM AND CILASTATIN SODIUM 1000 MG: 500; 500 INJECTION, POWDER, FOR SOLUTION INTRAVENOUS at 11:02

## 2022-02-19 RX ADMIN — IMIPENEM AND CILASTATIN SODIUM 1000 MG: 500; 500 INJECTION, POWDER, FOR SOLUTION INTRAVENOUS at 04:02

## 2022-02-19 RX ADMIN — HEPARIN SODIUM 5000 UNITS: 5000 INJECTION INTRAVENOUS; SUBCUTANEOUS at 09:02

## 2022-02-19 RX ADMIN — HEPARIN SODIUM 5000 UNITS: 5000 INJECTION INTRAVENOUS; SUBCUTANEOUS at 02:02

## 2022-02-19 NOTE — HPI
Ronal Mazariegos is a 70 y/o M with a PMH of afib (on eliquis), ESRD due to polycystic kidney disease s/p kidney transplant 5/2021, cardiomyopathy (suspected amyloid, unconfirmed), pulmonary hypertension, polycythemia on therapeutic phlebotomy who was transferred from OSH for a right sided brain mass. He was admitted to [facility], now s/p craniotomy and abscess drainage on 2/17, stepped down 2/18.    Hospital medicine was consulted for comanagement.

## 2022-02-19 NOTE — CONSULTS
Sahil Morgan - Neurosurgery ([facility])  [facility] Medicine  Consult Note    Patient Name: Ronal Mazariegos  MRN: [id number]  Admission Date: 2/16/2022  Hospital Length of Stay: 3 days  Attending Physician: Sunday Rosa DO   Primary Care Provider: Primary Doctor No           Patient information was obtained from patient and past medical records.     Inpatient consult to [facility] Medicine-General  Consult performed by: Lizbeth Knott MD  Consult ordered by: Chaim Yeung MD        Subjective:     Principal Problem: Intracranial abscess    Chief Complaint: No chief complaint on file.       HPI: Ronal Mazariegos is a 70 y/o M with a PMH of afib (on eliquis), ESRD due to polycystic kidney disease s/p kidney transplant 5/2021, cardiomyopathy (suspected amyloid, unconfirmed), pulmonary hypertension, polycythemia on therapeutic phlebotomy who was transferred from [facility] for a right sided brain mass. He was admitted to [facility], now s/p craniotomy and abscess drainage on 2/17, stepped down 2/18.    Hospital medicine was consulted for comanagement.       Past Medical History:   Diagnosis Date    Anasarca 10/1/2021    Anemia of chronic disease     Atrial fibrillation     BPH (benign prostatic hypertrophy)     Chronic systolic heart failure 10/1/2021    Hepatitis C virus infection cured after antiviral drug therapy     acquired through kidney transplant, treated / cured (SVR12 - 12/2021)    HTN (hypertension)     Polycystic kidney disease        Past Surgical History:   Procedure Laterality Date    AV FISTULA PLACEMENT Left 2016    CATARACT EXTRACTION, BILATERAL Bilateral     CRANIOTOMY Right 2/16/2022    Procedure: CRANIOTOMY;  Surgeon: Sunday Rosa DO;  Location[facility] OR [address] [place] [address];  Service: Neurosurgery;  Laterality: Right;  R craniotomy for abscess drainage    KIDNEY TRANSPLANT N/A 5/4/2021    Procedure: TRANSPLANT, KIDNEY;  Surgeon: Lexy Bolden MD;  Location: [facility] OR [address] [place] [address];  Service: Transplant;   Laterality: N/A;       Review of patient's allergies indicates:  No Known Allergies    No current facility-administered medications on file prior to encounter.     Current Outpatient Medications on File Prior to Encounter   Medication Sig    amLODIPine (NORVASC) 5 MG tablet Take 5 mg by mouth once daily.    apixaban (ELIQUIS) 5 mg Tab Take 1 tablet (5 mg total) by mouth 2 (two) times daily.    BYSTOLIC 20 mg Tab Take 1 tablet by mouth once daily.    CARDURA 2 mg tablet Take 2 mg by mouth nightly.    ergocalciferol (VITAMIN D2) 50,000 unit Cap Take 1 capsule (50,000 Units total) by mouth every 7 days. (Patient not taking: Reported on 12/21/2021)    famotidine (PEPCID) 20 MG tablet Take 1 tablet (20 mg total) by mouth every evening.    finasteride (PROSCAR) 5 mg tablet Take 5 mg by mouth once daily.    fish oil-omega-3 fatty acids 300-1,000 mg capsule Take 1 capsule by mouth once daily.     fluticasone propionate (FLONASE) 50 mcg/actuation nasal spray 1 spray by Each Nostril route daily as needed.    furosemide (LASIX) 40 MG tablet Take 40 mg by mouth once daily.    iron-vitamin C 100-250 mg, ICAR-C, 100-250 mg Tab Take 1 tablet by mouth once daily.    k phos di & mono-sod phos mono (K-PHOS-NEUTRAL) 250 mg Tab Take 3 tablets by mouth 2 (two) times a day.    magnesium oxide (MAG-OX) 400 mg (241.3 mg magnesium) tablet Take 3 tablets (1,200 mg total) by mouth 3 (three) times daily. (Patient taking differently: Take 1,200 mg by mouth 2 (two) times daily.)    multivitamin Tab Take 1 tablet by mouth once daily.    predniSONE (DELTASONE) 5 MG tablet Take by mouth daily. 5/7-6/6 20 mg, 6/7-7/6 15 mg, 7/7-8/6 10 mg, 5 mg daily thereafter starting 8/7/21    tacrolimus (PROGRAF) 1 MG Cap Take 2 capsules (2 mg total) by mouth every 12 (twelve) hours. Z94.0;Kidney txp on 5/4/21    valGANciclovir (VALCYTE) 450 mg Tab Take 2 tablets (900 mg total) by mouth 2 (two) times daily.     Family History       Problem  Relation (Age of Onset)    Hypertension Father, Sister    Kidney disease Father, Sister    Polycystic kidney disease Father, Sister          Tobacco Use    Smoking status: Former Smoker     Packs/day: 2.00     Years: 10.00     Pack years: 20.00     Types: Cigarettes     Start date: 1972     Quit date: 1984     Years since quittin.2    Smokeless tobacco: Never Used   Substance and Sexual Activity    Alcohol use: No     Comment: Pt reportsbeing a former beer drinker (2-3 cans per day) and quitting in .    Drug use: No    Sexual activity: Yes     Review of Systems   Constitutional:  Negative for chills and fever.   HENT:  Positive for congestion. Negative for sore throat.    Eyes:  Negative for visual disturbance.   Respiratory:  Negative for cough and shortness of breath.    Cardiovascular:  Negative for chest pain and palpitations.   Musculoskeletal:  Negative for arthralgias and myalgias.   Skin:  Negative for rash and wound.   Objective:     Vital Signs (Most Recent):  Temp: 97.9 °F (36.6 °C) (22 1155)  Pulse: 95 (22 1155)  Resp: 18 (22 1155)  BP: (!) 137/91 (22 1155)  SpO2: 95 % (22 1155)   Vital Signs (24h Range):  Temp:  [97.7 °F (36.5 °C)-98 °F (36.7 °C)] 97.9 °F (36.6 °C)  Pulse:  [] 95  Resp:  [18-20] 18  SpO2:  [95 %-97 %] 95 %  BP: (121-146)/(74-94) 137/91     Weight: 80.3 kg (177 lb)  Body mass index is 22.73 kg/m².    Physical Exam  Vitals and nursing note reviewed.   Constitutional:       General: He is not in acute distress.     Appearance: He is not toxic-appearing or diaphoretic.   HENT:      Head: Normocephalic.      Mouth/Throat:      Mouth: Mucous membranes are moist.   Cardiovascular:      Rate and Rhythm: Normal rate and regular rhythm.   Pulmonary:      Effort: Pulmonary effort is normal. No respiratory distress.   Abdominal:      Palpations: Abdomen is soft.      Tenderness: There is no guarding.   Musculoskeletal:      Cervical back:  Neck supple. No rigidity.   Skin:     General: Skin is warm and dry.   Neurological:      Mental Status: He is alert. Mental status is at baseline.   Psychiatric:         Mood and Affect: Mood normal.         Behavior: Behavior normal.       Significant Labs: All pertinent labs within the past 24 hours have been reviewed.    Significant Imaging: I have reviewed all pertinent imaging results/findings within the past 24 hours.    Assessment/Plan:     * Intracranial abscess  Transferred from OSH for R frontal lobe abscess.   S/p craniotomy and abscess drainage 2/17.  ID following. Abscess cultures growing Nocardia    - Continue vanc and meropenem per ID recs   - F/u remaining blood and surgical cultures  - prednisone taper back down to normal immunosuppressive dose of 5mg daily  - MDSSI for steroid induced hyperglycemia     A-fib  Takes nebivolol and eliquis at home.  CHADSVASC 3, ok to hold anticoagulation temporarily in post-op period    - Continue lopressor 25 mg BID   - Will consider transition back to Nebivolol prior to discharge  - Hold eliquis, resume per primary team when safe from post-op standpoint    Cardiomyopathy  Infiltrative cardiomyopathy on cardiac MRI 2/9/22 at OSH. Per records, there was concern for cardiac amyloidosis    - agree with TTE this admission (to assess for vegetation but also assess EF, etc.)  - cardiology referral upon discharge for further workup; not causing acute issues      Pulmonary HTN  Noted on TTE 10/2021 with PA systolic pressure is 52 mmHg.     - f/u repeat TTE   - outpatient referral to cardiology    Prediabetes  A1C 5.9. Not on any antihyperglycemics at home.    - scheduled mealtime insulin 3U TIDWM while on steroid burst  - LDSSI correction scale  - f/u with PCP for possible initiation of metformin etc.      Polycythemia vera  Follows with Dr. Wheatley in Hematology. Gets periodic therapeutic phlebotomy.  Was planned for possible BMBx.    - Hgb stable at 17.3.  - F/u with   "[facility] at discharge      S/P DBD kidney transplant on 5/4/21  KTM following.    - continue tacrolimus and prednisone (weaning prednisone back down to home dose after brain surgery)      HTN (hypertension)  On amlodipine 5 mg and nebivolol at home    - Continue to hold as BP is controlled   - metoprolol tartrate for rate control while inpatient        VTE Risk Mitigation (From admission, onward)         Ordered     heparin (porcine) injection 5,000 Units  Every 8 hours         02/18/22 1018     Place sequential compression device  Until discontinued         02/16/22 2129     IP VTE LOW RISK PATIENT  Once         02/16/22 2129                Please secure chat [facility] Medicine M ("Medicine Consult Only") or contact me directly for any additional questions or concerns.      Thank you for your consult. I will follow-up with patient. Please contact us if you have any additional questions.    Lizbeth Knott MD  Department of Hospital Medicine   [facility]il reyes - Neurosurgery (Hospital)    "

## 2022-02-19 NOTE — ASSESSMENT & PLAN NOTE
A1C 5.9. Not on any antihyperglycemics at home.    - scheduled mealtime insulin 3U TIDWM while on steroid burst  - LDSSI correction scale  - f/u with PCP for possible initiation of metformin etc.

## 2022-02-19 NOTE — PROGRESS NOTES
Pharmacokinetic Assessment Follow Up: IV Vancomycin    Vancomycin serum concentration assessment(s):    The trough level was drawn correctly and can be used to guide therapy at this time. The measurement is within the desired definitive target range of 15 to 20 mcg/mL.    Vancomycin Regimen Plan:    Continue regimen to Vancomycin 1250 mg IV every 12 hours with next serum trough concentration measured at 0400 prior to 4th dose on 2/21/22    Drug levels (last 3 results):  Recent Labs   Lab Result Units 02/16/22 2246 02/19/22  0504   Vancomycin, Random ug/mL <1.4  --    Vancomycin-Trough ug/mL  --  19.2       Pharmacy will continue to follow and monitor vancomycin.    Please contact pharmacy at extension [phone] for questions regarding this assessment.    Thank you for the consult,   Humaira Dotson       Patient brief summary:  Ronal Mazariegos is a 71 y.o. male initiated on antimicrobial therapy with IV Vancomycin for treatment of  Cranial abscess    The patient's current regimen is 1250mg Q12h     Drug Allergies:   Review of patient's allergies indicates:  No Known Allergies    Actual Body Weight:   80.3 kg     Renal Function:   Estimated Creatinine Clearance: 88 mL/min (based on SCr of 0.9 mg/dL).,     Dialysis Method (if applicable):  N/A    CBC (last 72 hours):  Recent Labs   Lab Result Units 02/16/22 2246 02/17/22  0430 02/17/22  1117 02/18/22  0234 02/19/22  0505   WBC K/uL 3.18* 2.92*  --  2.87* 3.67*   Hemoglobin g/dL 16.7 16.3  --  16.4 17.3   Hemoglobin A1C %  --   --  5.9*  --   --    Hematocrit % 51.8 51.3  --  50.0 53.5   Platelets K/uL 191 174  --  168 159   Gran % % 72.0 72.0  --  69.0  --    Lymph % % 17.0* 14.0*  --  12.0*  --    Mono % % 8.0 8.0  --  9.0  --    Eosinophil % % 0.0 0.0  --  0.0  --    Basophil % % 0.0 0.0  --  0.0  --    Differential Method  Manual Manual  --  Manual  --        Metabolic Panel (last 72 hours):  Recent Labs   Lab Result Units 02/16/22 2246 02/17/22  0430 02/17/22  1117  02/18/22  0234 02/19/22  0505   Sodium mmol/L 139 134* 136 134* 137   Potassium mmol/L 4.9 4.5  --  4.4 4.5   Chloride mmol/L 107 106  --  105 101   CO2 mmol/L 20* 18*  --  22* 27   Glucose mg/dL 216* 172*  --  217* 188*   BUN mg/dL 21 18  --  18 21   Creatinine mg/dL 0.9 0.9  --  0.8 0.9   Albumin g/dL 3.0* 2.8*  --  2.7* 2.5*   Total Bilirubin mg/dL 1.9* 2.3*  --  1.6* 1.5*   Alkaline Phosphatase U/L 77 70  --  67 77   AST U/L 21 16  --  16 18   ALT U/L 18 15  --  17 23   Magnesium mg/dL 1.6  --   --   --   --    Phosphorus mg/dL 2.6*  --   --   --   --        Vancomycin Administrations:  vancomycin given in the last 96 hours                     vancomycin 1.25 g in dextrose 5% 250 mL IVPB (ready to mix) (mg) 1,250 mg New Bag 02/19/22 0548     1,250 mg New Bag 02/18/22 1800     1,250 mg New Bag  0539     1,250 mg New Bag 02/17/22 1717    vancomycin 1.5 g in dextrose 5 % 250 mL IVPB (ready to mix) (mg) 1,500 mg New Bag 02/17/22 0449                    Microbiologic Results:  Microbiology Results (last 7 days)       Procedure Component Value Units Date/Time    Blood culture [[id number]] Collected: 02/16/22 2307    Order Status: Completed Specimen: Blood from Peripheral, Ankle, Right Updated: 02/19/22 0613     Blood Culture, Routine No Growth to date      No Growth to date      No Growth to date    Narrative:      Blood cultures from 2 different sites. 4 bottles total.  Please draw before starting antibiotics.    Blood culture [[id number]] Collected: 02/16/22 2247    Order Status: Completed Specimen: Blood from Peripheral, Hand, Right Updated: 02/19/22 0613     Blood Culture, Routine No Growth to date      No Growth to date      No Growth to date    Narrative:      Blood cultures x 2 different sites. 4 bottles total. Please  draw cultures before administering antibiotics.    Modified Acid Fast Stain For Nocardia [[id number]] Collected: 02/17/22 0238    Order Status: Completed Specimen: Brain Updated: 02/18/22 2041      Modified Acid Fast smear No partially acid fast organisms seen    Narrative:      add-on MAFST per Aaron House MD  02/18/2022  14:40 ORD#[id number]    Cryptococcal antigen [[id number]] Collected: 02/18/22 2016    Order Status: Sent Specimen: Blood, Venous Updated: 02/18/22 2040    Narrative:      Collection has been rescheduled by JM2 at 02/18/2022 19:56 Reason:   Due in Am per nurse......    AFB Culture & Smear [[id number]] Collected: 02/17/22 0238    Order Status: Completed Specimen: Abscess from Brain Updated: 02/18/22 1515     AFB Culture & Smear Culture in progress     AFB CULTURE STAIN No acid fast bacilli seen.    Narrative:      1) Intracerebral Abscess    AFB Culture & Smear [[id number]] Collected: 02/17/22 0238    Order Status: Completed Specimen: Abscess from Brain Updated: 02/18/22 1515     AFB Culture & Smear Culture in progress     AFB CULTURE STAIN No acid fast bacilli seen.    Narrative:      2) Intracerebral Abscess    Modified Acid Fast Stain For Nocardia [[id number]]     Order Status: Completed Specimen: Abscess     Modified Acid Fast Stain For Nocardia [[id number]]     Order Status: Completed Specimen: Abscess     Aerobic culture [[id number]] Collected: 02/17/22 0238    Order Status: Completed Specimen: Abscess from Brain Updated: 02/18/22 0900     Aerobic Bacterial Culture No growth    Narrative:      1) Intracerebral Abscess    Aerobic culture [[id number]] Collected: 02/17/22 0238    Order Status: Completed Specimen: Abscess from Brain Updated: 02/18/22 0858     Aerobic Bacterial Culture No growth    Narrative:      2) Intracerebral Abscess    Culture, Anaerobe [[id number]] Collected: 02/17/22 0238    Order Status: Completed Specimen: Abscess from Brain Updated: 02/18/22 0854     Anaerobic Culture Culture in progress    Narrative:      1) Intracerebral Abscess    Culture, Anaerobe [[id number]] Collected: 02/17/22 0238    Order Status: Completed Specimen: Abscess from Brain Updated: 02/18/22 0854      Anaerobic Culture Culture in progress    Narrative:      2) Intracerebral Abscess    Toxoplasma Gondii, DNA (Qual) [[id number]] Collected: 02/18/22 0603    Order Status: Completed Specimen: Blood Updated: 02/18/22 0609     Toxoplasma gondii DNA, Source blood    Modified Acid Fast Stain For Nocardia [[id number]]     Order Status: Completed Specimen: Abscess     Gram stain [[id number]] Collected: 02/17/22 0238    Order Status: Completed Specimen: Abscess from Brain Updated: 02/17/22 1031     Gram Stain Result Many WBC's      No epithelial cells      No organisms seen    Narrative:      2) Intracerebral Abscess    Gram stain [[id number]] Collected: 02/17/22 0238    Order Status: Completed Specimen: Abscess from Brain Updated: 02/17/22 0753     Gram Stain Result Rare WBC's      No organisms seen      No epithelial cells    Narrative:      1) Intracerebral Abscess    Fungus culture [[id number]] Collected: 02/17/22 0238    Order Status: Sent Specimen: Abscess from Brain Updated: 02/17/22 0303    Fungus culture [[id number]] Collected: 02/17/22 0238    Order Status: Sent Specimen: Abscess from Brain Updated: 02/17/22 0301

## 2022-02-19 NOTE — SUBJECTIVE & OBJECTIVE
Past Medical History:   Diagnosis Date    Anasarca 10/1/2021    Anemia of chronic disease     Atrial fibrillation     BPH (benign prostatic hypertrophy)     Chronic systolic heart failure 10/1/2021    Hepatitis C virus infection cured after antiviral drug therapy     acquired through kidney transplant, treated / cured (SVR12 - 12/2021)    HTN (hypertension)     Polycystic kidney disease        Past Surgical History:   Procedure Laterality Date    AV FISTULA PLACEMENT Left 2016    CATARACT EXTRACTION, BILATERAL Bilateral     CRANIOTOMY Right 2/16/2022    Procedure: CRANIOTOMY;  Surgeon: Sunday Rosa DO;  Location: [facility] OR [address] [place] [address];  Service: Neurosurgery;  Laterality: Right;  R craniotomy for abscess drainage    KIDNEY TRANSPLANT N/A 5/4/2021    Procedure: TRANSPLANT, KIDNEY;  Surgeon: Lexy Bolden MD;  Location: [facility] OR [address] [place] [address];  Service: Transplant;  Laterality: N/A;       Review of patient's allergies indicates:  No Known Allergies    No current facility-administered medications on file prior to encounter.     Current Outpatient Medications on File Prior to Encounter   Medication Sig    amLODIPine (NORVASC) 5 MG tablet Take 5 mg by mouth once daily.    apixaban (ELIQUIS) 5 mg Tab Take 1 tablet (5 mg total) by mouth 2 (two) times daily.    BYSTOLIC 20 mg Tab Take 1 tablet by mouth once daily.    CARDURA 2 mg tablet Take 2 mg by mouth nightly.    ergocalciferol (VITAMIN D2) 50,000 unit Cap Take 1 capsule (50,000 Units total) by mouth every 7 days. (Patient not taking: Reported on 12/21/2021)    famotidine (PEPCID) 20 MG tablet Take 1 tablet (20 mg total) by mouth every evening.    finasteride (PROSCAR) 5 mg tablet Take 5 mg by mouth once daily.    fish oil-omega-3 fatty acids 300-1,000 mg capsule Take 1 capsule by mouth once daily.     fluticasone propionate (FLONASE) 50 mcg/actuation nasal spray 1 spray by Each Nostril route daily as needed.    furosemide (LASIX) 40 MG tablet Take 40 mg by mouth once  daily.    iron-vitamin C 100-250 mg, ICAR-C, 100-250 mg Tab Take 1 tablet by mouth once daily.    k phos di & mono-sod phos mono (K-PHOS-NEUTRAL) 250 mg Tab Take 3 tablets by mouth 2 (two) times a day.    magnesium oxide (MAG-OX) 400 mg (241.3 mg magnesium) tablet Take 3 tablets (1,200 mg total) by mouth 3 (three) times daily. (Patient taking differently: Take 1,200 mg by mouth 2 (two) times daily.)    multivitamin Tab Take 1 tablet by mouth once daily.    predniSONE (DELTASONE) 5 MG tablet Take by mouth daily. - 20 mg, - 15 mg, - 10 mg, 5 mg daily thereafter starting 21    tacrolimus (PROGRAF) 1 MG Cap Take 2 capsules (2 mg total) by mouth every 12 (twelve) hours. Z94.0;Kidney txp on 21    valGANciclovir (VALCYTE) 450 mg Tab Take 2 tablets (900 mg total) by mouth 2 (two) times daily.     Family History       Problem Relation (Age of Onset)    Hypertension Father, Sister    Kidney disease Father, Sister    Polycystic kidney disease Father, Sister          Tobacco Use    Smoking status: Former Smoker     Packs/day: 2.00     Years: 10.00     Pack years: 20.00     Types: Cigarettes     Start date: 1972     Quit date: 1984     Years since quittin.2    Smokeless tobacco: Never Used   Substance and Sexual Activity    Alcohol use: No     Comment: Pt reportsbeing a former beer drinker (2-3 cans per day) and quitting in .    Drug use: No    Sexual activity: Yes     Review of Systems   Constitutional:  Negative for chills and fever.   HENT:  Positive for congestion. Negative for sore throat.    Eyes:  Negative for visual disturbance.   Respiratory:  Negative for cough and shortness of breath.    Cardiovascular:  Negative for chest pain and palpitations.   Musculoskeletal:  Negative for arthralgias and myalgias.   Skin:  Negative for rash and wound.   Objective:     Vital Signs (Most Recent):  Temp: 97.9 °F (36.6 °C) (22 1155)  Pulse: 95 (22 1155)  Resp: 18 (22  1155)  BP: (!) 137/91 (02/19/22 1155)  SpO2: 95 % (02/19/22 1155)   Vital Signs (24h Range):  Temp:  [97.7 °F (36.5 °C)-98 °F (36.7 °C)] 97.9 °F (36.6 °C)  Pulse:  [] 95  Resp:  [18-20] 18  SpO2:  [95 %-97 %] 95 %  BP: (121-146)/(74-94) 137/91     Weight: 80.3 kg (177 lb)  Body mass index is 22.73 kg/m².    Physical Exam  Vitals and nursing note reviewed.   Constitutional:       General: He is not in acute distress.     Appearance: He is not toxic-appearing or diaphoretic.   HENT:      Head: Normocephalic.      Mouth/Throat:      Mouth: Mucous membranes are moist.   Cardiovascular:      Rate and Rhythm: Normal rate and regular rhythm.   Pulmonary:      Effort: Pulmonary effort is normal. No respiratory distress.   Abdominal:      Palpations: Abdomen is soft.      Tenderness: There is no guarding.   Musculoskeletal:      Cervical back: Neck supple. No rigidity.   Skin:     General: Skin is warm and dry.   Neurological:      Mental Status: He is alert. Mental status is at baseline.   Psychiatric:         Mood and Affect: Mood normal.         Behavior: Behavior normal.       Significant Labs: All pertinent labs within the past 24 hours have been reviewed.    Significant Imaging: I have reviewed all pertinent imaging results/findings within the past 24 hours.

## 2022-02-19 NOTE — SUBJECTIVE & OBJECTIVE
Interval History: doing well today, no complaints, family at bedside. Cx now + nocardia    Review of Systems   Constitutional:  Negative for activity change, appetite change, chills and fever.   HENT:  Negative for congestion, dental problem, ear pain and rhinorrhea.    Eyes:  Negative for pain and discharge.   Respiratory:  Negative for cough and shortness of breath.    Cardiovascular:  Negative for chest pain and leg swelling.   Gastrointestinal:  Negative for abdominal distention, abdominal pain, constipation, diarrhea, nausea and vomiting.   Genitourinary:  Negative for difficulty urinating and dysuria.   Musculoskeletal:  Negative for arthralgias, back pain and joint swelling.   Skin:  Negative for rash and wound.   Neurological:  Negative for dizziness, light-headedness and headaches.   Psychiatric/Behavioral:  Negative for behavioral problems and confusion.    Objective:     Vital Signs (Most Recent):  Temp: 97.6 °F (36.4 °C) (02/19/22 1424)  Pulse: 105 (02/19/22 1424)  Resp: 16 (02/19/22 1424)  BP: 128/88 (02/19/22 1424)  SpO2: 96 % (02/19/22 1446) Vital Signs (24h Range):  Temp:  [97.6 °F (36.4 °C)-97.9 °F (36.6 °C)] 97.6 °F (36.4 °C)  Pulse:  [] 105  Resp:  [16-20] 16  SpO2:  [94 %-97 %] 96 %  BP: (121-146)/(74-93) 128/88     Weight: 80.3 kg (177 lb)  Body mass index is 22.73 kg/m².    Estimated Creatinine Clearance: 88 mL/min (based on SCr of 0.9 mg/dL).    Physical Exam  Constitutional:       General: He is not in acute distress.     Appearance: Normal appearance. He is well-developed. He is not ill-appearing or diaphoretic.   HENT:      Head: Normocephalic and atraumatic.      Right Ear: External ear normal.      Left Ear: External ear normal.      Nose: Nose normal.   Eyes:      General: No scleral icterus.        Right eye: No discharge.         Left eye: No discharge.      Extraocular Movements: Extraocular movements intact.      Conjunctiva/sclera: Conjunctivae normal.   Pulmonary:       Effort: Pulmonary effort is normal. No respiratory distress.      Breath sounds: No stridor.   Skin:     General: Skin is dry.      Coloration: Skin is not jaundiced or pale.      Findings: No erythema.   Neurological:      General: No focal deficit present.      Mental Status: He is alert and oriented to person, place, and time. Mental status is at baseline.   Psychiatric:         Mood and Affect: Mood normal.         Behavior: Behavior normal.         Thought Content: Thought content normal.         Judgment: Judgment normal.       Significant Labs: CBC:   Recent Labs   Lab 02/18/22  0234 02/19/22  0505   WBC 2.87* 3.67*   HGB 16.4 17.3   HCT 50.0 53.5    159     CMP:   Recent Labs   Lab 02/18/22 0234 02/19/22  0505   * 137   K 4.4 4.5    101   CO2 22* 27   * 188*   BUN 18 21   CREATININE 0.8 0.9   CALCIUM 9.4 9.5   PROT 5.6* 5.7*   ALBUMIN 2.7* 2.5*   BILITOT 1.6* 1.5*   ALKPHOS 67 77   AST 16 18   ALT 17 23   ANIONGAP 7* 9   EGFRNONAA >60.0 >60.0     Microbiology Results (last 7 days)       Procedure Component Value Units Date/Time    Aerobic culture [[id number]]  (Abnormal) Collected: 02/17/22 0238    Order Status: Completed Specimen: Abscess from Brain Updated: 02/19/22 1318     Aerobic Bacterial Culture Results called to and read back by:Rina Nunez RN 02/19/2022  13:17      NOCARDIA SPECIES  Moderate  further identified as Nocardia nova  For susceptibility see order #[id number]      Narrative:      2) Intracerebral Abscess    Aerobic culture [[id number]]  (Abnormal) Collected: 02/17/22 0238    Order Status: Completed Specimen: Abscess from Brain Updated: 02/19/22 1316     Aerobic Bacterial Culture Results called to and read back by:Rina Nunez RN 02/19/2022  13:15      NOCARDIA SPECIES  Moderate  further identified as Nocardia nova  Susceptibility pending      Narrative:      1) Intracerebral Abscess    Blood culture [[id number]] Collected: 02/16/22 2307    Order Status:  Completed Specimen: Blood from Peripheral, Ankle, Right Updated: 02/19/22 0613     Blood Culture, Routine No Growth to date      No Growth to date      No Growth to date    Narrative:      Blood cultures from 2 different sites. 4 bottles total.  Please draw before starting antibiotics.    Blood culture [[id number]] Collected: 02/16/22 2247    Order Status: Completed Specimen: Blood from Peripheral, Hand, Right Updated: 02/19/22 0613     Blood Culture, Routine No Growth to date      No Growth to date      No Growth to date    Narrative:      Blood cultures x 2 different sites. 4 bottles total. Please  draw cultures before administering antibiotics.    Modified Acid Fast Stain For Nocardia [[id number]] Collected: 02/17/22 0238    Order Status: Completed Specimen: Brain Updated: 02/18/22 2041     Modified Acid Fast smear No partially acid fast organisms seen    Narrative:      add-on MAFST per Aaron House MD  02/18/2022  14:40 ORD#[id number]    Cryptococcal antigen [[id number]] Collected: 02/18/22 2016    Order Status: Sent Specimen: Blood, Venous Updated: 02/18/22 2040    Narrative:      Collection has been rescheduled by DANIELE at 02/18/2022 19:56 Reason:   Due in Am per nurse......    AFB Culture & Smear [[id number]] Collected: 02/17/22 0238    Order Status: Completed Specimen: Abscess from Brain Updated: 02/18/22 1515     AFB Culture & Smear Culture in progress     AFB CULTURE STAIN No acid fast bacilli seen.    Narrative:      1) Intracerebral Abscess    AFB Culture & Smear [[id number]] Collected: 02/17/22 0238    Order Status: Completed Specimen: Abscess from Brain Updated: 02/18/22 1515     AFB Culture & Smear Culture in progress     AFB CULTURE STAIN No acid fast bacilli seen.    Narrative:      2) Intracerebral Abscess    Modified Acid Fast Stain For Nocardia [[id number]]     Order Status: Completed Specimen: Abscess     Modified Acid Fast Stain For Nocardia [[id number]]     Order Status: Completed Specimen: Abscess      Culture, Anaerobe [[id number]] Collected: 02/17/22 0238    Order Status: Completed Specimen: Abscess from Brain Updated: 02/18/22 0854     Anaerobic Culture Culture in progress    Narrative:      1) Intracerebral Abscess    Culture, Anaerobe [[id number]] Collected: 02/17/22 0238    Order Status: Completed Specimen: Abscess from Brain Updated: 02/18/22 0854     Anaerobic Culture Culture in progress    Narrative:      2) Intracerebral Abscess    Toxoplasma Gondii, DNA (Qual) [[id number]] Collected: 02/18/22 0603    Order Status: Completed Specimen: Blood Updated: 02/18/22 0609     Toxoplasma gondii DNA, Source blood    Modified Acid Fast Stain For Nocardia [[id number]]     Order Status: Completed Specimen: Abscess     Gram stain [[id number]] Collected: 02/17/22 0238    Order Status: Completed Specimen: Abscess from Brain Updated: 02/17/22 1031     Gram Stain Result Many WBC's      No epithelial cells      No organisms seen    Narrative:      2) Intracerebral Abscess    Gram stain [[id number]] Collected: 02/17/22 0238    Order Status: Completed Specimen: Abscess from Brain Updated: 02/17/22 0753     Gram Stain Result Rare WBC's      No organisms seen      No epithelial cells    Narrative:      1) Intracerebral Abscess    Fungus culture [[id number]] Collected: 02/17/22 0238    Order Status: Sent Specimen: Abscess from Brain Updated: 02/17/22 0303    Fungus culture [[id number]] Collected: 02/17/22 0238    Order Status: Sent Specimen: Abscess from Brain Updated: 02/17/22 0301            Significant Imaging: I have reviewed all pertinent imaging results/findings within the past 24 hours.

## 2022-02-19 NOTE — SUBJECTIVE & OBJECTIVE
Interval History: 2/19: POD 2. NAEON. AFVSS. Exam stable. Pain controlled. Saturating well on room air. Tolerating PO without n/v. Voiding. Not passing flatus.     Medications:  Continuous Infusions:    Scheduled Meds:   famotidine  20 mg Oral QHS    heparin (porcine)  5,000 Units Subcutaneous Q8H    meropenem (MERREM) IVPB  2 g Intravenous Q8H    metoprolol tartrate  25 mg Oral BID    mupirocin   Nasal BID    [START ON 2/20/2022] predniSONE  10 mg Oral Daily    predniSONE  25 mg Oral BID    [START ON 2/21/2022] predniSONE  5 mg Oral BID    [START ON 2/23/2022] predniSONE  5 mg Oral Daily    senna-docusate 8.6-50 mg  1 tablet Oral BID    tacrolimus  2 mg Oral BID    vancomycin (VANCOCIN) IVPB  15 mg/kg (Dosing Weight) Intravenous Q12H     PRN Meds:acetaminophen, dextrose 10%, glucagon (human recombinant), insulin aspart U-100, ondansetron, oxyCODONE, sodium chloride 0.9%     Review of Systems   Constitutional:  Negative for chills and fever.   Respiratory:  Negative for shortness of breath.    Gastrointestinal:  Negative for nausea and vomiting.   Musculoskeletal:  Negative for back pain.   Neurological:  Positive for weakness. Negative for headaches.   Psychiatric/Behavioral:  Negative for confusion.    Objective:     Weight: 80.3 kg (177 lb)  Body mass index is 22.73 kg/m².  Vital Signs (Most Recent):  Temp: 97.7 °F (36.5 °C) (02/18/22 2336)  Pulse: 105 (02/19/22 0300)  Resp: 18 (02/19/22 0258)  BP: 121/89 (02/18/22 2336)  SpO2: 97 % (02/18/22 2336) Vital Signs (24h Range):  Temp:  [97.7 °F (36.5 °C)-98.2 °F (36.8 °C)] 97.7 °F (36.5 °C)  Pulse:  [] 105  Resp:  [18-22] 18  SpO2:  [95 %-99 %] 97 %  BP: (113-139)/(74-94) 121/89  Arterial Line BP: (110-129)/(52-72) 125/63                            Urethral Catheter 02/17/22 0111 Non-latex;Straight-tip 16 Fr. (Active)   Site Assessment Clean;Intact 02/17/22 0301   Collection Container Urimeter 02/17/22 0301   Securement Method secured to top of thigh w/ adhesive  "device 02/17/22 0301   Catheter Care Performed yes 02/17/22 0301   Reason for Continuing Urinary Catheterization Post operative 02/17/22 0301   CAUTI Prevention Bundle Securement Device in place with 1" slack;Drainage bag/urimeter off the floor;Sheeting clip in use;Intact seal between catheter & drainage tubing;No dependent loops or kinks;Drainage bag/urimeter not overfilled (<2/3 full);Drainage bag/urimeter below bladder 02/17/22 0301   Output (mL) 130 mL 02/17/22 0705            Hemodialysis AV Fistula Left forearm (Active)   Site Assessment Clean;Dry 02/17/22 0301   Patency Thrill;Bruit;Present 02/17/22 0301   Status Deaccessed 02/17/22 0301   Site Condition No complications 02/17/22 0301   Dressing Open to air (None) 02/17/22 0301       Physical Exam  Constitutional:       General: He is not in acute distress.  Eyes:      Extraocular Movements: EOM normal.      Pupils: Pupils are equal, round, and reactive to light.   Cardiovascular:      Rate and Rhythm: Normal rate and regular rhythm.   Neurological:      Mental Status: He is alert.       Physical Exam:    Constitutional: No distress.     Eyes: Pupils are equal, round, and reactive to light. EOM are normal.     Cardiovascular: Normal rate and regular rhythm.     Psych/Behavior: He is alert.   E4V5M6  AOx3  PERRL  EOMI  Face Symmetric  Right 5/5  Left 4+/5      Significant Labs:  Recent Labs   Lab 02/17/22  1117 02/18/22  0234 02/19/22  0505   GLU  --  217* 188*    134* 137   K  --  4.4 4.5   CL  --  105 101   CO2  --  22* 27   BUN  --  18 21   CREATININE  --  0.8 0.9   CALCIUM  --  9.4 9.5       Recent Labs   Lab 02/18/22  0234   WBC 2.87*   HGB 16.4   HCT 50.0          No results for input(s): LABPT, INR, APTT in the last 48 hours.    Microbiology Results (last 7 days)       Procedure Component Value Units Date/Time    Blood culture [[id number]] Collected: 02/16/22 2307    Order Status: Completed Specimen: Blood from Peripheral, Ankle, Right " Updated: 02/19/22 0613     Blood Culture, Routine No Growth to date      No Growth to date      No Growth to date    Narrative:      Blood cultures from 2 different sites. 4 bottles total.  Please draw before starting antibiotics.    Blood culture [[id number]] Collected: 02/16/22 2247    Order Status: Completed Specimen: Blood from Peripheral, Hand, Right Updated: 02/19/22 0613     Blood Culture, Routine No Growth to date      No Growth to date      No Growth to date    Narrative:      Blood cultures x 2 different sites. 4 bottles total. Please  draw cultures before administering antibiotics.    Modified Acid Fast Stain For Nocardia [[id number]] Collected: 02/17/22 0238    Order Status: Completed Specimen: Brain Updated: 02/18/22 2041     Modified Acid Fast smear No partially acid fast organisms seen    Narrative:      add-on MAFST per Aaron House MD  02/18/2022  14:40 ORD#[id number]    Cryptococcal antigen [[id number]] Collected: 02/18/22 2016    Order Status: Sent Specimen: Blood, Venous Updated: 02/18/22 2040    Narrative:      Collection has been rescheduled by DANIELE at 02/18/2022 19:56 Reason:   Due in Am per nurse......    AFB Culture & Smear [[id number]] Collected: 02/17/22 0238    Order Status: Completed Specimen: Abscess from Brain Updated: 02/18/22 1515     AFB Culture & Smear Culture in progress     AFB CULTURE STAIN No acid fast bacilli seen.    Narrative:      1) Intracerebral Abscess    AFB Culture & Smear [[id number]] Collected: 02/17/22 0238    Order Status: Completed Specimen: Abscess from Brain Updated: 02/18/22 1515     AFB Culture & Smear Culture in progress     AFB CULTURE STAIN No acid fast bacilli seen.    Narrative:      2) Intracerebral Abscess    Modified Acid Fast Stain For Nocardia [[id number]]     Order Status: Completed Specimen: Abscess     Modified Acid Fast Stain For Nocardia [[id number]]     Order Status: Completed Specimen: Abscess     Aerobic culture [[id number]] Collected: 02/17/22 0238     Order Status: Completed Specimen: Abscess from Brain Updated: 02/18/22 0900     Aerobic Bacterial Culture No growth    Narrative:      1) Intracerebral Abscess    Aerobic culture [[id number]] Collected: 02/17/22 0238    Order Status: Completed Specimen: Abscess from Brain Updated: 02/18/22 0858     Aerobic Bacterial Culture No growth    Narrative:      2) Intracerebral Abscess    Culture, Anaerobe [[id number]] Collected: 02/17/22 0238    Order Status: Completed Specimen: Abscess from Brain Updated: 02/18/22 0854     Anaerobic Culture Culture in progress    Narrative:      1) Intracerebral Abscess    Culture, Anaerobe [[id number]] Collected: 02/17/22 0238    Order Status: Completed Specimen: Abscess from Brain Updated: 02/18/22 0854     Anaerobic Culture Culture in progress    Narrative:      2) Intracerebral Abscess    Toxoplasma Gondii, DNA (Qual) [[id number]] Collected: 02/18/22 0603    Order Status: Completed Specimen: Blood Updated: 02/18/22 0609     Toxoplasma gondii DNA, Source blood    Modified Acid Fast Stain For Nocardia [[id number]]     Order Status: Completed Specimen: Abscess     Gram stain [[id number]] Collected: 02/17/22 0238    Order Status: Completed Specimen: Abscess from Brain Updated: 02/17/22 1031     Gram Stain Result Many WBC's      No epithelial cells      No organisms seen    Narrative:      2) Intracerebral Abscess    Gram stain [[id number]] Collected: 02/17/22 0238    Order Status: Completed Specimen: Abscess from Brain Updated: 02/17/22 0753     Gram Stain Result Rare WBC's      No organisms seen      No epithelial cells    Narrative:      1) Intracerebral Abscess    Fungus culture [[id number]] Collected: 02/17/22 0238    Order Status: Sent Specimen: Abscess from Brain Updated: 02/17/22 0303    Fungus culture [[id number]] Collected: 02/17/22 0238    Order Status: Sent Specimen: Abscess from Brain Updated: 02/17/22 0301          All pertinent labs from the last 24 hours have been  reviewed.    Significant Diagnostics:  I have reviewed and interpreted all pertinent imaging results/findings within the past 24 hours.  No results found in the last 24 hours.

## 2022-02-19 NOTE — PROGRESS NOTES
Consult Requested By: Sunday Rosa DO  Reason for Consult: management of immunosuppressive agents, CKD    SUBJECTIVE:   Patient/family  and nurse report  the following issues today:    patient seen wife present during the interview  He feels better  Waiting for PT to walk him  Good appetite but he is NPO for a test  Otherwise feels fine'  Curious  about tests results to clarify etiology of brain abscess          Review of Systems:    Skin: no skin rash  CNS; no headaches, blurred vision, seizure, or syncope  ENT: No JVD,  Adenopathies,  nasal congestion. No oral lesions  Cardiac: No chest pain, dyspnea, claudication, edema or palpitations  Respiratory: No SOB, cough, hemoptysis   Gastro-intestinal: No diarrhea, constipation, abdominal pain, nausea, vomit. No ascitis  Genitourinary: no hematuria, dysuria, frequency, frequency  Musculoskeletal: joint pain, arthritis or vasculitic changes  Psych: alert awake, oriented, No cranial nerves deficit.  Patient Active Problem List   Diagnosis    Polycystic kidney disease    HTN (hypertension)    Anemia of chronic disease    Benign prostatic hyperplasia    A-fib    Long term (current) use of anticoagulants--Eliquis    At risk for opportunistic infections    S/P DBD kidney transplant on 5/4/21    Long-term use of immunosuppressant medication    Prophylactic immunotherapy    Hyperglycemia    Drug-induced neutropenia    Chronic systolic heart failure    Anasarca    Hepatitis C virus infection cured after antiviral drug therapy    Polycystic liver disease    Intracranial abscess    Vasogenic brain edema    Hyponatremia    Cardiomyopathy    Pulmonary HTN       OBJECTIVE:     Medications:   famotidine  20 mg Oral QHS    heparin (porcine)  5,000 Units Subcutaneous Q8H    meropenem (MERREM) IVPB  2 g Intravenous Q8H    metoprolol tartrate  25 mg Oral BID    mupirocin   Nasal BID    [START ON 2/20/2022] predniSONE  10 mg Oral Daily    predniSONE  25  "mg Oral BID    [START ON 2/21/2022] predniSONE  5 mg Oral BID    [START ON 2/23/2022] predniSONE  5 mg Oral Daily    senna-docusate 8.6-50 mg  1 tablet Oral BID    tacrolimus  2 mg Oral BID    vancomycin (VANCOCIN) IVPB  15 mg/kg (Dosing Weight) Intravenous Q12H       Vitals:    02/19/22 0746   BP: (!) 146/93   Pulse: 96   Resp: 18   Temp: 97.7 °F (36.5 °C)     I/O last 3 completed shifts:  In: 1841 [P.O.:1260; I.V.:30; IV Piggyback:551]  Out: 1445 [Urine:1445]    PHYSICAL EXAM:  BP (!) 146/93   Pulse 96   Temp 97.7 °F (36.5 °C)   Resp 18   Ht 6' 2" (1.88 m)   Wt 80.3 kg (177 lb)   SpO2 95%   BMI 22.73 kg/m²       Head: normocephalic  Neck: No JVD, cervical axillary, or femoral adenopathies  Heart: no murmurs, Normal s1 and s2, No gallops, no rubs, No murmurs  Lungs; CTA, good respiratory effort, no crackles  Abdomen: soft, non tender, no splenomegaly or hepatomegaly, no massess, no bruits  Extremities: No edema, skin rash, joint pain  SNC: awake, alert oriented. Cranial nerves are intact, no focalized, sensitivity and strength preserved    Laboratory:  Recent Labs   Lab 02/17/22  0430 02/18/22  0234 02/19/22  0505   WBC 2.92* 2.87* 3.67*   HGB 16.3 16.4 17.3   HCT 51.3 50.0 53.5    168 159   MONO 8.0 9.0  CANCELED 8.0     Recent Labs   Lab 02/16/22  2246 02/17/22  0430 02/17/22  1117 02/18/22  0234 02/19/22  0505    134* 136 134* 137   K 4.9 4.5  --  4.4 4.5    106  --  105 101   CO2 20* 18*  --  22* 27   BUN 21 18  --  18 21   CREATININE 0.9 0.9  --  0.8 0.9   CALCIUM 10.0 9.1  --  9.4 9.5   PHOS 2.6*  --   --   --   --        Labs reviewed  Diagnostic Results:  X-Ray: Reviewed  US: Reviewed  Echo: Reviewed      ASSESSMENT/PLAN:     1. Immunosuppression:at target No change indicated     2. Allograft Function/complications: excellent kidney function. Will monitor     3. Electrolyte and fluid balance Abnormalities:reviewed No action needed.     4. Essential Hypertension " management/Blood pressure control:per primary team     5. Anemia associated with kidney disease:mild polycythemia, will watch     6.Proteinuria and CKD management:at baseline normal GFR No action needed.     7.Active Infections/malignancy complications immunosuppressed host:per ID work up of intracerebral abscess/lesion         Thank you.    Carlos Barcenas MD  Transplant Nephrologist  [facility]  [facility].

## 2022-02-19 NOTE — ASSESSMENT & PLAN NOTE
11 M PMHx afib on eliquis, recent kidney transplant on 5/2021 on immunosuppressants, cardiomyopathy, pulmonary hypertension presenting with new right frontal lobe abscess. He has mild left sided weakness, but is otherwise intact. MRI demonstrates right frontal 2.2 cm ring enhancing lesion, restricting on DWI    Now s/p R frontal crani for abscess drainage 2/17/22    -- Admitted to [facility];  -- neurochecks and vitals per unit protocol  -- Post op CT reviewed: no detrimental changes  -- Post op MRI reviewed: good drainage of abscess  -- PCC for eliquis reversal pre op: continue to hold now post op until POD7  -- Vanc/Cefepime/flagyl per ID  -- CT C/A/P per ID  -- HM consulted for medical management; appreciate further recs  -- OR cultures: no growth on gram stain, pending final cultures  -- d/adria Decadron and started prednisone taper to transplant suppression dose.   -- transplant nephro on board for immunosuppresive medication recs   -- please page with exam change or questions

## 2022-02-19 NOTE — ASSESSMENT & PLAN NOTE
Follows with Dr. Wheatley in Hematology. Gets periodic therapeutic phlebotomy.  Was planned for possible BMBx.    - Hgb stable at 17.3.  - F/u with Dr. Wheatley at discharge

## 2022-02-19 NOTE — PROGRESS NOTES
Sahil Morgan - Neurosurgery ([facility])  Neurosurgery  Progress Note    Subjective:     History of Present Illness: 71 M PMHx afib on eliquis, recent kidney transplant on 5/2021 on immunosuppressants, cardiomyopathy, pulmonary hypertension presenting with new right frontal lobe abscess. Pt reports transient episodes of left sided weakness this week, most recent episode was this morning, he fell due to left leg weakness when getting up from the toilet, prompting visit to the ED because he thought he was having a stroke. At present, he feels well, has no fever/chills/aches, confusion, blurry vision, LOC, or seizures, and besides mild left sided weakness, he is asymptomatic.    Of note, he has had recent lower respiratory tract infection      Post-Op Info:  Procedure(s) (LRB):  CRANIOTOMY (Right)   3 Days Post-Op     Interval History: 2/19: POD 2. NAEON. AFVSS. Exam stable. Pain controlled. Saturating well on room air. Tolerating PO without n/v. Voiding. Not passing flatus.     Medications:  Continuous Infusions:    Scheduled Meds:   famotidine  20 mg Oral QHS    heparin (porcine)  5,000 Units Subcutaneous Q8H    meropenem (MERREM) IVPB  2 g Intravenous Q8H    metoprolol tartrate  25 mg Oral BID    mupirocin   Nasal BID    [START ON 2/20/2022] predniSONE  10 mg Oral Daily    predniSONE  25 mg Oral BID    [START ON 2/21/2022] predniSONE  5 mg Oral BID    [START ON 2/23/2022] predniSONE  5 mg Oral Daily    senna-docusate 8.6-50 mg  1 tablet Oral BID    tacrolimus  2 mg Oral BID    vancomycin (VANCOCIN) IVPB  15 mg/kg (Dosing Weight) Intravenous Q12H     PRN Meds:acetaminophen, dextrose 10%, glucagon (human recombinant), insulin aspart U-100, ondansetron, oxyCODONE, sodium chloride 0.9%     Review of Systems   Constitutional:  Negative for chills and fever.   Respiratory:  Negative for shortness of breath.    Gastrointestinal:  Negative for nausea and vomiting.   Musculoskeletal:  Negative for back pain.  "  Neurological:  Positive for weakness. Negative for headaches.   Psychiatric/Behavioral:  Negative for confusion.    Objective:     Weight: 80.3 kg (177 lb)  Body mass index is 22.73 kg/m².  Vital Signs (Most Recent):  Temp: 97.7 °F (36.5 °C) (02/18/22 2336)  Pulse: 105 (02/19/22 0300)  Resp: 18 (02/19/22 0258)  BP: 121/89 (02/18/22 2336)  SpO2: 97 % (02/18/22 2336) Vital Signs (24h Range):  Temp:  [97.7 °F (36.5 °C)-98.2 °F (36.8 °C)] 97.7 °F (36.5 °C)  Pulse:  [] 105  Resp:  [18-22] 18  SpO2:  [95 %-99 %] 97 %  BP: (113-139)/(74-94) 121/89  Arterial Line BP: (110-129)/(52-72) 125/63                            Urethral Catheter 02/17/22 0111 Non-latex;Straight-tip 16 Fr. (Active)   Site Assessment Clean;Intact 02/17/22 0301   Collection Container Urimeter 02/17/22 0301   Securement Method secured to top of thigh w/ adhesive device 02/17/22 0301   Catheter Care Performed yes 02/17/22 0301   Reason for Continuing Urinary Catheterization Post operative 02/17/22 0301   CAUTI Prevention Bundle Securement Device in place with 1" slack;Drainage bag/urimeter off the floor;Sheeting clip in use;Intact seal between catheter & drainage tubing;No dependent loops or kinks;Drainage bag/urimeter not overfilled (<2/3 full);Drainage bag/urimeter below bladder 02/17/22 0301   Output (mL) 130 mL 02/17/22 0705            Hemodialysis AV Fistula Left forearm (Active)   Site Assessment Clean;Dry 02/17/22 0301   Patency Thrill;Bruit;Present 02/17/22 0301   Status Deaccessed 02/17/22 0301   Site Condition No complications 02/17/22 0301   Dressing Open to air (None) 02/17/22 0301       Physical Exam  Constitutional:       General: He is not in acute distress.  Eyes:      Extraocular Movements: EOM normal.      Pupils: Pupils are equal, round, and reactive to light.   Cardiovascular:      Rate and Rhythm: Normal rate and regular rhythm.   Neurological:      Mental Status: He is alert.       Physical Exam:    Constitutional: No " distress.     Eyes: Pupils are equal, round, and reactive to light. EOM are normal.     Cardiovascular: Normal rate and regular rhythm.     Psych/Behavior: He is alert.   E4V5M6  AOx3  PERRL  EOMI  Face Symmetric  Right 5/5  Left 4+/5      Significant Labs:  Recent Labs   Lab 02/17/22  1117 02/18/22  0234 02/19/22  0505   GLU  --  217* 188*    134* 137   K  --  4.4 4.5   CL  --  105 101   CO2  --  22* 27   BUN  --  18 21   CREATININE  --  0.8 0.9   CALCIUM  --  9.4 9.5       Recent Labs   Lab 02/18/22  0234   WBC 2.87*   HGB 16.4   HCT 50.0          No results for input(s): LABPT, INR, APTT in the last 48 hours.    Microbiology Results (last 7 days)       Procedure Component Value Units Date/Time    Blood culture [[id number]] Collected: 02/16/22 2307    Order Status: Completed Specimen: Blood from Peripheral, Ankle, Right Updated: 02/19/22 0613     Blood Culture, Routine No Growth to date      No Growth to date      No Growth to date    Narrative:      Blood cultures from 2 different sites. 4 bottles total.  Please draw before starting antibiotics.    Blood culture [[id number]] Collected: 02/16/22 2247    Order Status: Completed Specimen: Blood from Peripheral, Hand, Right Updated: 02/19/22 0613     Blood Culture, Routine No Growth to date      No Growth to date      No Growth to date    Narrative:      Blood cultures x 2 different sites. 4 bottles total. Please  draw cultures before administering antibiotics.    Modified Acid Fast Stain For Nocardia [[id number]] Collected: 02/17/22 0238    Order Status: Completed Specimen: Brain Updated: 02/18/22 2041     Modified Acid Fast smear No partially acid fast organisms seen    Narrative:      add-on MAFST per Aaron House MD  02/18/2022  14:40 ORD#[id number]    Cryptococcal antigen [[id number]] Collected: 02/18/22 2016    Order Status: Sent Specimen: Blood, Venous Updated: 02/18/22 2040    Narrative:      Collection has been rescheduled by JMFarzana at 02/18/2022 19:56  Reason:   Due in Am per nurse......    AFB Culture & Smear [[id number]] Collected: 02/17/22 0238    Order Status: Completed Specimen: Abscess from Brain Updated: 02/18/22 1515     AFB Culture & Smear Culture in progress     AFB CULTURE STAIN No acid fast bacilli seen.    Narrative:      1) Intracerebral Abscess    AFB Culture & Smear [[id number]] Collected: 02/17/22 0238    Order Status: Completed Specimen: Abscess from Brain Updated: 02/18/22 1515     AFB Culture & Smear Culture in progress     AFB CULTURE STAIN No acid fast bacilli seen.    Narrative:      2) Intracerebral Abscess    Modified Acid Fast Stain For Nocardia [[id number]]     Order Status: Completed Specimen: Abscess     Modified Acid Fast Stain For Nocardia [[id number]]     Order Status: Completed Specimen: Abscess     Aerobic culture [[id number]] Collected: 02/17/22 0238    Order Status: Completed Specimen: Abscess from Brain Updated: 02/18/22 0900     Aerobic Bacterial Culture No growth    Narrative:      1) Intracerebral Abscess    Aerobic culture [[id number]] Collected: 02/17/22 0238    Order Status: Completed Specimen: Abscess from Brain Updated: 02/18/22 0858     Aerobic Bacterial Culture No growth    Narrative:      2) Intracerebral Abscess    Culture, Anaerobe [[id number]] Collected: 02/17/22 0238    Order Status: Completed Specimen: Abscess from Brain Updated: 02/18/22 0854     Anaerobic Culture Culture in progress    Narrative:      1) Intracerebral Abscess    Culture, Anaerobe [[id number]] Collected: 02/17/22 0238    Order Status: Completed Specimen: Abscess from Brain Updated: 02/18/22 0854     Anaerobic Culture Culture in progress    Narrative:      2) Intracerebral Abscess    Toxoplasma Gondii, DNA (Qual) [[id number]] Collected: 02/18/22 0603    Order Status: Completed Specimen: Blood Updated: 02/18/22 0609     Toxoplasma gondii DNA, Source blood    Modified Acid Fast Stain For Nocardia [[id number]]     Order Status: Completed Specimen: Abscess      Gram stain [[id number]] Collected: 02/17/22 0238    Order Status: Completed Specimen: Abscess from Brain Updated: 02/17/22 1031     Gram Stain Result Many WBC's      No epithelial cells      No organisms seen    Narrative:      2) Intracerebral Abscess    Gram stain [[id number]] Collected: 02/17/22 0238    Order Status: Completed Specimen: Abscess from Brain Updated: 02/17/22 0753     Gram Stain Result Rare WBC's      No organisms seen      No epithelial cells    Narrative:      1) Intracerebral Abscess    Fungus culture [[id number]] Collected: 02/17/22 0238    Order Status: Sent Specimen: Abscess from Brain Updated: 02/17/22 0303    Fungus culture [[id number]] Collected: 02/17/22 0238    Order Status: Sent Specimen: Abscess from Brain Updated: 02/17/22 0301          All pertinent labs from the last 24 hours have been reviewed.    Significant Diagnostics:  I have reviewed and interpreted all pertinent imaging results/findings within the past 24 hours.  No results found in the last 24 hours.      Assessment/Plan:     * Intracranial abscess  11 M PMHx afib on eliquis, recent kidney transplant on 5/2021 on immunosuppressants, cardiomyopathy, pulmonary hypertension presenting with new right frontal lobe abscess. He has mild left sided weakness, but is otherwise intact. MRI demonstrates right frontal 2.2 cm ring enhancing lesion, restricting on DWI    Now s/p R frontal crani for abscess drainage 2/17/22    -- Admitted to NPU;  -- neurochecks and vitals per unit protocol  -- Post op CT reviewed: no detrimental changes  -- Post op MRI reviewed: good drainage of abscess  -- PCC for eliquis reversal pre op: continue to hold now post op until POD7  -- Vanc/Cefepime/flagyl per ID  -- CT C/A/P per ID  -- HM consulted for medical management; appreciate further recs  -- OR cultures: no growth on gram stain, pending final cultures  -- d/adria Decadron and started prednisone taper to transplant suppression dose.   -- transplant nephro  on board for immunosuppresive medication recs   -- please page with exam change or questions        Molina Bassett MD  Neurosurgery  Sahil Morgan - Neurosurgery ([facility]

## 2022-02-20 LAB
1,3 BETA GLUCAN SER-MCNC: 44 PG/ML
ALBUMIN SERPL BCP-MCNC: 2.4 G/DL (ref 3.5–5.2)
ALP SERPL-CCNC: 66 U/L (ref 55–135)
ALT SERPL W/O P-5'-P-CCNC: 22 U/L (ref 10–44)
ANION GAP SERPL CALC-SCNC: 7 MMOL/L (ref 8–16)
ANISOCYTOSIS BLD QL SMEAR: SLIGHT
ASCENDING AORTA: 3.18 CM
AST SERPL-CCNC: 15 U/L (ref 10–40)
AV INDEX (PROSTH): 0.59
AV MEAN GRADIENT: 6 MMHG
AV PEAK GRADIENT: 8 MMHG
AV VALVE AREA: 2 CM2
AV VELOCITY RATIO: 0.58
BASOPHILS NFR BLD: 0 % (ref 0–1.9)
BILIRUB SERPL-MCNC: 1.1 MG/DL (ref 0.1–1)
BSA FOR ECHO PROCEDURE: 2.05 M2
BUN SERPL-MCNC: 20 MG/DL (ref 8–23)
CALCIUM SERPL-MCNC: 8.9 MG/DL (ref 8.7–10.5)
CHLORIDE SERPL-SCNC: 101 MMOL/L (ref 95–110)
CO2 SERPL-SCNC: 22 MMOL/L (ref 23–29)
CREAT SERPL-MCNC: 0.8 MG/DL (ref 0.5–1.4)
CV ECHO LV RWT: 0.38 CM
DIFFERENTIAL METHOD: ABNORMAL
DOP CALC AO PEAK VEL: 1.42 M/S
DOP CALC AO VTI: 27.52 CM
DOP CALC LVOT AREA: 3.4 CM2
DOP CALC LVOT DIAMETER: 2.08 CM
DOP CALC LVOT PEAK VEL: 0.82 M/S
DOP CALC LVOT STROKE VOLUME: 54.95 CM3
DOP CALCLVOT PEAK VEL VTI: 16.18 CM
E/E' RATIO: 8.76 M/S
ECHO LV POSTERIOR WALL: 1.09 CM (ref 0.6–1.1)
EJECTION FRACTION: 50 %
EOSINOPHIL NFR BLD: 0 % (ref 0–8)
ERYTHROCYTE [DISTWIDTH] IN BLOOD BY AUTOMATED COUNT: 13.2 % (ref 11.5–14.5)
EST. GFR  (AFRICAN AMERICAN): >60 ML/MIN/1.73 M^2
EST. GFR  (NON AFRICAN AMERICAN): >60 ML/MIN/1.73 M^2
FRACTIONAL SHORTENING: 37 % (ref 28–44)
FUNGITELL COMMENTS: NEGATIVE
GLUCOSE SERPL-MCNC: 197 MG/DL (ref 70–110)
HCT VFR BLD AUTO: 53 % (ref 40–54)
HGB BLD-MCNC: 18 G/DL (ref 14–18)
IMM GRANULOCYTES # BLD AUTO: ABNORMAL K/UL (ref 0–0.04)
IMM GRANULOCYTES NFR BLD AUTO: ABNORMAL % (ref 0–0.5)
INTERVENTRICULAR SEPTUM: 0.87 CM (ref 0.6–1.1)
IVRT: 98.95 MSEC
LA MAJOR: 7.18 CM
LA MINOR: 7.12 CM
LA WIDTH: 4.61 CM
LEFT ATRIUM SIZE: 4.25 CM
LEFT ATRIUM VOLUME INDEX MOD: 58.3 ML/M2
LEFT ATRIUM VOLUME INDEX: 57.8 ML/M2
LEFT ATRIUM VOLUME MOD: 120 CM3
LEFT ATRIUM VOLUME: 119.07 CM3
LEFT INTERNAL DIMENSION IN SYSTOLE: 3.64 CM (ref 2.1–4)
LEFT VENTRICLE DIASTOLIC VOLUME INDEX: 72.25 ML/M2
LEFT VENTRICLE DIASTOLIC VOLUME: 148.83 ML
LEFT VENTRICLE MASS INDEX: 110 G/M2
LEFT VENTRICLE SYSTOLIC VOLUME INDEX: 27.2 ML/M2
LEFT VENTRICLE SYSTOLIC VOLUME: 56.06 ML
LEFT VENTRICULAR INTERNAL DIMENSION IN DIASTOLE: 5.8 CM (ref 3.5–6)
LEFT VENTRICULAR MASS: 227.05 G
LV LATERAL E/E' RATIO: 8.36 M/S
LV SEPTAL E/E' RATIO: 9.2 M/S
LYMPHOCYTES NFR BLD: 9 % (ref 18–48)
MCH RBC QN AUTO: 32.1 PG (ref 27–31)
MCHC RBC AUTO-ENTMCNC: 34 G/DL (ref 32–36)
MCV RBC AUTO: 95 FL (ref 82–98)
MONOCYTES NFR BLD: 13 % (ref 4–15)
MV PEAK E VEL: 0.92 M/S
NEUTROPHILS NFR BLD: 77 % (ref 38–73)
NEUTS BAND NFR BLD MANUAL: 1 %
NRBC BLD-RTO: 0 /100 WBC
PISA TR MAX VEL: 2.56 M/S
PLATELET # BLD AUTO: 152 K/UL (ref 150–450)
PLATELET BLD QL SMEAR: ABNORMAL
PMV BLD AUTO: 10.7 FL (ref 9.2–12.9)
POCT GLUCOSE: 111 MG/DL (ref 70–110)
POCT GLUCOSE: 158 MG/DL (ref 70–110)
POCT GLUCOSE: 174 MG/DL (ref 70–110)
POCT GLUCOSE: 190 MG/DL (ref 70–110)
POTASSIUM SERPL-SCNC: 4.4 MMOL/L (ref 3.5–5.1)
PROT SERPL-MCNC: 5.2 G/DL (ref 6–8.4)
QEF: 48 %
RA MAJOR: 6.48 CM
RA PRESSURE: 3 MMHG
RA WIDTH: 4.11 CM
RBC # BLD AUTO: 5.6 M/UL (ref 4.6–6.2)
RIGHT ATRIAL AREA: 28 CM2
RIGHT VENTRICULAR END-DIASTOLIC DIMENSION: 2.99 CM
RV TISSUE DOPPLER FREE WALL SYSTOLIC VELOCITY 1 (APICAL 4 CHAMBER VIEW): 8.78 CM/S
SINUS: 3.26 CM
SODIUM SERPL-SCNC: 130 MMOL/L (ref 136–145)
STJ: 2.7 CM
TACROLIMUS BLD-MCNC: 8.2 NG/ML (ref 5–15)
TDI LATERAL: 0.11 M/S
TDI SEPTAL: 0.1 M/S
TDI: 0.11 M/S
TR MAX PG: 26 MMHG
TRICUSPID ANNULAR PLANE SYSTOLIC EXCURSION: 1.91 CM
TV REST PULMONARY ARTERY PRESSURE: 29 MMHG
WBC # BLD AUTO: 3.99 K/UL (ref 3.9–12.7)

## 2022-02-20 PROCEDURE — 25000003 PHARM REV CODE 250: Performed by: STUDENT IN AN ORGANIZED HEALTH CARE EDUCATION/TRAINING PROGRAM

## 2022-02-20 PROCEDURE — 25000003 PHARM REV CODE 250: Performed by: NURSE PRACTITIONER

## 2022-02-20 PROCEDURE — 99233 SBSQ HOSP IP/OBS HIGH 50: CPT | Mod: ,,, | Performed by: INTERNAL MEDICINE

## 2022-02-20 PROCEDURE — 97161 PT EVAL LOW COMPLEX 20 MIN: CPT

## 2022-02-20 PROCEDURE — 99233 PR SUBSEQUENT HOSPITAL CARE,LEVL III: ICD-10-PCS | Mod: ,,, | Performed by: INTERNAL MEDICINE

## 2022-02-20 PROCEDURE — 85027 COMPLETE CBC AUTOMATED: CPT | Performed by: NURSE PRACTITIONER

## 2022-02-20 PROCEDURE — 25000003 PHARM REV CODE 250: Performed by: INTERNAL MEDICINE

## 2022-02-20 PROCEDURE — S0039 INJECTION, SULFAMETHOXAZOLE: HCPCS | Performed by: INTERNAL MEDICINE

## 2022-02-20 PROCEDURE — 97165 OT EVAL LOW COMPLEX 30 MIN: CPT

## 2022-02-20 PROCEDURE — 63600175 PHARM REV CODE 636 W HCPCS: Performed by: INTERNAL MEDICINE

## 2022-02-20 PROCEDURE — 94761 N-INVAS EAR/PLS OXIMETRY MLT: CPT

## 2022-02-20 PROCEDURE — 97530 THERAPEUTIC ACTIVITIES: CPT

## 2022-02-20 PROCEDURE — 20600001 HC STEP DOWN PRIVATE ROOM

## 2022-02-20 PROCEDURE — 63600175 PHARM REV CODE 636 W HCPCS: Performed by: NURSE PRACTITIONER

## 2022-02-20 PROCEDURE — 93010 EKG 12-LEAD: ICD-10-PCS | Mod: ,,, | Performed by: INTERNAL MEDICINE

## 2022-02-20 PROCEDURE — 97116 GAIT TRAINING THERAPY: CPT

## 2022-02-20 PROCEDURE — 80053 COMPREHEN METABOLIC PANEL: CPT | Performed by: NURSE PRACTITIONER

## 2022-02-20 PROCEDURE — 85007 BL SMEAR W/DIFF WBC COUNT: CPT | Performed by: NURSE PRACTITIONER

## 2022-02-20 PROCEDURE — 63600175 PHARM REV CODE 636 W HCPCS: Performed by: STUDENT IN AN ORGANIZED HEALTH CARE EDUCATION/TRAINING PROGRAM

## 2022-02-20 PROCEDURE — 80197 ASSAY OF TACROLIMUS: CPT | Performed by: NURSE PRACTITIONER

## 2022-02-20 PROCEDURE — 36415 COLL VENOUS BLD VENIPUNCTURE: CPT | Performed by: NURSE PRACTITIONER

## 2022-02-20 PROCEDURE — 93005 ELECTROCARDIOGRAM TRACING: CPT

## 2022-02-20 PROCEDURE — 93010 ELECTROCARDIOGRAM REPORT: CPT | Mod: ,,, | Performed by: INTERNAL MEDICINE

## 2022-02-20 RX ORDER — CLOTRIMAZOLE AND BETAMETHASONE DIPROPIONATE 10; .64 MG/G; MG/G
CREAM TOPICAL 2 TIMES DAILY
Status: DISCONTINUED | OUTPATIENT
Start: 2022-02-20 | End: 2022-02-25 | Stop reason: HOSPADM

## 2022-02-20 RX ADMIN — MUPIROCIN: 20 OINTMENT TOPICAL at 08:02

## 2022-02-20 RX ADMIN — HEPARIN SODIUM 5000 UNITS: 5000 INJECTION INTRAVENOUS; SUBCUTANEOUS at 05:02

## 2022-02-20 RX ADMIN — SENNOSIDES AND DOCUSATE SODIUM 1 TABLET: 50; 8.6 TABLET ORAL at 08:02

## 2022-02-20 RX ADMIN — SENNOSIDES AND DOCUSATE SODIUM 1 TABLET: 50; 8.6 TABLET ORAL at 10:02

## 2022-02-20 RX ADMIN — IMIPENEM AND CILASTATIN SODIUM 1000 MG: 500; 500 INJECTION, POWDER, FOR SOLUTION INTRAVENOUS at 11:02

## 2022-02-20 RX ADMIN — PREDNISONE 10 MG: 5 TABLET ORAL at 10:02

## 2022-02-20 RX ADMIN — HEPARIN SODIUM 5000 UNITS: 5000 INJECTION INTRAVENOUS; SUBCUTANEOUS at 08:02

## 2022-02-20 RX ADMIN — SULFAMETHOXAZOLE AND TRIMETHOPRIM 403 MG: 80; 16 INJECTION, SOLUTION, CONCENTRATE INTRAVENOUS at 07:02

## 2022-02-20 RX ADMIN — TACROLIMUS 2 MG: 1 CAPSULE ORAL at 10:02

## 2022-02-20 RX ADMIN — FAMOTIDINE 20 MG: 20 TABLET ORAL at 08:02

## 2022-02-20 RX ADMIN — MUPIROCIN: 20 OINTMENT TOPICAL at 09:02

## 2022-02-20 RX ADMIN — CLOTRIMAZOLE AND BETAMETHASONE DIPROPIONATE: 10; .5 CREAM TOPICAL at 08:02

## 2022-02-20 RX ADMIN — TACROLIMUS 2 MG: 1 CAPSULE ORAL at 05:02

## 2022-02-20 RX ADMIN — METOPROLOL TARTRATE 25 MG: 25 TABLET, FILM COATED ORAL at 10:02

## 2022-02-20 RX ADMIN — HEPARIN SODIUM 5000 UNITS: 5000 INJECTION INTRAVENOUS; SUBCUTANEOUS at 02:02

## 2022-02-20 RX ADMIN — METOPROLOL TARTRATE 25 MG: 25 TABLET, FILM COATED ORAL at 06:02

## 2022-02-20 RX ADMIN — SULFAMETHOXAZOLE AND TRIMETHOPRIM 403 MG: 80; 16 INJECTION, SOLUTION, CONCENTRATE INTRAVENOUS at 12:02

## 2022-02-20 RX ADMIN — SULFAMETHOXAZOLE AND TRIMETHOPRIM 403 MG: 80; 16 INJECTION, SOLUTION, CONCENTRATE INTRAVENOUS at 05:02

## 2022-02-20 RX ADMIN — FUROSEMIDE 40 MG: 40 TABLET ORAL at 10:02

## 2022-02-20 RX ADMIN — IMIPENEM AND CILASTATIN SODIUM 1000 MG: 500; 500 INJECTION, POWDER, FOR SOLUTION INTRAVENOUS at 06:02

## 2022-02-20 RX ADMIN — IMIPENEM AND CILASTATIN SODIUM 1000 MG: 500; 500 INJECTION, POWDER, FOR SOLUTION INTRAVENOUS at 03:02

## 2022-02-20 RX ADMIN — INSULIN ASPART 3 UNITS: 100 INJECTION, SOLUTION INTRAVENOUS; SUBCUTANEOUS at 10:02

## 2022-02-20 NOTE — ASSESSMENT & PLAN NOTE
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 Per records, there was concern for cardiac amyloidosis    - agree with TTE this admission (to assess for vegetation but also assess EF, etc.); EF 50%  - pt would like to establish with [facility] cardiology, if possible prefers to coordinate with other follow up appts as he is traveling from [facility]  - cardiology referral placed

## 2022-02-20 NOTE — PLAN OF CARE
Problem: Physical Therapy Goal  Goal: Physical Therapy Goal  Description: Goals to be met by: 3/6/2022      Patient will increase functional independence with mobility by performin. Supine to sit with Modified [place]  2. Sit to stand transfer with Modified [place]  3. Gait  x 250 feet with Modified [place] using the least restrictive device.   4. Ascend/descend 5 steps with right Handrails Modified [place] using the least restrictive device.      Outcome: Ongoing, Progressing

## 2022-02-20 NOTE — PT/OT/SLP EVAL
Occupational Therapy   Evaluation & Treatment    Name: Ronal Mazariegos  MRN: [id number]  Admitting Diagnosis:  Intracranial abscess 4 Days Post-Op  Length of Stay: 4 days    Recommendations:     Discharge Recommendations: home health OT  Discharge Equipment Recommendations:  walker, rolling  Barriers to discharge:  None    Plan:     Patient to be seen 3 x/week to address the above listed problems via self-care/home management, therapeutic activities, therapeutic exercises  · Plan of Care Expires: 03/22/22  · Plan of Care Reviewed with: patient, spouse      Assessment:     Ronal Mazariegos is a 71 y.o. male with a medical diagnosis of Intracranial abscess.  He presents with the following performance deficits affecting function: weakness, impaired endurance, impaired self care skills, impaired functional mobilty, gait instability, impaired balance, decreased lower extremity function.      Pt mainly limited by fear of falling on this date. Pt requiring contact guard assistance for functional mobility both with and without rolling walker, and supervision for ADLs (LE dressing) while seated UIC. Pt's strengths include intact UE strength, good safety awareness, and strong family support of wife, present and appropriately involved throughout evaluation. Pt with good motivation and good tolerance for therapy. Pt would benefit from Home Health OT upon D/C to return to [facility].    Rehab Prognosis: Good; patient would benefit from acute skilled OT services to address these deficits and reach maximum level of function.         Subjective     Communicated with: JIGAR Flynn prior to session.  Patient found up in chair with peripheral IV and wife present upon OT entry to room.    Chief Complaint: No chief complaint on file.    Patient/Family Comments/goals: Pt expressed interest in using a RW. After trialing functional mobility both with and without the RW, pt stated preference for its use.    Pt stated his L leg felt weak with  "ambulation, requiring seated rest break.    Pt expressed concerns re: LE dressing; OT educated on safe LE dressing technique.    Pain/Comfort:  · Pain Rating 1: 0/10  · Pain Rating Post-Intervention 1: 0/10    Patients cultural, spiritual, Orthodoxy conflicts given the current situation: no    Occupational Profile:  Living Environment: Pt lives with his wife in a H with 0 KENNY. Pt's bathroom has a WIS with a built-in bench.  Prior Level of Function: Patient reports being independent with mobility & with ADLs.   Patient uses DME as follows: none.   DME owned (not currently used): none.  Roles/Responsibilities:   Work: no. Retired.  Drive: yes.   Managing Medicines/Managing Home: yes.   Hobbies: Hunting, fishing.  Equipment Used at Home:  none    Patient reports they will have assistance from wife (who works) upon discharge.      Objective:     Patient found with: peripheral IV   General Precautions: Standard, fall   Orthopedic Precautions:N/A   Braces: N/A   Oxygen Device: Room Air  Vitals: /70   Pulse 99   Temp 98 °F (36.7 °C)   Resp 18   Ht 6' 2" (1.88 m)   Wt 80.3 kg (177 lb)   SpO2 96%   BMI 22.73 kg/m²     Cognitive and Psychosocial Function:   · AxOx -- Not formally tested; no signs of disorientation noted   · Follows Commands/attention: follows two-step commands  · Communication:  clear/fluent  · Memory: No Deficits noted  · Safety awareness/insight to disability: intact   · Mood/Affect/Coping skills/emotional control: Appropriate to situation however slightly anxious    Hearing: Intact    Vision:  Intact visual fields    Physical Exam:  Postural examination/scapula alignment:    -       Rounded shoulders  -       Forward head     Left UE Right UE   UE Edema absent absent   UE ROM AROM WFL AROM WFL   UE Strength 5/5 5/5    Strength grasp WFL grasp WFL   Sensation LUE INTACT:WFL RUE INTACT: WFL   Fine Motor Coordination:  LUE INTACT: WFL RUE INTACT: WFL   Gross Motor Coordination: LUE INTACT: " WFL RUE INTACT:WFL     Occupational Performance:  Bed Mobility:    · Not performed as pt found/left seated UIC    Functional Mobility/Transfers:   Static Sitting UIC: Modified independence   Dynamic Sitting UIC: Supervision   Patient completed Sit <> Stand Transfer x2 trials with contact guard assistance  with  no assistive device and rolling walker respectively   Static Standing Balance: Contact guard assistance   Functional Mobility: Pt ambulated bedside <> doorway x2 trials with contact guard assistance with no assistive device and rolling walker respectively  o First trial (with no AD): Pt c/o LLE weakness, requiring seated rest break in chair.  o Second trial (with RW): Pt noted with increased stability and confidence with use of RW.  o No LOB, SOB, or c/o dizziness    Activities of Daily Living:  · Lower Body Dressing: supervision to doff/don slip-on shoes while seated UIC; pt educated on LE dressing for pants (see below)    [facility] 6 Click ADL:  AMPA Total Score: 22    Treatment & Education:  - Educated on AD management for RW  - Educated on safe, seated LE dressing technique for donning pants  - Educated on DME options for raising toilet height  - Educated on role of OT, POC, and goals for this hospital stay  - Emphasized importance of OOB ax and level of staff assistance required for transfers and functional mobility (contact guard assistance with rolling walker)  - Encouraged pt to alert OT of personal self-care goals and/or comfort-related concerns during future OT sessions  - Pt denies any further questions, concerns, or requests at this time  - Whiteboard updated        Patient left up in chair with all lines intact, call button in reach and wife present    GOALS:   Multidisciplinary Problems     Occupational Therapy Goals        Problem: Occupational Therapy Goal    Goal Priority Disciplines Outcome Interventions   Occupational Therapy Goal     OT, PT/OT Ongoing, Progressing    Description: Goals  to be met by: 3/6/2022    Patient will increase functional independence with ADLs by performing:    Feeding with [place].  UE Dressing with [place].  LE Dressing with [place].  Grooming while standing at sink with Modified [place] using AD PRN.  Toileting from toilet with [place] for hygiene and clothing management.   Toilet transfer to toilet with Modified [place] using AD PRN.  Step transfer with Modified [place] using AD PRN.                       History:     Past Medical History:   Diagnosis Date    Anasarca 10/1/2021    Anemia of chronic disease     Atrial fibrillation     BPH (benign prostatic hypertrophy)     Chronic systolic heart failure 10/1/2021    Hepatitis C virus infection cured after antiviral drug therapy     acquired through kidney transplant, treated / cured (SVR12 - 12/2021)    HTN (hypertension)     Polycystic kidney disease        Past Surgical History:   Procedure Laterality Date    AV FISTULA PLACEMENT Left 2016    CATARACT EXTRACTION, BILATERAL Bilateral     CRANIOTOMY Right 2/16/2022    Procedure: CRANIOTOMY;  Surgeon: Sunday Rosa DO;  Location: [facility] OR [address] [place] [address];  Service: Neurosurgery;  Laterality: Right;  R craniotomy for abscess drainage    KIDNEY TRANSPLANT N/A 5/4/2021    Procedure: TRANSPLANT, KIDNEY;  Surgeon: Lexy Bolden MD;  Location: [facility] OR [address] [place] [address];  Service: Transplant;  Laterality: N/A;         Time Tracking:     OT Date of Treatment: 02/20/22  OT Start Time: 0859  OT Stop Time: 0930  OT Total Time (min): 31 min  Additional staff present: N/A      Billable Minutes:Evaluation 16  Therapeutic Activity 15      2/20/2022

## 2022-02-20 NOTE — PROGRESS NOTES
Sahil Morgan - Neurosurgery ([facility])  Neurosurgery  Progress Note    Subjective:     History of Present Illness: 71 M PMHx afib on eliquis, recent kidney transplant on 5/2021 on immunosuppressants, cardiomyopathy, pulmonary hypertension presenting with new right frontal lobe abscess. Pt reports transient episodes of left sided weakness this week, most recent episode was this morning, he fell due to left leg weakness when getting up from the toilet, prompting visit to the ED because he thought he was having a stroke. At present, he feels well, has no fever/chills/aches, confusion, blurry vision, LOC, or seizures, and besides mild left sided weakness, he is asymptomatic.    Of note, he has had recent lower respiratory tract infection      Post-Op Info:  Procedure(s) (LRB):  CRANIOTOMY (Right)   4 Days Post-Op     Interval History: 2/20: POD 3. NAEON. AFVSS. Exam stable.     Medications:  Continuous Infusions:    Scheduled Meds:   famotidine  20 mg Oral QHS    furosemide  40 mg Oral Daily    heparin (porcine)  5,000 Units Subcutaneous Q8H    imipenem-cilastatin  1,000 mg Intravenous Q8H    insulin aspart U-100  3 Units Subcutaneous TIDWM    metoprolol tartrate  25 mg Oral BID    mupirocin   Nasal BID    predniSONE  10 mg Oral Daily    [START ON 2/21/2022] predniSONE  5 mg Oral BID    [START ON 2/23/2022] predniSONE  5 mg Oral Daily    senna-docusate 8.6-50 mg  1 tablet Oral BID    trimethoprim-sulfamethoxasole (BACTRIM) IVPB (fixed ratio)  15 mg/kg/day (Dosing Weight) Intravenous Q8H    tacrolimus  2 mg Oral BID     PRN Meds:acetaminophen, dextrose 10%, dextrose 10%, dextrose 10%, glucagon (human recombinant), glucose, glucose, insulin aspart U-100, iohexol, ondansetron, oxyCODONE, sodium chloride 0.9%     Review of Systems   Constitutional:  Negative for chills and fever.   Respiratory:  Negative for shortness of breath.    Gastrointestinal:  Negative for nausea and vomiting.   Musculoskeletal:  Negative  "for back pain.   Neurological:  Positive for weakness. Negative for headaches.   Psychiatric/Behavioral:  Negative for confusion.    Objective:     Weight: 80.3 kg (177 lb)  Body mass index is 22.73 kg/m².  Vital Signs (Most Recent):  Temp: 98.3 °F (36.8 °C) (02/20/22 0402)  Pulse: (!) 117 (02/20/22 0802)  Resp: 16 (02/20/22 0402)  BP: 134/89 (02/20/22 0402)  SpO2: 98 % (02/20/22 0402) Vital Signs (24h Range):  Temp:  [97.6 °F (36.4 °C)-98.3 °F (36.8 °C)] 98.3 °F (36.8 °C)  Pulse:  [] 117  Resp:  [16-18] 16  SpO2:  [94 %-98 %] 98 %  BP: (114-137)/(65-91) 134/89                            Urethral Catheter 02/17/22 0111 Non-latex;Straight-tip 16 Fr. (Active)   Site Assessment Clean;Intact 02/17/22 0301   Collection Container Urimeter 02/17/22 0301   Securement Method secured to top of thigh w/ adhesive device 02/17/22 0301   Catheter Care Performed yes 02/17/22 0301   Reason for Continuing Urinary Catheterization Post operative 02/17/22 0301   CAUTI Prevention Bundle Securement Device in place with 1" slack;Drainage bag/urimeter off the floor;Sheeting clip in use;Intact seal between catheter & drainage tubing;No dependent loops or kinks;Drainage bag/urimeter not overfilled (<2/3 full);Drainage bag/urimeter below bladder 02/17/22 0301   Output (mL) 130 mL 02/17/22 0705            Hemodialysis AV Fistula Left forearm (Active)   Site Assessment Clean;Dry 02/17/22 0301   Patency Thrill;Bruit;Present 02/17/22 0301   Status Deaccessed 02/17/22 0301   Site Condition No complications 02/17/22 0301   Dressing Open to air (None) 02/17/22 0301       Physical Exam  Constitutional:       General: He is not in acute distress.  Eyes:      Extraocular Movements: EOM normal.      Pupils: Pupils are equal, round, and reactive to light.   Cardiovascular:      Rate and Rhythm: Normal rate and regular rhythm.   Neurological:      Mental Status: He is alert.       Physical Exam:    Constitutional: No distress.     Eyes: Pupils are " equal, round, and reactive to light. EOM are normal.     Cardiovascular: Normal rate and regular rhythm.     Psych/Behavior: He is alert.   E4V5M6  AOx3  PERRL  EOMI  Face Symmetric  Right 5/5  Left 4+/5      Significant Labs:  Recent Labs   Lab 02/19/22  0505 02/20/22  0516   * 197*    130*   K 4.5 4.4    101   CO2 27 22*   BUN 21 20   CREATININE 0.9 0.8   CALCIUM 9.5 8.9       Recent Labs   Lab 02/19/22  0505 02/20/22  0516   WBC 3.67* 3.99   HGB 17.3 18.0   HCT 53.5 53.0    152       No results for input(s): LABPT, INR, APTT in the last 48 hours.    Microbiology Results (last 7 days)       Procedure Component Value Units Date/Time    Blood culture [[id number]] Collected: 02/16/22 2247    Order Status: Completed Specimen: Blood from Peripheral, Hand, Right Updated: 02/20/22 0612     Blood Culture, Routine No Growth to date      No Growth to date      No Growth to date      No Growth to date    Narrative:      Blood cultures x 2 different sites. 4 bottles total. Please  draw cultures before administering antibiotics.    Blood culture [[id number]] Collected: 02/16/22 2307    Order Status: Completed Specimen: Blood from Peripheral, Ankle, Right Updated: 02/20/22 0612     Blood Culture, Routine No Growth to date      No Growth to date      No Growth to date      No Growth to date    Narrative:      Blood cultures from 2 different sites. 4 bottles total.  Please draw before starting antibiotics.    Aerobic culture [[id number]]  (Abnormal) Collected: 02/17/22 0238    Order Status: Completed Specimen: Abscess from Brain Updated: 02/19/22 1318     Aerobic Bacterial Culture Results called to and read back by:Rina Nunez RN 02/19/2022  13:17      NOCARDIA SPECIES  Moderate  further identified as Nocardia nova  For susceptibility see order #[id number]      Narrative:      2) Intracerebral Abscess    Aerobic culture [[id number]]  (Abnormal) Collected: 02/17/22 0238    Order Status: Completed  Specimen: Abscess from Brain Updated: 02/19/22 1316     Aerobic Bacterial Culture Results called to and read back by:Rina Nunez RN 02/19/2022  13:15      NOCARDIA SPECIES  Moderate  further identified as Nocardia nova  Susceptibility pending      Narrative:      1) Intracerebral Abscess    Modified Acid Fast Stain For Nocardia [[id number]] Collected: 02/17/22 0238    Order Status: Completed Specimen: Brain Updated: 02/18/22 2041     Modified Acid Fast smear No partially acid fast organisms seen    Narrative:      add-on MAFST per Aaron House MD  02/18/2022  14:40 ORD#[id number]    Cryptococcal antigen [[id number]] Collected: 02/18/22 2016    Order Status: Sent Specimen: Blood, Venous Updated: 02/18/22 2040    Narrative:      Collection has been rescheduled by DANIELE at 02/18/2022 19:56 Reason:   Due in Am per nurse......    AFB Culture & Smear [[id number]] Collected: 02/17/22 0238    Order Status: Completed Specimen: Abscess from Brain Updated: 02/18/22 1515     AFB Culture & Smear Culture in progress     AFB CULTURE STAIN No acid fast bacilli seen.    Narrative:      1) Intracerebral Abscess    AFB Culture & Smear [[id number]] Collected: 02/17/22 0238    Order Status: Completed Specimen: Abscess from Brain Updated: 02/18/22 1515     AFB Culture & Smear Culture in progress     AFB CULTURE STAIN No acid fast bacilli seen.    Narrative:      2) Intracerebral Abscess    Modified Acid Fast Stain For Nocardia [[id number]]     Order Status: Completed Specimen: Abscess     Modified Acid Fast Stain For Nocardia [[id number]]     Order Status: Completed Specimen: Abscess     Culture, Anaerobe [[id number]] Collected: 02/17/22 0238    Order Status: Completed Specimen: Abscess from Brain Updated: 02/18/22 0854     Anaerobic Culture Culture in progress    Narrative:      1) Intracerebral Abscess    Culture, Anaerobe [[id number]] Collected: 02/17/22 0238    Order Status: Completed Specimen: Abscess from Brain Updated: 02/18/22 0854      Anaerobic Culture Culture in progress    Narrative:      2) Intracerebral Abscess    Toxoplasma Gondii, DNA (Qual) [[id number]] Collected: 02/18/22 0603    Order Status: Completed Specimen: Blood Updated: 02/18/22 0609     Toxoplasma gondii DNA, Source blood    Modified Acid Fast Stain For Nocardia [[id number]]     Order Status: Completed Specimen: Abscess     Gram stain [[id number]] Collected: 02/17/22 0238    Order Status: Completed Specimen: Abscess from Brain Updated: 02/17/22 1031     Gram Stain Result Many WBC's      No epithelial cells      No organisms seen    Narrative:      2) Intracerebral Abscess    Gram stain [[id number]] Collected: 02/17/22 0238    Order Status: Completed Specimen: Abscess from Brain Updated: 02/17/22 0753     Gram Stain Result Rare WBC's      No organisms seen      No epithelial cells    Narrative:      1) Intracerebral Abscess    Fungus culture [[id number]] Collected: 02/17/22 0238    Order Status: Sent Specimen: Abscess from Brain Updated: 02/17/22 0303    Fungus culture [[id number]] Collected: 02/17/22 0238    Order Status: Sent Specimen: Abscess from Brain Updated: 02/17/22 0301          All pertinent labs from the last 24 hours have been reviewed.    Significant Diagnostics:  I have reviewed and interpreted all pertinent imaging results/findings within the past 24 hours.  CT Chest Abdomen Pelvis With Contrast    Result Date: 2/19/2022  1.  Enlarged heart, small pericardial effusion.  2.  Small bilateral pleural effusions.  3.  Small focal triangular opacity with air bronchograms in the right middle lobe; this may represent focal of infectious/inflammatory process.  Attention on follow-up imaging recommended.  4.  Stable findings in the kidneys, and to a lesser extent in the liver, consistent with autosomal dominant polycystic kidney disease.  5.  Mild, segmental lateral segment biliary dilatation, of unclear etiology.  There is no corresponding abnormality seen on abdominal ultrasound  the 01/07/2022.  Unclear if present on prior examination as that examination was not performed with intravenous contrast.  6.  Unremarkable appearance of transplant right kidney, with patent appearing transplant vein, transplant renal artery, and no evidence of hydronephrosis.  7.  Other findings as above. Electronically signed by: Yin Christiansen Date:    02/19/2022 Time:    12:52       Assessment/Plan:     * Intracranial abscess  11 M PMHx afib on eliquis, recent kidney transplant on 5/2021 on immunosuppressants, cardiomyopathy, pulmonary hypertension presenting with new right frontal lobe abscess. He has mild left sided weakness, but is otherwise intact. MRI demonstrates right frontal 2.2 cm ring enhancing lesion, restricting on DWI    Now s/p R frontal crani for abscess drainage 2/17/22    -- Admitted to [facility]PU;  -- neurochecks and vitals per unit protocol  -- Post op CT reviewed: no detrimental changes  -- Post op MRI reviewed: good drainage of abscess  -- PCC for eliquis reversal pre op: continue to hold now post op until POD7  -- ID consulted; appreciate recs  -- CT C/A/P completed per ID recs.   -- HM consulted for medical management; patient would benefit from being  primary. Will d/w HM today.   -- OR cultures: no growth on gram stain, pending final cultures  -- d/adria Decadron and started prednisone taper to transplant suppression dose.   -- transplant nephro on board for immunosuppresive medication recs   -- please page with exam change or questions        Molina Bassett MD  Neurosurgery  Sahil Morgan - Neurosurgery ([facility])

## 2022-02-20 NOTE — ASSESSMENT & PLAN NOTE
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 CT C/A/P notes a RML lesion.     Recommendations:  - stop meropenem / vanc   - start IV bactrim 15mg/kg/day and imipenem 1000mg Q8H  - monitor renal function, volume status, and electrolytes closely to ensure pt can tolerate abx  - suspect lung lesion is also nocardia, recommend repeat CT in 3 months to reassess  - interestingly, nocardia can have cardiac involvement. Recommend discussing w/ radiology if able to complete cardiac MRI while inpatient. If not, will need cardiac MRI after hospital discharge  - duration of therapy will be at least 12 months    ID will follow

## 2022-02-20 NOTE — NURSING
Micro lab called nurse at  approx [phone] with results of culture frtom brain abcess reported to id [facility] primary care and Id service approx [phone]. Order placed see orders. NO of meds given see MAR. Pt tolerated.pt up in chair x1 . No acute events during shift

## 2022-02-20 NOTE — PLAN OF CARE
Problem: Occupational Therapy Goal  Goal: Occupational Therapy Goal  Description: Goals to be met by: 3/6/2022    Patient will increase functional independence with ADLs by performing:    Feeding with [place].  UE Dressing with [place].  LE Dressing with [place].  Grooming while standing at sink with Modified [place] using AD PRN.  Toileting from toilet with [place] for hygiene and clothing management.   Toilet transfer to toilet with Modified [place] using AD PRN.  Step transfer with Modified [place] using AD PRN.    Outcome: Ongoing, Progressing     Evaluation complete; goals and POC established.

## 2022-02-20 NOTE — PLAN OF CARE
Problem: Adult Inpatient Plan of Care  Goal: Plan of Care Review  Outcome: Ongoing, Progressing  Goal: Patient-Specific Goal (Individualized)   Outcome: Ongoing, Progressing  Goal: Absence of Hospital-Acquired Illness or Injury  Outcome: Ongoing, Progressing  Goal: Optimal Comfort and Wellbeing  Outcome: Ongoing, Progressing  Goal: Readiness for Transition of Care  Outcome: Ongoing, Progressing     Problem: Infection  Goal: Absence of Infection Signs and Symptoms  Outcome: Ongoing, Progressing     Problem: Impaired Wound Healing  Goal: Optimal Wound Healing  Outcome: Ongoing, Progressing     Problem: Skin Injury Risk Increased  Goal: Skin Health and Integrity  Outcome: Ongoing, Progressing     POC reviewed with pt and spouse. Both verbalized understanding. Pt bedtime BG was treated with prn insulin. Pt did not have any acute changes during shift. Pt is on antibiotics q8hr. Pt and spouse did voice concern of when will pt work with physical therapy. Orders placed for PT/OT. Vitals were stable. Pt bed is in lowest position and wheels are locked. Fall and all safety precautions maintained. All concerns address. Will continue to monitor.

## 2022-02-20 NOTE — ASSESSMENT & PLAN NOTE
Transferred from OSH for R frontal lobe abscess.   S/p craniotomy and abscess drainage 2/17.  ID following. Abscess cultures growing Nocardia    - Continue imipenem and bactrim per ID recs   - F/u remaining blood and surgical cultures  - No evidence of vegetations on TTE  - prednisone taper back down to normal immunosuppressive dose of 5mg daily  - aspart 3 U TIDWM and LDSSI for steroid induced hyperglycemia

## 2022-02-20 NOTE — PROGRESS NOTES
[facility] - Neurosurgery ([facility])  Infectious Disease  Progress Note    Patient Name: Ronal Mazariegos  MRN: [id number]  Admission Date: 2/16/2022  Length of Stay: 4 days  Attending Physician: Sunday Rosa DO  Primary Care Provider: Primary Doctor No    Isolation Status: No active isolations  Assessment/Plan:      * Intracranial abscess  71M hx polycystic kidney sidease s/p DBD KTx 5/2021 (CMV D+/R=, HCV NAWAF +, Simulect induction, CIT ~ 8 hours) on tacro/pred), Afib on AC, Polycythemia vera (09/2021), undergoing evaluation for newly diagnosed infiltrative cardiomyopathy, who presented after worsening L sided neurologic deficits and fall at home. 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 CT C/A/P notes a RML lesion- Cardiac MRI done at OSH 02/09 Findings are consistent with infiltrative cardiomyopathy.  Amyloid would be in the differential.       Recommendations:    - Continue IV bactrim 15mg/kg/day and imipenem 1000mg Q8H  - monitor renal function, volume status, and electrolytes closely to ensure pt can tolerate abx.  - suspect lung lesion is also nocardia, recommend repeat CT in 3 months to reassess.  - Follow up TTE  - Please Obtain outside hospital MRI images.  - duration of therapy will be at least 12 months  - Antifungal cream ordered for L.E rash.              Thank you for your consult. I will follow-up with patient. Please contact us if you have any additional questions.    Aaron House MD  Infectious Disease  [facility] Neurosurgery ([facility])    Subjective:     Principal Problem:Intracranial abscess    HPI: Ronal Mazariegos is a 72 y/o male with a hx of PCK s/p DBD 5/2021 (CMV D+/R=, HCV NAWAF +, Simulect induction, CIT ~ 8 hours) on tacro/pred), Afib on AC, Polycythemia vera (09/2021) ?infiltrative cardiomyopathy presented to ED following a fall found to have right frontal mass was transferred to [facility] [place] now s/p craniotomy (OP note aurelio pus) cultures  sent pending (GS negative).    Patient report feeling well until couple of days prior to his fall when he felt his coordination with left side wasn't normal, until he had his fall - he denies any fever/chills/N/V/diarrhea- no sinus pain/ HA blurry vision.    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  No recent travel or sick contact.  Has not had any dental procedure since transplant- he does floss but no bleeding noticed.      ID consulted for brain abscess.      Interval History:  NEON  Review of Systems   Constitutional:  Negative for activity change, appetite change, chills and fever.   HENT:  Negative for congestion, dental problem, ear pain and rhinorrhea.    Eyes:  Negative for pain and discharge.   Respiratory:  Negative for cough and shortness of breath.    Cardiovascular:  Negative for chest pain and leg swelling.   Gastrointestinal:  Negative for abdominal distention, abdominal pain, constipation, diarrhea, nausea and vomiting.   Genitourinary:  Negative for difficulty urinating and dysuria.   Musculoskeletal:  Negative for arthralgias, back pain and joint swelling.   Skin:  Negative for rash and wound.   Neurological:  Negative for dizziness, light-headedness and headaches.   Psychiatric/Behavioral:  Negative for behavioral problems and confusion.    Objective:     Vital Signs (Most Recent):  Temp: 98.3 °F (36.8 °C) (02/20/22 0402)  Pulse: 99 (02/20/22 0402)  Resp: 16 (02/20/22 0402)  BP: 134/89 (02/20/22 0402)  SpO2: 98 % (02/20/22 0402) Vital Signs (24h Range):  Temp:  [97.6 °F (36.4 °C)-98.3 °F (36.8 °C)] 98.3 °F (36.8 °C)  Pulse:  [] 99  Resp:  [16-18] 16  SpO2:  [94 %-98 %] 98 %  BP: (114-137)/(65-91) 134/89     Weight: 80.3 kg (177 lb)  Body mass index is 22.73 kg/m².    Estimated Creatinine Clearance: 99 mL/min (based on SCr of 0.8 mg/dL).    Physical Exam  Constitutional:       General: He is not in acute distress.     Appearance: Normal appearance.  He is well-developed. He is not ill-appearing or diaphoretic.   HENT:      Head: Normocephalic and atraumatic.      Right Ear: External ear normal.      Left Ear: External ear normal.      Nose: Nose normal.   Eyes:      General: No scleral icterus.        Right eye: No discharge.         Left eye: No discharge.      Extraocular Movements: Extraocular movements intact.      Conjunctiva/sclera: Conjunctivae normal.   Pulmonary:      Effort: Pulmonary effort is normal. No respiratory distress.      Breath sounds: No stridor.   Skin:     General: Skin is dry.      Coloration: Skin is not jaundiced or pale.      Findings: No erythema.   Neurological:      General: No focal deficit present.      Mental Status: He is alert and oriented to person, place, and time. Mental status is at baseline.   Psychiatric:         Mood and Affect: Mood normal.         Behavior: Behavior normal.         Thought Content: Thought content normal.         Judgment: Judgment normal.       Significant Labs: CBC:   Recent Labs   Lab 02/19/22  0505 02/20/22  0516   WBC 3.67* 3.99   HGB 17.3 18.0   HCT 53.5 53.0    152       CMP:   Recent Labs   Lab 02/19/22  0505 02/20/22  0516    130*   K 4.5 4.4    101   CO2 27 22*   * 197*   BUN 21 20   CREATININE 0.9 0.8   CALCIUM 9.5 8.9   PROT 5.7* 5.2*   ALBUMIN 2.5* 2.4*   BILITOT 1.5* 1.1*   ALKPHOS 77 66   AST 18 15   ALT 23 22   ANIONGAP 9 7*   EGFRNONAA >60.0 >60.0       Microbiology Results (last 7 days)       Procedure Component Value Units Date/Time    Blood culture [[id number]] Collected: 02/16/22 2247    Order Status: Completed Specimen: Blood from Peripheral, Hand, Right Updated: 02/20/22 0612     Blood Culture, Routine No Growth to date      No Growth to date      No Growth to date      No Growth to date    Narrative:      Blood cultures x 2 different sites. 4 bottles total. Please  draw cultures before administering antibiotics.    Blood culture [[id number]] Collected:  02/16/22 2307    Order Status: Completed Specimen: Blood from Peripheral, Ankle, Right Updated: 02/20/22 0612     Blood Culture, Routine No Growth to date      No Growth to date      No Growth to date      No Growth to date    Narrative:      Blood cultures from 2 different sites. 4 bottles total.  Please draw before starting antibiotics.    Aerobic culture [[id number]]  (Abnormal) Collected: 02/17/22 0238    Order Status: Completed Specimen: Abscess from Brain Updated: 02/19/22 1318     Aerobic Bacterial Culture Results called to and read back by:Rina uNnez RN 02/19/2022  13:17      NOCARDIA SPECIES  Moderate  further identified as Nocardia nova  For susceptibility see order #[id number]      Narrative:      2) Intracerebral Abscess    Aerobic culture [[id number]]  (Abnormal) Collected: 02/17/22 0238    Order Status: Completed Specimen: Abscess from Brain Updated: 02/19/22 1316     Aerobic Bacterial Culture Results called to and read back by:Rina Nunez RN 02/19/2022  13:15      NOCARDIA SPECIES  Moderate  further identified as Nocardia nova  Susceptibility pending      Narrative:      1) Intracerebral Abscess    Modified Acid Fast Stain For Nocardia [[id number]] Collected: 02/17/22 0238    Order Status: Completed Specimen: Brain Updated: 02/18/22 2041     Modified Acid Fast smear No partially acid fast organisms seen    Narrative:      add-on MAFST per Aaron House MD  02/18/2022  14:40 ORD#[id number]    Cryptococcal antigen [[id number]] Collected: 02/18/22 2016    Order Status: Sent Specimen: Blood, Venous Updated: 02/18/22 2040    Narrative:      Collection has been rescheduled by DANIELE at 02/18/2022 19:56 Reason:   Due in Am per nurse......    AFB Culture & Smear [[id number]] Collected: 02/17/22 0238    Order Status: Completed Specimen: Abscess from Brain Updated: 02/18/22 1515     AFB Culture & Smear Culture in progress     AFB CULTURE STAIN No acid fast bacilli seen.    Narrative:      1) Intracerebral  Abscess    AFB Culture & Smear [[id number]] Collected: 02/17/22 0238    Order Status: Completed Specimen: Abscess from Brain Updated: 02/18/22 1515     AFB Culture & Smear Culture in progress     AFB CULTURE STAIN No acid fast bacilli seen.    Narrative:      2) Intracerebral Abscess    Modified Acid Fast Stain For Nocardia [[id number]]     Order Status: Completed Specimen: Abscess     Modified Acid Fast Stain For Nocardia [[id number]]     Order Status: Completed Specimen: Abscess     Culture, Anaerobe [[id number]] Collected: 02/17/22 0238    Order Status: Completed Specimen: Abscess from Brain Updated: 02/18/22 0854     Anaerobic Culture Culture in progress    Narrative:      1) Intracerebral Abscess    Culture, Anaerobe [[id number]] Collected: 02/17/22 0238    Order Status: Completed Specimen: Abscess from Brain Updated: 02/18/22 0854     Anaerobic Culture Culture in progress    Narrative:      2) Intracerebral Abscess    Toxoplasma Gondii, DNA (Qual) [[id number]] Collected: 02/18/22 0603    Order Status: Completed Specimen: Blood Updated: 02/18/22 0609     Toxoplasma gondii DNA, Source blood    Modified Acid Fast Stain For Nocardia [[id number]]     Order Status: Completed Specimen: Abscess     Gram stain [[id number]] Collected: 02/17/22 0238    Order Status: Completed Specimen: Abscess from Brain Updated: 02/17/22 1031     Gram Stain Result Many WBC's      No epithelial cells      No organisms seen    Narrative:      2) Intracerebral Abscess    Gram stain [[id number]] Collected: 02/17/22 0238    Order Status: Completed Specimen: Abscess from Brain Updated: 02/17/22 0753     Gram Stain Result Rare WBC's      No organisms seen      No epithelial cells    Narrative:      1) Intracerebral Abscess    Fungus culture [[id number]] Collected: 02/17/22 0238    Order Status: Sent Specimen: Abscess from Brain Updated: 02/17/22 0303    Fungus culture [[id number]] Collected: 02/17/22 0238    Order Status: Sent Specimen: Abscess from  Brain Updated: 02/17/22 0301            Significant Imaging: I have reviewed all pertinent imaging results/findings within the past 24 hours.

## 2022-02-20 NOTE — ASSESSMENT & PLAN NOTE
KTM following.    - continue tacrolimus and prednisone (weaning prednisone back down to home dose after brain surgery)  - plan to transfer to kidney transplant service as primary team

## 2022-02-20 NOTE — SUBJECTIVE & OBJECTIVE
Interval History: 2/20: POD 3. NAEON. AFVSS. Exam stable.     Medications:  Continuous Infusions:    Scheduled Meds:   famotidine  20 mg Oral QHS    furosemide  40 mg Oral Daily    heparin (porcine)  5,000 Units Subcutaneous Q8H    imipenem-cilastatin  1,000 mg Intravenous Q8H    insulin aspart U-100  3 Units Subcutaneous TIDWM    metoprolol tartrate  25 mg Oral BID    mupirocin   Nasal BID    predniSONE  10 mg Oral Daily    [START ON 2/21/2022] predniSONE  5 mg Oral BID    [START ON 2/23/2022] predniSONE  5 mg Oral Daily    senna-docusate 8.6-50 mg  1 tablet Oral BID    trimethoprim-sulfamethoxasole (BACTRIM) IVPB (fixed ratio)  15 mg/kg/day (Dosing Weight) Intravenous Q8H    tacrolimus  2 mg Oral BID     PRN Meds:acetaminophen, dextrose 10%, dextrose 10%, dextrose 10%, glucagon (human recombinant), glucose, glucose, insulin aspart U-100, iohexol, ondansetron, oxyCODONE, sodium chloride 0.9%     Review of Systems   Constitutional:  Negative for chills and fever.   Respiratory:  Negative for shortness of breath.    Gastrointestinal:  Negative for nausea and vomiting.   Musculoskeletal:  Negative for back pain.   Neurological:  Positive for weakness. Negative for headaches.   Psychiatric/Behavioral:  Negative for confusion.    Objective:     Weight: 80.3 kg (177 lb)  Body mass index is 22.73 kg/m².  Vital Signs (Most Recent):  Temp: 98.3 °F (36.8 °C) (02/20/22 0402)  Pulse: (!) 117 (02/20/22 0802)  Resp: 16 (02/20/22 0402)  BP: 134/89 (02/20/22 0402)  SpO2: 98 % (02/20/22 0402) Vital Signs (24h Range):  Temp:  [97.6 °F (36.4 °C)-98.3 °F (36.8 °C)] 98.3 °F (36.8 °C)  Pulse:  [] 117  Resp:  [16-18] 16  SpO2:  [94 %-98 %] 98 %  BP: (114-137)/(65-91) 134/89                            Urethral Catheter 02/17/22 0111 Non-latex;Straight-tip 16 Fr. (Active)   Site Assessment Clean;Intact 02/17/22 0301   Collection Container Urimeter 02/17/22 0301   Securement Method secured to top of thigh w/ adhesive device 02/17/22  "0301   Catheter Care Performed yes 02/17/22 0301   Reason for Continuing Urinary Catheterization Post operative 02/17/22 0301   CAUTI Prevention Bundle Securement Device in place with 1" slack;Drainage bag/urimeter off the floor;Sheeting clip in use;Intact seal between catheter & drainage tubing;No dependent loops or kinks;Drainage bag/urimeter not overfilled (<2/3 full);Drainage bag/urimeter below bladder 02/17/22 0301   Output (mL) 130 mL 02/17/22 0705            Hemodialysis AV Fistula Left forearm (Active)   Site Assessment Clean;Dry 02/17/22 0301   Patency Thrill;Bruit;Present 02/17/22 0301   Status Deaccessed 02/17/22 0301   Site Condition No complications 02/17/22 0301   Dressing Open to air (None) 02/17/22 0301       Physical Exam  Constitutional:       General: He is not in acute distress.  Eyes:      Extraocular Movements: EOM normal.      Pupils: Pupils are equal, round, and reactive to light.   Cardiovascular:      Rate and Rhythm: Normal rate and regular rhythm.   Neurological:      Mental Status: He is alert.       Physical Exam:    Constitutional: No distress.     Eyes: Pupils are equal, round, and reactive to light. EOM are normal.     Cardiovascular: Normal rate and regular rhythm.     Psych/Behavior: He is alert.   E4V5M6  AOx3  PERRL  EOMI  Face Symmetric  Right 5/5  Left 4+/5      Significant Labs:  Recent Labs   Lab 02/19/22  0505 02/20/22  0516   * 197*    130*   K 4.5 4.4    101   CO2 27 22*   BUN 21 20   CREATININE 0.9 0.8   CALCIUM 9.5 8.9       Recent Labs   Lab 02/19/22  0505 02/20/22  0516   WBC 3.67* 3.99   HGB 17.3 18.0   HCT 53.5 53.0    152       No results for input(s): LABPT, INR, APTT in the last 48 hours.    Microbiology Results (last 7 days)       Procedure Component Value Units Date/Time    Blood culture [[id number]] Collected: 02/16/22 2247    Order Status: Completed Specimen: Blood from Peripheral, Hand, Right Updated: 02/20/22 0612     Blood Culture, " Routine No Growth to date      No Growth to date      No Growth to date      No Growth to date    Narrative:      Blood cultures x 2 different sites. 4 bottles total. Please  draw cultures before administering antibiotics.    Blood culture [[id number]] Collected: 02/16/22 2307    Order Status: Completed Specimen: Blood from Peripheral, Ankle, Right Updated: 02/20/22 0612     Blood Culture, Routine No Growth to date      No Growth to date      No Growth to date      No Growth to date    Narrative:      Blood cultures from 2 different sites. 4 bottles total.  Please draw before starting antibiotics.    Aerobic culture [[id number]]  (Abnormal) Collected: 02/17/22 0238    Order Status: Completed Specimen: Abscess from Brain Updated: 02/19/22 1318     Aerobic Bacterial Culture Results called to and read back by:Rina Nunez RN 02/19/2022  13:17      NOCARDIA SPECIES  Moderate  further identified as Nocardia nova  For susceptibility see order #[id number]      Narrative:      2) Intracerebral Abscess    Aerobic culture [[id number]]  (Abnormal) Collected: 02/17/22 0238    Order Status: Completed Specimen: Abscess from Brain Updated: 02/19/22 1316     Aerobic Bacterial Culture Results called to and read back by:Rina Nunez RN 02/19/2022  13:15      NOCARDIA SPECIES  Moderate  further identified as Nocardia nova  Susceptibility pending      Narrative:      1) Intracerebral Abscess    Modified Acid Fast Stain For Nocardia [[id number]] Collected: 02/17/22 0238    Order Status: Completed Specimen: Brain Updated: 02/18/22 2041     Modified Acid Fast smear No partially acid fast organisms seen    Narrative:      add-on MAFST per Aaron House MD  02/18/2022  14:40 ORD#[id number]    Cryptococcal antigen [[id number]] Collected: 02/18/22 2016    Order Status: Sent Specimen: Blood, Venous Updated: 02/18/22 2040    Narrative:      Collection has been rescheduled by DANIELE at 02/18/2022 19:56 Reason:   Due in Am per nurse......    AFB  Culture & Smear [[id number]] Collected: 02/17/22 0238    Order Status: Completed Specimen: Abscess from Brain Updated: 02/18/22 1515     AFB Culture & Smear Culture in progress     AFB CULTURE STAIN No acid fast bacilli seen.    Narrative:      1) Intracerebral Abscess    AFB Culture & Smear [[id number]] Collected: 02/17/22 0238    Order Status: Completed Specimen: Abscess from Brain Updated: 02/18/22 1515     AFB Culture & Smear Culture in progress     AFB CULTURE STAIN No acid fast bacilli seen.    Narrative:      2) Intracerebral Abscess    Modified Acid Fast Stain For Nocardia [[id number]]     Order Status: Completed Specimen: Abscess     Modified Acid Fast Stain For Nocardia [[id number]]     Order Status: Completed Specimen: Abscess     Culture, Anaerobe [[id number]] Collected: 02/17/22 0238    Order Status: Completed Specimen: Abscess from Brain Updated: 02/18/22 0854     Anaerobic Culture Culture in progress    Narrative:      1) Intracerebral Abscess    Culture, Anaerobe [[id number]] Collected: 02/17/22 0238    Order Status: Completed Specimen: Abscess from Brain Updated: 02/18/22 0854     Anaerobic Culture Culture in progress    Narrative:      2) Intracerebral Abscess    Toxoplasma Gondii, DNA (Qual) [[id number]] Collected: 02/18/22 0603    Order Status: Completed Specimen: Blood Updated: 02/18/22 0609     Toxoplasma gondii DNA, Source blood    Modified Acid Fast Stain For Nocardia [[id number]]     Order Status: Completed Specimen: Abscess     Gram stain [[id number]] Collected: 02/17/22 0238    Order Status: Completed Specimen: Abscess from Brain Updated: 02/17/22 1031     Gram Stain Result Many WBC's      No epithelial cells      No organisms seen    Narrative:      2) Intracerebral Abscess    Gram stain [[id number]] Collected: 02/17/22 0238    Order Status: Completed Specimen: Abscess from Brain Updated: 02/17/22 0753     Gram Stain Result Rare WBC's      No organisms seen      No epithelial cells    Narrative:       1) Intracerebral Abscess    Fungus culture [[id number]] Collected: 02/17/22 0238    Order Status: Sent Specimen: Abscess from Brain Updated: 02/17/22 0303    Fungus culture [[id number]] Collected: 02/17/22 0238    Order Status: Sent Specimen: Abscess from Brain Updated: 02/17/22 0301          All pertinent labs from the last 24 hours have been reviewed.    Significant Diagnostics:  I have reviewed and interpreted all pertinent imaging results/findings within the past 24 hours.  CT Chest Abdomen Pelvis With Contrast    Result Date: 2/19/2022  1.  Enlarged heart, small pericardial effusion.  2.  Small bilateral pleural effusions.  3.  Small focal triangular opacity with air bronchograms in the right middle lobe; this may represent focal of infectious/inflammatory process.  Attention on follow-up imaging recommended.  4.  Stable findings in the kidneys, and to a lesser extent in the liver, consistent with autosomal dominant polycystic kidney disease.  5.  Mild, segmental lateral segment biliary dilatation, of unclear etiology.  There is no corresponding abnormality seen on abdominal ultrasound the 01/07/2022.  Unclear if present on prior examination as that examination was not performed with intravenous contrast.  6.  Unremarkable appearance of transplant right kidney, with patent appearing transplant vein, transplant renal artery, and no evidence of hydronephrosis.  7.  Other findings as above. Electronically signed by: Yin Christiansen Date:    02/19/2022 Time:    12:52

## 2022-02-20 NOTE — SUBJECTIVE & OBJECTIVE
Interval History:  NEON  Review of Systems   Constitutional:  Negative for activity change, appetite change, chills and fever.   HENT:  Negative for congestion, dental problem, ear pain and rhinorrhea.    Eyes:  Negative for pain and discharge.   Respiratory:  Negative for cough and shortness of breath.    Cardiovascular:  Negative for chest pain and leg swelling.   Gastrointestinal:  Negative for abdominal distention, abdominal pain, constipation, diarrhea, nausea and vomiting.   Genitourinary:  Negative for difficulty urinating and dysuria.   Musculoskeletal:  Negative for arthralgias, back pain and joint swelling.   Skin:  Negative for rash and wound.   Neurological:  Negative for dizziness, light-headedness and headaches.   Psychiatric/Behavioral:  Negative for behavioral problems and confusion.    Objective:     Vital Signs (Most Recent):  Temp: 98.3 °F (36.8 °C) (02/20/22 0402)  Pulse: 99 (02/20/22 0402)  Resp: 16 (02/20/22 0402)  BP: 134/89 (02/20/22 0402)  SpO2: 98 % (02/20/22 0402) Vital Signs (24h Range):  Temp:  [97.6 °F (36.4 °C)-98.3 °F (36.8 °C)] 98.3 °F (36.8 °C)  Pulse:  [] 99  Resp:  [16-18] 16  SpO2:  [94 %-98 %] 98 %  BP: (114-137)/(65-91) 134/89     Weight: 80.3 kg (177 lb)  Body mass index is 22.73 kg/m².    Estimated Creatinine Clearance: 99 mL/min (based on SCr of 0.8 mg/dL).    Physical Exam  Constitutional:       General: He is not in acute distress.     Appearance: Normal appearance. He is well-developed. He is not ill-appearing or diaphoretic.   HENT:      Head: Normocephalic and atraumatic.      Right Ear: External ear normal.      Left Ear: External ear normal.      Nose: Nose normal.   Eyes:      General: No scleral icterus.        Right eye: No discharge.         Left eye: No discharge.      Extraocular Movements: Extraocular movements intact.      Conjunctiva/sclera: Conjunctivae normal.   Pulmonary:      Effort: Pulmonary effort is normal. No respiratory distress.      Breath  sounds: No stridor.   Skin:     General: Skin is dry.      Coloration: Skin is not jaundiced or pale.      Findings: No erythema.   Neurological:      General: No focal deficit present.      Mental Status: He is alert and oriented to person, place, and time. Mental status is at baseline.   Psychiatric:         Mood and Affect: Mood normal.         Behavior: Behavior normal.         Thought Content: Thought content normal.         Judgment: Judgment normal.       Significant Labs: CBC:   Recent Labs   Lab 02/19/22  0505 02/20/22  0516   WBC 3.67* 3.99   HGB 17.3 18.0   HCT 53.5 53.0    152       CMP:   Recent Labs   Lab 02/19/22  0505 02/20/22  0516    130*   K 4.5 4.4    101   CO2 27 22*   * 197*   BUN 21 20   CREATININE 0.9 0.8   CALCIUM 9.5 8.9   PROT 5.7* 5.2*   ALBUMIN 2.5* 2.4*   BILITOT 1.5* 1.1*   ALKPHOS 77 66   AST 18 15   ALT 23 22   ANIONGAP 9 7*   EGFRNONAA >60.0 >60.0       Microbiology Results (last 7 days)       Procedure Component Value Units Date/Time    Blood culture [[id number]] Collected: 02/16/22 2247    Order Status: Completed Specimen: Blood from Peripheral, Hand, Right Updated: 02/20/22 0612     Blood Culture, Routine No Growth to date      No Growth to date      No Growth to date      No Growth to date    Narrative:      Blood cultures x 2 different sites. 4 bottles total. Please  draw cultures before administering antibiotics.    Blood culture [[id number]] Collected: 02/16/22 2307    Order Status: Completed Specimen: Blood from Peripheral, Ankle, Right Updated: 02/20/22 0612     Blood Culture, Routine No Growth to date      No Growth to date      No Growth to date      No Growth to date    Narrative:      Blood cultures from 2 different sites. 4 bottles total.  Please draw before starting antibiotics.    Aerobic culture [[id number]]  (Abnormal) Collected: 02/17/22 0238    Order Status: Completed Specimen: Abscess from Brain Updated: 02/19/22 1318     Aerobic Bacterial  Culture Results called to and read back by:Rina Nunez RN 02/19/2022  13:17      NOCARDIA SPECIES  Moderate  further identified as Nocardia nova  For susceptibility see order #[id number]      Narrative:      2) Intracerebral Abscess    Aerobic culture [[id number]]  (Abnormal) Collected: 02/17/22 0238    Order Status: Completed Specimen: Abscess from Brain Updated: 02/19/22 1316     Aerobic Bacterial Culture Results called to and read back by:Rina Nunez RN 02/19/2022  13:15      NOCARDIA SPECIES  Moderate  further identified as Nocardia nova  Susceptibility pending      Narrative:      1) Intracerebral Abscess    Modified Acid Fast Stain For Nocardia [[id number]] Collected: 02/17/22 0238    Order Status: Completed Specimen: Brain Updated: 02/18/22 2041     Modified Acid Fast smear No partially acid fast organisms seen    Narrative:      add-on MAFST per Aaron House MD  02/18/2022  14:40 ORD#[id number]    Cryptococcal antigen [[id number]] Collected: 02/18/22 2016    Order Status: Sent Specimen: Blood, Venous Updated: 02/18/22 2040    Narrative:      Collection has been rescheduled by DANIELE at 02/18/2022 19:56 Reason:   Due in Am per nurse......    AFB Culture & Smear [[id number]] Collected: 02/17/22 0238    Order Status: Completed Specimen: Abscess from Brain Updated: 02/18/22 1515     AFB Culture & Smear Culture in progress     AFB CULTURE STAIN No acid fast bacilli seen.    Narrative:      1) Intracerebral Abscess    AFB Culture & Smear [[id number]] Collected: 02/17/22 0238    Order Status: Completed Specimen: Abscess from Brain Updated: 02/18/22 1515     AFB Culture & Smear Culture in progress     AFB CULTURE STAIN No acid fast bacilli seen.    Narrative:      2) Intracerebral Abscess    Modified Acid Fast Stain For Nocardia [[id number]]     Order Status: Completed Specimen: Abscess     Modified Acid Fast Stain For Nocardia [[id number]]     Order Status: Completed Specimen: Abscess     Culture, Anaerobe  [[id number]] Collected: 02/17/22 0238    Order Status: Completed Specimen: Abscess from Brain Updated: 02/18/22 0854     Anaerobic Culture Culture in progress    Narrative:      1) Intracerebral Abscess    Culture, Anaerobe [[id number]] Collected: 02/17/22 0238    Order Status: Completed Specimen: Abscess from Brain Updated: 02/18/22 0854     Anaerobic Culture Culture in progress    Narrative:      2) Intracerebral Abscess    Toxoplasma Gondii, DNA (Qual) [[id number]] Collected: 02/18/22 0603    Order Status: Completed Specimen: Blood Updated: 02/18/22 0609     Toxoplasma gondii DNA, Source blood    Modified Acid Fast Stain For Nocardia [[id number]]     Order Status: Completed Specimen: Abscess     Gram stain [[id number]] Collected: 02/17/22 0238    Order Status: Completed Specimen: Abscess from Brain Updated: 02/17/22 1031     Gram Stain Result Many WBC's      No epithelial cells      No organisms seen    Narrative:      2) Intracerebral Abscess    Gram stain [[id number]] Collected: 02/17/22 0238    Order Status: Completed Specimen: Abscess from Brain Updated: 02/17/22 0753     Gram Stain Result Rare WBC's      No organisms seen      No epithelial cells    Narrative:      1) Intracerebral Abscess    Fungus culture [[id number]] Collected: 02/17/22 0238    Order Status: Sent Specimen: Abscess from Brain Updated: 02/17/22 0303    Fungus culture [[id number]] Collected: 02/17/22 0238    Order Status: Sent Specimen: Abscess from Brain Updated: 02/17/22 0301            Significant Imaging: I have reviewed all pertinent imaging results/findings within the past 24 hours.

## 2022-02-20 NOTE — PLAN OF CARE
Problem: Adult Inpatient Plan of Care  Goal: Plan of Care Review  Outcome: Ongoing, Progressing  Flowsheets (Taken 2/20/2022 7411)  Plan of Care Reviewed With: patient     Problem: Infection  Goal: Absence of Infection Signs and Symptoms  Outcome: Ongoing, Progressing

## 2022-02-20 NOTE — PROGRESS NOTES
[facility] - Neurosurgery ([facility])  [facility]  Progress Note    Patient Name: Ronal Mazariegos  MRN: [id number]  Patient Class: IP- Inpatient   Admission Date: 2/16/2022  Length of Stay: 4 days  Attending Physician: Sunday Rosa DO  Primary Care Provider: Primary Doctor No        Subjective:     Principal Problem:Intracranial abscess        HPI:  Ronal Mazariegos is a 72 y/o M with a PMH of afib (on eliquis), ESRD due to polycystic kidney disease s/p kidney transplant 5/2021, cardiomyopathy (suspected amyloid, unconfirmed), pulmonary hypertension, polycythemia on therapeutic phlebotomy who was transferred from OSH for a right sided brain mass. He was admitted to [facility], now s/p craniotomy and abscess drainage on 2/17, stepped down 2/18.    Hospital medicine was consulted for comanagement.       Overview/Hospital Course:        Interval History: NAEO. Pt denies complaints this morning. Glc improved after adding scheduled insulin. Plan to transfer to primary kidney transplant service.     Review of Systems   Constitutional:  Negative for chills and fever.   HENT:  Positive for congestion. Negative for sore throat.    Eyes:  Negative for visual disturbance.   Respiratory:  Negative for cough and shortness of breath.    Cardiovascular:  Negative for chest pain and palpitations.   Musculoskeletal:  Negative for arthralgias and myalgias.   Skin:  Negative for rash and wound.   Objective:     Vital Signs (Most Recent):  Temp: 98 °F (36.7 °C) (02/20/22 1203)  Pulse: 99 (02/20/22 1203)  Resp: 18 (02/20/22 1203)  BP: 111/70 (02/20/22 1203)  SpO2: 96 % (02/20/22 1203) Vital Signs (24h Range):  Temp:  [97.6 °F (36.4 °C)-98.3 °F (36.8 °C)] 98 °F (36.7 °C)  Pulse:  [] 99  Resp:  [16-18] 18  SpO2:  [94 %-98 %] 96 %  BP: (111-134)/(65-89) 111/70     Weight: 80.3 kg (177 lb)  Body mass index is 22.73 kg/m².    Intake/Output Summary (Last 24 hours) at 2/20/2022 1254  Last data filed at 2/19/2022 1900  Gross per 24 hour    Intake 800 ml   Output 750 ml   Net 50 ml      Physical Exam  Vitals and nursing note reviewed.   Constitutional:       General: He is not in acute distress.     Appearance: He is not toxic-appearing or diaphoretic.   HENT:      Head: Normocephalic.      Mouth/Throat:      Mouth: Mucous membranes are moist.   Cardiovascular:      Rate and Rhythm: Normal rate and regular rhythm.   Pulmonary:      Effort: Pulmonary effort is normal. No respiratory distress.   Abdominal:      Palpations: Abdomen is soft.      Tenderness: There is no guarding.   Musculoskeletal:      Cervical back: Neck supple. No rigidity.   Skin:     General: Skin is warm and dry.   Neurological:      Mental Status: He is alert. Mental status is at baseline.   Psychiatric:         Mood and Affect: Mood normal.         Behavior: Behavior normal.       Significant Labs: All pertinent labs within the past 24 hours have been reviewed.    Significant Imaging: I have reviewed all pertinent imaging results/findings within the past 24 hours.      Assessment/Plan:      * Intracranial abscess  Transferred from OSH for R frontal lobe abscess.   S/p craniotomy and abscess drainage 2/17.  ID following. Abscess cultures growing Nocardia    - Continue imipenem and bactrim per ID recs   - F/u remaining blood and surgical cultures  - No evidence of vegetations on TTE  - prednisone taper back down to normal immunosuppressive dose of 5mg daily  - aspart 3 U TIDWM and LDSSI for steroid induced hyperglycemia     Polycythemia vera  Follows with Dr. Wheatley in Hematology. Gets periodic therapeutic phlebotomy.  Was planned for possible BMBx.    - Hgb stable   - F/u with Dr. Wheatley at discharge      Prediabetes  A1C 5.9. Not on any antihyperglycemics at home.    - scheduled mealtime insulin 3U TIDWM while on steroid burst  - LDSSI correction scale  - f/u with PCP for possible initiation of metformin etc.      Pulmonary HTN  Noted on TTE 10/2021 with PA systolic pressure is 52  mmHg.     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 Per records, there was concern for cardiac amyloidosis    - agree with TTE this admission (to assess for vegetation but also assess EF, etc.); EF 50%  - pt would like to establish with [facility] cardiology, if possible prefers to coordinate with other follow up appts as he is traveling from [facility]  - cardiology referral placed     S/P DBD kidney transplant on 5/4/21  KTM following.    - continue tacrolimus and prednisone (weaning prednisone back down to home dose after brain surgery)  - plan to transfer to kidney transplant service as primary team       A-fib  Takes nebivolol and eliquis at home.  CHADSVASC 3, ok to hold anticoagulation temporarily in post-op period    - Continue lopressor 25 mg BID   - Will consider transition back to Nebivolol prior to discharge  - Hold eliquis, resume per NSGY when safe from post-op standpoint    HTN (hypertension)  On amlodipine 5 mg and nebivolol at home    - Continue to hold as BP is controlled   - metoprolol tartrate for rate control while inpatient      VTE Risk Mitigation (From admission, onward)         Ordered     heparin (porcine) injection 5,000 Units  Every 8 hours         02/18/22 1018     Place sequential compression device  Until discontinued         02/16/22 2129     IP VTE LOW RISK PATIENT  Once         02/16/22 2129                Discharge Planning   ADRIAN: 2/24/2022     Code Status: Full Code   Is the patient medically ready for discharge?:     Reason for patient still in hospital (select all that apply): Treatment  Discharge Plan A: Home                  Melissa Levin MD  Department of Hospital Medicine   [facility] - Neurosurgery (Hospital)

## 2022-02-20 NOTE — ASSESSMENT & PLAN NOTE
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 CT C/A/P notes a RML lesion- Cardiac MRI done at OSH 02/09 Findings are consistent with infiltrative cardiomyopathy.  Amyloid would be in the differential.       Recommendations:    - Continue IV bactrim 15mg/kg/day and imipenem 1000mg Q8H  - monitor renal function, volume status, and electrolytes closely to ensure pt can tolerate abx.  - suspect lung lesion is also nocardia, recommend repeat CT in 3 months to reassess.  - Follow up TTE    - duration of therapy will be at least 12 months    - interestingly, nocardia can have cardiac involvement. Recommend discussing w/ radiology if able to complete cardiac MRI while inpatient. If not, will need cardiac MRI after hospital discharge

## 2022-02-20 NOTE — PROGRESS NOTES
Sahil Morgan - Neurosurgery ([facility])  Infectious Disease  Progress Note    Patient Name: Ronal Mazariegos  MRN: [id number]  Admission Date: 2/16/2022  Length of Stay: 3 days  Attending Physician: Sunday Rosa DO  Primary Care Provider: Primary Doctor No    Isolation Status: No active isolations  Assessment/Plan:      * Intracranial abscess  71M hx polycystic kidney sidease s/p DBD KTx 5/2021 (CMV D+/R=, HCV NAWAF +, Simulect induction, CIT ~ 8 hours) on tacro/pred), Afib on AC, Polycythemia vera (09/2021), undergoing evaluation for newly diagnosed infiltrative cardiomyopathy, who presented after worsening L sided neurologic deficits and fall at home. 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 CT C/A/P notes a RML lesion.     Recommendations:  - stop meropenem / vanc   - start IV bactrim 15mg/kg/day and imipenem 1000mg Q8H  - monitor renal function, volume status, and electrolytes closely to ensure pt can tolerate abx  - suspect lung lesion is also nocardia, recommend repeat CT in 3 months to reassess  - interestingly, nocardia can have cardiac involvement. Recommend discussing w/ radiology if able to complete cardiac MRI while inpatient. If not, will need cardiac MRI after hospital discharge  - duration of therapy will be at least 12 months    ID will follow      Anticipated Disposition: TBD    Thank you for your consult. I will follow-up with patient. Please contact us if you have any additional questions.    Gricel Travis DO  Transplant Infectious Disease      Subjective:     Principal Problem:Intracranial abscess    HPI: Ronal Mazariegos is a 70 y/o male with a hx of PCK s/p DBD 5/2021 (CMV D+/R=, HCV NAWAF +, Simulect induction, CIT ~ 8 hours) on tacro/pred), Afib on AC, Polycythemia vera (09/2021) ?infiltrative cardiomyopathy presented to ED following a fall found to have right frontal mass was transferred to [facility] [place] now s/p craniotomy (OP note aurelio pus) cultures sent pending (GS  negative).    Patient report feeling well until couple of days prior to his fall when he felt his coordination with left side wasn't normal, until he had his fall - he denies any fever/chills/N/V/diarrhea- no sinus pain/ HA blurry vision.    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  No recent travel or sick contact.  Has not had any dental procedure since transplant- he does floss but no bleeding noticed.      ID consulted for brain abscess.      Interval History: doing well today, no complaints, family at bedside. Cx now + nocardia    Review of Systems   Constitutional:  Negative for activity change, appetite change, chills and fever.   HENT:  Negative for congestion, dental problem, ear pain and rhinorrhea.    Eyes:  Negative for pain and discharge.   Respiratory:  Negative for cough and shortness of breath.    Cardiovascular:  Negative for chest pain and leg swelling.   Gastrointestinal:  Negative for abdominal distention, abdominal pain, constipation, diarrhea, nausea and vomiting.   Genitourinary:  Negative for difficulty urinating and dysuria.   Musculoskeletal:  Negative for arthralgias, back pain and joint swelling.   Skin:  Negative for rash and wound.   Neurological:  Negative for dizziness, light-headedness and headaches.   Psychiatric/Behavioral:  Negative for behavioral problems and confusion.    Objective:     Vital Signs (Most Recent):  Temp: 97.6 °F (36.4 °C) (02/19/22 1424)  Pulse: 105 (02/19/22 1424)  Resp: 16 (02/19/22 1424)  BP: 128/88 (02/19/22 1424)  SpO2: 96 % (02/19/22 1446) Vital Signs (24h Range):  Temp:  [97.6 °F (36.4 °C)-97.9 °F (36.6 °C)] 97.6 °F (36.4 °C)  Pulse:  [] 105  Resp:  [16-20] 16  SpO2:  [94 %-97 %] 96 %  BP: (121-146)/(74-93) 128/88     Weight: 80.3 kg (177 lb)  Body mass index is 22.73 kg/m².    Estimated Creatinine Clearance: 88 mL/min (based on SCr of 0.9 mg/dL).    Physical Exam  Constitutional:       General: He is not in  acute distress.     Appearance: Normal appearance. He is well-developed. He is not ill-appearing or diaphoretic.   HENT:      Head: Normocephalic and atraumatic.      Right Ear: External ear normal.      Left Ear: External ear normal.      Nose: Nose normal.   Eyes:      General: No scleral icterus.        Right eye: No discharge.         Left eye: No discharge.      Extraocular Movements: Extraocular movements intact.      Conjunctiva/sclera: Conjunctivae normal.   Pulmonary:      Effort: Pulmonary effort is normal. No respiratory distress.      Breath sounds: No stridor.   Skin:     General: Skin is dry.      Coloration: Skin is not jaundiced or pale.      Findings: No erythema.   Neurological:      General: No focal deficit present.      Mental Status: He is alert and oriented to person, place, and time. Mental status is at baseline.   Psychiatric:         Mood and Affect: Mood normal.         Behavior: Behavior normal.         Thought Content: Thought content normal.         Judgment: Judgment normal.       Significant Labs: CBC:   Recent Labs   Lab 02/18/22  0234 02/19/22  0505   WBC 2.87* 3.67*   HGB 16.4 17.3   HCT 50.0 53.5    159     CMP:   Recent Labs   Lab 02/18/22  0234 02/19/22  0505   * 137   K 4.4 4.5    101   CO2 22* 27   * 188*   BUN 18 21   CREATININE 0.8 0.9   CALCIUM 9.4 9.5   PROT 5.6* 5.7*   ALBUMIN 2.7* 2.5*   BILITOT 1.6* 1.5*   ALKPHOS 67 77   AST 16 18   ALT 17 23   ANIONGAP 7* 9   EGFRNONAA >60.0 >60.0     Microbiology Results (last 7 days)       Procedure Component Value Units Date/Time    Aerobic culture [[id number]]  (Abnormal) Collected: 02/17/22 0238    Order Status: Completed Specimen: Abscess from Brain Updated: 02/19/22 1318     Aerobic Bacterial Culture Results called to and read back by:Rina Nunez RN 02/19/2022  13:17      NOCARDIA SPECIES  Moderate  further identified as Nocardia nova  For susceptibility see order #[id number]      Narrative:       2) Intracerebral Abscess    Aerobic culture [[id number]]  (Abnormal) Collected: 02/17/22 0238    Order Status: Completed Specimen: Abscess from Brain Updated: 02/19/22 1316     Aerobic Bacterial Culture Results called to and read back by:Rina Nunez RN 02/19/2022  13:15      NOCARDIA SPECIES  Moderate  further identified as Nocardia nova  Susceptibility pending      Narrative:      1) Intracerebral Abscess    Blood culture [[id number]] Collected: 02/16/22 2307    Order Status: Completed Specimen: Blood from Peripheral, Ankle, Right Updated: 02/19/22 0613     Blood Culture, Routine No Growth to date      No Growth to date      No Growth to date    Narrative:      Blood cultures from 2 different sites. 4 bottles total.  Please draw before starting antibiotics.    Blood culture [[id number]] Collected: 02/16/22 2247    Order Status: Completed Specimen: Blood from Peripheral, Hand, Right Updated: 02/19/22 0613     Blood Culture, Routine No Growth to date      No Growth to date      No Growth to date    Narrative:      Blood cultures x 2 different sites. 4 bottles total. Please  draw cultures before administering antibiotics.    Modified Acid Fast Stain For Nocardia [[id number]] Collected: 02/17/22 0238    Order Status: Completed Specimen: Brain Updated: 02/18/22 2041     Modified Acid Fast smear No partially acid fast organisms seen    Narrative:      add-on MAFST per Aaron House MD  02/18/2022  14:40 ORD#[id number]    Cryptococcal antigen [[id number]] Collected: 02/18/22 2016    Order Status: Sent Specimen: Blood, Venous Updated: 02/18/22 2040    Narrative:      Collection has been rescheduled by DANIELE at 02/18/2022 19:56 Reason:   Due in Am per nurse......    AFB Culture & Smear [[id number]] Collected: 02/17/22 0238    Order Status: Completed Specimen: Abscess from Brain Updated: 02/18/22 1515     AFB Culture & Smear Culture in progress     AFB CULTURE STAIN No acid fast bacilli seen.    Narrative:      1) Intracerebral  Abscess    AFB Culture & Smear [[id number]] Collected: 02/17/22 0238    Order Status: Completed Specimen: Abscess from Brain Updated: 02/18/22 1515     AFB Culture & Smear Culture in progress     AFB CULTURE STAIN No acid fast bacilli seen.    Narrative:      2) Intracerebral Abscess    Modified Acid Fast Stain For Nocardia [[id number]]     Order Status: Completed Specimen: Abscess     Modified Acid Fast Stain For Nocardia [[id number]]     Order Status: Completed Specimen: Abscess     Culture, Anaerobe [[id number]] Collected: 02/17/22 0238    Order Status: Completed Specimen: Abscess from Brain Updated: 02/18/22 0854     Anaerobic Culture Culture in progress    Narrative:      1) Intracerebral Abscess    Culture, Anaerobe [[id number]] Collected: 02/17/22 0238    Order Status: Completed Specimen: Abscess from Brain Updated: 02/18/22 0854     Anaerobic Culture Culture in progress    Narrative:      2) Intracerebral Abscess    Toxoplasma Gondii, DNA (Qual) [[id number]] Collected: 02/18/22 0603    Order Status: Completed Specimen: Blood Updated: 02/18/22 0609     Toxoplasma gondii DNA, Source blood    Modified Acid Fast Stain For Nocardia [[id number]]     Order Status: Completed Specimen: Abscess     Gram stain [[id number]] Collected: 02/17/22 0238    Order Status: Completed Specimen: Abscess from Brain Updated: 02/17/22 1031     Gram Stain Result Many WBC's      No epithelial cells      No organisms seen    Narrative:      2) Intracerebral Abscess    Gram stain [[id number]] Collected: 02/17/22 0238    Order Status: Completed Specimen: Abscess from Brain Updated: 02/17/22 0753     Gram Stain Result Rare WBC's      No organisms seen      No epithelial cells    Narrative:      1) Intracerebral Abscess    Fungus culture [[id number]] Collected: 02/17/22 0238    Order Status: Sent Specimen: Abscess from Brain Updated: 02/17/22 0303    Fungus culture [[id number]] Collected: 02/17/22 0238    Order Status: Sent Specimen: Abscess from  Brain Updated: 02/17/22 0301            Significant Imaging: I have reviewed all pertinent imaging results/findings within the past 24 hours.

## 2022-02-20 NOTE — ASSESSMENT & PLAN NOTE
Noted on TTE 10/2021 with PA systolic pressure is 52 mmHg.     - repeat TTE with PA systolic pressure is 29 mmHg  - outpatient referral to cardiology  - resumed home lasix

## 2022-02-20 NOTE — ASSESSMENT & PLAN NOTE
Follows with Dr. Wheatley in Hematology. Gets periodic therapeutic phlebotomy.  Was planned for possible BMBx.    - Hgb stable   - F/u with Dr. Wheatley at discharge

## 2022-02-20 NOTE — SUBJECTIVE & OBJECTIVE
Interval History: NAEO. Pt denies complaints this morning. Glc improved after adding scheduled insulin. Plan to transfer to primary kidney transplant service.     Review of Systems   Constitutional:  Negative for chills and fever.   HENT:  Positive for congestion. Negative for sore throat.    Eyes:  Negative for visual disturbance.   Respiratory:  Negative for cough and shortness of breath.    Cardiovascular:  Negative for chest pain and palpitations.   Musculoskeletal:  Negative for arthralgias and myalgias.   Skin:  Negative for rash and wound.   Objective:     Vital Signs (Most Recent):  Temp: 98 °F (36.7 °C) (02/20/22 1203)  Pulse: 99 (02/20/22 1203)  Resp: 18 (02/20/22 1203)  BP: 111/70 (02/20/22 1203)  SpO2: 96 % (02/20/22 1203) Vital Signs (24h Range):  Temp:  [97.6 °F (36.4 °C)-98.3 °F (36.8 °C)] 98 °F (36.7 °C)  Pulse:  [] 99  Resp:  [16-18] 18  SpO2:  [94 %-98 %] 96 %  BP: (111-134)/(65-89) 111/70     Weight: 80.3 kg (177 lb)  Body mass index is 22.73 kg/m².    Intake/Output Summary (Last 24 hours) at 2/20/2022 1254  Last data filed at 2/19/2022 1900  Gross per 24 hour   Intake 800 ml   Output 750 ml   Net 50 ml      Physical Exam  Vitals and nursing note reviewed.   Constitutional:       General: He is not in acute distress.     Appearance: He is not toxic-appearing or diaphoretic.   HENT:      Head: Normocephalic.      Mouth/Throat:      Mouth: Mucous membranes are moist.   Cardiovascular:      Rate and Rhythm: Normal rate and regular rhythm.   Pulmonary:      Effort: Pulmonary effort is normal. No respiratory distress.   Abdominal:      Palpations: Abdomen is soft.      Tenderness: There is no guarding.   Musculoskeletal:      Cervical back: Neck supple. No rigidity.   Skin:     General: Skin is warm and dry.   Neurological:      Mental Status: He is alert. Mental status is at baseline.   Psychiatric:         Mood and Affect: Mood normal.         Behavior: Behavior normal.       Significant Labs:  All pertinent labs within the past 24 hours have been reviewed.    Significant Imaging: I have reviewed all pertinent imaging results/findings within the past 24 hours.

## 2022-02-20 NOTE — ASSESSMENT & PLAN NOTE
Takes nebivolol and eliquis at home.  CHADSVASC 3, ok to hold anticoagulation temporarily in post-op period    - Continue lopressor 25 mg BID   - Will consider transition back to Nebivolol prior to discharge  - Hold eliquis, resume per NSGY when safe from post-op standpoint

## 2022-02-20 NOTE — PT/OT/SLP EVAL
Physical Therapy Evaluation    Patient Name:  Ronal Mazariegos   MRN:  [id number]    Recommendations:     Discharge Recommendations:  home health PT   Discharge Equipment Recommendations: walker, rolling   Barriers to discharge: None    Assessment:     Ronal Mazariegos is a 71 y.o. male admitted with a medical diagnosis of Intracranial abscess.  He presents with the following impairments/functional limitations:  weakness, impaired endurance, impaired functional mobilty, decreased lower extremity function, gait instability.    Patient mobilizes well - requires minimal/contact guard assist with sit<>stand transfer and gait with -hand-held assist.  Patient has apparent gait deviation with ambulation- no loss of balance.  Patient lives with wife in a single story home with no steps to enter.  Patient's wife is capable of assisting with mobility if required.  Patient is amenable to home health PT.  Patient will benefit from skilled PT to address deficits and maximize function.       Rehab Prognosis: Fair;   Recent Surgery: Procedure(s) (LRB):  CRANIOTOMY (Right) 4 Days Post-Op    Plan:     During this hospitalization, patient to be seen 3 x/week to address the identified rehab impairments via gait training, therapeutic activities, therapeutic exercises, neuromuscular re-education and progress toward the following goals:    · Plan of Care Expires:  02/20/22    Subjective     Chief Complaint: none stated   Patient/Family Comments/goals: none stated   Pain/Comfort:  · Pain Rating 1: 0/10    Patients cultural, spiritual, Religion conflicts given the current situation: no    Living Environment:  Patient lives with wife in a single story home with no steps to enter.     Prior to admission, patients level of function was independent.  Equipment used at home: none.  DME owned (not currently used): none.  Upon discharge, patient will have assistance from family.    Objective:     Communicated with RN prior to session.  Patient  found up in chair with peripheral IV  upon PT entry to room.    General Precautions: Standard, fall   Orthopedic Precautions:N/A   Braces: N/A  Respiratory Status: Room air    Exams:  · Cognitive Exam:  Patient is oriented to Person, Place, Time and Situation  · RLE ROM: WFL  · RLE Strength: WFL  · LLE ROM: WFL  · LLE Strength: WFL    Functional Mobility:  · Bed Mobility:     · Supine to Sit: task performance not observed during PT exam   · Transfers:     · Sit to Stand:  minimum assistance with hand-held assist  · Gait: Patient ambulated 50 ft with minimal asssit - gati deviation noted -- hand-held assist   · Balance: mild gait deviation noted with ambulation - no loss of balance with upright mobilty       AM-PAC 6 CLICK MOBILITY  Total Score:18     Patient left up in chair with all lines intact, call button in reach, RN notified and wife  present.    GOALS:   Multidisciplinary Problems     Physical Therapy Goals        Problem: Physical Therapy Goal    Goal Priority Disciplines Outcome Goal Variances Interventions   Physical Therapy Goal     PT, PT/OT Ongoing, Progressing     Description: Goals to be met by: 3/6/2022      Patient will increase functional independence with mobility by performin. Supine to sit with Modified [place]  2. Sit to stand transfer with Modified [place]  3. Gait  x 250 feet with Modified [place] using the least restrictive device.   4. Ascend/descend 5 steps with right Handrails Modified [place] using the least restrictive device.                       History:     Past Medical History:   Diagnosis Date    Anasarca 10/1/2021    Anemia of chronic disease     Atrial fibrillation     BPH (benign prostatic hypertrophy)     Chronic systolic heart failure 10/1/2021    Hepatitis C virus infection cured after antiviral drug therapy     acquired through kidney transplant, treated / cured (SVR12 - 2021)    HTN (hypertension)     Polycystic kidney disease         Past Surgical History:   Procedure Laterality Date    AV FISTULA PLACEMENT Left 2016    CATARACT EXTRACTION, BILATERAL Bilateral     CRANIOTOMY Right 2/16/2022    Procedure: CRANIOTOMY;  Surgeon: Sunday Rosa DO;  Location: [facility] OR [address] [place] [address];  Service: Neurosurgery;  Laterality: Right;  R craniotomy for abscess drainage    KIDNEY TRANSPLANT N/A 5/4/2021    Procedure: TRANSPLANT, KIDNEY;  Surgeon: Lexy Bolden MD;  Location: [facility] OR [address] [place] [address];  Service: Transplant;  Laterality: N/A;       Time Tracking:     PT Received On: 02/20/22  PT Start Time: 0940     PT Stop Time: 1007  PT Total Time (min): 27 min     Billable Minutes: Evaluation 10 and Gait Training 17     Sivakumar Rousseau DPT, PhD       02/20/2022

## 2022-02-20 NOTE — ASSESSMENT & PLAN NOTE
11 M PMHx afib on eliquis, recent kidney transplant on 5/2021 on immunosuppressants, cardiomyopathy, pulmonary hypertension presenting with new right frontal lobe abscess. He has mild left sided weakness, but is otherwise intact. MRI demonstrates right frontal 2.2 cm ring enhancing lesion, restricting on DWI    Now s/p R frontal crani for abscess drainage 2/17/22    -- Admitted to NPU;  -- neurochecks and vitals per unit protocol  -- Post op CT reviewed: no detrimental changes  -- Post op MRI reviewed: good drainage of abscess  -- PCC for eliquis reversal pre op: continue to hold now post op until POD7  -- ID consulted; appreciate recs  -- CT C/A/P completed per ID recs.   -- HM consulted for medical management; patient would benefit from being HM primary. Will d/w HM today.   -- OR cultures: no growth on gram stain, pending final cultures  -- d/adria Decadron and started prednisone taper to transplant suppression dose.   -- transplant nephro on board for immunosuppresive medication recs   -- please page with exam change or questions

## 2022-02-20 NOTE — PROGRESS NOTES
Consult Requested By: Sunday Rosa DO  Reason for Consult: management of immunosuppressive agents, CKD    SUBJECTIVE:   Patient/family  and nurse report  the following issues today:    We discussed at length kidney function and bactrim  We spoke about polycythemia and treatment  We discussed long term follow up  Wife was in the room  Neurology/ medicine service requested transfer to our service discussed with surgery and accepted.   Data available was also discussed  I also communicated yesterday with Dr RAZ Boogie about nocardia findings dn work up       Review of Systems:    Skin: no skin rash  CNS; no headaches, blurred vision, seizure, or syncope  ENT: No JVD,  Adenopathies,  nasal congestion. No oral lesions  Cardiac: No chest pain, dyspnea, claudication, edema or palpitations  Respiratory: No SOB, cough, hemoptysis   Gastro-intestinal: No diarrhea, constipation, abdominal pain, nausea, vomit. No ascitis  Genitourinary: no hematuria, dysuria, frequency, frequency  Musculoskeletal: joint pain, arthritis or vasculitic changes  Psych: alert awake, oriented, No cranial nerves deficit.  Patient Active Problem List   Diagnosis    Polycystic kidney disease    HTN (hypertension)    Anemia of chronic disease    Benign prostatic hyperplasia    A-fib    Long term (current) use of anticoagulants--Eliquis    At risk for opportunistic infections    S/P DBD kidney transplant on 5/4/21    Long-term use of immunosuppressant medication    Prophylactic immunotherapy    Hyperglycemia    Drug-induced neutropenia    Chronic systolic heart failure    Anasarca    Hepatitis C virus infection cured after antiviral drug therapy    Polycystic liver disease    Intracranial abscess    Vasogenic brain edema    Hyponatremia    Cardiomyopathy    Pulmonary HTN    CKD (chronic kidney disease) stage 2, GFR 60-89 ml/min    Prediabetes    Polycythemia vera       OBJECTIVE:     Medications:   famotidine  20 mg Oral QHS  "   furosemide  40 mg Oral Daily    heparin (porcine)  5,000 Units Subcutaneous Q8H    imipenem-cilastatin  1,000 mg Intravenous Q8H    insulin aspart U-100  3 Units Subcutaneous TIDWM    metoprolol tartrate  25 mg Oral BID    mupirocin   Nasal BID    predniSONE  10 mg Oral Daily    [START ON 2/21/2022] predniSONE  5 mg Oral BID    [START ON 2/23/2022] predniSONE  5 mg Oral Daily    senna-docusate 8.6-50 mg  1 tablet Oral BID    trimethoprim-sulfamethoxasole (BACTRIM) IVPB (fixed ratio)  15 mg/kg/day (Dosing Weight) Intravenous Q8H    tacrolimus  2 mg Oral BID       Vitals:    02/20/22 1031   BP:    Pulse: 101   Resp:    Temp:      I/O last 3 completed shifts:  In: 800 [P.O.:800]  Out: 750 [Urine:750]    PHYSICAL EXAM:    /89 (BP Location: Right arm, Patient Position: Lying)   Pulse 101   Temp 98.3 °F (36.8 °C) (Oral)   Resp 16   Ht 6' 2" (1.88 m)   Wt 80.3 kg (177 lb)   SpO2 98%   BMI 22.73 kg/m²       Head: normocephalic  Neck: No JVD, cervical axillary, or femoral adenopathies  Heart: no murmurs, Normal s1 and s2, No gallops, no rubs, No murmurs  Lungs; CTA, good respiratory effort, no crackles  Abdomen: soft, non tender, no splenomegaly or hepatomegaly, no massess, no bruits  Extremities: No edema, skin rash, joint pain  SNC: awake, alert oriented. Cranial nerves are intact, no focalized, sensitivity and strength preserved    Laboratory:  Recent Labs   Lab 02/18/22  0234 02/19/22  0505 02/20/22  0516   WBC 2.87* 3.67* 3.99   HGB 16.4 17.3 18.0   HCT 50.0 53.5 53.0    159 152   MONO 9.0  CANCELED 8.0 13.0     Recent Labs   Lab 02/16/22  2246 02/17/22  0430 02/18/22  0234 02/19/22  0505 02/20/22  0516      < > 134* 137 130*   K 4.9   < > 4.4 4.5 4.4      < > 105 101 101   CO2 20*   < > 22* 27 22*   BUN 21   < > 18 21 20   CREATININE 0.9   < > 0.8 0.9 0.8   CALCIUM 10.0   < > 9.4 9.5 8.9   PHOS 2.6*  --   --   --   --     < > = values in this interval not displayed. "       Labs reviewed  Diagnostic Results:  X-Ray: Reviewed  US: Reviewed  Echo: Reviewed      ASSESSMENT/PLAN:     1. Immunosuppression:OFF MMF prograf level at target    2. Allograft Function/complications: at base;line excellent kidney function. I discussed with patient and wife about mary kate of close monitoring due to potential bactrim induced electrolyte abnormalities and hepatotoxicities.     3. Electrolyte and fluid balance Abnormalities:hyponatremia will monitor, avoid excessive water intake. Will order urine electrolytes and osmolality and serum os molality     4. Essential Hypertension management/Blood pressure control:BP essentially well controlled.     5. Anemia associated with kidney disease:h/h consistent with polycythemia. Seen by hematology in the past. 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         Thank you.    Carlos Barcenas MD  Transplant Nephrologist  [facility]  [facility].

## 2022-02-21 LAB
ALBUMIN SERPL BCP-MCNC: 2.6 G/DL (ref 3.5–5.2)
ALP SERPL-CCNC: 69 U/L (ref 55–135)
ALT SERPL W/O P-5'-P-CCNC: 29 U/L (ref 10–44)
ANION GAP SERPL CALC-SCNC: 9 MMOL/L (ref 8–16)
ANISOCYTOSIS BLD QL SMEAR: SLIGHT
AST SERPL-CCNC: 24 U/L (ref 10–40)
BASOPHILS NFR BLD: 0 % (ref 0–1.9)
BILIRUB SERPL-MCNC: 1.2 MG/DL (ref 0.1–1)
BUN SERPL-MCNC: 23 MG/DL (ref 8–23)
BURR CELLS BLD QL SMEAR: ABNORMAL
CALCIUM SERPL-MCNC: 9 MG/DL (ref 8.7–10.5)
CHLORIDE SERPL-SCNC: 100 MMOL/L (ref 95–110)
CO2 SERPL-SCNC: 24 MMOL/L (ref 23–29)
CREAT SERPL-MCNC: 1.1 MG/DL (ref 0.5–1.4)
DACRYOCYTES BLD QL SMEAR: ABNORMAL
DIFFERENTIAL METHOD: ABNORMAL
EOSINOPHIL NFR BLD: 1 % (ref 0–8)
ERYTHROCYTE [DISTWIDTH] IN BLOOD BY AUTOMATED COUNT: 13.3 % (ref 11.5–14.5)
EST. GFR  (AFRICAN AMERICAN): >60 ML/MIN/1.73 M^2
EST. GFR  (NON AFRICAN AMERICAN): >60 ML/MIN/1.73 M^2
GALACTOMANNAN AG SERPL IA-ACNC: <0.5 INDEX
GLUCOSE SERPL-MCNC: 123 MG/DL (ref 70–110)
HCT VFR BLD AUTO: 56.2 % (ref 40–54)
HGB BLD-MCNC: 18.4 G/DL (ref 14–18)
HYPOCHROMIA BLD QL SMEAR: ABNORMAL
IMM GRANULOCYTES # BLD AUTO: ABNORMAL K/UL (ref 0–0.04)
IMM GRANULOCYTES NFR BLD AUTO: ABNORMAL % (ref 0–0.5)
LYMPHOCYTES NFR BLD: 34 % (ref 18–48)
MAYO MISCELLANEOUS RESULT (REF): NORMAL
MCH RBC QN AUTO: 31.5 PG (ref 27–31)
MCHC RBC AUTO-ENTMCNC: 32.7 G/DL (ref 32–36)
MCV RBC AUTO: 96 FL (ref 82–98)
METAMYELOCYTES NFR BLD MANUAL: 2 %
MONOCYTES NFR BLD: 7 % (ref 4–15)
MYELOCYTES NFR BLD MANUAL: 1 %
NEUTROPHILS NFR BLD: 50 % (ref 38–73)
NEUTS BAND NFR BLD MANUAL: 5 %
NRBC BLD-RTO: 0 /100 WBC
PLATELET # BLD AUTO: 179 K/UL (ref 150–450)
PLATELET BLD QL SMEAR: ABNORMAL
PMV BLD AUTO: 10.5 FL (ref 9.2–12.9)
POCT GLUCOSE: 136 MG/DL (ref 70–110)
POCT GLUCOSE: 153 MG/DL (ref 70–110)
POCT GLUCOSE: 160 MG/DL (ref 70–110)
POCT GLUCOSE: 170 MG/DL (ref 70–110)
POIKILOCYTOSIS BLD QL SMEAR: SLIGHT
POLYCHROMASIA BLD QL SMEAR: ABNORMAL
POTASSIUM SERPL-SCNC: 4.1 MMOL/L (ref 3.5–5.1)
PROT SERPL-MCNC: 5.6 G/DL (ref 6–8.4)
RBC # BLD AUTO: 5.85 M/UL (ref 4.6–6.2)
SODIUM SERPL-SCNC: 133 MMOL/L (ref 136–145)
T GONDII IGM SER-ACNC: <3 AU/ML
TACROLIMUS BLD-MCNC: 5.9 NG/ML (ref 5–15)
WBC # BLD AUTO: 3.9 K/UL (ref 3.9–12.7)

## 2022-02-21 PROCEDURE — 63600175 PHARM REV CODE 636 W HCPCS: Performed by: NURSE PRACTITIONER

## 2022-02-21 PROCEDURE — 63600175 PHARM REV CODE 636 W HCPCS: Performed by: STUDENT IN AN ORGANIZED HEALTH CARE EDUCATION/TRAINING PROGRAM

## 2022-02-21 PROCEDURE — S0039 INJECTION, SULFAMETHOXAZOLE: HCPCS | Performed by: INTERNAL MEDICINE

## 2022-02-21 PROCEDURE — 25000003 PHARM REV CODE 250

## 2022-02-21 PROCEDURE — 99233 SBSQ HOSP IP/OBS HIGH 50: CPT | Mod: GC,,, | Performed by: INTERNAL MEDICINE

## 2022-02-21 PROCEDURE — 99233 SBSQ HOSP IP/OBS HIGH 50: CPT | Mod: ,,, | Performed by: PHYSICIAN ASSISTANT

## 2022-02-21 PROCEDURE — 25000003 PHARM REV CODE 250: Performed by: PHYSICIAN ASSISTANT

## 2022-02-21 PROCEDURE — 99024 PR POST-OP FOLLOW-UP VISIT: ICD-10-PCS | Mod: POP,,,

## 2022-02-21 PROCEDURE — 99233 PR SUBSEQUENT HOSPITAL CARE,LEVL III: ICD-10-PCS | Mod: ,,, | Performed by: PHYSICIAN ASSISTANT

## 2022-02-21 PROCEDURE — 25000003 PHARM REV CODE 250: Performed by: NURSE PRACTITIONER

## 2022-02-21 PROCEDURE — 80197 ASSAY OF TACROLIMUS: CPT | Performed by: NURSE PRACTITIONER

## 2022-02-21 PROCEDURE — 86480 TB TEST CELL IMMUN MEASURE: CPT | Performed by: PSYCHIATRY & NEUROLOGY

## 2022-02-21 PROCEDURE — 25000003 PHARM REV CODE 250: Performed by: STUDENT IN AN ORGANIZED HEALTH CARE EDUCATION/TRAINING PROGRAM

## 2022-02-21 PROCEDURE — 99233 PR SUBSEQUENT HOSPITAL CARE,LEVL III: ICD-10-PCS | Mod: GC,,, | Performed by: INTERNAL MEDICINE

## 2022-02-21 PROCEDURE — 87449 NOS EACH ORGANISM AG IA: CPT | Performed by: INTERNAL MEDICINE

## 2022-02-21 PROCEDURE — 93010 ELECTROCARDIOGRAM REPORT: CPT | Mod: ,,, | Performed by: INTERNAL MEDICINE

## 2022-02-21 PROCEDURE — 25000003 PHARM REV CODE 250: Performed by: INTERNAL MEDICINE

## 2022-02-21 PROCEDURE — 63600175 PHARM REV CODE 636 W HCPCS: Performed by: INTERNAL MEDICINE

## 2022-02-21 PROCEDURE — 20600001 HC STEP DOWN PRIVATE ROOM

## 2022-02-21 PROCEDURE — 87385 HISTOPLASMA CAPSUL AG IA: CPT | Performed by: INTERNAL MEDICINE

## 2022-02-21 PROCEDURE — 93010 EKG 12-LEAD: ICD-10-PCS | Mod: ,,, | Performed by: INTERNAL MEDICINE

## 2022-02-21 PROCEDURE — 80053 COMPREHEN METABOLIC PANEL: CPT | Performed by: NURSE PRACTITIONER

## 2022-02-21 PROCEDURE — 85027 COMPLETE CBC AUTOMATED: CPT | Performed by: NURSE PRACTITIONER

## 2022-02-21 PROCEDURE — 99024 POSTOP FOLLOW-UP VISIT: CPT | Mod: POP,,,

## 2022-02-21 PROCEDURE — 85007 BL SMEAR W/DIFF WBC COUNT: CPT | Performed by: NURSE PRACTITIONER

## 2022-02-21 PROCEDURE — 93005 ELECTROCARDIOGRAM TRACING: CPT

## 2022-02-21 RX ORDER — POLYETHYLENE GLYCOL 3350 17 G/17G
17 POWDER, FOR SOLUTION ORAL DAILY
Status: DISCONTINUED | OUTPATIENT
Start: 2022-02-21 | End: 2022-02-25 | Stop reason: HOSPADM

## 2022-02-21 RX ORDER — BACITRACIN ZINC 500 [USP'U]/G
OINTMENT TOPICAL 2 TIMES DAILY
Status: DISCONTINUED | OUTPATIENT
Start: 2022-02-21 | End: 2022-02-25 | Stop reason: HOSPADM

## 2022-02-21 RX ADMIN — HEPARIN SODIUM 5000 UNITS: 5000 INJECTION INTRAVENOUS; SUBCUTANEOUS at 05:02

## 2022-02-21 RX ADMIN — POLYETHYLENE GLYCOL 3350 17 G: 17 POWDER, FOR SOLUTION ORAL at 09:02

## 2022-02-21 RX ADMIN — MUPIROCIN: 20 OINTMENT TOPICAL at 09:02

## 2022-02-21 RX ADMIN — SULFAMETHOXAZOLE AND TRIMETHOPRIM 403 MG: 80; 16 INJECTION, SOLUTION, CONCENTRATE INTRAVENOUS at 11:02

## 2022-02-21 RX ADMIN — CLOTRIMAZOLE AND BETAMETHASONE DIPROPIONATE: 10; .5 CREAM TOPICAL at 08:02

## 2022-02-21 RX ADMIN — MUPIROCIN: 20 OINTMENT TOPICAL at 08:02

## 2022-02-21 RX ADMIN — BACITRACIN: 500 OINTMENT TOPICAL at 08:02

## 2022-02-21 RX ADMIN — IMIPENEM AND CILASTATIN SODIUM 1000 MG: 500; 500 INJECTION, POWDER, FOR SOLUTION INTRAVENOUS at 08:02

## 2022-02-21 RX ADMIN — TACROLIMUS 2 MG: 1 CAPSULE ORAL at 09:02

## 2022-02-21 RX ADMIN — HEPARIN SODIUM 5000 UNITS: 5000 INJECTION INTRAVENOUS; SUBCUTANEOUS at 08:02

## 2022-02-21 RX ADMIN — TRAZODONE HYDROCHLORIDE 25 MG: 50 TABLET ORAL at 08:02

## 2022-02-21 RX ADMIN — IMIPENEM AND CILASTATIN SODIUM 1000 MG: 500; 500 INJECTION, POWDER, FOR SOLUTION INTRAVENOUS at 11:02

## 2022-02-21 RX ADMIN — ONDANSETRON 4 MG: 2 INJECTION INTRAMUSCULAR; INTRAVENOUS at 05:02

## 2022-02-21 RX ADMIN — PREDNISONE 5 MG: 5 TABLET ORAL at 08:02

## 2022-02-21 RX ADMIN — METOPROLOL TARTRATE 25 MG: 25 TABLET, FILM COATED ORAL at 06:02

## 2022-02-21 RX ADMIN — SENNOSIDES AND DOCUSATE SODIUM 1 TABLET: 50; 8.6 TABLET ORAL at 09:02

## 2022-02-21 RX ADMIN — TACROLIMUS 2 MG: 1 CAPSULE ORAL at 06:02

## 2022-02-21 RX ADMIN — FAMOTIDINE 20 MG: 20 TABLET ORAL at 08:02

## 2022-02-21 RX ADMIN — METOPROLOL TARTRATE 25 MG: 25 TABLET, FILM COATED ORAL at 07:02

## 2022-02-21 RX ADMIN — PREDNISONE 10 MG: 5 TABLET ORAL at 09:02

## 2022-02-21 RX ADMIN — SULFAMETHOXAZOLE AND TRIMETHOPRIM 403 MG: 80; 16 INJECTION, SOLUTION, CONCENTRATE INTRAVENOUS at 01:02

## 2022-02-21 RX ADMIN — SENNOSIDES AND DOCUSATE SODIUM 1 TABLET: 50; 8.6 TABLET ORAL at 08:02

## 2022-02-21 RX ADMIN — IMIPENEM AND CILASTATIN SODIUM 1000 MG: 500; 500 INJECTION, POWDER, FOR SOLUTION INTRAVENOUS at 03:02

## 2022-02-21 RX ADMIN — SULFAMETHOXAZOLE AND TRIMETHOPRIM 403 MG: 80; 16 INJECTION, SOLUTION, CONCENTRATE INTRAVENOUS at 07:02

## 2022-02-21 RX ADMIN — ACETAMINOPHEN 650 MG: 325 TABLET ORAL at 07:02

## 2022-02-21 RX ADMIN — PREDNISONE 5 MG: 5 TABLET ORAL at 09:02

## 2022-02-21 RX ADMIN — FUROSEMIDE 40 MG: 40 TABLET ORAL at 09:02

## 2022-02-21 RX ADMIN — HEPARIN SODIUM 5000 UNITS: 5000 INJECTION INTRAVENOUS; SUBCUTANEOUS at 02:02

## 2022-02-21 RX ADMIN — ONDANSETRON 4 MG: 2 INJECTION INTRAMUSCULAR; INTRAVENOUS at 07:02

## 2022-02-21 RX ADMIN — ACETAMINOPHEN 650 MG: 325 TABLET ORAL at 06:02

## 2022-02-21 NOTE — ASSESSMENT & PLAN NOTE
- Cont Lopressor; dose increased to 50 mg BID on 2/22 due to uncontrolled rate (d/w cards)  - Hold Eliquis until at least 2/25 post craniotomy.  - Monitor electrolytes closely.  - Telemetry

## 2022-02-21 NOTE — NURSING
Pt noted to be in afib with RVR. HR 120s-150s. Galina REESE notified and ordered to give 21:00 dose of po lopressor. Hand off report given to Gricel PENA RN and charge RN Katie LORENZ

## 2022-02-21 NOTE — ASSESSMENT & PLAN NOTE
11 M PMHx afib on eliquis, recent kidney transplant on 5/2021 on immunosuppressants, cardiomyopathy, pulmonary hypertension presenting with new right frontal lobe abscess. He has mild left sided weakness, but is otherwise intact. MRI demonstrates right frontal 2.2 cm ring enhancing lesion, restricting on DWI.    Now s/p R frontal crani for abscess drainage 2/17/22.    -- Transferred to kidney transplant team service on 2/21 for continued medical management.  -- Neurochecks q4hrs.  -- Post op CT reviewed: no detrimental changes.  -- Post op MRI reviewed: good drainage of abscess.  -- Will need a repeat CT head prior to discharge.  -- Patient with Afib. Recommend continuing to hold Eliquis until POD 14.  -- ID consulted; appreciate recs. OR cultures with Nocardia novis. CT C/A/P completed per their recs. On IV Bactrim and Imipenem, estimated 12 mos of therapy.   -- d/adria Decadron and started Prednisone taper to transplant suppression dose.   -- We will continue to follow. Please page with neuro exam changes or questions.  -- Neurosurgery follow up to be arranged. 2 week post op wound check for staple removal. 4-6 weeks follow up with Dr. Rosa with an MRI brain w/wo contrast.    Discussed with Dr. Rosa.

## 2022-02-21 NOTE — ASSESSMENT & PLAN NOTE
- Follows with Dr. Wheatley in Hematology. Gets periodic therapeutic phlebotomy.  - Was planned for possible BMBx.  - Hgb stable   - F/u with Dr. Wheatley at discharge

## 2022-02-21 NOTE — ASSESSMENT & PLAN NOTE
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 CT C/A/P notes a RML lesion- suspect lung lesion is also nocardia- Cardiac MRI done at OSH 02/09 Findings are consistent with infiltrative cardiomyopathy.  Amyloid would be in the differential. TTE done no gross evidence of vegetatation. Abnormal thickening of the aortic root seen in SAX that is unchanged from the prior study.      Recommendations:    - Continue IV imipenem 1000mg Q8H.  - OK to transition IV bactrim to PO.  - Agree with cardiology evaluation and possible cardiac biopsy for further diagnose infiltrative CM see on Cardiac MRI   - repeat CT in 3 months to reassess Lung lesion   - Patient will need 6-12 months of antibiotic therapy, regimen and duration will be tailored in clinic based on sensitivity.     Outpatient Antibiotic Therapy Plan:    Please send referral to [facility].    1) Infection: Nocardiosis (brain/pulmonary)    2) Discharge Antibiotics:    Intravenous antibiotics:  · Imipenem 1000 mg IV Q8  Oral antibiotics:   Bactrim 2 DS Q8.    3) Therapy Duration:  2 months     Estimated end date of IV antibiotics: 04/18/2022    4) Outpatient Weekly Labs:    Order the following labs to be drawn on Mondays:    CBC   CMP          5) Fax Lab Results to Infectious Diseases Provider: Aaron    [facility] Fax Number: [phone]    6) Outpatient Infectious Diseases Follow-up     Follow-up appointment will be arranged by the ID clinic and will be found in the patient's appointments tab.     Prior to discharge, please ensure the patient's follow-up has been scheduled.      If there is still no follow-up scheduled prior to discharge, please send an EPIC message to Eliane Quintero in Infectious Diseases.

## 2022-02-21 NOTE — PROGRESS NOTES
"Admit Note     Met with patient and spouse to assess needs. Patient is a 71 y.o.  male, admitted post kidney transplantation on 5/4/2021 for Brain mass [G93.89].      Patient admitted from OS on 2/16/2022 .  At this time, patient presents as alert and oriented x 4, pleasant, good eye contact, recall good, concentration/judgement good, average intelligence, calm, communicative, cooperative and asking and answering questions appropriately.  At this time, patients caregiver presents as alert and oriented x 4, pleasant, good eye contact, well groomed, recall good, concentration/judgement good, average intelligence, calm, communicative, cooperative and asking and answering questions appropriately.    Household/Family Systems     Patient resides with patient's wife, at [address]  [place] [address].  Support system includes pt's wife, pt's sister Charlee Ward ([phone]), pt's nephew Chalo Paniagua ([phone]), and pt's son Bong Mazariegos ([phone]).  Patient does not have dependents that are need of being cared for.     Patients primary caregiver is Becky Mazariegos, patients wife, phone number [phone].  Confirmed patients contact information is [phone] (home);   Telephone Information:   Mobile [phone]   .    During admission, patient's caregiver plans to stay in patient's room.  Confirmed patient and patients caregivers do have access to reliable transportation.    Cognitive Status/Learning     Patient reports reading ability as college and states patient does have difficulty with hearing and states he likely needs hearing aids and "will get it checked out eventually".  Patient reports patient learns best by verbal and visual instruction.   Needed: No.   Highest education level: Associate/Bachelor Degree    Vocation/Disability   .  Working for Income: No  If no, reason not working: Patient Choice - Retired  Patient is retired from being a  at [employer] " [employer].    Adherence     Patient reports a high level of adherence to patients health care regimen.  Adherence counseling and education provided. Patient verbalizes understanding.    Substance Use    Patient reports the following substance usage.    Tobacco: none, patient denies any use.  Quit 36 years ago and smoked 1-2 ppd for 6-8 years prior to quitting.  Alcohol: none, patient denies any use.  Quit drinking in  and reports drinking 2-3 beers per day prior to quitting.  Illicit Drugs/Non-prescribed Medications: none, patient denies any use.  Patient states clear understanding of the potential impact of substance use.  Substance abstinence/cessation counseling, education and resources provided and reviewed.     Services Utilizing/ADLS    Infusion Service: Prior to admission, patient utilizing? no  Home Health: Prior to admission, patient utilizing? no  DME: Prior to admission, yes - BP cuff  Pulmonary/Cardiac Rehab: Prior to admission, no  Dialysis:  Prior to admission, no - prior to txp, HD in-center MWF at [facility]]  Transplant Specialty Pharmacy:  Prior to admission, yes; [facility].    Prior to admission, patient reports patient was independent with ADLS and was driving.  Patient reports patient is not able to care for self at this time due to compromised medical condition (as documented in medical record) and physical weakness..  Patient indicates a willingness to care for self once medically cleared to do so.    Insurance/Medications    Insured by   Payer/Plan Subscr  Sex Relation Sub. Ins. ID Effective Group Num   1. MEDICARE - ME* BRIANAANUJ 1950 Male Self [id number]                                    [address]   2. PHYSICIANS MU* AUNJ WARREN 1950 Male Self [id number]                                    [address]     Primary Insurance (for UNOS reporting): Public Insurance - Medicare FFS (Fee For Service)  Secondary Insurance (for UNOS  reporting): Public Insurance - Medicare FFS (Fee For Service) - [facility] [place] Medicare Supplement    Patient reports patient is able to obtain and afford medications at this time and at time of discharge.    Living Will/Healthcare Power of     Patient states patient does not have a LW and/or HCPA.   provided education regarding LW and HCPA and the completion of forms.    Coping/Mental Health    Patient is coping adequately with the aid of  family members.  Patient denies mental health difficulties.     Discharge Planning    At time of discharge, patient plans to return to patient's home under the care of pt's wife/sister.  Patients wife will transport patient.  Per rounds today, expected discharge date has not been medically determined at this time. Patient and patients caregiver verbalize understanding and are involved in treatment planning and discharge process.    Additional Concerns    Patient's caretaker denies additional needs and/or concerns at this time. Patient is being followed for needs, education, resources, information, emotional support, supportive counseling, and for supportive and skilled discharge plan of care.  providing ongoing psychosocial support, education, resources and d/c planning as needed.  SW remains available.  remains available. Patient's caregiver verbalizes understanding and agreement with information reviewed,  availability and how to access available resources as needed. Patient denies additional needs and/or concerns at this time. Patient verbalizes understanding and agreement with information reviewed, social work availability, and how to access available resources as needed.

## 2022-02-21 NOTE — PROGRESS NOTES
[facility] - Transplant Stepdown  Infectious Disease  Progress Note    Patient Name: Ronal Mazariegos  MRN: [id number]  Admission Date: 2/16/2022  Length of Stay: 5 days  Attending Physician: Sunday Rosa DO  Primary Care Provider: Primary Doctor No    Isolation Status: No active isolations  Assessment/Plan:      * Intracranial abscess  71M hx polycystic kidney sidease s/p DBD KTx 5/2021 (CMV D+/R=, HCV NAWAF +, Simulect induction, CIT ~ 8 hours) on tacro/pred), Afib on AC, Polycythemia vera (09/2021), undergoing evaluation for newly diagnosed infiltrative cardiomyopathy, who presented after worsening L sided neurologic deficits and fall at home. 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 CT C/A/P notes a RML lesion- suspect lung lesion is also nocardia- Cardiac MRI done at OSH 02/09 Findings are consistent with infiltrative cardiomyopathy.  Amyloid would be in the differential. TTE done no gross evidence of vegetatation. Abnormal thickening of the aortic root seen in SAX that is unchanged from the prior study.      Recommendations:    - Continue IV imipenem 1000mg Q8H ( if unable to approve imipenem for outpatient therapy, ok to use meropenem).  - OK to transition IV bactrim to PO.  - Agree with cardiology evaluation and possible cardiac biopsy for further diagnose infiltrative CM see on Cardiac MRI   - repeat CT in 3 months to reassess Lung lesion   - Patient will need 6-12 months of antibiotic therapy, regimen and duration will be tailored in clinic based on sensitivity.     Outpatient Antibiotic Therapy Plan:    Please send referral to [facility].    1) Infection: Nocardiosis (brain/pulmonary)    2) Discharge Antibiotics:    Intravenous antibiotics:  · imipenem-cilastatin 1000 mg IV Q8  Oral antibiotics:   Bactrim 2 DS Q8.    3) Therapy Duration:  2 months     Estimated end date of IV antibiotics: 04/18/2022    4) Outpatient Weekly  Labs:    Order the following labs to be drawn on Mondays:    CBC   CMP          5) Fax Lab Results to Infectious Diseases Provider: Aaron    [facility] Fax Number: [phone]    6) Outpatient Infectious Diseases Follow-up     Follow-up appointment will be arranged by the ID clinic and will be found in the patient's appointments tab.     Prior to discharge, please ensure the patient's follow-up has been scheduled.     If there is still no follow-up scheduled prior to discharge, please send an EPIC message to Eliane Quintero in Infectious Diseases.                    Thank you for your consult. I will sign off. Please contact us if you have any additional questions.    Aaron House MD  Infectious Disease  [facility] - Transplant Stepdown    Subjective:     Principal Problem:Intracranial abscess    HPI: Ronal Mazariegos is a 70 y/o male with a hx of PCK s/p DBD 5/2021 (CMV D+/R=, HCV NAWAF +, Simulect induction, CIT ~ 8 hours) on tacro/pred), Afib on AC, Polycythemia vera (09/2021) ?infiltrative cardiomyopathy presented to ED following a fall found to have right frontal mass was transferred to [facility] now s/p craniotomy (OP note aurelio pus) cultures sent pending (GS negative).    Patient report feeling well until couple of days prior to his fall when he felt his coordination with left side wasn't normal, until he had his fall - he denies any fever/chills/N/V/diarrhea- no sinus pain/ HA blurry vision.    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  No recent travel or sick contact.  Has not had any dental procedure since transplant- he does floss but no bleeding noticed.      ID consulted for brain abscess.      Interval History:  no acute changes   Review of Systems   Constitutional:  Negative for activity change, appetite change, chills and fever.   HENT:  Negative for congestion, dental problem, ear pain and rhinorrhea.    Eyes:  Negative for pain and discharge.    Respiratory:  Negative for cough and shortness of breath.    Cardiovascular:  Negative for chest pain and leg swelling.   Gastrointestinal:  Negative for abdominal distention, abdominal pain, constipation, diarrhea, nausea and vomiting.   Genitourinary:  Negative for difficulty urinating and dysuria.   Musculoskeletal:  Negative for arthralgias, back pain and joint swelling.   Skin:  Negative for rash and wound.   Neurological:  Negative for dizziness, light-headedness and headaches.   Psychiatric/Behavioral:  Negative for behavioral problems and confusion.    Objective:     Vital Signs (Most Recent):  Temp: 97.5 °F (36.4 °C) (02/21/22 0755)  Pulse: 95 (02/21/22 0755)  Resp: 17 (02/21/22 0755)  BP: 118/68 (02/21/22 0755)  SpO2: 97 % (02/21/22 0755) Vital Signs (24h Range):  Temp:  [97.5 °F (36.4 °C)-98.4 °F (36.9 °C)] 97.5 °F (36.4 °C)  Pulse:  [] 95  Resp:  [11-21] 17  SpO2:  [94 %-99 %] 97 %  BP: (111-139)/() 118/68     Weight: 85 kg (187 lb 6.3 oz)  Body mass index is 24.06 kg/m².    Estimated Creatinine Clearance: 73.7 mL/min (based on SCr of 1.1 mg/dL).    Physical Exam  Constitutional:       General: He is not in acute distress.     Appearance: Normal appearance. He is well-developed. He is not ill-appearing or diaphoretic.   HENT:      Head: Normocephalic and atraumatic.      Right Ear: External ear normal.      Left Ear: External ear normal.      Nose: Nose normal.   Eyes:      General: No scleral icterus.        Right eye: No discharge.         Left eye: No discharge.      Extraocular Movements: Extraocular movements intact.      Conjunctiva/sclera: Conjunctivae normal.   Pulmonary:      Effort: Pulmonary effort is normal. No respiratory distress.      Breath sounds: No stridor.   Skin:     General: Skin is dry.      Coloration: Skin is not jaundiced or pale.      Findings: No erythema.   Neurological:      General: No focal deficit present.      Mental Status: He is alert and oriented to  person, place, and time. Mental status is at baseline.   Psychiatric:         Mood and Affect: Mood normal.         Behavior: Behavior normal.         Thought Content: Thought content normal.         Judgment: Judgment normal.       Significant Labs: CBC:   Recent Labs   Lab 02/20/22  0516 02/21/22  0713   WBC 3.99 3.90   HGB 18.0 18.4*   HCT 53.0 56.2*    179       CMP:   Recent Labs   Lab 02/20/22  0516 02/21/22  0713   * 133*   K 4.4 4.1    100   CO2 22* 24   * 123*   BUN 20 23   CREATININE 0.8 1.1   CALCIUM 8.9 9.0   PROT 5.2* 5.6*   ALBUMIN 2.4* 2.6*   BILITOT 1.1* 1.2*   ALKPHOS 66 69   AST 15 24   ALT 22 29   ANIONGAP 7* 9   EGFRNONAA >60.0 >60.0       Microbiology Results (last 7 days)       Procedure Component Value Units Date/Time    Blood culture [[id number]] Collected: 02/16/22 2307    Order Status: Completed Specimen: Blood from Peripheral, Ankle, Right Updated: 02/21/22 0613     Blood Culture, Routine No Growth to date      No Growth to date      No Growth to date      No Growth to date      No Growth to date    Narrative:      Blood cultures from 2 different sites. 4 bottles total.  Please draw before starting antibiotics.    Blood culture [[id number]] Collected: 02/16/22 2247    Order Status: Completed Specimen: Blood from Peripheral, Hand, Right Updated: 02/21/22 0613     Blood Culture, Routine No Growth to date      No Growth to date      No Growth to date      No Growth to date      No Growth to date    Narrative:      Blood cultures x 2 different sites. 4 bottles total. Please  draw cultures before administering antibiotics.    Aerobic culture [[id number]]  (Abnormal) Collected: 02/17/22 0238    Order Status: Completed Specimen: Abscess from Brain Updated: 02/19/22 1318     Aerobic Bacterial Culture Results called to and read back by:Rina Nunez RN 02/19/2022  13:17      NOCARDIA SPECIES  Moderate  further identified as Nocardia nova  For susceptibility see order  #[id number]      Narrative:      2) Intracerebral Abscess    Aerobic culture [[id number]]  (Abnormal) Collected: 02/17/22 0238    Order Status: Completed Specimen: Abscess from Brain Updated: 02/19/22 1316     Aerobic Bacterial Culture Results called to and read back by:Rina Nunez RN 02/19/2022  13:15      NOCARDIA SPECIES  Moderate  further identified as Nocardia nova  Susceptibility pending      Narrative:      1) Intracerebral Abscess    Modified Acid Fast Stain For Nocardia [[id number]] Collected: 02/17/22 0238    Order Status: Completed Specimen: Brain Updated: 02/18/22 2041     Modified Acid Fast smear No partially acid fast organisms seen    Narrative:      add-on MAFST per Aaron House MD  02/18/2022  14:40 ORD#[id number]    Cryptococcal antigen [[id number]] Collected: 02/18/22 2016    Order Status: Sent Specimen: Blood, Venous Updated: 02/18/22 2040    Narrative:      Collection has been rescheduled by JMFarzana at 02/18/2022 19:56 Reason:   Due in Am per nurse......    AFB Culture & Smear [[id number]] Collected: 02/17/22 0238    Order Status: Completed Specimen: Abscess from Brain Updated: 02/18/22 1515     AFB Culture & Smear Culture in progress     AFB CULTURE STAIN No acid fast bacilli seen.    Narrative:      1) Intracerebral Abscess    AFB Culture & Smear [[id number]] Collected: 02/17/22 0238    Order Status: Completed Specimen: Abscess from Brain Updated: 02/18/22 1515     AFB Culture & Smear Culture in progress     AFB CULTURE STAIN No acid fast bacilli seen.    Narrative:      2) Intracerebral Abscess    Modified Acid Fast Stain For Nocardia [[id number]]     Order Status: Completed Specimen: Abscess     Modified Acid Fast Stain For Nocardia [[id number]]     Order Status: Completed Specimen: Abscess     Culture, Anaerobe [[id number]] Collected: 02/17/22 0238    Order Status: Completed Specimen: Abscess from Brain Updated: 02/18/22 0854     Anaerobic Culture Culture in progress    Narrative:      1)  Intracerebral Abscess    Culture, Anaerobe [[id number]] Collected: 02/17/22 0238    Order Status: Completed Specimen: Abscess from Brain Updated: 02/18/22 0854     Anaerobic Culture Culture in progress    Narrative:      2) Intracerebral Abscess    Toxoplasma Gondii, DNA (Qual) [[id number]] Collected: 02/18/22 0603    Order Status: Completed Specimen: Blood Updated: 02/18/22 0609     Toxoplasma gondii DNA, Source blood    Modified Acid Fast Stain For Nocardia [[id number]]     Order Status: Completed Specimen: Abscess     Gram stain [[id number]] Collected: 02/17/22 0238    Order Status: Completed Specimen: Abscess from Brain Updated: 02/17/22 1031     Gram Stain Result Many WBC's      No epithelial cells      No organisms seen    Narrative:      2) Intracerebral Abscess    Gram stain [[id number]] Collected: 02/17/22 0238    Order Status: Completed Specimen: Abscess from Brain Updated: 02/17/22 0753     Gram Stain Result Rare WBC's      No organisms seen      No epithelial cells    Narrative:      1) Intracerebral Abscess    Fungus culture [[id number]] Collected: 02/17/22 0238    Order Status: Sent Specimen: Abscess from Brain Updated: 02/17/22 0303    Fungus culture [[id number]] Collected: 02/17/22 0238    Order Status: Sent Specimen: Abscess from Brain Updated: 02/17/22 0301            Significant Imaging: I have reviewed all pertinent imaging results/findings within the past 24 hours.

## 2022-02-21 NOTE — NURSING
Pt arrived to TSU bed 52712. AAOx4, VSS. DEA 22g PIV noted to be infiltrated. New RFA 20g PIV obtained. Incision to right forehead from craniotomy. Staples intact. Bruising from fall and multiple skin tears on BUE. Pt oriented to room and plan of care reviewed. Wife at bedside attentive to pt needs.

## 2022-02-21 NOTE — ASSESSMENT & PLAN NOTE
- Transferred from OSH for R frontal lobe abscess.   -S/p craniotomy and abscess drainage 2/17.  - ID following. Abscess cultures growing Nocardia  - Continue imipenem and bactrim per ID recs   - F/u remaining blood and surgical cultures  - No evidence of vegetations on TTE

## 2022-02-21 NOTE — SUBJECTIVE & OBJECTIVE
Interval History:  no acute changes   Review of Systems   Constitutional:  Negative for activity change, appetite change, chills and fever.   HENT:  Negative for congestion, dental problem, ear pain and rhinorrhea.    Eyes:  Negative for pain and discharge.   Respiratory:  Negative for cough and shortness of breath.    Cardiovascular:  Negative for chest pain and leg swelling.   Gastrointestinal:  Negative for abdominal distention, abdominal pain, constipation, diarrhea, nausea and vomiting.   Genitourinary:  Negative for difficulty urinating and dysuria.   Musculoskeletal:  Negative for arthralgias, back pain and joint swelling.   Skin:  Negative for rash and wound.   Neurological:  Negative for dizziness, light-headedness and headaches.   Psychiatric/Behavioral:  Negative for behavioral problems and confusion.    Objective:     Vital Signs (Most Recent):  Temp: 97.5 °F (36.4 °C) (02/21/22 0755)  Pulse: 95 (02/21/22 0755)  Resp: 17 (02/21/22 0755)  BP: 118/68 (02/21/22 0755)  SpO2: 97 % (02/21/22 0755) Vital Signs (24h Range):  Temp:  [97.5 °F (36.4 °C)-98.4 °F (36.9 °C)] 97.5 °F (36.4 °C)  Pulse:  [] 95  Resp:  [11-21] 17  SpO2:  [94 %-99 %] 97 %  BP: (111-139)/() 118/68     Weight: 85 kg (187 lb 6.3 oz)  Body mass index is 24.06 kg/m².    Estimated Creatinine Clearance: 73.7 mL/min (based on SCr of 1.1 mg/dL).    Physical Exam  Constitutional:       General: He is not in acute distress.     Appearance: Normal appearance. He is well-developed. He is not ill-appearing or diaphoretic.   HENT:      Head: Normocephalic and atraumatic.      Right Ear: External ear normal.      Left Ear: External ear normal.      Nose: Nose normal.   Eyes:      General: No scleral icterus.        Right eye: No discharge.         Left eye: No discharge.      Extraocular Movements: Extraocular movements intact.      Conjunctiva/sclera: Conjunctivae normal.   Pulmonary:      Effort: Pulmonary effort is normal. No respiratory  distress.      Breath sounds: No stridor.   Skin:     General: Skin is dry.      Coloration: Skin is not jaundiced or pale.      Findings: No erythema.   Neurological:      General: No focal deficit present.      Mental Status: He is alert and oriented to person, place, and time. Mental status is at baseline.   Psychiatric:         Mood and Affect: Mood normal.         Behavior: Behavior normal.         Thought Content: Thought content normal.         Judgment: Judgment normal.       Significant Labs: CBC:   Recent Labs   Lab 02/20/22  0516 02/21/22  0713   WBC 3.99 3.90   HGB 18.0 18.4*   HCT 53.0 56.2*    179       CMP:   Recent Labs   Lab 02/20/22  0516 02/21/22  0713   * 133*   K 4.4 4.1    100   CO2 22* 24   * 123*   BUN 20 23   CREATININE 0.8 1.1   CALCIUM 8.9 9.0   PROT 5.2* 5.6*   ALBUMIN 2.4* 2.6*   BILITOT 1.1* 1.2*   ALKPHOS 66 69   AST 15 24   ALT 22 29   ANIONGAP 7* 9   EGFRNONAA >60.0 >60.0       Microbiology Results (last 7 days)       Procedure Component Value Units Date/Time    Blood culture [[id number]] Collected: 02/16/22 2307    Order Status: Completed Specimen: Blood from Peripheral, Ankle, Right Updated: 02/21/22 0613     Blood Culture, Routine No Growth to date      No Growth to date      No Growth to date      No Growth to date      No Growth to date    Narrative:      Blood cultures from 2 different sites. 4 bottles total.  Please draw before starting antibiotics.    Blood culture [[id number]] Collected: 02/16/22 2247    Order Status: Completed Specimen: Blood from Peripheral, Hand, Right Updated: 02/21/22 0613     Blood Culture, Routine No Growth to date      No Growth to date      No Growth to date      No Growth to date      No Growth to date    Narrative:      Blood cultures x 2 different sites. 4 bottles total. Please  draw cultures before administering antibiotics.    Aerobic culture [[id number]]  (Abnormal) Collected: 02/17/22 0238    Order Status: Completed  Specimen: Abscess from Brain Updated: 02/19/22 1318     Aerobic Bacterial Culture Results called to and read back by:Rina Nunez RN 02/19/2022  13:17      NOCARDIA SPECIES  Moderate  further identified as Nocardia nova  For susceptibility see order #[id number]      Narrative:      2) Intracerebral Abscess    Aerobic culture [[id number]]  (Abnormal) Collected: 02/17/22 0238    Order Status: Completed Specimen: Abscess from Brain Updated: 02/19/22 1316     Aerobic Bacterial Culture Results called to and read back by:Rina Nunez RN 02/19/2022  13:15      NOCARDIA SPECIES  Moderate  further identified as Nocardia nova  Susceptibility pending      Narrative:      1) Intracerebral Abscess    Modified Acid Fast Stain For Nocardia [[id number]] Collected: 02/17/22 0238    Order Status: Completed Specimen: Brain Updated: 02/18/22 2041     Modified Acid Fast smear No partially acid fast organisms seen    Narrative:      add-on MAFST per Aaron House MD  02/18/2022  14:40 ORD#[id number]    Cryptococcal antigen [[id number]] Collected: 02/18/22 2016    Order Status: Sent Specimen: Blood, Venous Updated: 02/18/22 2040    Narrative:      Collection has been rescheduled by Farzana at 02/18/2022 19:56 Reason:   Due in Am per nurse......    AFB Culture & Smear [[id number]] Collected: 02/17/22 0238    Order Status: Completed Specimen: Abscess from Brain Updated: 02/18/22 1515     AFB Culture & Smear Culture in progress     AFB CULTURE STAIN No acid fast bacilli seen.    Narrative:      1) Intracerebral Abscess    AFB Culture & Smear [[id number]] Collected: 02/17/22 0238    Order Status: Completed Specimen: Abscess from Brain Updated: 02/18/22 1515     AFB Culture & Smear Culture in progress     AFB CULTURE STAIN No acid fast bacilli seen.    Narrative:      2) Intracerebral Abscess    Modified Acid Fast Stain For Nocardia [[id number]]     Order Status: Completed Specimen: Abscess     Modified Acid Fast Stain For Nocardia [[id number]]      Order Status: Completed Specimen: Abscess     Culture, Anaerobe [[id number]] Collected: 02/17/22 0238    Order Status: Completed Specimen: Abscess from Brain Updated: 02/18/22 0854     Anaerobic Culture Culture in progress    Narrative:      1) Intracerebral Abscess    Culture, Anaerobe [[id number]] Collected: 02/17/22 0238    Order Status: Completed Specimen: Abscess from Brain Updated: 02/18/22 0854     Anaerobic Culture Culture in progress    Narrative:      2) Intracerebral Abscess    Toxoplasma Gondii, DNA (Qual) [[id number]] Collected: 02/18/22 0603    Order Status: Completed Specimen: Blood Updated: 02/18/22 0609     Toxoplasma gondii DNA, Source blood    Modified Acid Fast Stain For Nocardia [[id number]]     Order Status: Completed Specimen: Abscess     Gram stain [[id number]] Collected: 02/17/22 0238    Order Status: Completed Specimen: Abscess from Brain Updated: 02/17/22 1031     Gram Stain Result Many WBC's      No epithelial cells      No organisms seen    Narrative:      2) Intracerebral Abscess    Gram stain [[id number]] Collected: 02/17/22 0238    Order Status: Completed Specimen: Abscess from Brain Updated: 02/17/22 0753     Gram Stain Result Rare WBC's      No organisms seen      No epithelial cells    Narrative:      1) Intracerebral Abscess    Fungus culture [[id number]] Collected: 02/17/22 0238    Order Status: Sent Specimen: Abscess from Brain Updated: 02/17/22 0303    Fungus culture [[id number]] Collected: 02/17/22 0238    Order Status: Sent Specimen: Abscess from Brain Updated: 02/17/22 0301            Significant Imaging: I have reviewed all pertinent imaging results/findings within the past 24 hours.

## 2022-02-21 NOTE — SUBJECTIVE & OBJECTIVE
Interval History: POD 4. NAEON. Afebrile, VSS. Neuro exam is stable with continued left sided weakness. Denies new focal weakness, numbness, confusion, visual disturbances, vomiting. He c/o mild headaches relieved with PO medication and nausea after taking his antibiotics. Transferred to kidney transplant team service today for medical management.     Medications:  Continuous Infusions:  Scheduled Meds:   clotrimazole-betamethasone 1-0.05%   Topical (Top) BID    famotidine  20 mg Oral QHS    furosemide  40 mg Oral Daily    heparin (porcine)  5,000 Units Subcutaneous Q8H    imipenem-cilastatin  1,000 mg Intravenous Q8H    insulin aspart U-100  3 Units Subcutaneous TIDWM    metoprolol tartrate  25 mg Oral BID    mupirocin   Nasal BID    polyethylene glycol  17 g Oral Daily    predniSONE  5 mg Oral BID    [START ON 2/23/2022] predniSONE  5 mg Oral Daily    senna-docusate 8.6-50 mg  1 tablet Oral BID    trimethoprim-sulfamethoxasole (BACTRIM) IVPB (fixed ratio)  15 mg/kg/day (Dosing Weight) Intravenous Q8H    tacrolimus  2 mg Oral BID     PRN Meds:acetaminophen, dextrose 10%, dextrose 10%, dextrose 10%, glucagon (human recombinant), glucose, glucose, insulin aspart U-100, iohexol, ondansetron, oxyCODONE, sodium chloride 0.9%       Objective:     Weight: 85 kg (187 lb 6.3 oz)  Body mass index is 24.06 kg/m².  Vital Signs (Most Recent):  Temp: 98 °F (36.7 °C) (02/21/22 1153)  Pulse: 101 (02/21/22 1100)  Resp: 17 (02/21/22 0755)  BP: 118/68 (02/21/22 0755)  SpO2: 97 % (02/21/22 0755) Vital Signs (24h Range):  Temp:  [97.5 °F (36.4 °C)-98.4 °F (36.9 °C)] 98 °F (36.7 °C)  Pulse:  [] 101  Resp:  [11-21] 17  SpO2:  [94 %-99 %] 97 %  BP: (112-139)/() 118/68     Date 02/21/22 0700 - 02/22/22 0659   Shift 7910-9995 6539-9817 7735-9010 24 Hour Total   INTAKE   P.O. 480   480   IV Piggyback 250   250   Shift Total(mL/kg) 730(8.6)   730(8.6)   OUTPUT   Urine(mL/kg/hr) 1900   1900   Shift Total(mL/kg) 1900(22.4)    1900(22.4)   Weight (kg) 85 85 85 85                        Hemodialysis AV Fistula Left forearm (Active)   Site Assessment Clean 02/21/22 0755   Patency Present;Thrill;Bruit 02/21/22 0755   Status Deaccessed 02/21/22 0755   Dressing Status Clean;Dry 02/20/22 2000   Site Condition No complications 02/21/22 0755   Dressing Open to air (None) 02/21/22 0755     Neurosurgery Physical Exam  General: Well developed, well nourished, not in acute distress.   Head: Normocephalic.  Neck: Full ROM.   Neurologic: Alert and oriented x 4. Thought content appropriate.  GCS: Motor:6  Verbal:5  Eyes:4   GCS Total: 15  Language: No aphasia.  Speech: No dysarthria.  Cranial nerves: Face symmetric, tongue midline, CN II-XII grossly intact.   Eyes: Pupils equal, round, reactive to light with accomodation, EOMI.   Pulmonary: Normal respirations, no signs of respiratory distress.  Abdomen: Soft, non-distended, non-tender to palpation.  Sensory: Intact to light touch throughout.  Motor Strength: Moves all extremities spontaneously with good tone. No abnormal movements seen.     Strength  Deltoids Triceps Biceps Wrist Extension Wrist Flexion Hand    Upper: R 5/5 5/5 5/5 5/5 5/5 5/5    L 4/5 4/5 4/5 4/5 4/5 4/5     Hip Flexion Knee Extension Knee  Flexion Dorsiflexion Plantar flexion EHL   Lower: R 5/5 5/5 5/5 5/5 5/5 5/5    L 4/5 4/5 4/5 4/5 4/5 4/5     Pronator Drift: No drift noted.  Finger-to-nose: Intact bilaterally.  Skin: Skin is warm, dry and intact.    Right sided cranial incision c/d/i with skin edges well approximated with staples. No surrounding erythema or edema. No drainage from incision. No palpable hematoma or underlying fluid collection.    Significant Labs:  Recent Labs   Lab 02/20/22  0516 02/21/22  0713   * 123*   * 133*   K 4.4 4.1    100   CO2 22* 24   BUN 20 23   CREATININE 0.8 1.1   CALCIUM 8.9 9.0     Recent Labs   Lab 02/20/22  0516 02/21/22  0713   WBC 3.99 3.90   HGB 18.0 18.4*   HCT 53.0  56.2*    179     No results for input(s): LABPT, INR, APTT in the last 48 hours.  Microbiology Results (last 7 days)       Procedure Component Value Units Date/Time    Aerobic culture [[id number]]  (Abnormal) Collected: 02/17/22 0238    Order Status: Completed Specimen: Abscess from Brain Updated: 02/21/22 1222     Aerobic Bacterial Culture Results called to and read back by:Rina Nunez RN 02/19/2022  13:17      NOCARDIA SPECIES  Moderate  further identified as Nocardia nova  For susceptibility see order #[id number]      Narrative:      2) Intracerebral Abscess    Aerobic culture [[id number]]  (Abnormal) Collected: 02/17/22 0238    Order Status: Completed Specimen: Abscess from Brain Updated: 02/21/22 1222     Aerobic Bacterial Culture Results called to and read back by:Rina Nunez RN 02/19/2022  13:15      NOCARDIA SPECIES  Moderate  further identified as Nocardia nova  Susceptibility pending      Narrative:      1) Intracerebral Abscess    Fungus culture [[id number]] Collected: 02/17/22 0238    Order Status: Completed Specimen: Abscess from Brain Updated: 02/21/22 1150     Fungus (Mycology) Culture Culture in progress    Narrative:      1) Intracerebral Abscess    Fungus culture [[id number]] Collected: 02/17/22 0238    Order Status: Completed Specimen: Abscess from Brain Updated: 02/21/22 1150     Fungus (Mycology) Culture Culture in progress    Narrative:      2) Intracerebral Abscess    Blood culture [[id number]] Collected: 02/16/22 2307    Order Status: Completed Specimen: Blood from Peripheral, Ankle, Right Updated: 02/21/22 0613     Blood Culture, Routine No Growth to date      No Growth to date      No Growth to date      No Growth to date      No Growth to date    Narrative:      Blood cultures from 2 different sites. 4 bottles total.  Please draw before starting antibiotics.    Blood culture [[id number]] Collected: 02/16/22 2247    Order Status: Completed Specimen: Blood from Peripheral, Hand,  Right Updated: 02/21/22 0613     Blood Culture, Routine No Growth to date      No Growth to date      No Growth to date      No Growth to date      No Growth to date    Narrative:      Blood cultures x 2 different sites. 4 bottles total. Please  draw cultures before administering antibiotics.    Modified Acid Fast Stain For Nocardia [[id number]] Collected: 02/17/22 0238    Order Status: Completed Specimen: Brain Updated: 02/18/22 2041     Modified Acid Fast smear No partially acid fast organisms seen    Narrative:      add-on MAFST per Aaron House MD  02/18/2022  14:40 ORD#[id number]    Cryptococcal antigen [[id number]] Collected: 02/18/22 2016    Order Status: Sent Specimen: Blood, Venous Updated: 02/18/22 2040    Narrative:      Collection has been rescheduled by DANIELE at 02/18/2022 19:56 Reason:   Due in Am per nurse......    AFB Culture & Smear [[id number]] Collected: 02/17/22 0238    Order Status: Completed Specimen: Abscess from Brain Updated: 02/18/22 1515     AFB Culture & Smear Culture in progress     AFB CULTURE STAIN No acid fast bacilli seen.    Narrative:      1) Intracerebral Abscess    AFB Culture & Smear [[id number]] Collected: 02/17/22 0238    Order Status: Completed Specimen: Abscess from Brain Updated: 02/18/22 1515     AFB Culture & Smear Culture in progress     AFB CULTURE STAIN No acid fast bacilli seen.    Narrative:      2) Intracerebral Abscess    Modified Acid Fast Stain For Nocardia [[id number]]     Order Status: Completed Specimen: Abscess     Modified Acid Fast Stain For Nocardia [[id number]]     Order Status: Completed Specimen: Abscess     Culture, Anaerobe [[id number]] Collected: 02/17/22 0238    Order Status: Completed Specimen: Abscess from Brain Updated: 02/18/22 0854     Anaerobic Culture Culture in progress    Narrative:      1) Intracerebral Abscess    Culture, Anaerobe [[id number]] Collected: 02/17/22 0238    Order Status: Completed Specimen: Abscess from Brain Updated: 02/18/22 0854      Anaerobic Culture Culture in progress    Narrative:      2) Intracerebral Abscess    Toxoplasma Gondii, DNA (Qual) [[id number]] Collected: 02/18/22 0603    Order Status: Completed Specimen: Blood Updated: 02/18/22 0609     Toxoplasma gondii DNA, Source blood    Modified Acid Fast Stain For Nocardia [[id number]]     Order Status: Completed Specimen: Abscess     Gram stain [[id number]] Collected: 02/17/22 0238    Order Status: Completed Specimen: Abscess from Brain Updated: 02/17/22 1031     Gram Stain Result Many WBC's      No epithelial cells      No organisms seen    Narrative:      2) Intracerebral Abscess    Gram stain [[id number]] Collected: 02/17/22 0238    Order Status: Completed Specimen: Abscess from Brain Updated: 02/17/22 0753     Gram Stain Result Rare WBC's      No organisms seen      No epithelial cells    Narrative:      1) Intracerebral Abscess          All pertinent labs from the last 24 hours have been reviewed.    Significant Diagnostics:  I have reviewed and interpreted all pertinent imaging results/findings within the past 24 hours.

## 2022-02-21 NOTE — PLAN OF CARE
AAOx4, VSS, SpO2 > 92% on RA.   RFA 20g PIV saline locked. Dressing CDI.   Right forehead incision ronit with staples. Approximated.   DIANE AVF positive for thrill/bruit.   BG checks achs. No SSI indicated per order.   Tele afib. Rate controlled. HR 90-110s. EKG confirmed.   IV bactrim q8h and IV Primaxin q8h per ID recs.   Moisture dermatitis on groin.   No complaints of pain.   Up with stand by assist. Wears non slip socks when oob. Free from falls/injuries.   Bed in lowest, locked position. Bed rails up x2. Call light and personal belongings within reach.   Pt educated on plan of care. Verbalized understanding.

## 2022-02-21 NOTE — SUBJECTIVE & OBJECTIVE
Subjective:   History of Present Illness:  71 year old male with ESRD secondary to polycystic kidney disease.  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  Pt reports transient episodes of left sided weakness this week, most recent episode 1 day ago, he fell due to left leg weakness when getting up from the toilet, prompting visit to the ED because he thought he was having a stroke.  In ED, has no fever/chills/aches, confusion, blurry vision, LOC, or seizures, and besides mild left sided weakness, he is asymptomatic.  MRI osh showed R sided Abscess vs mass with vasogenic edema. Transferred here for higher level of care.   KTM consulted given history of renal transplant.     BL renal functions Cr 1 to 1.3. He is not on MMF  He has had prior CMV positivity as well.   He had phlebotomy last week for polycythemia.   Native kidney US showed no mass.    Mr. Mazariegos is a 71 y.o. year old male who is status post Kidney Transplant - 5/4/2021  (#1).    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 Pt was admitted to Neuro ICU and is now s/p craniotomy with cultures + nocardia nova. ID following patient and started him on IV bactrim and imipenem. CT C/A/P notes a RML lesion which will need to be repeated in 3 months (~5/19/22). Cardiac MRI done at OSH 02/09 with finding consistent with infiltrative cardiomyopathy. ECHO with no evidence of vegitations, EF 50%, PA pressure 29. Cardiology consulted for recs. Will need MRI repeated in 4-6 weeks per Neuro Surgery.     Interval history: No acute events overnight. Pt  stepped down from ICU yesterday afternoon s/p s/p craniotomy with cultures + nocardia nova. ID following, appreciate their assistance. Cont IV bactrim and imipenem. Cardiology consulted for further recs on inpatient treatment -  r/o nocardia involvement in regards to infiltrative cardiomyopathy. Pt with hx of afib, plan to restart eliquis Friday (2/25). Patient has no complaints. PT/OT following, recommend HH. Will continue to monitor.       Past Medical, Surgical, Family, and Social History:   Unchanged from H&P.    Scheduled Meds:   clotrimazole-betamethasone 1-0.05%   Topical (Top) BID    famotidine  20 mg Oral QHS    furosemide  40 mg Oral Daily    heparin (porcine)  5,000 Units Subcutaneous Q8H    imipenem-cilastatin  1,000 mg Intravenous Q8H    insulin aspart U-100  3 Units Subcutaneous TIDWM    metoprolol tartrate  25 mg Oral BID    mupirocin   Nasal BID    polyethylene glycol  17 g Oral Daily    predniSONE  5 mg Oral BID    [START ON 2/23/2022] predniSONE  5 mg Oral Daily    senna-docusate 8.6-50 mg  1 tablet Oral BID    trimethoprim-sulfamethoxasole (BACTRIM) IVPB (fixed ratio)  15 mg/kg/day (Dosing Weight) Intravenous Q8H    tacrolimus  2 mg Oral BID     Continuous Infusions:  PRN Meds:acetaminophen, dextrose 10%, dextrose 10%, dextrose 10%, glucagon (human recombinant), glucose, glucose, insulin aspart U-100, iohexol, ondansetron, oxyCODONE, sodium chloride 0.9%    Intake/Output - Last 3 Shifts         02/19 0700 02/20 0659 02/20 0700 02/21 0659 02/21 0700 02/22 0659    P.O. 800 360 480    I.V. (mL/kg)  0 (0)     Other  0     IV Piggyback  3213.9     Total Intake(mL/kg) 800 (10) 3573.9 (42) 480 (5.6)    Urine (mL/kg/hr) 750 (0.4) 1250 (0.6)     Emesis/NG output  0     Other  0     Stool  0     Blood  0     Total Output 750 1250     Net +50 +2323.9 +480           Urine Occurrence  0 x     Stool Occurrence  0 x     Emesis Occurrence  0 x              Review of Systems   Constitutional:  Negative for  "activity change, appetite change, chills and fever.   HENT:  Negative for congestion, dental problem, ear pain and rhinorrhea.    Eyes:  Negative for pain and discharge.   Respiratory:  Negative for cough and shortness of breath.    Cardiovascular:  Negative for chest pain and leg swelling.   Gastrointestinal:  Negative for abdominal distention, abdominal pain, constipation, diarrhea, nausea and vomiting.   Genitourinary:  Negative for difficulty urinating and dysuria.   Musculoskeletal:  Negative for arthralgias, back pain and joint swelling.   Skin:  Negative for rash and wound.   Allergic/Immunologic: Positive for immunocompromised state.   Neurological:  Positive for weakness (L side, minimal - improving). Negative for dizziness, light-headedness and headaches.   Psychiatric/Behavioral:  Negative for behavioral problems and confusion. The patient is not nervous/anxious.     Objective:     Vital Signs (Most Recent):  Temp: 98 °F (36.7 °C) (02/21/22 1153)  Pulse: 101 (02/21/22 1100)  Resp: 17 (02/21/22 0755)  BP: 118/68 (02/21/22 0755)  SpO2: 97 % (02/21/22 0755) Vital Signs (24h Range):  Temp:  [97.5 °F (36.4 °C)-98.4 °F (36.9 °C)] 98 °F (36.7 °C)  Pulse:  [] 101  Resp:  [11-21] 17  SpO2:  [94 %-99 %] 97 %  BP: (112-139)/() 118/68     Weight: 85 kg (187 lb 6.3 oz)  Height: 6' 2" (188 cm)  Body mass index is 24.06 kg/m².    Physical Exam  Vitals and nursing note reviewed.   Constitutional:       General: He is not in acute distress.     Appearance: He is not toxic-appearing or diaphoretic.   HENT:      Head: Normocephalic.      Comments: R sided craniotomy incision CDI with staples      Mouth/Throat:      Mouth: Mucous membranes are moist.   Cardiovascular:      Rate and Rhythm: Normal rate and regular rhythm.   Pulmonary:      Effort: Pulmonary effort is normal. No respiratory distress.   Abdominal:      Palpations: Abdomen is soft.      Tenderness: There is no guarding.   Musculoskeletal:      " Cervical back: Neck supple. No rigidity.   Skin:     General: Skin is warm and dry.   Neurological:      Mental Status: He is alert and oriented to person, place, and time. Mental status is at baseline.      Motor: Weakness (L hand) present.   Psychiatric:         Mood and Affect: Mood normal.         Behavior: Behavior normal.       Laboratory:  CBC:   Recent Labs   Lab 02/19/22  0505 02/20/22  0516 02/21/22  0713   WBC 3.67* 3.99 3.90   RBC 5.51 5.60 5.85   HGB 17.3 18.0 18.4*   HCT 53.5 53.0 56.2*    152 179   MCV 97 95 96   MCH 31.4* 32.1* 31.5*   MCHC 32.3 34.0 32.7     BMP:   Recent Labs   Lab 02/19/22  0505 02/20/22  0516 02/21/22  0713   * 197* 123*    130* 133*   K 4.5 4.4 4.1    101 100   CO2 27 22* 24   BUN 21 20 23   CREATININE 0.9 0.8 1.1   CALCIUM 9.5 8.9 9.0     Labs within the past 24 hours have been reviewed.    Diagnostic Results:  Reviewed

## 2022-02-21 NOTE — ASSESSMENT & PLAN NOTE
- Continue prograf and steroids.  - Continue to monitor prograf levels daily, monitor for toxic side effects, and adjust for therapeutic dose.   - Hold cellcept - d/t leukopenia

## 2022-02-21 NOTE — ASSESSMENT & PLAN NOTE
- Pt had cardiac MRI with infiltrative cardiomyopathy at OSH  - Cardiology consulted for further recs on inpatient treatment   - r/o nocardia involvement in regards to infiltrative cardiomyopathy  - Monitor

## 2022-02-21 NOTE — ASSESSMENT & PLAN NOTE
- Noted on TTE 10/2021 with PA systolic pressure is 52 mmHg.   - repeat TTE with PA systolic pressure is 29 mmHg  - resumed home lasix         ENDOSCOPY OPERATIVE REPORT    Patient Name:  Antonio Mabry  Medical Record #: [id number]  YOB: 1964  Date of Procedure: 6/8/2017    Preoperative Diagnosis:  Melena, acute blood loss anemia    Postoperative Diagnosis:  Clean based superfic developing peptic stricture in bulb, along this is a cratered but clean based duodenal ulcer. 2nd duodenum is unremarkable      The procedure was tolerated well and upon completion and throughtout the vital signs were stable.       Specimens:  See above

## 2022-02-21 NOTE — ASSESSMENT & PLAN NOTE
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

## 2022-02-21 NOTE — HOSPITAL COURSE
"Mr Ronal Mazariegso is a 71M s/p DBD KTx 5/2021 transferred from OSH for treatment of right frontal brain mass seen on MRI. Pt was admitted to Neuro ICU and is now s/p craniotomy 2/17 with cultures + nocardia nova. ID following patient and started him on IV bactrim and imipenem. CT C/A/P notes a RML lesion which will need to be repeated in 3 months (~5/19/22). Cardiac MRI done at OSH 02/09 with finding consistent with infiltrative cardiomyopathy. ECHO with no evidence of vegitations, EF 50%, PA pressure 29. Cardiology consulted for recs. Will need MRI repeated in 4-6 weeks per Neuro Surgery. Pt stepped down from ICU 2/21 afternoon. Patient has hx of Afib on 25 mg PO Metoprolol BID. Eliquis currently held s/p craniotomy (plan to resume 14 days post cranitomy (3/3). Rate uncontrolled overnight 2/22 (asymptomatic). Extra 12.5 mg IV Lopressor given. D/w cards 2/22. Metoprolol increased to 50 mg PO BID. Cardiology also consulted for further recs on inpatient treatment -  r/o nocardia involvement in regards to infiltrative cardiomyopathy. Cards d/w ID. Per cards, "patient does not require inpatient myocardial biopsy for diagnosis of amyloidosis or other possible infiltrative cardiomyopathy. He will need outpatient f/u for further workup including PYP scan. HR much improved 2/23. Patient has hx polycythemia onset 2021 requiring phlebotomy every few weeks (last on 2/3). D/w heme. Goal Hct 43-46%. Planned for inpatient phlebotomy. 2 pints blood taken on 2/23. Remeron started on 2/22 for poor appetite and sleep. Lasix discontinued 2/22 (previously on lasix for pulmonary HTN, but appeared dry - need to monitor closely). ID has been following patient for abx recommendations. He was switched from IV to oral Bactrim on 2/22. He was also switched from IV Imipenem to IV Meropenem on 2/23 due to greater drug stability/easier route of home administration. Approved by ID.    Interval history: No acute events overnight. Pt was planned " to be discharged yesterday patient had a near fall due to weakness when standing after phlebotomy. This morning pt hypotensive and s/o weakness and lightheadedness when standing. Pt noted to be in Afib with RVR. He was given a 500cc bolus and IV metoprolol in addtion to his PO dose. Hr now in 110-120. IVFs started. Encouraged patient to increase fluid intake. In afternoon pt reporting he is feeling better but plan to watch patient overnight. Cr trending back down (1.1 today). Tentative plan to dc patient tomorrow if clinically stable and labs appropriate. Will continue to monitor.

## 2022-02-21 NOTE — PROGRESS NOTES
[facility] - Transplant Stepdown  Kidney Transplant  Progress Note      Reason for Follow-up: Reassessment of Kidney Transplant - 5/4/2021  (#1) recipient and management of immunosuppression.    ORGAN:  RIGHT KIDNEY   Donor Type:  Donation after Brain Death       Subjective:   History of Present Illness:  71 M PMHx afib on eliquis, recent kidney transplant on 5/2021 on immunosuppressants, cardiomyopathy, pulmonary hypertension presenting to Neuro Surgery with new right frontal lobe abscess. Pt reports transient episodes of left sided weakness this week, most recent episode was this morning, he fell due to left leg weakness when getting up from the toilet, prompting visit to the ED because he thought he was having a stroke. At present, he feels well, has no fever/chills/aches, confusion, blurry vision, LOC, or seizures, and besides mild left sided weakness, he is asymptomatic.     Mr. Mazariegos is a 71 y.o. year old male who is status post Kidney Transplant - 5/4/2021  (#1).    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 Pt was admitted to Neuro ICU and is now s/p craniotomy with cultures + nocardia nova. ID following patient and started him on IV bactrim and imipenem. CT C/A/P notes a RML lesion which will need to be repeated in 3 months (~5/19/22). Cardiac MRI done at OSH 02/09 with finding consistent with infiltrative cardiomyopathy. ECHO with no evidence of vegitations, EF 50%, PA pressure 29. Cardiology consulted for recs. Will need MRI repeated in 4-6 weeks per Neuro Surgery.     Interval history: No acute events overnight. Pt stepped down from ICU yesterday afternoon s/p s/p  craniotomy with cultures + nocardia nova. ID following, appreciate their assistance. Cont IV bactrim and imipenem. Cardiology consulted for further recs on inpatient treatment -  r/o nocardia involvement in regards to infiltrative cardiomyopathy. Pt with hx of afib, cont to hold Eliquis. Patient has no complaints. PT/OT following, recommend HH. Will continue to monitor.       Past Medical, Surgical, Family, and Social History:   Unchanged from H&P.    Scheduled Meds:   clotrimazole-betamethasone 1-0.05%   Topical (Top) BID    famotidine  20 mg Oral QHS    furosemide  40 mg Oral Daily    heparin (porcine)  5,000 Units Subcutaneous Q8H    imipenem-cilastatin  1,000 mg Intravenous Q8H    insulin aspart U-100  3 Units Subcutaneous TIDWM    metoprolol tartrate  25 mg Oral BID    mupirocin   Nasal BID    polyethylene glycol  17 g Oral Daily    predniSONE  5 mg Oral BID    [START ON 2/23/2022] predniSONE  5 mg Oral Daily    senna-docusate 8.6-50 mg  1 tablet Oral BID    trimethoprim-sulfamethoxasole (BACTRIM) IVPB (fixed ratio)  15 mg/kg/day (Dosing Weight) Intravenous Q8H    tacrolimus  2 mg Oral BID     Continuous Infusions:  PRN Meds:acetaminophen, dextrose 10%, dextrose 10%, dextrose 10%, glucagon (human recombinant), glucose, glucose, insulin aspart U-100, iohexol, ondansetron, oxyCODONE, sodium chloride 0.9%    Intake/Output - Last 3 Shifts         02/19 0700  02/20 0659 02/20 0700 02/21 0659 02/21 0700  02/22 0659    P.O. 800 360 480    I.V. (mL/kg)  0 (0)     Other  0     IV Piggyback  3213.9     Total Intake(mL/kg) 800 (10) 3573.9 (42) 480 (5.6)    Urine (mL/kg/hr) 750 (0.4) 1250 (0.6)     Emesis/NG output  0     Other  0     Stool  0     Blood  0     Total Output 750 1250     Net +50 +2323.9 +480           Urine Occurrence  0 x     Stool Occurrence  0 x     Emesis Occurrence  0 x              Review of Systems   Constitutional:  Negative for activity change, appetite change, chills and fever.  "  HENT:  Negative for congestion, dental problem, ear pain and rhinorrhea.    Eyes:  Negative for pain and discharge.   Respiratory:  Negative for cough and shortness of breath.    Cardiovascular:  Negative for chest pain and leg swelling.   Gastrointestinal:  Negative for abdominal distention, abdominal pain, constipation, diarrhea, nausea and vomiting.   Genitourinary:  Negative for difficulty urinating and dysuria.   Musculoskeletal:  Negative for arthralgias, back pain and joint swelling.   Skin:  Negative for rash and wound.   Allergic/Immunologic: Positive for immunocompromised state.   Neurological:  Positive for weakness (L side, minimal - improving). Negative for dizziness, light-headedness and headaches.   Psychiatric/Behavioral:  Negative for behavioral problems and confusion. The patient is not nervous/anxious.     Objective:     Vital Signs (Most Recent):  Temp: 98 °F (36.7 °C) (02/21/22 1153)  Pulse: 101 (02/21/22 1100)  Resp: 17 (02/21/22 0755)  BP: 118/68 (02/21/22 0755)  SpO2: 97 % (02/21/22 0755) Vital Signs (24h Range):  Temp:  [97.5 °F (36.4 °C)-98.4 °F (36.9 °C)] 98 °F (36.7 °C)  Pulse:  [] 101  Resp:  [11-21] 17  SpO2:  [94 %-99 %] 97 %  BP: (112-139)/() 118/68     Weight: 85 kg (187 lb 6.3 oz)  Height: 6' 2" (188 cm)  Body mass index is 24.06 kg/m².    Physical Exam  Vitals and nursing note reviewed.   Constitutional:       General: He is not in acute distress.     Appearance: He is not toxic-appearing or diaphoretic.   HENT:      Head: Normocephalic.      Comments: R sided craniotomy incision CDI with staples      Mouth/Throat:      Mouth: Mucous membranes are moist.   Cardiovascular:      Rate and Rhythm: Normal rate and regular rhythm.   Pulmonary:      Effort: Pulmonary effort is normal. No respiratory distress.   Abdominal:      Palpations: Abdomen is soft.      Tenderness: There is no guarding.   Musculoskeletal:      Cervical back: Neck supple. No rigidity.   Skin:     " General: Skin is warm and dry.   Neurological:      Mental Status: He is alert and oriented to person, place, and time. Mental status is at baseline.      Motor: Weakness (L hand) present.   Psychiatric:         Mood and Affect: Mood normal.         Behavior: Behavior normal.       Laboratory:  CBC:   Recent Labs   Lab 02/19/22  0505 02/20/22  0516 02/21/22  0713   WBC 3.67* 3.99 3.90   RBC 5.51 5.60 5.85   HGB 17.3 18.0 18.4*   HCT 53.5 53.0 56.2*    152 179   MCV 97 95 96   MCH 31.4* 32.1* 31.5*   MCHC 32.3 34.0 32.7     BMP:   Recent Labs   Lab 02/19/22  0505 02/20/22  0516 02/21/22  0713   * 197* 123*    130* 133*   K 4.5 4.4 4.1    101 100   CO2 27 22* 24   BUN 21 20 23   CREATININE 0.9 0.8 1.1   CALCIUM 9.5 8.9 9.0     Labs within the past 24 hours have been reviewed.    Diagnostic Results:  Reviewed     Assessment/Plan:     * Intracranial abscess  - Transferred from OSH for R frontal lobe abscess.   -S/p craniotomy and abscess drainage 2/17.  - ID following. Abscess cultures growing Nocardia  - Continue imipenem and bactrim per ID recs   - F/u remaining blood and surgical cultures  - No evidence of vegetations on TTE      Polycythemia vera  - Follows with Dr. Wheatley in Hematology. Gets periodic therapeutic phlebotomy.  - Was planned for possible BMBx.  - Hgb stable   - F/u with Dr. Wheatley at discharge      Pulmonary HTN  - Noted on TTE 10/2021 with PA systolic pressure is 52 mmHg.   - repeat TTE with PA systolic pressure is 29 mmHg  - resumed home lasix        Cardiomyopathy  - Pt had cardiac MRI with infiltrative cardiomyopathy at OSH  - Cardiology consulted for further recs on inpatient treatment   - r/o nocardia involvement in regards to infiltrative cardiomyopathy  - Monitor     Drug-induced neutropenia  noticed leukopenia - likely related to valcyte   Last few cmv negative  Can stop valcyte now.       Prophylactic immunotherapy  - see long term use of immunosuppression        Long-term use of immunosuppressant medication  - Continue prograf and steroids.  - Continue to monitor prograf levels daily, monitor for toxic side effects, and adjust for therapeutic dose.   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       Long term (current) use of anticoagulants--Eliquis  - Held due to surgery  - Cont to hold       A-fib  - Cont lopressor  - Hold Eliquis  - Telemetry       Discharge Planning: Not a candidate for d/c at this time.       Lexy Benson PA-C  Kidney Transplant  [facility] - Transplant Stepdown

## 2022-02-21 NOTE — PROGRESS NOTES
[facility] - Transplant Stepdown  Neurosurgery  Progress Note    Subjective:     History of Present Illness: 71 M PMHx afib on eliquis, recent kidney transplant on 5/2021 on immunosuppressants, cardiomyopathy, pulmonary hypertension presenting with new right frontal lobe abscess. Pt reports transient episodes of left sided weakness this week, most recent episode was this morning, he fell due to left leg weakness when getting up from the toilet, prompting visit to the ED because he thought he was having a stroke. At present, he feels well, has no fever/chills/aches, confusion, blurry vision, LOC, or seizures, and besides mild left sided weakness, he is asymptomatic.    Of note, he has had recent lower respiratory tract infection      Post-Op Info:  Procedure(s) (LRB):  CRANIOTOMY (Right)   5 Days Post-Op     Interval History: POD 4. NAEON. Afebrile, VSS. Neuro exam is stable with continued left sided weakness. Denies new focal weakness, numbness, confusion, visual disturbances, vomiting. He c/o mild headaches relieved with PO medication and nausea after receiving his antibiotics. Transferred to kidney transplant team service today for medical management.     Medications:  Continuous Infusions:  Scheduled Meds:   clotrimazole-betamethasone 1-0.05%   Topical (Top) BID    famotidine  20 mg Oral QHS    furosemide  40 mg Oral Daily    heparin (porcine)  5,000 Units Subcutaneous Q8H    imipenem-cilastatin  1,000 mg Intravenous Q8H    insulin aspart U-100  3 Units Subcutaneous TIDWM    metoprolol tartrate  25 mg Oral BID    mupirocin   Nasal BID    polyethylene glycol  17 g Oral Daily    predniSONE  5 mg Oral BID    [START ON 2/23/2022] predniSONE  5 mg Oral Daily    senna-docusate 8.6-50 mg  1 tablet Oral BID    trimethoprim-sulfamethoxasole (BACTRIM) IVPB (fixed ratio)  15 mg/kg/day (Dosing Weight) Intravenous Q8H    tacrolimus  2 mg Oral BID     PRN Meds:acetaminophen, dextrose 10%, dextrose 10%, dextrose  10%, glucagon (human recombinant), glucose, glucose, insulin aspart U-100, iohexol, ondansetron, oxyCODONE, sodium chloride 0.9%       Objective:     Weight: 85 kg (187 lb 6.3 oz)  Body mass index is 24.06 kg/m².  Vital Signs (Most Recent):  Temp: 98 °F (36.7 °C) (02/21/22 1153)  Pulse: 101 (02/21/22 1100)  Resp: 17 (02/21/22 0755)  BP: 118/68 (02/21/22 0755)  SpO2: 97 % (02/21/22 0755) Vital Signs (24h Range):  Temp:  [97.5 °F (36.4 °C)-98.4 °F (36.9 °C)] 98 °F (36.7 °C)  Pulse:  [] 101  Resp:  [11-21] 17  SpO2:  [94 %-99 %] 97 %  BP: (112-139)/() 118/68     Date 02/21/22 0700 - 02/22/22 0659   Shift 7419-9824 7720-7084 1924-7613 24 Hour Total   INTAKE   P.O. 480   480   IV Piggyback 250   250   Shift Total(mL/kg) 730(8.6)   730(8.6)   OUTPUT   Urine(mL/kg/hr) 1900   1900   Shift Total(mL/kg) 1900(22.4)   1900(22.4)   Weight (kg) 85 85 85 85                        Hemodialysis AV Fistula Left forearm (Active)   Site Assessment Clean 02/21/22 0755   Patency Present;Thrill;Bruit 02/21/22 0755   Status Deaccessed 02/21/22 0755   Dressing Status Clean;Dry 02/20/22 2000   Site Condition No complications 02/21/22 0755   Dressing Open to air (None) 02/21/22 0755     Neurosurgery Physical Exam  General: Well developed, well nourished, not in acute distress.   Head: Normocephalic.  Neck: Full ROM.   Neurologic: Alert and oriented x 4. Thought content appropriate.  GCS: Motor:6  Verbal:5  Eyes:4   GCS Total: 15  Language: No aphasia.  Speech: No dysarthria.  Cranial nerves: Face symmetric, tongue midline, CN II-XII grossly intact.   Eyes: Pupils equal, round, reactive to light with accomodation, EOMI.   Pulmonary: Normal respirations, no signs of respiratory distress.  Abdomen: Soft, non-distended, non-tender to palpation.  Sensory: Intact to light touch throughout.  Motor Strength: Moves all extremities spontaneously with good tone. No abnormal movements seen.     Strength  Deltoids Triceps Biceps Wrist  Extension Wrist Flexion Hand    Upper: R 5/5 5/5 5/5 5/5 5/5 5/5    L 4/5 4/5 4/5 4/5 4/5 4/5     Hip Flexion Knee Extension Knee  Flexion Dorsiflexion Plantar flexion EHL   Lower: R 5/5 5/5 5/5 5/5 5/5 5/5    L 4/5 4/5 4/5 4/5 4/5 4/5     Pronator Drift: No drift noted.  Finger-to-nose: Intact bilaterally.  Skin: Skin is warm, dry and intact.    Right sided cranial incision c/d/i with skin edges well approximated with staples. No surrounding erythema or edema. No drainage from incision. No palpable hematoma or underlying fluid collection.    Significant Labs:  Recent Labs   Lab 02/20/22  0516 02/21/22  0713   * 123*   * 133*   K 4.4 4.1    100   CO2 22* 24   BUN 20 23   CREATININE 0.8 1.1   CALCIUM 8.9 9.0     Recent Labs   Lab 02/20/22  0516 02/21/22  0713   WBC 3.99 3.90   HGB 18.0 18.4*   HCT 53.0 56.2*    179     No results for input(s): LABPT, INR, APTT in the last 48 hours.  Microbiology Results (last 7 days)       Procedure Component Value Units Date/Time    Aerobic culture [[id number]]  (Abnormal) Collected: 02/17/22 0238    Order Status: Completed Specimen: Abscess from Brain Updated: 02/21/22 1222     Aerobic Bacterial Culture Results called to and read back by:Rina Nunez RN 02/19/2022  13:17      NOCARDIA SPECIES  Moderate  further identified as Nocardia nova  For susceptibility see order #[id number]      Narrative:      2) Intracerebral Abscess    Aerobic culture [[id number]]  (Abnormal) Collected: 02/17/22 0238    Order Status: Completed Specimen: Abscess from Brain Updated: 02/21/22 1222     Aerobic Bacterial Culture Results called to and read back by:Rina Nunez RN 02/19/2022  13:15      NOCARDIA SPECIES  Moderate  further identified as Nocardia nova  Susceptibility pending      Narrative:      1) Intracerebral Abscess    Fungus culture [[id number]] Collected: 02/17/22 0238    Order Status: Completed Specimen: Abscess from Brain Updated: 02/21/22 1150     Fungus  (Mycology) Culture Culture in progress    Narrative:      1) Intracerebral Abscess    Fungus culture [[id number]] Collected: 02/17/22 0238    Order Status: Completed Specimen: Abscess from Brain Updated: 02/21/22 1150     Fungus (Mycology) Culture Culture in progress    Narrative:      2) Intracerebral Abscess    Blood culture [[id number]] Collected: 02/16/22 2307    Order Status: Completed Specimen: Blood from Peripheral, Ankle, Right Updated: 02/21/22 0613     Blood Culture, Routine No Growth to date      No Growth to date      No Growth to date      No Growth to date      No Growth to date    Narrative:      Blood cultures from 2 different sites. 4 bottles total.  Please draw before starting antibiotics.    Blood culture [[id number]] Collected: 02/16/22 2247    Order Status: Completed Specimen: Blood from Peripheral, Hand, Right Updated: 02/21/22 0613     Blood Culture, Routine No Growth to date      No Growth to date      No Growth to date      No Growth to date      No Growth to date    Narrative:      Blood cultures x 2 different sites. 4 bottles total. Please  draw cultures before administering antibiotics.    Modified Acid Fast Stain For Nocardia [[id number]] Collected: 02/17/22 0238    Order Status: Completed Specimen: Brain Updated: 02/18/22 2041     Modified Acid Fast smear No partially acid fast organisms seen    Narrative:      add-on MAFST per Aaron House MD  02/18/2022  14:40 ORD#[id number]    Cryptococcal antigen [[id number]] Collected: 02/18/22 2016    Order Status: Sent Specimen: Blood, Venous Updated: 02/18/22 2040    Narrative:      Collection has been rescheduled by DANIELE at 02/18/2022 19:56 Reason:   Due in Am per nurse......    AFB Culture & Smear [[id number]] Collected: 02/17/22 0238    Order Status: Completed Specimen: Abscess from Brain Updated: 02/18/22 1515     AFB Culture & Smear Culture in progress     AFB CULTURE STAIN No acid fast bacilli seen.    Narrative:      1) Intracerebral Abscess     AFB Culture & Smear [[id number]] Collected: 02/17/22 0238    Order Status: Completed Specimen: Abscess from Brain Updated: 02/18/22 1515     AFB Culture & Smear Culture in progress     AFB CULTURE STAIN No acid fast bacilli seen.    Narrative:      2) Intracerebral Abscess    Modified Acid Fast Stain For Nocardia [[id number]]     Order Status: Completed Specimen: Abscess     Modified Acid Fast Stain For Nocardia [[id number]]     Order Status: Completed Specimen: Abscess     Culture, Anaerobe [[id number]] Collected: 02/17/22 0238    Order Status: Completed Specimen: Abscess from Brain Updated: 02/18/22 0854     Anaerobic Culture Culture in progress    Narrative:      1) Intracerebral Abscess    Culture, Anaerobe [[id number]] Collected: 02/17/22 0238    Order Status: Completed Specimen: Abscess from Brain Updated: 02/18/22 0854     Anaerobic Culture Culture in progress    Narrative:      2) Intracerebral Abscess    Toxoplasma Gondii, DNA (Qual) [[id number]] Collected: 02/18/22 0603    Order Status: Completed Specimen: Blood Updated: 02/18/22 0609     Toxoplasma gondii DNA, Source blood    Modified Acid Fast Stain For Nocardia [[id number]]     Order Status: Completed Specimen: Abscess     Gram stain [[id number]] Collected: 02/17/22 0238    Order Status: Completed Specimen: Abscess from Brain Updated: 02/17/22 1031     Gram Stain Result Many WBC's      No epithelial cells      No organisms seen    Narrative:      2) Intracerebral Abscess    Gram stain [[id number]] Collected: 02/17/22 0238    Order Status: Completed Specimen: Abscess from Brain Updated: 02/17/22 0753     Gram Stain Result Rare WBC's      No organisms seen      No epithelial cells    Narrative:      1) Intracerebral Abscess          All pertinent labs from the last 24 hours have been reviewed.    Significant Diagnostics:  I have reviewed and interpreted all pertinent imaging results/findings within the past 24 hours.    Assessment/Plan:     * Intracranial  abscess  11 M PMHx afib on eliquis, recent kidney transplant on 5/2021 on immunosuppressants, cardiomyopathy, pulmonary hypertension presenting with new right frontal lobe abscess. He has mild left sided weakness, but is otherwise intact. MRI demonstrates right frontal 2.2 cm ring enhancing lesion, restricting on DWI.    Now s/p R frontal crani for abscess drainage 2/17/22.    -- Transferred to kidney transplant team service on 2/21 for continued medical management.  -- Neurochecks q4hrs.  -- Post op CT reviewed: no detrimental changes.  -- Post op MRI reviewed: good drainage of abscess.  -- Will need a repeat CT head prior to discharge.  -- Patient with Afib. Recommend continuing to hold Eliquis until POD 14.  -- ID consulted; appreciate recs. OR cultures with Nocardia novis. CT C/A/P completed per their recs. On IV Bactrim and Imipenem, estimated 12 mos of therapy.   -- d/adria Decadron and started Prednisone taper to transplant suppression dose.   -- We will continue to follow. Please page with neuro exam changes or questions.  -- Neurosurgery follow up to be arranged. 2 week post op wound check for staple removal. 4-6 weeks follow up with Dr. Rosa with an MRI brain w/wo contrast.    Discussed with Dr. Rosa.         Francheska Sow PA-C  Neurosurgery  [facility] - Transplant Stepdown

## 2022-02-21 NOTE — PLAN OF CARE
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 - ID on board - pt on IV Bactrum q8hrs and IV Primaxin q8hrs   R FA 20g - CDI, saline locked   L FA Fistula +/+  Pt in Afib on Tele monitor - HR 90s-110s throughout shift   HR sustaining 120-130s this AM - MARY ANN Mojica notified - EKG ordered with Afib, AM Lopressor given early   CBG ACHS - last  - no SSI indicated   PO Lasix given daily with good UOP   Pt up 1x assist and ambulatory - PT/OT working with patient, voids in urinal/condom cath at night, LBM 2/16  Wife remains at bedside and attentive to patient needs  Diabetic diet   Pt remains free from falls and injuries, call light in reach, bed in lowest position, nonskid socks on when OOB, side rails up x2  Will continue to monitor

## 2022-02-21 NOTE — PLAN OF CARE
AST & ALT WDL. Has poor appetite. C/O nausea. Relieved with Zofran. Miralax and Senna given as ordered. Wife stated pt had BM yesterday. Voiding without difficulty, but states has urgency at times. Condom cath in use per pt request. Glucoses monitored. No Insulin indicated today. Afebrile. Imipenem and Bactrim IV continue. IV infiltrated this am and was removed. SL 24g placed by KELECHI Lu RN. Imipenem resumed at slower rate due to small gauge IV. Bactrim given after Imipenem at slower rate. PICC team placed 20g SL. Afternoon Imipenem going at slower rate to save IV. Incision to head dry and intact with staples. Sat in chair X2 today. Tolerated well. Barrier cream applied to buttocks with moisture associated dermatitis noted. Non-skid socks on. Wife at BS earlier today.

## 2022-02-21 NOTE — ASSESSMENT & PLAN NOTE
- Transferred from OSH for R frontal lobe abscess.   -S/p craniotomy and abscess drainage 2/17.  - ID following. Abscess cultures growing Nocardia  - Continue imipenem and bactrim per ID recs; switched to oral Bactrim 2/22  - F/u remaining blood and surgical cultures  - No evidence of vegetations on TTE  - Tunneled cath to be placed by IR 2/23 for long term outpatient abx  - Monitor

## 2022-02-22 LAB
ALBUMIN SERPL BCP-MCNC: 2.8 G/DL (ref 3.5–5.2)
ALP SERPL-CCNC: 69 U/L (ref 55–135)
ALT SERPL W/O P-5'-P-CCNC: 25 U/L (ref 10–44)
ANION GAP SERPL CALC-SCNC: 10 MMOL/L (ref 8–16)
AST SERPL-CCNC: 19 U/L (ref 10–40)
BACTERIA BLD CULT: NORMAL
BACTERIA BLD CULT: NORMAL
BACTERIA SPEC ANAEROBE CULT: NORMAL
BACTERIA SPEC ANAEROBE CULT: NORMAL
BASOPHILS # BLD AUTO: ABNORMAL K/UL (ref 0–0.2)
BASOPHILS NFR BLD: 0 % (ref 0–1.9)
BILIRUB SERPL-MCNC: 1.3 MG/DL (ref 0.1–1)
BUN SERPL-MCNC: 17 MG/DL (ref 8–23)
CALCIUM SERPL-MCNC: 9.4 MG/DL (ref 8.7–10.5)
CHLORIDE SERPL-SCNC: 100 MMOL/L (ref 95–110)
CO2 SERPL-SCNC: 24 MMOL/L (ref 23–29)
CREAT SERPL-MCNC: 1.2 MG/DL (ref 0.5–1.4)
DIFFERENTIAL METHOD: ABNORMAL
EBV DNA SERPL NAA+PROBE-ACNC: 38 IU/ML
EOSINOPHIL # BLD AUTO: ABNORMAL K/UL (ref 0–0.5)
EOSINOPHIL NFR BLD: 0 % (ref 0–8)
ERYTHROCYTE [DISTWIDTH] IN BLOOD BY AUTOMATED COUNT: 13.5 % (ref 11.5–14.5)
EST. GFR  (AFRICAN AMERICAN): >60 ML/MIN/1.73 M^2
EST. GFR  (NON AFRICAN AMERICAN): >60 ML/MIN/1.73 M^2
GAMMA INTERFERON BACKGROUND BLD IA-ACNC: 0.04 IU/ML
GLUCOSE SERPL-MCNC: 120 MG/DL (ref 70–110)
HCT VFR BLD AUTO: 56.3 % (ref 40–54)
HGB BLD-MCNC: 18.5 G/DL (ref 14–18)
IMM GRANULOCYTES # BLD AUTO: ABNORMAL K/UL (ref 0–0.04)
IMM GRANULOCYTES NFR BLD AUTO: ABNORMAL % (ref 0–0.5)
LYMPHOCYTES # BLD AUTO: ABNORMAL K/UL (ref 1–4.8)
LYMPHOCYTES NFR BLD: 36 % (ref 18–48)
M TB CMPLX DNA SPEC QL NAA+PROBE: NEGATIVE
M TB IFN-G CD4+ BCKGRND COR BLD-ACNC: -0.01 IU/ML
M TB IFN-G CD4+CD8+ BCKGRND COR BLD-ACNC: -0.01 IU/ML
MAGNESIUM SERPL-MCNC: 1.7 MG/DL (ref 1.6–2.6)
MCH RBC QN AUTO: 31.8 PG (ref 27–31)
MCHC RBC AUTO-ENTMCNC: 32.9 G/DL (ref 32–36)
MCV RBC AUTO: 97 FL (ref 82–98)
MITOGEN IGNF BCKGRD COR BLD-ACNC: >10 IU/ML
MONOCYTES # BLD AUTO: ABNORMAL K/UL (ref 0.3–1)
MONOCYTES NFR BLD: 14 % (ref 4–15)
NEUTROPHILS NFR BLD: 47 % (ref 38–73)
NEUTS BAND NFR BLD MANUAL: 3 %
NRBC BLD-RTO: 0 /100 WBC
PLATELET # BLD AUTO: 185 K/UL (ref 150–450)
PLATELET BLD QL SMEAR: ABNORMAL
PMV BLD AUTO: 10.5 FL (ref 9.2–12.9)
POCT GLUCOSE: 128 MG/DL (ref 70–110)
POCT GLUCOSE: 159 MG/DL (ref 70–110)
POCT GLUCOSE: 216 MG/DL (ref 70–110)
POTASSIUM SERPL-SCNC: 4.5 MMOL/L (ref 3.5–5.1)
PROT SERPL-MCNC: 5.9 G/DL (ref 6–8.4)
RBC # BLD AUTO: 5.82 M/UL (ref 4.6–6.2)
SODIUM SERPL-SCNC: 134 MMOL/L (ref 136–145)
SPECIMEN SOURCE: NORMAL
T GONDII DNA SPEC QL NAA+PROBE: NOT DETECTED
TACROLIMUS BLD-MCNC: 7 NG/ML (ref 5–15)
TB GOLD PLUS: NEGATIVE
TOXOPLASMA GONDII DNA SOURCE: NORMAL
WBC # BLD AUTO: 3.96 K/UL (ref 3.9–12.7)

## 2022-02-22 PROCEDURE — 25000003 PHARM REV CODE 250: Performed by: STUDENT IN AN ORGANIZED HEALTH CARE EDUCATION/TRAINING PROGRAM

## 2022-02-22 PROCEDURE — 85027 COMPLETE CBC AUTOMATED: CPT | Performed by: NURSE PRACTITIONER

## 2022-02-22 PROCEDURE — 63600175 PHARM REV CODE 636 W HCPCS: Performed by: NURSE PRACTITIONER

## 2022-02-22 PROCEDURE — 20600001 HC STEP DOWN PRIVATE ROOM

## 2022-02-22 PROCEDURE — 25000003 PHARM REV CODE 250: Performed by: RADIOLOGY

## 2022-02-22 PROCEDURE — 25000003 PHARM REV CODE 250: Performed by: INTERNAL MEDICINE

## 2022-02-22 PROCEDURE — 83735 ASSAY OF MAGNESIUM: CPT | Performed by: NURSE PRACTITIONER

## 2022-02-22 PROCEDURE — 63600175 PHARM REV CODE 636 W HCPCS: Performed by: RADIOLOGY

## 2022-02-22 PROCEDURE — 25000003 PHARM REV CODE 250: Performed by: PHYSICIAN ASSISTANT

## 2022-02-22 PROCEDURE — 25000003 PHARM REV CODE 250

## 2022-02-22 PROCEDURE — 97530 THERAPEUTIC ACTIVITIES: CPT

## 2022-02-22 PROCEDURE — 25000003 PHARM REV CODE 250: Performed by: NURSE PRACTITIONER

## 2022-02-22 PROCEDURE — 94761 N-INVAS EAR/PLS OXIMETRY MLT: CPT

## 2022-02-22 PROCEDURE — 63600175 PHARM REV CODE 636 W HCPCS: Performed by: INTERNAL MEDICINE

## 2022-02-22 PROCEDURE — 80053 COMPREHEN METABOLIC PANEL: CPT | Performed by: NURSE PRACTITIONER

## 2022-02-22 PROCEDURE — S0039 INJECTION, SULFAMETHOXAZOLE: HCPCS | Performed by: INTERNAL MEDICINE

## 2022-02-22 PROCEDURE — 99233 SBSQ HOSP IP/OBS HIGH 50: CPT | Mod: ,,, | Performed by: PHYSICIAN ASSISTANT

## 2022-02-22 PROCEDURE — 99024 POSTOP FOLLOW-UP VISIT: CPT | Mod: POP,,,

## 2022-02-22 PROCEDURE — 99233 PR SUBSEQUENT HOSPITAL CARE,LEVL III: ICD-10-PCS | Mod: ,,, | Performed by: PHYSICIAN ASSISTANT

## 2022-02-22 PROCEDURE — 85007 BL SMEAR W/DIFF WBC COUNT: CPT | Performed by: NURSE PRACTITIONER

## 2022-02-22 PROCEDURE — 99024 PR POST-OP FOLLOW-UP VISIT: ICD-10-PCS | Mod: POP,,,

## 2022-02-22 PROCEDURE — 80197 ASSAY OF TACROLIMUS: CPT | Performed by: NURSE PRACTITIONER

## 2022-02-22 PROCEDURE — 63600175 PHARM REV CODE 636 W HCPCS: Performed by: STUDENT IN AN ORGANIZED HEALTH CARE EDUCATION/TRAINING PROGRAM

## 2022-02-22 RX ORDER — LISINOPRIL 5 MG/1
5 TABLET ORAL DAILY
Status: DISCONTINUED | OUTPATIENT
Start: 2022-02-22 | End: 2022-02-22

## 2022-02-22 RX ORDER — LIDOCAINE HYDROCHLORIDE 10 MG/ML
INJECTION INFILTRATION; PERINEURAL CODE/TRAUMA/SEDATION MEDICATION
Status: COMPLETED | OUTPATIENT
Start: 2022-02-22 | End: 2022-02-22

## 2022-02-22 RX ORDER — SULFAMETHOXAZOLE AND TRIMETHOPRIM 800; 160 MG/1; MG/1
2 TABLET ORAL 3 TIMES DAILY
Status: DISCONTINUED | OUTPATIENT
Start: 2022-02-22 | End: 2022-02-25 | Stop reason: HOSPADM

## 2022-02-22 RX ORDER — LISINOPRIL 2.5 MG/1
2.5 TABLET ORAL DAILY
Status: DISCONTINUED | OUTPATIENT
Start: 2022-02-23 | End: 2022-02-24

## 2022-02-22 RX ORDER — MIRTAZAPINE 15 MG/1
15 TABLET, FILM COATED ORAL NIGHTLY
Status: DISCONTINUED | OUTPATIENT
Start: 2022-02-22 | End: 2022-02-25 | Stop reason: HOSPADM

## 2022-02-22 RX ORDER — METOPROLOL TARTRATE 25 MG/1
12.5 TABLET ORAL ONCE
Status: COMPLETED | OUTPATIENT
Start: 2022-02-22 | End: 2022-02-22

## 2022-02-22 RX ORDER — METOPROLOL TARTRATE 50 MG/1
50 TABLET ORAL 2 TIMES DAILY
Status: DISCONTINUED | OUTPATIENT
Start: 2022-02-22 | End: 2022-02-25 | Stop reason: HOSPADM

## 2022-02-22 RX ORDER — MIDAZOLAM HYDROCHLORIDE 1 MG/ML
INJECTION INTRAMUSCULAR; INTRAVENOUS CODE/TRAUMA/SEDATION MEDICATION
Status: COMPLETED | OUTPATIENT
Start: 2022-02-22 | End: 2022-02-22

## 2022-02-22 RX ORDER — FENTANYL CITRATE 50 UG/ML
INJECTION, SOLUTION INTRAMUSCULAR; INTRAVENOUS CODE/TRAUMA/SEDATION MEDICATION
Status: COMPLETED | OUTPATIENT
Start: 2022-02-22 | End: 2022-02-22

## 2022-02-22 RX ORDER — METOPROLOL TARTRATE 25 MG/1
25 TABLET, FILM COATED ORAL ONCE
Status: COMPLETED | OUTPATIENT
Start: 2022-02-22 | End: 2022-02-22

## 2022-02-22 RX ORDER — HYDROXYZINE HYDROCHLORIDE 25 MG/1
25 TABLET, FILM COATED ORAL 3 TIMES DAILY PRN
Status: DISCONTINUED | OUTPATIENT
Start: 2022-02-22 | End: 2022-02-25 | Stop reason: HOSPADM

## 2022-02-22 RX ORDER — CEFAZOLIN SODIUM 1 G/50ML
SOLUTION INTRAVENOUS
Status: COMPLETED | OUTPATIENT
Start: 2022-02-22 | End: 2022-02-22

## 2022-02-22 RX ADMIN — SENNOSIDES AND DOCUSATE SODIUM 1 TABLET: 50; 8.6 TABLET ORAL at 08:02

## 2022-02-22 RX ADMIN — BACITRACIN: 500 OINTMENT TOPICAL at 08:02

## 2022-02-22 RX ADMIN — SULFAMETHOXAZOLE AND TRIMETHOPRIM 403 MG: 80; 16 INJECTION, SOLUTION, CONCENTRATE INTRAVENOUS at 04:02

## 2022-02-22 RX ADMIN — MIRTAZAPINE 15 MG: 15 TABLET, FILM COATED ORAL at 08:02

## 2022-02-22 RX ADMIN — FENTANYL CITRATE 50 MCG: 50 INJECTION, SOLUTION INTRAMUSCULAR; INTRAVENOUS at 05:02

## 2022-02-22 RX ADMIN — INSULIN ASPART 1 UNITS: 100 INJECTION, SOLUTION INTRAVENOUS; SUBCUTANEOUS at 08:02

## 2022-02-22 RX ADMIN — PREDNISONE 5 MG: 5 TABLET ORAL at 08:02

## 2022-02-22 RX ADMIN — FENTANYL CITRATE 50 MCG: 50 INJECTION, SOLUTION INTRAMUSCULAR; INTRAVENOUS at 04:02

## 2022-02-22 RX ADMIN — IMIPENEM AND CILASTATIN SODIUM 1000 MG: 500; 500 INJECTION, POWDER, FOR SOLUTION INTRAVENOUS at 11:02

## 2022-02-22 RX ADMIN — MIDAZOLAM HYDROCHLORIDE 1 MG: 1 INJECTION, SOLUTION INTRAMUSCULAR; INTRAVENOUS at 05:02

## 2022-02-22 RX ADMIN — LIDOCAINE HYDROCHLORIDE 5 ML: 10 INJECTION, SOLUTION INFILTRATION; PERINEURAL at 04:02

## 2022-02-22 RX ADMIN — TACROLIMUS 2 MG: 1 CAPSULE ORAL at 08:02

## 2022-02-22 RX ADMIN — SULFAMETHOXAZOLE AND TRIMETHOPRIM 2 TABLET: 800; 160 TABLET ORAL at 02:02

## 2022-02-22 RX ADMIN — SULFAMETHOXAZOLE AND TRIMETHOPRIM 2 TABLET: 800; 160 TABLET ORAL at 08:02

## 2022-02-22 RX ADMIN — CEFAZOLIN SODIUM 2 G: 1 SOLUTION INTRAVENOUS at 04:02

## 2022-02-22 RX ADMIN — OXYCODONE 5 MG: 5 TABLET ORAL at 10:02

## 2022-02-22 RX ADMIN — FUROSEMIDE 40 MG: 40 TABLET ORAL at 08:02

## 2022-02-22 RX ADMIN — IMIPENEM AND CILASTATIN SODIUM 1000 MG: 500; 500 INJECTION, POWDER, FOR SOLUTION INTRAVENOUS at 03:02

## 2022-02-22 RX ADMIN — CLOTRIMAZOLE AND BETAMETHASONE DIPROPIONATE: 10; .5 CREAM TOPICAL at 08:02

## 2022-02-22 RX ADMIN — METOPROLOL TARTRATE 25 MG: 25 TABLET, FILM COATED ORAL at 08:02

## 2022-02-22 RX ADMIN — POLYETHYLENE GLYCOL 3350 17 G: 17 POWDER, FOR SOLUTION ORAL at 08:02

## 2022-02-22 RX ADMIN — METOPROLOL TARTRATE 12.5 MG: 25 TABLET, FILM COATED ORAL at 12:02

## 2022-02-22 RX ADMIN — SENNOSIDES AND DOCUSATE SODIUM 1 TABLET: 50; 8.6 TABLET ORAL at 09:02

## 2022-02-22 RX ADMIN — IMIPENEM AND CILASTATIN SODIUM 1000 MG: 500; 500 INJECTION, POWDER, FOR SOLUTION INTRAVENOUS at 08:02

## 2022-02-22 RX ADMIN — HEPARIN SODIUM 5000 UNITS: 5000 INJECTION INTRAVENOUS; SUBCUTANEOUS at 05:02

## 2022-02-22 RX ADMIN — FAMOTIDINE 20 MG: 20 TABLET ORAL at 08:02

## 2022-02-22 RX ADMIN — MIDAZOLAM HYDROCHLORIDE 1 MG: 1 INJECTION, SOLUTION INTRAMUSCULAR; INTRAVENOUS at 04:02

## 2022-02-22 RX ADMIN — METOPROLOL TARTRATE 50 MG: 50 TABLET, FILM COATED ORAL at 08:02

## 2022-02-22 RX ADMIN — HEPARIN SODIUM 5000 UNITS: 5000 INJECTION INTRAVENOUS; SUBCUTANEOUS at 08:02

## 2022-02-22 RX ADMIN — TACROLIMUS 2 MG: 1 CAPSULE ORAL at 06:02

## 2022-02-22 NOTE — CARE UPDATE
Patient underwent cardiac MRI at outside facility.     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  Amyloid would be in the differential.     Moderately reduced left ventricular function, LVEF = 41 %.     Moderately reduced right ventricular function, RVEF = 32%.     No myocardial iron overload.     Small pericardial effusion.     Dr. Pat Kovacs of [facility].      ID suggested possible myocardial biopsy. Cardiology asked to discuss options. Patient does not require inpatient myocardial biopsy for diagnosis of amyloidosis or other possible infiltrative cardiomyopathy. Follow up with outpatient cardiologist or with [facility] cardiology for further work up including PYP scan.

## 2022-02-22 NOTE — H&P
Inpatient Radiology Pre-procedure Note    History of Present Illness:  Ronal Mazariegos is a 71 y.o. male with a kidney transplant on 5/4/21. Patient initially presented with neurological symptoms found to have a left frontal abscess. ID is following and is recommending the patient to be on long term IV antibiotics. IR consulted for a tunneled PICC line.      Admission H&P reviewed.  Past Medical History:   Diagnosis Date    Anasarca 10/1/2021    Anemia of chronic disease     Atrial fibrillation     BPH (benign prostatic hypertrophy)     Chronic systolic heart failure 10/1/2021    CKD (chronic kidney disease) stage 2, GFR 60-89 ml/min     Hepatitis C virus infection cured after antiviral drug therapy     acquired through kidney transplant, treated / cured (SVR12 - 12/2021)    HTN (hypertension)     HTN (hypertension)     Polycystic kidney disease      Past Surgical History:   Procedure Laterality Date    AV FISTULA PLACEMENT Left 2016    CATARACT EXTRACTION, BILATERAL Bilateral     CRANIOTOMY Right 2/16/2022    Procedure: CRANIOTOMY;  Surgeon: Sunday Rosa DO;  Location: [facility] OR [address] [place] [address];  Service: Neurosurgery;  Laterality: Right;  R craniotomy for abscess drainage    KIDNEY TRANSPLANT N/A 5/4/2021    Procedure: TRANSPLANT, KIDNEY;  Surgeon: Lexy Bolden MD;  Location: [facility] OR [address] [place] [address];  Service: Transplant;  Laterality: N/A;       Review of Systems:   As documented in primary team H&P    Home Meds:   Prior to Admission medications    Medication Sig Start Date End Date Taking? Authorizing Provider   amLODIPine (NORVASC) 5 MG tablet Take 5 mg by mouth once daily. 11/30/21   Historical Provider   apixaban (ELIQUIS) 5 mg Tab Take 1 tablet (5 mg total) by mouth 2 (two) times daily. 5/7/21   Celeste Yen MD   BYSTOLIC 20 mg Tab Take 1 tablet by mouth once daily. 12/10/21   Historical Provider   CARDURA 2 mg tablet Take 2 mg by mouth nightly. 9/14/21   Historical Provider   ergocalciferol  (VITAMIN D2) 50,000 unit Cap Take 1 capsule (50,000 Units total) by mouth every 7 days.  Patient not taking: Reported on 12/21/2021 11/1/21   Celeste Yen MD   famotidine (PEPCID) 20 MG tablet Take 1 tablet (20 mg total) by mouth every evening. 5/5/21 5/5/22  Celeste Yen MD   finasteride (PROSCAR) 5 mg tablet Take 5 mg by mouth once daily.    Historical Provider   fish oil-omega-3 fatty acids 300-1,000 mg capsule Take 1 capsule by mouth once daily.     Historical Provider   fluticasone propionate (FLONASE) 50 mcg/actuation nasal spray 1 spray by Each Nostril route daily as needed. 4/22/19   Historical Provider   furosemide (LASIX) 40 MG tablet Take 40 mg by mouth once daily. 9/27/21   Historical Provider   iron-vitamin C 100-250 mg, ICAR-C, 100-250 mg Tab Take 1 tablet by mouth once daily.    Liam Ryan MD   k phos di & mono-sod phos mono (K-PHOS-NEUTRAL) 250 mg Tab Take 3 tablets by mouth 2 (two) times a day. 5/21/21   Samreen Esparza, NP   magnesium oxide (MAG-OX) 400 mg (241.3 mg magnesium) tablet Take 3 tablets (1,200 mg total) by mouth 3 (three) times daily.  Patient taking differently: Take 1,200 mg by mouth 2 (two) times daily. 7/7/21 7/7/22  Sherry Pena MD   multivitamin Tab Take 1 tablet by mouth once daily. 5/6/21   Celeste Yen MD   predniSONE (DELTASONE) 5 MG tablet Take by mouth daily. 5/7-6/6 20 mg, 6/7-7/6 15 mg, 7/7-8/6 10 mg, 5 mg daily thereafter starting 8/7/21 5/5/21   Celeste Yen MD   tacrolimus (PROGRAF) 1 MG Cap Take 2 capsules (2 mg total) by mouth every 12 (twelve) hours. Z94.0;Kidney txp on 5/4/21 12/17/21   Carlos Barcenas MD   valGANciclovir (VALCYTE) 450 mg Tab Take 2 tablets (900 mg total) by mouth 2 (two) times daily. 12/23/21   Celeste Yen MD     Scheduled Meds:    bacitracin zinc   Topical (Top) BID    clotrimazole-betamethasone 1-0.05%   Topical (Top) BID    famotidine  20 mg Oral QHS    heparin (porcine)  5,000 Units Subcutaneous Q8H     imipenem-cilastatin  1,000 mg Intravenous Q8H    insulin aspart U-100  3 Units Subcutaneous TIDWM    [START ON 2/23/2022] lisinopriL  2.5 mg Oral Daily    metoprolol tartrate  50 mg Oral BID    mirtazapine  15 mg Oral Nightly    polyethylene glycol  17 g Oral Daily    predniSONE  5 mg Oral BID    [START ON 2/23/2022] predniSONE  5 mg Oral Daily    senna-docusate 8.6-50 mg  1 tablet Oral BID    sulfamethoxazole-trimethoprim 800-160mg  2 tablet Oral TID    tacrolimus  2 mg Oral BID     Continuous Infusions:   PRN Meds:acetaminophen, dextrose 10%, dextrose 10%, dextrose 10%, glucagon (human recombinant), glucose, glucose, hydrOXYzine HCL, insulin aspart U-100, iohexol, ondansetron, oxyCODONE, sodium chloride 0.9%, traZODone  Anticoagulants/Antiplatelets: Heparin    Allergies: Review of patient's allergies indicates:  No Known Allergies  Sedation Hx: have not been any systemic reactions    Labs:  Recent Labs   Lab 02/16/22  2246   INR 1.3*       Recent Labs   Lab 02/22/22  0743   WBC 3.96   HGB 18.5*   HCT 56.3*   MCV 97         Recent Labs   Lab 02/22/22  0743   *   *   K 4.5      CO2 24   BUN 17   CREATININE 1.2   CALCIUM 9.4   MG 1.7   ALT 25   AST 19   ALBUMIN 2.8*   BILITOT 1.3*         Vitals:  Temp: 98.4 °F (36.9 °C) (02/22/22 1214)  Pulse: (!) 131 (02/22/22 1502)  Resp: 20 (02/22/22 1132)  BP: 113/67 (02/22/22 1132)  SpO2: 95 % (02/22/22 1132)     Physical Exam:  ASA: 3  Mallampati: 3    General: no acute distress  Mental Status: alert and oriented to person, place and time  HEENT: normocephalic, atraumatic  Chest: unlabored breathing  Heart: regular heart rate  Abdomen: nondistended  Extremity: moves all extremities    Plan: tunneled PICC line  Sedation Plan: up to moderate        Shaggy Lopez MD  Radiology PGY-2  [facility]

## 2022-02-22 NOTE — PROGRESS NOTES
[facility] - Transplant Stepdown  Neurosurgery  Progress Note    Subjective:     History of Present Illness: 71 M PMHx afib on eliquis, recent kidney transplant on 5/2021 on immunosuppressants, cardiomyopathy, pulmonary hypertension presenting with new right frontal lobe abscess. Pt reports transient episodes of left sided weakness this week, most recent episode was this morning, he fell due to left leg weakness when getting up from the toilet, prompting visit to the ED because he thought he was having a stroke. At present, he feels well, has no fever/chills/aches, confusion, blurry vision, LOC, or seizures, and besides mild left sided weakness, he is asymptomatic.    Of note, he has had recent lower respiratory tract infection      Post-Op Info:  Procedure(s) (LRB):  CRANIOTOMY (Right)   6 Days Post-Op     Interval History: POD 5. HR uncontrolled overnight, lopressor given per primary. Patient denies new focal weakness, numbness, confusion, visual disturbances. Strength improved in L distal upper extremity. He has continued headaches managed well with meds. Continued nausea, denies vomiting. Primary team will trial Remeron for appetite loss. Reports multiple BMs post op, voiding spontaneously. No abdominal pain or distention.    Medications:  Continuous Infusions:  Scheduled Meds:   bacitracin zinc   Topical (Top) BID    clotrimazole-betamethasone 1-0.05%   Topical (Top) BID    famotidine  20 mg Oral QHS    heparin (porcine)  5,000 Units Subcutaneous Q8H    imipenem-cilastatin  1,000 mg Intravenous Q8H    insulin aspart U-100  3 Units Subcutaneous TIDWM    [START ON 2/23/2022] lisinopriL  2.5 mg Oral Daily    metoprolol tartrate  50 mg Oral BID    mirtazapine  15 mg Oral Nightly    polyethylene glycol  17 g Oral Daily    predniSONE  5 mg Oral BID    [START ON 2/23/2022] predniSONE  5 mg Oral Daily    senna-docusate 8.6-50 mg  1 tablet Oral BID    sulfamethoxazole-trimethoprim 800-160mg  2 tablet Oral  TID    tacrolimus  2 mg Oral BID     PRN Meds:acetaminophen, dextrose 10%, dextrose 10%, dextrose 10%, glucagon (human recombinant), glucose, glucose, hydrOXYzine HCL, insulin aspart U-100, iohexol, ondansetron, oxyCODONE, sodium chloride 0.9%, traZODone       Objective:     Weight: 78.1 kg (172 lb 4.6 oz)  Body mass index is 22.12 kg/m².  Vital Signs (Most Recent):  Temp: 98.4 °F (36.9 °C) (02/22/22 1214)  Pulse: (!) 131 (02/22/22 1502)  Resp: 20 (02/22/22 1132)  BP: 113/67 (02/22/22 1132)  SpO2: 95 % (02/22/22 1132)   Vital Signs (24h Range):  Temp:  [97.6 °F (36.4 °C)-98.4 °F (36.9 °C)] 98.4 °F (36.9 °C)  Pulse:  [] 131  Resp:  [13-23] 20  SpO2:  [93 %-98 %] 95 %  BP: (113-139)/() 113/67                          Hemodialysis AV Fistula Left forearm (Active)   Site Assessment Clean;Dry;Intact;No redness;No swelling 02/21/22 2000   Patency Present;Thrill;Bruit 02/21/22 2000   Status Deaccessed 02/21/22 2000   Dressing Status Clean;Dry;Intact 02/21/22 2000   Site Condition No complications 02/21/22 2000   Dressing Open to air (None) 02/21/22 2000       Neurosurgery Physical Exam  General: Well developed, well nourished, not in acute distress.   Head: Normocephalic.  Neck: Full ROM.   Neurologic: Alert and oriented x 4. Thought content appropriate.  GCS: Motor:6  Verbal:5  Eyes:4   GCS Total: 15  Language: No aphasia.  Speech: No dysarthria.  Cranial nerves: Face symmetric, tongue midline, CN II-XII grossly intact.   Eyes: Pupils equal, round, reactive to light with accomodation, EOMI.   Pulmonary: Normal respirations, no signs of respiratory distress.  Abdomen: Soft, non-distended, non-tender to palpation.  Sensory: Intact to light touch throughout.  Motor Strength: Moves all extremities spontaneously with good tone. No abnormal movements seen.      Strength   Deltoids Triceps Biceps Wrist Extension Wrist Flexion Hand    Upper: R 5/5 5/5 5/5 5/5 5/5 5/5     L 4/5 4/5 4/5 5/5 5/5 5/5       Hip  Flexion Knee Extension Knee  Flexion Dorsiflexion Plantar flexion EHL   Lower: R 5/5 5/5 5/5 5/5 5/5 5/5     L 4/5 4/5 4/5 4/5 4/5 4/5      Pronator Drift: No drift noted.  Finger-to-nose: Intact bilaterally.  Skin: Skin is warm, dry and intact.     Right sided cranial incision c/d/i with skin edges well approximated with staples. No surrounding erythema or edema. No drainage from incision. No palpable hematoma or underlying fluid collection.    Significant Labs:  Recent Labs   Lab 02/21/22  0713 02/22/22 0743   * 120*   * 134*   K 4.1 4.5    100   CO2 24 24   BUN 23 17   CREATININE 1.1 1.2   CALCIUM 9.0 9.4   MG  --  1.7     Recent Labs   Lab 02/21/22  0713 02/22/22 0743   WBC 3.90 3.96   HGB 18.4* 18.5*   HCT 56.2* 56.3*    185     No results for input(s): LABPT, INR, APTT in the last 48 hours.  Microbiology Results (last 7 days)       Procedure Component Value Units Date/Time    Aerobic culture [[id number]]  (Abnormal) Collected: 02/17/22 0238    Order Status: Completed Specimen: Abscess from Brain Updated: 02/22/22 0813     Aerobic Bacterial Culture Results called to and read back by:Rina Nunez RN 02/19/2022  13:17      NOCARDIA SPECIES  Moderate  further identified as Nocardia nova  For susceptibility see order #[id number]      Narrative:      2) Intracerebral Abscess    Aerobic culture [[id number]]  (Abnormal) Collected: 02/17/22 0238    Order Status: Completed Specimen: Abscess from Brain Updated: 02/22/22 0813     Aerobic Bacterial Culture Results called to and read back by:Rina Nunez RN 02/19/2022  13:15      NOCARDIA SPECIES  Moderate  further identified as Nocardia nova  Susceptibility pending      Narrative:      1) Intracerebral Abscess    Culture, Anaerobe [[id number]] Collected: 02/17/22 0238    Order Status: Completed Specimen: Abscess from Brain Updated: 02/22/22 0744     Anaerobic Culture No anaerobes isolated    Narrative:      2) Intracerebral Abscess     Culture, Anaerobe [[id number]] Collected: 02/17/22 0238    Order Status: Completed Specimen: Abscess from Brain Updated: 02/22/22 0743     Anaerobic Culture No anaerobes isolated    Narrative:      1) Intracerebral Abscess    Blood culture [[id number]] Collected: 02/16/22 2247    Order Status: Completed Specimen: Blood from Peripheral, Hand, Right Updated: 02/22/22 0612     Blood Culture, Routine No growth after 5 days.    Narrative:      Blood cultures x 2 different sites. 4 bottles total. Please  draw cultures before administering antibiotics.    Blood culture [[id number]] Collected: 02/16/22 2307    Order Status: Completed Specimen: Blood from Peripheral, Ankle, Right Updated: 02/22/22 0612     Blood Culture, Routine No growth after 5 days.    Narrative:      Blood cultures from 2 different sites. 4 bottles total.  Please draw before starting antibiotics.    Fungus culture [[id number]] Collected: 02/17/22 0238    Order Status: Completed Specimen: Abscess from Brain Updated: 02/21/22 1150     Fungus (Mycology) Culture Culture in progress    Narrative:      1) Intracerebral Abscess    Fungus culture [[id number]] Collected: 02/17/22 0238    Order Status: Completed Specimen: Abscess from Brain Updated: 02/21/22 1150     Fungus (Mycology) Culture Culture in progress    Narrative:      2) Intracerebral Abscess    Modified Acid Fast Stain For Nocardia [[id number]] Collected: 02/17/22 0238    Order Status: Completed Specimen: Brain Updated: 02/18/22 2041     Modified Acid Fast smear No partially acid fast organisms seen    Narrative:      add-on MAFST per Aaron House MD  02/18/2022  14:40 ORD#[id number]    Cryptococcal antigen [[id number]] Collected: 02/18/22 2016    Order Status: Sent Specimen: Blood, Venous Updated: 02/18/22 2040    Narrative:      Collection has been rescheduled by DANIELE at 02/18/2022 19:56 Reason:   Due in Am per nurse......    AFB Culture & Smear [[id number]] Collected: 02/17/22 0238    Order Status:  Completed Specimen: Abscess from Brain Updated: 02/18/22 1515     AFB Culture & Smear Culture in progress     AFB CULTURE STAIN No acid fast bacilli seen.    Narrative:      1) Intracerebral Abscess    AFB Culture & Smear [[id number]] Collected: 02/17/22 0238    Order Status: Completed Specimen: Abscess from Brain Updated: 02/18/22 1515     AFB Culture & Smear Culture in progress     AFB CULTURE STAIN No acid fast bacilli seen.    Narrative:      2) Intracerebral Abscess    Modified Acid Fast Stain For Nocardia [[id number]]     Order Status: Completed Specimen: Abscess     Modified Acid Fast Stain For Nocardia [[id number]]     Order Status: Completed Specimen: Abscess     Toxoplasma Gondii, DNA (Qual) [[id number]] Collected: 02/18/22 0603    Order Status: Completed Specimen: Blood Updated: 02/18/22 0609     Toxoplasma gondii DNA, Source blood    Modified Acid Fast Stain For Nocardia [[id number]]     Order Status: Completed Specimen: Abscess     Gram stain [[id number]] Collected: 02/17/22 0238    Order Status: Completed Specimen: Abscess from Brain Updated: 02/17/22 1031     Gram Stain Result Many WBC's      No epithelial cells      No organisms seen    Narrative:      2) Intracerebral Abscess    Gram stain [[id number]] Collected: 02/17/22 0238    Order Status: Completed Specimen: Abscess from Brain Updated: 02/17/22 0753     Gram Stain Result Rare WBC's      No organisms seen      No epithelial cells    Narrative:      1) Intracerebral Abscess          All pertinent labs from the last 24 hours have been reviewed.    Significant Diagnostics:  I have reviewed and interpreted all pertinent imaging results/findings within the past 24 hours.    Assessment/Plan:     * Intracranial abscess  11 M PMHx afib on eliquis, recent kidney transplant on 5/2021 on immunosuppressants, cardiomyopathy, pulmonary hypertension presenting with new right frontal lobe abscess. He has mild left sided weakness, but is otherwise intact. MRI  demonstrates right frontal 2.2 cm ring enhancing lesion, restricting on DWI.    Now s/p R frontal crani for abscess drainage 2/17/22.    -- Transferred to kidney transplant team service on 2/21 for continued medical management.  -- Neurochecks q4hrs.  -- Post op CT reviewed: no detrimental changes.  -- Post op MRI reviewed: good drainage of abscess.  -- Will need a repeat CT head prior to discharge.  -- Patient with Afib. Recommend continuing to hold Eliquis until POD 14.  -- ID consulted; appreciate recs. OR cultures with Nocardia novis. CT C/A/P completed per their recs. Now on oral Bactrim. Continued IV Imipenem, estimated 6-12 mos of therapy.   -- d/adria Decadron and started Prednisone taper to transplant suppression dose.   -- We will continue to follow. Please page with neuro exam changes or questions.  -- Neurosurgery follow up to be arranged. 2 week post op wound check for staple removal. 4-6 weeks follow up with Dr. Rosa with an MRI brain w/wo contrast.    Discussed with Dr. Rosa.         ÓSCAR PatelC  Neurosurgery  [facility] - Transplant Stepdown

## 2022-02-22 NOTE — ASSESSMENT & PLAN NOTE
- Follows with Dr. Wheatley in Hematology. Gets periodic therapeutic phlebotomy.  - Was planned for possible BMBx.  - F/u with Dr. Wheatley at discharge  - H/H trending up. D/w Dr. Wheatley. Goal Hct 45-46% (2/22 56.2%)  - D/w heme on call and Dr. Wheatley  - Plan for therapeutic phlebotomy inpatient today 2/22 and 2/24  - Start low dose ACEi 2/23 if BP and K able to tolerate it.

## 2022-02-22 NOTE — PROGRESS NOTES
[facility] - Transplant Stepdown  Kidney Transplant  Progress Note      Reason for Follow-up: Reassessment of Kidney Transplant - 5/4/2021  (#1) recipient and management of immunosuppression.    ORGAN:  RIGHT KIDNEY   Donor Type:  Donation after Brain Death   Donor CMV Status: Positive  Donor HBcAB:Negative  Donor HCV Status:Positive  Donor HBV NAWAF: Negative  Donor HCV NAWAF: Positive      Subjective:   History of Present Illness:  71 M PMHx afib on eliquis, recent kidney transplant on 5/2021 on immunosuppressants, cardiomyopathy, pulmonary hypertension presenting to Neuro Surgery with new right frontal lobe abscess. Pt reports transient episodes of left sided weakness this week, most recent episode was this morning, he fell due to left leg weakness when getting up from the toilet, prompting visit to the ED because he thought he was having a stroke. At present, he feels well, has no fever/chills/aches, confusion, blurry vision, LOC, or seizures, and besides mild left sided weakness, he is asymptomatic.     Mr. Mazariegos is a 71 y.o. year old male who is status post Kidney Transplant - 5/4/2021  (#1).  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 Pt was admitted to Neuro ICU and is now s/p craniotomy 2/17 with cultures + nocardia nova. ID following patient and started him on IV bactrim and imipenem. CT C/A/P notes a RML lesion which will need to be repeated in 3 months (~5/19/22). Cardiac MRI done at OSH 02/09 with finding consistent with infiltrative cardiomyopathy. ECHO with no evidence of vegitations, EF 50%, PA pressure 29. Cardiology consulted for recs. Will need MRI repeated in 4-6  "weeks per Neuro Surgery. Pt stepped down from ICU 2/21 afternoon.    Interval history: Patient has hx of Afib on 25 mg PO Metoprolol BID. Eliquis currently held s/p craniotomy x 14 days post op (resume 3/2). Rate uncontrolled overnight (asymptomatic). Extra 12.5 mg IV Lopressor given. D/w cards this AM. Metoprolol increased to 50 mg PO BID. Cont tele. Cardiology consulted for further recs on inpatient treatment -  r/o nocardia involvement in regards to infiltrative cardiomyopathy. Cards d/w ID. Per cards, "patient does not require inpatient myocardial biopsy for diagnosis of amyloidosis or other possible infiltrative cardiomyopathy. He will need outpatient f/u for further workup including PYP scan. ID following, appreciate their assistance. Switched to oral Bactrim from IV today. Cont IV Imipenem. Plan for tunneled catheter placement with IR 2/23 due to need for long term abx. H/H increasing (Hct 56.2%). Patient has hx polycythemia onset 2021 requiring phlebotomy every few weeks (last on 2/3). D/w heme. Goal Hct 43-46%. Plan for phlebotomy today and in 2 days per heme/blood bank. UOP adequate, Cr 1.1. Dc lasix today as euvolemic with UOP >5L past 24h. Patient c/o poor appetite and sleep. Will trial Remeron. PT/OT following, recommend HH. Orders placed for PT/OT/HH abx. Will continue to monitor.         Past Medical, Surgical, Family, and Social History:   Unchanged from H&P.    Scheduled Meds:   bacitracin zinc   Topical (Top) BID    clotrimazole-betamethasone 1-0.05%   Topical (Top) BID    famotidine  20 mg Oral QHS    heparin (porcine)  5,000 Units Subcutaneous Q8H    imipenem-cilastatin  1,000 mg Intravenous Q8H    insulin aspart U-100  3 Units Subcutaneous TIDWM    [START ON 2/23/2022] lisinopriL  2.5 mg Oral Daily    metoprolol tartrate  50 mg Oral BID    mirtazapine  15 mg Oral Nightly    polyethylene glycol  17 g Oral Daily    predniSONE  5 mg Oral BID    [START ON 2/23/2022] predniSONE  5 mg Oral " Daily    senna-docusate 8.6-50 mg  1 tablet Oral BID    sulfamethoxazole-trimethoprim 800-160mg  2 tablet Oral TID    tacrolimus  2 mg Oral BID     Continuous Infusions:  PRN Meds:acetaminophen, dextrose 10%, dextrose 10%, dextrose 10%, glucagon (human recombinant), glucose, glucose, hydrOXYzine HCL, insulin aspart U-100, iohexol, ondansetron, oxyCODONE, sodium chloride 0.9%, traZODone    Intake/Output - Last 3 Shifts         02/20 0700 02/21 0659 02/21 0700 02/22 0659 02/22 0700 02/23 0659    P.O. 360 700     I.V. (mL/kg) 0 (0) 0 (0)     Other 0 0     IV Piggyback 3213.9 1513.8     Total Intake(mL/kg) 3573.9 (42) 2213.8 (28.3)     Urine (mL/kg/hr) 1250 (0.6) 5550 (3)     Emesis/NG output 0 0     Other 0 0     Stool 0 0     Blood 0 0     Total Output 1250 5550     Net +2323.9 -3336.2            Urine Occurrence 0 x 1 x     Stool Occurrence 0 x 1 x     Emesis Occurrence 0 x 0 x              Review of Systems   Constitutional:  Positive for appetite change and fatigue. Negative for activity change, chills and fever.   HENT:  Negative for congestion, dental problem, ear pain, rhinorrhea and trouble swallowing.    Eyes:  Negative for pain and discharge.   Respiratory:  Negative for cough, shortness of breath, wheezing and stridor.    Cardiovascular:  Negative for chest pain, palpitations and leg swelling.   Gastrointestinal:  Negative for abdominal distention, abdominal pain, constipation, diarrhea, nausea and vomiting.   Genitourinary:  Negative for decreased urine volume, difficulty urinating and dysuria.   Musculoskeletal:  Negative for arthralgias, back pain, joint swelling, neck pain and neck stiffness.   Skin:  Negative for rash and wound.   Allergic/Immunologic: Positive for immunocompromised state.   Neurological:  Positive for weakness (L side, minimal - improving). Negative for dizziness, light-headedness and headaches.   Psychiatric/Behavioral:  Positive for sleep disturbance. Negative for behavioral  "problems and confusion. The patient is nervous/anxious.     Objective:     Vital Signs (Most Recent):  Temp: 98.4 °F (36.9 °C) (02/22/22 1214)  Pulse: 107 (02/22/22 1132)  Resp: 20 (02/22/22 1132)  BP: 113/67 (02/22/22 1132)  SpO2: 95 % (02/22/22 1132) Vital Signs (24h Range):  Temp:  [97.6 °F (36.4 °C)-98.4 °F (36.9 °C)] 98.4 °F (36.9 °C)  Pulse:  [] 107  Resp:  [13-23] 20  SpO2:  [93 %-98 %] 95 %  BP: (113-139)/() 113/67     Weight: 78.1 kg (172 lb 4.6 oz)  Height: 6' 2" (188 cm)  Body mass index is 22.12 kg/m².    Physical Exam  Vitals and nursing note reviewed.   Constitutional:       General: He is not in acute distress.     Appearance: He is not toxic-appearing or diaphoretic.   HENT:      Head: Normocephalic.      Comments: R sided craniotomy incision CDI with staples      Mouth/Throat:      Mouth: Mucous membranes are moist.   Eyes:      General: No scleral icterus.        Right eye: No discharge.         Left eye: No discharge.   Cardiovascular:      Rate and Rhythm: Tachycardia present. Rhythm irregular.      Heart sounds: No murmur heard.  Pulmonary:      Effort: Pulmonary effort is normal. No respiratory distress.      Breath sounds: No wheezing or rales.   Abdominal:      General: Bowel sounds are normal. There is no distension.      Palpations: Abdomen is soft.      Tenderness: There is no abdominal tenderness. There is no guarding.   Musculoskeletal:      Cervical back: Neck supple. No rigidity.      Right lower leg: No edema.      Left lower leg: No edema.   Skin:     General: Skin is warm and dry.      Coloration: Skin is not jaundiced.   Neurological:      Mental Status: He is alert and oriented to person, place, and time. Mental status is at baseline.      Motor: Weakness (L hand) present.   Psychiatric:         Mood and Affect: Mood normal.         Behavior: Behavior normal.         Thought Content: Thought content normal.       Laboratory:  CBC:   Recent Labs   Lab 02/20/22  0516 " 02/21/22  0713 02/22/22  0743   WBC 3.99 3.90 3.96   RBC 5.60 5.85 5.82   HGB 18.0 18.4* 18.5*   HCT 53.0 56.2* 56.3*    179 185   MCV 95 96 97   MCH 32.1* 31.5* 31.8*   MCHC 34.0 32.7 32.9     CMP:   Recent Labs   Lab 02/20/22  0516 02/21/22  0713 02/22/22  0743   * 123* 120*   CALCIUM 8.9 9.0 9.4   ALBUMIN 2.4* 2.6* 2.8*   PROT 5.2* 5.6* 5.9*   * 133* 134*   K 4.4 4.1 4.5   CO2 22* 24 24    100 100   BUN 20 23 17   CREATININE 0.8 1.1 1.2   ALKPHOS 66 69 69   ALT 22 29 25   AST 15 24 19       Diagnostic Results:  Reviewed    Assessment/Plan:     * Intracranial abscess  - Transferred from OSH for R frontal lobe abscess.   -S/p craniotomy and abscess drainage 2/17.  - ID following. Abscess cultures growing Nocardia  - Continue imipenem and bactrim per ID recs; switched to oral Bactrim 2/22  - F/u remaining blood and surgical cultures  - No evidence of vegetations on TTE  - Tunneled cath to be placed by IR 2/23 for long term outpatient abx  - Monitor        Polycythemia vera  - Follows with Dr. Wheatley in Hematology. Gets periodic therapeutic phlebotomy.  - Was planned for possible BMBx.  - F/u with Dr. Wheatley at discharge  - H/H trending up. D/w Dr. Wheatley. Goal Hct 45-46% (2/22 56.2%)  - D/w heme on call and Dr. Wheatley  - Plan for therapeutic phlebotomy inpatient today 2/22 and 2/24  - Start low dose ACEi 2/23 if BP and K able to tolerate it.    Pulmonary HTN  - Noted on TTE 10/2021 with PA systolic pressure is 52 mmHg.   - Repeat TTE with PA systolic pressure is 29 mmHg  - Resumed home lasix; discontinued 2/22 as appears euvolemic and >3L negative with weight decrease and poor PO intake  - Monitor closely        Cardiomyopathy  - Pt had cardiac MRI with infiltrative cardiomyopathy at OSH  - Cardiology consulted for further recs on inpatient treatment   - r/o nocardia involvement in regards to infiltrative cardiomyopathy; per cards can workup outpatient (needs scheduling at discharge)  - Monitor      Drug-induced neutropenia  noticed leukopenia - likely related to valcyte   Last few cmv negative  Can stop valcyte now.       Prophylactic immunotherapy  - see long term use of immunosuppression         Long-term use of immunosuppressant medication  - Continue prograf and steroids.  - Continue to monitor prograf levels daily, monitor for toxic side effects, and adjust for therapeutic dose.   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       Long term (current) use of anticoagulants--Eliquis  - Held due to surgery  - Cont to hold       A-fib  - Cont Lopressor; dose increased to 50 mg BID on 2/22 due to uncontrolled rate (d/w cards)  - Hold Eliquis 14 days post craniotomy per neuro (3/3).  - Monitor electrolytes closely.  - Telemetry         Discharge Planning:  Possibly within next few days.    ÓSCAR CoyC  Kidney Transplant  [facility] - Transplant Stepdown

## 2022-02-22 NOTE — ASSESSMENT & PLAN NOTE
11 M PMHx afib on eliquis, recent kidney transplant on 5/2021 on immunosuppressants, cardiomyopathy, pulmonary hypertension presenting with new right frontal lobe abscess. He has mild left sided weakness, but is otherwise intact. MRI demonstrates right frontal 2.2 cm ring enhancing lesion, restricting on DWI.    Now s/p R frontal crani for abscess drainage 2/17/22.    -- Transferred to kidney transplant team service on 2/21 for continued medical management.  -- Neurochecks q4hrs.  -- Post op CT reviewed: no detrimental changes.  -- Post op MRI reviewed: good drainage of abscess.  -- Will need a repeat CT head prior to discharge.  -- Patient with Afib. Recommend continuing to hold Eliquis until POD 14.  -- ID consulted; appreciate recs. OR cultures with Nocardia novis. CT C/A/P completed per their recs. Now on oral Bactrim. Continued IV Imipenem, estimated 6-12 mos of therapy.   -- d/adria Decadron and started Prednisone taper to transplant suppression dose.   -- We will continue to follow. Please page with neuro exam changes or questions.  -- Neurosurgery follow up to be arranged. 2 week post op wound check for staple removal. 4-6 weeks follow up with Dr. Rosa with an MRI brain w/wo contrast.    Discussed with Dr. Rosa.

## 2022-02-22 NOTE — ASSESSMENT & PLAN NOTE
- Pt had cardiac MRI with infiltrative cardiomyopathy at OSH  - Cardiology consulted for further recs on inpatient treatment   - r/o nocardia involvement in regards to infiltrative cardiomyopathy; per cards can workup outpatient (needs scheduling at discharge)  - Monitor

## 2022-02-22 NOTE — PROCEDURES
Radiology Post-Procedure Note    Pre Op Diagnosis: Intracranial abscess  Post Op Diagnosis: Same    Procedure: Tunneled Powerline placement    Procedure performed by: Yamil Reardon MD    Written Informed Consent Obtained: Yes  Specimen Removed: NO  Estimated Blood Loss: Minimal    Findings:   Right chest wall tunneled powerline placed via IJ access.  24cm tip-to-cuff.  No complications.    Patient tolerated procedure well.    Yamil Reardon MD  Diagnostic and Interventional Radiologist  Department of Radiology  Pager: [phone]

## 2022-02-22 NOTE — SUBJECTIVE & OBJECTIVE
Interval History: POD 5. HR uncontrolled overnight, lopressor given per primary. Patient denies new focal weakness, numbness, confusion, visual disturbances. Strength improved in L distal upper extremity. He has continued headaches managed well with meds. Continued nausea, denies vomiting. Primary team will trial Remeron for appetite loss.     Medications:  Continuous Infusions:  Scheduled Meds:   bacitracin zinc   Topical (Top) BID    clotrimazole-betamethasone 1-0.05%   Topical (Top) BID    famotidine  20 mg Oral QHS    heparin (porcine)  5,000 Units Subcutaneous Q8H    imipenem-cilastatin  1,000 mg Intravenous Q8H    insulin aspart U-100  3 Units Subcutaneous TIDWM    [START ON 2/23/2022] lisinopriL  2.5 mg Oral Daily    metoprolol tartrate  50 mg Oral BID    mirtazapine  15 mg Oral Nightly    polyethylene glycol  17 g Oral Daily    predniSONE  5 mg Oral BID    [START ON 2/23/2022] predniSONE  5 mg Oral Daily    senna-docusate 8.6-50 mg  1 tablet Oral BID    sulfamethoxazole-trimethoprim 800-160mg  2 tablet Oral TID    tacrolimus  2 mg Oral BID     PRN Meds:acetaminophen, dextrose 10%, dextrose 10%, dextrose 10%, glucagon (human recombinant), glucose, glucose, hydrOXYzine HCL, insulin aspart U-100, iohexol, ondansetron, oxyCODONE, sodium chloride 0.9%, traZODone       Objective:     Weight: 78.1 kg (172 lb 4.6 oz)  Body mass index is 22.12 kg/m².  Vital Signs (Most Recent):  Temp: 98.4 °F (36.9 °C) (02/22/22 1214)  Pulse: (!) 131 (02/22/22 1502)  Resp: 20 (02/22/22 1132)  BP: 113/67 (02/22/22 1132)  SpO2: 95 % (02/22/22 1132)   Vital Signs (24h Range):  Temp:  [97.6 °F (36.4 °C)-98.4 °F (36.9 °C)] 98.4 °F (36.9 °C)  Pulse:  [] 131  Resp:  [13-23] 20  SpO2:  [93 %-98 %] 95 %  BP: (113-139)/() 113/67                          Hemodialysis AV Fistula Left forearm (Active)   Site Assessment Clean;Dry;Intact;No redness;No swelling 02/21/22 2000   Patency Present;Thrill;Bruit 02/21/22 2000   Status  Deaccessed 02/21/22 2000   Dressing Status Clean;Dry;Intact 02/21/22 2000   Site Condition No complications 02/21/22 2000   Dressing Open to air (None) 02/21/22 2000       Neurosurgery Physical Exam  General: Well developed, well nourished, not in acute distress.   Head: Normocephalic.  Neck: Full ROM.   Neurologic: Alert and oriented x 4. Thought content appropriate.  GCS: Motor:6  Verbal:5  Eyes:4   GCS Total: 15  Language: No aphasia.  Speech: No dysarthria.  Cranial nerves: Face symmetric, tongue midline, CN II-XII grossly intact.   Eyes: Pupils equal, round, reactive to light with accomodation, EOMI.   Pulmonary: Normal respirations, no signs of respiratory distress.  Abdomen: Soft, non-distended, non-tender to palpation.  Sensory: Intact to light touch throughout.  Motor Strength: Moves all extremities spontaneously with good tone. No abnormal movements seen.      Strength   Deltoids Triceps Biceps Wrist Extension Wrist Flexion Hand    Upper: R 5/5 5/5 5/5 5/5 5/5 5/5     L 4/5 4/5 4/5 5/5 5/5 5/5       Hip Flexion Knee Extension Knee  Flexion Dorsiflexion Plantar flexion EHL   Lower: R 5/5 5/5 5/5 5/5 5/5 5/5     L 4/5 4/5 4/5 4/5 4/5 4/5      Pronator Drift: No drift noted.  Finger-to-nose: Intact bilaterally.  Skin: Skin is warm, dry and intact.     Right sided cranial incision c/d/i with skin edges well approximated with staples. No surrounding erythema or edema. No drainage from incision. No palpable hematoma or underlying fluid collection.    Significant Labs:  Recent Labs   Lab 02/21/22  0713 02/22/22  0743   * 120*   * 134*   K 4.1 4.5    100   CO2 24 24   BUN 23 17   CREATININE 1.1 1.2   CALCIUM 9.0 9.4   MG  --  1.7     Recent Labs   Lab 02/21/22  0713 02/22/22  0743   WBC 3.90 3.96   HGB 18.4* 18.5*   HCT 56.2* 56.3*    185     No results for input(s): LABPT, INR, APTT in the last 48 hours.  Microbiology Results (last 7 days)       Procedure Component Value Units  Date/Time    Aerobic culture [[id number]]  (Abnormal) Collected: 02/17/22 0238    Order Status: Completed Specimen: Abscess from Brain Updated: 02/22/22 0813     Aerobic Bacterial Culture Results called to and read back by:Rina Nunez RN 02/19/2022  13:17      NOCARDIA SPECIES  Moderate  further identified as Nocardia nova  For susceptibility see order #[id number]      Narrative:      2) Intracerebral Abscess    Aerobic culture [[id number]]  (Abnormal) Collected: 02/17/22 0238    Order Status: Completed Specimen: Abscess from Brain Updated: 02/22/22 0813     Aerobic Bacterial Culture Results called to and read back by:Rina Nunez RN 02/19/2022  13:15      NOCARDIA SPECIES  Moderate  further identified as Nocardia nova  Susceptibility pending      Narrative:      1) Intracerebral Abscess    Culture, Anaerobe [[id number]] Collected: 02/17/22 0238    Order Status: Completed Specimen: Abscess from Brain Updated: 02/22/22 0744     Anaerobic Culture No anaerobes isolated    Narrative:      2) Intracerebral Abscess    Culture, Anaerobe [[id number]] Collected: 02/17/22 0238    Order Status: Completed Specimen: Abscess from Brain Updated: 02/22/22 0743     Anaerobic Culture No anaerobes isolated    Narrative:      1) Intracerebral Abscess    Blood culture [[id number]] Collected: 02/16/22 2247    Order Status: Completed Specimen: Blood from Peripheral, Hand, Right Updated: 02/22/22 0612     Blood Culture, Routine No growth after 5 days.    Narrative:      Blood cultures x 2 different sites. 4 bottles total. Please  draw cultures before administering antibiotics.    Blood culture [[id number]] Collected: 02/16/22 2307    Order Status: Completed Specimen: Blood from Peripheral, Ankle, Right Updated: 02/22/22 0612     Blood Culture, Routine No growth after 5 days.    Narrative:      Blood cultures from 2 different sites. 4 bottles total.  Please draw before starting antibiotics.    Fungus culture [[id number]] Collected:  02/17/22 0238    Order Status: Completed Specimen: Abscess from Brain Updated: 02/21/22 1150     Fungus (Mycology) Culture Culture in progress    Narrative:      1) Intracerebral Abscess    Fungus culture [[id number]] Collected: 02/17/22 0238    Order Status: Completed Specimen: Abscess from Brain Updated: 02/21/22 1150     Fungus (Mycology) Culture Culture in progress    Narrative:      2) Intracerebral Abscess    Modified Acid Fast Stain For Nocardia [[id number]] Collected: 02/17/22 0238    Order Status: Completed Specimen: Brain Updated: 02/18/22 2041     Modified Acid Fast smear No partially acid fast organisms seen    Narrative:      add-on MAFST per Aaron House MD  02/18/2022  14:40 ORD#[id number]    Cryptococcal antigen [[id number]] Collected: 02/18/22 2016    Order Status: Sent Specimen: Blood, Venous Updated: 02/18/22 2040    Narrative:      Collection has been rescheduled by JMFarzana at 02/18/2022 19:56 Reason:   Due in Am per nurse......    AFB Culture & Smear [[id number]] Collected: 02/17/22 0238    Order Status: Completed Specimen: Abscess from Brain Updated: 02/18/22 1515     AFB Culture & Smear Culture in progress     AFB CULTURE STAIN No acid fast bacilli seen.    Narrative:      1) Intracerebral Abscess    AFB Culture & Smear [[id number]] Collected: 02/17/22 0238    Order Status: Completed Specimen: Abscess from Brain Updated: 02/18/22 1515     AFB Culture & Smear Culture in progress     AFB CULTURE STAIN No acid fast bacilli seen.    Narrative:      2) Intracerebral Abscess    Modified Acid Fast Stain For Nocardia [[id number]]     Order Status: Completed Specimen: Abscess     Modified Acid Fast Stain For Nocardia [[id number]]     Order Status: Completed Specimen: Abscess     Toxoplasma Gondii, DNA (Qual) [[id number]] Collected: 02/18/22 0603    Order Status: Completed Specimen: Blood Updated: 02/18/22 0609     Toxoplasma gondii DNA, Source blood    Modified Acid Fast Stain For Nocardia [[id number]]     Order  Status: Completed Specimen: Abscess     Gram stain [[id number]] Collected: 02/17/22 0238    Order Status: Completed Specimen: Abscess from Brain Updated: 02/17/22 1031     Gram Stain Result Many WBC's      No epithelial cells      No organisms seen    Narrative:      2) Intracerebral Abscess    Gram stain [[id number]] Collected: 02/17/22 0238    Order Status: Completed Specimen: Abscess from Brain Updated: 02/17/22 0753     Gram Stain Result Rare WBC's      No organisms seen      No epithelial cells    Narrative:      1) Intracerebral Abscess          All pertinent labs from the last 24 hours have been reviewed.    Significant Diagnostics:  I have reviewed and interpreted all pertinent imaging results/findings within the past 24 hours.

## 2022-02-22 NOTE — CONSULTS
Radiology Consult    IR was consulted for tunneled PICC placement for long term IV antibiotics.    Ronal Mazariegos is a 71 y.o. male with a kidney transplant on 5/4/21. Patient initially presented with neurological symptoms found to have a left frontal abscess. ID is following and is recommending the patient to be on long term IV antibiotics.    Past Medical History:   Diagnosis Date    Anasarca 10/1/2021    Anemia of chronic disease     Atrial fibrillation     BPH (benign prostatic hypertrophy)     Chronic systolic heart failure 10/1/2021    CKD (chronic kidney disease) stage 2, GFR 60-89 ml/min     Hepatitis C virus infection cured after antiviral drug therapy     acquired through kidney transplant, treated / cured (SVR12 - 12/2021)    HTN (hypertension)     HTN (hypertension)     Polycystic kidney disease      Past Surgical History:   Procedure Laterality Date    AV FISTULA PLACEMENT Left 2016    CATARACT EXTRACTION, BILATERAL Bilateral     CRANIOTOMY Right 2/16/2022    Procedure: CRANIOTOMY;  Surgeon: Sunday Rosa DO;  Location: [facility] OR [address] [place] [address];  Service: Neurosurgery;  Laterality: Right;  R craniotomy for abscess drainage    KIDNEY TRANSPLANT N/A 5/4/2021    Procedure: TRANSPLANT, KIDNEY;  Surgeon: Lexy Bolden MD;  Location: [facility] OR [address] [place] [address];  Service: Transplant;  Laterality: N/A;       Discussed with primary team, MARY ANN Carrillo    Imaging reviewed with Radiology staff, Dr. Rudy Mckay.     Procedure: tunneled PICC.    Scheduled Meds:    bacitracin zinc   Topical (Top) BID    clotrimazole-betamethasone 1-0.05%   Topical (Top) BID    famotidine  20 mg Oral QHS    furosemide  40 mg Oral Daily    heparin (porcine)  5,000 Units Subcutaneous Q8H    imipenem-cilastatin  1,000 mg Intravenous Q8H    insulin aspart U-100  3 Units Subcutaneous TIDWM    metoprolol tartrate  50 mg Oral BID    polyethylene glycol  17 g Oral Daily    predniSONE  5 mg Oral BID    [START ON 2/23/2022]  predniSONE  5 mg Oral Daily    senna-docusate 8.6-50 mg  1 tablet Oral BID    trimethoprim-sulfamethoxasole (BACTRIM) IVPB (fixed ratio)  15 mg/kg/day (Dosing Weight) Intravenous Q8H    tacrolimus  2 mg Oral BID     Continuous Infusions:   PRN Meds:acetaminophen, dextrose 10%, dextrose 10%, dextrose 10%, glucagon (human recombinant), glucose, glucose, insulin aspart U-100, iohexol, ondansetron, oxyCODONE, sodium chloride 0.9%, traZODone    Allergies: Review of patient's allergies indicates:  No Known Allergies    Labs:  Recent Labs   Lab 02/16/22  2246   INR 1.3*       Recent Labs   Lab 02/22/22  0743   WBC 3.96   HGB 18.5*   HCT 56.3*   MCV 97         Recent Labs   Lab 02/22/22  0743   *   *   K 4.5      CO2 24   BUN 17   CREATININE 1.2   CALCIUM 9.4   MG 1.7   ALT 25   AST 19   ALBUMIN 2.8*   BILITOT 1.3*         Vitals (Most Recent):  Temp: 97.8 °F (36.6 °C) (02/22/22 0804)  Pulse: (!) 117 (02/22/22 0853)  Resp: 16 (02/22/22 1002)  BP: 128/87 (02/22/22 0853)  SpO2: 95 % (02/22/22 0804)    Plan:   Plan for image guided tunneled PICC placement on 2/23/22  NPO at midnight.  Hold anticoagulants.      Shaggy Lopez MD  Radiology PGY-2  [facility]

## 2022-02-22 NOTE — PLAN OF CARE
Pt remains AAO x 4 with VSS on visi monitor, afebrile, sats upper 90s on RA throughout shift  Pt c/o HA relieved with PRN Tylenol; PRN Trazadone given for sleep  Incision from R side forehead across head with staples - CDI - cleaned with Bacitracin ointment BID as ordered  Cultures from Abscess Positive for Nocardia infection - ID on board - pt on IV Bactrum q8hrs and IV Primaxin q8hrs   R FA 20g and R hand 24g - CDI, saline locked   L FA Fistula +/+  Pt in Afib on Tele monitor - HR 90s-130s throughout shift   HR sustaining 120-130s after PM Lopressor given as scheduled - MARY ANN Mojica notified - extra dose 12.5 mg Lopressor given - -110s, will continue to monitor  CBG ACHS - last  - no SSI indicated   PO Lasix given daily with good UOP   Pt up 1x assist and ambulatory - PT/OT working with patient, voids in urinal/condom cath at night, LBM 2/21  Diabetic diet   Pt remains free from falls and injuries, call light in reach, bed in lowest position, nonskid socks on when OOB, side rails up x2  Will continue to monitor

## 2022-02-22 NOTE — PT/OT/SLP PROGRESS
Occupational Therapy   Treatment    Name: Ronal Mazariegos  MRN: [id number]  Admitting Diagnosis:  Intracranial abscess  6 Days Post-Op     Pre-op Diagnosis: Brain abscess [G06.0]    Procedure(s):  CRANIOTOMY     Recommendations:     Discharge Recommendations: home health OT  Discharge Equipment Recommendations:  walker, rolling  Barriers to discharge:  None    Assessment:     Ronal Mazariegos is a 71 y.o. male with a medical diagnosis of Intracranial abscess.  He presents with the following performance deficits affecting function:  weakness, impaired endurance, impaired self care skills, impaired functional mobilty, gait instability, impaired balance, decreased safety awareness.     Pt agreeable to therapy this date. Pt ambulated 150 ft within the halls with no AD and HHA. Pt demonstrates unsteady balance and fear when ambulating. Practiced donning pants with Pt requiring Min A to thread LEs. Pt would benefit from continued skilled acute OT services in order to maximize independence and safety with ADLs and functional mobility to ensure safe return to PLOF in the least restrictive environment. OT recommending HHOT once pt is medically appropriate for d/c.       Rehab Prognosis:  Good; patient would benefit from acute skilled OT services to address these deficits and reach maximum level of function.       Plan:     Patient to be seen 3 x/week to address the above listed problems via self-care/home management, therapeutic activities, therapeutic exercises  · Plan of Care Expires: 03/22/22  · Plan of Care Reviewed with: patient    Subjective     Pain/Comfort:  · Pain Rating 1: 0/10    Objective:     Communicated with: RN prior to session.  Patient found ambulatory in room/lagos with telemetry upon OT entry to room.    General Precautions: Standard, fall   Orthopedic Precautions:N/A   Braces: N/A  Respiratory Status: Room air     Occupational Performance:     Bed Mobility:    · Patient completed Rolling/Turning to Left with   stand by assistance  · Patient completed Supine to Sit with stand by assistance  · Patient completed Sit to Supine with stand by assistance     Functional Mobility/Transfers:  · Patient completed Sit <> Stand Transfer with stand by assistance  with  no assistive device   · Functional Mobility: Pt engaging in functional mobility to simulate household/community distances approx 150 ft with SBA and utilizing HHA and no AD in order to maximize functional activity tolerance and standing balance required for engagement in occupations of choice.  · No AD for first half and HHA for second half due to fear     Activities of Daily Living:  · Lower Body Dressing: minimum assistance : To doff/don pants       AMPAC 6 Click ADL: 21    Treatment & Education:   Therapist provided facilitation and instruction of proper body mechanics and fall prevention strategies during tasks listed above.   Instructed patient to sit in bedside chair daily to increase OOB/activity tolerance.   Instructed patient to use call light to have nursing staff assist with needs/transfers.   Discussed OT POC and answered all questions within OT scope of practice.   Whiteboard updated       Patient left HOB elevated with all lines intact and call button in reachEducation:      GOALS:   Multidisciplinary Problems     Occupational Therapy Goals        Problem: Occupational Therapy Goal    Goal Priority Disciplines Outcome Interventions   Occupational Therapy Goal     OT, PT/OT Ongoing, Progressing    Description: Goals to be met by: 3/6/2022    Patient will increase functional independence with ADLs by performing:    Feeding with [place].  UE Dressing with [place].  LE Dressing with [place].  Grooming while standing at sink with Modified [place] using AD PRN.  Toileting from toilet with [place] for hygiene and clothing management.   Toilet transfer to toilet with Modified [place] using AD PRN.  Step transfer with Modified  [place] using AD PRN.                     Time Tracking:     OT Date of Treatment: 02/22/22  OT Start Time: 1435  OT Stop Time: 1452  OT Total Time (min): 17 min    Billable Minutes:Therapeutic Activity 17    OT/FERCHO: OT          2/22/2022

## 2022-02-22 NOTE — ASSESSMENT & PLAN NOTE
- Noted on TTE 10/2021 with PA systolic pressure is 52 mmHg.   - Repeat TTE with PA systolic pressure is 29 mmHg  - Resumed home lasix; discontinued 2/22 as appears euvolemic and >3L negative with weight decrease and poor PO intake  - Monitor closely

## 2022-02-22 NOTE — PLAN OF CARE
AAOx4, VSS, SpO2 > 92% on RA.   LFA AVF positive for thrill/bruit.   RFA 20g PIV saline locked.   RH 24g PIV saline locked.   R forehead incision ronit with staples. CDI. Approximated. Bacitracin applied BID.   Cultures from abscess grew nocardia nova. IV bactrim changed to po. IV Primaxin q8h continued.   IR consulted for tunnel line for out patient abx therapy. Possible line placement today.   Hematology consulted for polycythemia vera. Inpatient phlebotomy today and 2/24.  Tele afib. PO Lopressor increased from 25mg BID to 50mg BID. Cards consulted.   BG checks achs. No SSI indicated per order. Pt with poor po intake. Scheduled meal time insulin not given.   Poss dc tomorrow.   Up independently. Wears non slip socks when oob. Free from falls/injuries.   Bed in lowest, locked position. Bed rails up x2. Call light and personal belongings within reach.   Pt and wife educated on plan of care. Verbalized understanding.

## 2022-02-22 NOTE — CARE UPDATE
RAPID RESPONSE NURSE ROUND       Rounding completed with charge RN, Samreen. No concerns verbalized at this time. Instructed to call [phone] for further concerns or assistance.

## 2022-02-22 NOTE — SUBJECTIVE & OBJECTIVE
Subjective:   History of Present Illness:  71 M PMHx afib on eliquis, recent kidney transplant on 5/2021 on immunosuppressants, cardiomyopathy, pulmonary hypertension presenting to Neuro Surgery with new right frontal lobe abscess. Pt reports transient episodes of left sided weakness this week, most recent episode was this morning, he fell due to left leg weakness when getting up from the toilet, prompting visit to the ED because he thought he was having a stroke. At present, he feels well, has no fever/chills/aches, confusion, blurry vision, LOC, or seizures, and besides mild left sided weakness, he is asymptomatic.     Mr. Mazariegos is a 71 y.o. year old male who is status post Kidney Transplant - 5/4/2021  (#1).  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 Pt was admitted to Neuro ICU and is now s/p craniotomy 2/17 with cultures + nocardia nova. ID following patient and started him on IV bactrim and imipenem. CT C/A/P notes a RML lesion which will need to be repeated in 3 months (~5/19/22). Cardiac MRI done at OSH 02/09 with finding consistent with infiltrative cardiomyopathy. ECHO with no evidence of vegitations, EF 50%, PA pressure 29. Cardiology consulted for recs. Will need MRI repeated in 4-6 weeks per Neuro Surgery. Pt stepped down from ICU 2/21 afternoon.    Interval history: Patient has hx of Afib on 25 mg PO Metoprolol BID. Eliquis currently held s/p craniotomy (plan to resume 2/25). Rate uncontrolled overnight (asymptomatic). Extra 12.5 mg IV Lopressor given. D/w cards this AM. Metoprolol increased to 50 mg PO BID. Cont tele. Cardiology consulted for further recs on  "inpatient treatment -  r/o nocardia involvement in regards to infiltrative cardiomyopathy. Cards d/w ID. Per cards, "patient does not require inpatient myocardial biopsy for diagnosis of amyloidosis or other possible infiltrative cardiomyopathy. He will need outpatient f/u for further workup including PYP scan. ID following, appreciate their assistance. Switched to oral Bactrim from IV today. Cont IV Imipenem. Plan for tunneled catheter placement with IR 2/23 due to need for long term abx. H/H increasing (Hct 56.2%). Patient has hx polycythemia onset 2021 requiring phlebotomy every few weeks (last on 2/3). D/w heme. Goal Hct 43-46%. Plan for phlebotomy today and in 2 days per heme/blood bank. UOP adequate, Cr 1.1. Dc lasix today as euvolemic with UOP >5L past 24h. Patient c/o poor appetite and sleep. Will trial Remeron. PT/OT following, recommend HH. Orders placed for PT/OT/HH abx. Will continue to monitor.         Past Medical, Surgical, Family, and Social History:   Unchanged from H&P.    Scheduled Meds:   bacitracin zinc   Topical (Top) BID    clotrimazole-betamethasone 1-0.05%   Topical (Top) BID    famotidine  20 mg Oral QHS    heparin (porcine)  5,000 Units Subcutaneous Q8H    imipenem-cilastatin  1,000 mg Intravenous Q8H    insulin aspart U-100  3 Units Subcutaneous TIDWM    [START ON 2/23/2022] lisinopriL  2.5 mg Oral Daily    metoprolol tartrate  50 mg Oral BID    mirtazapine  15 mg Oral Nightly    polyethylene glycol  17 g Oral Daily    predniSONE  5 mg Oral BID    [START ON 2/23/2022] predniSONE  5 mg Oral Daily    senna-docusate 8.6-50 mg  1 tablet Oral BID    sulfamethoxazole-trimethoprim 800-160mg  2 tablet Oral TID    tacrolimus  2 mg Oral BID     Continuous Infusions:  PRN Meds:acetaminophen, dextrose 10%, dextrose 10%, dextrose 10%, glucagon (human recombinant), glucose, glucose, hydrOXYzine HCL, insulin aspart U-100, iohexol, ondansetron, oxyCODONE, sodium chloride 0.9%, traZODone    Intake/Output " - Last 3 Shifts         02/20 0700  02/21 0659 02/21 0700  02/22 0659 02/22 0700 02/23 0659    P.O. 360 700     I.V. (mL/kg) 0 (0) 0 (0)     Other 0 0     IV Piggyback 3213.9 1513.8     Total Intake(mL/kg) 3573.9 (42) 2213.8 (28.3)     Urine (mL/kg/hr) 1250 (0.6) 5550 (3)     Emesis/NG output 0 0     Other 0 0     Stool 0 0     Blood 0 0     Total Output 1250 5550     Net +2323.9 -3336.2            Urine Occurrence 0 x 1 x     Stool Occurrence 0 x 1 x     Emesis Occurrence 0 x 0 x              Review of Systems   Constitutional:  Positive for appetite change and fatigue. Negative for activity change, chills and fever.   HENT:  Negative for congestion, dental problem, ear pain, rhinorrhea and trouble swallowing.    Eyes:  Negative for pain and discharge.   Respiratory:  Negative for cough, shortness of breath, wheezing and stridor.    Cardiovascular:  Negative for chest pain, palpitations and leg swelling.   Gastrointestinal:  Negative for abdominal distention, abdominal pain, constipation, diarrhea, nausea and vomiting.   Genitourinary:  Negative for decreased urine volume, difficulty urinating and dysuria.   Musculoskeletal:  Negative for arthralgias, back pain, joint swelling, neck pain and neck stiffness.   Skin:  Negative for rash and wound.   Allergic/Immunologic: Positive for immunocompromised state.   Neurological:  Positive for weakness (L side, minimal - improving). Negative for dizziness, light-headedness and headaches.   Psychiatric/Behavioral:  Positive for sleep disturbance. Negative for behavioral problems and confusion. The patient is nervous/anxious.     Objective:     Vital Signs (Most Recent):  Temp: 98.4 °F (36.9 °C) (02/22/22 1214)  Pulse: 107 (02/22/22 1132)  Resp: 20 (02/22/22 1132)  BP: 113/67 (02/22/22 1132)  SpO2: 95 % (02/22/22 1132) Vital Signs (24h Range):  Temp:  [97.6 °F (36.4 °C)-98.4 °F (36.9 °C)] 98.4 °F (36.9 °C)  Pulse:  [] 107  Resp:  [13-23] 20  SpO2:  [93 %-98 %] 95  "%  BP: (113-139)/() 113/67     Weight: 78.1 kg (172 lb 4.6 oz)  Height: 6' 2" (188 cm)  Body mass index is 22.12 kg/m².    Physical Exam  Vitals and nursing note reviewed.   Constitutional:       General: He is not in acute distress.     Appearance: He is not toxic-appearing or diaphoretic.   HENT:      Head: Normocephalic.      Comments: R sided craniotomy incision CDI with staples      Mouth/Throat:      Mouth: Mucous membranes are moist.   Eyes:      General: No scleral icterus.        Right eye: No discharge.         Left eye: No discharge.   Cardiovascular:      Rate and Rhythm: Tachycardia present. Rhythm irregular.      Heart sounds: No murmur heard.  Pulmonary:      Effort: Pulmonary effort is normal. No respiratory distress.      Breath sounds: No wheezing or rales.   Abdominal:      General: Bowel sounds are normal. There is no distension.      Palpations: Abdomen is soft.      Tenderness: There is no abdominal tenderness. There is no guarding.   Musculoskeletal:      Cervical back: Neck supple. No rigidity.      Right lower leg: No edema.      Left lower leg: No edema.   Skin:     General: Skin is warm and dry.      Coloration: Skin is not jaundiced.   Neurological:      Mental Status: He is alert and oriented to person, place, and time. Mental status is at baseline.      Motor: Weakness (L hand) present.   Psychiatric:         Mood and Affect: Mood normal.         Behavior: Behavior normal.         Thought Content: Thought content normal.       Laboratory:  CBC:   Recent Labs   Lab 02/20/22  0516 02/21/22  0713 02/22/22  0743   WBC 3.99 3.90 3.96   RBC 5.60 5.85 5.82   HGB 18.0 18.4* 18.5*   HCT 53.0 56.2* 56.3*    179 185   MCV 95 96 97   MCH 32.1* 31.5* 31.8*   MCHC 34.0 32.7 32.9     CMP:   Recent Labs   Lab 02/20/22  0516 02/21/22  0713 02/22/22  0743   * 123* 120*   CALCIUM 8.9 9.0 9.4   ALBUMIN 2.4* 2.6* 2.8*   PROT 5.2* 5.6* 5.9*   * 133* 134*   K 4.4 4.1 4.5   CO2 22* 24 " 24    100 100   BUN 20 23 17   CREATININE 0.8 1.1 1.2   ALKPHOS 66 69 69   ALT 22 29 25   AST 15 24 19       Diagnostic Results:  Reviewed

## 2022-02-22 NOTE — PLAN OF CARE
Pt arrived to  for tunneled PICC placement. Pt oriented to unit and staff. Plan of care reviewed with patient. Comfort measures utilized. Pt safely transferred from stretcher to procedural table. Safety strap applied, positioner pillows utilized to minimize pressure points. Blankets applied. Patient placed on continuous monitoring. RN to remain at bedside. Education accepted. Consents reviewed. See flow sheets for monitoring, medication administration, and updates.

## 2022-02-23 ENCOUNTER — TELEPHONE (OUTPATIENT)
Dept: NEUROSURGERY | Facility: CLINIC | Age: 72
End: 2022-02-23
Payer: MEDICARE

## 2022-02-23 LAB
ALBUMIN SERPL BCP-MCNC: 3 G/DL (ref 3.5–5.2)
ALP SERPL-CCNC: 68 U/L (ref 55–135)
ALT SERPL W/O P-5'-P-CCNC: 24 U/L (ref 10–44)
ANION GAP SERPL CALC-SCNC: 11 MMOL/L (ref 8–16)
AST SERPL-CCNC: 21 U/L (ref 10–40)
B DERMAT AG UR QL IA: NOT DETECTED
BASOPHILS # BLD AUTO: 0.01 K/UL (ref 0–0.2)
BASOPHILS NFR BLD: 0.2 % (ref 0–1.9)
BILIRUB SERPL-MCNC: 1.5 MG/DL (ref 0.1–1)
BLASTOMYCES AG RESULT: NOT DETECTED NG/ML
BUN SERPL-MCNC: 20 MG/DL (ref 8–23)
CALCIUM SERPL-MCNC: 9.9 MG/DL (ref 8.7–10.5)
CHLORIDE SERPL-SCNC: 100 MMOL/L (ref 95–110)
CO2 SERPL-SCNC: 19 MMOL/L (ref 23–29)
CREAT SERPL-MCNC: 1.4 MG/DL (ref 0.5–1.4)
CYTOMEGALOVIRUS DNA: NOT DETECTED
CYTOMEGALOVIRUS LOG (IU/ML): NOT DETECTED LOGIU/ML
CYTOMEGALOVIRUS PCR, QUANT: NOT DETECTED IU/ML
DIFFERENTIAL METHOD: ABNORMAL
EOSINOPHIL # BLD AUTO: 0.1 K/UL (ref 0–0.5)
EOSINOPHIL NFR BLD: 1.1 % (ref 0–8)
ERYTHROCYTE [DISTWIDTH] IN BLOOD BY AUTOMATED COUNT: 13.8 % (ref 11.5–14.5)
EST. GFR  (AFRICAN AMERICAN): 58.8 ML/MIN/1.73 M^2
EST. GFR  (NON AFRICAN AMERICAN): 50.9 ML/MIN/1.73 M^2
GLUCOSE SERPL-MCNC: 109 MG/DL (ref 70–110)
HCT VFR BLD AUTO: 57.3 % (ref 40–54)
HGB BLD-MCNC: 18.8 G/DL (ref 14–18)
IMM GRANULOCYTES # BLD AUTO: 0.19 K/UL (ref 0–0.04)
IMM GRANULOCYTES NFR BLD AUTO: 4.1 % (ref 0–0.5)
LYMPHOCYTES # BLD AUTO: 1.8 K/UL (ref 1–4.8)
LYMPHOCYTES NFR BLD: 38.6 % (ref 18–48)
MAGNESIUM SERPL-MCNC: 1.9 MG/DL (ref 1.6–2.6)
MCH RBC QN AUTO: 31.4 PG (ref 27–31)
MCHC RBC AUTO-ENTMCNC: 32.8 G/DL (ref 32–36)
MCV RBC AUTO: 96 FL (ref 82–98)
MONOCYTES # BLD AUTO: 1 K/UL (ref 0.3–1)
MONOCYTES NFR BLD: 22.7 % (ref 4–15)
NEUTROPHILS # BLD AUTO: 1.5 K/UL (ref 1.8–7.7)
NEUTROPHILS NFR BLD: 33.3 % (ref 38–73)
NRBC BLD-RTO: 0 /100 WBC
PHOSPHATE SERPL-MCNC: 2.5 MG/DL (ref 2.7–4.5)
PLATELET # BLD AUTO: 201 K/UL (ref 150–450)
PMV BLD AUTO: 11.2 FL (ref 9.2–12.9)
POCT GLUCOSE: 118 MG/DL (ref 70–110)
POCT GLUCOSE: 129 MG/DL (ref 70–110)
POCT GLUCOSE: 166 MG/DL (ref 70–110)
POCT GLUCOSE: 189 MG/DL (ref 70–110)
POTASSIUM SERPL-SCNC: 4.8 MMOL/L (ref 3.5–5.1)
PROT SERPL-MCNC: 6 G/DL (ref 6–8.4)
RBC # BLD AUTO: 5.99 M/UL (ref 4.6–6.2)
SODIUM SERPL-SCNC: 130 MMOL/L (ref 136–145)
T GONDII IGG SER QL IA: NORMAL
T GONDII IGG SERPL IA-ACNC: <5 IU/ML (ref 0–6.4)
TACROLIMUS BLD-MCNC: 6.8 NG/ML (ref 5–15)
WBC # BLD AUTO: 4.59 K/UL (ref 3.9–12.7)

## 2022-02-23 PROCEDURE — 25000003 PHARM REV CODE 250: Performed by: PHYSICIAN ASSISTANT

## 2022-02-23 PROCEDURE — 63600175 PHARM REV CODE 636 W HCPCS: Performed by: STUDENT IN AN ORGANIZED HEALTH CARE EDUCATION/TRAINING PROGRAM

## 2022-02-23 PROCEDURE — 99195 PHLEBOTOMY: CPT

## 2022-02-23 PROCEDURE — 99195 PHLEBOTOMY: CPT | Mod: ,,, | Performed by: PATHOLOGY

## 2022-02-23 PROCEDURE — 99195 PR PHLEBOTOMY: ICD-10-PCS | Mod: ,,, | Performed by: PATHOLOGY

## 2022-02-23 PROCEDURE — 80053 COMPREHEN METABOLIC PANEL: CPT | Performed by: NURSE PRACTITIONER

## 2022-02-23 PROCEDURE — 63600175 PHARM REV CODE 636 W HCPCS: Performed by: INTERNAL MEDICINE

## 2022-02-23 PROCEDURE — 63600175 PHARM REV CODE 636 W HCPCS: Performed by: NURSE PRACTITIONER

## 2022-02-23 PROCEDURE — 80197 ASSAY OF TACROLIMUS: CPT | Performed by: NURSE PRACTITIONER

## 2022-02-23 PROCEDURE — 63600175 PHARM REV CODE 636 W HCPCS: Performed by: PHYSICIAN ASSISTANT

## 2022-02-23 PROCEDURE — 85025 COMPLETE CBC W/AUTO DIFF WBC: CPT | Performed by: NURSE PRACTITIONER

## 2022-02-23 PROCEDURE — 25000003 PHARM REV CODE 250: Performed by: NURSE PRACTITIONER

## 2022-02-23 PROCEDURE — 99233 SBSQ HOSP IP/OBS HIGH 50: CPT | Mod: ,,, | Performed by: PHYSICIAN ASSISTANT

## 2022-02-23 PROCEDURE — 99233 PR SUBSEQUENT HOSPITAL CARE,LEVL III: ICD-10-PCS | Mod: ,,, | Performed by: PHYSICIAN ASSISTANT

## 2022-02-23 PROCEDURE — 25000003 PHARM REV CODE 250: Performed by: INTERNAL MEDICINE

## 2022-02-23 PROCEDURE — 25000003 PHARM REV CODE 250

## 2022-02-23 PROCEDURE — 83735 ASSAY OF MAGNESIUM: CPT | Performed by: PHYSICIAN ASSISTANT

## 2022-02-23 PROCEDURE — 94761 N-INVAS EAR/PLS OXIMETRY MLT: CPT

## 2022-02-23 PROCEDURE — 36415 COLL VENOUS BLD VENIPUNCTURE: CPT | Performed by: NURSE PRACTITIONER

## 2022-02-23 PROCEDURE — 20600001 HC STEP DOWN PRIVATE ROOM

## 2022-02-23 PROCEDURE — 84100 ASSAY OF PHOSPHORUS: CPT | Performed by: PHYSICIAN ASSISTANT

## 2022-02-23 RX ORDER — SULFAMETHOXAZOLE AND TRIMETHOPRIM 800; 160 MG/1; MG/1
2 TABLET ORAL 3 TIMES DAILY
Qty: 180 TABLET | Refills: 11 | Status: SHIPPED | OUTPATIENT
Start: 2022-02-23 | End: 2023-01-30 | Stop reason: DRUGHIGH

## 2022-02-23 RX ORDER — LANOLIN ALCOHOL/MO/W.PET/CERES
1200 CREAM (GRAM) TOPICAL 2 TIMES DAILY
Qty: 120 TABLET | Refills: 11 | Status: SHIPPED | OUTPATIENT
Start: 2022-02-23 | End: 2022-06-28

## 2022-02-23 RX ORDER — MIRTAZAPINE 15 MG/1
15 TABLET, FILM COATED ORAL NIGHTLY
Qty: 30 TABLET | Refills: 11 | Status: SHIPPED | OUTPATIENT
Start: 2022-02-23 | End: 2022-05-05

## 2022-02-23 RX ORDER — METOPROLOL TARTRATE 50 MG/1
50 TABLET ORAL 2 TIMES DAILY
Qty: 60 TABLET | Refills: 11 | Status: ON HOLD | OUTPATIENT
Start: 2022-02-23 | End: 2023-02-11 | Stop reason: DRUGHIGH

## 2022-02-23 RX ORDER — LISINOPRIL 2.5 MG/1
2.5 TABLET ORAL DAILY
Qty: 30 TABLET | Refills: 11 | Status: SHIPPED | OUTPATIENT
Start: 2022-02-24 | End: 2022-05-05 | Stop reason: HOSPADM

## 2022-02-23 RX ADMIN — METOPROLOL TARTRATE 50 MG: 50 TABLET, FILM COATED ORAL at 08:02

## 2022-02-23 RX ADMIN — BACITRACIN 1 EACH: 500 OINTMENT TOPICAL at 08:02

## 2022-02-23 RX ADMIN — SENNOSIDES AND DOCUSATE SODIUM 1 TABLET: 50; 8.6 TABLET ORAL at 08:02

## 2022-02-23 RX ADMIN — POLYETHYLENE GLYCOL 3350 17 G: 17 POWDER, FOR SOLUTION ORAL at 08:02

## 2022-02-23 RX ADMIN — PREDNISONE 5 MG: 5 TABLET ORAL at 08:02

## 2022-02-23 RX ADMIN — SULFAMETHOXAZOLE AND TRIMETHOPRIM 2 TABLET: 800; 160 TABLET ORAL at 08:02

## 2022-02-23 RX ADMIN — CLOTRIMAZOLE AND BETAMETHASONE DIPROPIONATE: 10; .5 CREAM TOPICAL at 08:02

## 2022-02-23 RX ADMIN — INSULIN ASPART 3 UNITS: 100 INJECTION, SOLUTION INTRAVENOUS; SUBCUTANEOUS at 05:02

## 2022-02-23 RX ADMIN — INSULIN ASPART 3 UNITS: 100 INJECTION, SOLUTION INTRAVENOUS; SUBCUTANEOUS at 12:02

## 2022-02-23 RX ADMIN — HEPARIN SODIUM 5000 UNITS: 5000 INJECTION INTRAVENOUS; SUBCUTANEOUS at 10:02

## 2022-02-23 RX ADMIN — MIRTAZAPINE 15 MG: 15 TABLET, FILM COATED ORAL at 08:02

## 2022-02-23 RX ADMIN — HEPARIN SODIUM 5000 UNITS: 5000 INJECTION INTRAVENOUS; SUBCUTANEOUS at 01:02

## 2022-02-23 RX ADMIN — IMIPENEM AND CILASTATIN SODIUM 1000 MG: 500; 500 INJECTION, POWDER, FOR SOLUTION INTRAVENOUS at 06:02

## 2022-02-23 RX ADMIN — LISINOPRIL 2.5 MG: 2.5 TABLET ORAL at 08:02

## 2022-02-23 RX ADMIN — SULFAMETHOXAZOLE AND TRIMETHOPRIM 2 TABLET: 800; 160 TABLET ORAL at 02:02

## 2022-02-23 RX ADMIN — TACROLIMUS 2 MG: 1 CAPSULE ORAL at 05:02

## 2022-02-23 RX ADMIN — BACITRACIN: 500 OINTMENT TOPICAL at 09:02

## 2022-02-23 RX ADMIN — INSULIN ASPART 3 UNITS: 100 INJECTION, SOLUTION INTRAVENOUS; SUBCUTANEOUS at 08:02

## 2022-02-23 RX ADMIN — TACROLIMUS 2 MG: 1 CAPSULE ORAL at 08:02

## 2022-02-23 RX ADMIN — IMIPENEM AND CILASTATIN SODIUM 1000 MG: 500; 500 INJECTION, POWDER, FOR SOLUTION INTRAVENOUS at 02:02

## 2022-02-23 RX ADMIN — FAMOTIDINE 20 MG: 20 TABLET ORAL at 08:02

## 2022-02-23 RX ADMIN — MEROPENEM 2 G: 1 INJECTION INTRAVENOUS at 10:02

## 2022-02-23 NOTE — PROCEDURES
[facility] - Transplant Stepdown  Transfusion Medicine  Procedure Note    SUMMARY   Phlebotomy therapeutic    Date/Time: 2/23/2022 2:45 PM  Performed by: Valente Tan MD  Authorized by: Pedrito Ellsworth MD         Date of Procedure: 2/23/2022     Procedure: Therapeutic Phlebotomy    Provider: Valente Tan MD     Assisting Provider: None    Pre-Procedure Diagnosis: Intracranial abscess    Post-Procedure Diagnosis: Intracranial abscess    Follow-up Assessment:  Mr. 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 Patient reports history of polycythemia requiring phlebotomy every few weeks (last on 2/3). Heme consulted and recommend inpatient phlebotomy to be performed with a therapeutic goal of 43-46%. Plan for TP x 2 (removal of  ~ 500 mL whole blood per TP) with 500 ml NS bolus after first phlebotomy.     Overall, procedure was successfully completed however patient endorsed hypotension and lightheadedness after second phlebotomy. Second bolus of NS provided and patient's s/s improved. Therapeutic goal unlikely reached with this phlebotomy session. Please contact us if additional teatments are warranted.     Pertinent Laboratory Data:   Complete Blood Count:   Lab Results   Component Value Date    HGB 18.8 (H) 02/23/2022    HCT 57.3 (H) 02/23/2022     02/23/2022    WBC 4.59 02/23/2022     Comprehensive Metabolic Panel:   Lab Results   Component Value Date     (L) 02/23/2022    K 4.8 02/23/2022     02/23/2022    CO2 19 (L) 02/23/2022     02/23/2022    BUN 20 02/23/2022    CREATININE 1.4 02/23/2022    CALCIUM 9.9 02/23/2022    PROT 6.0 02/23/2022    ALBUMIN 3.0 (L) 02/23/2022    BILITOT 1.5 (H) 02/23/2022    ALKPHOS 68 02/23/2022    AST 21 02/23/2022    ALT 24 02/23/2022     ANIONGAP 11 02/23/2022    EGFRNONAA 50.9 (A) 02/23/2022       Pertinent Medications:     Current Facility-Administered Medications:     acetaminophen tablet 650 mg, 650 mg, Oral, Q6H PRN, Linda Martinez, NP, 650 mg at 02/21/22 1939    bacitracin zinc ointment, , Topical (Top), BID, Francheska Sow PA-C, Given at 02/23/22 0900    clotrimazole-betamethasone 1-0.05% cream, , Topical (Top), BID, Aaron House MD, Given at 02/23/22 0854    dextrose 10% bolus 125 mL, 12.5 g, Intravenous, PRN, Linda Martinez, NP    dextrose 10% bolus 125 mL, 12.5 g, Intravenous, PRN, Sunday Rosa DO    dextrose 10% bolus 250 mL, 25 g, Intravenous, PRN, Sunday Rosa,     famotidine tablet 20 mg, 20 mg, Oral, QHS, Linda Mirohith, NP, 20 mg at 02/22/22 2033    glucagon (human recombinant) injection 1 mg, 1 mg, Intramuscular, PRN, Linda Martinez, NP    glucose chewable tablet 16 g, 16 g, Oral, PRN, Lizbeth Knott MD    glucose chewable tablet 24 g, 24 g, Oral, PRN, Lizbeth Knott MD    heparin (porcine) injection 5,000 Units, 5,000 Units, Subcutaneous, Q8H, Linda Martinez NP, 5,000 Units at 02/23/22 1304    hydrOXYzine HCL tablet 25 mg, 25 mg, Oral, TID PRN, Dina Tang PA-C    insulin aspart U-100 pen 0-5 Units, 0-5 Units, Subcutaneous, QID (AC + HS) PRN, Lizbeth Knott MD, 1 Units at 02/22/22 2032    insulin aspart U-100 pen 3 Units, 3 Units, Subcutaneous, TIDWM, Lizbeth Knott MD, 3 Units at 02/23/22 1228    iohexol (OMNIPAQUE) oral solution 15 mL, 15 mL, Oral, PRN, Bing Castaneda MD    lisinopriL tablet 2.5 mg, 2.5 mg, Oral, Daily, Dina Tang PA-C, 2.5 mg at 02/23/22 0853    meropenem (MERREM) 2 g in sodium chloride 0.9% 100 mL IVPB, 2 g, Intravenous, Q8H, Dina Tang PA-C    metoprolol tartrate (LOPRESSOR) tablet 50 mg, 50 mg, Oral, BID, Dina Tang PA-C, 50 mg at 02/23/22 0853    mirtazapine tablet 15 mg, 15 mg, Oral, Nightly, Dina L. Kitty,  PA-BRIDGET, 15 mg at 02/22/22 2033    ondansetron injection 4 mg, 4 mg, Intravenous, Q8H PRN, Linda Miinea, NP, 4 mg at 02/21/22 1745    oxyCODONE immediate release tablet 5 mg, 5 mg, Oral, Q6H PRN, Linda Miinea, NP, 5 mg at 02/22/22 1002    polyethylene glycol packet 17 g, 17 g, Oral, Daily, Lexy Benson PA-C, 17 g at 02/23/22 0853    predniSONE tablet 5 mg, 5 mg, Oral, Daily, Molina Bassett MD, 5 mg at 02/23/22 0853    senna-docusate 8.6-50 mg per tablet 1 tablet, 1 tablet, Oral, BID, Linda Miinea, NP, 1 tablet at 02/23/22 0853    sodium chloride 0.9% flush 10 mL, 10 mL, Intravenous, PRN, Linda Miinea, NP    sulfamethoxazole-trimethoprim 800-160mg per tablet 2 tablet, 2 tablet, Oral, TID, Dina Tang PA-C, 2 tablet at 02/23/22 1433    tacrolimus capsule 2 mg, 2 mg, Oral, BID, Linda Miinea, NP, 2 mg at 02/23/22 0853    trazodone split tablet 25 mg, 25 mg, Oral, Nightly PRN, Galina Cardoza PA-C, 25 mg at 02/21/22 2034      Review of patient's allergies indicates:  No Known Allergies    Anesthesia: None     Technical Procedures Used: Right chest central line patent, dressing clean, dry and intact.  2 units of whole blood drawn from central line.  500 mls of NS administered after 1st unit was removed.  Vital signs after 2nd unit removed; b/p 62/46, pulse 87, temp 97.4.  500 mls of NS administered after 2nd unit removed. Post vital signs; b/p 102/67, pulse 99, temp 97.4.  Central line flushed with NS per protocol.     Description of the Findings of the Procedure:     Please see Apheresis Nurse flowsheet for details.    The patient was evaluated and all clinical and laboratory data relevant to the treatment was reviewed, and a decision was made to proceed with the Apheresis procedure.    I was available to the clinical staff throughout the procedure.    Significant Surgical Tasks Conducted by the Assistant(s): Not applicable    Complications: Patient experienced some hypotension and lightheadedness  which resolved after the 2nd administration of 500 mls of NS.     Estimated Blood Loss (EBL): 1 Liter      Valente Tan M.D., M.S., [facility]  Medical Director  Section of Transfusion Medicine & Blood Donor Services  Department of Pathology and Laboratory Medicine  [facility]  [phone] (Blood Bank)  02/23/2022

## 2022-02-23 NOTE — ASSESSMENT & PLAN NOTE
- Cont Lopressor; dose increased to 50 mg BID on 2/22 due to uncontrolled rate (d/w cards)  - Hold Eliquis until at least 14 days post craniotomy (3/3) -> d/w  Neurosurgery after clinic follow up prior to resuming.  - Monitor electrolytes closely.  - Telemetry

## 2022-02-23 NOTE — ASSESSMENT & PLAN NOTE
- Noted on TTE 10/2021 with PA systolic pressure is 52 mmHg.   - Repeat TTE with PA systolic pressure is 29 mmHg  - Resumed home lasix; discontinued 2/22 as appears euvolemic and >3L negative with weight decrease and poor PO intake  - Cont to hold lasix.   - Monitor closely.

## 2022-02-23 NOTE — SUBJECTIVE & OBJECTIVE
Subjective:   History of Present Illness:  71 M PMHx afib on eliquis, recent kidney transplant on 5/2021 on immunosuppressants, cardiomyopathy, pulmonary hypertension presenting to Neuro Surgery with new right frontal lobe abscess. Pt reports transient episodes of left sided weakness this week, most recent episode was this morning, he fell due to left leg weakness when getting up from the toilet, prompting visit to the ED because he thought he was having a stroke. At present, he feels well, has no fever/chills/aches, confusion, blurry vision, LOC, or seizures, and besides mild left sided weakness, he is asymptomatic.     Mr. Mazariegos is a 71 y.o. year old male who is status post Kidney Transplant - 5/4/2021  (#1).  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 Pt was admitted to Neuro ICU and is now s/p craniotomy 2/17 with cultures + nocardia nova. ID following patient and started him on IV bactrim and imipenem. CT C/A/P notes a RML lesion which will need to be repeated in 3 months (~5/19/22). Cardiac MRI done at OSH 02/09 with finding consistent with infiltrative cardiomyopathy. ECHO with no evidence of vegitations, EF 50%, PA pressure 29. Cardiology consulted for recs. Will need MRI repeated in 4-6 weeks per Neuro Surgery. Pt stepped down from ICU 2/21 afternoon. Patient has hx of Afib on 25 mg PO Metoprolol BID. Eliquis currently held s/p craniotomy (plan to resume 14 days post cranitomy (3/3). Rate uncontrolled overnight 2/22 (asymptomatic). Extra 12.5 mg IV Lopressor given. D/w cards 2/22. Metoprolol increased to 50 mg PO BID. Cardiology also consulted for further recs on  "inpatient treatment -  r/o nocardia involvement in regards to infiltrative cardiomyopathy. Cards d/w ID. Per cards, "patient does not require inpatient myocardial biopsy for diagnosis of amyloidosis or other possible infiltrative cardiomyopathy. He will need outpatient f/u for further workup including PYP scan. HR much improved 2/23. Patient has hx polycythemia onset 2021 requiring phlebotomy every few weeks (last on 2/3). D/w heme. Goal Hct 43-46%. Planned for inpatient phlebotomy. 2 pints blood taken on 2/23. Remeron started on 2/22 for poor appetite and sleep. Lasix discontinued 2/22 (previously on lasix for pulmonary HTN, but appeared dry - need to monitor closely). ID has been following patient for abx recommendations. He was switched from IV to oral Bactrim on 2/22. He was also switched from IV Imipenem to IV Meropenem on 2/23 due to greater drug stability/easier route of home administration. Approved by ID.    Interval history: Patient in good spirits this AM reporting he finally slept well. RCW tunneled catheter placed by IR 2/22 PM for administation of long term outpatient abx. Initially planned to discharge patient today. However, patient had a near fall due to weakness when standing after phlebotomy. Vitals remained stable. IV Imipenem switched to IV Meropenem (approved by ID). Bioscripts still teaching patient and caregiver today how to administer home abx. Pharmacist has also updated blue card and taught patient. Cr slowly trending up (1.4 today), possibly due to poor PO intake and NPO status yesterday. Encouraged patient to increase fluid intake. Need to monitor Cr and electrolytes closely due to current drug regimen. Orders are in place for PT/OT/HH abx/wound/line care. Tentative plan to dc patient tomorrow if clinically stable and labs appropriate. Monitor.      Past Medical, Surgical, Family, and Social History:   Unchanged from H&P.    Scheduled Meds:   bacitracin zinc   Topical (Top) BID    " clotrimazole-betamethasone 1-0.05%   Topical (Top) BID    famotidine  20 mg Oral QHS    heparin (porcine)  5,000 Units Subcutaneous Q8H    insulin aspart U-100  3 Units Subcutaneous TIDWM    lisinopriL  2.5 mg Oral Daily    meropenem (MERREM) IVPB  2 g Intravenous Q8H    metoprolol tartrate  50 mg Oral BID    mirtazapine  15 mg Oral Nightly    polyethylene glycol  17 g Oral Daily    predniSONE  5 mg Oral Daily    senna-docusate 8.6-50 mg  1 tablet Oral BID    sulfamethoxazole-trimethoprim 800-160mg  2 tablet Oral TID    tacrolimus  2 mg Oral BID     Continuous Infusions:  PRN Meds:acetaminophen, dextrose 10%, dextrose 10%, dextrose 10%, glucagon (human recombinant), glucose, glucose, hydrOXYzine HCL, insulin aspart U-100, iohexol, ondansetron, oxyCODONE, sodium chloride 0.9%, traZODone    Intake/Output - Last 3 Shifts         02/21 0700  02/22 0659 02/22 0700 02/23 0659 02/23 0700 02/24 0659    P.O. 700      I.V. (mL/kg) 0 (0)      Other 0      IV Piggyback 1513.8      Total Intake(mL/kg) 2213.8 (28.3)      Urine (mL/kg/hr) 5550 (3) 1125 (0.6)     Emesis/NG output 0      Other 0      Stool 0      Blood 0      Total Output 5550 1125     Net -3336.2 -1125            Urine Occurrence 1 x 8 x 1 x    Stool Occurrence 1 x      Emesis Occurrence 0 x               Review of Systems   Constitutional:  Positive for appetite change and fatigue. Negative for activity change, chills and fever.   HENT:  Negative for congestion, dental problem, ear pain, rhinorrhea and trouble swallowing.    Eyes:  Negative for pain and discharge.   Respiratory:  Negative for cough, shortness of breath, wheezing and stridor.    Cardiovascular:  Negative for chest pain, palpitations and leg swelling.   Gastrointestinal:  Negative for abdominal distention, abdominal pain, constipation, diarrhea, nausea and vomiting.   Genitourinary:  Negative for decreased urine volume, difficulty urinating and dysuria.   Musculoskeletal:  Negative for  "arthralgias, back pain, joint swelling, neck pain and neck stiffness.   Skin:  Positive for wound. Negative for rash.   Allergic/Immunologic: Positive for immunocompromised state.   Neurological:  Positive for weakness (L side, minimal - improving). Negative for dizziness, light-headedness and headaches.   Psychiatric/Behavioral:  Positive for sleep disturbance (improving). Negative for behavioral problems and confusion. The patient is nervous/anxious.     Objective:     Vital Signs (Most Recent):  Temp: 97.6 °F (36.4 °C) (02/23/22 1125)  Pulse: 93 (02/23/22 1350)  Resp: 16 (02/23/22 0745)  BP: 121/79 (02/23/22 1350)  SpO2: 98 % (02/23/22 0745) Vital Signs (24h Range):  Temp:  [97.4 °F (36.3 °C)-98 °F (36.7 °C)] 97.6 °F (36.4 °C)  Pulse:  [] 93  Resp:  [14-18] 16  SpO2:  [91 %-98 %] 98 %  BP: ()/(61-93) 121/79     Weight: 78 kg (172 lb)  Height: 6' 2" (188 cm)  Body mass index is 22.08 kg/m².    Physical Exam  Vitals and nursing note reviewed.   Constitutional:       General: He is not in acute distress.     Appearance: He is not toxic-appearing or diaphoretic.   HENT:      Head: Normocephalic.      Comments: R sided craniotomy incision CDI with staples      Mouth/Throat:      Mouth: Mucous membranes are moist.   Eyes:      General: No scleral icterus.        Right eye: No discharge.         Left eye: No discharge.   Cardiovascular:      Rate and Rhythm: Normal rate. Rhythm irregular.      Heart sounds: No murmur heard.  Pulmonary:      Effort: Pulmonary effort is normal. No respiratory distress.      Breath sounds: No wheezing or rales.   Abdominal:      General: Bowel sounds are normal. There is no distension.      Palpations: Abdomen is soft.      Tenderness: There is no abdominal tenderness. There is no guarding.   Musculoskeletal:      Cervical back: Neck supple. No rigidity.      Right lower leg: No edema.      Left lower leg: No edema.   Skin:     General: Skin is warm and dry.      Coloration: " Skin is not jaundiced.   Neurological:      Mental Status: He is alert and oriented to person, place, and time. Mental status is at baseline.      Motor: Weakness present.   Psychiatric:         Mood and Affect: Mood normal.         Behavior: Behavior normal.         Thought Content: Thought content normal.       Laboratory:  CBC:   Recent Labs   Lab 02/21/22  0713 02/22/22  0743 02/23/22  0643   WBC 3.90 3.96 4.59   RBC 5.85 5.82 5.99   HGB 18.4* 18.5* 18.8*   HCT 56.2* 56.3* 57.3*    185 201   MCV 96 97 96   MCH 31.5* 31.8* 31.4*   MCHC 32.7 32.9 32.8     CMP:   Recent Labs   Lab 02/21/22  0713 02/22/22  0743 02/23/22  0643   * 120* 109   CALCIUM 9.0 9.4 9.9   ALBUMIN 2.6* 2.8* 3.0*   PROT 5.6* 5.9* 6.0   * 134* 130*   K 4.1 4.5 4.8   CO2 24 24 19*    100 100   BUN 23 17 20   CREATININE 1.1 1.2 1.4   ALKPHOS 69 69 68   ALT 29 25 24   AST 24 19 21       Diagnostic Results:  Reviewed

## 2022-02-23 NOTE — PROGRESS NOTES
[facility] - Transplant Stepdown  Kidney Transplant  Progress Note      Reason for Follow-up: Reassessment of Kidney Transplant - 5/4/2021  (#1) recipient and management of immunosuppression.    ORGAN:  RIGHT KIDNEY   Donor Type:  Donation after Brain Death   Donor CMV Status: Positive  Donor HBcAB:Negative  Donor HCV Status:Positive  Donor HBV NAWAF: Negative  Donor HCV NAWAF: Positive      Subjective:   History of Present Illness:  71 M PMHx afib on eliquis, recent kidney transplant on 5/2021 on immunosuppressants, cardiomyopathy, pulmonary hypertension presenting to Neuro Surgery with new right frontal lobe abscess. Pt reports transient episodes of left sided weakness this week, most recent episode was this morning, he fell due to left leg weakness when getting up from the toilet, prompting visit to the ED because he thought he was having a stroke. At present, he feels well, has no fever/chills/aches, confusion, blurry vision, LOC, or seizures, and besides mild left sided weakness, he is asymptomatic.     Mr. Mazariegos is a 71 y.o. year old male who is status post Kidney Transplant - 5/4/2021  (#1).  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 Pt was admitted to Neuro ICU and is now s/p craniotomy 2/17 with cultures + nocardia nova. ID following patient and started him on IV bactrim and imipenem. CT C/A/P notes a RML lesion which will need to be repeated in 3 months (~5/19/22). Cardiac MRI done at OSH 02/09 with finding consistent with infiltrative cardiomyopathy. ECHO with no evidence of vegitations, EF 50%, PA pressure 29. Cardiology consulted for recs. Will need MRI repeated in  "4-6 weeks per Neuro Surgery. Pt stepped down from ICU 2/21 afternoon. Patient has hx of Afib on 25 mg PO Metoprolol BID. Eliquis currently held s/p craniotomy (plan to resume 14 days post cranitomy (3/3). Rate uncontrolled overnight 2/22 (asymptomatic). Extra 12.5 mg IV Lopressor given. D/w cards 2/22. Metoprolol increased to 50 mg PO BID. Cardiology also consulted for further recs on inpatient treatment -  r/o nocardia involvement in regards to infiltrative cardiomyopathy. Cards d/w ID. Per cards, "patient does not require inpatient myocardial biopsy for diagnosis of amyloidosis or other possible infiltrative cardiomyopathy. He will need outpatient f/u for further workup including PYP scan. HR much improved 2/23. Patient has hx polycythemia onset 2021 requiring phlebotomy every few weeks (last on 2/3). D/w heme. Goal Hct 43-46%. Planned for inpatient phlebotomy. 2 pints blood taken on 2/23. Remeron started on 2/22 for poor appetite and sleep. Lasix discontinued 2/22 (previously on lasix for pulmonary HTN, but appeared dry - need to monitor closely). ID has been following patient for abx recommendations. He was switched from IV to oral Bactrim on 2/22. He was also switched from IV Imipenem to IV Meropenem on 2/23 due to greater drug stability/easier route of home administration. Approved by ID.    Interval history: Patient in good spirits this AM reporting he finally slept well. RCW tunneled catheter placed by IR 2/22 PM for administation of long term outpatient abx. Initially planned to discharge patient today. However, patient had a near fall due to weakness when standing after phlebotomy. Vitals remained stable. IV Imipenem switched to IV Meropenem (approved by ID). Bioscripts still teaching patient and caregiver today how to administer home abx. Pharmacist has also updated blue card and taught patient. Cr slowly trending up (1.4 today), possibly due to poor PO intake and NPO status yesterday. Encouraged patient " to increase fluid intake. Need to monitor Cr and electrolytes closely due to current drug regimen. Orders are in place for PT/OT/HH abx/wound/line care. Tentative plan to dc patient tomorrow if clinically stable and labs appropriate. Monitor.      Past Medical, Surgical, Family, and Social History:   Unchanged from H&P.    Scheduled Meds:   bacitracin zinc   Topical (Top) BID    clotrimazole-betamethasone 1-0.05%   Topical (Top) BID    famotidine  20 mg Oral QHS    heparin (porcine)  5,000 Units Subcutaneous Q8H    insulin aspart U-100  3 Units Subcutaneous TIDWM    lisinopriL  2.5 mg Oral Daily    meropenem (MERREM) IVPB  2 g Intravenous Q8H    metoprolol tartrate  50 mg Oral BID    mirtazapine  15 mg Oral Nightly    polyethylene glycol  17 g Oral Daily    predniSONE  5 mg Oral Daily    senna-docusate 8.6-50 mg  1 tablet Oral BID    sulfamethoxazole-trimethoprim 800-160mg  2 tablet Oral TID    tacrolimus  2 mg Oral BID     Continuous Infusions:  PRN Meds:acetaminophen, dextrose 10%, dextrose 10%, dextrose 10%, glucagon (human recombinant), glucose, glucose, hydrOXYzine HCL, insulin aspart U-100, iohexol, ondansetron, oxyCODONE, sodium chloride 0.9%, traZODone    Intake/Output - Last 3 Shifts         02/21 0700  02/22 0659 02/22 0700  02/23 0659 02/23 0700  02/24 0659    P.O. 700      I.V. (mL/kg) 0 (0)      Other 0      IV Piggyback 1513.8      Total Intake(mL/kg) 2213.8 (28.3)      Urine (mL/kg/hr) 5550 (3) 1125 (0.6)     Emesis/NG output 0      Other 0      Stool 0      Blood 0      Total Output 5550 1125     Net -3336.2 -1125            Urine Occurrence 1 x 8 x 1 x    Stool Occurrence 1 x      Emesis Occurrence 0 x               Review of Systems   Constitutional:  Positive for appetite change and fatigue. Negative for activity change, chills and fever.   HENT:  Negative for congestion, dental problem, ear pain, rhinorrhea and trouble swallowing.    Eyes:  Negative for pain and discharge.  "  Respiratory:  Negative for cough, shortness of breath, wheezing and stridor.    Cardiovascular:  Negative for chest pain, palpitations and leg swelling.   Gastrointestinal:  Negative for abdominal distention, abdominal pain, constipation, diarrhea, nausea and vomiting.   Genitourinary:  Negative for decreased urine volume, difficulty urinating and dysuria.   Musculoskeletal:  Negative for arthralgias, back pain, joint swelling, neck pain and neck stiffness.   Skin:  Positive for wound. Negative for rash.   Allergic/Immunologic: Positive for immunocompromised state.   Neurological:  Positive for weakness (L side, minimal - improving). Negative for dizziness, light-headedness and headaches.   Psychiatric/Behavioral:  Positive for sleep disturbance (improving). Negative for behavioral problems and confusion. The patient is nervous/anxious.     Objective:     Vital Signs (Most Recent):  Temp: 97.6 °F (36.4 °C) (02/23/22 1125)  Pulse: 93 (02/23/22 1350)  Resp: 16 (02/23/22 0745)  BP: 121/79 (02/23/22 1350)  SpO2: 98 % (02/23/22 0745) Vital Signs (24h Range):  Temp:  [97.4 °F (36.3 °C)-98 °F (36.7 °C)] 97.6 °F (36.4 °C)  Pulse:  [] 93  Resp:  [14-18] 16  SpO2:  [91 %-98 %] 98 %  BP: ()/(61-93) 121/79     Weight: 78 kg (172 lb)  Height: 6' 2" (188 cm)  Body mass index is 22.08 kg/m².    Physical Exam  Vitals and nursing note reviewed.   Constitutional:       General: He is not in acute distress.     Appearance: He is not toxic-appearing or diaphoretic.   HENT:      Head: Normocephalic.      Comments: R sided craniotomy incision CDI with staples      Mouth/Throat:      Mouth: Mucous membranes are moist.   Eyes:      General: No scleral icterus.        Right eye: No discharge.         Left eye: No discharge.   Cardiovascular:      Rate and Rhythm: Normal rate. Rhythm irregular.      Heart sounds: No murmur heard.  Pulmonary:      Effort: Pulmonary effort is normal. No respiratory distress.      Breath sounds: " No wheezing or rales.   Abdominal:      General: Bowel sounds are normal. There is no distension.      Palpations: Abdomen is soft.      Tenderness: There is no abdominal tenderness. There is no guarding.   Musculoskeletal:      Cervical back: Neck supple. No rigidity.      Right lower leg: No edema.      Left lower leg: No edema.   Skin:     General: Skin is warm and dry.      Coloration: Skin is not jaundiced.   Neurological:      Mental Status: He is alert and oriented to person, place, and time. Mental status is at baseline.      Motor: Weakness present.   Psychiatric:         Mood and Affect: Mood normal.         Behavior: Behavior normal.         Thought Content: Thought content normal.       Laboratory:  CBC:   Recent Labs   Lab 02/21/22  0713 02/22/22  0743 02/23/22  0643   WBC 3.90 3.96 4.59   RBC 5.85 5.82 5.99   HGB 18.4* 18.5* 18.8*   HCT 56.2* 56.3* 57.3*    185 201   MCV 96 97 96   MCH 31.5* 31.8* 31.4*   MCHC 32.7 32.9 32.8     CMP:   Recent Labs   Lab 02/21/22  0713 02/22/22  0743 02/23/22  0643   * 120* 109   CALCIUM 9.0 9.4 9.9   ALBUMIN 2.6* 2.8* 3.0*   PROT 5.6* 5.9* 6.0   * 134* 130*   K 4.1 4.5 4.8   CO2 24 24 19*    100 100   BUN 23 17 20   CREATININE 1.1 1.2 1.4   ALKPHOS 69 69 68   ALT 29 25 24   AST 24 19 21       Diagnostic Results:  Reviewed    Assessment/Plan:     * Intracranial abscess  - Transferred from OSH for R frontal lobe abscess.   -S/p craniotomy and abscess drainage 2/17.  - ID following. Abscess cultures growing Nocardia  - Continued Imipenem and Bactrim per ID recs; switched to oral Bactrim 2/22  - Switched from Imipenem to Meropenem 2/23 -> approved by ID.  - F/u remaining blood and surgical cultures  - No evidence of vegetations on TTE  - Tunneled cath placed by IR 2/22 for long term outpatient abx  - Bioscripts taught abx administration 2/23.   - Monitor        Polycythemia vera  - Follows with Dr. Wheatley in Hematology. Gets periodic therapeutic  phlebotomy.  - Was planned for possible BMBx.  - F/u with Dr. Wheately at discharge  - H/H trending up. D/w Dr. Wheatley. Goal Hct 45-46% (2/22 56.2%)  - D/w heme on call and Dr. Wheatley  - Therapeutic phlebotomy completed inpatient 2/23 (2 pints)  - Started low dose ACEi 2/23. Monitor BP and K closely.    Pulmonary HTN  - Noted on TTE 10/2021 with PA systolic pressure is 52 mmHg.   - Repeat TTE with PA systolic pressure is 29 mmHg  - Resumed home lasix; discontinued 2/22 as appears euvolemic and >3L negative with weight decrease and poor PO intake  - Cont to hold lasix.   - Monitor closely.        Cardiomyopathy  - Pt had cardiac MRI with infiltrative cardiomyopathy at OSH  - Cardiology consulted for further recs on inpatient treatment   - r/o nocardia involvement in regards to infiltrative cardiomyopathy; per cards can workup outpatient (needs scheduling at discharge)  - Monitor     Drug-induced neutropenia  noticed leukopenia - likely related to valcyte   Last few cmv negative  Can stop valcyte now.       Prophylactic immunotherapy  - see long term use of immunosuppression         Long-term use of immunosuppressant medication  - Continue Prograf and steroids.  - Continue to monitor Prograf levels daily, monitor for toxic side effects, and adjust for therapeutic dose.   - Hold Cellcept - d/t leukopenia       S/P DBD kidney transplant on 5/4/21  - s/p DBD KTx 5/2021 (CMV D+/R=, HCV NAWAF +, Simulect induction, CIT ~ 8 hours) for PCKD  - Cr slowly trending up (1.4 2/23), likely dry due to recent use of lasix and low PO intake; encouraged increased PO hydration.  - Adequate UOP  - Monitor       At risk for opportunistic infections  - resume appropriate OI prophylaxis per protocol.       Long term (current) use of anticoagulants--Eliquis  - Held due to surgery 14 days post craniotomy (resume 3/3 if H/H stable)  - Cont to hold       A-fib  - Cont Lopressor; dose increased to 50 mg BID on 2/22 due to uncontrolled rate (d/w cards)  -  Hold Eliquis until at least 14 days post craniotomy (3/3) -> d/w  Neurosurgery after clinic follow up prior to resuming.  - Monitor electrolytes closely.  - Telemetry         Discharge Planning:  Tentative dc tomorrow.    Dina Tang PA-C  Kidney Transplant  [facility] - Trans[facility] Stepdown

## 2022-02-23 NOTE — PLAN OF CARE
-AAOx4, VSS/afebrile, a-fib on tele (lopressor 50 mg BID), on RA  -craniotomy incision FERCHO w staples intact; Bacitracin ointment applied  -cx from craniotomy growing Nocardia  -MRI chest showing infiltrative cardiomyopathy  -plan for 6-12 mo IV abx; R sc guthrie placed 2/22  -IV Primaxin q8, PO Bactrim   -labs monitored; H/H 18.5/56.3; hematology following; plan for inpatient phlebotomy today  -voiding independently in urinal  -accuchecks ACHS; ss per MAR  -plan to D/C 2/23  -follow up outpatient with cardiology for afib  -wife at bedside, ambulating with walker and 1 person assist; fall precautions maintained, call bell in reach

## 2022-02-23 NOTE — PROGRESS NOTES
AAOx4. VSS. Craniotomy incision FERCHO w/ staples intact - bacitracin ointment applied. Patient had 1 BM today. CT head completed today - shows improved pneumocephalus and hemorrhage. Therapeutic phlebotomy done today - 2 units whole blood removed. Patient unsteady when OOB to bathroom w/ walker - patient almost fell, but did not - MARY ANN Del Real notified - orders given to monitor patient. Primaxin given IVPB - patient to start Meropenem tonight 2/23. Patient up with standby assist to bathroom w/ walker. Dressings on L FA and lower back changed per protocol. Home health consulted. Plan to be discharged tomorrow 2/24/2022 pending labs. No complaints from patient throughout shift. Bed in low position. Non-skid socks on. Call light within reach.

## 2022-02-23 NOTE — PT/OT/SLP PROGRESS
Physical Therapy      Patient Name:  Ronal Mazariegos   MRN:  [id number]    Patient not seen today secondary to discussing with MD. Upon 1st attempt, pt was working with MD learning about infusion medicine. Upon 2nd attempt, pt was discussing with MD. Upon to return for 3rd attempt. Will follow-up as schedule.    02/23/2022

## 2022-02-23 NOTE — ASSESSMENT & PLAN NOTE
- s/p DBD KTx 5/2021 (CMV D+/R=, HCV NAWAF +, Simulect induction, CIT ~ 8 hours) for PCKD  - Cr slowly trending up (1.4 2/23), likely dry due to recent use of lasix and low PO intake; encouraged increased PO hydration.  - Adequate UOP  - Monitor

## 2022-02-23 NOTE — ASSESSMENT & PLAN NOTE
- Transferred from OSH for R frontal lobe abscess.   -S/p craniotomy and abscess drainage 2/17.  - ID following. Abscess cultures growing Nocardia  - Continued Imipenem and Bactrim per ID recs; switched to oral Bactrim 2/22  - Switched from Imipenem to Meropenem 2/23 -> approved by ID.  - F/u remaining blood and surgical cultures  - No evidence of vegetations on TTE  - Tunneled cath placed by IR 2/22 for long term outpatient abx  - Bioscripts taught abx administration 2/23.   - Monitor

## 2022-02-23 NOTE — ASSESSMENT & PLAN NOTE
- Follows with Dr. Wheatley in Hematology. Gets periodic therapeutic phlebotomy.  - Was planned for possible BMBx.  - F/u with Dr. Wheatley at discharge  - H/H trending up. D/w Dr. Wheatley. Goal Hct 45-46% (2/22 56.2%)  - D/w heme on call and Dr. Wheatley  - Therapeutic phlebotomy completed inpatient 2/23 (2 pints)  - Started low dose ACEi 2/23. Monitor BP and K closely.

## 2022-02-23 NOTE — PROGRESS NOTES
Pt lying in bed alert and oriented x4 upon this nurses arrival to bedside.  Vital signs; b/p 115/80, pulse 91, temp 97.6, hematocrit 57.3% and weight 172 lbs.  Right chest central line patent, dressing clean, dry and intact.  2 units of whole blood drawn from central line.  500 mls of NS administered after 1st unit was removed.  Vital signs after 2nd unit removed; b/p 62/46, pulse 87, temp 97.4.  500 mls of NS administered after 2nd unit removed. Post vital signs; b/p 102/67, pulse 99, temp 97.4.  Central line flushed with NS per protocol.  Instructions for phlebotomy after care given verbally.  Pt experienced some hypotension and lightheadedness, this resolved after the 2nd administration of 500 mls of NS.  Wife at bedside.  [facility] staff nurse JIGAR Bean, aware of phlebotomy completion.

## 2022-02-23 NOTE — PLAN OF CARE
On teaching with infusion company, concern imipenem may not be feasible at home.  Okay to switch to meropenem 2g IV q8 hours    Outpatient Antibiotic Therapy Plan:    Please send referral to [facility].    1) Infection: Disseminated Nocardia (brain, lung)    2) Discharge Antibiotics:    Intravenous antibiotics:   Meropenem 2g IV q8 hours    Oral antibiotics:   TMP-SMX DS 2 tablets PO TID    3) Therapy Duration:  9-12 months    Estimated end date of IV antibiotics: 2/2023    4) Outpatient Weekly Labs:    Order the following labs to be drawn on Mondays:    CBC   CMP       5) Fax Lab Results to Infectious Diseases Provider: [facility] Fax Number: [phone]    6) Outpatient Infectious Diseases Follow-up     Follow-up appointment will be arranged by the ID clinic and will be found in the patient's appointments tab.     Prior to discharge, please ensure the patient's follow-up has been scheduled.     If there is still no follow-up scheduled prior to discharge, please send an EPIC message to Eliane Quintero in Infectious Diseases.

## 2022-02-23 NOTE — NURSING
Care assumed of pt post PICC placement. Pt in low, locked stretcher, call bell in reach. Pt on continuous bedside cardiac, SpO2, and NIBP monitoring. VSS, Pt A&O, site clean, dry, intact. No c/o of pain or distress. Will continue to monitor.

## 2022-02-23 NOTE — TELEPHONE ENCOUNTER
----- Message from Teresa Snell PA-C sent at 2/23/2022 10:57 AM CST -----  This pt needs 2 week wound check and 4-6 weeks with Dr. Rosa with MRI brain. Thanks!    Teresa

## 2022-02-24 LAB
ALBUMIN SERPL BCP-MCNC: 2.7 G/DL (ref 3.5–5.2)
ALP SERPL-CCNC: 60 U/L (ref 55–135)
ALT SERPL W/O P-5'-P-CCNC: 21 U/L (ref 10–44)
ANION GAP SERPL CALC-SCNC: 11 MMOL/L (ref 8–16)
AST SERPL-CCNC: 19 U/L (ref 10–40)
BASOPHILS # BLD AUTO: 0.02 K/UL (ref 0–0.2)
BASOPHILS NFR BLD: 0.4 % (ref 0–1.9)
BILIRUB SERPL-MCNC: 1.5 MG/DL (ref 0.1–1)
BUN SERPL-MCNC: 17 MG/DL (ref 8–23)
CALCIUM SERPL-MCNC: 9 MG/DL (ref 8.7–10.5)
CHLORIDE SERPL-SCNC: 106 MMOL/L (ref 95–110)
CO2 SERPL-SCNC: 16 MMOL/L (ref 23–29)
CREAT SERPL-MCNC: 1.1 MG/DL (ref 0.5–1.4)
DIFFERENTIAL METHOD: ABNORMAL
EOSINOPHIL # BLD AUTO: 0.1 K/UL (ref 0–0.5)
EOSINOPHIL NFR BLD: 1.7 % (ref 0–8)
ERYTHROCYTE [DISTWIDTH] IN BLOOD BY AUTOMATED COUNT: 13.7 % (ref 11.5–14.5)
EST. GFR  (AFRICAN AMERICAN): >60 ML/MIN/1.73 M^2
EST. GFR  (NON AFRICAN AMERICAN): >60 ML/MIN/1.73 M^2
GLUCOSE SERPL-MCNC: 103 MG/DL (ref 70–110)
H CAPSUL AG UR-MCNC: NOT DETECTED NG/ML
HCT VFR BLD AUTO: 50.4 % (ref 40–54)
HGB BLD-MCNC: 16.1 G/DL (ref 14–18)
HISTOPLASMA ANTIGEN URINE: NOT DETECTED
IMM GRANULOCYTES # BLD AUTO: 0.2 K/UL (ref 0–0.04)
IMM GRANULOCYTES NFR BLD AUTO: 3.8 % (ref 0–0.5)
LACTATE SERPL-SCNC: 2.2 MMOL/L (ref 0.5–2.2)
LYMPHOCYTES # BLD AUTO: 1.7 K/UL (ref 1–4.8)
LYMPHOCYTES NFR BLD: 32 % (ref 18–48)
MAGNESIUM SERPL-MCNC: 1.8 MG/DL (ref 1.6–2.6)
MCH RBC QN AUTO: 31.4 PG (ref 27–31)
MCHC RBC AUTO-ENTMCNC: 31.9 G/DL (ref 32–36)
MCV RBC AUTO: 98 FL (ref 82–98)
MONOCYTES # BLD AUTO: 1.4 K/UL (ref 0.3–1)
MONOCYTES NFR BLD: 26.3 % (ref 4–15)
NEUTROPHILS # BLD AUTO: 1.9 K/UL (ref 1.8–7.7)
NEUTROPHILS NFR BLD: 35.8 % (ref 38–73)
NRBC BLD-RTO: 0 /100 WBC
PHOSPHATE SERPL-MCNC: 2.2 MG/DL (ref 2.7–4.5)
PLATELET # BLD AUTO: 194 K/UL (ref 150–450)
PMV BLD AUTO: 10.5 FL (ref 9.2–12.9)
POCT GLUCOSE: 113 MG/DL (ref 70–110)
POCT GLUCOSE: 122 MG/DL (ref 70–110)
POCT GLUCOSE: 146 MG/DL (ref 70–110)
POTASSIUM SERPL-SCNC: 4.7 MMOL/L (ref 3.5–5.1)
PROT SERPL-MCNC: 5.1 G/DL (ref 6–8.4)
RBC # BLD AUTO: 5.12 M/UL (ref 4.6–6.2)
SODIUM SERPL-SCNC: 133 MMOL/L (ref 136–145)
TACROLIMUS BLD-MCNC: 7 NG/ML (ref 5–15)
WBC # BLD AUTO: 5.32 K/UL (ref 3.9–12.7)

## 2022-02-24 PROCEDURE — 25000003 PHARM REV CODE 250: Performed by: PHYSICIAN ASSISTANT

## 2022-02-24 PROCEDURE — 93005 ELECTROCARDIOGRAM TRACING: CPT

## 2022-02-24 PROCEDURE — 83735 ASSAY OF MAGNESIUM: CPT | Performed by: PHYSICIAN ASSISTANT

## 2022-02-24 PROCEDURE — 80053 COMPREHEN METABOLIC PANEL: CPT | Performed by: NURSE PRACTITIONER

## 2022-02-24 PROCEDURE — 25000003 PHARM REV CODE 250

## 2022-02-24 PROCEDURE — 20600001 HC STEP DOWN PRIVATE ROOM

## 2022-02-24 PROCEDURE — 93010 ELECTROCARDIOGRAM REPORT: CPT | Mod: ,,, | Performed by: INTERNAL MEDICINE

## 2022-02-24 PROCEDURE — 25000003 PHARM REV CODE 250: Performed by: NURSE PRACTITIONER

## 2022-02-24 PROCEDURE — 63600175 PHARM REV CODE 636 W HCPCS: Performed by: NURSE PRACTITIONER

## 2022-02-24 PROCEDURE — 87040 BLOOD CULTURE FOR BACTERIA: CPT | Mod: 59 | Performed by: PHYSICIAN ASSISTANT

## 2022-02-24 PROCEDURE — 99233 PR SUBSEQUENT HOSPITAL CARE,LEVL III: ICD-10-PCS | Mod: ,,, | Performed by: PHYSICIAN ASSISTANT

## 2022-02-24 PROCEDURE — 93010 EKG 12-LEAD: ICD-10-PCS | Mod: ,,, | Performed by: INTERNAL MEDICINE

## 2022-02-24 PROCEDURE — 36415 COLL VENOUS BLD VENIPUNCTURE: CPT | Performed by: PHYSICIAN ASSISTANT

## 2022-02-24 PROCEDURE — 85025 COMPLETE CBC W/AUTO DIFF WBC: CPT | Performed by: NURSE PRACTITIONER

## 2022-02-24 PROCEDURE — 84100 ASSAY OF PHOSPHORUS: CPT | Performed by: PHYSICIAN ASSISTANT

## 2022-02-24 PROCEDURE — 80197 ASSAY OF TACROLIMUS: CPT | Performed by: NURSE PRACTITIONER

## 2022-02-24 PROCEDURE — 63600175 PHARM REV CODE 636 W HCPCS: Performed by: PHYSICIAN ASSISTANT

## 2022-02-24 PROCEDURE — 83605 ASSAY OF LACTIC ACID: CPT | Performed by: PHYSICIAN ASSISTANT

## 2022-02-24 PROCEDURE — 94761 N-INVAS EAR/PLS OXIMETRY MLT: CPT

## 2022-02-24 PROCEDURE — 63600175 PHARM REV CODE 636 W HCPCS: Performed by: STUDENT IN AN ORGANIZED HEALTH CARE EDUCATION/TRAINING PROGRAM

## 2022-02-24 PROCEDURE — 99233 SBSQ HOSP IP/OBS HIGH 50: CPT | Mod: ,,, | Performed by: PHYSICIAN ASSISTANT

## 2022-02-24 RX ORDER — SODIUM CHLORIDE 9 MG/ML
INJECTION, SOLUTION INTRAVENOUS CONTINUOUS
Status: ACTIVE | OUTPATIENT
Start: 2022-02-24 | End: 2022-02-25

## 2022-02-24 RX ORDER — METOPROLOL TARTRATE 1 MG/ML
5 INJECTION, SOLUTION INTRAVENOUS ONCE
Status: COMPLETED | OUTPATIENT
Start: 2022-02-24 | End: 2022-02-24

## 2022-02-24 RX ORDER — SODIUM,POTASSIUM PHOSPHATES 280-250MG
1 POWDER IN PACKET (EA) ORAL ONCE
Status: COMPLETED | OUTPATIENT
Start: 2022-02-24 | End: 2022-02-24

## 2022-02-24 RX ORDER — SODIUM BICARBONATE 650 MG/1
1300 TABLET ORAL 2 TIMES DAILY
Status: DISCONTINUED | OUTPATIENT
Start: 2022-02-24 | End: 2022-02-25 | Stop reason: HOSPADM

## 2022-02-24 RX ORDER — SODIUM BICARBONATE 650 MG/1
1300 TABLET ORAL 2 TIMES DAILY
Qty: 120 TABLET | Refills: 11 | Status: SHIPPED | OUTPATIENT
Start: 2022-02-24 | End: 2023-04-05

## 2022-02-24 RX ORDER — METOPROLOL TARTRATE 1 MG/ML
INJECTION, SOLUTION INTRAVENOUS
Status: DISPENSED
Start: 2022-02-24 | End: 2022-02-24

## 2022-02-24 RX ADMIN — SODIUM CHLORIDE: 0.9 INJECTION, SOLUTION INTRAVENOUS at 12:02

## 2022-02-24 RX ADMIN — SODIUM BICARBONATE 650 MG TABLET 1300 MG: at 08:02

## 2022-02-24 RX ADMIN — SULFAMETHOXAZOLE AND TRIMETHOPRIM 2 TABLET: 800; 160 TABLET ORAL at 09:02

## 2022-02-24 RX ADMIN — CLOTRIMAZOLE AND BETAMETHASONE DIPROPIONATE: 10; .5 CREAM TOPICAL at 08:02

## 2022-02-24 RX ADMIN — PREDNISONE 5 MG: 5 TABLET ORAL at 09:02

## 2022-02-24 RX ADMIN — METOPROLOL TARTRATE 50 MG: 50 TABLET, FILM COATED ORAL at 09:02

## 2022-02-24 RX ADMIN — SULFAMETHOXAZOLE AND TRIMETHOPRIM 2 TABLET: 800; 160 TABLET ORAL at 04:02

## 2022-02-24 RX ADMIN — SULFAMETHOXAZOLE AND TRIMETHOPRIM 2 TABLET: 800; 160 TABLET ORAL at 08:02

## 2022-02-24 RX ADMIN — HEPARIN SODIUM 5000 UNITS: 5000 INJECTION INTRAVENOUS; SUBCUTANEOUS at 09:02

## 2022-02-24 RX ADMIN — SENNOSIDES AND DOCUSATE SODIUM 1 TABLET: 50; 8.6 TABLET ORAL at 09:02

## 2022-02-24 RX ADMIN — METOROPROLOL TARTRATE 5 MG: 5 INJECTION, SOLUTION INTRAVENOUS at 09:02

## 2022-02-24 RX ADMIN — BACITRACIN 1 EACH: 500 OINTMENT TOPICAL at 09:02

## 2022-02-24 RX ADMIN — TACROLIMUS 2 MG: 1 CAPSULE ORAL at 06:02

## 2022-02-24 RX ADMIN — MIRTAZAPINE 15 MG: 15 TABLET, FILM COATED ORAL at 09:02

## 2022-02-24 RX ADMIN — TACROLIMUS 2 MG: 1 CAPSULE ORAL at 08:02

## 2022-02-24 RX ADMIN — CLOTRIMAZOLE AND BETAMETHASONE DIPROPIONATE: 10; .5 CREAM TOPICAL at 09:02

## 2022-02-24 RX ADMIN — MEROPENEM 2 G: 1 INJECTION INTRAVENOUS at 09:02

## 2022-02-24 RX ADMIN — INSULIN ASPART 3 UNITS: 100 INJECTION, SOLUTION INTRAVENOUS; SUBCUTANEOUS at 08:02

## 2022-02-24 RX ADMIN — FAMOTIDINE 20 MG: 20 TABLET ORAL at 09:02

## 2022-02-24 RX ADMIN — SODIUM CHLORIDE 500 ML: 0.9 INJECTION, SOLUTION INTRAVENOUS at 08:02

## 2022-02-24 RX ADMIN — MEROPENEM 2 G: 1 INJECTION INTRAVENOUS at 02:02

## 2022-02-24 RX ADMIN — SODIUM BICARBONATE 650 MG TABLET 1300 MG: at 09:02

## 2022-02-24 RX ADMIN — HEPARIN SODIUM 5000 UNITS: 5000 INJECTION INTRAVENOUS; SUBCUTANEOUS at 02:02

## 2022-02-24 RX ADMIN — POTASSIUM & SODIUM PHOSPHATES POWDER PACK 280-160-250 MG 1 PACKET: 280-160-250 PACK at 08:02

## 2022-02-24 RX ADMIN — BACITRACIN 1 EACH: 500 OINTMENT TOPICAL at 08:02

## 2022-02-24 RX ADMIN — MEROPENEM 2 G: 1 INJECTION INTRAVENOUS at 05:02

## 2022-02-24 NOTE — DISCHARGE SUMMARY
[facility] - Transplant Stepdown  Kidney Transplant  Discharge Summary    Patient Name: Ronal Mazariegos  MRN: [id number]  Admission Date: 2/16/2022  Hospital Length of Stay: 9 days  Discharge Date and Time:  02/25/2022 12:07 PM  Attending Physician: Jaimie Menon MD   Discharging Provider: Lexy Benson PA-C  Primary Care Provider: Primary Doctor No    HPI:   71 M PMHx afib on eliquis, recent kidney transplant on 5/2021 on immunosuppressants, cardiomyopathy, pulmonary hypertension presenting to Neuro Surgery with new right frontal lobe abscess. Pt reports transient episodes of left sided weakness this week, most recent episode was this morning, he fell due to left leg weakness when getting up from the toilet, prompting visit to the ED because he thought he was having a stroke. At present, he feels well, has no fever/chills/aches, confusion, blurry vision, LOC, or seizures, and besides mild left sided weakness, he is asymptomatic.       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 Pt was admitted to Neuro ICU on 2/16/22 and is now s/p craniotomy 2/17/22 with cultures + nocardia nova. ID following patient and started him on IV bactrim and Imipenem. CT C/A/P noted a RML lesion which will need to be repeated in 3 months (~5/19/22). Cardiac MRI done at OSH 02/09 with finding consistent with infiltrative cardiomyopathy. ECHO with no evidence of vegitations, EF 50%, PA pressure 29. Cardiology consulted for recs. Cards stated no inpatient workup needed - pt will need to f/u outpatient (referral placed at OR). Will need MRI brain repeated in 4-6 weeks per Neuro Surgery. Pt stepped down from ICU 2/21 afternoon. Patient has hx of Afib on 25 mg PO Metoprolol BID. Eliquis currently held s/p craniotomy (plan to resume 14 days post cranitomy (3/3), but pt needs to check with  neurosurgery team post follow up prior to resumption. Rate uncontrolled overnight 2/221(asymptomatic). Extra 12.5 mg IV Lopressor given. D/w cards 2/22. Metoprolol increased to 50 mg PO BID 2/22.  HR much improved 2/23. Patient has hx polycythemia vera onset 2021 requiring phlebotomy every few weeks (last on 2/3). D/w heme. Goal Hct 43-46%. Planned for inpatient phlebotomy. 2 pints blood taken on 2/23. Initially planned to dc patient on 2/23, but pt had near fall when feeling weak after phlebotomy (vitals were stable). Postponed discharge at that time for close monitoring. Remeron started on 2/22 for poor appetite and sleep with improvement noted. Lasix discontinued 2/22 (previously on lasix for pulmonary HTN, but appeared dry - need to monitor closely). Cr trended up to 1.4 as of 2/23, but decreased to 1.1 with increased oral intake and holding lasix. ID has been following patient for abx recommendations. He was switched from IV to oral Bactrim on 2/22. RCW tunneled catheter placed by IR on 2/22 for outpatient abx administration. He was also switched from IV Imipenem to IV Meropenem on 2/23 due to greater drug stability/easier route of home administration. Approved by ID. He will need 9-12 months of IV abx. Estimated EOT 2/2023. He will need CBC and CMP drawn weekly on Mondays and faxed to [facility] Fax Number [phone]. He will need f/u with ID scheduled at discharge. Patient and caregiver were taught how to administer home abx prior to discharge, and expressed understanding. Patient is stable and ready for dc with HH PT/OT/line care at this time. Follow up to include labs 2/28/22, neurosurgery f/u 3/9/22, cards f/u date pending, ID f/u 3/7/22, and transplant clinic f/u 3/9/22. Patient is in agreement with discharge from the hospital today and follow up plan.      Goals of Care Treatment Preferences:  Code Status: Full Code      Final Active Diagnoses:    Diagnosis Date Noted POA    PRINCIPAL PROBLEM:   "Intracranial abscess [G06.0] 02/16/2022 Yes    Polycythemia vera [D45] 02/19/2022 Yes    Cardiomyopathy [I42.9] 02/18/2022 Yes    Pulmonary HTN [I27.20] 02/18/2022 Yes    Kidney replaced by transplant [Z94.0] 05/05/2021 Not Applicable    Long-term use of immunosuppressant medication [Z79.899] 05/05/2021 Not Applicable    Prophylactic immunotherapy [Z29.8] 05/05/2021 Not Applicable    At risk for opportunistic infections [Z91.89] 05/04/2021 Yes    Long term (current) use of anticoagulants--Eliquis [Z79.01] 03/16/2018 Not Applicable     Chronic    A-fib [I48.91] 03/16/2018 Yes      Problems Resolved During this Admission:    Diagnosis Date Noted Date Resolved POA    Infectious endarteritis [I77.6]  02/22/2022 Yes    HTN (hypertension) [I10]  02/21/2022 Yes       Treatments: See above.    Consults (From admission, onward)          Status Ordering Provider     Inpatient consult to Interventional Radiology  Once        Provider:  (Not yet assigned)    Completed PILAR GUTHRIE     Inpatient consult to Midline team  Once        Provider:  (Not yet assigned)    Completed CHANCE PRAJAPATI     Inpatient consult to Hospital Medicine-General  Once        Provider:  (Not yet assigned)    Completed JOSE FERGUSON     Inpatient consult to Infectious Diseases  Once        Provider:  (Not yet assigned)    Completed LETTY ZAMAN     Inpatient consult to Kidney/Pancreas Transplant Medicine  Once        Provider:  (Not yet assigned)    Completed TIMOTHY MARTINEZ     Pharmacy to dose Vancomycin consult  Once        Provider:  (Not yet assigned)   "And" Linked Group Details    Completed TIMOTHY MARTINEZ            Pending Diagnostic Studies:       Procedure Component Value Units Date/Time    EKG 12-lead [[id number]]     Order Status: Sent Lab Status: No result     Histoplasma antigen, urine [[id number]] Collected: 02/21/22 0930    Order Status: Sent Lab Status: In process Updated: 02/21/22 0943    Specimen: Urine, " "Clean Catch     Lactic acid, plasma [[id number]] Collected: 02/24/22 0914    Order Status: Sent Lab Status: In process Updated: 02/24/22 0916    Specimen: Blood     Tacrolimus level [[id number]] Collected: 02/24/22 0625    Order Status: Sent Lab Status: In process Updated: 02/24/22 0647    Specimen: Blood           Significant Diagnostic Studies: Labs:   CMP   Recent Labs   Lab 02/23/22 0643 02/24/22 0625   * 133*   K 4.8 4.7    106   CO2 19* 16*    103   BUN 20 17   CREATININE 1.4 1.1   CALCIUM 9.9 9.0   PROT 6.0 5.1*   ALBUMIN 3.0* 2.7*   BILITOT 1.5* 1.5*   ALKPHOS 68 60   AST 21 19   ALT 24 21   ANIONGAP 11 11   ESTGFRAFRICA 58.8* >60.0   EGFRNONAA 50.9* >60.0   , CBC   Recent Labs   Lab 02/23/22 0643 02/24/22 0625   WBC 4.59 5.32   HGB 18.8* 16.1   HCT 57.3* 50.4    194    and INR   Lab Results   Component Value Date    INR 1.3 (H) 02/16/2022    INR 1.2 12/21/2021    INR 1.4 (H) 05/07/2021       Discharged Condition: stable    Disposition: Home or Self Care    Follow Up:   Follow-up Information       [facility] Follow up.    Specialties: Home Health Services, Home Therapy Services, Home Living Aide Services  Why: Call with any questions regarding home health services (nurse, PT/OT).  Contact information:  [address] [place] [id number] [phone]             [facility] Follow up.    Specialty: Infusion Provider  Why: Call with any questions regarding home IV infusion services.  Contact information:  [address] [facility] [address] [phone]                           Patient Instructions:      WALKER FOR HOME USE     Order Specific Question Answer Comments   Type of Walker: Adult (5'4"-6'6")    With wheels? Yes    Height: 6' 2" (1.88 m)    Weight: 78.1 kg (172 lb 4.6 oz)    Length of need (1-99 months): 99    Does patient have medical equipment at home? none    Please check all that apply: Patient's condition " impairs ambulation.      MRI Brain W WO Contrast   Standing Status: Future Standing Exp. Date: 02/23/23     Order Specific Question Answer Comments   Does the patient have a pacemaker or a defibrilator (Note: Some facilities may not be able to schedule an MRI for patients with pacemakers and defibrillators. You should contact your local radiology department to determine if this is the case.)? No    Does the patient have an aneurysm or surgical clip, pump, nerve/brain stimulator, middle/inner ear prosthesis, or other metal implant or foreign object (bullet, shrapnel)? If they have a card related to their implant, ask them to bring it. Issues related to the implant may cause the MRI to be delayed. No    Is the patient claustrophobic? No    Will the patient require sedation? No    Does the patient have any of the following conditions? Diabetes, History of Renal Disease or Hypertension requiring medical therapy? Yes    May the Radiologist modify the order per protocol to meet the clinical needs of the patient? Yes    Is this part of a Research Study? No    Does the patient have on a skin patch for medication with aluminized backing? No      Ambulatory referral/consult to Cardiology   Standing Status: Future   Referral Priority: Routine Referral Type: Consultation   Referral Reason: Specialty Services Required   Requested Specialty: Cardiology   Number of Visits Requested: 1     Ambulatory referral/consult to Home Health   Standing Status: Future   Referral Priority: Routine Referral Type: Home Health Care   Referral Reason: Specialty Services Required   Requested Specialty: Home Health Services   Number of Visits Requested: 1     Diet Adult Regular     Diet Adult Regular     Notify your health care provider if you experience any of the following:  increased confusion or weakness     Notify your health care provider if you experience any of the following:  persistent dizziness, light-headedness, or visual disturbances      Notify your health care provider if you experience any of the following:  worsening rash     Notify your health care provider if you experience any of the following:  severe persistent headache     Notify your health care provider if you experience any of the following:  difficulty breathing or increased cough     Notify your health care provider if you experience any of the following:  redness, tenderness, or signs of infection (pain, swelling, redness, odor or green/yellow discharge around incision site)     Notify your health care provider if you experience any of the following:  severe uncontrolled pain     Notify your health care provider if you experience any of the following:  temperature >100.4     Notify your health care provider if you experience any of the following:  persistent nausea and vomiting or diarrhea     Activity as tolerated     Medications:  Reconciled Home Medications:      Medication List        START taking these medications      lisinopriL 2.5 MG tablet  Commonly known as: PRINIVIL,ZESTRIL  Take 1 tablet (2.5 mg total) by mouth once daily.     metoprolol tartrate 50 MG tablet  Commonly known as: LOPRESSOR  Take 1 tablet (50 mg total) by mouth 2 (two) times daily.     mirtazapine 15 MG tablet  Commonly known as: REMERON  Take 1 tablet (15 mg total) by mouth nightly.     sodium chloride 0.9% SolP 250 mL with imipenem-cilastatin 500 mg SolR 1,000 mg  Inject 1,000 mg into the vein every 8 (eight) hours.     sulfamethoxazole-trimethoprim 800-160mg 800-160 mg Tab  Commonly known as: BACTRIM DS  Take 2 tablets by mouth 3 (three) times daily.            CHANGE how you take these medications      apixaban 5 mg Tab  Commonly known as: ELIQUIS  Take 1 tablet (5 mg total) by mouth 2 (two) times daily. DO NOT RESTART UNTIL APPROVED BY NEUROSURGERY  What changed: additional instructions            CONTINUE taking these medications      finasteride 5 mg tablet  Commonly known as: PROSCAR  Take 5 mg  by mouth once daily.     fish oil-omega-3 fatty acids 300-1,000 mg capsule  Take 1 capsule by mouth once daily.     magnesium oxide 400 mg (241.3 mg magnesium) tablet  Commonly known as: MAG-OX  Take 3 tablets (1,200 mg total) by mouth 2 (two) times daily.     multivitamin Tab  Take 1 tablet by mouth once daily.     predniSONE 5 MG tablet  Commonly known as: DELTASONE  Take by mouth daily. 5/7-6/6 20 mg, 6/7-7/6 15 mg, 7/7-8/6 10 mg, 5 mg daily thereafter starting 8/7/21     tacrolimus 1 MG Cap  Commonly known as: PROGRAF  Take 2 capsules (2 mg total) by mouth every 12 (twelve) hours. Z94.0;Kidney txp on 5/4/21            STOP taking these medications      amLODIPine 5 MG tablet  Commonly known as: NORVASC     BYSTOLIC 20 mg Tab  Generic drug: nebivoloL     CARDURA 2 MG tablet  Generic drug: doxazosin     famotidine 20 MG tablet  Commonly known as: PEPCID     fluticasone propionate 50 mcg/actuation nasal spray  Commonly known as: FLONASE     furosemide 40 MG tablet  Commonly known as: LASIX     iron-vitamin C 100-250 mg (ICAR-C) 100-250 mg Tab     PHOSPHA 250 NEUTRAL 250 mg Tab  Generic drug: k phos di & mono-sod phos mono     valGANciclovir 450 mg Tab  Commonly known as: VALCYTE     VITAMIN D2 50,000 unit Cap  Generic drug: ergocalciferol            Time spent caring for patient (Greater than 1/2 spent in direct face-to-face contact): > 30 minutes    Lexy Benson PA-C  Kidney Transplant  [facility]   Female

## 2022-02-24 NOTE — SUBJECTIVE & OBJECTIVE
Subjective:   History of Present Illness:  71 M PMHx afib on eliquis, recent kidney transplant on 5/2021 on immunosuppressants, cardiomyopathy, pulmonary hypertension presenting to Neuro Surgery with new right frontal lobe abscess. Pt reports transient episodes of left sided weakness this week, most recent episode was this morning, he fell due to left leg weakness when getting up from the toilet, prompting visit to the ED because he thought he was having a stroke. At present, he feels well, has no fever/chills/aches, confusion, blurry vision, LOC, or seizures, and besides mild left sided weakness, he is asymptomatic.     Mr. Mazariegos is a 71 y.o. year old male who is status post Kidney Transplant - 5/4/2021  (#1).    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 Pt was admitted to Neuro ICU and is now s/p craniotomy 2/17 with cultures + nocardia nova. ID following patient and started him on IV bactrim and imipenem. CT C/A/P notes a RML lesion which will need to be repeated in 3 months (~5/19/22). Cardiac MRI done at OSH 02/09 with finding consistent with infiltrative cardiomyopathy. ECHO with no evidence of vegitations, EF 50%, PA pressure 29. Cardiology consulted for recs. Will need MRI repeated in 4-6 weeks per Neuro Surgery. Pt stepped down from ICU 2/21 afternoon. Patient has hx of Afib on 25 mg PO Metoprolol BID. Eliquis currently held s/p craniotomy (plan to resume 14 days post cranitomy (3/3). Rate uncontrolled overnight 2/22 (asymptomatic). Extra 12.5 mg IV Lopressor given. D/w cards 2/22. Metoprolol increased to 50 mg PO BID. Cardiology also consulted for further recs on  "inpatient treatment -  r/o nocardia involvement in regards to infiltrative cardiomyopathy. Cards d/w ID. Per cards, "patient does not require inpatient myocardial biopsy for diagnosis of amyloidosis or other possible infiltrative cardiomyopathy. He will need outpatient f/u for further workup including PYP scan. HR much improved 2/23. Patient has hx polycythemia onset 2021 requiring phlebotomy every few weeks (last on 2/3). D/w heme. Goal Hct 43-46%. Planned for inpatient phlebotomy. 2 pints blood taken on 2/23. Remeron started on 2/22 for poor appetite and sleep. Lasix discontinued 2/22 (previously on lasix for pulmonary HTN, but appeared dry - need to monitor closely). ID has been following patient for abx recommendations. He was switched from IV to oral Bactrim on 2/22. He was also switched from IV Imipenem to IV Meropenem on 2/23 due to greater drug stability/easier route of home administration. Approved by ID.    Interval history: No acute events overnight. Pt was planned to be discharged yesterday patient had a near fall due to weakness when standing after phlebotomy. This morning pt hypotensive and s/o weakness and lightheadedness when standing. Pt noted to be in Afib with RVR. He was given a 500cc bolus and IV metoprolol in addtion to his PO dose. Hr now in 110-120. IVFs started. Encouraged patient to increase fluid intake. In afternoon pt reporting he is feeling better but plan to watch patient overnight. Cr trending back down (1.1 today). Tentative plan to dc patient tomorrow if clinically stable and labs appropriate. Will continue to monitor.       Past Medical, Surgical, Family, and Social History:   Unchanged from H&P.    Scheduled Meds:   bacitracin zinc   Topical (Top) BID    clotrimazole-betamethasone 1-0.05%   Topical (Top) BID    famotidine  20 mg Oral QHS    heparin (porcine)  5,000 Units Subcutaneous Q8H    meropenem (MERREM) IVPB  2 g Intravenous Q8H    metoprolol        metoprolol tartrate  50 " mg Oral BID    mirtazapine  15 mg Oral Nightly    polyethylene glycol  17 g Oral Daily    predniSONE  5 mg Oral Daily    senna-docusate 8.6-50 mg  1 tablet Oral BID    sodium bicarbonate  1,300 mg Oral BID    sulfamethoxazole-trimethoprim 800-160mg  2 tablet Oral TID    tacrolimus  2 mg Oral BID     Continuous Infusions:   sodium chloride 0.9% 75 mL/hr at 02/24/22 1241     PRN Meds:acetaminophen, dextrose 10%, dextrose 10%, dextrose 10%, glucagon (human recombinant), hydrOXYzine HCL, insulin aspart U-100, iohexol, ondansetron, oxyCODONE, sodium chloride 0.9%, traZODone    Intake/Output - Last 3 Shifts         02/22 0700  02/23 0659 02/23 0700 02/24 0659 02/24 0700 02/25 0659    P.O.  360 1080    I.V. (mL/kg)  0 (0)     Other  0     IV Piggyback  98.9 500    Total Intake(mL/kg)  458.9 (5.9) 1580 (20.3)    Urine (mL/kg/hr) 1125 (0.6) 1325 (0.7) 325 (0.7)    Emesis/NG output  0     Other  0     Stool  0 0    Blood  0     Total Output 1125 1325 325    Net -1125 -866.1 +1255           Urine Occurrence 8 x 2 x 1 x    Stool Occurrence  1 x 1 x    Emesis Occurrence  0 x              Review of Systems   Constitutional:  Positive for appetite change (improving) and fatigue. Negative for activity change, chills and fever.   HENT:  Negative for congestion, dental problem, ear pain, rhinorrhea and trouble swallowing.    Eyes:  Negative for pain and discharge.   Respiratory:  Negative for cough, shortness of breath, wheezing and stridor.    Cardiovascular:  Negative for chest pain, palpitations and leg swelling.   Gastrointestinal:  Negative for abdominal distention, abdominal pain, constipation, diarrhea, nausea and vomiting.   Genitourinary:  Negative for decreased urine volume, difficulty urinating and dysuria.   Musculoskeletal:  Negative for arthralgias, back pain, joint swelling, neck pain and neck stiffness.   Skin:  Positive for wound. Negative for rash.   Allergic/Immunologic: Positive for immunocompromised state.  "  Neurological:  Positive for weakness (L side, minimal - improving) and light-headedness. Negative for dizziness and headaches.   Psychiatric/Behavioral:  Positive for sleep disturbance (improving). Negative for behavioral problems and confusion. The patient is nervous/anxious.     Objective:     Vital Signs (Most Recent):  Temp: 98 °F (36.7 °C) (02/24/22 1200)  Pulse: (!) 116 (02/24/22 1214)  Resp: 18 (02/24/22 1200)  BP: 92/61 (02/24/22 1200)  SpO2: 95 % (02/24/22 0957)   Vital Signs (24h Range):  Temp:  [97.6 °F (36.4 °C)-98.2 °F (36.8 °C)] 98 °F (36.7 °C)  Pulse:  [] 116  Resp:  [13-18] 18  SpO2:  [85 %-96 %] 95 %  BP: ()/(56-82) 92/61     Weight: 78 kg (172 lb)  Height: 6' 2" (188 cm)  Body mass index is 22.08 kg/m².    Physical Exam  Vitals and nursing note reviewed.   Constitutional:       General: He is not in acute distress.     Appearance: He is not toxic-appearing or diaphoretic.   HENT:      Head: Normocephalic.      Comments: R sided craniotomy incision CDI with staples      Mouth/Throat:      Mouth: Mucous membranes are moist.   Eyes:      General: No scleral icterus.        Right eye: No discharge.         Left eye: No discharge.      Extraocular Movements: Extraocular movements intact.      Pupils: Pupils are equal, round, and reactive to light.   Cardiovascular:      Rate and Rhythm: Tachycardia present. Rhythm irregular.      Heart sounds: No murmur heard.  Pulmonary:      Effort: Pulmonary effort is normal. No respiratory distress.      Breath sounds: No wheezing or rales.   Abdominal:      General: Bowel sounds are normal. There is no distension.      Palpations: Abdomen is soft.      Tenderness: There is no abdominal tenderness. There is no guarding.   Musculoskeletal:         General: No swelling or tenderness.      Cervical back: Neck supple. No rigidity.      Right lower leg: No edema.      Left lower leg: No edema.   Skin:     General: Skin is warm and dry.      Coloration: Skin " is not jaundiced.   Neurological:      Mental Status: He is alert and oriented to person, place, and time. Mental status is at baseline.      Motor: Weakness present.   Psychiatric:         Mood and Affect: Mood normal.         Behavior: Behavior normal.         Thought Content: Thought content normal.       Laboratory:  CBC:   Recent Labs   Lab 02/22/22  0743 02/23/22  0643 02/24/22  0625   WBC 3.96 4.59 5.32   RBC 5.82 5.99 5.12   HGB 18.5* 18.8* 16.1   HCT 56.3* 57.3* 50.4    201 194   MCV 97 96 98   MCH 31.8* 31.4* 31.4*   MCHC 32.9 32.8 31.9*     CMP:   Recent Labs   Lab 02/22/22  0743 02/23/22  0643 02/24/22  0625   * 109 103   CALCIUM 9.4 9.9 9.0   ALBUMIN 2.8* 3.0* 2.7*   PROT 5.9* 6.0 5.1*   * 130* 133*   K 4.5 4.8 4.7   CO2 24 19* 16*    100 106   BUN 17 20 17   CREATININE 1.2 1.4 1.1   ALKPHOS 69 68 60   ALT 25 24 21   AST 19 21 19     Labs within the past 24 hours have been reviewed.

## 2022-02-24 NOTE — CARE UPDATE
RAPID RESPONSE NURSE ROUND       Rounding completed with TSU Charge RN, Ngozi.   -120s, sustaining.   5mg IVP Lopressor administered per PA order.   EKG obtained.   Labs collected.   500cc bolus administered per PA order.   Abx therapy administered.   Please call [phone] for further concerns or assistance.

## 2022-02-24 NOTE — PLAN OF CARE
Plan of care reviewed on am rounds, afrebrile,hypotensive and symptomatic at beginning of shift. Patient had 2 units blood removed for therapeutic phlebotomy yesterday. Am h/h 16/50 Cr improving 1.1 CO2 15.  NS bolus given and continious IVF started as sbp 90's most of am shift.Also became more tach 130's in afib rhythm. LA and blood cx, EKG done. Po Metoproplol given after PA spoke to cards. Lisinopril dcd. Appetite good, po fluids encuraged.Meropenem continued and will be dcd on home therapy. Cranial incision healing with staples intact, Bacitracin applied per orders. Denies any c/o incisonal pain. General weakness noted when up to bedside commode. Patient/ wife instructed to call staff for assist with mobility Supportive/ attentive wife at bs throughout shift. Emotional support provided to both

## 2022-02-24 NOTE — PROGRESS NOTES
[facility] - Transplant Stepdown  Kidney Transplant  Progress Note      Reason for Follow-up: Reassessment of Kidney Transplant - 5/4/2021  (#1) recipient and management of immunosuppression.    ORGAN:  RIGHT KIDNEY   Donor Type:  Donation after Brain Death       Subjective:   History of Present Illness:  71 M PMHx afib on eliquis, recent kidney transplant on 5/2021 on immunosuppressants, cardiomyopathy, pulmonary hypertension presenting to Neuro Surgery with new right frontal lobe abscess. Pt reports transient episodes of left sided weakness this week, most recent episode was this morning, he fell due to left leg weakness when getting up from the toilet, prompting visit to the ED because he thought he was having a stroke. At present, he feels well, has no fever/chills/aches, confusion, blurry vision, LOC, or seizures, and besides mild left sided weakness, he is asymptomatic.     Mr. Mazariegos is a 71 y.o. year old male who is status post Kidney Transplant - 5/4/2021  (#1).    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 Pt was admitted to Neuro ICU and is now s/p craniotomy 2/17 with cultures + nocardia nova. ID following patient and started him on IV bactrim and imipenem. CT C/A/P notes a RML lesion which will need to be repeated in 3 months (~5/19/22). Cardiac MRI done at OSH 02/09 with finding consistent with infiltrative cardiomyopathy. ECHO with no evidence of vegitations, EF 50%, PA pressure 29. Cardiology consulted for recs. Will need MRI repeated in 4-6 weeks per Neuro Surgery. Pt stepped down from ICU 2/21 afternoon. Patient has hx of Afib on 25 mg PO Metoprolol BID. Eliquis  "currently held s/p craniotomy (plan to resume 14 days post cranitomy (3/3). Rate uncontrolled overnight 2/22 (asymptomatic). Extra 12.5 mg IV Lopressor given. D/w cards 2/22. Metoprolol increased to 50 mg PO BID. Cardiology also consulted for further recs on inpatient treatment -  r/o nocardia involvement in regards to infiltrative cardiomyopathy. Cards d/w ID. Per cards, "patient does not require inpatient myocardial biopsy for diagnosis of amyloidosis or other possible infiltrative cardiomyopathy. He will need outpatient f/u for further workup including PYP scan. HR much improved 2/23. Patient has hx polycythemia onset 2021 requiring phlebotomy every few weeks (last on 2/3). D/w heme. Goal Hct 43-46%. Planned for inpatient phlebotomy. 2 pints blood taken on 2/23. Remeron started on 2/22 for poor appetite and sleep. Lasix discontinued 2/22 (previously on lasix for pulmonary HTN, but appeared dry - need to monitor closely). ID has been following patient for abx recommendations. He was switched from IV to oral Bactrim on 2/22. He was also switched from IV Imipenem to IV Meropenem on 2/23 due to greater drug stability/easier route of home administration. Approved by ID.    Interval history: No acute events overnight. Pt was planned to be discharged yesterday patient had a near fall due to weakness when standing after phlebotomy. This morning pt hypotensive and s/o weakness and lightheadedness when standing. Pt noted to be in Afib with RVR. He was given a 500cc bolus and IV metoprolol in addtion to his PO dose. Hr now in 110-120. IVFs started. Encouraged patient to increase fluid intake. In afternoon pt reporting he is feeling better but plan to watch patient overnight. Cr trending back down (1.1 today). Tentative plan to dc patient tomorrow if clinically stable and labs appropriate. Will continue to monitor.       Past Medical, Surgical, Family, and Social History:   Unchanged from H&P.    Scheduled Meds:   " bacitracin zinc   Topical (Top) BID    clotrimazole-betamethasone 1-0.05%   Topical (Top) BID    famotidine  20 mg Oral QHS    heparin (porcine)  5,000 Units Subcutaneous Q8H    meropenem (MERREM) IVPB  2 g Intravenous Q8H    metoprolol        metoprolol tartrate  50 mg Oral BID    mirtazapine  15 mg Oral Nightly    polyethylene glycol  17 g Oral Daily    predniSONE  5 mg Oral Daily    senna-docusate 8.6-50 mg  1 tablet Oral BID    sodium bicarbonate  1,300 mg Oral BID    sulfamethoxazole-trimethoprim 800-160mg  2 tablet Oral TID    tacrolimus  2 mg Oral BID     Continuous Infusions:   sodium chloride 0.9% 75 mL/hr at 02/24/22 1241     PRN Meds:acetaminophen, dextrose 10%, dextrose 10%, dextrose 10%, glucagon (human recombinant), hydrOXYzine HCL, insulin aspart U-100, iohexol, ondansetron, oxyCODONE, sodium chloride 0.9%, traZODone    Intake/Output - Last 3 Shifts         02/22 0700 02/23 0659 02/23 0700 02/24 0659 02/24 0700 02/25 0659    P.O.  360 1080    I.V. (mL/kg)  0 (0)     Other  0     IV Piggyback  98.9 500    Total Intake(mL/kg)  458.9 (5.9) 1580 (20.3)    Urine (mL/kg/hr) 1125 (0.6) 1325 (0.7) 325 (0.7)    Emesis/NG output  0     Other  0     Stool  0 0    Blood  0     Total Output 1125 1325 325    Net -1125 -866.1 +1255           Urine Occurrence 8 x 2 x 1 x    Stool Occurrence  1 x 1 x    Emesis Occurrence  0 x              Review of Systems   Constitutional:  Positive for appetite change (improving) and fatigue. Negative for activity change, chills and fever.   HENT:  Negative for congestion, dental problem, ear pain, rhinorrhea and trouble swallowing.    Eyes:  Negative for pain and discharge.   Respiratory:  Negative for cough, shortness of breath, wheezing and stridor.    Cardiovascular:  Negative for chest pain, palpitations and leg swelling.   Gastrointestinal:  Negative for abdominal distention, abdominal pain, constipation, diarrhea, nausea and vomiting.   Genitourinary:   "Negative for decreased urine volume, difficulty urinating and dysuria.   Musculoskeletal:  Negative for arthralgias, back pain, joint swelling, neck pain and neck stiffness.   Skin:  Positive for wound. Negative for rash.   Allergic/Immunologic: Positive for immunocompromised state.   Neurological:  Positive for weakness (L side, minimal - improving) and light-headedness. Negative for dizziness and headaches.   Psychiatric/Behavioral:  Positive for sleep disturbance (improving). Negative for behavioral problems and confusion. The patient is nervous/anxious.     Objective:     Vital Signs (Most Recent):  Temp: 98 °F (36.7 °C) (02/24/22 1200)  Pulse: (!) 116 (02/24/22 1214)  Resp: 18 (02/24/22 1200)  BP: 92/61 (02/24/22 1200)  SpO2: 95 % (02/24/22 0957)   Vital Signs (24h Range):  Temp:  [97.6 °F (36.4 °C)-98.2 °F (36.8 °C)] 98 °F (36.7 °C)  Pulse:  [] 116  Resp:  [13-18] 18  SpO2:  [85 %-96 %] 95 %  BP: ()/(56-82) 92/61     Weight: 78 kg (172 lb)  Height: 6' 2" (188 cm)  Body mass index is 22.08 kg/m².    Physical Exam  Vitals and nursing note reviewed.   Constitutional:       General: He is not in acute distress.     Appearance: He is not toxic-appearing or diaphoretic.   HENT:      Head: Normocephalic.      Comments: R sided craniotomy incision CDI with staples      Mouth/Throat:      Mouth: Mucous membranes are moist.   Eyes:      General: No scleral icterus.        Right eye: No discharge.         Left eye: No discharge.      Extraocular Movements: Extraocular movements intact.      Pupils: Pupils are equal, round, and reactive to light.   Cardiovascular:      Rate and Rhythm: Tachycardia present. Rhythm irregular.      Heart sounds: No murmur heard.  Pulmonary:      Effort: Pulmonary effort is normal. No respiratory distress.      Breath sounds: No wheezing or rales.   Abdominal:      General: Bowel sounds are normal. There is no distension.      Palpations: Abdomen is soft.      Tenderness: There is " no abdominal tenderness. There is no guarding.   Musculoskeletal:         General: No swelling or tenderness.      Cervical back: Neck supple. No rigidity.      Right lower leg: No edema.      Left lower leg: No edema.   Skin:     General: Skin is warm and dry.      Coloration: Skin is not jaundiced.   Neurological:      Mental Status: He is alert and oriented to person, place, and time. Mental status is at baseline.      Motor: Weakness present.   Psychiatric:         Mood and Affect: Mood normal.         Behavior: Behavior normal.         Thought Content: Thought content normal.       Laboratory:  CBC:   Recent Labs   Lab 02/22/22  0743 02/23/22  0643 02/24/22  0625   WBC 3.96 4.59 5.32   RBC 5.82 5.99 5.12   HGB 18.5* 18.8* 16.1   HCT 56.3* 57.3* 50.4    201 194   MCV 97 96 98   MCH 31.8* 31.4* 31.4*   MCHC 32.9 32.8 31.9*     CMP:   Recent Labs   Lab 02/22/22  0743 02/23/22  0643 02/24/22  0625   * 109 103   CALCIUM 9.4 9.9 9.0   ALBUMIN 2.8* 3.0* 2.7*   PROT 5.9* 6.0 5.1*   * 130* 133*   K 4.5 4.8 4.7   CO2 24 19* 16*    100 106   BUN 17 20 17   CREATININE 1.2 1.4 1.1   ALKPHOS 69 68 60   ALT 25 24 21   AST 19 21 19     Labs within the past 24 hours have been reviewed.      Assessment/Plan:     * Intracranial abscess  - Transferred from OSH for R frontal lobe abscess.   -S/p craniotomy and abscess drainage 2/17.  - ID following. Abscess cultures growing Nocardia  - Continued Imipenem and Bactrim per ID recs; switched to oral Bactrim 2/22  - Switched from Imipenem to Meropenem 2/23 -> approved by ID.  - F/u remaining blood and surgical cultures  - No evidence of vegetations on TTE  - Tunneled cath placed by IR 2/22 for long term outpatient abx  - Bioscripts taught abx administration 2/23.   - Monitor      Polycythemia vera  - Follows with Dr. Wheatley in Hematology. Gets periodic therapeutic phlebotomy.  - Was planned for possible BMBx.  - F/u with Dr. Wheatley at discharge  - H/H trending up.  D/w Dr. Wheatley. Goal Hct 45-46% (2/22 56.2%)  - D/w heme on call and Dr. Wheatley  - Therapeutic phlebotomy completed inpatient 2/23 (2 pints)  - Started low dose ACEi 2/23. Monitor BP and K closely.      Pulmonary HTN  - Noted on TTE 10/2021 with PA systolic pressure is 52 mmHg.   - Repeat TTE with PA systolic pressure is 29 mmHg  - Resumed home lasix; discontinued 2/22 as appears euvolemic and >3L negative with weight decrease and poor PO intake  - Cont to hold lasix.   - Monitor closely.      Cardiomyopathy  - Pt had cardiac MRI with infiltrative cardiomyopathy at OSH  - Cardiology consulted for further recs on inpatient treatment   - r/o nocardia involvement in regards to infiltrative cardiomyopathy; per cards can workup outpatient (needs scheduling at discharge)  - Monitor       Drug-induced neutropenia  noticed leukopenia - likely related to valcyte   Last few cmv negative  Can stop valcyte now.       Prophylactic immunotherapy  - see long term use of immunosuppression       Long-term use of immunosuppressant medication  - Continue Prograf and steroids.  - Continue to monitor Prograf levels daily, monitor for toxic side effects, and adjust for therapeutic dose.   - Hold Cellcept - d/t leukopenia       Kidney replaced by transplant  - s/p DBD KTx 5/2021 (CMV D+/R=, HCV NAWAF +, Simulect induction, CIT ~ 8 hours) for PCKD  - Cr trending down 1.1  - Started IVFs for additional hydration  - Adequate UOP  - Monitor       At risk for opportunistic infections  - resume appropriate OI prophylaxis per protocol.       Long term (current) use of anticoagulants--Eliquis  - Held due to surgery 14 days post craniotomy (resume 3/3 if H/H stable)  - Cont to hold       A-fib  - Cont Lopressor; dose increased to 50 mg BID on 2/22 due to uncontrolled rate (d/w cards)  - A fib w/ RVR 2/24 - given additional dose of metoprolol IV, rate improved - monitor   - Hold Eliquis until at least 14 days post craniotomy (3/3) -> d/w  Neurosurgery  after clinic follow up prior to resuming.  - Monitor electrolytes closely.  - Telemetry       Discharge Planning: Not a candidate for d/c at this time.       ÓSCAR SalcidoC  Kidney Transplant  [facility] - Transplant Stepdown

## 2022-02-24 NOTE — NURSING
Lexy REESE notified of bp 89/56 heart rate 110's. Patient up in chair, asymptomatic.Order placed for 500 NS bolus.

## 2022-02-24 NOTE — PLAN OF CARE
-AAOx4, VSS/afebrile, a-fib on tele (lopressor 50 mg BID), on RA  -craniotomy incision FERCHO w staples intact; Bacitracin ointment applied  -cx from craniotomy growing Nocardia  -MRI chest showing infiltrative cardiomyopathy  -plan for 6-12 mo IV abx; R sc guthrie placed 2/22  -IV Merrem q8, PO Bactrim   -CT head 2/23 showing improved pneumocephalus and hemorrhage  -labs monitored; H/H 18.8/57.3; hematology following; phlebotomy done 2/23; 2 u off; per Vikas RN, pt BP dropped after procedure and almost had a fall; staying tonight for further monitoring  -voiding independently in urinal  -accuchecks ACHS; ss per MAR  -possible D/C 2/24  -follow up outpatient with cardiology for afib  -wife at bedside, ambulating with walker and 1 person assist; fall precautions maintained, call bell in reach

## 2022-02-24 NOTE — NURSING
Lexy REESE notified of latest bp 92/61  Hr 110's ( still in afib), no c/o voiced but was symptomatic when assisted up to bedside in past hour. Order noted for continioius IVF

## 2022-02-24 NOTE — ASSESSMENT & PLAN NOTE
- s/p DBD KTx 5/2021 (CMV D+/R=, HCV NAWAF +, Simulect induction, CIT ~ 8 hours) for PCKD  - Cr trending down 1.1  - Started IVFs for additional hydration  - Adequate UOP  - Monitor

## 2022-02-25 VITALS
RESPIRATION RATE: 16 BRPM | BODY MASS INDEX: 22.07 KG/M2 | HEART RATE: 101 BPM | TEMPERATURE: 98 F | OXYGEN SATURATION: 97 % | SYSTOLIC BLOOD PRESSURE: 104 MMHG | HEIGHT: 74 IN | WEIGHT: 171.94 LBS | DIASTOLIC BLOOD PRESSURE: 66 MMHG

## 2022-02-25 LAB
ALBUMIN SERPL BCP-MCNC: 2.6 G/DL (ref 3.5–5.2)
ALP SERPL-CCNC: 64 U/L (ref 55–135)
ALT SERPL W/O P-5'-P-CCNC: 19 U/L (ref 10–44)
ANION GAP SERPL CALC-SCNC: 7 MMOL/L (ref 8–16)
ANISOCYTOSIS BLD QL SMEAR: SLIGHT
AST SERPL-CCNC: 19 U/L (ref 10–40)
BASOPHILS NFR BLD: 0 % (ref 0–1.9)
BILIRUB SERPL-MCNC: 1.6 MG/DL (ref 0.1–1)
BUN SERPL-MCNC: 17 MG/DL (ref 8–23)
BURR CELLS BLD QL SMEAR: ABNORMAL
CALCIUM SERPL-MCNC: 8.9 MG/DL (ref 8.7–10.5)
CHLORIDE SERPL-SCNC: 107 MMOL/L (ref 95–110)
CO2 SERPL-SCNC: 19 MMOL/L (ref 23–29)
CREAT SERPL-MCNC: 1.1 MG/DL (ref 0.5–1.4)
DIFFERENTIAL METHOD: ABNORMAL
EOSINOPHIL NFR BLD: 0 % (ref 0–8)
ERYTHROCYTE [DISTWIDTH] IN BLOOD BY AUTOMATED COUNT: 13.7 % (ref 11.5–14.5)
EST. GFR  (AFRICAN AMERICAN): >60 ML/MIN/1.73 M^2
EST. GFR  (NON AFRICAN AMERICAN): >60 ML/MIN/1.73 M^2
GLUCOSE SERPL-MCNC: 117 MG/DL (ref 70–110)
HCT VFR BLD AUTO: 46.2 % (ref 40–54)
HGB BLD-MCNC: 15 G/DL (ref 14–18)
HYPOCHROMIA BLD QL SMEAR: ABNORMAL
IMM GRANULOCYTES # BLD AUTO: ABNORMAL K/UL (ref 0–0.04)
IMM GRANULOCYTES NFR BLD AUTO: ABNORMAL % (ref 0–0.5)
LYMPHOCYTES NFR BLD: 8 % (ref 18–48)
MAGNESIUM SERPL-MCNC: 1.7 MG/DL (ref 1.6–2.6)
MCH RBC QN AUTO: 30.7 PG (ref 27–31)
MCHC RBC AUTO-ENTMCNC: 32.5 G/DL (ref 32–36)
MCV RBC AUTO: 95 FL (ref 82–98)
MONOCYTES NFR BLD: 9 % (ref 4–15)
NEUTROPHILS NFR BLD: 81 % (ref 38–73)
NEUTS BAND NFR BLD MANUAL: 2 %
NRBC BLD-RTO: 0 /100 WBC
OVALOCYTES BLD QL SMEAR: ABNORMAL
PHOSPHATE SERPL-MCNC: 2.1 MG/DL (ref 2.7–4.5)
PLATELET # BLD AUTO: 221 K/UL (ref 150–450)
PMV BLD AUTO: 10.5 FL (ref 9.2–12.9)
POIKILOCYTOSIS BLD QL SMEAR: SLIGHT
POLYCHROMASIA BLD QL SMEAR: ABNORMAL
POTASSIUM SERPL-SCNC: 4.6 MMOL/L (ref 3.5–5.1)
PROT SERPL-MCNC: 5.1 G/DL (ref 6–8.4)
RBC # BLD AUTO: 4.89 M/UL (ref 4.6–6.2)
SCHISTOCYTES BLD QL SMEAR: ABNORMAL
SODIUM SERPL-SCNC: 133 MMOL/L (ref 136–145)
TACROLIMUS BLD-MCNC: 7.5 NG/ML (ref 5–15)
WBC # BLD AUTO: 6.76 K/UL (ref 3.9–12.7)

## 2022-02-25 PROCEDURE — 99239 HOSP IP/OBS DSCHRG MGMT >30: CPT | Mod: ,,, | Performed by: PHYSICIAN ASSISTANT

## 2022-02-25 PROCEDURE — 85027 COMPLETE CBC AUTOMATED: CPT | Performed by: NURSE PRACTITIONER

## 2022-02-25 PROCEDURE — 25000003 PHARM REV CODE 250

## 2022-02-25 PROCEDURE — 97116 GAIT TRAINING THERAPY: CPT | Mod: CQ

## 2022-02-25 PROCEDURE — 84100 ASSAY OF PHOSPHORUS: CPT | Performed by: NURSE PRACTITIONER

## 2022-02-25 PROCEDURE — 63600175 PHARM REV CODE 636 W HCPCS: Performed by: STUDENT IN AN ORGANIZED HEALTH CARE EDUCATION/TRAINING PROGRAM

## 2022-02-25 PROCEDURE — 25000003 PHARM REV CODE 250: Performed by: PHYSICIAN ASSISTANT

## 2022-02-25 PROCEDURE — 83735 ASSAY OF MAGNESIUM: CPT | Performed by: NURSE PRACTITIONER

## 2022-02-25 PROCEDURE — 63600175 PHARM REV CODE 636 W HCPCS: Performed by: NURSE PRACTITIONER

## 2022-02-25 PROCEDURE — 99239 PR HOSPITAL DISCHARGE DAY,>30 MIN: ICD-10-PCS | Mod: ,,, | Performed by: PHYSICIAN ASSISTANT

## 2022-02-25 PROCEDURE — 63600175 PHARM REV CODE 636 W HCPCS: Performed by: PHYSICIAN ASSISTANT

## 2022-02-25 PROCEDURE — 80197 ASSAY OF TACROLIMUS: CPT | Performed by: NURSE PRACTITIONER

## 2022-02-25 PROCEDURE — 87118 MYCOBACTERIC IDENTIFICATION: CPT | Performed by: STUDENT IN AN ORGANIZED HEALTH CARE EDUCATION/TRAINING PROGRAM

## 2022-02-25 PROCEDURE — 25000003 PHARM REV CODE 250: Performed by: NURSE PRACTITIONER

## 2022-02-25 PROCEDURE — 80053 COMPREHEN METABOLIC PANEL: CPT | Performed by: NURSE PRACTITIONER

## 2022-02-25 PROCEDURE — 85007 BL SMEAR W/DIFF WBC COUNT: CPT | Performed by: NURSE PRACTITIONER

## 2022-02-25 RX ORDER — SODIUM,POTASSIUM PHOSPHATES 280-250MG
1 POWDER IN PACKET (EA) ORAL ONCE
Status: DISCONTINUED | OUTPATIENT
Start: 2022-02-25 | End: 2022-02-25

## 2022-02-25 RX ADMIN — OXYCODONE 5 MG: 5 TABLET ORAL at 01:02

## 2022-02-25 RX ADMIN — HEPARIN SODIUM 5000 UNITS: 5000 INJECTION INTRAVENOUS; SUBCUTANEOUS at 05:02

## 2022-02-25 RX ADMIN — METOPROLOL TARTRATE 50 MG: 50 TABLET, FILM COATED ORAL at 08:02

## 2022-02-25 RX ADMIN — SODIUM BICARBONATE 650 MG TABLET 1300 MG: at 08:02

## 2022-02-25 RX ADMIN — TACROLIMUS 2 MG: 1 CAPSULE ORAL at 08:02

## 2022-02-25 RX ADMIN — CLOTRIMAZOLE AND BETAMETHASONE DIPROPIONATE: 10; .5 CREAM TOPICAL at 08:02

## 2022-02-25 RX ADMIN — PREDNISONE 5 MG: 5 TABLET ORAL at 08:02

## 2022-02-25 RX ADMIN — DIBASIC SODIUM PHOSPHATE, MONOBASIC POTASSIUM PHOSPHATE AND MONOBASIC SODIUM PHOSPHATE 2 TABLET: 852; 155; 130 TABLET ORAL at 11:02

## 2022-02-25 RX ADMIN — BACITRACIN: 500 OINTMENT TOPICAL at 08:02

## 2022-02-25 RX ADMIN — MEROPENEM 2 G: 1 INJECTION INTRAVENOUS at 05:02

## 2022-02-25 RX ADMIN — SULFAMETHOXAZOLE AND TRIMETHOPRIM 2 TABLET: 800; 160 TABLET ORAL at 03:02

## 2022-02-25 RX ADMIN — SULFAMETHOXAZOLE AND TRIMETHOPRIM 2 TABLET: 800; 160 TABLET ORAL at 08:02

## 2022-02-25 RX ADMIN — MEROPENEM 2 G: 1 INJECTION INTRAVENOUS at 12:02

## 2022-02-25 NOTE — NURSING
1500 Patient ready for dc home. Printed AVS reviewed along with followup appts. Blue med card has been updated/ reviewed by PharmD. Rx to be picked up from outpatient pharmacy upon discharge. Home health arranged per . Patient has all needed infuioin supplies/ Rx for home therapy.Patient has walker at bs to take home. Patient/ wife verbalize understanding of dc instructions.Wife to transport patient home.

## 2022-02-25 NOTE — PT/OT/SLP PROGRESS
Physical Therapy Treatment    Patient Name:  Ronal Mazariegos   MRN:  [id number]    Recommendations:     Discharge Recommendations:  home health PT   Discharge Equipment Recommendations: walker, rolling   Barriers to discharge: None    Assessment:     Ronal Mazariegos is a 71 y.o. male admitted with a medical diagnosis of Intracranial abscess.  He presents with the following impairments/functional limitations:  weakness, impaired endurance, impaired functional mobilty, decreased lower extremity function, gait instability. Pt participated, and tolerated treatment well. Pt will continue to benefit from skilled PT services to improve all deficits noted above. Resume PT POC as indicated.    Rehab Prognosis: Fair; patient would benefit from acute skilled PT services to address these deficits and reach maximum level of function.    Recent Surgery: Procedure(s) (LRB):  CRANIOTOMY (Right) 9 Days Post-Op    Plan:     During this hospitalization, patient to be seen 3 x/week to address the identified rehab impairments via gait training, therapeutic activities, therapeutic exercises, neuromuscular re-education and progress toward the following goals:    · Plan of Care Expires:  03/06/22    Subjective     Chief Complaint: none stated  Patient/Family Comments/goals: none stated  Pain/Comfort:  · Pain Rating 1: 0/10  · Pain Rating Post-Intervention 1: 0/10      Objective:     Communicated with nursing prior to session.  Patient found up in chair with  (all lines intact) upon PT entry to room.     General Precautions: Standard, fall   Orthopedic Precautions:N/A   Braces: N/A  Respiratory Status: Room air     Functional Mobility:  · Transfers:  Sit to Stand: to/from bedside chair x2 trials stand by assistance and contact guard assistance with rolling walker  · Gait: ~100ft w/ RW and SBA-CGA for safety.       AM-PAC 6 CLICK MOBILITY   Total Score: 18     Patient left up in chair with all lines intact, call button in reach and nursing  notified..    GOALS:   Multidisciplinary Problems     Physical Therapy Goals        Problem: Physical Therapy Goal    Goal Priority Disciplines Outcome Goal Variances Interventions   Physical Therapy Goal     PT, PT/OT Ongoing, Progressing     Description: Goals to be met by: 3/6/2022      Patient will increase functional independence with mobility by performin. Supine to sit with Modified Sinclair  2. Sit to stand transfer with Modified Sinclair  3. Gait  x 250 feet with Modified Sinclair using the least restrictive device.   4. Ascend/descend 5 steps with right Handrails Modified Sinclair using the least restrictive device.                       Time Tracking:     PT Received On: 22  PT Start Time: 1200     PT Stop Time: 1212  PT Total Time (min): 12 min     Billable Minutes: Gait Training 12    Treatment Type: Treatment  PT/PTA: PTA     PTA Visit Number: 1     2022

## 2022-02-25 NOTE — PLAN OF CARE
Repeat CT head reviewed. Stable from prior. Continued vasogenic edema. Mild mass effect and no midline shift. Follow up arranged with neurosurgery clinic. Will obtain repeat CT head at post op appointment before resuming patient's Eliquis to ensure stability. Please contact neurosurgery with any questions or concerns. I will sign off at this time.       Francheska Sow PA-C  Neurosurgery   [facility]

## 2022-02-25 NOTE — PLAN OF CARE
Recommendations     1. Continue current Regular diet + Boost Plus ONS.   2. RD to monitor & follow-up.     Goals: Meet % EEN, EPN by RD f/u date  Nutrition Goal Status: new  Communication of RD Recs: reviewed with RN

## 2022-02-25 NOTE — PROGRESS NOTES
Discharge Note:    Pt will discharge to patient's home under the care of Becky Menchacalin, patient's wife, phone number [phone].  YOVANY discussed with pt and wife discharge plan including referral for home health services (for SN, PT, & OT) and home infusion (for iv abx).  Pt verbalized understanding and agreement regarding same.  Pt denies preference for HH / infusion agencies.  YOVANY completed referrals to [facility] ( [phone]) via liaison [facility] and [facility] ([phone]) via liaison [facility].  Per pt request and PT recommendation, YOVANY confirmed order and delivery of RW to pt's bedside prior to discharge.  Pt aware of, involved in, and coping well with this discharge plan. Pt did not have any concerns with the discharge plan at this time.  No additional psychosocial needs indicated for discharge at this time.  YOVANY remains available at [phone].

## 2022-02-25 NOTE — PROGRESS NOTES
Discharge Medication Note:    Hospital Course:  Mr Mazariegos is s/p kidney transplant from 5/4/21 readmitted with episodes of left sided weakness and found to have a right frontal brain mass seen on MRI.  Patient was admitted to Neuro ICU for crainiotomy on 2/17/22 with cultures + for nocardia.  ID consulted and patient started on treatment dose bactrim (2 DS PO TID) and meropenem 2g q 8 hours, expected duration 9-12 months. Patient with hx of polycythemia and underwent phlembotomy earlier this week.  Patient episodes of AFib with RVR and low BPs, home BP meds adjusted - will discharge patient on metoprolol tartrate 50mg BID. Patient previously on apixaban for AFib - pt to restart ~14 days post crainiotomy but instructed to follow up with neurosurgery for the final ok. Mirtazapine started this admission for appetite and sleep.     Met with Ronal Mazariegos at discharge to review discharge medications and to update the blue medication card.           Medication List      START taking these medications    metoprolol tartrate 50 MG tablet  Commonly known as: LOPRESSOR  Take 1 tablet (50 mg total) by mouth 2 (two) times daily.     mirtazapine 15 MG tablet  Commonly known as: REMERON  Take 1 tablet (15 mg total) by mouth nightly.     sodium bicarbonate 650 MG tablet  Take 2 tablets (1,300 mg total) by mouth 2 (two) times daily.     sodium chloride 0.9% SolP 100 mL with meropenem 1 gram SolR 2 g  Inject 2 g into the vein every 8 (eight) hours.     sulfamethoxazole-trimethoprim 800-160mg 800-160 mg Tab  Commonly known as: BACTRIM DS  Take 2 tablets by mouth 3 (three) times daily.        CHANGE how you take these medications    apixaban 5 mg Tab  Commonly known as: ELIQUIS  What changed: additional instructions     k phos di & mono-sod phos mono 250 mg Tab  Commonly known as: K-PHOS-NEUTRAL  Take 2 tablets by mouth 2 (two) times a day.  What changed: how much to take        CONTINUE taking these medications    finasteride 5  mg tablet  Commonly known as: PROSCAR     fish oil-omega-3 fatty acids 300-1,000 mg capsule     magnesium oxide 400 mg (241.3 mg magnesium) tablet  Commonly known as: MAG-OX  Take 3 tablets (1,200 mg total) by mouth 2 (two) times daily.     multivitamin Tab  Take 1 tablet by mouth once daily.     predniSONE 5 MG tablet  Commonly known as: DELTASONE  Take by mouth daily. 5/7-6/6 20 mg, 6/7-7/6 15 mg, 7/7-8/6 10 mg, 5 mg daily thereafter starting 8/7/21     tacrolimus 1 MG Cap  Commonly known as: PROGRAF  Take 2 capsules (2 mg total) by mouth every 12 (twelve) hours. Z94.0;Kidney txp on 5/4/21        STOP taking these medications    amLODIPine 5 MG tablet  Commonly known as: NORVASC     BYSTOLIC 20 mg Tab  Generic drug: nebivoloL     CARDURA 2 MG tablet  Generic drug: doxazosin     famotidine 20 MG tablet  Commonly known as: PEPCID     fluticasone propionate 50 mcg/actuation nasal spray  Commonly known as: FLONASE     furosemide 40 MG tablet  Commonly known as: LASIX     iron-vitamin C 100-250 mg (ICAR-C) 100-250 mg Tab     valGANciclovir 450 mg Tab  Commonly known as: VALCYTE     VITAMIN D2 50,000 unit Cap  Generic drug: ergocalciferol           Where to Get Your Medications      These medications were sent to [facility] [address] [address]    Hours: Mon-Fri 7a-7p, Sat-Sun 10a-4p Phone: [phone]   · k phos di & mono-sod phos mono 250 mg Tab  · magnesium oxide 400 mg (241.3 mg magnesium) tablet  · metoprolol tartrate 50 MG tablet  · mirtazapine 15 MG tablet  · sodium bicarbonate 650 MG tablet  · sulfamethoxazole-trimethoprim 800-160mg 800-160 mg Tab     Information about where to get these medications is not yet available    Ask your nurse or doctor about these medications  · sodium chloride 0.9% SolP 100 mL with meropenem 1 gram SolR 2 g            The following medications have been placed on HOLD and should be restarted in the outpatient setting (when appropriate): MMF  (nocardia infection)    Ronal Mazariegos verbalized understanding and had the opportunity to ask questions.

## 2022-02-25 NOTE — CONSULTS
[facility] - Transplant Stepdown  Adult Nutrition  Consult Note    SUMMARY     Recommendations    1. Continue current Regular diet + Boost Plus ONS.   2. RD to monitor & follow-up.    Goals: Meet % EEN, EPN by RD f/u date  Nutrition Goal Status: new  Communication of RD Recs: reviewed with RN    Assessment and Plan    Nutrition Problem:  Moderate Protein-Calorie Malnutrition  Malnutrition in the context of Acute Illness/Injury    Related to (etiology):  Inability to consume sufficient energy    Signs and Symptoms (as evidenced by):  Body Fat Depletion: moderate depletion of orbitals and triceps   Muscle Mass Depletion: moderate depletion of temples, clavicle region and lower extremities   Weight Loss: 5% x 1 month     Interventions(treatment strategy):  Collaboration of nutrition care w/ other providers  ONS    Nutrition Diagnosis Status:  New     Malnutrition Assessment    Weight Loss (Malnutrition): 5% in 1 month     Orbital Region (Subcutaneous Fat Loss): moderate depletion  Upper Arm Region (Subcutaneous Fat Loss): moderate depletion   Mormonism Region (Muscle Loss): moderate depletion  Clavicle Bone Region (Muscle Loss): moderate depletion  Anterior Thigh Region (Muscle Loss): moderate depletion  Posterior Calf Region (Muscle Loss): moderate depletion     Reason for Assessment    Reason For Assessment: consult  Diagnosis: other (see comments) (Intracranial abscess)  Relevant Medical History: Kidney tx (5/2021), HTN  Interdisciplinary Rounds: did not attend    General Information Comments: Pt reports improving appetite, tolerating diet w/ 75% PO intake. Pt willing to try ONS, ordered starting yesterday. Possible discharge today. PTA - pt reports decreased appetite x ~ 5 days & UBW of 180#. NFPE complete - pt w/ moderate fat & muscle wasting. RD feels pt meets criteria for moderate malnutrition - improving as PO intake improves. Please see PES statement for details.  Nutrition Discharge Planning: Adequate PO  "intake    Nutrition/Diet History    Patient Reported Diet/Restrictions/Preferences: general  Spiritual, Cultural Beliefs, Bahai Practices, Values that Affect Care: no  Factors Affecting Nutritional Intake: None identified at this time    Anthropometrics    Temp: 97.8 °F (36.6 °C)  Height Method: Stated  Height: 6' 2" (188 cm)  Height (inches): 74 in  Weight Method: Standard Scale  Weight: 78 kg (171 lb 15.3 oz)  Weight (lb): 171.96 lb  Ideal Body Weight (IBW), Male: 190 lb  % Ideal Body Weight, Male (lb): 90.51 %  BMI (Calculated): 22.1  BMI Grade: 18.5-24.9 - normal  Usual Body Weight (UBW), k kg  % Usual Body Weight: 95.32  % Weight Change From Usual Weight: -4.88 %    Lab/Procedures/Meds    Pertinent Labs Reviewed: reviewed  Pertinent Labs Comments: Na 133  Pertinent Medications Reviewed: reviewed    Estimated/Assessed Needs    Weight Used For Calorie Calculations: 78 kg (171 lb 15.3 oz)     Energy Calorie Requirements (kcal): 2019 kcal/d  Energy Need Method: Paterson-St Jeor (1.25 PAL)     Protein Requirements: 94 g/d (1.2 g/kg)  Weight Used For Protein Calculations: 78 kg (171 lb 15.3 oz)     Estimated Fluid Requirement Method: other (see comments) (Per MD or 1 mL/kcal)  RDA Method (mL): 2019    Nutrition Prescription Ordered    Current Diet Order: Regular  Oral Nutrition Supplement: Boost Plus ONS    Evaluation of Received Nutrient/Fluid Intake    I/O: -8.1L since admit    Comments: LBM:     Tolerance: tolerating    Nutrition Risk    Level of Risk/Frequency of Follow-up: (1x/week)     Monitor and Evaluation    Food and Nutrient Intake: energy intake, food and beverage intake  Food and Nutrient Adminstration: diet order  Physical Activity and Function: nutrition-related ADLs and IADLs  Anthropometric Measurements: weight, weight change  Biochemical Data, Medical Tests and Procedures: inflammatory profile, lipid profile, glucose/endocrine profile, gastrointestinal profile, electrolyte and renal " panel  Nutrition-Focused Physical Findings: overall appearance     Nutrition Follow-Up    RD Follow-up?: Yes

## 2022-02-25 NOTE — NURSING
Received AAO x4. Follows commands, hard of hearing, wife at bedside. Respirations even and unlabored on room air with no distress noted. C/O slight pain to back area. NS infusing Via Right Devine, no problems noted. Will continue to monitor for changes

## 2022-02-25 NOTE — PLAN OF CARE
Problem: Adult Inpatient Plan of Care  Goal: Plan of Care Review  Outcome: Ongoing, Progressing  Goal: Patient-Specific Goal (Individualized)  Description: Admit Date: 2/16/22    Admit Dx: Intracranial abscess    Past Medical History:  10/1/2021: Anasarca  No date: Anemia of chronic disease  No date: Atrial fibrillation  No date: BPH (benign prostatic hypertrophy)  10/1/2021: Chronic systolic heart failure  No date: Hepatitis C virus infection cured after antiviral drug   therapy      Comment:  acquired through kidney transplant, treated / cured                (SVR12 - 12/2021)  No date: HTN (hypertension)  No date: Polycystic kidney disease    Past Surgical History:  2016: AV FISTULA PLACEMENT; Left  No date: CATARACT EXTRACTION, BILATERAL; Bilateral  2/16/2022: CRANIOTOMY; Right      Comment:  Procedure: CRANIOTOMY;  Surgeon: Sunday Rosa DO;               Location: [facility] OR [address] [place] [address];  Service: Neurosurgery;                 Laterality: Right;  R craniotomy for abscess drainage  5/4/2021: KIDNEY TRANSPLANT; N/A      Comment:  Procedure: TRANSPLANT, KIDNEY;  Surgeon: Lexy Bolden MD;  Location: [facility] OR [address] [place] [address];  Service:                Transplant;  Laterality: N/A;    Individualization:   1. Needs reinforcement of fall risk due to L sided weakness    Restraints: N/A         Outcome: Ongoing, Progressing  Goal: Absence of Hospital-Acquired Illness or Injury  Outcome: Ongoing, Progressing  Goal: Optimal Comfort and Wellbeing  Outcome: Ongoing, Progressing  Goal: Readiness for Transition of Care  Outcome: Ongoing, Progressing     Problem: Infection  Goal: Absence of Infection Signs and Symptoms  Outcome: Ongoing, Progressing     Problem: Impaired Wound Healing  Goal: Optimal Wound Healing  Outcome: Ongoing, Progressing     Problem: Skin Injury Risk Increased  Goal: Skin Health and Integrity  Outcome: Ongoing, Progressing     Problem: Fall Injury Risk  Goal: Absence of Fall and Fall-Related  Injury  Outcome: Ongoing, Progressing

## 2022-02-26 PROCEDURE — G0180 PR HOME HEALTH MD CERTIFICATION: ICD-10-PCS | Mod: ,,, | Performed by: INTERNAL MEDICINE

## 2022-02-26 PROCEDURE — G0180 MD CERTIFICATION HHA PATIENT: HCPCS | Mod: ,,, | Performed by: INTERNAL MEDICINE

## 2022-02-28 ENCOUNTER — TELEPHONE (OUTPATIENT)
Dept: NEUROSURGERY | Facility: CLINIC | Age: 72
End: 2022-02-28
Payer: MEDICARE

## 2022-02-28 ENCOUNTER — PATIENT MESSAGE (OUTPATIENT)
Dept: TRANSPLANT | Facility: CLINIC | Age: 72
End: 2022-02-28
Payer: MEDICARE

## 2022-02-28 NOTE — TELEPHONE ENCOUNTER
Scheduled patient an appointment with Francheska REESE for 3/10/2022 @ 11 am patient verbally understood.

## 2022-02-28 NOTE — TELEPHONE ENCOUNTER
----- Message from Porsha Cheney MA sent at 2/25/2022 11:44 AM CST -----    ----- Message -----  From: Francheska Sow PA-C  Sent: 2/25/2022   8:42 AM CST  To: Moe REESE Staff    Hi,    This patient needs a repeat CT head at his 2 week post op. He is currently scheduled with nursing on the 9th so can we move him to my schedule since he will have a CT for review? I am in clinic on the 10th! I will put the CT head order in.     Thank you!

## 2022-03-01 LAB
BACTERIA BLD CULT: NORMAL
BACTERIA BLD CULT: NORMAL

## 2022-03-01 NOTE — PHYSICIAN QUERY
PT Name: Ronal Mazariegos  MR #: [id number]    DOCUMENTATION CLARIFICATION     CDS/: Marilee Vyas               Contact information: [email or web address]    This form is a permanent document in the medical record.     Query Date: March 1, 2022    By submitting this query, we are merely seeking further clarification of documentation.. Please utilize your independent clinical judgment when addressing the question(s) below.    The medical record contains the following:   Indicators  Supporting Clinical Findings Location in Medical Record   x % of Estimated Energy Intake over a time frame from p.o., TF, or TPN pt reports decreased appetite x ~ 5 days  RD note 2-25   x Weight Status over a time frame Weight Loss: 5% x 1 month    RD note 2-25   x Subcutaneous Fat and/or Muscle Loss Body Fat Depletion: moderate depletion of orbitals and triceps   Muscle Mass Depletion: moderate depletion of temples, clavicle region and lower extremities       RD note 2-25    Fluid Accumulation or Edema     x Wt/BMI/Usual Body Weight Ss=306  BMI=22.1 RD note 2-25    Delayed Wound Healing/Failure to Thrive     x Acute or Chronic Illness Moderate Protein-Calorie Malnutrition RD note 2-25    Medication     x Treatment Recommendations     1. Continue current Regular diet + Boost Plus ONS.   2. RD to monitor & follow-up RD note 2-25    Other       AND / ASPEN Clinical Characteristics (October 2011)  A minimum of two characteristics is recommended for diagnosing either moderate or severe malnutrition   Mild Malnutrition Moderate Malnutrition Severe Malnutrition   Energy Intake from p.o., TF or TPN. < 75% intake of estimated energy needs for less than 7 days < 75% intake of estimated energy needs for greater than 7 days < 50% intake of estimated energy needs for > 5 days   Weight Loss 1-2% in 1 month  5% in 3 months  7.5% in 6 months  10% in 1 year 1-2 % in 1 week  5% in 1 month  7.5% in 3 months  10% in 6 months  20% in 1  year > 2% in 1 week  > 5% in 1 month  > 7.5% in 3 months  > 10% in 6 months  > 20% in 1 year   Physical Findings     None *Mild subcutaneous fat and/or muscle loss  *Mild fluid accumulation  *Stage II decubitus  *Surgical wound or non-healing wound *Mod/severe subcutaneous fat and/or muscle loss  *Mod/severe fluid accumulation  *Stage III or IV decubitus  *Non-healing surgical wound     Provider, please specify diagnosis or diagnoses associated with above clinical findings.    [  ] Moderate Protein-Calorie Malnutrition   [  ] Other Nutritional Diagnosis (please specify): _______   [  ] Clinically Undetermined       Please document in your progress notes daily for the duration of treatment until resolved and include in your discharge summary.

## 2022-03-03 DIAGNOSIS — E87.5 HYPERKALEMIA: Primary | ICD-10-CM

## 2022-03-03 NOTE — PHYSICIAN QUERY
PT Name: Ronal Mazariegos  MR #: [id number]    DOCUMENTATION CLARIFICATION     CDS/: Marilee Vyas               Contact information: [email or web address]    This form is a permanent document in the medical record.     Query Date: March 2, 2022    By submitting this query, we are merely seeking further clarification of documentation.. Please utilize your independent clinical judgment when addressing the question(s) below.    The medical record contains the following:   Indicators  Supporting Clinical Findings Location in Medical Record   x % of Estimated Energy Intake over a time frame from p.o., TF, or TPN pt reports decreased appetite x ~ 5 days  RD note 2-25   x Weight Status over a time frame Weight Loss: 5% x 1 month    RD note 2-25   x Subcutaneous Fat and/or Muscle Loss Body Fat Depletion: moderate depletion of orbitals and triceps   Muscle Mass Depletion: moderate depletion of temples, clavicle region and lower extremities       RD note 2-25    Fluid Accumulation or Edema     x Wt/BMI/Usual Body Weight To=069  BMI=22.1 RD note 2-25    Delayed Wound Healing/Failure to Thrive     x Acute or Chronic Illness Moderate Protein-Calorie Malnutrition RD note 2-25    Medication     x Treatment Recommendations     1. Continue current Regular diet + Boost Plus ONS.   2. RD to monitor & follow-up RD note 2-25    Other       AND / ASPEN Clinical Characteristics (October 2011)  A minimum of two characteristics is recommended for diagnosing either moderate or severe malnutrition   Mild Malnutrition Moderate Malnutrition Severe Malnutrition   Energy Intake from p.o., TF or TPN. < 75% intake of estimated energy needs for less than 7 days < 75% intake of estimated energy needs for greater than 7 days < 50% intake of estimated energy needs for > 5 days   Weight Loss 1-2% in 1 month  5% in 3 months  7.5% in 6 months  10% in 1 year 1-2 % in 1 week  5% in 1 month  7.5% in 3 months  10% in 6 months  20% in 1  year > 2% in 1 week  > 5% in 1 month  > 7.5% in 3 months  > 10% in 6 months  > 20% in 1 year   Physical Findings     None *Mild subcutaneous fat and/or muscle loss  *Mild fluid accumulation  *Stage II decubitus  *Surgical wound or non-healing wound *Mod/severe subcutaneous fat and/or muscle loss  *Mod/severe fluid accumulation  *Stage III or IV decubitus  *Non-healing surgical wound     Provider, please specify diagnosis or diagnoses associated with above clinical findings.    [  ] Moderate Protein-Calorie Malnutrition   [  ] Other Nutritional Diagnosis (please specify): _______   [  X] Clinically Undetermined       Please document in your progress notes daily for the duration of treatment until resolved and include in your discharge summary.

## 2022-03-03 NOTE — PROGRESS NOTES
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  Please ask transplant provider about potassium of 5.3.  Thank you

## 2022-03-03 NOTE — TELEPHONE ENCOUNTER
----- Message from Celeste Yen MD sent at 3/3/2022  1:22 PM CST -----  Please kayexalate 30 gram daily X 2 days. 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  Please ask transplant provider about potassium of 5.3.  Thank you

## 2022-03-04 ENCOUNTER — PATIENT MESSAGE (OUTPATIENT)
Dept: TRANSPLANT | Facility: CLINIC | Age: 72
End: 2022-03-04
Payer: MEDICARE

## 2022-03-04 DIAGNOSIS — E87.5 HYPERKALEMIA: ICD-10-CM

## 2022-03-04 RX ORDER — SODIUM POLYSTYRENE SULFONATE 4.1 MEQ/G
POWDER, FOR SUSPENSION ORAL; RECTAL
Qty: 454 G | Refills: 1 | Status: SHIPPED | OUTPATIENT
Start: 2022-03-04 | End: 2022-04-11 | Stop reason: DRUGHIGH

## 2022-03-04 NOTE — TELEPHONE ENCOUNTER
Coordinator contacted [facility] pharmacy to make sure they received the prescription for patient's Kayexalate. Pharmacist reports he is only able to get the powder in the large bottle from the  to dispense to patient. Pharmacist request prescription for powder be sent.

## 2022-03-04 NOTE — TELEPHONE ENCOUNTER
Additional medication instructions given to patient via [facility].     Hi Mr. Villeda,     I talked to the pharmacist at [facility]. They have the Kayexalate powder but it's comes in a large multi-dose jar. You'll only need to take 2 doses for now, so we will let you know if you need to take additional doses later.     The instructions are to Mix 8 LEVEL TEASPOONS (30grams total) in 120mL water and drink by mouth FOR 2 DAYS (3/4/22 & 3/5/22),  then TAKE AS DIRECTED BY TRANSPLANT (for high potassium). Don't take it with your other 8AM & 8 PM oral medications.       Thanks,     Tiara

## 2022-03-05 LAB
BACTERIA SPEC AEROBE CULT: ABNORMAL
BACTERIA SPEC AEROBE CULT: ABNORMAL

## 2022-03-07 ENCOUNTER — PATIENT MESSAGE (OUTPATIENT)
Dept: INFECTIOUS DISEASES | Facility: CLINIC | Age: 72
End: 2022-03-07

## 2022-03-07 ENCOUNTER — HISTORICAL (OUTPATIENT)
Dept: ADMINISTRATIVE | Facility: HOSPITAL | Age: 72
End: 2022-03-07

## 2022-03-07 ENCOUNTER — OFFICE VISIT (OUTPATIENT)
Dept: TRANSPLANT | Facility: CLINIC | Age: 72
End: 2022-03-07
Payer: MEDICARE

## 2022-03-07 ENCOUNTER — OFFICE VISIT (OUTPATIENT)
Dept: INFECTIOUS DISEASES | Facility: CLINIC | Age: 72
End: 2022-03-07
Payer: MEDICARE

## 2022-03-07 ENCOUNTER — OFFICE VISIT (OUTPATIENT)
Dept: HEPATOLOGY | Facility: CLINIC | Age: 72
End: 2022-03-07
Payer: MEDICARE

## 2022-03-07 VITALS
DIASTOLIC BLOOD PRESSURE: 85 MMHG | HEIGHT: 74 IN | HEART RATE: 109 BPM | WEIGHT: 177.94 LBS | RESPIRATION RATE: 16 BRPM | TEMPERATURE: 97 F | OXYGEN SATURATION: 97 % | SYSTOLIC BLOOD PRESSURE: 141 MMHG | BODY MASS INDEX: 22.84 KG/M2

## 2022-03-07 VITALS
HEART RATE: 100 BPM | SYSTOLIC BLOOD PRESSURE: 120 MMHG | DIASTOLIC BLOOD PRESSURE: 75 MMHG | RESPIRATION RATE: 18 BRPM | OXYGEN SATURATION: 98 % | BODY MASS INDEX: 22.69 KG/M2 | TEMPERATURE: 96 F | WEIGHT: 176.81 LBS | HEIGHT: 74 IN

## 2022-03-07 VITALS
SYSTOLIC BLOOD PRESSURE: 115 MMHG | TEMPERATURE: 98 F | HEIGHT: 74 IN | BODY MASS INDEX: 22.8 KG/M2 | WEIGHT: 177.69 LBS | HEART RATE: 80 BPM | DIASTOLIC BLOOD PRESSURE: 65 MMHG

## 2022-03-07 DIAGNOSIS — Z29.89 PROPHYLACTIC IMMUNOTHERAPY: ICD-10-CM

## 2022-03-07 DIAGNOSIS — Z94.0 KIDNEY REPLACED BY TRANSPLANT: ICD-10-CM

## 2022-03-07 DIAGNOSIS — Z94.0 KIDNEY REPLACED BY TRANSPLANT: Primary | ICD-10-CM

## 2022-03-07 DIAGNOSIS — I48.91 ATRIAL FIBRILLATION, UNSPECIFIED TYPE: ICD-10-CM

## 2022-03-07 DIAGNOSIS — G06.0 INTRACRANIAL ABSCESS: ICD-10-CM

## 2022-03-07 DIAGNOSIS — I15.0 RENOVASCULAR HYPERTENSION: ICD-10-CM

## 2022-03-07 DIAGNOSIS — I42.8 OTHER CARDIOMYOPATHY: ICD-10-CM

## 2022-03-07 DIAGNOSIS — Z86.19 HEPATITIS C VIRUS INFECTION CURED AFTER ANTIVIRAL DRUG THERAPY: ICD-10-CM

## 2022-03-07 DIAGNOSIS — Z79.60 LONG-TERM USE OF IMMUNOSUPPRESSANT MEDICATION: ICD-10-CM

## 2022-03-07 DIAGNOSIS — D63.8 ANEMIA OF CHRONIC DISEASE: ICD-10-CM

## 2022-03-07 DIAGNOSIS — Z91.89 AT RISK FOR OPPORTUNISTIC INFECTIONS: ICD-10-CM

## 2022-03-07 DIAGNOSIS — D45 POLYCYTHEMIA VERA: ICD-10-CM

## 2022-03-07 DIAGNOSIS — Z79.01 LONG TERM (CURRENT) USE OF ANTICOAGULANTS: Chronic | ICD-10-CM

## 2022-03-07 DIAGNOSIS — A43.9 NOCARDIA INFECTION: Primary | ICD-10-CM

## 2022-03-07 LAB
ABS NEUT (OLG): 2.13 (ref 2.1–9.2)
ALBUMIN SERPL-MCNC: 3.2 G/DL (ref 3.4–4.8)
BASOPHILS # BLD AUTO: 0 10*3/UL (ref 0–0.2)
BASOPHILS NFR BLD AUTO: 1 %
BUN SERPL-MCNC: 21.6 MG/DL (ref 8.4–25.7)
CALCIUM SERPL-MCNC: 9.2 MG/DL (ref 8.7–10.5)
CHLORIDE SERPL-SCNC: 108 MMOL/L (ref 98–107)
CO2 SERPL-SCNC: 22 MMOL/L (ref 23–31)
CREAT SERPL-MCNC: 1.43 MG/DL (ref 0.73–1.18)
CREAT/UREA NIT SERPL: 15
EOSINOPHIL # BLD AUTO: 0.2 10*3/UL (ref 0–0.9)
EOSINOPHIL NFR BLD AUTO: 4 %
ERYTHROCYTE [DISTWIDTH] IN BLOOD BY AUTOMATED COUNT: 15 % (ref 11.5–17)
GLUCOSE SERPL-MCNC: 91 MG/DL (ref 82–115)
HCT VFR BLD AUTO: 48.1 % (ref 42–52)
HEMOLYSIS INTERF INDEX SERPL-ACNC: 3
HGB BLD-MCNC: 15.5 G/DL (ref 14–18)
ICTERIC INTERF INDEX SERPL-ACNC: 1
ICTERIC INTERF INDEX SERPL-ACNC: 1
LIPEMIC INTERF INDEX SERPL-ACNC: <0
LIPEMIC INTERF INDEX SERPL-ACNC: <0
LYMPHOCYTES # BLD AUTO: 1.3 10*3/UL (ref 0.6–4.6)
LYMPHOCYTES NFR BLD AUTO: 28 %
MAGNESIUM SERPL-MCNC: 2.1 MG/DL (ref 1.6–2.6)
MANUAL DIFF? (OHS): NO
MCH RBC QN AUTO: 29.9 PG (ref 27–31)
MCHC RBC AUTO-ENTMCNC: 32.2 G/DL (ref 33–36)
MCV RBC AUTO: 92.7 FL (ref 80–94)
MONOCYTES # BLD AUTO: 0.7 10*3/UL (ref 0.1–1.3)
MONOCYTES NFR BLD AUTO: 15 %
NEUTROPHILS # BLD AUTO: 2.13 10*3/UL (ref 2.1–9.2)
NEUTROPHILS NFR BLD AUTO: 47 %
PHOSPHATE SERPL-MCNC: 2.8 MG/DL (ref 2.3–4.7)
PLATELET # BLD AUTO: 176 10*3/UL (ref 130–400)
PMV BLD AUTO: 9.3 FL (ref 9.4–12.4)
POTASSIUM SERPL-SCNC: 5.1 MMOL/L (ref 3.5–5.1)
RBC # BLD AUTO: 5.19 10*6/UL (ref 4.7–6.1)
SODIUM SERPL-SCNC: 139 MMOL/L (ref 136–145)
VERIFY DIFF SUSPECT FLAG (OHS): NORMAL
WBC # SPEC AUTO: 4.5 10*3/UL (ref 4.5–11.5)

## 2022-03-07 PROCEDURE — 99215 OFFICE O/P EST HI 40 MIN: CPT | Mod: S$PBB,,, | Performed by: NURSE PRACTITIONER

## 2022-03-07 PROCEDURE — 99999 PR PBB SHADOW E&M-EST. PATIENT-LVL III: CPT | Mod: PBBFAC,,, | Performed by: INTERNAL MEDICINE

## 2022-03-07 PROCEDURE — 99215 OFFICE O/P EST HI 40 MIN: CPT | Mod: PBBFAC | Performed by: NURSE PRACTITIONER

## 2022-03-07 PROCEDURE — 99213 OFFICE O/P EST LOW 20 MIN: CPT | Mod: PBBFAC,27 | Performed by: INTERNAL MEDICINE

## 2022-03-07 PROCEDURE — 99999 PR PBB SHADOW E&M-EST. PATIENT-LVL III: ICD-10-PCS | Mod: PBBFAC,,, | Performed by: INTERNAL MEDICINE

## 2022-03-07 PROCEDURE — 99214 PR OFFICE/OUTPT VISIT, EST, LEVL IV, 30-39 MIN: ICD-10-PCS | Mod: S$PBB,,, | Performed by: INTERNAL MEDICINE

## 2022-03-07 PROCEDURE — 99999 PR PBB SHADOW E&M-EST. PATIENT-LVL IV: CPT | Mod: PBBFAC,,, | Performed by: INTERNAL MEDICINE

## 2022-03-07 PROCEDURE — 99215 PR OFFICE/OUTPT VISIT, EST, LEVL V, 40-54 MIN: ICD-10-PCS | Mod: S$PBB,,, | Performed by: NURSE PRACTITIONER

## 2022-03-07 PROCEDURE — 99215 OFFICE O/P EST HI 40 MIN: CPT | Mod: S$PBB,,, | Performed by: INTERNAL MEDICINE

## 2022-03-07 PROCEDURE — 99999 PR PBB SHADOW E&M-EST. PATIENT-LVL V: ICD-10-PCS | Mod: PBBFAC,,, | Performed by: NURSE PRACTITIONER

## 2022-03-07 PROCEDURE — 99215 PR OFFICE/OUTPT VISIT, EST, LEVL V, 40-54 MIN: ICD-10-PCS | Mod: S$PBB,,, | Performed by: INTERNAL MEDICINE

## 2022-03-07 PROCEDURE — 99214 OFFICE O/P EST MOD 30 MIN: CPT | Mod: S$PBB,,, | Performed by: INTERNAL MEDICINE

## 2022-03-07 PROCEDURE — 99999 PR PBB SHADOW E&M-EST. PATIENT-LVL V: CPT | Mod: PBBFAC,,, | Performed by: NURSE PRACTITIONER

## 2022-03-07 PROCEDURE — 99214 OFFICE O/P EST MOD 30 MIN: CPT | Mod: PBBFAC,27 | Performed by: INTERNAL MEDICINE

## 2022-03-07 PROCEDURE — 99999 PR PBB SHADOW E&M-EST. PATIENT-LVL IV: ICD-10-PCS | Mod: PBBFAC,,, | Performed by: INTERNAL MEDICINE

## 2022-03-07 NOTE — PROGRESS NOTES
Subjective:     Patient ID: Ronal Mazariegos is a 71 y.o. male    Chief Complaint: disseminated nocardia    HPI: 71M with polycystic kidney sidease s/p DBD KTx 5/2021 on tacro/pred, Afib on AC, Polycythemia vera (09/2021), undergoing evaluation for newly diagnosed infiltrative cardiomyopathy, who presented after worsening L sided neurologic deficits and fall at home. 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 CT C/A/P notes a RML lesion- suspect lung lesion is also nocardia. Cardiac MRI done at OSH 02/09 Findings are consistent with infiltrative cardiomyopathy. TTE negative for vegetatation. Abnormal thickening of the aortic root unchanged from prior. Patient was discharged on IV meropenem and high dose PO bactrim. He presents today for follow up. Doing well on current abx. Recent increase in Cr and K, managed by transplant nephrology, likely related to bactrim. Feeling well, has follow up w/ NSGY on Thursday. Wound healing well. No fevers/chills. Neurologic symptoms resolved.      Immunization History   Administered Date(s) Administered    Hepatitis A, Adult 12/07/2016, 05/10/2017    Hepatitis B, Adult 12/07/2016, 01/09/2017    Influenza 09/26/2018, 09/24/2019    Pneumococcal Conjugate - 13 Valent 12/07/2016    Tdap 12/07/2016    Zoster 02/27/2014    Zoster Recombinant 03/26/2019, 06/04/2019        Review of Systems   Constitutional: Negative for chills, decreased appetite, fever, malaise/fatigue and night sweats.   HENT: Negative for congestion and sore throat.    Eyes: Negative for blurred vision, double vision, vision loss in left eye, vision loss in right eye and visual disturbance.   Cardiovascular: Negative for chest pain, dyspnea on exertion, irregular heartbeat and leg swelling.   Respiratory: Negative for cough, hemoptysis, shortness of breath and sputum production.    Hematologic/Lymphatic: Negative for adenopathy. Does not bruise/bleed easily.   Skin:  Negative for rash and suspicious lesions.   Musculoskeletal: Negative for arthritis, joint pain, muscle weakness and myalgias.   Gastrointestinal: Negative for abdominal pain, constipation, diarrhea, heartburn, nausea and vomiting.   Genitourinary: Negative for dysuria, flank pain, frequency and genital sores.   Neurological: Negative for dizziness, focal weakness, numbness, paresthesias, sensory change, tremors and weakness.   Psychiatric/Behavioral: Negative for altered mental status and depression. The patient is not nervous/anxious.    Allergic/Immunologic: Negative for environmental allergies and persistent infections.        Past Medical History:   Diagnosis Date    Anasarca 10/1/2021    Anemia of chronic disease     Atrial fibrillation     BPH (benign prostatic hypertrophy)     Chronic systolic heart failure 10/1/2021    CKD (chronic kidney disease) stage 2, GFR 60-89 ml/min     Hepatitis C virus infection cured after antiviral drug therapy     acquired through kidney transplant, treated / cured (SVR12 - 12/2021)    HTN (hypertension)     HTN (hypertension)     Polycystic kidney disease      Past Surgical History:   Procedure Laterality Date    AV FISTULA PLACEMENT Left 2016    CATARACT EXTRACTION, BILATERAL Bilateral     CRANIOTOMY Right 2/16/2022    Procedure: CRANIOTOMY;  Surgeon: Sunday Rosa DO;  Location: [facility] OR [address] [place] [address];  Service: Neurosurgery;  Laterality: Right;  R craniotomy for abscess drainage    KIDNEY TRANSPLANT N/A 5/4/2021    Procedure: TRANSPLANT, KIDNEY;  Surgeon: Lexy Bolden MD;  Location: [facility] OR [address] [place] [address];  Service: Transplant;  Laterality: N/A;     Family History   Problem Relation Age of Onset    Polycystic kidney disease Father     Hypertension Father     Kidney disease Father     Polycystic kidney disease Sister     Hypertension Sister     Kidney disease Sister      Social History     Tobacco Use    Smoking status: Former Smoker     Packs/day: 2.00      Years: 10.00     Pack years: 20.00     Types: Cigarettes     Start date: 1972     Quit date: 1984     Years since quittin.2    Smokeless tobacco: Never Used   Substance Use Topics    Alcohol use: No     Comment: Pt reportsbeing a former beer drinker (2-3 cans per day) and quitting in .    Drug use: No       Objective:     Physical Exam  Constitutional:       General: He is not in acute distress.     Appearance: Normal appearance. He is well-developed. He is not ill-appearing or diaphoretic.   HENT:      Head: Normocephalic and atraumatic.      Right Ear: External ear normal.      Left Ear: External ear normal.      Nose: Nose normal.   Eyes:      General: No scleral icterus.        Right eye: No discharge.         Left eye: No discharge.      Extraocular Movements: Extraocular movements intact.      Conjunctiva/sclera: Conjunctivae normal.   Pulmonary:      Effort: Pulmonary effort is normal. No respiratory distress.      Breath sounds: No stridor.   Skin:     General: Skin is dry.      Coloration: Skin is not jaundiced or pale.      Findings: No erythema.      Comments: Scalp incision c/d/i, staples present   Neurological:      General: No focal deficit present.      Mental Status: He is alert and oriented to person, place, and time. Mental status is at baseline.   Psychiatric:         Mood and Affect: Mood normal.         Behavior: Behavior normal.         Thought Content: Thought content normal.         Judgment: Judgment normal.         Data:    All data, including recent labs, radiology, and pathology, has been independently reviewed.    Labs:    Recent Labs   Lab Result Units 22  2246 22  0211 22  0643 22  0625 22  0339   WBC K/uL 3.18*   < > 4.59 5.32 6.76   Hemoglobin g/dL 16.7   < > 18.8* 16.1 15.0   POC Hematocrit   --    < >  --   --   --    Hematocrit % 51.8   < > 57.3* 50.4 46.2   Sodium mmol/L 139   < > 130* 133* 133*   Potassium mmol/L 4.9   < > 4.8  4.7 4.6   Chloride mmol/L 107   < > 100 106 107   BUN mg/dL 21   < > 20 17 17   Creatinine mg/dL 0.9   < > 1.4 1.1 1.1   AST U/L 21   < > 21 19 19   ALT U/L 18   < > 24 21 19   Alkaline Phosphatase U/L 77   < > 68 60 64   Total Bilirubin mg/dL 1.9*   < > 1.5* 1.5* 1.6*   CRP mg/L 15.4*  --   --   --   --    INR  1.3*  --   --   --   --     < > = values in this interval not displayed.        Radiology:    No results found in the last 24 hours.     Assessment:    1. Nocardia infection  Will plan to continue meropenem and bactrim as long as patient continues to tolerate  Weekly labs to monitor for toxicity  Follow up susceptibilities   Optimal treatment duration is 12 months, followed by lifelong secondary prophylaxis     2. Intracranial abscess  Following up w/ NSGY this week  Repeat MRI scheduled next month     3. Other cardiomyopathy  Following w/ cardiology      4. Kidney replaced by transplant  Monitor weekly labs          Follow up in 1 month    The total time for evaluation and management services performed on 3/7/22 was greater than 40 minutes.     Gricel Travis DO  Transplant Infectious Disease

## 2022-03-07 NOTE — PROGRESS NOTES
Subjective:       Patient ID: Ronal Mazariegos is a 71 y.o. male.    Chief Complaint: No chief complaint on file.    HPI  I saw this 71 y.o. 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    - SVR post Tx on 12/17/2021    · U/S 7/2020 - several liver cysts up to 3.5cm, spleen 8.5cm   · CT abdomen 5/4/21 (1 day post kidney transplant) - subtle nodular contour of liver, several liver cysts up to 3.5cm  · FibroSURE 10/1/21 - A0-1, F3  · Transaminases normal (teens-20s)  · Persistent mild TBili elevation, predominantly unconjugated    F3 on fibrosure  FibroScan  - no evidence of cirrhosis or steatosis:  KPa 8.2,   KPa 10.7,       - recent platelets at 97  - INR 1.2    Abdo CT: 5/4/21  1. Findings suggesting sequela of polycystic kidney disease and to a lesser extent polycystic liver disease, including innumerable simple and minimally complex appearing cortical cysts and scattered parenchymal calcifications throughout each kidney, allowing for lack of intravenous contrast.  Cannot exclude nonobstructing small nephroliths.  No hydronephrosis on either side or definitive radiodense ureteral calculus.  2. Hepatomegaly with subtle hepatic nodular contour which could represent sequela of underlying cirrhosis.  Clinical correlation and with LFTs advised.  3. Diffuse mesenteric stranding with pelvic small volume free fluid suggesting mesenteric edema and nonspecific ascites tracking to the dependent pelvis.  There is also suspected mild body wall anasarca.  4. Prostatomegaly.  5. Atherosclerosis.    Abdo US: 12/21/21  No liver masses.   No ascites  Patient history of polycystic kidney disease with multiple hepatic and renal cysts, similar to the     2 D echo: 10/1/21  · The left ventricle is normal in size with concentric hypertrophy and mildly decreased systolic  function. The estimated ejection fraction is 45%.  · The left ventricular global longitudinal strain is -8.9% with apical sparing consistent with an infiltrative (e.g., amyloid) cardiomyopathy.  · Normal right ventricular size with mildly reduced right ventricular systolic function.  · Severe biatrial enlargement.  · Mild mitral regurgitation.  · Atrial fibrillation observed.  · Small circumferential pericardial effusion.  · Pulmonary hypertension; the estimated PA systolic pressure is 52 mmHg.  · Intermediate central venous pressure (8 mmHg).      Review of Systems   Constitutional: Negative for activity change, appetite change, chills, fatigue, fever and unexpected weight change.   HENT: Negative for hearing loss.    Eyes: Negative for discharge and visual disturbance.   Respiratory: Negative for cough, chest tightness, shortness of breath and wheezing.    Cardiovascular: Negative for chest pain, palpitations and leg swelling.   Gastrointestinal: Negative for abdominal distention, abdominal pain, constipation, diarrhea and nausea.   Genitourinary: Negative for dysuria and frequency.   Musculoskeletal: Negative for arthralgias and back pain.   Skin: Negative for pallor and rash.   Neurological: Negative for dizziness, tremors, speech difficulty and headaches.   Hematological: Negative for adenopathy.   Psychiatric/Behavioral: Negative for agitation and confusion.         Lab Results   Component Value Date    ALT 19 02/25/2022    AST 19 02/25/2022    GGT 54 10/01/2021    ALKPHOS 64 02/25/2022    BILITOT 1.6 (H) 02/25/2022     Past Medical History:   Diagnosis Date    Anasarca 10/1/2021    Anemia of chronic disease     Atrial fibrillation     BPH (benign prostatic hypertrophy)     Chronic systolic heart failure 10/1/2021    CKD (chronic kidney disease) stage 2, GFR 60-89 ml/min     Hepatitis C virus infection cured after antiviral drug therapy     acquired through kidney transplant, treated / cured (SVR12 -  12/2021)    HTN (hypertension)     HTN (hypertension)     Polycystic kidney disease      Past Surgical History:   Procedure Laterality Date    AV FISTULA PLACEMENT Left 2016    CATARACT EXTRACTION, BILATERAL Bilateral     CRANIOTOMY Right 2/16/2022    Procedure: CRANIOTOMY;  Surgeon: Sunday Rosa DO;  Location: [facility] OR [address] [place] [address];  Service: Neurosurgery;  Laterality: Right;  R craniotomy for abscess drainage    KIDNEY TRANSPLANT N/A 5/4/2021    Procedure: TRANSPLANT, KIDNEY;  Surgeon: Lexy Bolden MD;  Location: [facility] OR [address] [place] [address];  Service: Transplant;  Laterality: N/A;     Current Outpatient Medications   Medication Sig    apixaban (ELIQUIS) 5 mg Tab Take 1 tablet (5 mg total) by mouth 2 (two) times daily. DO NOT RESTART UNTIL APPROVED BY NEUROSURGERY    finasteride (PROSCAR) 5 mg tablet Take 5 mg by mouth once daily.    fish oil-omega-3 fatty acids 300-1,000 mg capsule Take 1 capsule by mouth once daily.     k phos di & mono-sod phos mono (K-PHOS-NEUTRAL) 250 mg Tab Take 2 tablets by mouth 2 (two) times a day.    magnesium oxide (MAG-OX) 400 mg (241.3 mg magnesium) tablet Take 3 tablets (1,200 mg total) by mouth 2 (two) times daily.    metoprolol tartrate (LOPRESSOR) 50 MG tablet Take 1 tablet (50 mg total) by mouth 2 (two) times daily.    mirtazapine (REMERON) 15 MG tablet Take 1 tablet (15 mg total) by mouth nightly.    multivitamin Tab Take 1 tablet by mouth once daily.    predniSONE (DELTASONE) 5 MG tablet Take by mouth daily. 5/7-6/6 20 mg, 6/7-7/6 15 mg, 7/7-8/6 10 mg, 5 mg daily thereafter starting 8/7/21    sodium bicarbonate 650 MG tablet Take 2 tablets (1,300 mg total) by mouth 2 (two) times daily.    sodium chloride 0.9% SolP 100 mL with meropenem 1 gram SolR 2 g Inject 2 g into the vein every 8 (eight) hours.    sodium polystyrene (KAYEXALATE) powder Mix 8 LEVEL TEASPOONS (30grams total) in 120mL water and drink by mouth FOR 2 DAYS (3/4/22 & 3/5/22),  then TAKE AS DIRECTED BY  TRANSPLANT (for high potassium)    sulfamethoxazole-trimethoprim 800-160mg (BACTRIM DS) 800-160 mg Tab Take 2 tablets by mouth 3 (three) times daily.    tacrolimus (PROGRAF) 1 MG Cap Take 2 capsules (2 mg total) by mouth every 12 (twelve) hours. Z94.0;Kidney txp on 5/4/21     No current facility-administered medications for this visit.       Objective:      Physical Exam  HENT:      Head: Normocephalic.   Eyes:      Pupils: Pupils are equal, round, and reactive to light.   Neck:      Thyroid: No thyromegaly.   Cardiovascular:      Rate and Rhythm: Normal rate and regular rhythm.      Heart sounds: Normal heart sounds.   Pulmonary:      Effort: Pulmonary effort is normal.      Breath sounds: Normal breath sounds. No wheezing.   Abdominal:      General: There is no distension.      Palpations: Abdomen is soft. There is no mass.      Tenderness: There is no abdominal tenderness.   Lymphadenopathy:      Cervical: No cervical adenopathy.   Skin:     General: Skin is warm.      Findings: No erythema or rash.   Neurological:      Mental Status: He is alert and oriented to person, place, and time.   Psychiatric:         Behavior: Behavior normal.           Assessment:       1. Kidney replaced by transplant    2. Hepatitis C virus infection cured after antiviral drug therapy    3. Prophylactic immunotherapy        Plan:   He had a successful kidney Tx in May 2021 and his post transplant HCV infection has now been successfully treated.  His polycystic liver disease appears stable.    - reassured  - clinic in 1 year with liver US

## 2022-03-07 NOTE — PROGRESS NOTES
Kidney Post-Transplant Assessment    Referring Physician: Alexis Zamoarno  Current Nephrologist: Alexis Zamorano    ORGAN: RIGHT KIDNEY  Donor Type: donation after brain death  PHS Increased Risk: yes  Cold Ischemia: 486 mins  Induction Medications: simulect - basiliximab    Subjective:     CC:  Reassessment of renal allograft function and management of chronic immunosuppression.    HPI:  Mr. Mazariegos is a 71 y.o. 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  He has CKD stage 2 - GFR 60-89 and his  creatinine is  1.1 He takes prednisone and tacrolimus for maintenance immunosuppression.  Recent hospitalizations or ER visits since his previous clinic visit.     Interval HX:  2/25/22: hospital d/c; 2/17/22 - craniotomy for frontal brain mass; + nocardia nova, was seen by ID today   currently being treated for Nocardia - Bactrim DS 2 tabs q8 + meropenem 1g q8 - being followed by ID and neurosurgery    Is doing Physical therapy ~2x/week . Is also doing exercising  on hisown. Feels LE weakness has improved.    Over all looking good.   MMF is on hold    He f/u with hepatology for polycystic liver dz, has apt today     Polycythemia, last therapeutic phlebotomy 2/23/22    Intake Is drinking water   UOP-no problems   BP  BP Readings from Last 3 Encounters:   03/07/22 (!) 141/85   03/07/22 120/75   03/07/22 115/65     Peripheral edema- mild  Appetite-good     Lab /diagnostic results reviewed with patient today.   All questions answered    Past Medical History:   Diagnosis Date    Anasarca 10/1/2021    Anemia of chronic disease     Atrial fibrillation     BPH (benign prostatic hypertrophy)     Chronic systolic heart failure 10/1/2021    CKD (chronic kidney disease) stage 2, GFR 60-89 ml/min     Hepatitis C virus infection cured after antiviral drug therapy     acquired through kidney  "transplant, treated / cured (SVR12 - 12/2021)    HTN (hypertension)     HTN (hypertension)     Polycystic kidney disease        Review of Systems   Constitutional: Negative for activity change, appetite change, chills, fatigue, fever and unexpected weight change.   HENT: Negative for congestion, facial swelling, postnasal drip, rhinorrhea, sinus pressure, sore throat and trouble swallowing.    Eyes: Negative for pain, redness and visual disturbance.   Respiratory: Negative for cough, chest tightness, shortness of breath and wheezing.    Cardiovascular: Positive for palpitations. Negative for chest pain and leg swelling.        Hx Afib   Gastrointestinal: Negative for abdominal pain, diarrhea, nausea and vomiting.   Genitourinary: Negative for dysuria, flank pain and urgency.   Musculoskeletal: Negative for gait problem, neck pain and neck stiffness.   Skin: Positive for wound. Negative for rash.        Staples right head intact    Allergic/Immunologic: Positive for immunocompromised state. Negative for environmental allergies and food allergies.   Neurological: Negative for dizziness, weakness, light-headedness and headaches.   Hematological: Bruises/bleeds easily.        Eliquis--restarted on 3/3/2022   Psychiatric/Behavioral: Negative for agitation and confusion. The patient is not nervous/anxious.        Objective:     Blood pressure (!) 141/85, pulse 109, temperature 97.3 °F (36.3 °C), temperature source Temporal, resp. rate 16, height 6' 2" (1.88 m), weight 80.7 kg (177 lb 14.6 oz), SpO2 97 %.body mass index is 22.84 kg/m².    Physical Exam  Vitals reviewed.   Constitutional:       Appearance: Normal appearance. He is well-developed.   HENT:      Head: Normocephalic.        Comments: Staples intact, incision well approximated.  No erythema , edema or drainage from incision site.   Eyes:      Conjunctiva/sclera: Conjunctivae normal.      Pupils: Pupils are equal, round, and reactive to light. "   Cardiovascular:      Rate and Rhythm: Normal rate and regular rhythm.      Heart sounds: Normal heart sounds.   Pulmonary:      Effort: Pulmonary effort is normal.      Breath sounds: Normal breath sounds.   Chest:       Abdominal:      General: Bowel sounds are normal.      Palpations: Abdomen is soft.       Musculoskeletal:         General: Normal range of motion.        Arms:       Cervical back: Normal range of motion and neck supple.   Skin:     General: Skin is warm and dry.   Neurological:      Mental Status: He is alert and oriented to person, place, and time.      Motor: No abnormal muscle tone.   Psychiatric:         Behavior: Behavior normal.         Labs:  Lab Results   Component Value Date    WBC 6.76 02/25/2022    HGB 15.0 02/25/2022    HCT 46.2 02/25/2022     (L) 02/25/2022    K 4.6 02/25/2022     02/25/2022    CO2 19 (L) 02/25/2022    BUN 17 02/25/2022    CREATININE 1.1 02/25/2022    EGFRNONAA >60.0 02/25/2022    CALCIUM 8.9 02/25/2022    PHOS 2.1 (L) 02/25/2022    MG 1.7 02/25/2022    ALBUMIN 2.6 (L) 02/25/2022    AST 19 02/25/2022    ALT 19 02/25/2022    UTPCR 0.17 08/25/2021    .0 (H) 05/04/2021    TACROLIMUS 7.5 02/25/2022       Lab Results   Component Value Date    EXTANC  02/07/2022      Comment:      KIDNEY; 9 month protocol labs to include CMV, PCR; RTC appt 2/17/22    EXTWBC 5.0 02/07/2022    EXTSEGS 73 02/07/2022    EXTPLATELETS 184 02/07/2022    EXTHEMOGLOBI 17 02/07/2022    EXTHEMATOCRI 52.2 (A) 02/07/2022    EXTCREATININ 0.94 02/07/2022    EXTSODIUM 141 02/07/2022    EXTPOTASSIUM 4.1 02/07/2022    EXTBUN 13.6 02/07/2022    EXTCO2 27 02/07/2022    EXTCALCIUM 9.9 02/07/2022    EXTPHOSPHORU 2.2 (A) 02/07/2022    EXTGLUCOSE 111 02/07/2022    EXTALBUMIN 3.2 (A) 02/07/2022    EXTAST 21 02/07/2022    EXTALT 22 02/07/2022    EXTBILITOTAL 2.0 02/07/2022       Lab Results   Component Value Date    EXTTACROLVL 6.0 02/07/2022    EXTPROTCRE 0.37 11/01/2021    EXTPTHINTACT 244.9  (H) 08/02/2021    EXTPROTEINUA NEG 02/07/2022    EXTWBCUA NONE SEEN 02/07/2022    EXTRBCUA NONE SEEN 02/07/2022       Labs were reviewed with the patient.    Assessment:     1. Kidney replaced by transplant    2. Long-term use of immunosuppressant medication    3. Prophylactic immunotherapy    4. Anemia of chronic disease    5. At risk for opportunistic infections    6. Renovascular hypertension    7. Polycythemia vera    8. Long term (current) use of anticoagulants--Eliquis    9. Atrial fibrillation, unspecified type        Plan:       currently being treated for Nocardia - Bactrim DS 2 tabs q8 + meropenem 1g q8 -   Mgmt deferred to ID    Follow-up:   1. CKD stage: 2   2. Immunosuppression:   Prograf trough 7.5 , which is  Sub therapeutic, target 4-7.  Prograf 2/2, and Prednisone 5 mg QD, Will continue to monitor for drug toxicities    3. Allograft Function: Stable. Continue good po hydration.   Lab Results   Component Value Date    CREATININE 1.1 02/25/2022 2/25/2022  9mo 3wk   eGFR if non African American >60 mL/min/1.73 m^2 >60.0  Calculation used to obtain the estimated glomerular filtration  rate (eGFR) is the CKD-EPI equation.             HCVAB+/NAWAF+ donor -s/p  Epclusa tx and HX polycystic liver dz--mgmt deferred to hepatology    4. Hypertension management: advise low salt diet and home BP monitoring    Afib--Eliquis as prescribed, mgmt deferred to cardiology  Lopressor, amlodipine    5. Metabolic Bone Disease/Secondary Hyperparathyroidism:stable  Will monitor PTH, CA and Vit D/guidelines,   Lab Results   Component Value Date    .0 (H) 05/04/2021    CALCIUM 8.9 02/25/2022    PHOS 2.1 (L) 02/25/2022 2/25/2022  9mo 3wk   Magnesium 1.6 - 2.6 mg/dL 1.7         6. Electrolytes:  Will monitor /guidelines   Lab Results   Component Value Date     (L) 02/25/2022    K 4.6 02/25/2022     02/25/2022    CO2 19 (L) 02/25/2022   Hyperkalemia-- veltassa as prescribed      7. Anemia: stable.  HX  Polycythemia, last therapeutic phlebotomy 2/23/22   No need for intervention,  Will monitor /guidelines    Lab Results   Component Value Date    WBC 6.76 02/25/2022    HGB 15.0 02/25/2022    HCT 46.2 02/25/2022    MCV 95 02/25/2022     02/25/2022       8.  Cytopenias: no significant cytopenias will monitor as per our guidelines. Medicine list reviewed including potential causes of drug-induced cytopenias    9.Proteinuria: continue p/c ratio as per guidelines          2/7/2022  9mo 0wk   EXT Protein UA NEG       10. CMV and BK virus infection screening:  will continue to monitor/ guidelines        2/19/2022  9mo 2wk   Cytomegalovirus PCR, Quant <50 IU/mL Not Detected       2/7/2022  9mo 0wk   EXT BK Virus DNA QN PCR UNDETECTED  RESULTED       11. Weight education: provided during the clinic visit   Body mass index is 22.84 kg/m².          12.Patient safety education regarding immunosuppression including prophylaxis posttransplant for CMV, PCP : Education provided about vaccination and prevention of infections         Follow-up:   Clinic: return to transplant clinic weekly for the first month after transplant; every 2 weeks during months 2-3; then at 6-, 9-, 12-, 18-, 24-, and 36- months post-transplant to reassess for complications from immunosuppression toxicity and monitor for rejection.  Annually thereafter.    Labs: since patient remains at high risk for rejection and drug-related complications that warrant close monitoring, labs will be ordered as follows: continue twice weekly CBC, renal panel, and drug level for first month; then same labs once weekly through 3rd month post-transplant.  Urine for UA and protein/creatinine ratio monthly.  Serum BK - PCR at 1-, 3-, 6-, 9-, 12-, 18-, 24-, 36- 48-, and 60 months post-transplant.  Hepatic panel at 1-, 2-, 3-, 6-, 9-, 12-, 18-, 24-, and 36- months post-transplant.    Jessy England NP       Education:   Material provided to the patient.  Patient reminded to  call with any health changes since these can be early signs of significant complications.  Also, I advised the patient to be sure any new medications or changes of old medications are discussed with either a pharmacist or physician knowledgeable with transplant to avoid rejection/drug toxicity related to significant drug interactions.    UNOS Patient Status  Functional Status: 80% - Normal activity with effort: some symptoms of disease  Physical Capacity: No Limitations

## 2022-03-07 NOTE — LETTER
March 7, 2022        Alexis Zamorano  [address] [place] [place] [address]  Phone: [phone]  Fax: [phone]             [facility]- Transplant 1st Fl  [address]  [facility] [address]  Phone: [phone]   Patient: Ronal Mazariegos   MR Number: [id number]   YOB: 1950   Date of Visit: 3/7/2022       Dear Dr. Alexis Zamorano    Thank you for referring Ronal Mazariegos to me for evaluation. Attached you will find relevant portions of my assessment and plan of care.    If you have questions, please do not hesitate to call me. I look forward to following Ronal Mazariegos along with you.    Sincerely,    Jessy England, NP    Enclosure    If you would like to receive this communication electronically, please contact [email or web address] or [phone] to request [employer] access.    [employer] is a tool which provides read-only access to select patient information with whom you have a relationship. Its easy to use and provides real time access to review your patients record including encounter summaries, notes, results, and demographic information.    If you feel you have received this communication in error or would no longer like to receive these types of communications, please e-mail [email or web address]

## 2022-03-08 ENCOUNTER — TELEPHONE (OUTPATIENT)
Dept: TRANSPLANT | Facility: CLINIC | Age: 72
End: 2022-03-08
Payer: MEDICARE

## 2022-03-08 ENCOUNTER — DOCUMENTATION ONLY (OUTPATIENT)
Dept: TRANSPLANT | Facility: CLINIC | Age: 72
End: 2022-03-08
Payer: MEDICARE

## 2022-03-08 ENCOUNTER — EXTERNAL HOME HEALTH (OUTPATIENT)
Dept: HOME HEALTH SERVICES | Facility: HOSPITAL | Age: 72
End: 2022-03-08
Payer: MEDICARE

## 2022-03-08 ENCOUNTER — HISTORICAL (OUTPATIENT)
Dept: ADMINISTRATIVE | Facility: HOSPITAL | Age: 72
End: 2022-03-08

## 2022-03-08 LAB
ABS NEUT (OLG): 2.06 (ref 2.1–9.2)
ALBUMIN SERPL-MCNC: 2.9 G/DL (ref 3.4–4.8)
ALBUMIN/GLOB SERPL: 1.1 {RATIO} (ref 1.1–2)
ALP SERPL-CCNC: 109 U/L (ref 40–150)
ALT SERPL-CCNC: 48 U/L (ref 0–55)
AST SERPL-CCNC: 41 U/L (ref 5–34)
BASOPHILS # BLD AUTO: 0 10*3/UL (ref 0–0.2)
BASOPHILS NFR BLD AUTO: 1 %
BILIRUB SERPL-MCNC: 0.7 MG/DL
BILIRUBIN DIRECT+TOT PNL SERPL-MCNC: 0.3 (ref 0–0.8)
BILIRUBIN DIRECT+TOT PNL SERPL-MCNC: 0.4 (ref 0–0.5)
BUN SERPL-MCNC: 22.7 MG/DL (ref 8.4–25.7)
CALCIUM SERPL-MCNC: 8.9 MG/DL (ref 8.7–10.5)
CHLORIDE SERPL-SCNC: 113 MMOL/L (ref 98–107)
CO2 SERPL-SCNC: 18 MMOL/L (ref 23–31)
CREAT SERPL-MCNC: 1.15 MG/DL (ref 0.73–1.18)
CRP SERPL-MCNC: 0.15 MG/L
EOSINOPHIL # BLD AUTO: 0.2 10*3/UL (ref 0–0.9)
EOSINOPHIL NFR BLD AUTO: 5 %
ERYTHROCYTE [DISTWIDTH] IN BLOOD BY AUTOMATED COUNT: 15.4 % (ref 11.5–17)
ERYTHROCYTE [SEDIMENTATION RATE] IN BLOOD: 4 MM/H (ref 0–15)
EXT ALBUMIN: 3.2 (ref 3.4–4.8)
EXT ANC: ABNORMAL
EXT BUN: 21.6 (ref 8.4–25.7)
EXT CALCIUM: 9.2 (ref 8.7–10.5)
EXT CHLORIDE: 108 (ref 98–107)
EXT CO2: 22 (ref 23–31)
EXT CREATININE: 1.43 MG/DL
EXT EOSINOPHIL%: 4
EXT GFR MDRD NON AF AMER: 52
EXT GLUCOSE: 91 (ref 82–115)
EXT HEMATOCRIT: 48.1 (ref 42–52)
EXT HEMOGLOBIN: 15.5 (ref 14–18)
EXT LYMPH%: 28
EXT MONOCYTES%: 15
EXT PHOSPHORUS: 2.8 (ref 2.3–4.7)
EXT PLATELETS: 176 (ref 130–400)
EXT POTASSIUM: 5.1 (ref 3.5–5.1)
EXT SEGS%: 47
EXT SODIUM: 139 MMOL/L (ref 136–145)
EXT TACROLIMUS LVL: ABNORMAL
EXT WBC: 4.5 (ref 4.5–11.5)
GLOBULIN SER-MCNC: 2.6 G/DL (ref 2.4–3.5)
GLUCOSE SERPL-MCNC: 124 MG/DL (ref 82–115)
HCT VFR BLD AUTO: 45.2 % (ref 42–52)
HEMOLYSIS INTERF INDEX SERPL-ACNC: 17
HGB BLD-MCNC: 14.4 G/DL (ref 14–18)
ICTERIC INTERF INDEX SERPL-ACNC: 1
LIPEMIC INTERF INDEX SERPL-ACNC: 7
LYMPHOCYTES # BLD AUTO: 2 10*3/UL (ref 0.6–4.6)
LYMPHOCYTES NFR BLD AUTO: 39 %
MANUAL DIFF? (OHS): NO
MCH RBC QN AUTO: 29 PG (ref 27–31)
MCHC RBC AUTO-ENTMCNC: 31.9 G/DL (ref 33–36)
MCV RBC AUTO: 90.9 FL (ref 80–94)
MONOCYTES # BLD AUTO: 0.7 10*3/UL (ref 0.1–1.3)
MONOCYTES NFR BLD AUTO: 14 %
NEUTROPHILS # BLD AUTO: 2.06 10*3/UL (ref 2.1–9.2)
NEUTROPHILS NFR BLD AUTO: 40 %
PLATELET # BLD AUTO: 186 10*3/UL (ref 130–400)
PMV BLD AUTO: 10.3 FL (ref 9.4–12.4)
POTASSIUM SERPL-SCNC: 4.8 MMOL/L (ref 3.5–5.1)
PROT SERPL-MCNC: 5.5 G/DL (ref 5.8–7.6)
RBC # BLD AUTO: 4.97 10*6/UL (ref 4.7–6.1)
SODIUM SERPL-SCNC: 140 MMOL/L (ref 136–145)
WBC # SPEC AUTO: 5.1 10*3/UL (ref 4.5–11.5)

## 2022-03-10 ENCOUNTER — OFFICE VISIT (OUTPATIENT)
Dept: CARDIOLOGY | Facility: CLINIC | Age: 72
End: 2022-03-10
Payer: MEDICARE

## 2022-03-10 ENCOUNTER — OFFICE VISIT (OUTPATIENT)
Dept: NEUROSURGERY | Facility: CLINIC | Age: 72
End: 2022-03-10
Payer: MEDICARE

## 2022-03-10 ENCOUNTER — HOSPITAL ENCOUNTER (OUTPATIENT)
Dept: RADIOLOGY | Facility: HOSPITAL | Age: 72
Discharge: HOME | End: 2022-03-10
Payer: MEDICARE

## 2022-03-10 VITALS — BODY MASS INDEX: 22.48 KG/M2 | WEIGHT: 175.13 LBS | HEIGHT: 74 IN

## 2022-03-10 VITALS
HEIGHT: 74 IN | HEART RATE: 59 BPM | WEIGHT: 175.69 LBS | BODY MASS INDEX: 22.55 KG/M2 | DIASTOLIC BLOOD PRESSURE: 71 MMHG | SYSTOLIC BLOOD PRESSURE: 119 MMHG

## 2022-03-10 DIAGNOSIS — I42.8 OTHER CARDIOMYOPATHY: ICD-10-CM

## 2022-03-10 DIAGNOSIS — G06.0 BRAIN ABSCESS: ICD-10-CM

## 2022-03-10 DIAGNOSIS — I42.8 INFILTRATIVE CARDIOMYOPATHY: Primary | ICD-10-CM

## 2022-03-10 DIAGNOSIS — G06.0 INTRACRANIAL ABSCESS: Primary | ICD-10-CM

## 2022-03-10 PROCEDURE — 99024 POSTOP FOLLOW-UP VISIT: CPT | Mod: S$PBB,,,

## 2022-03-10 PROCEDURE — 70450 CT HEAD/BRAIN W/O DYE: CPT | Mod: 26,,, | Performed by: RADIOLOGY

## 2022-03-10 PROCEDURE — 70450 CT HEAD WITHOUT CONTRAST: ICD-10-PCS | Mod: 26,,, | Performed by: RADIOLOGY

## 2022-03-10 PROCEDURE — 99213 OFFICE O/P EST LOW 20 MIN: CPT | Mod: S$PBB,GC,, | Performed by: INTERNAL MEDICINE

## 2022-03-10 PROCEDURE — 99215 OFFICE O/P EST HI 40 MIN: CPT | Mod: PBBFAC,25 | Performed by: INTERNAL MEDICINE

## 2022-03-10 PROCEDURE — 99213 OFFICE O/P EST LOW 20 MIN: CPT | Mod: PBBFAC,25,27

## 2022-03-10 PROCEDURE — 99999 PR PBB SHADOW E&M-EST. PATIENT-LVL III: ICD-10-PCS | Mod: PBBFAC,,,

## 2022-03-10 PROCEDURE — 99999 PR PBB SHADOW E&M-EST. PATIENT-LVL V: ICD-10-PCS | Mod: PBBFAC,GC,, | Performed by: INTERNAL MEDICINE

## 2022-03-10 PROCEDURE — 70450 CT HEAD/BRAIN W/O DYE: CPT | Mod: TC

## 2022-03-10 PROCEDURE — 99999 PR PBB SHADOW E&M-EST. PATIENT-LVL III: CPT | Mod: PBBFAC,,,

## 2022-03-10 PROCEDURE — 99024 PR POST-OP FOLLOW-UP VISIT: ICD-10-PCS | Mod: S$PBB,,,

## 2022-03-10 PROCEDURE — 99999 PR PBB SHADOW E&M-EST. PATIENT-LVL V: CPT | Mod: PBBFAC,GC,, | Performed by: INTERNAL MEDICINE

## 2022-03-10 PROCEDURE — 99213 PR OFFICE/OUTPT VISIT, EST, LEVL III, 20-29 MIN: ICD-10-PCS | Mod: S$PBB,GC,, | Performed by: INTERNAL MEDICINE

## 2022-03-10 NOTE — PROGRESS NOTES
Cardiology Clinic Note  Reason for Visit: Establish Care and Follow-up    HPI:     72 y/o M with permanent atrial fibrillation, kidney transplant on 5/2021 (2/2 PCKD). Presents for hospital discharge follow up. Admitted last month after a fall left sided numbness. Found to have right sided frontal abscess and underwent emergent surgery 2/17/22. He had issues with afib RVR post op. Given his findings on echo and CMR of infiltrative CMP, he was referred to cardiology. Today he reports feeling well, recovering just fine from surgery. Able to walk without SOB, chest pressure. He is using a walker but mainly for balance. No other issues.     ROS:    Review of Systems   Constitutional: Negative for chills and fever.   Cardiovascular: Negative for chest pain, dyspnea on exertion, palpitations and syncope.   Respiratory: Negative for cough and sleep disturbances due to breathing.    Musculoskeletal: Negative for back pain.   Gastrointestinal: Negative for abdominal pain, nausea and vomiting.   Neurological: Negative for dizziness and focal weakness.   Psychiatric/Behavioral: Negative for altered mental status.     PMH:     Past Medical History:   Diagnosis Date    Anasarca 10/1/2021    Anemia of chronic disease     Atrial fibrillation     BPH (benign prostatic hypertrophy)     Chronic systolic heart failure 10/1/2021    CKD (chronic kidney disease) stage 2, GFR 60-89 ml/min     Hepatitis C virus infection cured after antiviral drug therapy     acquired through kidney transplant, treated / cured (SVR12 - 12/2021)    HTN (hypertension)     HTN (hypertension)     Polycystic kidney disease      Past Surgical History:   Procedure Laterality Date    AV FISTULA PLACEMENT Left 2016    CATARACT EXTRACTION, BILATERAL Bilateral     CRANIOTOMY Right 2/16/2022    Procedure: CRANIOTOMY;  Surgeon: Sunday Rosa DO;  Location: [facility] OR [address] [place] [address];  Service: Neurosurgery;  Laterality: Right;  R craniotomy for abscess  drainage    KIDNEY TRANSPLANT N/A 5/4/2021    Procedure: TRANSPLANT, KIDNEY;  Surgeon: Lexy Bolden MD;  Location: [facility] OR [address] [place] [address];  Service: Transplant;  Laterality: N/A;     Allergies:   Review of patient's allergies indicates:  No Known Allergies  Medications:     Current Outpatient Medications on File Prior to Visit   Medication Sig Dispense Refill    apixaban (ELIQUIS) 5 mg Tab Take 1 tablet (5 mg total) by mouth 2 (two) times daily. DO NOT RESTART UNTIL APPROVED BY NEUROSURGERY 60 tablet 11    finasteride (PROSCAR) 5 mg tablet Take 5 mg by mouth once daily.      fish oil-omega-3 fatty acids 300-1,000 mg capsule Take 1 capsule by mouth once daily.       k phos di & mono-sod phos mono (K-PHOS-NEUTRAL) 250 mg Tab Take 2 tablets by mouth 2 (two) times a day. 120 tablet 11    magnesium oxide (MAG-OX) 400 mg (241.3 mg magnesium) tablet Take 3 tablets (1,200 mg total) by mouth 2 (two) times daily. 120 tablet 11    metoprolol tartrate (LOPRESSOR) 50 MG tablet Take 1 tablet (50 mg total) by mouth 2 (two) times daily. 60 tablet 11    mirtazapine (REMERON) 15 MG tablet Take 1 tablet (15 mg total) by mouth nightly. 30 tablet 11    multivitamin Tab Take 1 tablet by mouth once daily.      predniSONE (DELTASONE) 5 MG tablet Take by mouth daily. 5/7-6/6 20 mg, 6/7-7/6 15 mg, 7/7-8/6 10 mg, 5 mg daily thereafter starting 8/7/21 120 tablet 11    sodium bicarbonate 650 MG tablet Take 2 tablets (1,300 mg total) by mouth 2 (two) times daily. 120 tablet 11    sodium chloride 0.9% SolP 100 mL with meropenem 1 gram SolR 2 g Inject 2 g into the vein every 8 (eight) hours.      sodium polystyrene (KAYEXALATE) powder Mix 8 LEVEL TEASPOONS (30grams total) in 120mL water and drink by mouth FOR 2 DAYS (3/4/22 & 3/5/22),  then TAKE AS DIRECTED BY TRANSPLANT (for high potassium) 454 g 1    sulfamethoxazole-trimethoprim 800-160mg (BACTRIM DS) 800-160 mg Tab Take 2 tablets by mouth 3 (three) times daily. 180 tablet 11     "tacrolimus (PROGRAF) 1 MG Cap Take 2 capsules (2 mg total) by mouth every 12 (twelve) hours. Z94.0;Kidney txp on 21 120 capsule 11    [DISCONTINUED] famotidine (PEPCID) 20 MG tablet Take 1 tablet (20 mg total) by mouth every evening. 30 tablet 1    [DISCONTINUED] lisinopriL (PRINIVIL,ZESTRIL) 2.5 MG tablet Take 1 tablet (2.5 mg total) by mouth once daily. 30 tablet 11    [DISCONTINUED] valGANciclovir (VALCYTE) 450 mg Tab Take 2 tablets (900 mg total) by mouth 2 (two) times daily. 120 tablet 2     No current facility-administered medications on file prior to visit.     Social History:     Social History     Tobacco Use    Smoking status: Former Smoker     Packs/day: 2.00     Years: 10.00     Pack years: 20.00     Types: Cigarettes     Start date: 1972     Quit date: 1984     Years since quittin.2    Smokeless tobacco: Never Used   Substance Use Topics    Alcohol use: No     Comment: Pt reportsbeing a former beer drinker (2-3 cans per day) and quitting in .     Family History:     Family History   Problem Relation Age of Onset    Polycystic kidney disease Father     Hypertension Father     Kidney disease Father     Polycystic kidney disease Sister     Hypertension Sister     Kidney disease Sister      Physical Exam:   /71 (BP Location: Left arm, Patient Position: Sitting, BP Method: Medium (Automatic))   Pulse (!) 59   Ht 6' 2" (1.88 m)   Wt 79.7 kg (175 lb 11.3 oz)   BMI 22.56 kg/m²      Physical Exam  Constitutional:       General: He is not in acute distress.     Appearance: He is well-developed. He is not diaphoretic.   HENT:      Head: Normocephalic and atraumatic.      Comments: Scar with sandee in his forehead  Eyes:      Conjunctiva/sclera: Conjunctivae normal.   Neck:      Trachea: No tracheal deviation.   Cardiovascular:      Rate and Rhythm: Normal rate and regular rhythm.      Heart sounds: Normal heart sounds. No murmur heard.  Pulmonary:      Effort: " Pulmonary effort is normal. No respiratory distress.      Breath sounds: Normal breath sounds. No wheezing.   Abdominal:      General: Bowel sounds are normal. There is no distension.      Palpations: Abdomen is soft.      Tenderness: There is no abdominal tenderness.   Musculoskeletal:         General: Normal range of motion.      Cervical back: Normal range of motion.   Neurological:      Mental Status: He is alert and oriented to person, place, and time.         Labs:     Lab Results   Component Value Date     (L) 02/25/2022    K 4.6 02/25/2022     02/25/2022    CO2 19 (L) 02/25/2022    BUN 17 02/25/2022    CREATININE 1.1 02/25/2022    ANIONGAP 7 (L) 02/25/2022     Lab Results   Component Value Date    HGBA1C 5.9 (H) 02/17/2022     No results found for: BNP, BNPTRIAGEBLO Lab Results   Component Value Date    WBC 6.76 02/25/2022    HGB 15.0 02/25/2022    HCT 46.2 02/25/2022    HCT 44 02/17/2022     02/25/2022    GRAN 81.0 (H) 02/25/2022    GRAN 1.9 02/24/2022     Lab Results   Component Value Date    CHOL 107 (L) 05/04/2021    HDL 48 05/04/2021    LDLCALC 50.0 (L) 05/04/2021    TRIG 45 05/04/2021          Imaging:     TTE 2/19/22  · There is no gross evidence of vegetatation. Abnormal thickening of the aortic root seen in SAX that is unchanged from the prior study. Consider VERITO if clinically indicated.  · The estimated ejection fraction is 50%.  · The left ventricle is normal in size with low normal systolic function.  · Normal left ventricular diastolic function.  · Normal right ventricular size with normal right ventricular systolic function.  · Severe left atrial enlargement.  · Moderate right atrial enlargement.  · The estimated PA systolic pressure is 29 mmHg.  · Normal central venous pressure (3 mmHg).  · Trivial circumferential pericardial effusion that is small anteriroly.    TTE 10/2/21  · The left ventricle is normal in size with concentric hypertrophy and mildly decreased systolic  function. The estimated ejection fraction is 45%.  · The left ventricular global longitudinal strain is -8.9% with apical sparing consistent with an infiltrative (e.g., amyloid) cardiomyopathy.  · Normal right ventricular size with mildly reduced right ventricular systolic function.  · Severe biatrial enlargement.  · Mild mitral regurgitation.  · Atrial fibrillation observed.  · Small circumferential pericardial effusion.  · Pulmonary hypertension; the estimated PA systolic pressure is 52 mmHg.  · Intermediate central venous pressure (8 mmHg).    Cardiac MRI 2/9/22  Findings are consistent with infiltrative cardiomyopathy.  Amyloid would be in the differential.   Moderately reduced left ventricular function, LVEF = 41 %.   Moderately reduced right ventricular function, RVEF = 32%.   No myocardial iron overload.   Small pericardial effusion.         Assessment:     1. Infiltrative cardiomyopathy    2. Other cardiomyopathy        Plan:   Infiltrative cardiomyopathy  - He needs evaluation for amyloidosis. He had normal light chain ratio. Although unlikely AL amyloid given that he has not developed any other issues for so long (TTE in 10/2021 showed apical sparing pattern), will complete work up with serum and urine immunofixation.   - Check PYP scan as well    Discussed with Dr. De Guzman.     Salvatore Benton  Cardiology Fellow, PGY6

## 2022-03-10 NOTE — PROGRESS NOTES
History of Present Illness:  Ronal Mazariegos is a 71 y.o. male with PMHx of Afib, recent kidney transplant on 5/2021 on immunosuppressants, cardiomyopathy, pulmonary hypertension who presented to [facility] ED with left sided weakness. MRI demonstrated a right frontal 2.2cm ring enhancing lesion, intracranial abscess. He is s/p right frontal craniotomy for abscess drainage on 2/17/22. Cultures positive for Nocardia novis. On IV Imipenem for 6-12mos. Patient following up with ID. Repeat CT head scans during hospital stay were stable. Aspirin restarted. Eliquis held until repeat CT head today. He also presents for wound check and staple removal. Patient states he feels as if his strength on his left side is back to baseline since discharge. He has been working well with PT/OT. He denies new focal weakness, numbness, headaches, vision changes, seizure like activity, lethargy, confusion, nausea and vomiting. His wife is with him today and endorses this as well. CT head obtained after appointment today.      Review of patient's allergies indicates:  No Known Allergies    Current Outpatient Medications:     apixaban (ELIQUIS) 5 mg Tab, Take 1 tablet (5 mg total) by mouth 2 (two) times daily. DO NOT RESTART UNTIL APPROVED BY NEUROSURGERY, Disp: 60 tablet, Rfl: 11    finasteride (PROSCAR) 5 mg tablet, Take 5 mg by mouth once daily., Disp: , Rfl:     fish oil-omega-3 fatty acids 300-1,000 mg capsule, Take 1 capsule by mouth once daily. , Disp: , Rfl:     k phos di & mono-sod phos mono (K-PHOS-NEUTRAL) 250 mg Tab, Take 2 tablets by mouth 2 (two) times a day., Disp: 120 tablet, Rfl: 11    magnesium oxide (MAG-OX) 400 mg (241.3 mg magnesium) tablet, Take 3 tablets (1,200 mg total) by mouth 2 (two) times daily., Disp: 120 tablet, Rfl: 11    metoprolol tartrate (LOPRESSOR) 50 MG tablet, Take 1 tablet (50 mg total) by mouth 2 (two) times daily., Disp: 60 tablet, Rfl: 11    mirtazapine (REMERON) 15 MG tablet, Take 1 tablet (15 mg  total) by mouth nightly., Disp: 30 tablet, Rfl: 11    multivitamin Tab, Take 1 tablet by mouth once daily., Disp: , Rfl:     predniSONE (DELTASONE) 5 MG tablet, Take by mouth daily. 5/7-6/6 20 mg, 6/7-7/6 15 mg, 7/7-8/6 10 mg, 5 mg daily thereafter starting 8/7/21, Disp: 120 tablet, Rfl: 11    sodium bicarbonate 650 MG tablet, Take 2 tablets (1,300 mg total) by mouth 2 (two) times daily., Disp: 120 tablet, Rfl: 11    sodium chloride 0.9% SolP 100 mL with meropenem 1 gram SolR 2 g, Inject 2 g into the vein every 8 (eight) hours., Disp: , Rfl:     sodium polystyrene (KAYEXALATE) powder, Mix 8 LEVEL TEASPOONS (30grams total) in 120mL water and drink by mouth FOR 2 DAYS (3/4/22 & 3/5/22),  then TAKE AS DIRECTED BY TRANSPLANT (for high potassium), Disp: 454 g, Rfl: 1    sulfamethoxazole-trimethoprim 800-160mg (BACTRIM DS) 800-160 mg Tab, Take 2 tablets by mouth 3 (three) times daily., Disp: 180 tablet, Rfl: 11    tacrolimus (PROGRAF) 1 MG Cap, Take 2 capsules (2 mg total) by mouth every 12 (twelve) hours. Z94.0;Kidney txp on 5/4/21, Disp: 120 capsule, Rfl: 11     Past Medical History:   Diagnosis Date    Anasarca 10/1/2021    Anemia of chronic disease     Atrial fibrillation     BPH (benign prostatic hypertrophy)     Chronic systolic heart failure 10/1/2021    CKD (chronic kidney disease) stage 2, GFR 60-89 ml/min     Hepatitis C virus infection cured after antiviral drug therapy     acquired through kidney transplant, treated / cured (SVR12 - 12/2021)    HTN (hypertension)     HTN (hypertension)     Polycystic kidney disease      Past Surgical History:   Procedure Laterality Date    AV FISTULA PLACEMENT Left 2016    CATARACT EXTRACTION, BILATERAL Bilateral     CRANIOTOMY Right 2/16/2022    Procedure: CRANIOTOMY;  Surgeon: Sunday Rosa DO;  Location: [facility] OR [address] [place] [address];  Service: Neurosurgery;  Laterality: Right;  R craniotomy for abscess drainage    KIDNEY TRANSPLANT N/A 5/4/2021    Procedure:  TRANSPLANT, KIDNEY;  Surgeon: Lexy Bolden MD;  Location: [facility] OR [address] [place] [address];  Service: Transplant;  Laterality: N/A;     Family History   Problem Relation Age of Onset    Polycystic kidney disease Father     Hypertension Father     Kidney disease Father     Polycystic kidney disease Sister     Hypertension Sister     Kidney disease Sister      Social History     Tobacco Use    Smoking status: Former Smoker     Packs/day: 2.00     Years: 10.00     Pack years: 20.00     Types: Cigarettes     Start date: 1972     Quit date: 1984     Years since quittin.2    Smokeless tobacco: Never Used   Substance Use Topics    Alcohol use: No     Comment: Pt reportsbeing a former beer drinker (2-3 cans per day) and quitting in .    Drug use: No       Review of Systems:  Constitutional: No fever or chills. No fatigue.  Eyes: No visual changes.  ENT: No nasal congestion or sore throat. No trouble swallowing.  Respiratory: No cough or shortness of breath.  Cardiovascular: No chest pain or palpitations.  Gastrointestinal: No nausea or vomiting, tolerating diet.  Genitourinary: No hematuria or dysuria.  Skin: No rash or pruritis.  Hematologic/Lymphatic: no easy bruising or lymphadenopathy  Musculoskeletal: No arthralgias, myalgias or weakness.  Neurological: No seizures or tremors. No focal weakness. No dizziness.  Behavioral/Psych: No auditory or visual hallucinations. No SI/HI.  Endocrine: No heat or cold intolerance.    OBJECTIVE:     Vital Signs (Most Recent):       Physical Exam:  General: Well developed, well nourished, no distress.  Head: Normocephalic, atraumatic.  Neurologic: Alert and oriented. Thought content appropriate  GCS: Motor: 6/Verbal: 5/Eyes: 4 GCS Total: 15  Mental Status: Awake, Alert, Oriented x 4.  Language: No aphasia.  Speech: No dysarthria.  Cranial nerves: Face symmetric, tongue midline, CN II-XII grossly intact.   Eyes: Pupils equal, round, reactive to light with accommodation,  EOMI. Unable to appreciate optic disc. Red reflex intact bilaterally.   Pulmonary: Normal respirations, not labored, no accessory muscles used.  Abdomen: Soft, non-distended, not tender to palpation.  Sensory: Intact to light touch throughout.  Motor Strength: Moves all extremities spontaneously with good tone.  Full strength upper and lower extremities. No abnormal movements seen.     Strength  Deltoids Triceps Biceps Wrist Extension Wrist Flexion Hand    Upper: R 5/5 5/5 5/5 5/5 5/5 5/5    L 5/5 5/5 5/5 5/5 5/5 5/5     Iliopsoas Quadriceps Knee  Flexion Tibialis  anterior Gastro- cnemius EHL   Lower: R 5/5 5/5 5/5 5/5 5/5 5/5    L 5/5 5/5 5/5 5/5 5/5 5/5     Pronator Drift: No drift noted.  Finger-to-nose: Intact bilaterally  Vascular: No LE edema or skin changes.  Skin: Warm, dry and intact, no rashes.    Incision before and after staple removal below. No signs of gross infection. Healing appropriately.          Laboratory:  I have reviewed all pertinent lab results within the past 24 hours.    Diagnostic Results:  CT Head 3/10/22:  Post surgical changes from right frontal craniotomy present. Vasogenic edema present on prior scans improved and decreased in size. Mild mass effect that was present on prior scans, no midline shift. No new abnormalities including hemorrhage or infarctions. Persistent subdural collection in right frontal lobe that has decreased in sized. No new collections.     ASSESSMENT/PLAN:     CT head imaging is stable from prior. Patient okay to resume Eliquis and will continue to follow up with cardiologist for management. Staples removed from incision and patient tolerated procedure well. Incision is healing appropriately. Wound care discussed with patient and he voiced understanding. No need to keep applying bacitracin. Keep incision open to air. Patient can shower, do not let force of water hit incision directly. Instructed to not scrub incision and that scabbing will fall off on own.  Patient has follow up with Dr. Rosa in April with an MRI w/wo contrast.     All symptoms and signs that require emergent or urgent treatment were reviewed. The plan was discussed with the patient. All of the the patient's questions and concerns were answered and he voiced understanding. Please feel free to call with any further questions.     Francheska Sow PA-C  Neurosurgery   [facility]

## 2022-03-11 ENCOUNTER — PATIENT MESSAGE (OUTPATIENT)
Dept: TRANSPLANT | Facility: CLINIC | Age: 72
End: 2022-03-11
Payer: MEDICARE

## 2022-03-15 ENCOUNTER — HISTORICAL (OUTPATIENT)
Dept: ADMINISTRATIVE | Facility: HOSPITAL | Age: 72
End: 2022-03-15

## 2022-03-15 LAB
ABS NEUT (OLG): 1.2 (ref 2.1–9.2)
ALBUMIN SERPL-MCNC: 3.1 G/DL (ref 3.4–4.8)
ALBUMIN/GLOB SERPL: 1.2 {RATIO} (ref 1.1–2)
ALP SERPL-CCNC: 118 U/L (ref 40–150)
ALT SERPL-CCNC: 50 U/L (ref 0–55)
AST SERPL-CCNC: 41 U/L (ref 5–34)
BASOPHILS # BLD AUTO: 0 10*3/UL (ref 0–0.2)
BASOPHILS NFR BLD AUTO: 0 %
BILIRUB SERPL-MCNC: 0.8 MG/DL
BILIRUBIN DIRECT+TOT PNL SERPL-MCNC: 0.4 (ref 0–0.5)
BILIRUBIN DIRECT+TOT PNL SERPL-MCNC: 0.4 (ref 0–0.8)
BUN SERPL-MCNC: 17.2 MG/DL (ref 8.4–25.7)
CALCIUM SERPL-MCNC: 8.9 MG/DL (ref 8.7–10.5)
CHLORIDE SERPL-SCNC: 111 MMOL/L (ref 98–107)
CO2 SERPL-SCNC: 19 MMOL/L (ref 23–31)
CREAT SERPL-MCNC: 1.41 MG/DL (ref 0.73–1.18)
CRP SERPL-MCNC: <0.1 MG/L
EOSINOPHIL # BLD AUTO: 0 10*3/UL (ref 0–0.9)
EOSINOPHIL NFR BLD AUTO: 1 %
ERYTHROCYTE [DISTWIDTH] IN BLOOD BY AUTOMATED COUNT: 16.3 % (ref 11.5–17)
ERYTHROCYTE [SEDIMENTATION RATE] IN BLOOD: 2 MM/H (ref 0–15)
GLOBULIN SER-MCNC: 2.5 G/DL (ref 2.4–3.5)
GLUCOSE SERPL-MCNC: 107 MG/DL (ref 82–115)
HCT VFR BLD AUTO: 47.5 % (ref 42–52)
HEMOLYSIS INTERF INDEX SERPL-ACNC: 7
HGB BLD-MCNC: 15.2 G/DL (ref 14–18)
ICTERIC INTERF INDEX SERPL-ACNC: 1
LIPEMIC INTERF INDEX SERPL-ACNC: 5
LYMPHOCYTES # BLD AUTO: 2 10*3/UL (ref 0.6–4.6)
LYMPHOCYTES NFR BLD AUTO: 52 %
MANUAL DIFF? (OHS): NO
MCH RBC QN AUTO: 28.6 PG (ref 27–31)
MCHC RBC AUTO-ENTMCNC: 32 G/DL (ref 33–36)
MCV RBC AUTO: 89.5 FL (ref 80–94)
MONOCYTES # BLD AUTO: 0.5 10*3/UL (ref 0.1–1.3)
MONOCYTES NFR BLD AUTO: 14 %
NEUTROPHILS # BLD AUTO: 1.2 10*3/UL (ref 2.1–9.2)
NEUTROPHILS NFR BLD AUTO: 30 %
PLATELET # BLD AUTO: 186 10*3/UL (ref 130–400)
PMV BLD AUTO: 10.2 FL (ref 9.4–12.4)
POS ERR2 (OHS): NORMAL
POTASSIUM SERPL-SCNC: 5.2 MMOL/L (ref 3.5–5.1)
PROT SERPL-MCNC: 5.6 G/DL (ref 5.8–7.6)
RBC # BLD AUTO: 5.31 10*6/UL (ref 4.7–6.1)
SODIUM SERPL-SCNC: 138 MMOL/L (ref 136–145)
WBC # SPEC AUTO: 4 10*3/UL (ref 4.5–11.5)

## 2022-03-17 ENCOUNTER — TELEPHONE (OUTPATIENT)
Dept: CARDIOLOGY | Facility: HOSPITAL | Age: 72
End: 2022-03-17
Payer: MEDICARE

## 2022-03-19 ENCOUNTER — PATIENT MESSAGE (OUTPATIENT)
Dept: TRANSPLANT | Facility: CLINIC | Age: 72
End: 2022-03-19
Payer: MEDICARE

## 2022-03-21 ENCOUNTER — HOSPITAL ENCOUNTER (OUTPATIENT)
Dept: CARDIOLOGY | Facility: HOSPITAL | Age: 72
Discharge: HOME | End: 2022-03-21
Attending: INTERNAL MEDICINE
Payer: MEDICARE

## 2022-03-21 ENCOUNTER — HISTORICAL (OUTPATIENT)
Dept: ADMINISTRATIVE | Facility: HOSPITAL | Age: 72
End: 2022-03-21

## 2022-03-21 DIAGNOSIS — I42.8 INFILTRATIVE CARDIOMYOPATHY: ICD-10-CM

## 2022-03-21 LAB
ABS NEUT (OLG): 1.87 (ref 2.1–9.2)
BASOPHILS # BLD AUTO: 0.1 10*3/UL (ref 0–0.2)
BASOPHILS NFR BLD AUTO: 1 %
EOSINOPHIL # BLD AUTO: 0 10*3/UL (ref 0–0.9)
EOSINOPHIL NFR BLD AUTO: 1 %
ERYTHROCYTE [DISTWIDTH] IN BLOOD BY AUTOMATED COUNT: 17.2 % (ref 11.5–17)
HCT VFR BLD AUTO: 47.5 % (ref 42–52)
HGB BLD-MCNC: 15.1 G/DL (ref 14–18)
LYMPHOCYTES # BLD AUTO: 2.2 10*3/UL (ref 0.6–4.6)
LYMPHOCYTES NFR BLD AUTO: 44 %
MANUAL DIFF? (OHS): NO
MCH RBC QN AUTO: 27.8 PG (ref 27–31)
MCHC RBC AUTO-ENTMCNC: 31.8 G/DL (ref 33–36)
MCV RBC AUTO: 87.3 FL (ref 80–94)
MONOCYTES # BLD AUTO: 0.7 10*3/UL (ref 0.1–1.3)
MONOCYTES NFR BLD AUTO: 14 %
NEUTROPHILS # BLD AUTO: 1.87 10*3/UL (ref 2.1–9.2)
NEUTROPHILS NFR BLD AUTO: 38 %
PLATELET # BLD AUTO: 193 10*3/UL (ref 130–400)
PMV BLD AUTO: 9 FL (ref 9.4–12.4)
RBC # BLD AUTO: 5.44 10*6/UL (ref 4.7–6.1)
WBC # SPEC AUTO: 5 10*3/UL (ref 4.5–11.5)

## 2022-03-21 PROCEDURE — 78803 RP LOCLZJ TUM SPECT 1 AREA: CPT

## 2022-03-21 PROCEDURE — 78803 CV PYP ATTR W SPECT (CUPID ONLY): ICD-10-PCS | Mod: 26,,, | Performed by: INTERNAL MEDICINE

## 2022-03-21 PROCEDURE — 78803 RP LOCLZJ TUM SPECT 1 AREA: CPT | Mod: 26,,, | Performed by: INTERNAL MEDICINE

## 2022-03-22 ENCOUNTER — HISTORICAL (OUTPATIENT)
Dept: ADMINISTRATIVE | Facility: HOSPITAL | Age: 72
End: 2022-03-22

## 2022-03-22 LAB
ABS NEUT (OLG): 2.08 (ref 2.1–9.2)
ALBUMIN SERPL-MCNC: 3 G/DL (ref 3.4–4.8)
ALBUMIN/GLOB SERPL: 1.2 {RATIO} (ref 1.1–2)
ALP SERPL-CCNC: 112 U/L (ref 40–150)
ALT SERPL-CCNC: 39 U/L (ref 0–55)
AST SERPL-CCNC: 38 U/L (ref 5–34)
BASOPHILS # BLD AUTO: 0.1 10*3/UL (ref 0–0.2)
BASOPHILS NFR BLD AUTO: 1 %
BILIRUB SERPL-MCNC: 0.9 MG/DL
BILIRUBIN DIRECT+TOT PNL SERPL-MCNC: 0.3 (ref 0–0.5)
BILIRUBIN DIRECT+TOT PNL SERPL-MCNC: 0.6 (ref 0–0.8)
BUN SERPL-MCNC: 19.7 MG/DL (ref 8.4–25.7)
CALCIUM SERPL-MCNC: 9 MG/DL (ref 8.7–10.5)
CHLORIDE SERPL-SCNC: 112 MMOL/L (ref 98–107)
CO2 SERPL-SCNC: 16 MMOL/L (ref 23–31)
CREAT SERPL-MCNC: 1.38 MG/DL (ref 0.73–1.18)
CRP SERPL-MCNC: <0.1 MG/L
EOSINOPHIL # BLD AUTO: 0 10*3/UL (ref 0–0.9)
EOSINOPHIL NFR BLD AUTO: 0 %
ERYTHROCYTE [DISTWIDTH] IN BLOOD BY AUTOMATED COUNT: 17.2 % (ref 11.5–14.5)
ERYTHROCYTE [SEDIMENTATION RATE] IN BLOOD: 2 MM/H (ref 0–15)
FLAG2 (OHS): 60
FLAG3 (OHS): 80
FLAGS (OHS): 80
GLOBULIN SER-MCNC: 2.6 G/DL (ref 2.4–3.5)
GLUCOSE SERPL-MCNC: 105 MG/DL (ref 82–115)
HCT VFR BLD AUTO: 43.5 % (ref 40–51)
HEMOLYSIS INTERF INDEX SERPL-ACNC: 67
HGB BLD-MCNC: 14.4 G/DL (ref 13.5–17.5)
ICTERIC INTERF INDEX SERPL-ACNC: 1
IMM GRANULOCYTES # BLD AUTO: 0.07 10*3/UL
IMM GRANULOCYTES NFR BLD AUTO: 1 %
IMM. NE 1 SUSPECT FLAG (OHS): 20
LIPEMIC INTERF INDEX SERPL-ACNC: 12
LOW EVENT # SUSPECT FLAG (OHS): 80
LYMPHOCYTES # BLD AUTO: 2 10*3/UL (ref 0.6–4.6)
LYMPHOCYTES NFR BLD AUTO: 42 %
MANUAL DIFF? (OHS): NO
MCH RBC QN AUTO: 27.6 PG (ref 26–34)
MCHC RBC AUTO-ENTMCNC: 33.1 G/DL (ref 31–37)
MCV RBC AUTO: 83.5 FL (ref 80–100)
MO BLASTS SUSPECT FLAG (OHS): 50
MONOCYTES # BLD AUTO: 0.6 10*3/UL (ref 0.1–1.3)
MONOCYTES NFR BLD AUTO: 12 %
NEUTROPHILS # BLD AUTO: 2.08 10*3/UL (ref 2.1–9.2)
NEUTROPHILS NFR BLD AUTO: 43 %
NRBC BLD AUTO-RTO: 0 % (ref 0–0.2)
PLATELET # BLD AUTO: 233 10*3/UL (ref 130–400)
PLATELET CLUMPS SUSPECT FLAG (OHS): 10
PMV BLD AUTO: 10.4 FL (ref 7.4–10.4)
POTASSIUM SERPL-SCNC: 5.2 MMOL/L (ref 3.5–5.1)
PROT SERPL-MCNC: 5.6 G/DL (ref 5.8–7.6)
RBC # BLD AUTO: 5.21 10*6/UL (ref 4.5–5.9)
SODIUM SERPL-SCNC: 135 MMOL/L (ref 136–145)
WBC # SPEC AUTO: 4.9 10*3/UL (ref 4.5–11)
ZZGIANT PLATELETS (OHS): 20

## 2022-03-23 ENCOUNTER — DOCUMENTATION ONLY (OUTPATIENT)
Dept: TRANSPLANT | Facility: CLINIC | Age: 72
End: 2022-03-23
Payer: MEDICARE

## 2022-03-23 LAB
EXT CMV DNA QUANT. BY PCR: NORMAL
EXT EOSINOPHIL%: 1
EXT HEMATOCRIT: 47.5 (ref 42–52)
EXT HEMOGLOBIN: 15.1 (ref 14–18)
EXT LYMPH%: 44
EXT MONOCYTES%: 14
EXT PLATELETS: 193 (ref 130–400)
EXT SEGS%: 38
EXT TACROLIMUS LVL: NORMAL
EXT WBC: 5 (ref 4.5–11.5)

## 2022-03-24 LAB
FUNGUS SPEC CULT: NORMAL
FUNGUS SPEC CULT: NORMAL

## 2022-03-25 ENCOUNTER — DOCUMENTATION ONLY (OUTPATIENT)
Dept: TRANSPLANT | Facility: CLINIC | Age: 72
End: 2022-03-25
Payer: MEDICARE

## 2022-03-25 ENCOUNTER — PATIENT MESSAGE (OUTPATIENT)
Dept: TRANSPLANT | Facility: CLINIC | Age: 72
End: 2022-03-25
Payer: MEDICARE

## 2022-03-28 ENCOUNTER — TELEPHONE (OUTPATIENT)
Dept: TRANSPLANT | Facility: CLINIC | Age: 72
End: 2022-03-28
Payer: MEDICARE

## 2022-03-28 LAB
M TB DNA SPEC QL NAA+PROBE: ABNORMAL
M TB DNA SPEC QL NAA+PROBE: ABNORMAL

## 2022-03-28 NOTE — TELEPHONE ENCOUNTER
Coordinator returned [facility]'s  phone call. Lab order faxed to  as requested to [phone].     ----- Message from Wilfred Bland sent at 3/28/2022  4:32 PM CDT -----  Regarding: lab order  [facility] lab call in regards to a lab order for the pt requesting call back    Call

## 2022-03-29 ENCOUNTER — HISTORICAL (OUTPATIENT)
Dept: ADMINISTRATIVE | Facility: HOSPITAL | Age: 72
End: 2022-03-29

## 2022-03-29 ENCOUNTER — PATIENT MESSAGE (OUTPATIENT)
Dept: TRANSPLANT | Facility: CLINIC | Age: 72
End: 2022-03-29
Payer: MEDICARE

## 2022-03-29 DIAGNOSIS — E87.5 HYPERKALEMIA: Primary | ICD-10-CM

## 2022-03-29 LAB
ABS NEUT (OLG): 2.31 (ref 2.1–9.2)
ALBUMIN SERPL-MCNC: 3.3 G/DL (ref 3.4–4.8)
ALBUMIN/GLOB SERPL: 1.3 {RATIO} (ref 1.1–2)
ALP SERPL-CCNC: 108 U/L (ref 40–150)
ALT SERPL-CCNC: 39 U/L (ref 0–55)
AST SERPL-CCNC: 33 U/L (ref 5–34)
BASOPHILS # BLD AUTO: 0.1 10*3/UL (ref 0–0.2)
BASOPHILS NFR BLD AUTO: 1 %
BILIRUB SERPL-MCNC: 0.9 MG/DL
BILIRUBIN DIRECT+TOT PNL SERPL-MCNC: 0.4 (ref 0–0.5)
BILIRUBIN DIRECT+TOT PNL SERPL-MCNC: 0.5 (ref 0–0.8)
BUN SERPL-MCNC: 17.7 MG/DL (ref 8.4–25.7)
CALCIUM SERPL-MCNC: 9.3 MG/DL (ref 8.7–10.5)
CHLORIDE SERPL-SCNC: 111 MMOL/L (ref 98–107)
CO2 SERPL-SCNC: 19 MMOL/L (ref 23–31)
CREAT SERPL-MCNC: 1.57 MG/DL (ref 0.73–1.18)
CRP SERPL-MCNC: <0.1 MG/L
EOSINOPHIL # BLD AUTO: 0 10*3/UL (ref 0–0.9)
EOSINOPHIL NFR BLD AUTO: 0 %
ERYTHROCYTE [DISTWIDTH] IN BLOOD BY AUTOMATED COUNT: 18.6 % (ref 11.5–17)
ERYTHROCYTE [SEDIMENTATION RATE] IN BLOOD: 1 MM/H (ref 0–15)
GLOBULIN SER-MCNC: 2.5 G/DL (ref 2.4–3.5)
GLUCOSE SERPL-MCNC: 138 MG/DL (ref 82–115)
HCT VFR BLD AUTO: 47.8 % (ref 42–52)
HEMOLYSIS INTERF INDEX SERPL-ACNC: 24
HGB BLD-MCNC: 14.8 G/DL (ref 14–18)
ICTERIC INTERF INDEX SERPL-ACNC: 1
LIPEMIC INTERF INDEX SERPL-ACNC: 3
LYMPHOCYTES # BLD AUTO: 1.6 10*3/UL (ref 0.6–4.6)
LYMPHOCYTES NFR BLD AUTO: 33 %
MANUAL DIFF? (OHS): NO
MCH RBC QN AUTO: 26.5 PG (ref 27–31)
MCHC RBC AUTO-ENTMCNC: 31 G/DL (ref 33–36)
MCV RBC AUTO: 85.5 FL (ref 80–94)
MONOCYTES # BLD AUTO: 0.6 10*3/UL (ref 0.1–1.3)
MONOCYTES NFR BLD AUTO: 13 %
NEUTROPHILS # BLD AUTO: 2.31 10*3/UL (ref 2.1–9.2)
NEUTROPHILS NFR BLD AUTO: 48 %
PLATELET # BLD AUTO: 207 10*3/UL (ref 130–400)
PMV BLD AUTO: 10.1 FL (ref 9.4–12.4)
POTASSIUM SERPL-SCNC: 6.4 MMOL/L (ref 3.5–5.1)
PROT SERPL-MCNC: 5.8 G/DL (ref 5.8–7.6)
RBC # BLD AUTO: 5.59 10*6/UL (ref 4.7–6.1)
SODIUM SERPL-SCNC: 137 MMOL/L (ref 136–145)
WBC # SPEC AUTO: 4.8 10*3/UL (ref 4.5–11.5)

## 2022-03-29 NOTE — TELEPHONE ENCOUNTER
Coordinator contacted patient via telephone to inquire if he was given instructions by his Family physician to take Kayexalate due to elevated potassium=6.3. Patient reports he was instructed to take 2 doses of Kayexalate 30gm one dose today & another 30gm dose tomorrow. Patient states he will have his potassium level repeated tomorrow along with an EKG.     Coordinator informed patient of prescription for Lokelma was sent to [facility]. Once medication is approved by insurance he will receive a call from [facility] with instructions. Also instructed if he starts feeling bad, SOB, chest pain or discomfort report directly to nearest ER for further management of elevated potassium. Patient verbalized understanding.   __________________________________________________    Coordinator received a [facility] message from patient regarding abnormal lab results.      My Dr. at [facility] called as they received blood work that has my potassium level at 6.3. I assume the results came from [facility] as that take 3 vials every Monday. I have an appointment in the morning to recheck potassium and do an EKG. Let me know if I need to do anything else.      Hi Mr. Villeda,     Thanks for informing us. The labs drawn by [facility] were ordered by infectious disease. Our transplant labs were a CBC, CMV & Tacrolimus. I'll let Dr. Yen know. Most likely it's due to Bactrim. She will probably start you on Lokelma by mouth daily to let keep your potassium normal since you'll need to continue taking Bactrim for 1 year.     I'll let you know about the lokelma. In the meantime, follow the plan to have your level repeated & EKG. Also if you start feeling bad, shortness of breath, chest pain or discomfort report directly to nearest ER for further management of elevated potassium.     Thanks,     Tiara

## 2022-03-30 ENCOUNTER — PATIENT MESSAGE (OUTPATIENT)
Dept: TRANSPLANT | Facility: CLINIC | Age: 72
End: 2022-03-30
Payer: MEDICARE

## 2022-03-30 NOTE — TELEPHONE ENCOUNTER
Coordinator notified patient of Dr. Yen's recommendations to take Kayexalate instead of Lokelma since patient reports cost of medication is $190.    Hi Mr. Villeda,    Dr. Yen wants you to take Kayexalate 15gm 3 x a week instead of taking Lokelma. Instructions will be Mix 4 LEVEL TEASPOONS (15grams total) in 60mL water and drink by mouth. You can start taking it every Mon, Wed and Friday. Start with this Friday since your last dose was today. We'll recheck your labs next week as scheduled.     A new prescription will be sent to [facility] pharmacy to reflect the dose change.     Thanks,     Tiara

## 2022-03-31 LAB
ANTIMICROBIAL SUSCEPTIBILITY, NOCARIDA SPECIES: ABNORMAL
ANTIMICROBIAL SUSCEPTIBILITY, NOCARIDA SPECIES: ABNORMAL

## 2022-03-31 RX ORDER — SODIUM POLYSTYRENE SULFONATE 4.1 MEQ/G
POWDER, FOR SUSPENSION ORAL; RECTAL
Qty: 454 G | Refills: 4 | Status: SHIPPED | OUTPATIENT
Start: 2022-03-31 | End: 2022-05-05

## 2022-04-01 ENCOUNTER — OFFICE VISIT (OUTPATIENT)
Dept: TRANSPLANT | Facility: CLINIC | Age: 72
End: 2022-04-01
Attending: INTERNAL MEDICINE
Payer: MEDICARE

## 2022-04-01 VITALS
HEIGHT: 74 IN | WEIGHT: 181.44 LBS | BODY MASS INDEX: 23.29 KG/M2 | HEART RATE: 104 BPM | DIASTOLIC BLOOD PRESSURE: 78 MMHG | SYSTOLIC BLOOD PRESSURE: 120 MMHG

## 2022-04-01 DIAGNOSIS — R76.8 ELEVATED SERUM IMMUNOGLOBULIN FREE LIGHT CHAIN LEVEL: Primary | ICD-10-CM

## 2022-04-01 DIAGNOSIS — Z79.60 LONG-TERM USE OF IMMUNOSUPPRESSANT MEDICATION: ICD-10-CM

## 2022-04-01 DIAGNOSIS — Z79.01 LONG TERM (CURRENT) USE OF ANTICOAGULANTS: Chronic | ICD-10-CM

## 2022-04-01 DIAGNOSIS — Z91.89 AT RISK FOR OPPORTUNISTIC INFECTIONS: ICD-10-CM

## 2022-04-01 DIAGNOSIS — Z29.89 PROPHYLACTIC IMMUNOTHERAPY: ICD-10-CM

## 2022-04-01 DIAGNOSIS — Z94.0 KIDNEY REPLACED BY TRANSPLANT: ICD-10-CM

## 2022-04-01 DIAGNOSIS — I48.21 PERMANENT ATRIAL FIBRILLATION: ICD-10-CM

## 2022-04-01 PROCEDURE — 99214 OFFICE O/P EST MOD 30 MIN: CPT | Mod: PBBFAC | Performed by: INTERNAL MEDICINE

## 2022-04-01 PROCEDURE — 99214 PR OFFICE/OUTPT VISIT, EST, LEVL IV, 30-39 MIN: ICD-10-PCS | Mod: S$PBB,,, | Performed by: INTERNAL MEDICINE

## 2022-04-01 PROCEDURE — 99999 PR PBB SHADOW E&M-EST. PATIENT-LVL IV: CPT | Mod: PBBFAC,,, | Performed by: INTERNAL MEDICINE

## 2022-04-01 PROCEDURE — 99214 OFFICE O/P EST MOD 30 MIN: CPT | Mod: S$PBB,,, | Performed by: INTERNAL MEDICINE

## 2022-04-01 PROCEDURE — 99999 PR PBB SHADOW E&M-EST. PATIENT-LVL IV: ICD-10-PCS | Mod: PBBFAC,,, | Performed by: INTERNAL MEDICINE

## 2022-04-01 RX ORDER — FLUTICASONE PROPIONATE 50 MCG
SPRAY, SUSPENSION (ML) NASAL DAILY PRN
COMMUNITY
Start: 2022-03-20

## 2022-04-01 RX ORDER — MEROPENEM 1 G/1
INJECTION, POWDER, FOR SOLUTION INTRAVENOUS
COMMUNITY
Start: 2022-03-31 | End: 2022-05-05

## 2022-04-01 NOTE — LETTER
April 2, 2022        Alexis Zamorano  [address]  [place] [address]  Phone: [phone]  Fax: [phone]             [id number]  [address]  [facility] [address]  Phone: [phone]   Patient: Ronal Mazariegos   MR Number: [id number]   YOB: 1950   Date of Visit: 4/1/2022       Dear Dr. Alexis Zamorano    Thank you for referring Ronal Mazariegos to me for evaluation. Attached you will find relevant portions of my assessment and plan of care.    If you have questions, please do not hesitate to call me. I look forward to following Ronal Mazariegos along with you.    Sincerely,    Chava Aguila Jr, MD    Enclosure    If you would like to receive this communication electronically, please contact [email or web address] or [phone] to request [employer] access.    [employer] is a tool which provides read-only access to select patient information with whom you have a relationship. Its easy to use and provides real time access to review your patients record including encounter summaries, notes, results, and demographic information.    If you feel you have received this communication in error or would no longer like to receive these types of communications, please e-mail [email or web address]

## 2022-04-01 NOTE — PROGRESS NOTES
Subjective:     Patient ID:  Ronal Mazariegos is a 71 y.o. male who presents for evaluation of Consult (For possible cardiac amyloidosis; Dr. Bass Cardiologist [place] and referred internally by Chetan Etienne and Dr. Wheatley)    HPI:  70 yo WM with permanent atrial fibrillation, kidney transplant on 2021 (due to PCKD), HBP, P Vera was referred by Latanya De Guzman and Agnes for evaluation of infiltrative CMP.  He had PYP scan 3/21/22 negative for TTR amyloidosis.  He had urine immuno negative for monoclonal band though serum had faint lambda light chain band in gamma.  Free light chain Dec 2021 with elevated kappa 2.89, normal lambda 2.04 and normal ratio 1.42.  He has seen Dr. Wheatley.    He has No tightness, pressure or heaviness in chest, neck, arms, throat, jaw or back with or without exertion.  No VICTORIA, orthopnea, PND, palpitations, pre-syncope or syncope.    He reports prior to craniotomy BP very hard to control (ran high) but since much lower.  He has been using metoprolol prn holding whenever /80 or less.  HR runs above 100 most of the time.    Past Hx, Operations, Meds and allergies all reviewed    Social History     Tobacco Use    Smoking status: Former Smoker     Packs/day: 2.00     Years: 10.00     Pack years: 20.00     Types: Cigarettes     Start date: 1972     Quit date: 1984     Years since quittin.3    Smokeless tobacco: Never Used   Substance Use Topics    Alcohol use: No     Comment: Pt reportsbeing a former beer drinker (2-3 cans per day) and quitting in .    Drug use: No       Review of Systems   Constitutional: Negative for malaise/fatigue, weight gain and weight loss.   Hematologic/Lymphatic: Does not bruise/bleed easily.   Musculoskeletal:        No carpal tunnel, tendon rupture, joint replacement   Gastrointestinal: Negative for constipation and diarrhea.   Neurological: Negative for brief paralysis, dizziness, light-headedness, paresthesias and sensory change.     "    No neuropathy, orthostatic symptoms, dizzy, LH  Using walker as recovering from craniotomy but does not feel need to continue is doing so pending NS clearance        Objective:   Physical Exam  Constitutional:       General: He is not in acute distress.     Appearance: He is well-developed. He is not ill-appearing, toxic-appearing or diaphoretic.      Comments: /78 (BP Location: Left arm, Patient Position: Sitting, BP Method: Medium (Automatic))   Pulse 104   Ht 6' 2" (1.88 m)   Wt 82.3 kg (181 lb 7 oz)   BMI 23.30 kg/m²   WD, WN WM in NAD   HENT:      Head: Normocephalic and atraumatic.   Eyes:      General: No scleral icterus.        Right eye: No discharge.         Left eye: No discharge.      Conjunctiva/sclera: Conjunctivae normal.   Neck:      Thyroid: No thyromegaly.      Vascular: No JVD.      Trachea: No tracheal deviation.   Cardiovascular:      Rate and Rhythm: Normal rate and regular rhythm.      Heart sounds: Normal heart sounds. No murmur heard.    No gallop.   Pulmonary:      Effort: Pulmonary effort is normal.      Breath sounds: Normal breath sounds.   Abdominal:      General: Bowel sounds are normal. There is no distension.      Palpations: Abdomen is soft. There is no mass.      Tenderness: There is no abdominal tenderness. There is no guarding or rebound.   Musculoskeletal:         General: No tenderness.      Right lower leg: No edema.      Left lower leg: No edema.   Skin:     General: Skin is warm and dry.   Neurological:      General: No focal deficit present.      Mental Status: He is alert and oriented to person, place, and time. Mental status is at baseline.   Psychiatric:         Mood and Affect: Mood normal.         Behavior: Behavior normal.         Thought Content: Thought content normal.         Judgment: Judgment normal.        EKG 2/24/22 Af with low limb lead voltage, low anterior forces, NST-T changes    ECHO 2/19/2022--I reviewed and walls not thick, has biatrial " enlargement, pericardial effusion--I am not suspicious of infiltrative CM based upon this study  · There is no gross evidence of vegetatation. Abnormal thickening of the aortic root seen in SAX that is unchanged from the prior study. Consider VERITO if clinically indicated.  · The estimated ejection fraction is 50%.  · The left ventricle is normal in size with low normal systolic function.  · Normal left ventricular diastolic function.  · Normal right ventricular size with normal right ventricular systolic function.  · Severe left atrial enlargement.  · Moderate right atrial enlargement.  · The estimated PA systolic pressure is 29 mmHg.  · Normal central venous pressure (3 mmHg).  · Trivial circumferential pericardial effusion that is small anteriroly.    Lab Results   Component Value Date     (L) 02/25/2022    K 4.6 02/25/2022    MG 1.7 02/25/2022     02/25/2022    CO2 19 (L) 02/25/2022    PHOS 2.1 (L) 02/25/2022    BUN 17 02/25/2022    CREATININE 1.1 02/25/2022     (H) 02/25/2022    HGBA1C 5.9 (H) 02/17/2022    AST 19 02/25/2022    ALT 19 02/25/2022    ALBUMIN 2.6 (L) 02/25/2022    ALBUMIN 53.19 12/29/2021    PROT 5.1 (L) 02/25/2022    BILITOT 1.6 (H) 02/25/2022    WBC 6.76 02/25/2022    HGB 15.0 02/25/2022    HCT 46.2 02/25/2022    HCT 44 02/17/2022     02/25/2022    INR 1.3 (H) 02/16/2022     05/04/2021    CHOL 107 (L) 05/04/2021    HDL 48 05/04/2021    LDLCALC 50.0 (L) 05/04/2021    TRIG 45 05/04/2021    TACROLIMUS 7.5 02/25/2022     See HPI for AL screening tests and Dr. Wheatley note  Assessment:   Referred for evaluation of possible cardiac amyloidosis.  I see no evidence for this entity.  While 95% of cardiac amyloidosis is due to TTR or AL, he is at risk for rarer types of amyloidosis but since nothing on echo to suggest cardiac involvement, I would not recommend endomyocardial biopsy.  Also, we only have treatment for AL and ATTR amyloidosis so result would not impact his  management.  He has elevated Kappa light chain with normal Kappa/lambda ratio,  urine immuno negative for monoclonal band though serum had faint lambda light chain band in gamma.  Final determination of the significance of this finding and any necessary f/u deferred to Dr. Wheatley.   1. Kidney replaced by transplant    2. Long-term use of immunosuppressant medication    3. At risk for opportunistic infections    4. Prophylactic immunotherapy    5. Permanent atrial fibrillation    6. Long term (current) use of anticoagulants--Eliquis      Plan:   Rather than use metoprolol prn, I suggested that he take 25 mg BID and f/u with Dr. Bass.  I left message for Dr. Bass relaying this and result of my consult.  Will send this note to him as well as Chetan Etienne and Dr. Wheatley.    Thank you for allowing me to see this nice man in consultation and do not hesitate to contact me if any questions arise.

## 2022-04-02 PROBLEM — I48.21 PERMANENT ATRIAL FIBRILLATION: Status: ACTIVE | Noted: 2018-03-16

## 2022-04-04 ENCOUNTER — HISTORICAL (OUTPATIENT)
Dept: ADMINISTRATIVE | Facility: HOSPITAL | Age: 72
End: 2022-04-04

## 2022-04-04 LAB
ALBUMIN SERPL-MCNC: 3.4 G/DL (ref 3.4–4.8)
BUN SERPL-MCNC: 17.9 MG/DL (ref 8.4–25.7)
CALCIUM SERPL-MCNC: 9.5 MG/DL (ref 8.7–10.5)
CHLORIDE SERPL-SCNC: 108 MMOL/L (ref 98–107)
CO2 SERPL-SCNC: 24 MMOL/L (ref 23–31)
CREAT SERPL-MCNC: 1.61 MG/DL (ref 0.73–1.18)
CREAT/UREA NIT SERPL: 11
GLUCOSE SERPL-MCNC: 88 MG/DL (ref 82–115)
HEMOLYSIS INTERF INDEX SERPL-ACNC: 6
ICTERIC INTERF INDEX SERPL-ACNC: 1
ICTERIC INTERF INDEX SERPL-ACNC: 1
LIPEMIC INTERF INDEX SERPL-ACNC: <0
LIPEMIC INTERF INDEX SERPL-ACNC: <0
MAGNESIUM SERPL-MCNC: 2 MG/DL (ref 1.6–2.6)
PHOSPHATE SERPL-MCNC: 2.8 MG/DL (ref 2.3–4.7)
POTASSIUM SERPL-SCNC: 5 MMOL/L (ref 3.5–5.1)
SODIUM SERPL-SCNC: 141 MMOL/L (ref 136–145)

## 2022-04-05 ENCOUNTER — HISTORICAL (OUTPATIENT)
Dept: ADMINISTRATIVE | Facility: HOSPITAL | Age: 72
End: 2022-04-05

## 2022-04-05 LAB
ABS NEUT (OLG): 2.81 (ref 2.1–9.2)
ALBUMIN SERPL-MCNC: 3.2 G/DL (ref 3.4–4.8)
ALBUMIN/GLOB SERPL: 1.3 {RATIO} (ref 1.1–2)
ALP SERPL-CCNC: 105 U/L (ref 40–150)
ALT SERPL-CCNC: 29 U/L (ref 0–55)
AST SERPL-CCNC: 31 U/L (ref 5–34)
BASOPHILS # BLD AUTO: 0 10*3/UL (ref 0–0.2)
BASOPHILS NFR BLD AUTO: 1 %
BILIRUB SERPL-MCNC: 1 MG/DL
BILIRUBIN DIRECT+TOT PNL SERPL-MCNC: 0.4 (ref 0–0.5)
BILIRUBIN DIRECT+TOT PNL SERPL-MCNC: 0.6 (ref 0–0.8)
BUN SERPL-MCNC: 17.6 MG/DL (ref 8.4–25.7)
CALCIUM SERPL-MCNC: 9.3 MG/DL (ref 8.7–10.5)
CHLORIDE SERPL-SCNC: 109 MMOL/L (ref 98–107)
CO2 SERPL-SCNC: 19 MMOL/L (ref 23–31)
CREAT SERPL-MCNC: 1.37 MG/DL (ref 0.73–1.18)
CRP SERPL-MCNC: <0.1 MG/L
EOSINOPHIL # BLD AUTO: 0.1 10*3/UL (ref 0–0.9)
EOSINOPHIL NFR BLD AUTO: 2 %
ERYTHROCYTE [DISTWIDTH] IN BLOOD BY AUTOMATED COUNT: 19.2 % (ref 11.5–17)
ERYTHROCYTE [SEDIMENTATION RATE] IN BLOOD: 1 MM/H (ref 0–15)
GLOBULIN SER-MCNC: 2.4 G/DL (ref 2.4–3.5)
GLUCOSE SERPL-MCNC: 125 MG/DL (ref 82–115)
HCT VFR BLD AUTO: 46.4 % (ref 42–52)
HEMOLYSIS INTERF INDEX SERPL-ACNC: 10
HGB BLD-MCNC: 14.3 G/DL (ref 14–18)
ICTERIC INTERF INDEX SERPL-ACNC: 1
LIPEMIC INTERF INDEX SERPL-ACNC: 2
LYMPHOCYTES # BLD AUTO: 0.7 10*3/UL (ref 0.6–4.6)
LYMPHOCYTES NFR BLD AUTO: 17 %
MANUAL DIFF? (OHS): NO
MCH RBC QN AUTO: 25.9 PG (ref 27–31)
MCHC RBC AUTO-ENTMCNC: 30.8 G/DL (ref 33–36)
MCV RBC AUTO: 84.1 FL (ref 80–94)
MONOCYTES # BLD AUTO: 0.4 10*3/UL (ref 0.1–1.3)
MONOCYTES NFR BLD AUTO: 11 %
NEUTROPHILS # BLD AUTO: 2.81 10*3/UL (ref 2.1–9.2)
NEUTROPHILS NFR BLD AUTO: 68 %
PLATELET # BLD AUTO: 195 10*3/UL (ref 130–400)
PMV BLD AUTO: 10 FL (ref 9.4–12.4)
POTASSIUM SERPL-SCNC: 5.2 MMOL/L (ref 3.5–5.1)
PROT SERPL-MCNC: 5.6 G/DL (ref 5.8–7.6)
RBC # BLD AUTO: 5.52 10*6/UL (ref 4.7–6.1)
SODIUM SERPL-SCNC: 137 MMOL/L (ref 136–145)
WBC # SPEC AUTO: 4.1 10*3/UL (ref 4.5–11.5)

## 2022-04-06 ENCOUNTER — DOCUMENTATION ONLY (OUTPATIENT)
Dept: TRANSPLANT | Facility: CLINIC | Age: 72
End: 2022-04-06
Payer: MEDICARE

## 2022-04-06 LAB
EXT CMV DNA QUANT. BY PCR: ABNORMAL
EXT EOSINOPHIL%: 0
EXT HEMATOCRIT: 48.4 (ref 42–52)
EXT HEMOGLOBIN: 14.6 (ref 14–18)
EXT LYMPH%: 22
EXT MONOCYTES%: 11
EXT PLATELETS: 211 (ref 130–400)
EXT SEGS%: 65
EXT TACROLIMUS LVL: 7.2
EXT WBC: 5.5 (ref 4.5–11.5)

## 2022-04-07 ENCOUNTER — TELEPHONE (OUTPATIENT)
Dept: HEMATOLOGY/ONCOLOGY | Facility: CLINIC | Age: 72
End: 2022-04-07
Payer: MEDICARE

## 2022-04-07 ENCOUNTER — PATIENT MESSAGE (OUTPATIENT)
Dept: TRANSPLANT | Facility: CLINIC | Age: 72
End: 2022-04-07
Payer: MEDICARE

## 2022-04-07 ENCOUNTER — OFFICE VISIT (OUTPATIENT)
Dept: HEMATOLOGY/ONCOLOGY | Facility: CLINIC | Age: 72
End: 2022-04-07
Payer: MEDICARE

## 2022-04-07 ENCOUNTER — DOCUMENTATION ONLY (OUTPATIENT)
Dept: TRANSPLANT | Facility: CLINIC | Age: 72
End: 2022-04-07
Payer: MEDICARE

## 2022-04-07 DIAGNOSIS — E85.9 AMYLOIDOSIS, UNSPECIFIED TYPE: Primary | ICD-10-CM

## 2022-04-07 PROCEDURE — 99215 OFFICE O/P EST HI 40 MIN: CPT | Mod: 95,,, | Performed by: INTERNAL MEDICINE

## 2022-04-07 PROCEDURE — 99215 PR OFFICE/OUTPT VISIT, EST, LEVL V, 40-54 MIN: ICD-10-PCS | Mod: 95,,, | Performed by: INTERNAL MEDICINE

## 2022-04-07 NOTE — PROGRESS NOTES
Route Chart for Scheduling    BMT Chart Routing      Follow up with physician    Follow up with POLLY    Labs    Lab interval:  CBC, CMP, SPEP, free light chains, quant immunoglobulins and DEVI at [facility] 4/21   Imaging    Pharmacy appointment    Other referrals            Therapy Plan Information  None      The patient location is: [place]    The chief complaint leading to consultation is:Polycythemia vera    Visit type: audiovisual    Face to Face time with patient: 20 minutes  40 minutes of total time spent on the encounter, which includes face to face time and non-face to face time preparing to see the patient (eg, review of tests), Obtaining and/or reviewing separately obtained history, Documenting clinical information in the electronic or other health record, Independently interpreting results (not separately reported) and communicating results to the patient/family/caregiver, or Care coordination (not separately reported).         Each patient to whom he or she provides medical services by telemedicine is:  (1) informed of the relationship between the physician and patient and the respective role of any other health care provider with respect to management of the patient; and (2) notified that he or she may decline to receive medical services by telemedicine and may withdraw from such care at any time.    Notes:     Subjective:       Patient ID: Ronal Mazariegos is a 71 y.o. male.    Chief Complaint: No chief complaint on file.    HPI     New virtual visit for new polycythemia. Referred by renal transplant with a history of ESRD secondary to polycystic kidney disease.  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    Polycythemia starts acutely in late August/early September by lab review including outside labs from [facility]. Reports no medication changes other than BP meds. Now on Bystolic and Norvasc. Also history of afib and currently on DOAC.      Also reports severe lower extremity edema started around the same time. Has mild VICTORIA. Edema currently controlled with lasix. ECHO from January 2021 bland but outside ECHO from August 2021 has global hypertrophy and rising pulmonary hypertension. 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    Has received single phlebotomy with repeat this week. Labs scheduled for tomorrow. Repeat ECHO with local cardiologist is scheduled 1/10 at [facility] Dr. Jimmy Bass. He has appt a few weeks later to review results.     ROS positive today for URI symptoms of congestion, cough, mild VICTORIA, increased fatigue.    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 Negative for unexpected weight change.   HENT: Negative for mouth sores.    Eyes: Negative for visual disturbance.   Respiratory: Positive for cough. Negative for shortness of breath.    Cardiovascular: Negative for chest pain.   Gastrointestinal: Negative for abdominal pain and diarrhea.   Genitourinary: Negative for frequency.   Musculoskeletal: Negative for back pain.   Integumentary:  Negative for rash.   Neurological: Negative for headaches.   Hematological: Negative for adenopathy.   Psychiatric/Behavioral: The patient is not nervous/anxious.          Objective:    No exam 12/28/2021 due to telehealth visit  Physical Exam    Assessment:        Problem List Items Addressed This Visit    None         Plan:       New, rather acute onset of polycythemia vera in August/September 2021.   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

## 2022-04-11 ENCOUNTER — PATIENT MESSAGE (OUTPATIENT)
Dept: TRANSPLANT | Facility: CLINIC | Age: 72
End: 2022-04-11
Payer: MEDICARE

## 2022-04-11 DIAGNOSIS — E87.5 HYPERKALEMIA: Primary | ICD-10-CM

## 2022-04-11 LAB
EXT ALBUMIN: 3.2 (ref 3.4–4.8)
EXT ALKALINE PHOSPHATASE: 105 (ref 40–150)
EXT ALT: 29 (ref 0–55)
EXT AST: 31 (ref 5–34)
EXT BILIRUBIN DIRECT: 0.4 MG/DL (ref 0–0.5)
EXT BILIRUBIN TOTAL: 1
EXT BUN: 17.6 (ref 8.4–25.7)
EXT CALCIUM: 9.3 (ref 8.7–10.5)
EXT CHLORIDE: 109 (ref 98–107)
EXT CO2: 19 (ref 23–31)
EXT CREATININE: 1.37 MG/DL
EXT EOSINOPHIL%: 2
EXT GFR MDRD NON AF AMER: 55
EXT GLUCOSE: 125 (ref 82–115)
EXT HEMATOCRIT: 46.4 (ref 42–52)
EXT HEMOGLOBIN: 14.3 (ref 14–18)
EXT LYMPH%: 17
EXT MONOCYTES%: 11
EXT PLATELETS: 195 (ref 130–400)
EXT POTASSIUM: 5.2 (ref 3.5–5.1)
EXT PROTEIN TOTAL: 5.6 (ref 5.8–7.6)
EXT SEGS%: 68
EXT SODIUM: 137 MMOL/L (ref 136–145)
EXT WBC: 4.1 (ref 4.5–11.5)

## 2022-04-11 RX ORDER — SODIUM POLYSTYRENE SULFONATE 4.1 MEQ/G
30 POWDER, FOR SUSPENSION ORAL; RECTAL EVERY 12 HOURS
Qty: 60 G | Refills: 0 | OUTPATIENT
Start: 2022-04-11 | End: 2022-04-12

## 2022-04-11 NOTE — TELEPHONE ENCOUNTER
Notified patient of Dr. Yen's review of 4/5/22 lab results via [facility].     Hi Mr. Villeda,     Dr. Yne reviewed your 4/5/22 lab results. Your potassium was 5.2. She wants you to take an extra 2 doses of Kayexalate 30gm. One dose tomorrow AM & one dose in the PM; maintain a low potassium diet. We'll need to recheck your potassium level this Thurs since the lab will be closed this Friday.     Thanks,     Tiara       ----- Message from Celeste Yen MD sent at 4/11/2022  4:55 PM CDT -----  Kayexalate 30 gram daily x 2 times. Low K diet. More water intake. Check K on Friday

## 2022-04-12 ENCOUNTER — HISTORICAL (OUTPATIENT)
Dept: ADMINISTRATIVE | Facility: HOSPITAL | Age: 72
End: 2022-04-12

## 2022-04-12 LAB
ABS NEUT (OLG): 1.64 (ref 2.1–9.2)
ALBUMIN SERPL-MCNC: 3.2 G/DL (ref 3.4–4.8)
ALBUMIN/GLOB SERPL: 1.3 {RATIO} (ref 1.1–2)
ALP SERPL-CCNC: 105 U/L (ref 40–150)
ALT SERPL-CCNC: 28 U/L (ref 0–55)
AST SERPL-CCNC: 41 U/L (ref 5–34)
BASOPHILS # BLD AUTO: 0 10*3/UL (ref 0–0.2)
BASOPHILS NFR BLD AUTO: 0 %
BILIRUB SERPL-MCNC: 0.9 MG/DL
BILIRUBIN DIRECT+TOT PNL SERPL-MCNC: 0.3 (ref 0–0.5)
BILIRUBIN DIRECT+TOT PNL SERPL-MCNC: 0.6 (ref 0–0.8)
BUN SERPL-MCNC: 17.8 MG/DL (ref 8.4–25.7)
CALCIUM SERPL-MCNC: 8.9 MG/DL (ref 8.7–10.5)
CHLORIDE SERPL-SCNC: 110 MMOL/L (ref 98–107)
CO2 SERPL-SCNC: 19 MMOL/L (ref 23–31)
CREAT SERPL-MCNC: 1.48 MG/DL (ref 0.73–1.18)
CRP SERPL-MCNC: 0.12 MG/L
EOSINOPHIL # BLD AUTO: 0 10*3/UL (ref 0–0.9)
EOSINOPHIL NFR BLD AUTO: 1 %
ERYTHROCYTE [DISTWIDTH] IN BLOOD BY AUTOMATED COUNT: 19.6 % (ref 11.5–17)
ERYTHROCYTE [SEDIMENTATION RATE] IN BLOOD: 2 MM/H (ref 0–15)
GLOBULIN SER-MCNC: 2.4 G/DL (ref 2.4–3.5)
GLUCOSE SERPL-MCNC: 81 MG/DL (ref 82–115)
HCT VFR BLD AUTO: 48.4 % (ref 42–52)
HEMOLYSIS INTERF INDEX SERPL-ACNC: 56
HGB BLD-MCNC: 15.1 G/DL (ref 14–18)
ICTERIC INTERF INDEX SERPL-ACNC: 1
LIPEMIC INTERF INDEX SERPL-ACNC: 12
LYMPHOCYTES # BLD AUTO: 1.9 10*3/UL (ref 0.6–4.6)
LYMPHOCYTES NFR BLD AUTO: 48 %
MANUAL DIFF? (OHS): NO
MCH RBC QN AUTO: 25.3 PG (ref 27–31)
MCHC RBC AUTO-ENTMCNC: 31.2 G/DL (ref 33–36)
MCV RBC AUTO: 80.9 FL (ref 80–94)
MONOCYTES # BLD AUTO: 0.3 10*3/UL (ref 0.1–1.3)
MONOCYTES NFR BLD AUTO: 8 %
NEUTROPHILS # BLD AUTO: 1.64 10*3/UL (ref 2.1–9.2)
NEUTROPHILS NFR BLD AUTO: 41 %
PLATELET # BLD AUTO: 144 10*3/UL (ref 130–400)
PMV BLD AUTO: 10.1 FL (ref 9.4–12.4)
POS ERR2 (OHS): NORMAL
POTASSIUM SERPL-SCNC: 5.6 MMOL/L (ref 3.5–5.1)
PROT SERPL-MCNC: 5.6 G/DL (ref 5.8–7.6)
RBC # BLD AUTO: 5.98 10*6/UL (ref 4.7–6.1)
SODIUM SERPL-SCNC: 138 MMOL/L (ref 136–145)
WBC # SPEC AUTO: 4 10*3/UL (ref 4.5–11.5)

## 2022-04-14 ENCOUNTER — HISTORICAL (OUTPATIENT)
Dept: ADMINISTRATIVE | Facility: HOSPITAL | Age: 72
End: 2022-04-14
Payer: MEDICARE

## 2022-04-14 ENCOUNTER — DOCUMENTATION ONLY (OUTPATIENT)
Dept: TRANSPLANT | Facility: CLINIC | Age: 72
End: 2022-04-14
Payer: MEDICARE

## 2022-04-14 LAB
EXT POTASSIUM: 4.8
HEMOLYSIS INTERF INDEX SERPL-ACNC: 6
POTASSIUM SERPL-SCNC: 4.8 MMOL/L (ref 3.5–5.1)

## 2022-04-18 ENCOUNTER — HISTORICAL (OUTPATIENT)
Dept: ADMINISTRATIVE | Facility: HOSPITAL | Age: 72
End: 2022-04-18
Payer: MEDICARE

## 2022-04-18 LAB
ABS NEUT (OLG): 3.24 (ref 2.1–9.2)
BASOPHILS # BLD AUTO: 0 10*3/UL (ref 0–0.2)
BASOPHILS NFR BLD AUTO: 1 %
EOSINOPHIL # BLD AUTO: 0.1 10*3/UL (ref 0–0.9)
EOSINOPHIL NFR BLD AUTO: 2 %
ERYTHROCYTE [DISTWIDTH] IN BLOOD BY AUTOMATED COUNT: 20.3 % (ref 11.5–17)
HCT VFR BLD AUTO: 53.1 % (ref 42–52)
HGB BLD-MCNC: 16 G/DL (ref 14–18)
LYMPHOCYTES # BLD AUTO: 2.2 10*3/UL (ref 0.6–4.6)
LYMPHOCYTES NFR BLD AUTO: 35 %
MANUAL DIFF? (OHS): NO
MCH RBC QN AUTO: 24.9 PG (ref 27–31)
MCHC RBC AUTO-ENTMCNC: 30.1 G/DL (ref 33–36)
MCV RBC AUTO: 82.6 FL (ref 80–94)
MONOCYTES # BLD AUTO: 0.6 10*3/UL (ref 0.1–1.3)
MONOCYTES NFR BLD AUTO: 9 %
NEUTROPHILS # BLD AUTO: 3.24 10*3/UL (ref 2.1–9.2)
NEUTROPHILS NFR BLD AUTO: 51 %
PLATELET # BLD AUTO: 222 10*3/UL (ref 130–400)
PMV BLD AUTO: 10 FL (ref 9.4–12.4)
RBC # BLD AUTO: 6.43 10*6/UL (ref 4.7–6.1)
WBC # SPEC AUTO: 6.3 10*3/UL (ref 4.5–11.5)

## 2022-04-19 ENCOUNTER — HISTORICAL (OUTPATIENT)
Dept: ADMINISTRATIVE | Facility: HOSPITAL | Age: 72
End: 2022-04-19
Payer: MEDICARE

## 2022-04-19 DIAGNOSIS — Z94.0 KIDNEY REPLACED BY TRANSPLANT: ICD-10-CM

## 2022-04-19 LAB
ABS NEUT (OLG): 3.71 (ref 2.1–9.2)
ALBUMIN SERPL-MCNC: 3.2 G/DL (ref 3.4–4.8)
ALBUMIN/GLOB SERPL: 1.4 {RATIO} (ref 1.1–2)
ALP SERPL-CCNC: 109 U/L (ref 40–150)
ALT SERPL-CCNC: 21 U/L (ref 0–55)
AST SERPL-CCNC: 22 U/L (ref 5–34)
BASOPHILS # BLD AUTO: 0 10*3/UL (ref 0–0.2)
BASOPHILS NFR BLD AUTO: 0 %
BILIRUB SERPL-MCNC: 1.3 MG/DL
BILIRUBIN DIRECT+TOT PNL SERPL-MCNC: 0.6 (ref 0–0.5)
BILIRUBIN DIRECT+TOT PNL SERPL-MCNC: 0.7 (ref 0–0.8)
BUN SERPL-MCNC: 18.9 MG/DL (ref 8.4–25.7)
CALCIUM SERPL-MCNC: 9.1 MG/DL (ref 8.7–10.5)
CHLORIDE SERPL-SCNC: 110 MMOL/L (ref 98–107)
CO2 SERPL-SCNC: 21 MMOL/L (ref 23–31)
CREAT SERPL-MCNC: 1.54 MG/DL (ref 0.73–1.18)
CRP SERPL-MCNC: 0.17 MG/L
EOSINOPHIL # BLD AUTO: 0.1 10*3/UL (ref 0–0.9)
EOSINOPHIL NFR BLD AUTO: 1 %
ERYTHROCYTE [DISTWIDTH] IN BLOOD BY AUTOMATED COUNT: 20.3 % (ref 11.5–17)
ERYTHROCYTE [SEDIMENTATION RATE] IN BLOOD: 1 MM/H (ref 0–15)
GLOBULIN SER-MCNC: 2.3 G/DL (ref 2.4–3.5)
GLUCOSE SERPL-MCNC: 114 MG/DL (ref 82–115)
HCT VFR BLD AUTO: 49.9 % (ref 42–52)
HEMOLYSIS INTERF INDEX SERPL-ACNC: 1
HGB BLD-MCNC: 15.5 G/DL (ref 14–18)
ICTERIC INTERF INDEX SERPL-ACNC: 1
LIPEMIC INTERF INDEX SERPL-ACNC: <0
LYMPHOCYTES # BLD AUTO: 1.8 10*3/UL (ref 0.6–4.6)
LYMPHOCYTES NFR BLD AUTO: 28 %
MANUAL DIFF? (OHS): NO
MCH RBC QN AUTO: 25.2 PG (ref 27–31)
MCHC RBC AUTO-ENTMCNC: 31.1 G/DL (ref 33–36)
MCV RBC AUTO: 81.1 FL (ref 80–94)
MONOCYTES # BLD AUTO: 0.7 10*3/UL (ref 0.1–1.3)
MONOCYTES NFR BLD AUTO: 11 %
NEUTROPHILS # BLD AUTO: 3.71 10*3/UL (ref 2.1–9.2)
NEUTROPHILS NFR BLD AUTO: 57 %
PLATELET # BLD AUTO: 210 10*3/UL (ref 130–400)
PMV BLD AUTO: 10.4 FL (ref 9.4–12.4)
POTASSIUM SERPL-SCNC: 5 MMOL/L (ref 3.5–5.1)
PROT SERPL-MCNC: 5.5 G/DL (ref 5.8–7.6)
RBC # BLD AUTO: 6.15 10*6/UL (ref 4.7–6.1)
SODIUM SERPL-SCNC: 136 MMOL/L (ref 136–145)
WBC # SPEC AUTO: 6.5 10*3/UL (ref 4.5–11.5)

## 2022-04-19 RX ORDER — ATOVAQUONE 750 MG/5ML
1500 SUSPENSION ORAL DAILY
Qty: 300 ML | Refills: 2 | Status: CANCELLED | OUTPATIENT
Start: 2022-04-19 | End: 2022-07-18

## 2022-04-21 ENCOUNTER — OFFICE VISIT (OUTPATIENT)
Dept: INFECTIOUS DISEASES | Facility: CLINIC | Age: 72
End: 2022-04-21
Payer: MEDICARE

## 2022-04-21 ENCOUNTER — OFFICE VISIT (OUTPATIENT)
Dept: SPINE | Facility: CLINIC | Age: 72
End: 2022-04-21
Payer: MEDICARE

## 2022-04-21 ENCOUNTER — HOSPITAL ENCOUNTER (OUTPATIENT)
Dept: RADIOLOGY | Facility: OTHER | Age: 72
Discharge: HOME | End: 2022-04-21
Attending: PHYSICIAN ASSISTANT
Payer: MEDICARE

## 2022-04-21 VITALS — HEART RATE: 100 BPM | DIASTOLIC BLOOD PRESSURE: 70 MMHG | SYSTOLIC BLOOD PRESSURE: 105 MMHG | TEMPERATURE: 98 F

## 2022-04-21 VITALS
WEIGHT: 177.25 LBS | DIASTOLIC BLOOD PRESSURE: 72 MMHG | TEMPERATURE: 98 F | HEIGHT: 74 IN | SYSTOLIC BLOOD PRESSURE: 101 MMHG | BODY MASS INDEX: 22.75 KG/M2 | HEART RATE: 51 BPM

## 2022-04-21 DIAGNOSIS — A43.9 NOCARDIA INFECTION: ICD-10-CM

## 2022-04-21 DIAGNOSIS — G06.0 BRAIN ABSCESS: ICD-10-CM

## 2022-04-21 DIAGNOSIS — G06.0 INTRACRANIAL ABSCESS: Primary | ICD-10-CM

## 2022-04-21 PROCEDURE — 25500020 PHARM REV CODE 255: Performed by: PHYSICIAN ASSISTANT

## 2022-04-21 PROCEDURE — 70553 MRI BRAIN W WO CONTRAST: ICD-10-PCS | Mod: 26,,, | Performed by: RADIOLOGY

## 2022-04-21 PROCEDURE — 99999 PR PBB SHADOW E&M-EST. PATIENT-LVL IV: CPT | Mod: PBBFAC,,, | Performed by: STUDENT IN AN ORGANIZED HEALTH CARE EDUCATION/TRAINING PROGRAM

## 2022-04-21 PROCEDURE — 99024 POSTOP FOLLOW-UP VISIT: CPT | Mod: S$PBB,,, | Performed by: STUDENT IN AN ORGANIZED HEALTH CARE EDUCATION/TRAINING PROGRAM

## 2022-04-21 PROCEDURE — 70553 MRI BRAIN STEM W/O & W/DYE: CPT | Mod: TC

## 2022-04-21 PROCEDURE — 99214 OFFICE O/P EST MOD 30 MIN: CPT | Mod: PBBFAC,25,27 | Performed by: STUDENT IN AN ORGANIZED HEALTH CARE EDUCATION/TRAINING PROGRAM

## 2022-04-21 PROCEDURE — 99213 PR OFFICE/OUTPT VISIT, EST, LEVL III, 20-29 MIN: ICD-10-PCS | Mod: S$PBB,GC,, | Performed by: STUDENT IN AN ORGANIZED HEALTH CARE EDUCATION/TRAINING PROGRAM

## 2022-04-21 PROCEDURE — 99999 PR PBB SHADOW E&M-EST. PATIENT-LVL IV: ICD-10-PCS | Mod: PBBFAC,GC,, | Performed by: STUDENT IN AN ORGANIZED HEALTH CARE EDUCATION/TRAINING PROGRAM

## 2022-04-21 PROCEDURE — 99024 PR POST-OP FOLLOW-UP VISIT: ICD-10-PCS | Mod: S$PBB,,, | Performed by: STUDENT IN AN ORGANIZED HEALTH CARE EDUCATION/TRAINING PROGRAM

## 2022-04-21 PROCEDURE — A9585 GADOBUTROL INJECTION: HCPCS | Performed by: PHYSICIAN ASSISTANT

## 2022-04-21 PROCEDURE — 99999 PR PBB SHADOW E&M-EST. PATIENT-LVL IV: CPT | Mod: PBBFAC,GC,, | Performed by: STUDENT IN AN ORGANIZED HEALTH CARE EDUCATION/TRAINING PROGRAM

## 2022-04-21 PROCEDURE — 99213 OFFICE O/P EST LOW 20 MIN: CPT | Mod: S$PBB,GC,, | Performed by: STUDENT IN AN ORGANIZED HEALTH CARE EDUCATION/TRAINING PROGRAM

## 2022-04-21 PROCEDURE — 99999 PR PBB SHADOW E&M-EST. PATIENT-LVL IV: ICD-10-PCS | Mod: PBBFAC,,, | Performed by: STUDENT IN AN ORGANIZED HEALTH CARE EDUCATION/TRAINING PROGRAM

## 2022-04-21 PROCEDURE — 70553 MRI BRAIN STEM W/O & W/DYE: CPT | Mod: 26,,, | Performed by: RADIOLOGY

## 2022-04-21 PROCEDURE — 99214 OFFICE O/P EST MOD 30 MIN: CPT | Mod: PBBFAC,25 | Performed by: STUDENT IN AN ORGANIZED HEALTH CARE EDUCATION/TRAINING PROGRAM

## 2022-04-21 RX ORDER — GADOBUTROL 604.72 MG/ML
8 INJECTION INTRAVENOUS
Status: COMPLETED | OUTPATIENT
Start: 2022-04-21 | End: 2022-04-21

## 2022-04-21 RX ADMIN — GADOBUTROL 8 ML: 604.72 INJECTION INTRAVENOUS at 02:04

## 2022-04-22 ENCOUNTER — DOCUMENTATION ONLY (OUTPATIENT)
Dept: TRANSPLANT | Facility: CLINIC | Age: 72
End: 2022-04-22
Payer: MEDICARE

## 2022-04-22 PROBLEM — A43.9 NOCARDIA INFECTION: Status: ACTIVE | Noted: 2022-04-22

## 2022-04-22 LAB
EXT CMV DNA QUANT. BY PCR: ABNORMAL
EXT EOSINOPHIL%: 2
EXT HEMATOCRIT: 53.1 (ref 42–52)
EXT HEMOGLOBIN: 16 (ref 14–18)
EXT LYMPH%: 35
EXT MONOCYTES%: 9
EXT PLATELETS: 222 (ref 130–400)
EXT SEGS%: 51
EXT WBC: 6.3 (ref 4.5–11.5)

## 2022-04-22 NOTE — PROGRESS NOTES
Subjective:       Patient ID: Ronal Mazariegos is a 71 y.o. male.    Chief Complaint: Follow-up    HPI     Ronal Mazariegos is a 72 y/o male with a hx of polycystic kidney sidease s/p DBD KTx 5/2021 on tacro/pred, Afib on AC, and Polycythemia vera (09/2021) presents to clinic for follow up.    Patient was admitted 2/16-2/25 with L sided neurologic deficits and fall at home. Found to have right frontal mass, now s/p craniotomy 2/17/22 with cultures + nocardia nova. CT C/A/P notes a RML lesion- suspect lung lesion is also nocardia. Cardiac MRI done at OSH 02/09 Findings are consistent with infiltrative cardiomyopathy. TTE negative for vegetatation. Abnormal thickening of the aortic root unchanged from prior. Patient was discharged on IV meropenem and high dose PO bactrim. He followed up with ID 3/07/2022    Evaluated by heart transplant (Dr. Aguila) 4/1 s/p PYP scan 3/21/22 negative for TTR amyloidosis.  He had urine immuno negative for monoclonal band though serum had faint lambda light chain band in gamma.  Free light chain Dec 2021 with elevated kappa 2.89, normal lambda 2.04 and normal ratio 1.42. no evidence cardiac amyloidosis endomyocardial biopsy was not recommended.    He was also seen by Dr. Wheatley 4/7 .      Patient tolerating IV antibiotics- no issues with line or infusion.     Review of Systems   Constitutional: Negative for chills, fatigue and fever.   Eyes: Negative for visual disturbance.   Respiratory: Negative for cough and shortness of breath.    Cardiovascular: Negative for chest pain and leg swelling.   Gastrointestinal: Negative for abdominal pain, change in bowel habit, diarrhea and change in bowel habit.   Genitourinary: Negative for dysuria, flank pain and genital sores.   Musculoskeletal: Negative for back pain.   Neurological: Negative for numbness and headaches.   Hematological: Negative for adenopathy.         Objective:       Vitals:    04/21/22 1045   BP: 101/72   Pulse: (!) 51   Temp:  98.1 °F (36.7 °C)     Physical Exam  Constitutional:       Appearance: He is well-developed.   HENT:      Head: Normocephalic.   Cardiovascular:      Rate and Rhythm: Normal rate and regular rhythm.      Heart sounds: Normal heart sounds. No murmur heard.  Pulmonary:      Effort: Pulmonary effort is normal.      Breath sounds: Normal breath sounds.   Abdominal:      General: Bowel sounds are normal. There is no distension.      Palpations: Abdomen is soft.      Tenderness: There is no abdominal tenderness.   Musculoskeletal:         General: Normal range of motion.      Comments: Right chest TL - clean and dry wo erythema or tenderness    Neurological:      Mental Status: He is alert and oriented to person, place, and time.   Psychiatric:         Behavior: Behavior normal.         Assessment:       Problem List Items Addressed This Visit        ID    Nocardia infection          Plan:           -- Discontinue IV meropenem.    -- Start IV CTX 2 g IV Q12 and continue PO bactrim 2 DS Q8.    -- Continue weekly labs while on IV Abx ( CBC/CMP).    -- Optimal treatment duration 9-12 months, will consider switching IV Abx to another PO regimen ~Tedizolid/bactrim    -- Follow up repeat MRI and NSGY f/u.      RTC in 1 month.        Aaron House MD  Infectious Disease Fellow  PGY-5    Pager [phone]

## 2022-04-26 ENCOUNTER — HISTORICAL (OUTPATIENT)
Dept: ADMINISTRATIVE | Facility: HOSPITAL | Age: 72
End: 2022-04-26
Payer: MEDICARE

## 2022-04-26 LAB
ABS NEUT (OLG): 3.36 (ref 2.1–9.2)
ALBUMIN SERPL-MCNC: 3 G/DL (ref 3.4–4.8)
ALBUMIN/GLOB SERPL: 1.2 {RATIO} (ref 1.1–2)
ALP SERPL-CCNC: 108 U/L (ref 40–150)
ALT SERPL-CCNC: 22 U/L (ref 0–55)
AST SERPL-CCNC: 26 U/L (ref 5–34)
BASOPHILS # BLD AUTO: 0 10*3/UL (ref 0–0.2)
BASOPHILS NFR BLD AUTO: 1 %
BILIRUB SERPL-MCNC: 0.9 MG/DL
BILIRUBIN DIRECT+TOT PNL SERPL-MCNC: 0.4 (ref 0–0.5)
BILIRUBIN DIRECT+TOT PNL SERPL-MCNC: 0.5 (ref 0–0.8)
BUN SERPL-MCNC: 17.9 MG/DL (ref 8.4–25.7)
CALCIUM SERPL-MCNC: 9.3 MG/DL (ref 8.7–10.5)
CHLORIDE SERPL-SCNC: 111 MMOL/L (ref 98–107)
CO2 SERPL-SCNC: 21 MMOL/L (ref 23–31)
CREAT SERPL-MCNC: 1.63 MG/DL (ref 0.73–1.18)
CRP SERPL-MCNC: 0.62 MG/L
EOSINOPHIL # BLD AUTO: 0.1 10*3/UL (ref 0–0.9)
EOSINOPHIL NFR BLD AUTO: 1 %
ERYTHROCYTE [DISTWIDTH] IN BLOOD BY AUTOMATED COUNT: 21.8 % (ref 11.5–17)
ERYTHROCYTE [SEDIMENTATION RATE] IN BLOOD: 2 MM/H (ref 0–15)
GLOBULIN SER-MCNC: 2.4 G/DL (ref 2.4–3.5)
GLUCOSE SERPL-MCNC: 133 MG/DL (ref 82–115)
HCT VFR BLD AUTO: 47.1 % (ref 42–52)
HEMOLYSIS INTERF INDEX SERPL-ACNC: 16
HGB BLD-MCNC: 14.9 G/DL (ref 14–18)
ICTERIC INTERF INDEX SERPL-ACNC: 1
LIPEMIC INTERF INDEX SERPL-ACNC: 6
LYMPHOCYTES # BLD AUTO: 1.5 10*3/UL (ref 0.6–4.6)
LYMPHOCYTES NFR BLD AUTO: 26 %
MANUAL DIFF? (OHS): NO
MCH RBC QN AUTO: 25.6 PG (ref 27–31)
MCHC RBC AUTO-ENTMCNC: 31.6 G/DL (ref 33–36)
MCV RBC AUTO: 81.1 FL (ref 80–94)
MONOCYTES # BLD AUTO: 0.6 10*3/UL (ref 0.1–1.3)
MONOCYTES NFR BLD AUTO: 11 %
NEUTROPHILS # BLD AUTO: 3.36 10*3/UL (ref 2.1–9.2)
NEUTROPHILS NFR BLD AUTO: 57 %
PLATELET # BLD AUTO: 185 10*3/UL (ref 130–400)
PMV BLD AUTO: 10.8 FL (ref 9.4–12.4)
POTASSIUM SERPL-SCNC: 5.2 MMOL/L (ref 3.5–5.1)
PROT SERPL-MCNC: 5.4 G/DL (ref 5.8–7.6)
RBC # BLD AUTO: 5.81 10*6/UL (ref 4.7–6.1)
SODIUM SERPL-SCNC: 139 MMOL/L (ref 136–145)
WBC # SPEC AUTO: 5.9 10*3/UL (ref 4.5–11.5)

## 2022-04-27 PROCEDURE — G0179 MD RECERTIFICATION HHA PT: HCPCS | Mod: ,,, | Performed by: INTERNAL MEDICINE

## 2022-04-27 PROCEDURE — G0179 PR HOME HEALTH MD RECERTIFICATION: ICD-10-PCS | Mod: ,,, | Performed by: INTERNAL MEDICINE

## 2022-04-29 ENCOUNTER — EXTERNAL HOME HEALTH (OUTPATIENT)
Dept: HOME HEALTH SERVICES | Facility: HOSPITAL | Age: 72
End: 2022-04-29
Payer: MEDICARE

## 2022-05-02 ENCOUNTER — LAB VISIT (OUTPATIENT)
Dept: LAB | Facility: HOSPITAL | Age: 72
End: 2022-05-02
Attending: INTERNAL MEDICINE
Payer: MEDICARE

## 2022-05-02 ENCOUNTER — PATIENT MESSAGE (OUTPATIENT)
Dept: TRANSPLANT | Facility: CLINIC | Age: 72
End: 2022-05-02
Payer: MEDICARE

## 2022-05-02 DIAGNOSIS — E55.9 VITAMIN D DEFICIENCY: Primary | ICD-10-CM

## 2022-05-02 DIAGNOSIS — N25.81 HYPERPARATHYROIDISM, SECONDARY: ICD-10-CM

## 2022-05-02 DIAGNOSIS — E83.52 HYPERCALCEMIA: ICD-10-CM

## 2022-05-02 DIAGNOSIS — Z79.899 ENCOUNTER FOR LONG-TERM (CURRENT) USE OF OTHER MEDICATIONS: Primary | ICD-10-CM

## 2022-05-02 DIAGNOSIS — R80.9 PROTEIN IN URINE: ICD-10-CM

## 2022-05-02 DIAGNOSIS — N28.9 KIDNEY DISEASE: ICD-10-CM

## 2022-05-02 DIAGNOSIS — Z94.0 KIDNEY TRANSPLANTED: Primary | ICD-10-CM

## 2022-05-02 DIAGNOSIS — Z79.899 ENCOUNTER FOR LONG-TERM (CURRENT) USE OF OTHER MEDICATIONS: ICD-10-CM

## 2022-05-02 DIAGNOSIS — Z94.0 KIDNEY TRANSPLANTED: ICD-10-CM

## 2022-05-02 DIAGNOSIS — T86.10 COMPLICATION OF TRANSPLANTED KIDNEY, UNSPECIFIED COMPLICATION: ICD-10-CM

## 2022-05-02 LAB
ALBUMIN SERPL-MCNC: 3.5 GM/DL (ref 3.4–4.8)
ALP SERPL-CCNC: 104 UNIT/L (ref 40–150)
ALT SERPL-CCNC: 24 UNIT/L (ref 0–55)
ANION GAP SERPL CALC-SCNC: 12 MEQ/L
APPEARANCE UR: CLEAR
AST SERPL-CCNC: 26 UNIT/L (ref 5–34)
BACTERIA #/AREA URNS AUTO: NORMAL /HPF
BILIRUB UR QL STRIP.AUTO: NEGATIVE MG/DL
BILIRUBIN DIRECT+TOT PNL SERPL-MCNC: 0.5 MG/DL (ref 0–0.5)
BILIRUBIN DIRECT+TOT PNL SERPL-MCNC: 0.6 MG/DL (ref 0–0.8)
BILIRUBIN DIRECT+TOT PNL SERPL-MCNC: 1.1 MG/DL
BUN SERPL-MCNC: 21.6 MG/DL (ref 8.4–25.7)
CALCIUM SERPL-MCNC: 9.8 MG/DL (ref 8.8–10)
CHLORIDE SERPL-SCNC: 104 MMOL/L (ref 98–107)
CO2 SERPL-SCNC: 23 MMOL/L (ref 23–31)
COLOR UR AUTO: YELLOW
CREAT SERPL-MCNC: 1.65 MG/DL (ref 0.73–1.18)
CREAT UR-MCNC: 48.6 MG/DL (ref 63–166)
CREAT/UREA NIT SERPL: 13
DEPRECATED CALCIDIOL+CALCIFEROL SERPL-MC: 20.8 NG/ML (ref 30–80)
GLUCOSE SERPL-MCNC: 78 MG/DL (ref 82–115)
GLUCOSE UR QL STRIP.AUTO: NEGATIVE MG/DL
KETONES UR QL STRIP.AUTO: NEGATIVE MG/DL
LEUKOCYTE ESTERASE UR QL STRIP.AUTO: NEGATIVE UNIT/L
MAGNESIUM SERPL-MCNC: 2 MG/DL (ref 1.6–2.6)
NITRITE UR QL STRIP.AUTO: NEGATIVE
PH UR STRIP.AUTO: 7 [PH]
PHOSPHATE SERPL-MCNC: 3.2 MG/DL (ref 2.3–4.7)
POTASSIUM SERPL-SCNC: 5.1 MMOL/L (ref 3.5–5.1)
PROT SERPL-MCNC: 6 GM/DL (ref 5.8–7.6)
PROT UR QL STRIP.AUTO: NEGATIVE MG/DL
PROT UR STRIP-MCNC: 23.2 MG/DL
PROT/CREAT UR-RTO: 477.4 MG/GM CR
PTH-INTACT SERPL-MCNC: 142 PG/ML (ref 8.7–77)
RBC #/AREA URNS AUTO: NORMAL /HPF
RBC UR QL AUTO: ABNORMAL UNIT/L
SODIUM SERPL-SCNC: 139 MMOL/L (ref 136–145)
SP GR UR STRIP.AUTO: 1.02 (ref 1–1.03)
SQUAMOUS #/AREA URNS AUTO: NORMAL /LPF
UROBILINOGEN UR STRIP-ACNC: 0.2 MG/DL
WBC #/AREA URNS AUTO: NORMAL /HPF

## 2022-05-02 PROCEDURE — 80053 COMPREHEN METABOLIC PANEL: CPT

## 2022-05-02 PROCEDURE — 82570 ASSAY OF URINE CREATININE: CPT

## 2022-05-02 PROCEDURE — 82306 VITAMIN D 25 HYDROXY: CPT

## 2022-05-02 PROCEDURE — 83735 ASSAY OF MAGNESIUM: CPT

## 2022-05-02 PROCEDURE — 81003 URINALYSIS AUTO W/O SCOPE: CPT

## 2022-05-02 PROCEDURE — 36415 COLL VENOUS BLD VENIPUNCTURE: CPT

## 2022-05-02 PROCEDURE — 83970 ASSAY OF PARATHORMONE: CPT

## 2022-05-02 PROCEDURE — 81001 URINALYSIS AUTO W/SCOPE: CPT

## 2022-05-02 PROCEDURE — 80197 ASSAY OF TACROLIMUS: CPT

## 2022-05-02 PROCEDURE — 84100 ASSAY OF PHOSPHORUS: CPT

## 2022-05-02 NOTE — PROGRESS NOTES
Needs more hydration. How is his UOP? Any decrease?renal panel a week after hydration along with cylex, DSA, allosure  Continue kayexalate on MWF   Ergo 00147 units every week x 12 months  Calcitriol 0.25 mcg daily

## 2022-05-02 NOTE — TELEPHONE ENCOUNTER
----- Message from Celeste Yen MD sent at 5/2/2022 12:46 PM CDT -----  Needs more hydration. How is his UOP? Any decrease?renal panel a week after hydration along with cylex, DSA, allosure  Continue kayexalate on MWF   Ergo 87083 units every week x 12 months  Calcitriol 0.25 mcg daily

## 2022-05-03 ENCOUNTER — PATIENT MESSAGE (OUTPATIENT)
Dept: TRANSPLANT | Facility: CLINIC | Age: 72
End: 2022-05-03
Payer: MEDICARE

## 2022-05-03 PROCEDURE — 80053 COMPREHEN METABOLIC PANEL: CPT | Performed by: INTERNAL MEDICINE

## 2022-05-03 PROCEDURE — 85651 RBC SED RATE NONAUTOMATED: CPT | Performed by: INTERNAL MEDICINE

## 2022-05-03 PROCEDURE — 86140 C-REACTIVE PROTEIN: CPT | Performed by: INTERNAL MEDICINE

## 2022-05-03 PROCEDURE — 85025 COMPLETE CBC W/AUTO DIFF WBC: CPT | Performed by: INTERNAL MEDICINE

## 2022-05-03 RX ORDER — CALCITRIOL 0.25 UG/1
0.25 CAPSULE ORAL DAILY
Qty: 90 CAPSULE | Refills: 3 | Status: SHIPPED | OUTPATIENT
Start: 2022-05-03 | End: 2022-09-30

## 2022-05-03 RX ORDER — ERGOCALCIFEROL 1.25 MG/1
50000 CAPSULE ORAL
Qty: 12 CAPSULE | Refills: 3 | Status: ON HOLD | OUTPATIENT
Start: 2022-05-03 | End: 2023-01-03 | Stop reason: HOSPADM

## 2022-05-03 NOTE — TELEPHONE ENCOUNTER
Notified patient of Dr. Yen's review of 5/2/22 lab results via [facility].     Hi Mr. Villeda,     Dr. Yen reviewed your 5/2/22 lab results. Your creatinine bumped up to 1.65. She wants to know if you're experiencing any urinary issues? If you're not experiencing any urinary issues, your creatinine could be elevated due to the intake of bactrim, so she wants you to increase your daily water intake to at least 3Liters & we'll need to recheck your renal panel next week (Tues 5/10/22).     Dr. Yen wants you to continue taking Kayexalate every Mon, Wed & Fri to help keep your potassium within normal range.    Your vitamin D level is low. Dr. Yen is writing prescriptions for you to take calcitriol 0.25mcg by mouth daily & ergocalciferol 50,000Untis by mouth every week for 12 months.     Thanks,     Tiara              ----- Message from Celeste Yen MD sent at 5/2/2022 12:46 PM CDT -----  Needs more hydration. How is his UOP? Any decrease?renal panel a week after hydration along with cylex, DSA, allosure  Continue kayexalate on MWF   Ergo 74309 units every week x 12 months  Calcitriol 0.25 mcg daily

## 2022-05-04 ENCOUNTER — PATIENT MESSAGE (OUTPATIENT)
Dept: TRANSPLANT | Facility: CLINIC | Age: 72
End: 2022-05-04
Payer: MEDICARE

## 2022-05-04 ENCOUNTER — LAB REQUISITION (OUTPATIENT)
Dept: LAB | Facility: HOSPITAL | Age: 72
End: 2022-05-04
Payer: MEDICARE

## 2022-05-04 DIAGNOSIS — G06.0 INTRACRANIAL ABSCESS AND GRANULOMA: ICD-10-CM

## 2022-05-04 LAB
ALBUMIN SERPL-MCNC: 3.4 GM/DL (ref 3.4–4.8)
ALBUMIN/GLOB SERPL: 1.4 RATIO (ref 1.1–2)
ALP SERPL-CCNC: 92 UNIT/L (ref 40–150)
ALT SERPL-CCNC: 22 UNIT/L (ref 0–55)
AST SERPL-CCNC: 26 UNIT/L (ref 5–34)
BASOPHILS # BLD AUTO: 0.05 X10(3)/MCL (ref 0–0.2)
BASOPHILS NFR BLD AUTO: 0.7 %
BILIRUBIN DIRECT+TOT PNL SERPL-MCNC: 0.4 MG/DL (ref 0–0.5)
BILIRUBIN DIRECT+TOT PNL SERPL-MCNC: 0.5 MG/DL (ref 0–0.8)
BILIRUBIN DIRECT+TOT PNL SERPL-MCNC: 0.9 MG/DL
BUN SERPL-MCNC: 19.9 MG/DL (ref 8.4–25.7)
CALCIUM SERPL-MCNC: 9.5 MG/DL (ref 8.8–10)
CHLORIDE SERPL-SCNC: 104 MMOL/L (ref 98–107)
CO2 SERPL-SCNC: 19 MMOL/L (ref 23–31)
CREAT SERPL-MCNC: 1.58 MG/DL (ref 0.73–1.18)
EOSINOPHIL # BLD AUTO: 0.08 X10(3)/MCL (ref 0–0.9)
EOSINOPHIL NFR BLD AUTO: 1.2 %
ERYTHROCYTE [DISTWIDTH] IN BLOOD BY AUTOMATED COUNT: 22.8 % (ref 11.5–17)
ERYTHROCYTE [SEDIMENTATION RATE] IN BLOOD: 1 MM/HR (ref 0–15)
GLOBULIN SER-MCNC: 2.4 GM/DL (ref 2.4–3.5)
GLUCOSE SERPL-MCNC: 40 MG/DL (ref 82–115)
HCT VFR BLD AUTO: 47.8 % (ref 42–52)
HGB BLD-MCNC: 14.2 GM/DL (ref 14–18)
IMM GRANULOCYTES # BLD AUTO: 0.21 X10(3)/MCL (ref 0–0.02)
IMM GRANULOCYTES NFR BLD AUTO: 3.1 % (ref 0–0.43)
LYMPHOCYTES # BLD AUTO: 1.75 X10(3)/MCL (ref 0.6–4.6)
LYMPHOCYTES NFR BLD AUTO: 25.6 %
MCH RBC QN AUTO: 25.6 PG (ref 27–31)
MCHC RBC AUTO-ENTMCNC: 29.7 MG/DL (ref 33–36)
MCV RBC AUTO: 86.3 FL (ref 80–94)
MONOCYTES # BLD AUTO: 0.59 X10(3)/MCL (ref 0.1–1.3)
MONOCYTES NFR BLD AUTO: 8.6 %
NEUTROPHILS # BLD AUTO: 4.2 X10(3)/MCL (ref 2.1–9.2)
NEUTROPHILS NFR BLD AUTO: 60.8 %
NRBC BLD AUTO-RTO: 0 %
PATH REV: NORMAL
PLATELET # BLD AUTO: 201 X10(3)/MCL (ref 130–400)
PMV BLD AUTO: 9.3 FL (ref 9.4–12.4)
POTASSIUM SERPL-SCNC: 5.7 MMOL/L (ref 3.5–5.1)
PROT SERPL-MCNC: 5.8 GM/DL (ref 5.8–7.6)
RBC # BLD AUTO: 5.54 X10(6)/MCL (ref 4.7–6.1)
SODIUM SERPL-SCNC: 136 MMOL/L (ref 136–145)
TACROLIMUS TROUGH BLD-MCNC: 6.4 NG/ML
WBC # SPEC AUTO: 6.8 X10(3)/MCL (ref 4.5–11.5)

## 2022-05-05 ENCOUNTER — HOSPITAL ENCOUNTER (OUTPATIENT)
Dept: RADIOLOGY | Facility: OTHER | Age: 72
Discharge: HOME | End: 2022-05-05
Attending: STUDENT IN AN ORGANIZED HEALTH CARE EDUCATION/TRAINING PROGRAM
Payer: MEDICARE

## 2022-05-05 ENCOUNTER — OFFICE VISIT (OUTPATIENT)
Dept: TRANSPLANT | Facility: CLINIC | Age: 72
End: 2022-05-05
Payer: MEDICARE

## 2022-05-05 ENCOUNTER — PATIENT MESSAGE (OUTPATIENT)
Dept: TRANSPLANT | Facility: CLINIC | Age: 72
End: 2022-05-05

## 2022-05-05 ENCOUNTER — OFFICE VISIT (OUTPATIENT)
Dept: SPINE | Facility: CLINIC | Age: 72
End: 2022-05-05
Payer: MEDICARE

## 2022-05-05 VITALS
SYSTOLIC BLOOD PRESSURE: 114 MMHG | BODY MASS INDEX: 22.8 KG/M2 | HEART RATE: 109 BPM | DIASTOLIC BLOOD PRESSURE: 63 MMHG | RESPIRATION RATE: 16 BRPM | WEIGHT: 177.69 LBS | OXYGEN SATURATION: 95 % | TEMPERATURE: 97 F | HEIGHT: 74 IN

## 2022-05-05 VITALS — SYSTOLIC BLOOD PRESSURE: 114 MMHG | HEART RATE: 106 BPM | DIASTOLIC BLOOD PRESSURE: 66 MMHG | TEMPERATURE: 98 F

## 2022-05-05 DIAGNOSIS — I10 HYPERTENSION, UNSPECIFIED TYPE: ICD-10-CM

## 2022-05-05 DIAGNOSIS — E87.5 HYPERKALEMIA: ICD-10-CM

## 2022-05-05 DIAGNOSIS — A43.9 NOCARDIA INFECTION: ICD-10-CM

## 2022-05-05 DIAGNOSIS — E87.5 HYPERKALEMIA: Primary | ICD-10-CM

## 2022-05-05 DIAGNOSIS — S06.5XAA SDH (SUBDURAL HEMATOMA): Primary | ICD-10-CM

## 2022-05-05 DIAGNOSIS — Z94.0 KIDNEY REPLACED BY TRANSPLANT: ICD-10-CM

## 2022-05-05 DIAGNOSIS — I48.21 PERMANENT ATRIAL FIBRILLATION: Primary | ICD-10-CM

## 2022-05-05 DIAGNOSIS — G06.0 INTRACRANIAL ABSCESS: ICD-10-CM

## 2022-05-05 DIAGNOSIS — Z79.60 LONG-TERM USE OF IMMUNOSUPPRESSANT MEDICATION: ICD-10-CM

## 2022-05-05 DIAGNOSIS — Z79.01 LONG TERM (CURRENT) USE OF ANTICOAGULANTS: Chronic | ICD-10-CM

## 2022-05-05 LAB — CMV DNA SERPL NAA+PROBE-ACNC: <35 IU/ML

## 2022-05-05 PROCEDURE — 99999 PR PBB SHADOW E&M-EST. PATIENT-LVL IV: CPT | Mod: PBBFAC,,, | Performed by: INTERNAL MEDICINE

## 2022-05-05 PROCEDURE — 70450 CT HEAD WITHOUT CONTRAST: ICD-10-PCS | Mod: 26,,, | Performed by: RADIOLOGY

## 2022-05-05 PROCEDURE — 99999 PR PBB SHADOW E&M-EST. PATIENT-LVL IV: ICD-10-PCS | Mod: PBBFAC,,, | Performed by: INTERNAL MEDICINE

## 2022-05-05 PROCEDURE — 99999 PR PBB SHADOW E&M-EST. PATIENT-LVL IV: ICD-10-PCS | Mod: PBBFAC,,, | Performed by: STUDENT IN AN ORGANIZED HEALTH CARE EDUCATION/TRAINING PROGRAM

## 2022-05-05 PROCEDURE — 99024 POSTOP FOLLOW-UP VISIT: CPT | Mod: S$PBB,,, | Performed by: STUDENT IN AN ORGANIZED HEALTH CARE EDUCATION/TRAINING PROGRAM

## 2022-05-05 PROCEDURE — 70450 CT HEAD/BRAIN W/O DYE: CPT | Mod: 26,,, | Performed by: RADIOLOGY

## 2022-05-05 PROCEDURE — 99024 PR POST-OP FOLLOW-UP VISIT: ICD-10-PCS | Mod: S$PBB,,, | Performed by: STUDENT IN AN ORGANIZED HEALTH CARE EDUCATION/TRAINING PROGRAM

## 2022-05-05 PROCEDURE — 99214 OFFICE O/P EST MOD 30 MIN: CPT | Mod: PBBFAC,25 | Performed by: STUDENT IN AN ORGANIZED HEALTH CARE EDUCATION/TRAINING PROGRAM

## 2022-05-05 PROCEDURE — 70450 CT HEAD/BRAIN W/O DYE: CPT | Mod: TC

## 2022-05-05 PROCEDURE — 99215 OFFICE O/P EST HI 40 MIN: CPT | Mod: S$PBB,,, | Performed by: INTERNAL MEDICINE

## 2022-05-05 PROCEDURE — 99999 PR PBB SHADOW E&M-EST. PATIENT-LVL IV: CPT | Mod: PBBFAC,,, | Performed by: STUDENT IN AN ORGANIZED HEALTH CARE EDUCATION/TRAINING PROGRAM

## 2022-05-05 PROCEDURE — 99215 PR OFFICE/OUTPT VISIT, EST, LEVL V, 40-54 MIN: ICD-10-PCS | Mod: S$PBB,,, | Performed by: INTERNAL MEDICINE

## 2022-05-05 PROCEDURE — 99214 OFFICE O/P EST MOD 30 MIN: CPT | Mod: PBBFAC,25,27 | Performed by: INTERNAL MEDICINE

## 2022-05-05 RX ORDER — ASPIRIN 325 MG
1.25 TABLET, DELAYED RELEASE (ENTERIC COATED) ORAL
COMMUNITY
Start: 2021-08-30 | End: 2022-05-05

## 2022-05-05 RX ORDER — CALCITRIOL 0.25 UG/1
0.25 CAPSULE ORAL
COMMUNITY
Start: 2021-08-14 | End: 2022-05-05

## 2022-05-05 RX ORDER — SODIUM POLYSTYRENE SULFONATE 4.1 MEQ/G
POWDER, FOR SUSPENSION ORAL; RECTAL
Qty: 454 G | Refills: 4 | Status: SHIPPED | OUTPATIENT
Start: 2022-05-05 | End: 2022-05-05

## 2022-05-05 RX ORDER — SODIUM POLYSTYRENE SULFONATE 4.1 MEQ/G
POWDER, FOR SUSPENSION ORAL; RECTAL
Qty: 454 G | Refills: 4 | Status: SHIPPED | OUTPATIENT
Start: 2022-05-05 | End: 2022-11-14

## 2022-05-05 RX ORDER — SODIUM POLYSTYRENE SULFONATE 4.1 MEQ/G
POWDER, FOR SUSPENSION ORAL; RECTAL
Qty: 45 G | Refills: 0 | OUTPATIENT
Start: 2022-05-05 | End: 2022-08-15

## 2022-05-05 RX ORDER — CEFTRIAXONE 2 G/1
INJECTION, POWDER, FOR SOLUTION INTRAMUSCULAR; INTRAVENOUS
COMMUNITY
Start: 2022-05-03 | End: 2022-07-12 | Stop reason: CLARIF

## 2022-05-05 NOTE — TELEPHONE ENCOUNTER
Hi Mr. Villeda,      I know you maybe on your way back home & may not see this message until later. I'm sending the message to let you know Dr. Yen reviewed your today's renal panel. Your potassium is 5.5. She wants you to increase your Kayexalate dose to 30gm by mouth for 3 days (starting with tonight's dose - Thurs, Friday & Sat) then change to Kayexalate 30gm every Mon, Wed & Fri instead of taking 15gm. We'll recheck your labs on Monday instead of Tues. I'll change the lab appointment in the system to Monday.      Have a safe trip back home. Tell Ms. Pena Happy Mother's Day!     Tiara           ----- Message from Celeste Yen MD sent at 5/5/2022  4:04 PM CDT -----  Please kayaxalate 30 gram daily x 3 days then MWF.   Check K on Monday

## 2022-05-05 NOTE — LETTER
May 5, 2022        Liam Ryan  [address]  [place] [address]  Phone: [phone]  Fax: [phone]             [facility]- Transplant 1st Fl  [address]  [facility] [address]  Phone: [phone]   Patient: Ronal Mazariegos   MR Number: [id number]   YOB: 1950   Date of Visit: 5/5/2022       Dear Dr. Liam Ryan    Thank you for referring Ronal Mazariegos to me for evaluation. Attached you will find relevant portions of my assessment and plan of care.    If you have questions, please do not hesitate to call me. I look forward to following Ronal Mazariegos along with you.    Sincerely,    Celeste Yen MD    Enclosure    If you would like to receive this communication electronically, please contact [email or web address] or [phone] to request [employer] access.    [employer] is a tool which provides read-only access to select patient information with whom you have a relationship. Its easy to use and provides real time access to review your patients record including encounter summaries, notes, results, and demographic information.    If you feel you have received this communication in error or would no longer like to receive these types of communications, please e-mail [email or web address]

## 2022-05-05 NOTE — PROGRESS NOTES
Neurosurgery  Established Patient    SUBJECTIVE:     History of Present Illness:  Ronal Mazariegos is a 71 y.o. male with PMHx of Afib, recent kidney transplant on 5/2021 on immunosuppressants, cardiomyopathy, pulmonary hypertension who presented to [facility] ED with left sided weakness. MRI demonstrated a right frontal 2.2cm ring enhancing lesion, intracranial abscess. He is s/p right frontal craniotomy for abscess drainage on 2/17/22. Cultures positive for Nocardia novis. On IV Imipenem for 6-12mos. Patient following up with ID. Repeat CT head scans during hospital stay were stable. Aspirin restarted. Eliquis held until repeat CT head today. He also presents for wound check and staple removal. Patient states he feels as if his strength on his left side is back to baseline since discharge. He has been working well with PT/OT. He denies new focal weakness, numbness, headaches, vision changes, seizure like activity, lethargy, confusion, nausea and vomiting. His wife is with him today and endorses this as well. CT head obtained after appointment today.     Interval fu 4/21/2022: Pt is doing well and denies HA, vomiting, focal numbness, weakness, parasthesias in his extremities.  He does have some mild numbness around his scalp near the incision site.  He is still on antibiotics.    Interval fu 5/5/2022: Pt denies HAs, new focal numbness, weakness.  He is still on antibiotics.  No issues with wound healing.  He has been off eliquis since his last MRI brain.    Review of patient's allergies indicates:  No Known Allergies    Current Outpatient Medications   Medication Sig Dispense Refill    apixaban (ELIQUIS) 5 mg Tab Take 1 tablet (5 mg total) by mouth 2 (two) times daily. DO NOT RESTART UNTIL APPROVED BY NEUROSURGERY 60 tablet 11    calcitRIOL (ROCALTROL) 0.25 MCG Cap Take 1 capsule (0.25 mcg total) by mouth once daily. 90 capsule 3    cefTRIAXone (ROCEPHIN) 2 gram injection       ergocalciferol (ERGOCALCIFEROL) 50,000  unit Cap Take 1 capsule (50,000 Units total) by mouth every 7 days. X 12 months 12 capsule 3    finasteride (PROSCAR) 5 mg tablet Take 5 mg by mouth once daily.      fish oil-omega-3 fatty acids 300-1,000 mg capsule Take 1 capsule by mouth once daily.       fluticasone propionate (FLONASE) 50 mcg/actuation nasal spray       k phos di & mono-sod phos mono (K-PHOS-NEUTRAL) 250 mg Tab Take 2 tablets by mouth 2 (two) times a day. 120 tablet 11    magnesium oxide (MAG-OX) 400 mg (241.3 mg magnesium) tablet Take 3 tablets (1,200 mg total) by mouth 2 (two) times daily. 120 tablet 11    metoprolol tartrate (LOPRESSOR) 50 MG tablet Take 1 tablet (50 mg total) by mouth 2 (two) times daily. 60 tablet 11    multivitamin Tab Take 1 tablet by mouth once daily.      predniSONE (DELTASONE) 5 MG tablet Take by mouth daily. 5/7-6/6 20 mg, 6/7-7/6 15 mg, 7/7-8/6 10 mg, 5 mg daily thereafter starting 8/7/21 120 tablet 11    sodium bicarbonate 650 MG tablet Take 2 tablets (1,300 mg total) by mouth 2 (two) times daily. 120 tablet 11    sodium polystyrene (KAYEXALATE) powder Mix 4 LEVEL TEASPOONS (30 grams total) in 60mL water and drink by mouth once daily every Mon, Wed & Friday  (for high potassium), 454 g 4    sulfamethoxazole-trimethoprim 800-160mg (BACTRIM DS) 800-160 mg Tab Take 2 tablets by mouth 3 (three) times daily. 180 tablet 11    tacrolimus (PROGRAF) 1 MG Cap Take 2 capsules (2 mg total) by mouth every 12 (twelve) hours. Z94.0;Kidney txp on 5/4/21 120 capsule 11     No current facility-administered medications for this visit.       Past Medical History:   Diagnosis Date    Anasarca 10/1/2021    Anemia of chronic disease     Atrial fibrillation     BPH (benign prostatic hypertrophy)     Chronic systolic heart failure 10/1/2021    CKD (chronic kidney disease) stage 2, GFR 60-89 ml/min     Hepatitis C virus infection cured after antiviral drug therapy     acquired through kidney transplant, treated / cured  (SVR12 - 2021)    HTN (hypertension)     HTN (hypertension)     Polycystic kidney disease      Past Surgical History:   Procedure Laterality Date    AV FISTULA PLACEMENT Left     CATARACT EXTRACTION, BILATERAL Bilateral     CRANIOTOMY Right 2022    Procedure: CRANIOTOMY;  Surgeon: Sunday Rosa DO;  Location: [facility] OR [address] [place] [address];  Service: Neurosurgery;  Laterality: Right;  R craniotomy for abscess drainage    KIDNEY TRANSPLANT N/A 2021    Procedure: TRANSPLANT, KIDNEY;  Surgeon: Lexy Bolden MD;  Location: [facility] OR [address] [place] [address];  Service: Transplant;  Laterality: N/A;     Family History     Problem Relation (Age of Onset)    Hypertension Father, Sister    Kidney disease Father, Sister    Polycystic kidney disease Father, Sister        Social History     Socioeconomic History    Marital status:     Number of children: 1   Tobacco Use    Smoking status: Former Smoker     Packs/day: 2.00     Years: 10.00     Pack years: 20.00     Types: Cigarettes     Start date: 1972     Quit date: 1984     Years since quittin.4    Smokeless tobacco: Never Used   Substance and Sexual Activity    Alcohol use: No     Comment: Pt reportsbeing a former beer drinker (2-3 cans per day) and quitting in .    Drug use: No    Sexual activity: Yes       Review of Systems   14 point ROS was negative    OBJECTIVE:     Vital Signs  Temp: 98.2 °F (36.8 °C)  Pulse: 106  BP: 114/66  Pain Score: 0-No pain  There is no height or weight on file to calculate BMI.    Neurosurgery Physical Exam  Constitutional: He appears well-developed and well-nourished.      Eyes: Pupils are equal, round, and reactive to light.      Cardiovascular: Normal rate and regular rhythm.      Abdominal: Soft.     Psych/Behavior: He is alert. He is oriented to person, place, and time. He has a normal mood and affect.     Musculoskeletal: Gait is normal.        Neck: Range of motion is full.        Back: Range of motion is  full.        Right Upper Extremities: Muscle strength is 5/5.        Left Upper Extremities: Muscle strength is 5/5.       Right Lower Extremities: Muscle strength is 5/5.        Left Lower Extremities: Muscle strength is 5/5.     Neurological:        Sensory: There is no sensory deficit in the trunk. There is no sensory deficit in the extremities.        DTRs: DTRs are DTRS NORMAL AND SYMMETRICnormal and symmetric.        Cranial nerves: Cranial nerve(s) II, III, IV, V, VI, VII, VIII, IX, X, XI and XII are intact.      Right frontal incision well healed.    Diagnostic Results:  CT head: right extraaxial fluid collection consistent with subacute SDH measuring 1.1 cm in max thickness.  No mass effect or midline shift.  Interval improvement in right frontal edema s/p abscess evac.  Stable extraaxial collection from prior MRI brain.  Reviewed    ASSESSMENT/PLAN:     71 y.o. male with PMHx of Afib, recent kidney transplant on 5/2021 on immunosuppressants, cardiomyopathy, pulmonary hypertension who presented in feb of 2022 with right frontal abscess s/p right frontal crani and evac of abscess.  He is back to his neuro baseline.  His CT head shows a stable subacute SDH from his prior MRI and he remains off eliquis.  I discussed the imaging findings with the pt and that I would like him to be evaluated for possible right MMA embolization of his right convexity SDH as I don't believe the SDH requires a craniotomy and evac at this point.  I would like him to remain off eliquis for now.  -Referral for possible right MMA embolization  -Continue abx per ID.

## 2022-05-05 NOTE — PROGRESS NOTES
Kidney Post-Transplant Assessment    Referring Physician: Alexis Zamorano  Current Nephrologist: Alexis Zamorano    ORGAN: RIGHT KIDNEY  Donor Type: donation after brain death  PHS Increased Risk: yes  Cold Ischemia: 486 mins  Induction Medications: simulect - basiliximab    Subjective:     CC:  Reassessment of renal allograft function and management of chronic immunosuppression.    Kidney History:  Mr. Mazariegos is a 71 y.o. year old White male with ESRD secondary to polycystic kidney disease.  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 He was on coumadin prior to transplant for atrial fibrillation    Post Transplant Course :  - Afib: warfarin switched to eliquis. However eliquis is on hold per neurosurgery after craniotomy in Feb 2022  - CMV viremia: resolved  - Intracranial Nocardia infection:  Admitted at hospital with  left sided weakness. MRI demonstrated a right frontal 2.2cm ring enhancing lesion, intracranial abscess. He is s/p right frontal craniotomy for abscess drainage on 2/17/22. Cultures positive for Nocardia novis. On IV Ceftriaxone and bactrim.  Patient following up with ID and neurosurgery. Aspirin restarted. Eliquis still on hold.     Interval History: he presents at clinic alone. No complaints. He is doing fine. Taking his antibiotics for Nocardia infection and following with neurosurgery and ID teams at [facility]. BP readings are in normal range on daily basis.  he denies any fever, headache, chest pain, shortness of breath, ENT symptoms, nausea/vomiting, dysuria, frequency, urgency, or pain over the allograft. All labs were reviewed with patient and addressed       Current Medication  Current Outpatient Medications   Medication Sig Dispense Refill    apixaban (ELIQUIS) 5 mg Tab Take 1 tablet (5 mg total) by mouth 2 (two) times daily. DO NOT RESTART UNTIL APPROVED BY NEUROSURGERY 60 tablet 11    calcitRIOL (ROCALTROL) 0.25 MCG Cap Take 1  capsule (0.25 mcg total) by mouth once daily. 90 capsule 3    cefTRIAXone (ROCEPHIN) 2 gram injection       ergocalciferol (ERGOCALCIFEROL) 50,000 unit Cap Take 1 capsule (50,000 Units total) by mouth every 7 days. X 12 months 12 capsule 3    finasteride (PROSCAR) 5 mg tablet Take 5 mg by mouth once daily.      fish oil-omega-3 fatty acids 300-1,000 mg capsule Take 1 capsule by mouth once daily.       fluticasone propionate (FLONASE) 50 mcg/actuation nasal spray       k phos di & mono-sod phos mono (K-PHOS-NEUTRAL) 250 mg Tab Take 2 tablets by mouth 2 (two) times a day. 120 tablet 11    magnesium oxide (MAG-OX) 400 mg (241.3 mg magnesium) tablet Take 3 tablets (1,200 mg total) by mouth 2 (two) times daily. 120 tablet 11    metoprolol tartrate (LOPRESSOR) 50 MG tablet Take 1 tablet (50 mg total) by mouth 2 (two) times daily. 60 tablet 11    multivitamin Tab Take 1 tablet by mouth once daily.      predniSONE (DELTASONE) 5 MG tablet Take by mouth daily. 5/7-6/6 20 mg, 6/7-7/6 15 mg, 7/7-8/6 10 mg, 5 mg daily thereafter starting 8/7/21 120 tablet 11    sodium bicarbonate 650 MG tablet Take 2 tablets (1,300 mg total) by mouth 2 (two) times daily. 120 tablet 11    sodium polystyrene (KAYEXALATE) powder Mix 4 LEVEL TEASPOONS (15grams total) in 60mL water and drink by mouth once daily every Mon, Wed & Friday  (for high potassium), 454 g 4    sulfamethoxazole-trimethoprim 800-160mg (BACTRIM DS) 800-160 mg Tab Take 2 tablets by mouth 3 (three) times daily. 180 tablet 11    tacrolimus (PROGRAF) 1 MG Cap Take 2 capsules (2 mg total) by mouth every 12 (twelve) hours. Z94.0;Kidney txp on 5/4/21 120 capsule 11     No current facility-administered medications for this visit.         Review of Systems    Constitutional: Negative for fever, appetite change and fatigue.   Respiratory: Negative for cough, chest tightness, shortness of breath and wheezing.   Cardiovascular: Negative for chest pain, palpitations and leg  swelling.   Gastrointestinal: Negative for nausea, vomiting, abdominal pain, diarrhea, constipation, blood in stool and abdominal distention.   Genitourinary: Negative for dysuria, urgency, frequency, hematuria, decreased urine volume, difficulty urinating  Neurological: Negative for dizziness, tremors, syncope, weakness, light-headedness and headaches.   Hematological: Negative for adenopathy. Does not bruise/bleed easily.   Psychiatric/Behavioral: Negative for confusion, sleep disturbance and dysphoric mood. The patient is not nervous/anxious.       Objective:     Vitals:    05/05/22 1258   BP: 114/63   Pulse: 109   Resp: 16   Temp: 97.3 °F (36.3 °C)     Resting comfortably, NAD  Neck- no JVD  Lungs- no rhonchi, no rale. Respirations - unlabored  Heart: irregular, no murmur, no rub  EXT: Edema- none  Skin- w/o rash on exposed areas  Neuro- intact cranial nerves, intact movement, AAOX3    Lab Results   Component Value Date    EXTANC  03/07/2022      Comment:      Labs repeated to reassess K+;pt took kayexalate 30 gram daily X 2 days    EXTWBC 6.3 04/18/2022    EXTSEGS 51 04/18/2022    EXTPLATELETS 222 04/18/2022    EXTHEMOGLOBI 16 04/18/2022    EXTHEMATOCRI 53.1 (A) 04/18/2022    EXTCREATININ 1.37 04/05/2022    EXTSODIUM 137 04/05/2022    EXTPOTASSIUM 4.8 04/14/2022    EXTBUN 17.6 04/05/2022    EXTCO2 19 (A) 04/05/2022    EXTCALCIUM 9.3 04/05/2022    EXTPHOSPHORU 2.8 03/07/2022    EXTGLUCOSE 125 (A) 04/05/2022    EXTALBUMIN 3.2 (A) 04/05/2022    EXTAST 31 04/05/2022    EXTALT 29 04/05/2022    EXTBILITOTAL 1.0 04/05/2022       Lab Results   Component Value Date    EXTTACROLVL 7.2 04/04/2022    EXTPROTCRE 0.37 11/01/2021    EXTPTHINTACT 244.9 (H) 08/02/2021    EXTPROTEINUA NEG 02/07/2022    EXTWBCUA NONE SEEN 02/07/2022    EXTRBCUA NONE SEEN 02/07/2022       Labs were reviewed with the patient    Assessment/Plan:     1. Permanent atrial fibrillation    2. Kidney replaced by transplant    3. Nocardia infection    4.  Long term (current) use of anticoagulants--Eliquis    5. Long-term use of immunosuppressant medication    6. Hyperkalemia    7. Hypertension, unspecified type          Plan  - check K today  - change kayexalate based on K result  - check renal panel weekly   - DSA and allosure monthly X 3 times  - continue to follow with neurosurgery and ID   - follow with cardiology, general nephrology and PCP teams.        Mr. Mazariegos is a 71 y.o. male with:       # History of ESRD presumed secondary to PKD   - s/p DBD KTxp on 5/4/21 (CMV D+/R=, HCV NAWAF +, Simulect induction, CIT ~ 8 hours  - Baseline Cr 1.1-1.4  - last Cr 1.56 - slightly more elevated due to ? bactrim  - Good UOP    # Hyperkalemia  - due to bactrim for nocardia treatment  - monitor K closely  - pending K today  - Kayexalate 30 gram MWF  - Lokelma had a high copay    # Nocardia infection (brain Nocardia infection)  - ID and neurosurgery on board  - on ceftriaxone and bactrim at this point   - Off MMF     # Immunosuppression:   - Prograf 2 mg BID : Goal for tac level ~ 4-5  - off MMF due to Nocardia infection  - continue prednisone tapering  - will monitor closely for toxicities    # CMV PCR: resolved      # HTN:   - BPs well controlled   - continue with home blood pressure monitoring  - low salt and healthy life discussed with the patient      # Metabolic Bone Disease/Secondary Hyperparathyroidism:  - on calcitriol   - will monitor PTH, calcium, Po4, Mg      # Hepatitis C from donor  - treated          Celeste Yen MD

## 2022-05-06 ENCOUNTER — PATIENT MESSAGE (OUTPATIENT)
Dept: TRANSPLANT | Facility: CLINIC | Age: 72
End: 2022-05-06
Payer: MEDICARE

## 2022-05-09 ENCOUNTER — LAB VISIT (OUTPATIENT)
Dept: LAB | Facility: HOSPITAL | Age: 72
End: 2022-05-09
Attending: INTERNAL MEDICINE
Payer: MEDICARE

## 2022-05-09 DIAGNOSIS — Z94.0 KIDNEY TRANSPLANTED: ICD-10-CM

## 2022-05-09 DIAGNOSIS — R80.9 PROTEIN IN URINE: ICD-10-CM

## 2022-05-09 DIAGNOSIS — N25.81 HYPERPARATHYROIDISM, SECONDARY: ICD-10-CM

## 2022-05-09 DIAGNOSIS — N28.9 KIDNEY DISEASE: ICD-10-CM

## 2022-05-09 DIAGNOSIS — Z79.899 ENCOUNTER FOR LONG-TERM (CURRENT) USE OF OTHER MEDICATIONS: ICD-10-CM

## 2022-05-09 DIAGNOSIS — E83.52 HYPERCALCEMIA: ICD-10-CM

## 2022-05-09 LAB
ALBUMIN SERPL-MCNC: 3.6 GM/DL (ref 3.4–4.8)
ALP SERPL-CCNC: 79 UNIT/L (ref 40–150)
ALT SERPL-CCNC: 24 UNIT/L (ref 0–55)
ANION GAP SERPL CALC-SCNC: 9 MEQ/L
ANISOCYTOSIS BLD QL SMEAR: ABNORMAL
APPEARANCE UR: ABNORMAL
AST SERPL-CCNC: 26 UNIT/L (ref 5–34)
BACTERIA #/AREA URNS AUTO: NORMAL /HPF
BASOPHILS # BLD AUTO: 0.06 X10(3)/MCL (ref 0–0.2)
BASOPHILS NFR BLD AUTO: 0.9 %
BASOPHILS NFR BLD MANUAL: 2 %
BILIRUB UR QL STRIP.AUTO: NEGATIVE MG/DL
BILIRUBIN DIRECT+TOT PNL SERPL-MCNC: 0.5 MG/DL (ref 0–0.5)
BILIRUBIN DIRECT+TOT PNL SERPL-MCNC: 0.6 MG/DL (ref 0–0.8)
BILIRUBIN DIRECT+TOT PNL SERPL-MCNC: 1.1 MG/DL
BUN SERPL-MCNC: 19.7 MG/DL (ref 8.4–25.7)
BURR CELLS (OLG): ABNORMAL
CALCIUM SERPL-MCNC: 9.8 MG/DL (ref 8.8–10)
CHLORIDE SERPL-SCNC: 106 MMOL/L (ref 98–107)
CO2 SERPL-SCNC: 22 MMOL/L (ref 23–31)
COLOR UR AUTO: YELLOW
CREAT SERPL-MCNC: 1.4 MG/DL (ref 0.73–1.18)
CREAT UR-MCNC: 59.5 MG/DL (ref 63–166)
CREAT/UREA NIT SERPL: 14
DEPRECATED CALCIDIOL+CALCIFEROL SERPL-MC: 19.9 NG/ML (ref 30–80)
EOSINOPHIL # BLD AUTO: 0.1 X10(3)/MCL (ref 0–0.9)
EOSINOPHIL NFR BLD AUTO: 1.5 %
EOSINOPHIL NFR BLD MANUAL: 1 %
ERYTHROCYTE [DISTWIDTH] IN BLOOD BY AUTOMATED COUNT: 24.1 % (ref 11.5–17)
GLUCOSE SERPL-MCNC: 92 MG/DL (ref 82–115)
GLUCOSE UR QL STRIP.AUTO: ABNORMAL MG/DL
HCT VFR BLD AUTO: 45.4 % (ref 42–52)
HGB BLD-MCNC: 14.2 GM/DL (ref 14–18)
IMM GRANULOCYTES # BLD AUTO: 0.47 X10(3)/MCL (ref 0–0.02)
IMM GRANULOCYTES NFR BLD AUTO: 7.2 % (ref 0–0.43)
KETONES UR QL STRIP.AUTO: NEGATIVE MG/DL
LEUKOCYTE ESTERASE UR QL STRIP.AUTO: NEGATIVE UNIT/L
LYMPHOCYTES # BLD AUTO: 1.31 X10(3)/MCL (ref 0.6–4.6)
LYMPHOCYTES NFR BLD AUTO: 20.1 %
LYMPHOCYTES NFR BLD MANUAL: 6 %
MACROCYTES BLD QL SMEAR: ABNORMAL
MAGNESIUM SERPL-MCNC: 1.6 MG/DL (ref 1.6–2.6)
MCH RBC QN AUTO: 26.1 PG (ref 27–31)
MCHC RBC AUTO-ENTMCNC: 31.3 MG/DL (ref 33–36)
MCV RBC AUTO: 83.3 FL (ref 80–94)
METAMYELOCYTES NFR BLD MANUAL: 3 %
MICROCYTES BLD QL SMEAR: ABNORMAL
MONOCYTES # BLD AUTO: 0.67 X10(3)/MCL (ref 0.1–1.3)
MONOCYTES NFR BLD AUTO: 10.3 %
MONOCYTES NFR BLD MANUAL: 8 %
NEUTROPHILS # BLD AUTO: 3.9 X10(3)/MCL (ref 2.1–9.2)
NEUTROPHILS NFR BLD AUTO: 60 %
NEUTROPHILS NFR BLD MANUAL: 82 %
NITRITE UR QL STRIP.AUTO: NEGATIVE
NRBC BLD AUTO-RTO: 0 %
OVALOCYTES (OLG): ABNORMAL
PATH REV: NORMAL
PH UR STRIP.AUTO: 7.5 [PH]
PHOSPHATE SERPL-MCNC: 2.9 MG/DL (ref 2.3–4.7)
PLATELET # BLD AUTO: 195 X10(3)/MCL (ref 130–400)
PLATELET # BLD EST: ABNORMAL 10*3/UL
PMV BLD AUTO: 8.9 FL (ref 9.4–12.4)
POIKILOCYTOSIS BLD QL SMEAR: ABNORMAL
POTASSIUM SERPL-SCNC: 4.6 MMOL/L (ref 3.5–5.1)
PROT SERPL-MCNC: 5.7 GM/DL (ref 5.8–7.6)
PROT UR QL STRIP.AUTO: ABNORMAL MG/DL
PROT UR STRIP-MCNC: 19.3 MG/DL
PROT/CREAT UR-RTO: 324.4 MG/GM CR
PTH-INTACT SERPL-MCNC: 186.6 PG/ML (ref 8.7–77)
RBC # BLD AUTO: 5.45 X10(6)/MCL (ref 4.7–6.1)
RBC #/AREA URNS AUTO: NORMAL /HPF
RBC MORPH BLD: ABNORMAL
RBC UR QL AUTO: NEGATIVE UNIT/L
SCHISTOCYTE (OLG): ABNORMAL
SODIUM SERPL-SCNC: 137 MMOL/L (ref 136–145)
SP GR UR STRIP.AUTO: 1.01 (ref 1–1.03)
SQUAMOUS #/AREA URNS AUTO: NORMAL /LPF
TARGETS BLD QL SMEAR: ABNORMAL
UROBILINOGEN UR STRIP-ACNC: 0.2 MG/DL
WBC # SPEC AUTO: 6.5 X10(3)/MCL (ref 4.5–11.5)
WBC #/AREA URNS AUTO: NORMAL /HPF

## 2022-05-09 PROCEDURE — 83970 ASSAY OF PARATHORMONE: CPT

## 2022-05-09 PROCEDURE — 80197 ASSAY OF TACROLIMUS: CPT

## 2022-05-09 PROCEDURE — 85007 BL SMEAR W/DIFF WBC COUNT: CPT | Mod: XB

## 2022-05-09 PROCEDURE — 36415 COLL VENOUS BLD VENIPUNCTURE: CPT

## 2022-05-09 PROCEDURE — 83735 ASSAY OF MAGNESIUM: CPT

## 2022-05-09 PROCEDURE — 81003 URINALYSIS AUTO W/O SCOPE: CPT

## 2022-05-09 PROCEDURE — 82306 VITAMIN D 25 HYDROXY: CPT

## 2022-05-09 PROCEDURE — 80053 COMPREHEN METABOLIC PANEL: CPT

## 2022-05-09 PROCEDURE — 81001 URINALYSIS AUTO W/SCOPE: CPT

## 2022-05-09 PROCEDURE — 85025 COMPLETE CBC W/AUTO DIFF WBC: CPT

## 2022-05-09 PROCEDURE — 82570 ASSAY OF URINE CREATININE: CPT

## 2022-05-09 PROCEDURE — 84100 ASSAY OF PHOSPHORUS: CPT

## 2022-05-09 NOTE — PROGRESS NOTES
Cr improved to 1.4 and K 4.6. improved. Please lab in a week.   Ergo 77153 units weekly X 12 months. Continue calcitriol for now

## 2022-05-10 ENCOUNTER — LAB REQUISITION (OUTPATIENT)
Dept: LAB | Facility: HOSPITAL | Age: 72
End: 2022-05-10
Payer: MEDICARE

## 2022-05-10 DIAGNOSIS — G06.0 INTRACRANIAL ABSCESS AND GRANULOMA: ICD-10-CM

## 2022-05-10 LAB
ALBUMIN SERPL-MCNC: 3.4 GM/DL (ref 3.4–4.8)
ALBUMIN/GLOB SERPL: 1.6 RATIO (ref 1.1–2)
ALP SERPL-CCNC: 79 UNIT/L (ref 40–150)
ALT SERPL-CCNC: 22 UNIT/L (ref 0–55)
AST SERPL-CCNC: 26 UNIT/L (ref 5–34)
BASOPHILS # BLD AUTO: 0.05 X10(3)/MCL (ref 0–0.2)
BASOPHILS NFR BLD AUTO: 0.9 %
BILIRUBIN DIRECT+TOT PNL SERPL-MCNC: 0.4 MG/DL (ref 0–0.5)
BILIRUBIN DIRECT+TOT PNL SERPL-MCNC: 0.4 MG/DL (ref 0–0.8)
BILIRUBIN DIRECT+TOT PNL SERPL-MCNC: 0.8 MG/DL
BUN SERPL-MCNC: 17.8 MG/DL (ref 8.4–25.7)
CALCIUM SERPL-MCNC: 9.7 MG/DL (ref 8.8–10)
CHLORIDE SERPL-SCNC: 108 MMOL/L (ref 98–107)
CO2 SERPL-SCNC: 19 MMOL/L (ref 23–31)
CREAT SERPL-MCNC: 1.5 MG/DL (ref 0.73–1.18)
CRP SERPL-MCNC: 0.4 MG/L
EOSINOPHIL # BLD AUTO: 0.09 X10(3)/MCL (ref 0–0.9)
EOSINOPHIL NFR BLD AUTO: 1.6 %
ERYTHROCYTE [DISTWIDTH] IN BLOOD BY AUTOMATED COUNT: 24.2 % (ref 11.5–17)
ERYTHROCYTE [SEDIMENTATION RATE] IN BLOOD: 1 MM/HR (ref 0–15)
GLOBULIN SER-MCNC: 2.1 GM/DL (ref 2.4–3.5)
GLUCOSE SERPL-MCNC: 153 MG/DL (ref 82–115)
HCT VFR BLD AUTO: 45.1 % (ref 42–52)
HGB BLD-MCNC: 13.9 GM/DL (ref 14–18)
IMM GRANULOCYTES # BLD AUTO: 0.26 X10(3)/MCL (ref 0–0.02)
IMM GRANULOCYTES NFR BLD AUTO: 4.6 % (ref 0–0.43)
LYMPHOCYTES # BLD AUTO: 1.44 X10(3)/MCL (ref 0.6–4.6)
LYMPHOCYTES NFR BLD AUTO: 25.3 %
MCH RBC QN AUTO: 25.9 PG (ref 27–31)
MCHC RBC AUTO-ENTMCNC: 30.8 MG/DL (ref 33–36)
MCV RBC AUTO: 84.1 FL (ref 80–94)
MONOCYTES # BLD AUTO: 0.55 X10(3)/MCL (ref 0.1–1.3)
MONOCYTES NFR BLD AUTO: 9.6 %
NEUTROPHILS # BLD AUTO: 3.3 X10(3)/MCL (ref 2.1–9.2)
NEUTROPHILS NFR BLD AUTO: 58 %
NRBC BLD AUTO-RTO: 0 %
PLATELET # BLD AUTO: 211 X10(3)/MCL (ref 130–400)
PMV BLD AUTO: 9.7 FL (ref 9.4–12.4)
POTASSIUM SERPL-SCNC: 5.1 MMOL/L (ref 3.5–5.1)
PROT SERPL-MCNC: 5.5 GM/DL (ref 5.8–7.6)
RBC # BLD AUTO: 5.36 X10(6)/MCL (ref 4.7–6.1)
SODIUM SERPL-SCNC: 137 MMOL/L (ref 136–145)
TACROLIMUS TROUGH BLD-MCNC: 23.2 NG/ML
WBC # SPEC AUTO: 5.7 X10(3)/MCL (ref 4.5–11.5)

## 2022-05-10 PROCEDURE — 85025 COMPLETE CBC W/AUTO DIFF WBC: CPT | Performed by: INTERNAL MEDICINE

## 2022-05-10 PROCEDURE — 80053 COMPREHEN METABOLIC PANEL: CPT | Performed by: INTERNAL MEDICINE

## 2022-05-10 PROCEDURE — 85651 RBC SED RATE NONAUTOMATED: CPT | Performed by: INTERNAL MEDICINE

## 2022-05-10 PROCEDURE — 86140 C-REACTIVE PROTEIN: CPT | Performed by: INTERNAL MEDICINE

## 2022-05-11 ENCOUNTER — PATIENT MESSAGE (OUTPATIENT)
Dept: RESEARCH | Facility: CLINIC | Age: 72
End: 2022-05-11
Payer: MEDICARE

## 2022-05-11 LAB
CMV DNA SERPL NAA+PROBE-ACNC: <35 IU/ML
CRP SERPL-MCNC: 1 MG/L

## 2022-05-12 ENCOUNTER — DOCUMENT SCAN (OUTPATIENT)
Dept: HOME HEALTH SERVICES | Facility: HOSPITAL | Age: 72
End: 2022-05-12
Payer: MEDICARE

## 2022-05-12 DIAGNOSIS — S06.5XAA SDH (SUBDURAL HEMATOMA): Primary | ICD-10-CM

## 2022-05-13 ENCOUNTER — TELEPHONE (OUTPATIENT)
Dept: TRANSPLANT | Facility: CLINIC | Age: 72
End: 2022-05-13
Payer: MEDICARE

## 2022-05-13 NOTE — TELEPHONE ENCOUNTER
SW received message from pt's post-kidney transplant coordinator Tiara Ferrell RN indicating pt concern after pt received bill from [facility] for $1700.  SW contacted pt ([phone]) who confirms receiving said bill.  Pt looking for clarification regarding insurance coverage and reason of high bill.  SW attempted to contact [facility] billing department but was told by representative that pt-specific information could not be provided to SW without pt's presence / expressed permission for SW to receive such information.  SW contacted local [facility] liaison Kamila Hernandez for assistance.  Kamila reports, after speaking with billing department, that pt does not have coverage for supplies and limited coverage for medication.  Kamila reports having spoken with [facility] about possibility of cost-sharing agreement to assist pt in affording infusion cost; however, HH agency not willing to share cost due to prolonged length of pt's antibiotic treatment course.  SW called pt back to provide updates received from Kamila.  Pt verbalized understanding regarding same.  Pt reports hope that next month's bill will be significantly lower since he has changed medications which only requires 2 doses per day as opposed to 3 per day with previous medication.  Pt denies any major concerns regarding immediate affordability, but is concerned about cost of entire treatment course.  SW strongly encouraged pt to contact [facility] to discuss payment plan options available.  SW also encouraged pt to consider fundraising.  Pt verbalized understanding of same and reiterated hope that future bills will be cheaper.  SW strongly encouraged pt to contact transplant team and infectious disease providers if costs become prohibitive of ongoing treatment.  Pt confirms having follow-up appt with ID soon and will discuss this further at appt.  SW remains available for further psychosocial support and will f/u as  needed.

## 2022-05-17 ENCOUNTER — LAB REQUISITION (OUTPATIENT)
Dept: LAB | Facility: HOSPITAL | Age: 72
End: 2022-05-17
Payer: MEDICARE

## 2022-05-17 DIAGNOSIS — B06.00: ICD-10-CM

## 2022-05-17 LAB
ABS NEUT (OLG): ABNORMAL
ALBUMIN SERPL-MCNC: 3.5 GM/DL (ref 3.4–4.8)
ALBUMIN/GLOB SERPL: 1.6 RATIO (ref 1.1–2)
ALP SERPL-CCNC: 81 UNIT/L (ref 40–150)
ALT SERPL-CCNC: 27 UNIT/L (ref 0–55)
AST SERPL-CCNC: 30 UNIT/L (ref 5–34)
BASOPHILS NFR BLD MANUAL: 3 %
BILIRUBIN DIRECT+TOT PNL SERPL-MCNC: 0.9 MG/DL
BUN SERPL-MCNC: 18.4 MG/DL (ref 8.4–25.7)
CALCIUM SERPL-MCNC: 9.3 MG/DL (ref 8.8–10)
CHLORIDE SERPL-SCNC: 108 MMOL/L (ref 98–107)
CO2 SERPL-SCNC: 18 MMOL/L (ref 23–31)
CREAT SERPL-MCNC: 1.24 MG/DL (ref 0.73–1.18)
CRP SERPL-MCNC: 0.5 MG/L
ERYTHROCYTE [DISTWIDTH] IN BLOOD BY AUTOMATED COUNT: 23.7 % (ref 11.5–17)
ERYTHROCYTE [SEDIMENTATION RATE] IN BLOOD: 4 MM/HR (ref 0–15)
GLOBULIN SER-MCNC: 2.2 GM/DL (ref 2.4–3.5)
GLUCOSE SERPL-MCNC: 117 MG/DL (ref 82–115)
HCT VFR BLD AUTO: 46 % (ref 42–52)
HGB BLD-MCNC: 13.9 GM/DL (ref 14–18)
IMM GRANULOCYTES # BLD AUTO: 0.39 X10(3)/MCL (ref 0–0.02)
IMM GRANULOCYTES NFR BLD AUTO: 6.7 % (ref 0–0.43)
LYMPHOCYTES NFR BLD MANUAL: 28 %
MACROCYTES BLD QL SMEAR: ABNORMAL
MCH RBC QN AUTO: 25.4 PG (ref 27–31)
MCHC RBC AUTO-ENTMCNC: 30.2 MG/DL (ref 33–36)
MCV RBC AUTO: 84.1 FL (ref 80–94)
MONOCYTES NFR BLD MANUAL: 5 %
NEUTROPHILS NFR BLD MANUAL: 64 %
NRBC BLD AUTO-RTO: 0 %
PLATELET # BLD AUTO: 216 X10(3)/MCL (ref 130–400)
PLATELET # BLD EST: ADEQUATE 10*3/UL
PMV BLD AUTO: 9.9 FL (ref 9.4–12.4)
POIKILOCYTOSIS BLD QL SMEAR: ABNORMAL
POTASSIUM SERPL-SCNC: 5.3 MMOL/L (ref 3.5–5.1)
PROT SERPL-MCNC: 5.7 GM/DL (ref 5.8–7.6)
RBC # BLD AUTO: 5.47 X10(6)/MCL (ref 4.7–6.1)
RBC MORPH BLD: ABNORMAL
SCHISTOCYTE (OLG): ABNORMAL
SODIUM SERPL-SCNC: 136 MMOL/L (ref 136–145)
WBC # SPEC AUTO: 5.9 X10(3)/MCL (ref 4.5–11.5)

## 2022-05-17 PROCEDURE — 86140 C-REACTIVE PROTEIN: CPT | Performed by: INTERNAL MEDICINE

## 2022-05-17 PROCEDURE — 85007 BL SMEAR W/DIFF WBC COUNT: CPT | Mod: XB | Performed by: INTERNAL MEDICINE

## 2022-05-17 PROCEDURE — 85651 RBC SED RATE NONAUTOMATED: CPT | Performed by: INTERNAL MEDICINE

## 2022-05-17 PROCEDURE — 85025 COMPLETE CBC W/AUTO DIFF WBC: CPT | Performed by: INTERNAL MEDICINE

## 2022-05-17 PROCEDURE — 80053 COMPREHEN METABOLIC PANEL: CPT | Performed by: INTERNAL MEDICINE

## 2022-05-19 RX ORDER — PREDNISONE 5 MG/1
TABLET ORAL
Qty: 120 TABLET | Refills: 11 | Status: ON HOLD | OUTPATIENT
Start: 2022-05-19 | End: 2023-01-03 | Stop reason: SDUPTHER

## 2022-05-20 ENCOUNTER — DOCUMENT SCAN (OUTPATIENT)
Dept: HOME HEALTH SERVICES | Facility: HOSPITAL | Age: 72
End: 2022-05-20
Payer: MEDICARE

## 2022-05-23 ENCOUNTER — DOCUMENTATION ONLY (OUTPATIENT)
Dept: TRANSPLANT | Facility: CLINIC | Age: 72
End: 2022-05-23
Payer: MEDICARE

## 2022-05-23 ENCOUNTER — PATIENT MESSAGE (OUTPATIENT)
Dept: TRANSPLANT | Facility: CLINIC | Age: 72
End: 2022-05-23
Payer: MEDICARE

## 2022-05-23 ENCOUNTER — LAB REQUISITION (OUTPATIENT)
Dept: LAB | Facility: HOSPITAL | Age: 72
End: 2022-05-23
Payer: MEDICARE

## 2022-05-23 DIAGNOSIS — G06.0 INTRACRANIAL ABSCESS AND GRANULOMA: ICD-10-CM

## 2022-05-23 LAB
ABS NEUT (OLG): 3.84 X10(3)/MCL (ref 2.1–9.2)
ACANTHOCYTES (OLG): ABNORMAL
ALBUMIN SERPL-MCNC: 3.5 GM/DL (ref 3.4–4.8)
ALBUMIN/GLOB SERPL: 1.7 RATIO (ref 1.1–2)
ALP SERPL-CCNC: 81 UNIT/L (ref 40–150)
ALT SERPL-CCNC: 29 UNIT/L (ref 0–55)
ANISOCYTOSIS BLD QL SMEAR: ABNORMAL
AST SERPL-CCNC: 28 UNIT/L (ref 5–34)
B-HCG SERPL QL: 0 %
BASOPHILS NFR BLD MANUAL: 0 %
BASOPHILS NFR BLD MANUAL: 0 X10(3)/MCL (ref 0–0.2)
BILIRUBIN DIRECT+TOT PNL SERPL-MCNC: 1 MG/DL
BUN SERPL-MCNC: 17.7 MG/DL (ref 8.4–25.7)
BURR CELLS (OLG): ABNORMAL
CALCIUM SERPL-MCNC: 9.4 MG/DL (ref 8.8–10)
CHLORIDE SERPL-SCNC: 107 MMOL/L (ref 98–107)
CO2 SERPL-SCNC: 20 MMOL/L (ref 23–31)
CREAT SERPL-MCNC: 1.41 MG/DL (ref 0.73–1.18)
CRP SERPL-MCNC: 0.9 MG/L
ELLIPTOCYTOSIS (OHS): ABNORMAL
EOSINOPHIL NFR BLD MANUAL: 0.12 X10(3)/MCL (ref 0–0.9)
EOSINOPHIL NFR BLD MANUAL: 2 %
ERYTHROCYTE [DISTWIDTH] IN BLOOD BY AUTOMATED COUNT: 23.1 % (ref 11.5–17)
ERYTHROCYTE [SEDIMENTATION RATE] IN BLOOD: 2 MM/HR (ref 0–15)
EXT ALBUMIN: 3.5 (ref 3.4–4.8)
EXT ALKALINE PHOSPHATASE: 81 (ref 40–150)
EXT ALT: 29 (ref 0–55)
EXT AST: 28 (ref 5–34)
EXT BILIRUBIN TOTAL: 1
EXT BUN: 17.7 (ref 8.4–25.7)
EXT CALCIUM: 9.4 (ref 8.8–10)
EXT CHLORIDE: 107 (ref 98–107)
EXT CO2: 20 (ref 23–31)
EXT CREATININE: 1.41 MG/DL
EXT EOSINOPHIL%: 2
EXT GLUCOSE: 105 (ref 82–115)
EXT HEMATOCRIT: 45.5 (ref 42–52)
EXT HEMOGLOBIN: 13.9 (ref 14–18)
EXT LYMPH%: 26
EXT MONOCYTES%: 9
EXT PLATELETS: 205 (ref 130–400)
EXT POTASSIUM: 5.6 (ref 3.5–5.1)
EXT PROTEIN TOTAL: 5.6 (ref 5.8–7.6)
EXT SEGS%: 62
EXT SODIUM: 134 MMOL/L (ref 136–145)
EXT WBC: 6.2 (ref 4.5–11.5)
GLOBULIN SER-MCNC: 2.1 GM/DL (ref 2.4–3.5)
GLUCOSE SERPL-MCNC: 105 MG/DL (ref 82–115)
HCT VFR BLD AUTO: 45.5 % (ref 42–52)
HGB BLD-MCNC: 13.9 GM/DL (ref 14–18)
IMM GRANULOCYTES # BLD AUTO: 0.41 X10(3)/MCL (ref 0–0.02)
IMM GRANULOCYTES NFR BLD AUTO: 6.7 % (ref 0–0.43)
INSTRUMENT WBC (OLG): 6 X10(3)/MCL
LYMPHOCYTES NFR BLD MANUAL: 1.56 X10(3)/MCL
LYMPHOCYTES NFR BLD MANUAL: 26 %
MACROCYTES BLD QL SMEAR: ABNORMAL
MCH RBC QN AUTO: 25.6 PG (ref 27–31)
MCHC RBC AUTO-ENTMCNC: 30.5 MG/DL (ref 33–36)
MCV RBC AUTO: 83.8 FL (ref 80–94)
METAMYELOCYTES NFR BLD MANUAL: 2 %
MONOCYTES NFR BLD MANUAL: 0.54 X10(3)/MCL (ref 0.1–1.3)
MONOCYTES NFR BLD MANUAL: 9 %
MYELOCYTES NFR BLD MANUAL: 0 %
NEUTROPHILS NFR BLD MANUAL: 62 %
NEUTS BAND NFR BLD MANUAL: 0 %
NRBC BLD AUTO-RTO: 0 %
PLASMA CELLS BLD QL SMEAR: 0 %
PLATELET # BLD AUTO: 205 X10(3)/MCL (ref 130–400)
PLATELET # BLD EST: ADEQUATE 10*3/UL
PMV BLD AUTO: 10 FL (ref 9.4–12.4)
POIKILOCYTOSIS BLD QL SMEAR: ABNORMAL
POTASSIUM SERPL-SCNC: 5.6 MMOL/L (ref 3.5–5.1)
PROLYMPHOCYTES # BLD MANUAL: 0 %
PROMYELOCYTES # BLD MANUAL: 0 %
PROT SERPL-MCNC: 5.6 GM/DL (ref 5.8–7.6)
RBC # BLD AUTO: 5.43 X10(6)/MCL (ref 4.7–6.1)
RBC MORPH BLD: ABNORMAL
SODIUM SERPL-SCNC: 134 MMOL/L (ref 136–145)
WBC # SPEC AUTO: 6.2 X10(3)/MCL (ref 4.5–11.5)

## 2022-05-23 PROCEDURE — 85025 COMPLETE CBC W/AUTO DIFF WBC: CPT | Performed by: INTERNAL MEDICINE

## 2022-05-23 PROCEDURE — 85651 RBC SED RATE NONAUTOMATED: CPT | Performed by: INTERNAL MEDICINE

## 2022-05-23 PROCEDURE — 85007 BL SMEAR W/DIFF WBC COUNT: CPT | Performed by: INTERNAL MEDICINE

## 2022-05-23 PROCEDURE — 80053 COMPREHEN METABOLIC PANEL: CPT | Performed by: INTERNAL MEDICINE

## 2022-05-23 PROCEDURE — 86140 C-REACTIVE PROTEIN: CPT | Performed by: INTERNAL MEDICINE

## 2022-05-23 NOTE — PROGRESS NOTES
Coordinator reviewed 5/23/22 CMP with Dr. Yen. Potassium elevated 5.6. Plan given: take extra 30gm Kayexalate now then repeat same dose tomorrow AM; repeat K+ Wed 5/25/22.     Contacted patient regarding abnormal labs. Informed patient via telephone & My [employer] message of Dr. Yen's recommendations. Patient reports he did take his prescribed dose of Kayexalate 30gm this AM after labs. Patient will take Kayexalate 30gm this evening & repeat same dose tomorrow morning; repeat K+ Wed. Patient verbalized understanding.

## 2022-05-24 ENCOUNTER — OFFICE VISIT (OUTPATIENT)
Dept: INFECTIOUS DISEASES | Facility: CLINIC | Age: 72
End: 2022-05-24
Payer: MEDICARE

## 2022-05-24 VITALS
BODY MASS INDEX: 23.85 KG/M2 | DIASTOLIC BLOOD PRESSURE: 82 MMHG | WEIGHT: 185.88 LBS | TEMPERATURE: 98 F | HEIGHT: 74 IN | SYSTOLIC BLOOD PRESSURE: 128 MMHG | HEART RATE: 93 BPM

## 2022-05-24 DIAGNOSIS — A43.9 NOCARDIA INFECTION: Primary | ICD-10-CM

## 2022-05-24 PROCEDURE — 99213 PR OFFICE/OUTPT VISIT, EST, LEVL III, 20-29 MIN: ICD-10-PCS | Mod: S$PBB,GC,, | Performed by: STUDENT IN AN ORGANIZED HEALTH CARE EDUCATION/TRAINING PROGRAM

## 2022-05-24 PROCEDURE — 99999 PR PBB SHADOW E&M-EST. PATIENT-LVL III: CPT | Mod: PBBFAC,GC,, | Performed by: STUDENT IN AN ORGANIZED HEALTH CARE EDUCATION/TRAINING PROGRAM

## 2022-05-24 PROCEDURE — 99213 OFFICE O/P EST LOW 20 MIN: CPT | Mod: PBBFAC | Performed by: STUDENT IN AN ORGANIZED HEALTH CARE EDUCATION/TRAINING PROGRAM

## 2022-05-24 PROCEDURE — 99999 PR PBB SHADOW E&M-EST. PATIENT-LVL III: ICD-10-PCS | Mod: PBBFAC,GC,, | Performed by: STUDENT IN AN ORGANIZED HEALTH CARE EDUCATION/TRAINING PROGRAM

## 2022-05-24 PROCEDURE — 99213 OFFICE O/P EST LOW 20 MIN: CPT | Mod: S$PBB,GC,, | Performed by: STUDENT IN AN ORGANIZED HEALTH CARE EDUCATION/TRAINING PROGRAM

## 2022-05-24 NOTE — PROGRESS NOTES
"Subjective:       Patient ID: Ronal Mazariegos is a 71 y.o. male.    Chief Complaint: Follow-up    Follow-up  Pertinent negatives include no abdominal pain, change in bowel habit, chest pain, chills, coughing, fatigue, fever, headaches or numbness.        Ronal Mazariegos is a 70 y/o male with a hx of polycystic kidney sidease s/p DBD KTx 5/2021 on tacro/pred, Afib on AC, and Polycythemia vera (09/2021) presents to clinic for follow up.      I last saw him 4/21 transitioned meropenem to CTX 2g IV Q12 based on sensitivities  results and continued PO bactrim. Today, he report feeling well. tolerating IV antibiotics- no issues with line or infusion.    Denies any respiratory symptoms, HA, vision changes, N/V abdominal pain or diarrhea.    Last labs reviewed stable with normal CRP. He was seen by neurosurgery 4/21 " repeat MRI brain shows improvement in the amount of edema surrounding the prior lesion as well as markedly reduced contrast enhancement." there wasa right frontal fluid collection concerning for subacute to chronic blood products.eliquis was stopped-  followup CT head 5/5 unchanged. He was seen 5/5 plan for evaluation for possible right MMA embolization of his right convexity SDH. Has follow up on 6/7        Previous visits;  Patient was admitted 2/16-2/25 with L sided neurologic deficits and fall at home. Found to have right frontal mass, now s/p craniotomy 2/17/22 with cultures + nocardia nova. CT C/A/P notes a RML lesion- suspect lung lesion is also nocardia. Cardiac MRI done at OSH 02/09 Findings are consistent with infiltrative cardiomyopathy. TTE negative for vegetatation. Abnormal thickening of the aortic root unchanged from prior. Patient was discharged on IV meropenem and high dose PO bactrim (Started ~2/19). He followed up with ID 3/07/2022    Evaluated by heart transplant (Dr. Aguila) 4/1 s/p PYP scan 3/21/22 negative for TTR amyloidosis.  He had urine immuno negative for monoclonal band though " serum had faint lambda light chain band in gamma.  Free light chain Dec 2021 with elevated kappa 2.89, normal lambda 2.04 and normal ratio 1.42. no evidence cardiac amyloidosis endomyocardial biopsy was not recommended.       Review of Systems   Constitutional: Negative for chills, fatigue and fever.   Eyes: Negative for visual disturbance.   Respiratory: Negative for cough and shortness of breath.    Cardiovascular: Negative for chest pain and leg swelling.   Gastrointestinal: Negative for abdominal pain, change in bowel habit, diarrhea and change in bowel habit.   Genitourinary: Negative for dysuria, flank pain and genital sores.   Musculoskeletal: Negative for back pain.   Neurological: Negative for numbness and headaches.   Hematological: Negative for adenopathy.         Objective:       Vitals:    05/24/22 0951   BP: 128/82   Pulse: 93   Temp: 97.9 °F (36.6 °C)     Physical Exam  Constitutional:       Appearance: He is well-developed.   HENT:      Head: Normocephalic.   Cardiovascular:      Rate and Rhythm: Normal rate and regular rhythm.      Heart sounds: Normal heart sounds. No murmur heard.  Pulmonary:      Effort: Pulmonary effort is normal.      Breath sounds: Normal breath sounds.   Abdominal:      General: Bowel sounds are normal. There is no distension.      Palpations: Abdomen is soft.      Tenderness: There is no abdominal tenderness.   Musculoskeletal:         General: Normal range of motion.      Comments: Right chest TL - clean and dry wo erythema or tenderness    Neurological:      Mental Status: He is alert and oriented to person, place, and time.   Psychiatric:         Behavior: Behavior normal.         Assessment:       Problem List Items Addressed This Visit        ID    Nocardia infection - Primary    Relevant Orders    CT Chest Without Contrast    Ambulatory referral/consult to Financial Counseling    Ambulatory referral/consult to Pharmacy Assistance          Plan:         -- Continue IV  CTX 2 g IV Q12 and continue PO bactrim 2 DS Q8.    -- Continue weekly labs while on IV Abx ( CBC/CMP/CRP).    -- Optimal treatment duration 9-12 months, will switch IV CTXto another PO regimen ~Tedizolid/bactrim after reviewing repeat CT scan(~6/7) (order for tedizolid sent to specialty pharmacy with start date 6/7 for pricing.    -- Follow up Neurosurgery plan.         RTC in 2 month.        Aaron House MD  Infectious Disease Fellow  PGY-5    Pager [phone]

## 2022-05-25 ENCOUNTER — LAB VISIT (OUTPATIENT)
Dept: LAB | Facility: HOSPITAL | Age: 72
End: 2022-05-25
Attending: INTERNAL MEDICINE
Payer: MEDICARE

## 2022-05-25 ENCOUNTER — TELEPHONE (OUTPATIENT)
Dept: TRANSPLANT | Facility: CLINIC | Age: 72
End: 2022-05-25
Payer: MEDICARE

## 2022-05-25 ENCOUNTER — PATIENT MESSAGE (OUTPATIENT)
Dept: TRANSPLANT | Facility: CLINIC | Age: 72
End: 2022-05-25
Payer: MEDICARE

## 2022-05-25 DIAGNOSIS — B25.9 CYTOMEGALOVIRUS INFECTION, UNSPECIFIED CYTOMEGALOVIRAL INFECTION TYPE: ICD-10-CM

## 2022-05-25 DIAGNOSIS — E83.42 HYPOMAGNESEMIA: ICD-10-CM

## 2022-05-25 DIAGNOSIS — E21.3 HYPERPARATHYROIDISM, UNSPECIFIED: ICD-10-CM

## 2022-05-25 DIAGNOSIS — R60.1 GENERALIZED EDEMA: ICD-10-CM

## 2022-05-25 DIAGNOSIS — Z79.899 ENCOUNTER FOR LONG-TERM (CURRENT) USE OF OTHER MEDICATIONS: ICD-10-CM

## 2022-05-25 DIAGNOSIS — Z94.0 HISTORY OF RENAL TRANSPLANT: ICD-10-CM

## 2022-05-25 DIAGNOSIS — D63.8 ANEMIA OF CHRONIC ILLNESS: ICD-10-CM

## 2022-05-25 DIAGNOSIS — E87.5 HYPERKALEMIA: ICD-10-CM

## 2022-05-25 DIAGNOSIS — N18.2 CHRONIC KIDNEY DISEASE, STAGE II (MILD): Primary | ICD-10-CM

## 2022-05-25 DIAGNOSIS — I12.9 HYPERTENSIVE RENAL DISEASE WITH RENAL FAILURE: ICD-10-CM

## 2022-05-25 DIAGNOSIS — Z94.0 KIDNEY REPLACED BY TRANSPLANT: Primary | ICD-10-CM

## 2022-05-25 DIAGNOSIS — N18.31 STAGE 3A CHRONIC KIDNEY DISEASE: ICD-10-CM

## 2022-05-25 LAB
ABS NEUT (OLG): 4.86 X10(3)/MCL (ref 2.1–9.2)
ACANTHOCYTES (OLG): ABNORMAL
ALBUMIN SERPL-MCNC: 3.7 GM/DL (ref 3.4–4.8)
ALBUMIN/GLOB SERPL: 1.8 RATIO (ref 1.1–2)
ALP SERPL-CCNC: 83 UNIT/L (ref 40–150)
ALT SERPL-CCNC: 29 UNIT/L (ref 0–55)
ANISOCYTOSIS BLD QL SMEAR: ABNORMAL
AST SERPL-CCNC: 27 UNIT/L (ref 5–34)
B-HCG SERPL QL: 0 %
BASOPHILS NFR BLD MANUAL: 0 %
BASOPHILS NFR BLD MANUAL: 0 X10(3)/MCL (ref 0–0.2)
BILIRUBIN DIRECT+TOT PNL SERPL-MCNC: 0.9 MG/DL
BUN SERPL-MCNC: 19.1 MG/DL (ref 8.4–25.7)
BURR CELLS (OLG): ABNORMAL
CALCIUM SERPL-MCNC: 9.9 MG/DL (ref 8.8–10)
CHLORIDE SERPL-SCNC: 107 MMOL/L (ref 98–107)
CO2 SERPL-SCNC: 23 MMOL/L (ref 23–31)
CREAT SERPL-MCNC: 1.53 MG/DL (ref 0.73–1.18)
DEPRECATED CALCIDIOL+CALCIFEROL SERPL-MC: 20.9 NG/ML (ref 30–80)
EOSINOPHIL NFR BLD MANUAL: 0 %
EOSINOPHIL NFR BLD MANUAL: 0 X10(3)/MCL (ref 0–0.9)
ERYTHROCYTE [DISTWIDTH] IN BLOOD BY AUTOMATED COUNT: 22.9 % (ref 11.5–17)
FERRITIN SERPL-MCNC: 45.02 NG/ML (ref 21.81–274.66)
GLOBULIN SER-MCNC: 2.1 GM/DL (ref 2.4–3.5)
GLUCOSE SERPL-MCNC: 93 MG/DL (ref 82–115)
HCT VFR BLD AUTO: 47.4 % (ref 42–52)
HGB BLD-MCNC: 14.6 GM/DL (ref 14–18)
IMM GRANULOCYTES # BLD AUTO: 0.4 X10(3)/MCL (ref 0–0.02)
IMM GRANULOCYTES NFR BLD AUTO: 7 % (ref 0–0.43)
INSTRUMENT WBC (OLG): 6 X10(3)/MCL
IRON SATN MFR SERPL: 14 % (ref 20–50)
IRON SERPL-MCNC: 48 UG/DL (ref 65–175)
LYMPHOCYTES NFR BLD MANUAL: 0.84 X10(3)/MCL
LYMPHOCYTES NFR BLD MANUAL: 14 %
MACROCYTES BLD QL SMEAR: ABNORMAL
MAGNESIUM SERPL-MCNC: 1.7 MG/DL (ref 1.6–2.6)
MCH RBC QN AUTO: 25.9 PG (ref 27–31)
MCHC RBC AUTO-ENTMCNC: 30.8 MG/DL (ref 33–36)
MCV RBC AUTO: 84 FL (ref 80–94)
METAMYELOCYTES NFR BLD MANUAL: 1 %
MONOCYTES NFR BLD MANUAL: 0.36 X10(3)/MCL (ref 0.1–1.3)
MONOCYTES NFR BLD MANUAL: 6 %
MYELOCYTES NFR BLD MANUAL: 0 %
NEUTROPHILS NFR BLD MANUAL: 80 %
NEUTS BAND NFR BLD MANUAL: 0 %
NRBC BLD AUTO-RTO: 0 %
PHOSPHATE SERPL-MCNC: 3.2 MG/DL (ref 2.3–4.7)
PLASMA CELLS BLD QL SMEAR: 0 %
PLATELET # BLD AUTO: 181 X10(3)/MCL (ref 130–400)
PLATELET # BLD EST: ADEQUATE 10*3/UL
PMV BLD AUTO: 9.3 FL (ref 9.4–12.4)
POIKILOCYTOSIS BLD QL SMEAR: ABNORMAL
POTASSIUM SERPL-SCNC: 4.8 MMOL/L (ref 3.5–5.1)
POTASSIUM SERPL-SCNC: 5.4 MMOL/L (ref 3.5–5.1)
PROLYMPHOCYTES # BLD MANUAL: 0 %
PROMYELOCYTES # BLD MANUAL: 0 %
PROT SERPL-MCNC: 5.8 GM/DL (ref 5.8–7.6)
PTH-INTACT SERPL-MCNC: 174.4 PG/ML (ref 8.7–77)
RBC # BLD AUTO: 5.64 X10(6)/MCL (ref 4.7–6.1)
RBC MORPH BLD: ABNORMAL
SODIUM SERPL-SCNC: 139 MMOL/L (ref 136–145)
TIBC SERPL-MCNC: 287 UG/DL (ref 69–240)
TIBC SERPL-MCNC: 335 UG/DL (ref 250–450)
TRANSFERRIN SERPL-MCNC: 337 MG/DL (ref 163–344)
WBC # SPEC AUTO: 5.7 X10(3)/MCL (ref 4.5–11.5)

## 2022-05-25 PROCEDURE — 36415 COLL VENOUS BLD VENIPUNCTURE: CPT

## 2022-05-25 PROCEDURE — 83970 ASSAY OF PARATHORMONE: CPT

## 2022-05-25 PROCEDURE — 80053 COMPREHEN METABOLIC PANEL: CPT

## 2022-05-25 PROCEDURE — 84100 ASSAY OF PHOSPHORUS: CPT

## 2022-05-25 PROCEDURE — 82728 ASSAY OF FERRITIN: CPT

## 2022-05-25 PROCEDURE — 83540 ASSAY OF IRON: CPT

## 2022-05-25 PROCEDURE — 85007 BL SMEAR W/DIFF WBC COUNT: CPT

## 2022-05-25 PROCEDURE — 82306 VITAMIN D 25 HYDROXY: CPT | Mod: GA

## 2022-05-25 PROCEDURE — 83735 ASSAY OF MAGNESIUM: CPT

## 2022-05-25 PROCEDURE — 84132 ASSAY OF SERUM POTASSIUM: CPT | Mod: 91

## 2022-05-25 PROCEDURE — 85025 COMPLETE CBC W/AUTO DIFF WBC: CPT

## 2022-05-25 NOTE — PROGRESS NOTES
Please kayexlate to 30 gram daily X 5 days . Please check K on Monday   [employer] Ds  can we start veltassa on him? Any insurance issue? Lokelma not approved.

## 2022-05-26 ENCOUNTER — PATIENT MESSAGE (OUTPATIENT)
Dept: TRANSPLANT | Facility: CLINIC | Age: 72
End: 2022-05-26
Payer: MEDICARE

## 2022-05-26 NOTE — TELEPHONE ENCOUNTER
----- Message from Nata Sullivan PharmD sent at 5/25/2022  2:45 PM CDT -----  Yes, I agree.    ----- Message -----  From: Celeste Yen MD  Sent: 5/25/2022   2:28 PM CDT  To: Nata Sullivan PharmD    Lets stop Kphos  I think high K  is because of bactrim for nocardia treatment   ----- Message -----  From: Nata Sullivan PharmD  Sent: 5/25/2022   2:15 PM CDT  To: Tiara Ferrell RN, #    Veltassa is $272. Patient is in the gap with his insurance.  Patient has Medicare part D therefore there is no help.     Can we decrease KPN as well - Phos 3.2.     Ushma  ----- Message -----  From: Tiara Ferrell RN  Sent: 5/25/2022   1:56 PM CDT  To: Abdominal Transplant Pharmacists          
Notified patient of Dr. Yen's recommendations to stop KPN via [employer] message.     Hi Mr. Villeda,     Your 5/25/22 phosphorus result is 3.2, which is within normal range. Dr. Yen wants you to stop taking your phosphorus supplements K Phos Neutral.      Thanks,     Tiara       ----- Message from Nata Sullivan PharmD sent at 5/25/2022  2:45 PM CDT -----  Yes, I agree.    ----- Message -----  From: Celeste Yen MD  Sent: 5/25/2022   2:28 PM CDT  To: Nata Sullivan PharmD    Lets stop Kphos  I think high K  is because of bactrim for nocardia treatment   ----- Message -----  From: Nata Sullivan PharmD  Sent: 5/25/2022   2:15 PM CDT  To: Tiara Ferrell RN, #    Veltassa is $272. Patient is in the gap with his insurance.  Patient has Medicare part D therefore there is no help.     Can we decrease KPN as well - Phos 3.2.     Nata  ----- Message -----  From: Tiara Ferrell RN  Sent: 5/25/2022   1:56 PM CDT  To: Abdominal Transplant Pharmacists            
36.2

## 2022-05-30 ENCOUNTER — LAB VISIT (OUTPATIENT)
Dept: LAB | Facility: HOSPITAL | Age: 72
End: 2022-05-30
Attending: INTERNAL MEDICINE
Payer: MEDICARE

## 2022-05-30 DIAGNOSIS — E87.5 HYPERKALEMIA: ICD-10-CM

## 2022-05-30 LAB
ALBUMIN SERPL-MCNC: 3.5 GM/DL (ref 3.4–4.8)
BUN SERPL-MCNC: 18.4 MG/DL (ref 8.4–25.7)
CALCIUM SERPL-MCNC: 9.7 MG/DL (ref 8.8–10)
CHLORIDE SERPL-SCNC: 105 MMOL/L (ref 98–107)
CO2 SERPL-SCNC: 25 MMOL/L (ref 23–31)
CREAT SERPL-MCNC: 1.62 MG/DL (ref 0.73–1.18)
GLUCOSE SERPL-MCNC: 83 MG/DL (ref 82–115)
PHOSPHATE SERPL-MCNC: 2.9 MG/DL (ref 2.3–4.7)
POTASSIUM SERPL-SCNC: 4.6 MMOL/L (ref 3.5–5.1)
SODIUM SERPL-SCNC: 138 MMOL/L (ref 136–145)

## 2022-05-30 PROCEDURE — 36415 COLL VENOUS BLD VENIPUNCTURE: CPT

## 2022-05-30 PROCEDURE — 80069 RENAL FUNCTION PANEL: CPT

## 2022-05-31 ENCOUNTER — LAB REQUISITION (OUTPATIENT)
Dept: LAB | Facility: HOSPITAL | Age: 72
End: 2022-05-31
Payer: MEDICARE

## 2022-05-31 DIAGNOSIS — G06.0 INTRACRANIAL ABSCESS AND GRANULOMA: ICD-10-CM

## 2022-05-31 LAB
ALBUMIN SERPL-MCNC: 3.5 GM/DL (ref 3.4–4.8)
ALBUMIN/GLOB SERPL: 1.8 RATIO (ref 1.1–2)
ALP SERPL-CCNC: 83 UNIT/L (ref 40–150)
ALT SERPL-CCNC: 27 UNIT/L (ref 0–55)
AST SERPL-CCNC: 23 UNIT/L (ref 5–34)
BASOPHILS # BLD AUTO: 0.02 X10(3)/MCL (ref 0–0.2)
BASOPHILS NFR BLD AUTO: 0.3 %
BILIRUBIN DIRECT+TOT PNL SERPL-MCNC: 0.8 MG/DL
BUN SERPL-MCNC: 17.6 MG/DL (ref 8.4–25.7)
CALCIUM SERPL-MCNC: 9.4 MG/DL (ref 8.8–10)
CHLORIDE SERPL-SCNC: 108 MMOL/L (ref 98–107)
CO2 SERPL-SCNC: 21 MMOL/L (ref 23–31)
CREAT SERPL-MCNC: 1.55 MG/DL (ref 0.73–1.18)
CRP SERPL-MCNC: 0.8 MG/L
EOSINOPHIL # BLD AUTO: 0.06 X10(3)/MCL (ref 0–0.9)
EOSINOPHIL NFR BLD AUTO: 1 %
ERYTHROCYTE [DISTWIDTH] IN BLOOD BY AUTOMATED COUNT: 22 % (ref 11.5–17)
ERYTHROCYTE [SEDIMENTATION RATE] IN BLOOD: 1 MM/HR (ref 0–15)
GLOBULIN SER-MCNC: 2 GM/DL (ref 2.4–3.5)
GLUCOSE SERPL-MCNC: 135 MG/DL (ref 82–115)
HCT VFR BLD AUTO: 44.9 % (ref 42–52)
HGB BLD-MCNC: 13.8 GM/DL (ref 14–18)
IMM GRANULOCYTES # BLD AUTO: 0.1 X10(3)/MCL (ref 0–0.02)
IMM GRANULOCYTES NFR BLD AUTO: 1.7 % (ref 0–0.43)
LYMPHOCYTES # BLD AUTO: 1.85 X10(3)/MCL (ref 0.6–4.6)
LYMPHOCYTES NFR BLD AUTO: 31.3 %
MCH RBC QN AUTO: 25.9 PG (ref 27–31)
MCHC RBC AUTO-ENTMCNC: 30.7 MG/DL (ref 33–36)
MCV RBC AUTO: 84.4 FL (ref 80–94)
MONOCYTES # BLD AUTO: 0.56 X10(3)/MCL (ref 0.1–1.3)
MONOCYTES NFR BLD AUTO: 9.5 %
NEUTROPHILS # BLD AUTO: 3.3 X10(3)/MCL (ref 2.1–9.2)
NEUTROPHILS NFR BLD AUTO: 56.2 %
NRBC BLD AUTO-RTO: 0 %
PLATELET # BLD AUTO: 170 X10(3)/MCL (ref 130–400)
PMV BLD AUTO: 8.7 FL (ref 9.4–12.4)
POTASSIUM SERPL-SCNC: 4.2 MMOL/L (ref 3.5–5.1)
PROT SERPL-MCNC: 5.5 GM/DL (ref 5.8–7.6)
RBC # BLD AUTO: 5.32 X10(6)/MCL (ref 4.7–6.1)
SODIUM SERPL-SCNC: 138 MMOL/L (ref 136–145)
WBC # SPEC AUTO: 5.9 X10(3)/MCL (ref 4.5–11.5)

## 2022-05-31 PROCEDURE — 85025 COMPLETE CBC W/AUTO DIFF WBC: CPT | Performed by: INTERNAL MEDICINE

## 2022-05-31 PROCEDURE — 86140 C-REACTIVE PROTEIN: CPT | Performed by: INTERNAL MEDICINE

## 2022-05-31 PROCEDURE — 85651 RBC SED RATE NONAUTOMATED: CPT | Performed by: INTERNAL MEDICINE

## 2022-05-31 PROCEDURE — 80053 COMPREHEN METABOLIC PANEL: CPT | Performed by: INTERNAL MEDICINE

## 2022-06-01 LAB
ACID FAST MOD KINY STN SPEC: ABNORMAL
ACID FAST MOD KINY STN SPEC: ABNORMAL
MYCOBACTERIUM SPEC QL CULT: ABNORMAL

## 2022-06-06 ENCOUNTER — PATIENT MESSAGE (OUTPATIENT)
Dept: TRANSPLANT | Facility: CLINIC | Age: 72
End: 2022-06-06
Payer: MEDICARE

## 2022-06-06 ENCOUNTER — LAB VISIT (OUTPATIENT)
Dept: LAB | Facility: HOSPITAL | Age: 72
End: 2022-06-06
Attending: INTERNAL MEDICINE
Payer: MEDICARE

## 2022-06-06 DIAGNOSIS — T86.10 COMPLICATION OF TRANSPLANTED KIDNEY, UNSPECIFIED COMPLICATION: ICD-10-CM

## 2022-06-06 DIAGNOSIS — Z79.899 IMMUNOSUPPRESSIVE MANAGEMENT ENCOUNTER FOLLOWING KIDNEY TRANSPLANT: ICD-10-CM

## 2022-06-06 DIAGNOSIS — Z94.0 IMMUNOSUPPRESSIVE MANAGEMENT ENCOUNTER FOLLOWING KIDNEY TRANSPLANT: ICD-10-CM

## 2022-06-06 DIAGNOSIS — Z94.0 KIDNEY REPLACED BY TRANSPLANT: ICD-10-CM

## 2022-06-06 LAB
ALBUMIN SERPL-MCNC: 3.5 GM/DL (ref 3.4–4.8)
BASOPHILS # BLD AUTO: 0.03 X10(3)/MCL (ref 0–0.2)
BASOPHILS NFR BLD AUTO: 0.5 %
BUN SERPL-MCNC: 19.4 MG/DL (ref 8.4–25.7)
CALCIUM SERPL-MCNC: 9.7 MG/DL (ref 8.8–10)
CHLORIDE SERPL-SCNC: 103 MMOL/L (ref 98–107)
CO2 SERPL-SCNC: 21 MMOL/L (ref 23–31)
CREAT SERPL-MCNC: 1.56 MG/DL (ref 0.73–1.18)
EOSINOPHIL # BLD AUTO: 0.05 X10(3)/MCL (ref 0–0.9)
EOSINOPHIL NFR BLD AUTO: 0.9 %
ERYTHROCYTE [DISTWIDTH] IN BLOOD BY AUTOMATED COUNT: 21.1 % (ref 11.5–17)
GLUCOSE SERPL-MCNC: 99 MG/DL (ref 82–115)
HCT VFR BLD AUTO: 47.3 % (ref 42–52)
HGB BLD-MCNC: 14.8 GM/DL (ref 14–18)
IMM GRANULOCYTES # BLD AUTO: 0.51 X10(3)/MCL (ref 0–0.02)
IMM GRANULOCYTES NFR BLD AUTO: 9.1 % (ref 0–0.43)
LYMPHOCYTES # BLD AUTO: 1.49 X10(3)/MCL (ref 0.6–4.6)
LYMPHOCYTES NFR BLD AUTO: 26.7 %
MAGNESIUM SERPL-MCNC: 2 MG/DL (ref 1.6–2.6)
MCH RBC QN AUTO: 26 PG (ref 27–31)
MCHC RBC AUTO-ENTMCNC: 31.3 MG/DL (ref 33–36)
MCV RBC AUTO: 83.1 FL (ref 80–94)
MONOCYTES # BLD AUTO: 0.54 X10(3)/MCL (ref 0.1–1.3)
MONOCYTES NFR BLD AUTO: 9.7 %
NEUTROPHILS # BLD AUTO: 3 X10(3)/MCL (ref 2.1–9.2)
NEUTROPHILS NFR BLD AUTO: 53.1 %
NRBC BLD AUTO-RTO: 0 %
PHOSPHATE SERPL-MCNC: 2.7 MG/DL (ref 2.3–4.7)
PLATELET # BLD AUTO: 176 X10(3)/MCL (ref 130–400)
PMV BLD AUTO: 9 FL (ref 9.4–12.4)
POTASSIUM SERPL-SCNC: 4.9 MMOL/L (ref 3.5–5.1)
RBC # BLD AUTO: 5.69 X10(6)/MCL (ref 4.7–6.1)
SODIUM SERPL-SCNC: 135 MMOL/L (ref 136–145)
WBC # SPEC AUTO: 5.6 X10(3)/MCL (ref 4.5–11.5)

## 2022-06-06 PROCEDURE — 83735 ASSAY OF MAGNESIUM: CPT

## 2022-06-06 PROCEDURE — 84100 ASSAY OF PHOSPHORUS: CPT

## 2022-06-06 PROCEDURE — 80053 COMPREHEN METABOLIC PANEL: CPT

## 2022-06-06 PROCEDURE — 36415 COLL VENOUS BLD VENIPUNCTURE: CPT

## 2022-06-06 PROCEDURE — 85025 COMPLETE CBC W/AUTO DIFF WBC: CPT

## 2022-06-07 ENCOUNTER — HOSPITAL ENCOUNTER (OUTPATIENT)
Dept: RADIOLOGY | Facility: HOSPITAL | Age: 72
Discharge: HOME | End: 2022-06-07
Attending: STUDENT IN AN ORGANIZED HEALTH CARE EDUCATION/TRAINING PROGRAM
Payer: MEDICARE

## 2022-06-07 ENCOUNTER — OFFICE VISIT (OUTPATIENT)
Dept: NEUROSURGERY | Facility: CLINIC | Age: 72
End: 2022-06-07
Payer: MEDICARE

## 2022-06-07 ENCOUNTER — TELEPHONE (OUTPATIENT)
Dept: TRANSPLANT | Facility: CLINIC | Age: 72
End: 2022-06-07
Payer: MEDICARE

## 2022-06-07 VITALS
HEIGHT: 74 IN | HEART RATE: 92 BPM | DIASTOLIC BLOOD PRESSURE: 83 MMHG | BODY MASS INDEX: 23.74 KG/M2 | WEIGHT: 185 LBS | SYSTOLIC BLOOD PRESSURE: 125 MMHG

## 2022-06-07 DIAGNOSIS — A43.9 NOCARDIA INFECTION: ICD-10-CM

## 2022-06-07 DIAGNOSIS — G06.0 INTRACRANIAL ABSCESS: ICD-10-CM

## 2022-06-07 DIAGNOSIS — S06.5XAA SUBDURAL HEMATOMA: Primary | ICD-10-CM

## 2022-06-07 LAB
CMV DNA SERPL NAA+PROBE-ACNC: <35 IU/ML
TACROLIMUS TROUGH BLD-MCNC: 13 NG/ML

## 2022-06-07 PROCEDURE — 71250 CT CHEST WITHOUT CONTRAST: ICD-10-PCS | Mod: 26,,, | Performed by: RADIOLOGY

## 2022-06-07 PROCEDURE — 71250 CT THORAX DX C-: CPT | Mod: 26,,, | Performed by: RADIOLOGY

## 2022-06-07 PROCEDURE — 99999 PR PBB SHADOW E&M-EST. PATIENT-LVL IV: ICD-10-PCS | Mod: PBBFAC,,, | Performed by: NEUROLOGICAL SURGERY

## 2022-06-07 PROCEDURE — 99215 OFFICE O/P EST HI 40 MIN: CPT | Mod: S$PBB,,, | Performed by: NEUROLOGICAL SURGERY

## 2022-06-07 PROCEDURE — 99215 PR OFFICE/OUTPT VISIT, EST, LEVL V, 40-54 MIN: ICD-10-PCS | Mod: S$PBB,,, | Performed by: NEUROLOGICAL SURGERY

## 2022-06-07 PROCEDURE — 99214 OFFICE O/P EST MOD 30 MIN: CPT | Mod: PBBFAC,25 | Performed by: NEUROLOGICAL SURGERY

## 2022-06-07 PROCEDURE — 99999 PR PBB SHADOW E&M-EST. PATIENT-LVL IV: CPT | Mod: PBBFAC,,, | Performed by: NEUROLOGICAL SURGERY

## 2022-06-07 PROCEDURE — 71250 CT THORAX DX C-: CPT | Mod: TC

## 2022-06-07 NOTE — PROGRESS NOTES
Neurosurgery  Established Patient    SUBJECTIVE:     History of Present Illness:  Mr. Mazariegos, is a 71-year-old man with a past medical history of atrial fibrillation previously on Eliquis recent kidney transplant, on immunosuppressants, cardiomyopathy, pulmonary hypertension, who previously presented to arm see ED with left-sided weakness.  He was noted to have a 2.2 cm ring-enhancing lesion consistent with an intracranial abscess.  He underwent a right frontal craniotomy for abscess drainage on 02/17/2022.  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  He has subsequently had his Eliquis held, is aspirin restarted.  He was noted to have a subacute appearing subdural hematoma adjacent to and extending from the previous surgical bed.  There was concern for him restarting the Eliquis and expansion of this.  As result he was referred by my partner Dr. Rosa for possible MMA embolization on the right side.  He currently denies any headache, nausea, vomiting.  He denies any aurelio weakness or paresthesias in the upper lower extremities.  He has been off of Eliquis since his surgery and is concerned as his cardiologist given his history of atrial fibrillation.  Also denies any fevers, chills or any evidence of infection.      Review of patient's allergies indicates:  No Known Allergies    Current Outpatient Medications   Medication Sig Dispense Refill    apixaban (ELIQUIS) 5 mg Tab Take 1 tablet (5 mg total) by mouth 2 (two) times daily. DO NOT RESTART UNTIL APPROVED BY NEUROSURGERY 60 tablet 11    calcitRIOL (ROCALTROL) 0.25 MCG Cap Take 1 capsule (0.25 mcg total) by mouth once daily. 90 capsule 3    cefTRIAXone (ROCEPHIN) 2 gram injection       finasteride (PROSCAR) 5 mg tablet Take 5 mg by mouth once daily.      fish oil-omega-3 fatty acids 300-1,000 mg capsule Take 1 capsule by mouth once daily.       fluticasone propionate (FLONASE) 50 mcg/actuation nasal  spray       magnesium oxide (MAG-OX) 400 mg (241.3 mg magnesium) tablet Take 3 tablets (1,200 mg total) by mouth 2 (two) times daily. 120 tablet 11    metoprolol tartrate (LOPRESSOR) 50 MG tablet Take 1 tablet (50 mg total) by mouth 2 (two) times daily. 60 tablet 11    multivitamin Tab Take 1 tablet by mouth once daily.      predniSONE (DELTASONE) 5 MG tablet Take by mouth daily. 5/7-6/6 20 mg, 6/7-7/6 15 mg, 7/7-8/6 10 mg, 5 mg daily thereafter starting 8/7/21 120 tablet 11    sodium bicarbonate 650 MG tablet Take 2 tablets (1,300 mg total) by mouth 2 (two) times daily. 120 tablet 11    sodium polystyrene (KAYEXALATE) powder Mix 4 LEVEL TEASPOONS (30 grams total) in 60mL water and drink by mouth once daily every Mon, Wed & Friday  (for high potassium), 454 g 4    sulfamethoxazole-trimethoprim 800-160mg (BACTRIM DS) 800-160 mg Tab Take 2 tablets by mouth 3 (three) times daily. 180 tablet 11    tacrolimus (PROGRAF) 1 MG Cap Take 2 capsules (2 mg total) by mouth every 12 (twelve) hours. Z94.0;Kidney txp on 5/4/21 120 capsule 11    [START ON 6/14/2022] tedizolid 200 mg Tab Take (200 mg) 1 tablet by mouth once daily. 30 tablet 3    ergocalciferol (ERGOCALCIFEROL) 50,000 unit Cap Take 1 capsule (50,000 Units total) by mouth every 7 days. X 12 months 12 capsule 3    sodium polystyrene (KAYEXALATE) powder Mix 4 LEVEL TEASPOONS (30 grams total) in 60mL water and drink by mouth once daily x 3 days (for high potassium) 45 g 0     No current facility-administered medications for this visit.       Past Medical History:   Diagnosis Date    Anasarca 10/1/2021    Anemia of chronic disease     Atrial fibrillation     BPH (benign prostatic hypertrophy)     Chronic systolic heart failure 10/1/2021    CKD (chronic kidney disease) stage 2, GFR 60-89 ml/min     Hepatitis C virus infection cured after antiviral drug therapy     acquired through kidney transplant, treated / cured (SVR12 - 12/2021)    HTN (hypertension)   "   HTN (hypertension)     Polycystic kidney disease      Past Surgical History:   Procedure Laterality Date    AV FISTULA PLACEMENT Left     CATARACT EXTRACTION, BILATERAL Bilateral     CRANIOTOMY Right 2022    Procedure: CRANIOTOMY;  Surgeon: Sunday Rosa DO;  Location: [facility] OR [address] [place] [address];  Service: Neurosurgery;  Laterality: Right;  R craniotomy for abscess drainage    KIDNEY TRANSPLANT N/A 2021    Procedure: TRANSPLANT, KIDNEY;  Surgeon: Lexy Bolden MD;  Location: [facility] OR [address] [place] [address];  Service: Transplant;  Laterality: N/A;     Family History     Problem Relation (Age of Onset)    Hypertension Father, Sister    Kidney disease Father, Sister    Polycystic kidney disease Father, Sister        Social History     Socioeconomic History    Marital status:     Number of children: 1   Tobacco Use    Smoking status: Former Smoker     Packs/day: 2.00     Years: 10.00     Pack years: 20.00     Types: Cigarettes     Start date: 1972     Quit date: 1984     Years since quittin.5    Smokeless tobacco: Never Used   Substance and Sexual Activity    Alcohol use: No     Comment: Pt reportsbeing a former beer drinker (2-3 cans per day) and quitting in .    Drug use: No    Sexual activity: Yes       Review of Systems    OBJECTIVE:     Vital Signs  Pulse: 92  BP: 125/83  Pain Score: 0-No pain  Height: 6' 2" (188 cm)  Weight: 83.9 kg (185 lb)  Body mass index is 23.75 kg/m².    Neurosurgery Physical Exam  General: no acute distress  Head:  Postsurgical otherwise atraumatic.  There is a well-healed right frontal incision nontender to palpation.  Eyes: Pupils equal, EOMI  Neck: Supple, normal ROM, no tenderness to palpation  CVS: Normal rate and rhythm, distal pulses present  Pulm: Symmetric expansion, no respiratory distress  GI: Abdomen nondistended, nontender    MSK: Moves all extremities without restriction, atraumatic  Skin: Dry, intact  Psych: Normal thought content and " cognition    Neuro:  Alert, awake, oriented, to self, place, time  Language:  Speech is fluent, goal directed without any noted dysarthria or aphasia    Cranial nerves:    CNII-XII: Intact on fine exam,   Pupils equal round react to light,   Extraocular muscles are intact  V1 to V3 is intact to light touch,   no facial asymmetry,   Hearing is intact to finger rub and voice  tongue/uvula/palate midline,   shoulder shrug equal,     No pronator drift    Extremities:  Motor:  Upper Extremity    Deltoid Triceps Biceps Wrist  Extension Wrist  Flexion Interosseous   R 5/5 5/5 5/5 5/5 5/5 5/5   L 5/5 5/5 5/5 5/5 5/5 5/5       Thumb   Abduction Thumb  ADDuction Finger  Flexion Finger  Extension     R 5/5 5/5 5/5 5/5     L 5/5 5/5 5/5 5/5        Lower Extremity     Iliopsoas Quadriceps Hamstring Plantarflexion Dorsiflexion EHL   R 5/5 5/5 5/5 5/5 5/5 5/5   L 5/5 5/5 5/5 5/5 5/5 5/5       Reflexes:     DTR: 2+ biceps    2+ triceps   2+ brachioradialis   2+ patellar  2+ Achilles     Sin's: Negative     Babinski's: Negative     Clonus: Negative     Sensory:      Sensation intact to light touch, temperature sensation, vibration, pinprick     Coordination:      Coordination intact throughout, no dysmetria, normal rapid alternating movements, no dysdiadochokinesia  Cerebellar:  Normal finger-to-nose, normal heel-to-shin      Diagnostic Results:  I personally reviewed patient's Diagnostic Imaging.  Patient has a CT scan of the head with subjacent extra-axial mass 11 mm in its grade of thickness representing a subacute subdural hematoma.        ASSESSMENT/PLAN:     71-year-old man with a previous right frontal abscess status post craniotomy.  With a subacute/chronic subdural hematoma.  Needing to be on anticoagulation.    1. No focal motor or sensory deficits on examination in clinic  2. CT scan of the head with the stable appearing extra-axial mass with mixed density.  3. Had long discussion with patient regarding restarting his  Eliquis.  Did note that given the extra-axial fluid collection consistent with the subacute subdural hematoma that restarting and full anticoagulation may put him at high risk for expansion.  Given that I would recommend MMA embolization of the right middle meningeal artery such that patient may be able to restart his anticoagulation with reduce risk of hematoma enlargement.  I discussed with the patient the risks, benefits, alternatives to the procedure including not limited to heart attack coma, stroke, infection, bleeding, paralysis, even death.  Informed consent was obtained and secured in chart after the patient voiced understanding of these risks and decided proceed with the procedure.    Thank you so very much for allowing me to participate in the care of this patient.  Please feel free to call any questions, comments, concerns.    Hakeem Bourgeois MD,MSc  Department of Neurosurgery   Department of Radiology  Department of Neurology  [facility]    [facility]   [employer] [facility] [employer] [facility]    Total time spent in counseling and discussion about further management options including relevant lab work, treatment,  prognosis, medications and intended side effects was more than 60 minutes. More than 50 % of the time was spent in counseling and coordination of care.  I spent a total of 60 minutes on the day of the visit.This includes face to face time and non-face to face time preparing to see the patient (eg, review of tests), Obtaining and/or reviewing separately obtained history, Documenting clinical information in the electronic or other health record, Independently interpreting resultsand communicating results to the patient/family/caregiver, or Care coordination    Note dictated with voice recognition software, please excuse any grammatical errors.

## 2022-06-08 ENCOUNTER — TELEPHONE (OUTPATIENT)
Dept: TRANSPLANT | Facility: CLINIC | Age: 72
End: 2022-06-08
Payer: MEDICARE

## 2022-06-08 ENCOUNTER — DOCUMENT SCAN (OUTPATIENT)
Dept: HOME HEALTH SERVICES | Facility: HOSPITAL | Age: 72
End: 2022-06-08
Payer: MEDICARE

## 2022-06-08 ENCOUNTER — PATIENT MESSAGE (OUTPATIENT)
Dept: TRANSPLANT | Facility: CLINIC | Age: 72
End: 2022-06-08
Payer: MEDICARE

## 2022-06-08 NOTE — TELEPHONE ENCOUNTER
Notified patient of Dr. Yen's review of 6/6/22 lab results via [facility]. Waiting for patient to reply to message.       Hi Mr. Villeda,     Dr. Yen reviewed your 6/6/22 lab results. Your Prograf level=13 is higher than it should be. If the prograf level was a true 12 hour level, please HOLD your prograf dose tonight & tomorrow AM & PM; restart on Friday 6/10/22 with 1mg twice daily. We'll need to recheck your Prograf level next Mon 6/13/22.     Let me know if the level was a true 12 hour level so we can make the dose adjustments on the medication list.     Thanks,     Tiara      ----- Message from Tiara Ferrell RN sent at 6/8/2022  5:28 PM CDT -----    ----- Message -----  From: Celeste Yen MD  Sent: 6/8/2022   7:23 AM CDT  To: [facility]    Check CMV PCR monthly X 2 times  If tac level is a true level, please hold and resume at 1 mg BID on Friday. Please check tac level next Monday

## 2022-06-08 NOTE — PROGRESS NOTES
Check CMV PCR monthly X 2 times  If tac level is a true level, please hold and resume at 1 mg BID on Friday. Please check tac level next Monday

## 2022-06-09 ENCOUNTER — LAB VISIT (OUTPATIENT)
Dept: LAB | Facility: HOSPITAL | Age: 72
End: 2022-06-09
Attending: INTERNAL MEDICINE
Payer: MEDICARE

## 2022-06-09 DIAGNOSIS — Z79.899 ENCOUNTER FOR LONG-TERM (CURRENT) USE OF OTHER MEDICATIONS: ICD-10-CM

## 2022-06-09 PROCEDURE — 36415 COLL VENOUS BLD VENIPUNCTURE: CPT

## 2022-06-10 LAB — TACROLIMUS TROUGH BLD-MCNC: 7.8 NG/ML

## 2022-06-13 ENCOUNTER — PATIENT MESSAGE (OUTPATIENT)
Dept: INFECTIOUS DISEASES | Facility: CLINIC | Age: 72
End: 2022-06-13
Payer: MEDICARE

## 2022-06-13 ENCOUNTER — PATIENT MESSAGE (OUTPATIENT)
Dept: TRANSPLANT | Facility: CLINIC | Age: 72
End: 2022-06-13
Payer: MEDICARE

## 2022-06-13 ENCOUNTER — LAB VISIT (OUTPATIENT)
Dept: LAB | Facility: HOSPITAL | Age: 72
End: 2022-06-13
Attending: INTERNAL MEDICINE
Payer: MEDICARE

## 2022-06-13 DIAGNOSIS — B18.2 CHRONIC HEPATITIS C WITH HEPATIC COMA: Primary | ICD-10-CM

## 2022-06-13 DIAGNOSIS — Z94.0 KIDNEY REPLACED BY TRANSPLANT: Primary | ICD-10-CM

## 2022-06-13 DIAGNOSIS — Z79.899 ENCOUNTER FOR LONG-TERM (CURRENT) USE OF OTHER MEDICATIONS: ICD-10-CM

## 2022-06-13 PROCEDURE — 36415 COLL VENOUS BLD VENIPUNCTURE: CPT

## 2022-06-13 PROCEDURE — 87522 HEPATITIS C REVRS TRNSCRPJ: CPT

## 2022-06-13 NOTE — TELEPHONE ENCOUNTER
Notified patient of Dr. Yen's review of 6/9/22 lab results via [facility].     Hi Mr. Villeda,     Dr. Yen reviewed your 6/9/22 prograf level=7.8. Based on the result, she wants you to decrease your dose to 2mg in the AM & 1mg in the PM, starting with tonight's dose; next lab appointment will be 6/20/22.     Thanks,     Tiara         ----- Message from Tiara Ferrell RN sent at 6/13/2022  5:45 PM CDT -----    ----- Message -----  From: Celeste Yen MD  Sent: 6/10/2022   8:32 PM CDT  To: [facility] Trans[facility] Clinical    Prograf to 2/1 mg if it is a true trough. Check tac level in a week

## 2022-06-13 NOTE — TELEPHONE ENCOUNTER
----- Message from Celeste Yen MD sent at 6/10/2022  8:32 PM CDT -----  Prograf to 2/1 mg if it is a true trough. Check tac level in a week

## 2022-06-14 RX ORDER — TACROLIMUS 1 MG/1
CAPSULE ORAL
Qty: 90 CAPSULE | Refills: 11 | Status: SHIPPED | OUTPATIENT
Start: 2022-06-14 | End: 2023-02-09 | Stop reason: DRUGHIGH

## 2022-06-15 ENCOUNTER — TELEPHONE (OUTPATIENT)
Dept: INFECTIOUS DISEASES | Facility: CLINIC | Age: 72
End: 2022-06-15
Payer: MEDICARE

## 2022-06-15 LAB — MAYO GENERIC ORDERABLE RESULT: NORMAL

## 2022-06-15 NOTE — TELEPHONE ENCOUNTER
[facility] called with critical labs  CBC: MCH-25.2 MCHC-9.7 RDW- 19.9 MPZ-9.2  CMP: Sodium- 133 Potassium- 5.2 Carbon Dioxide- 18 Cre Ad 1.8 Protein- 5.7    Liv from [facility] will be faxing over labs shortly, please advise.

## 2022-06-20 ENCOUNTER — TELEPHONE (OUTPATIENT)
Dept: INFECTIOUS DISEASES | Facility: CLINIC | Age: 72
End: 2022-06-20
Payer: MEDICARE

## 2022-06-20 ENCOUNTER — PATIENT MESSAGE (OUTPATIENT)
Dept: INFECTIOUS DISEASES | Facility: CLINIC | Age: 72
End: 2022-06-20
Payer: MEDICARE

## 2022-06-20 ENCOUNTER — LAB VISIT (OUTPATIENT)
Dept: LAB | Facility: HOSPITAL | Age: 72
End: 2022-06-20
Attending: INTERNAL MEDICINE
Payer: MEDICARE

## 2022-06-20 DIAGNOSIS — Z94.0 IMMUNOSUPPRESSIVE MANAGEMENT ENCOUNTER FOLLOWING KIDNEY TRANSPLANT: ICD-10-CM

## 2022-06-20 DIAGNOSIS — A43.9 NOCARDIA INFECTION: Primary | ICD-10-CM

## 2022-06-20 DIAGNOSIS — Z79.899 IMMUNOSUPPRESSIVE MANAGEMENT ENCOUNTER FOLLOWING KIDNEY TRANSPLANT: ICD-10-CM

## 2022-06-20 DIAGNOSIS — Z94.0 KIDNEY REPLACED BY TRANSPLANT: ICD-10-CM

## 2022-06-20 LAB
ABS NEUT (OLG): 3.66 X10(3)/MCL (ref 2.1–9.2)
ALBUMIN SERPL-MCNC: 3.7 GM/DL (ref 3.4–4.8)
ANION GAP SERPL CALC-SCNC: 7 MEQ/L
ANISOCYTOSIS BLD QL SMEAR: ABNORMAL
BASOPHILS NFR BLD MANUAL: 0.12 X10(3)/MCL (ref 0–0.2)
BASOPHILS NFR BLD MANUAL: 2 %
BUN SERPL-MCNC: 19.3 MG/DL (ref 8.4–25.7)
BURR CELLS (OLG): ABNORMAL
CALCIUM SERPL-MCNC: 10.6 MG/DL (ref 8.8–10)
CHLORIDE SERPL-SCNC: 102 MMOL/L (ref 98–107)
CO2 SERPL-SCNC: 21 MMOL/L (ref 23–31)
CREAT SERPL-MCNC: 1.75 MG/DL (ref 0.73–1.18)
CREAT/UREA NIT SERPL: 11
EOSINOPHIL NFR BLD MANUAL: 0.12 X10(3)/MCL (ref 0–0.9)
EOSINOPHIL NFR BLD MANUAL: 2 %
ERYTHROCYTE [DISTWIDTH] IN BLOOD BY AUTOMATED COUNT: 19.4 % (ref 11.5–17)
GLUCOSE SERPL-MCNC: 88 MG/DL (ref 82–115)
HCT VFR BLD AUTO: 48.6 % (ref 42–52)
HGB BLD-MCNC: 15.1 GM/DL (ref 14–18)
IMM GRANULOCYTES # BLD AUTO: 0.4 X10(3)/MCL (ref 0–0.02)
IMM GRANULOCYTES NFR BLD AUTO: 6.8 % (ref 0–0.43)
INSTRUMENT WBC (OLG): 6 X10(3)/MCL
LYMPHOCYTES NFR BLD MANUAL: 1.38 X10(3)/MCL
LYMPHOCYTES NFR BLD MANUAL: 23 %
MAGNESIUM SERPL-MCNC: 1.9 MG/DL (ref 1.6–2.6)
MCH RBC QN AUTO: 25.1 PG (ref 27–31)
MCHC RBC AUTO-ENTMCNC: 31.1 MG/DL (ref 33–36)
MCV RBC AUTO: 80.7 FL (ref 80–94)
MICROCYTES BLD QL SMEAR: ABNORMAL
MONOCYTES NFR BLD MANUAL: 0.72 X10(3)/MCL (ref 0.1–1.3)
MONOCYTES NFR BLD MANUAL: 12 %
NEUTROPHILS NFR BLD MANUAL: 61 %
NRBC BLD AUTO-RTO: 0 %
OVALOCYTES (OLG): ABNORMAL
PHOSPHATE SERPL-MCNC: 3.1 MG/DL (ref 2.3–4.7)
PLATELET # BLD AUTO: 162 X10(3)/MCL (ref 130–400)
PLATELET # BLD EST: ADEQUATE 10*3/UL
PMV BLD AUTO: 9.1 FL (ref 9.4–12.4)
POIKILOCYTOSIS BLD QL SMEAR: ABNORMAL
POLYCHROMASIA BLD QL SMEAR: ABNORMAL
POTASSIUM SERPL-SCNC: 4.7 MMOL/L (ref 3.5–5.1)
RBC # BLD AUTO: 6.02 X10(6)/MCL (ref 4.7–6.1)
RBC MORPH BLD: ABNORMAL
SCHISTOCYTE (OLG): ABNORMAL
SODIUM SERPL-SCNC: 130 MMOL/L (ref 136–145)
TARGETS BLD QL SMEAR: ABNORMAL
WBC # SPEC AUTO: 5.9 X10(3)/MCL (ref 4.5–11.5)

## 2022-06-20 PROCEDURE — 80069 RENAL FUNCTION PANEL: CPT

## 2022-06-20 PROCEDURE — 85007 BL SMEAR W/DIFF WBC COUNT: CPT

## 2022-06-20 PROCEDURE — 36415 COLL VENOUS BLD VENIPUNCTURE: CPT

## 2022-06-20 PROCEDURE — 85025 COMPLETE CBC W/AUTO DIFF WBC: CPT

## 2022-06-20 PROCEDURE — 83735 ASSAY OF MAGNESIUM: CPT

## 2022-06-20 NOTE — TELEPHONE ENCOUNTER
72 y/o male with a hx of polycystic kidney sidease s/p DBD KTx 5/2021 on tacro/pred, Afib on AC, and Polycythemia vera (09/2021) recent admission  2/16-2/25 with Disseminated nocardia infection (brain and lung) s/p craniotomy 2/17/22 with cultures + nocardia nova. CT C/A/P notes a RML lesion-discharged on IV meropenem and high dose PO bactrim (Started ~2/19)- Meropenem was switched to IV CTX 4/2022- repeat CT chest with resolution of lung lesion on 6/7- MRI brain 4/21/22 Along the operative tract and extending into the operative defect there is linear rim enhancement and there is an adjacent complex frontoparietal rim enhancing fluid collection measuring approximately 10 mm in maximum transverse diameter.  Findings are suggestive of late subacute blood products with peripheral rim of T1 and T2 hypointensity and blooming artifact on the gradient echo sequences.  No local mass effect or midline shift regional edema present on the previous study is less conspicuous. subseq Ct-Head shows subacute/chronic subdural hematoma, plan for ?  MMA embolization of the right middle meningeal artery with NSGY (not booked).       Plan:    1. Discontinue IV CTX 2 g IV Q12 + Remove Tunneled line.    2. Continue PO bactrim 2 DS Q8 + Start Tedizolid 200 mg once daily.     3. Continue weekly labs while on IV Abx ( CBC/CMP/CRP).     -- Optimal treatment duration 9-12 months,~ ETD 2/2023.        Aaron House MD  Infectious Disease Fellow  PGY-5    Pager [phone]

## 2022-06-20 NOTE — TELEPHONE ENCOUNTER
Was unsuccessful at contacting [facility] to have pt scheduled for tunneled line removal. Called pt to inquire about him coming to main [place] to be scheduled, no answer. Left  and sent [facility] message about scheduling.

## 2022-06-20 NOTE — TELEPHONE ENCOUNTER
----- Message from Bakari Tejada MA sent at 6/20/2022  4:20 PM CDT -----  Regarding: FW: appt  Contact: pt @@ [phone].    ----- Message -----  From: Tremontana Chevalier  Sent: 6/20/2022   3:35 PM CDT  To: [facility] Staff  Subject: appt                                             Pt returning call to Delilah in Dr. House to schedule the location of facility to have the tunneled line removed.  Pls call pt @ [phone].

## 2022-06-20 NOTE — TELEPHONE ENCOUNTER
Pt states he is fine to come in to main campus to have line removed. I just wanted to make sure the order and referral are in. Let me know and I will call IR asap!

## 2022-06-21 ENCOUNTER — LAB REQUISITION (OUTPATIENT)
Dept: LAB | Facility: HOSPITAL | Age: 72
End: 2022-06-21
Payer: MEDICARE

## 2022-06-21 DIAGNOSIS — G06.0 INTRACRANIAL ABSCESS AND GRANULOMA: ICD-10-CM

## 2022-06-21 LAB
ALBUMIN SERPL-MCNC: 3.8 GM/DL (ref 3.4–4.8)
ALBUMIN/GLOB SERPL: 2 RATIO (ref 1.1–2)
ALP SERPL-CCNC: 89 UNIT/L (ref 40–150)
ALT SERPL-CCNC: 26 UNIT/L (ref 0–55)
AST SERPL-CCNC: 25 UNIT/L (ref 5–34)
BASOPHILS # BLD AUTO: 0.03 X10(3)/MCL (ref 0–0.2)
BASOPHILS NFR BLD AUTO: 0.5 %
BILIRUBIN DIRECT+TOT PNL SERPL-MCNC: 0.7 MG/DL
BUN SERPL-MCNC: 20 MG/DL (ref 8.4–25.7)
CALCIUM SERPL-MCNC: 9.9 MG/DL (ref 8.8–10)
CHLORIDE SERPL-SCNC: 108 MMOL/L (ref 98–107)
CO2 SERPL-SCNC: 20 MMOL/L (ref 23–31)
CREAT SERPL-MCNC: 1.58 MG/DL (ref 0.73–1.18)
CRP SERPL-MCNC: <0.4 MG/L
EOSINOPHIL # BLD AUTO: 0.03 X10(3)/MCL (ref 0–0.9)
EOSINOPHIL NFR BLD AUTO: 0.5 %
ERYTHROCYTE [DISTWIDTH] IN BLOOD BY AUTOMATED COUNT: 19.3 % (ref 11.5–17)
ERYTHROCYTE [SEDIMENTATION RATE] IN BLOOD: 2 MM/HR (ref 0–15)
GLOBULIN SER-MCNC: 1.9 GM/DL (ref 2.4–3.5)
GLUCOSE SERPL-MCNC: 99 MG/DL (ref 82–115)
HCT VFR BLD AUTO: 48 % (ref 42–52)
HGB BLD-MCNC: 15.1 GM/DL (ref 14–18)
IMM GRANULOCYTES # BLD AUTO: 0.13 X10(3)/MCL (ref 0–0.02)
IMM GRANULOCYTES NFR BLD AUTO: 2 % (ref 0–0.43)
LYMPHOCYTES # BLD AUTO: 2.65 X10(3)/MCL (ref 0.6–4.6)
LYMPHOCYTES NFR BLD AUTO: 40.8 %
MCH RBC QN AUTO: 25.2 PG (ref 27–31)
MCHC RBC AUTO-ENTMCNC: 31.5 MG/DL (ref 33–36)
MCV RBC AUTO: 80 FL (ref 80–94)
MONOCYTES # BLD AUTO: 0.63 X10(3)/MCL (ref 0.1–1.3)
MONOCYTES NFR BLD AUTO: 9.7 %
NEUTROPHILS # BLD AUTO: 3 X10(3)/MCL (ref 2.1–9.2)
NEUTROPHILS NFR BLD AUTO: 46.5 %
NRBC BLD AUTO-RTO: 0 %
PLATELET # BLD AUTO: 179 X10(3)/MCL (ref 130–400)
PMV BLD AUTO: 9.3 FL (ref 9.4–12.4)
POTASSIUM SERPL-SCNC: 5 MMOL/L (ref 3.5–5.1)
PROT SERPL-MCNC: 5.7 GM/DL (ref 5.8–7.6)
RBC # BLD AUTO: 6 X10(6)/MCL (ref 4.7–6.1)
SODIUM SERPL-SCNC: 135 MMOL/L (ref 136–145)
WBC # SPEC AUTO: 6.5 X10(3)/MCL (ref 4.5–11.5)

## 2022-06-21 PROCEDURE — 85025 COMPLETE CBC W/AUTO DIFF WBC: CPT | Performed by: INTERNAL MEDICINE

## 2022-06-21 PROCEDURE — 80053 COMPREHEN METABOLIC PANEL: CPT | Performed by: INTERNAL MEDICINE

## 2022-06-21 PROCEDURE — 86140 C-REACTIVE PROTEIN: CPT | Performed by: INTERNAL MEDICINE

## 2022-06-21 PROCEDURE — 85651 RBC SED RATE NONAUTOMATED: CPT | Performed by: INTERNAL MEDICINE

## 2022-06-22 ENCOUNTER — PATIENT MESSAGE (OUTPATIENT)
Dept: TRANSPLANT | Facility: CLINIC | Age: 72
End: 2022-06-22
Payer: MEDICARE

## 2022-06-22 ENCOUNTER — TELEPHONE (OUTPATIENT)
Dept: INFECTIOUS DISEASES | Facility: CLINIC | Age: 72
End: 2022-06-22
Payer: MEDICARE

## 2022-06-22 ENCOUNTER — PATIENT MESSAGE (OUTPATIENT)
Dept: INFECTIOUS DISEASES | Facility: CLINIC | Age: 72
End: 2022-06-22
Payer: MEDICARE

## 2022-06-22 DIAGNOSIS — G06.0 INTRACRANIAL ABSCESS: Primary | ICD-10-CM

## 2022-06-22 LAB
CMV DNA SERPL NAA+PROBE-ACNC: <35 IU/ML
TACROLIMUS TROUGH BLD-MCNC: 5.7 NG/ML

## 2022-06-22 NOTE — TELEPHONE ENCOUNTER
----- Message from Rachel Helton sent at 6/22/2022 12:09 PM CDT -----  Did your provider put in the [id number] to be approved?  ----- Message -----  From: Delilah Gonsalves MA  Sent: 6/21/2022   1:46 PM CDT  To: Rachel Helton    Pt has an order for tunneled line removal. He lives 3 hours away but he says he doesn't mind coming here and is open to either [facility] or [facility]. Thank you!

## 2022-06-22 NOTE — ADDENDUM NOTE
Addended by: ALETA GARCIA on: 6/22/2022 12:27 PM     Modules accepted: Orders     Patient came in today to labs done, patient informed me she has insurance now. I informed patient we will not be able to see patient at our location anymore. Gave patient number and address to Dr Isael Crowley and told her to make her follow up with her, and she will be able to take care of any other medical needs she has.

## 2022-06-28 ENCOUNTER — HOSPITAL ENCOUNTER (OUTPATIENT)
Dept: INTERVENTIONAL RADIOLOGY/VASCULAR | Facility: HOSPITAL | Age: 72
Discharge: HOME | End: 2022-06-28
Attending: STUDENT IN AN ORGANIZED HEALTH CARE EDUCATION/TRAINING PROGRAM
Payer: MEDICARE

## 2022-06-28 VITALS
BODY MASS INDEX: 24.11 KG/M2 | HEART RATE: 99 BPM | SYSTOLIC BLOOD PRESSURE: 118 MMHG | RESPIRATION RATE: 18 BRPM | OXYGEN SATURATION: 99 % | TEMPERATURE: 98 F | DIASTOLIC BLOOD PRESSURE: 68 MMHG | WEIGHT: 178 LBS | HEIGHT: 72 IN

## 2022-06-28 DIAGNOSIS — A43.9 NOCARDIA INFECTION: ICD-10-CM

## 2022-06-28 PROCEDURE — A4550 SURGICAL TRAYS: HCPCS

## 2022-06-28 PROCEDURE — 36589 REMOVAL TUNNELED CV CATH: CPT | Performed by: RADIOLOGY

## 2022-06-28 PROCEDURE — 25000003 PHARM REV CODE 250: Performed by: RADIOLOGY

## 2022-06-28 PROCEDURE — 36589 IR TUNNELED CATH REMOVAL W/O PORT: ICD-10-PCS | Mod: RT,,, | Performed by: RADIOLOGY

## 2022-06-28 RX ORDER — LIDOCAINE HYDROCHLORIDE 10 MG/ML
INJECTION INFILTRATION; PERINEURAL CODE/TRAUMA/SEDATION MEDICATION
Status: DISCONTINUED | OUTPATIENT
Start: 2022-06-28 | End: 2022-06-29 | Stop reason: HOSPADM

## 2022-06-28 RX ADMIN — LIDOCAINE HYDROCHLORIDE 3 ML: 10 INJECTION, SOLUTION INFILTRATION; PERINEURAL at 12:06

## 2022-06-28 NOTE — DISCHARGE SUMMARY
Interventional Radiology Short Stay Discharge Summary      Admit Date: 6/28/2022  Discharge Date: 06/28/2022     Hospital Course: Uneventful    Discharge Diagnosis: Infection s/p tunneled line removal today    Discharge Condition: Stable    Discharge Disposition: Home    Diet: Resume prior diet    Activity: Activity as tolerated    Follow-up: With referring provider      Singh Quick MD  [facility]  Pager [phone]

## 2022-06-28 NOTE — H&P
Interventional Radiology Pre-Procedure History & Physical      Chief Complaint/Reason for Referral: Indwelling CVC    History of Present Illness:  Ronal Mazariegos is a 71 y.o. male who presents with an indwelling tunneled CVC that is no longer needed. Referred for removal.    Past Medical History:   Diagnosis Date    Anasarca 10/1/2021    Anemia of chronic disease     Atrial fibrillation     BPH (benign prostatic hypertrophy)     Chronic systolic heart failure 10/1/2021    CKD (chronic kidney disease) stage 2, GFR 60-89 ml/min     Hepatitis C virus infection cured after antiviral drug therapy     acquired through kidney transplant, treated / cured (SVR12 - 12/2021)    HTN (hypertension)     HTN (hypertension)     Polycystic kidney disease      Past Surgical History:   Procedure Laterality Date    AV FISTULA PLACEMENT Left 2016    CATARACT EXTRACTION, BILATERAL Bilateral     CRANIOTOMY Right 2/16/2022    Procedure: CRANIOTOMY;  Surgeon: Sunday Rosa DO;  Location: [facility] OR [address] [place] [address];  Service: Neurosurgery;  Laterality: Right;  R craniotomy for abscess drainage    KIDNEY TRANSPLANT N/A 5/4/2021    Procedure: TRANSPLANT, KIDNEY;  Surgeon: Lexy Bolden MD;  Location: [facility] OR [address] [place] [address];  Service: Transplant;  Laterality: N/A;       Allergies:   Review of patient's allergies indicates:  No Known Allergies    Home Meds:   Prior to Admission medications    Medication Sig Start Date End Date Taking? Authorizing Provider   calcitRIOL (ROCALTROL) 0.25 MCG Cap Take 1 capsule (0.25 mcg total) by mouth once daily. 5/3/22  Yes Celeste Yen MD   ergocalciferol (ERGOCALCIFEROL) 50,000 unit Cap Take 1 capsule (50,000 Units total) by mouth every 7 days. X 12 months 5/3/22 5/3/23 Yes Celeste Yen MD   finasteride (PROSCAR) 5 mg tablet Take 5 mg by mouth once daily.   Yes Historical Provider   fish oil-omega-3 fatty acids 300-1,000 mg capsule Take 1 capsule by mouth once daily.    Yes Historical  Provider   fluticasone propionate (FLONASE) 50 mcg/actuation nasal spray  3/20/22  Yes Historical Provider   magnesium oxide (MAG-OX) 400 mg (241.3 mg magnesium) tablet TAKE 2 TABLETS BY MOUTH THREE TIMES DAILY 6/28/22  Yes Carlos Barcenas MD   metoprolol tartrate (LOPRESSOR) 50 MG tablet Take 1 tablet (50 mg total) by mouth 2 (two) times daily. 2/23/22  Yes Jaimie Menon MD   multivitamin Tab Take 1 tablet by mouth once daily. 5/6/21  Yes Celeste Yen MD   predniSONE (DELTASONE) 5 MG tablet Take by mouth daily. 5/7-6/6 20 mg, 6/7-7/6 15 mg, 7/7-8/6 10 mg, 5 mg daily thereafter starting 8/7/21 5/19/22  Yes Celeste Yen MD   sodium bicarbonate 650 MG tablet Take 2 tablets (1,300 mg total) by mouth 2 (two) times daily. 2/24/22  Yes Jaimie Menon MD   sodium polystyrene (KAYEXALATE) powder Mix 4 LEVEL TEASPOONS (30 grams total) in 60mL water and drink by mouth once daily every Mon, Wed & Friday  (for high potassium), 5/5/22  Yes Celeste Yen MD   sodium polystyrene (KAYEXALATE) powder Mix 4 LEVEL TEASPOONS (30 grams total) in 60mL water and drink by mouth once daily x 3 days (for high potassium) 5/5/22  Yes Celeste Yen MD   sulfamethoxazole-trimethoprim 800-160mg (BACTRIM DS) 800-160 mg Tab Take 2 tablets by mouth 3 (three) times daily. 2/23/22  Yes Jaimie Menon MD   tacrolimus (PROGRAF) 1 MG Cap Take 2 capsules (2 mg total) by mouth every morning AND 1 capsule (1 mg total) every evening. Z94.0;Kidney txp on 5/4/2021. 6/14/22  Yes Celeste Yen MD   tedizolid 200 mg Tab Take 1 tablet  (200 mg total) by mouth once daily. 6/14/22  Yes Aaron House MD   apixaban (ELIQUIS) 5 mg Tab Take 1 tablet (5 mg total) by mouth 2 (two) times daily. DO NOT RESTART UNTIL APPROVED BY NEUROSURGERY 2/23/22   Jaimie Menon MD   cefTRIAXone (ROCEPHIN) 2 gram injection  5/3/22   Historical Provider   famotidine (PEPCID) 20 MG tablet Take 1 tablet (20 mg  total) by mouth every evening. 5/5/21 5/5/22  Celeste Yen MD   lisinopriL (PRINIVIL,ZESTRIL) 2.5 MG tablet Take 1 tablet (2.5 mg total) by mouth once daily. 2/24/22 5/5/22  Jaimie Menon MD   magnesium oxide (MAG-OX) 400 mg (241.3 mg magnesium) tablet Take 3 tablets (1,200 mg total) by mouth 2 (two) times daily. 2/23/22 6/28/22  Jaimie Menon MD   valGANciclovir (VALCYTE) 450 mg Tab Take 2 tablets (900 mg total) by mouth 2 (two) times daily. 12/23/21 5/5/22  Celeste Yen MD       Anticoagulation/Antiplatelet Meds: Eliquis     Review of Systems:   Hematological: no known coagulopathies  Respiratory: no shortness of breath  Cardiovascular: no chest pain  Gastrointestinal: no abdominal pain  Genitourinary: no dysuria  Musculoskeletal: negative  Neurological: no TIA or stroke symptoms     Physical Exam:  Temp: 98 °F (36.7 °C) (06/28/22 1222)  Pulse: 99 (06/28/22 1222)  Resp: 18 (06/28/22 1147)  BP: 118/68 (06/28/22 1222)  SpO2: 99 % (06/28/22 1222)    General: NAD  HEENT: Normocephalic, sclera anicteric, oropharynx clear  Neck/chest: Supple, no palpable lymphadenopathy, RIJ vein tunneled CVC in palce  Heart: RRR  Lungs: Symmetric excursions, breathing unlabored  Abd: NTND, soft  Extremities: MULLIGAN  Neuro: Gross nonfocal    Laboratory:  Lab Results   Component Value Date    INR 1.3 (H) 02/16/2022       Lab Results   Component Value Date    WBC 6.5 06/21/2022    HGB 15.1 06/21/2022    HCT 48.0 06/21/2022    MCV 80.0 06/21/2022     06/21/2022      Lab Results   Component Value Date     (H) 05/05/2022     (L) 06/21/2022    K 5.0 06/21/2022     05/05/2022    CO2 20 (L) 06/21/2022    BUN 20.0 06/21/2022    CREATININE 1.58 (H) 06/21/2022    CALCIUM 9.9 06/21/2022    MG 1.90 06/20/2022    ALT 26 06/21/2022    AST 25 06/21/2022    ALBUMIN 3.8 06/21/2022    BILITOT 0.7 06/21/2022    BILIDIR 0.4 05/10/2022       Assessment/Plan:  71 y.o. male with an indwelling CVC. No  longer needed. Will undergo removal today.    Sedation plan: None      Andrew Marsala MD [facility]  Pager [phone]

## 2022-06-28 NOTE — PROCEDURES
Interventional Radiology Immediate Post-Procedure Note    Pre-Op Diagnosis: Infection  Post-Op Diagnosis: Same    Procedure: Tunneled line removal    Procedure performed by: Singh Quick MD  Assistants: None    Estimated Blood Loss: Minimal  Specimen Removed: None requested    Findings/description of procedure:  Right chest and indwelling right IJ vein tunneled CVC prepped and draped. Line removed intact by gentle traction. Pressure held until hemostasis.    No immediate complications. Patient tolerated procedure well. Please see full dictated procedure report for additional details and recommendations.      Singh Quick MD  [facility]  Pager [phone]

## 2022-06-29 ENCOUNTER — EXTERNAL HOME HEALTH (OUTPATIENT)
Dept: HOME HEALTH SERVICES | Facility: HOSPITAL | Age: 72
End: 2022-06-29
Payer: MEDICARE

## 2022-06-30 ENCOUNTER — TELEPHONE (OUTPATIENT)
Dept: NEUROSURGERY | Facility: CLINIC | Age: 72
End: 2022-06-30
Payer: MEDICARE

## 2022-06-30 NOTE — TELEPHONE ENCOUNTER
Received teams message from Bj. Pt is scheduled for 07/14/22 830 am arrival and 10 am procedure time. Pt needing BMP. Pt just had CMP will find out is still needs BMP. Pt notified of procedure date and time and NPO after MN. Pt verbalized understanding.

## 2022-06-30 NOTE — TELEPHONE ENCOUNTER
----- Message from Amanda Arambula sent at 6/30/2022  8:25 AM CDT -----    Ronal Mazariegos MRN [id number] he is asking about the surgery status per Dr. Bourgeois. States he has been off his Eloquis now for 2 months and hasnt heard from anyone regarding the procedure w/Dr. Bourgeois. States they have left prior messages but no reply. Patients heart doctor is expressing concern    Please reach out to patient today and advise regarding above    Patient can be contacted @# [phone]

## 2022-06-30 NOTE — TELEPHONE ENCOUNTER
Called and spoke with pt. Explained I have sent a message to the IR dept about scheduling. Once I hear back will reach out again. Pt verbalized understanding.

## 2022-07-05 ENCOUNTER — LAB VISIT (OUTPATIENT)
Dept: LAB | Facility: HOSPITAL | Age: 72
End: 2022-07-05
Attending: INTERNAL MEDICINE
Payer: MEDICARE

## 2022-07-05 DIAGNOSIS — B25.9 CYTOMEGALOVIRUS INFECTION, UNSPECIFIED CYTOMEGALOVIRAL INFECTION TYPE: ICD-10-CM

## 2022-07-05 DIAGNOSIS — Z94.0 KIDNEY REPLACED BY TRANSPLANT: ICD-10-CM

## 2022-07-05 LAB
ALBUMIN SERPL-MCNC: 3.8 GM/DL (ref 3.4–4.8)
BASOPHILS # BLD AUTO: 0.04 X10(3)/MCL (ref 0–0.2)
BASOPHILS NFR BLD AUTO: 0.6 %
BUN SERPL-MCNC: 20.1 MG/DL (ref 8.4–25.7)
CALCIUM SERPL-MCNC: 10.2 MG/DL (ref 8.8–10)
CHLORIDE SERPL-SCNC: 105 MMOL/L (ref 98–107)
CO2 SERPL-SCNC: 25 MMOL/L (ref 23–31)
CREAT SERPL-MCNC: 1.64 MG/DL (ref 0.73–1.18)
EOSINOPHIL # BLD AUTO: 0.07 X10(3)/MCL (ref 0–0.9)
EOSINOPHIL NFR BLD AUTO: 1.1 %
ERYTHROCYTE [DISTWIDTH] IN BLOOD BY AUTOMATED COUNT: 20 % (ref 11.5–17)
GLUCOSE SERPL-MCNC: 95 MG/DL (ref 82–115)
HCT VFR BLD AUTO: 51.4 % (ref 42–52)
HGB BLD-MCNC: 15.7 GM/DL (ref 14–18)
IMM GRANULOCYTES # BLD AUTO: 0.36 X10(3)/MCL (ref 0–0.04)
IMM GRANULOCYTES NFR BLD AUTO: 5.8 %
LYMPHOCYTES # BLD AUTO: 2.49 X10(3)/MCL (ref 0.6–4.6)
LYMPHOCYTES NFR BLD AUTO: 40.2 %
MAGNESIUM SERPL-MCNC: 1.8 MG/DL (ref 1.6–2.6)
MCH RBC QN AUTO: 24.6 PG (ref 27–31)
MCHC RBC AUTO-ENTMCNC: 30.5 MG/DL (ref 33–36)
MCV RBC AUTO: 80.7 FL (ref 80–94)
MONOCYTES # BLD AUTO: 0.55 X10(3)/MCL (ref 0.1–1.3)
MONOCYTES NFR BLD AUTO: 8.9 %
NEUTROPHILS # BLD AUTO: 2.7 X10(3)/MCL (ref 2.1–9.2)
NEUTROPHILS NFR BLD AUTO: 43.4 %
NRBC BLD AUTO-RTO: 0 %
PHOSPHATE SERPL-MCNC: 3.1 MG/DL (ref 2.3–4.7)
PLATELET # BLD AUTO: 120 X10(3)/MCL (ref 130–400)
PMV BLD AUTO: 8.2 FL (ref 7.4–10.4)
POTASSIUM SERPL-SCNC: 5.1 MMOL/L (ref 3.5–5.1)
RBC # BLD AUTO: 6.37 X10(6)/MCL (ref 4.7–6.1)
SODIUM SERPL-SCNC: 138 MMOL/L (ref 136–145)
WBC # SPEC AUTO: 6.2 X10(3)/MCL (ref 4.5–11.5)

## 2022-07-05 PROCEDURE — 83735 ASSAY OF MAGNESIUM: CPT

## 2022-07-05 PROCEDURE — 36415 COLL VENOUS BLD VENIPUNCTURE: CPT

## 2022-07-05 PROCEDURE — 80069 RENAL FUNCTION PANEL: CPT

## 2022-07-05 PROCEDURE — 85025 COMPLETE CBC W/AUTO DIFF WBC: CPT

## 2022-07-06 LAB
CMV DNA SERPL NAA+PROBE-ACNC: <35 IU/ML
TACROLIMUS TROUGH BLD-MCNC: 5.1 NG/ML

## 2022-07-07 ENCOUNTER — PATIENT MESSAGE (OUTPATIENT)
Dept: TRANSPLANT | Facility: CLINIC | Age: 72
End: 2022-07-07
Payer: MEDICARE

## 2022-07-10 RX ORDER — VERAPAMIL HYDROCHLORIDE 2.5 MG/ML
10 INJECTION, SOLUTION INTRAVENOUS ONCE
Status: CANCELLED | OUTPATIENT
Start: 2022-07-10 | End: 2022-07-10

## 2022-07-10 RX ORDER — LIDOCAINE AND PRILOCAINE 25; 25 MG/G; MG/G
CREAM TOPICAL ONCE
Status: CANCELLED | OUTPATIENT
Start: 2022-07-10 | End: 2022-07-10

## 2022-07-10 RX ORDER — HEPARIN SODIUM 1000 [USP'U]/ML
5000 INJECTION, SOLUTION INTRAVENOUS; SUBCUTANEOUS ONCE
Status: CANCELLED | OUTPATIENT
Start: 2022-07-10 | End: 2022-07-10

## 2022-07-12 ENCOUNTER — DOCUMENTATION ONLY (OUTPATIENT)
Dept: PREADMISSION TESTING | Facility: HOSPITAL | Age: 72
End: 2022-07-12
Payer: MEDICARE

## 2022-07-12 ENCOUNTER — TELEPHONE (OUTPATIENT)
Dept: NEUROSURGERY | Facility: CLINIC | Age: 72
End: 2022-07-12
Payer: MEDICARE

## 2022-07-12 NOTE — TELEPHONE ENCOUNTER
Called and spoke with pt concerning angio embolization. Pt stated only question was will he stay over night. Explained yes he would. No other questions.   .

## 2022-07-12 NOTE — PRE-PROCEDURE INSTRUCTIONS
PRE-OP INSTRUCTIONS:  Instructed patient to have no food,milk or milk products after midnight   It is ok to take AM medications with a few sips of water   Medication instructions for pm prior to and am of surgery reviewed.  Instructed patient to avoid taking vitamins,supplements,aspirin or ibuprofen the am of surgery.  Shower instructions provided    Patient denies any side effects or issues with anesthesia or sedation.     S/P KIDNEY TRANSPLANT 5/4/2021

## 2022-07-14 ENCOUNTER — ANESTHESIA (OUTPATIENT)
Dept: INTERVENTIONAL RADIOLOGY/VASCULAR | Facility: HOSPITAL | Age: 72
End: 2022-07-14
Payer: MEDICARE

## 2022-07-14 ENCOUNTER — HOSPITAL ENCOUNTER (OUTPATIENT)
Dept: INTERVENTIONAL RADIOLOGY/VASCULAR | Facility: HOSPITAL | Age: 72
Discharge: HOME | End: 2022-07-14
Attending: PHYSICIAN ASSISTANT
Payer: MEDICARE

## 2022-07-14 VITALS
TEMPERATURE: 98 F | HEART RATE: 63 BPM | SYSTOLIC BLOOD PRESSURE: 134 MMHG | RESPIRATION RATE: 18 BRPM | OXYGEN SATURATION: 93 % | HEIGHT: 74 IN | DIASTOLIC BLOOD PRESSURE: 85 MMHG | WEIGHT: 178 LBS | BODY MASS INDEX: 22.84 KG/M2

## 2022-07-14 DIAGNOSIS — S06.5XAA SDH (SUBDURAL HEMATOMA): ICD-10-CM

## 2022-07-14 PROCEDURE — C1887 CATHETER, GUIDING: HCPCS

## 2022-07-14 PROCEDURE — 75898 FOLLOW-UP ANGIOGRAPHY: CPT | Mod: 26,GC,, | Performed by: NEUROLOGICAL SURGERY

## 2022-07-14 PROCEDURE — 27201076 IR EMBOLIZATION (CNS) INTRACRANIAL

## 2022-07-14 PROCEDURE — 75894 X-RAYS TRANSCATH THERAPY: CPT | Mod: 26,GC,, | Performed by: NEUROLOGICAL SURGERY

## 2022-07-14 PROCEDURE — 61624 TCAT PERM OCCLS/EMBOLJ CNS: CPT | Mod: GC,,, | Performed by: NEUROLOGICAL SURGERY

## 2022-07-14 PROCEDURE — D9220A PRA ANESTHESIA: Mod: CRNA,,, | Performed by: STUDENT IN AN ORGANIZED HEALTH CARE EDUCATION/TRAINING PROGRAM

## 2022-07-14 PROCEDURE — 61624 TCAT PERM OCCLS/EMBOLJ CNS: CPT | Performed by: NEUROLOGICAL SURGERY

## 2022-07-14 PROCEDURE — D9220A PRA ANESTHESIA: ICD-10-PCS | Mod: CRNA,,, | Performed by: STUDENT IN AN ORGANIZED HEALTH CARE EDUCATION/TRAINING PROGRAM

## 2022-07-14 PROCEDURE — 75898 PR  ANGIOGRAM,F/U STUDY,CATH THER/EMBOL/INF: ICD-10-PCS | Mod: 26,GC,, | Performed by: NEUROLOGICAL SURGERY

## 2022-07-14 PROCEDURE — G0269 OCCLUSIVE DEVICE IN VEIN ART: HCPCS | Performed by: NEUROLOGICAL SURGERY

## 2022-07-14 PROCEDURE — 36227 PLACE CATH XTRNL CAROTID: CPT | Mod: RT | Performed by: NEUROLOGICAL SURGERY

## 2022-07-14 PROCEDURE — 36223 IR EMBOLIZATION (CNS) INTRACRANIAL: ICD-10-PCS | Mod: 51,RT,GC, | Performed by: NEUROLOGICAL SURGERY

## 2022-07-14 PROCEDURE — 25000003 PHARM REV CODE 250: Performed by: NEUROLOGICAL SURGERY

## 2022-07-14 PROCEDURE — 25000003 PHARM REV CODE 250: Performed by: STUDENT IN AN ORGANIZED HEALTH CARE EDUCATION/TRAINING PROGRAM

## 2022-07-14 PROCEDURE — 37000008 HC ANESTHESIA 1ST 15 MINUTES

## 2022-07-14 PROCEDURE — 36227 PLACE CATH XTRNL CAROTID: CPT | Mod: RT,GC,, | Performed by: NEUROLOGICAL SURGERY

## 2022-07-14 PROCEDURE — 94761 N-INVAS EAR/PLS OXIMETRY MLT: CPT

## 2022-07-14 PROCEDURE — D9220A PRA ANESTHESIA: Mod: ANES,,, | Performed by: ANESTHESIOLOGY

## 2022-07-14 PROCEDURE — 36223 PLACE CATH CAROTID/INOM ART: CPT | Mod: 51,RT,GC, | Performed by: NEUROLOGICAL SURGERY

## 2022-07-14 PROCEDURE — 75894 PR  TRANSCATHETER RX EMBOLIZATN: ICD-10-PCS | Mod: 26,GC,, | Performed by: NEUROLOGICAL SURGERY

## 2022-07-14 PROCEDURE — 36223 PLACE CATH CAROTID/INOM ART: CPT | Mod: RT | Performed by: NEUROLOGICAL SURGERY

## 2022-07-14 PROCEDURE — 63600175 PHARM REV CODE 636 W HCPCS: Performed by: STUDENT IN AN ORGANIZED HEALTH CARE EDUCATION/TRAINING PROGRAM

## 2022-07-14 PROCEDURE — 75894 X-RAYS TRANSCATH THERAPY: CPT | Mod: TC | Performed by: NEUROLOGICAL SURGERY

## 2022-07-14 PROCEDURE — 75898 FOLLOW-UP ANGIOGRAPHY: CPT | Mod: TC | Performed by: NEUROLOGICAL SURGERY

## 2022-07-14 PROCEDURE — 37000009 HC ANESTHESIA EA ADD 15 MINS

## 2022-07-14 PROCEDURE — 25500020 PHARM REV CODE 255: Performed by: NEUROLOGICAL SURGERY

## 2022-07-14 PROCEDURE — D9220A PRA ANESTHESIA: ICD-10-PCS | Mod: ANES,,, | Performed by: ANESTHESIOLOGY

## 2022-07-14 PROCEDURE — 61624 IR EMBOLIZATION (CNS) INTRACRANIAL: ICD-10-PCS | Mod: GC,,, | Performed by: NEUROLOGICAL SURGERY

## 2022-07-14 PROCEDURE — 36227 PR ANGIO XTRNL CAROTD CIRC, XTRNL CAROTID, SELECTV CATH S&I: ICD-10-PCS | Mod: RT,GC,, | Performed by: NEUROLOGICAL SURGERY

## 2022-07-14 RX ORDER — MEPERIDINE HYDROCHLORIDE 50 MG/ML
12.5 INJECTION INTRAMUSCULAR; INTRAVENOUS; SUBCUTANEOUS ONCE AS NEEDED
Status: DISCONTINUED | OUTPATIENT
Start: 2022-07-14 | End: 2022-07-15 | Stop reason: HOSPADM

## 2022-07-14 RX ORDER — DIPHENHYDRAMINE HYDROCHLORIDE 50 MG/ML
25 INJECTION INTRAMUSCULAR; INTRAVENOUS EVERY 6 HOURS PRN
Status: DISCONTINUED | OUTPATIENT
Start: 2022-07-14 | End: 2022-07-15 | Stop reason: HOSPADM

## 2022-07-14 RX ORDER — HYDROMORPHONE HYDROCHLORIDE 1 MG/ML
0.2 INJECTION, SOLUTION INTRAMUSCULAR; INTRAVENOUS; SUBCUTANEOUS EVERY 5 MIN PRN
Status: DISCONTINUED | OUTPATIENT
Start: 2022-07-14 | End: 2022-07-15 | Stop reason: HOSPADM

## 2022-07-14 RX ORDER — ONDANSETRON 2 MG/ML
4 INJECTION INTRAMUSCULAR; INTRAVENOUS DAILY PRN
Status: DISCONTINUED | OUTPATIENT
Start: 2022-07-14 | End: 2022-07-15 | Stop reason: HOSPADM

## 2022-07-14 RX ORDER — ONDANSETRON 2 MG/ML
4 INJECTION INTRAMUSCULAR; INTRAVENOUS ONCE AS NEEDED
Status: DISCONTINUED | OUTPATIENT
Start: 2022-07-14 | End: 2022-07-15 | Stop reason: HOSPADM

## 2022-07-14 RX ORDER — HEPARIN SODIUM 1000 [USP'U]/ML
INJECTION, SOLUTION INTRAVENOUS; SUBCUTANEOUS
Status: DISCONTINUED | OUTPATIENT
Start: 2022-07-14 | End: 2022-07-14

## 2022-07-14 RX ORDER — FENTANYL CITRATE 50 UG/ML
INJECTION, SOLUTION INTRAMUSCULAR; INTRAVENOUS
Status: DISCONTINUED | OUTPATIENT
Start: 2022-07-14 | End: 2022-07-14

## 2022-07-14 RX ORDER — LORAZEPAM 2 MG/ML
0.25 INJECTION INTRAMUSCULAR ONCE AS NEEDED
Status: DISCONTINUED | OUTPATIENT
Start: 2022-07-14 | End: 2022-07-15 | Stop reason: HOSPADM

## 2022-07-14 RX ORDER — FENTANYL CITRATE 50 UG/ML
25 INJECTION, SOLUTION INTRAMUSCULAR; INTRAVENOUS EVERY 5 MIN PRN
Status: DISCONTINUED | OUTPATIENT
Start: 2022-07-14 | End: 2022-07-15 | Stop reason: HOSPADM

## 2022-07-14 RX ORDER — SODIUM CHLORIDE 9 MG/ML
INJECTION, SOLUTION INTRAVENOUS CONTINUOUS
Status: DISCONTINUED | OUTPATIENT
Start: 2022-07-14 | End: 2022-07-15 | Stop reason: HOSPADM

## 2022-07-14 RX ORDER — IODIXANOL 320 MG/ML
75 INJECTION, SOLUTION INTRAVASCULAR
Status: COMPLETED | OUTPATIENT
Start: 2022-07-14 | End: 2022-07-14

## 2022-07-14 RX ORDER — ONDANSETRON 2 MG/ML
INJECTION INTRAMUSCULAR; INTRAVENOUS
Status: DISCONTINUED | OUTPATIENT
Start: 2022-07-14 | End: 2022-07-14

## 2022-07-14 RX ORDER — VERAPAMIL HYDROCHLORIDE 2.5 MG/ML
INJECTION, SOLUTION INTRAVENOUS CODE/TRAUMA/SEDATION MEDICATION
Status: COMPLETED | OUTPATIENT
Start: 2022-07-14 | End: 2022-07-14

## 2022-07-14 RX ORDER — SODIUM CHLORIDE 0.9 % (FLUSH) 0.9 %
10 SYRINGE (ML) INJECTION
Status: DISCONTINUED | OUTPATIENT
Start: 2022-07-14 | End: 2022-07-15 | Stop reason: HOSPADM

## 2022-07-14 RX ORDER — MIDAZOLAM HYDROCHLORIDE 5 MG/ML
INJECTION INTRAMUSCULAR; INTRAVENOUS
Status: DISCONTINUED | OUTPATIENT
Start: 2022-07-14 | End: 2022-07-14

## 2022-07-14 RX ADMIN — ONDANSETRON 4 MG: 2 INJECTION INTRAMUSCULAR; INTRAVENOUS at 02:07

## 2022-07-14 RX ADMIN — VERAPAMIL HYDROCHLORIDE 10 MG: 2.5 INJECTION, SOLUTION INTRAVENOUS at 01:07

## 2022-07-14 RX ADMIN — FENTANYL CITRATE 25 MCG: 50 INJECTION, SOLUTION INTRAMUSCULAR; INTRAVENOUS at 01:07

## 2022-07-14 RX ADMIN — SODIUM CHLORIDE, SODIUM GLUCONATE, SODIUM ACETATE, POTASSIUM CHLORIDE, MAGNESIUM CHLORIDE, SODIUM PHOSPHATE, DIBASIC, AND POTASSIUM PHOSPHATE: .53; .5; .37; .037; .03; .012; .00082 INJECTION, SOLUTION INTRAVENOUS at 01:07

## 2022-07-14 RX ADMIN — IODIXANOL 75 ML: 320 INJECTION, SOLUTION INTRAVASCULAR at 02:07

## 2022-07-14 RX ADMIN — FENTANYL CITRATE 25 MCG: 50 INJECTION, SOLUTION INTRAMUSCULAR; INTRAVENOUS at 02:07

## 2022-07-14 RX ADMIN — HEPARIN SODIUM 2000 UNITS: 1000 INJECTION, SOLUTION INTRAVENOUS; SUBCUTANEOUS at 01:07

## 2022-07-14 RX ADMIN — MIDAZOLAM 2 MG: 5 INJECTION INTRAMUSCULAR; INTRAVENOUS at 01:07

## 2022-07-14 RX ADMIN — FENTANYL CITRATE 50 MCG: 50 INJECTION, SOLUTION INTRAMUSCULAR; INTRAVENOUS at 01:07

## 2022-07-14 NOTE — ANESTHESIA PREPROCEDURE EVALUATION
07/14/2022  Ronal Mazariegos is a 71 y.o., male.      Pre-op Assessment    I have reviewed the Patient Summary Reports.     I have reviewed the Nursing Notes.       Review of Systems  Anesthesia Hx:  No problems with previous Anesthesia    Hematology/Oncology:  Hematology Normal   Oncology Normal     EENT/Dental:EENT/Dental Normal   Cardiovascular:   Hypertension Dysrhythmias atrial fibrillation Cardiomyopathy   Pulmonary:  Pulmonary Normal    Renal/:   Chronic Renal Disease    Hepatic/GI:   Liver Disease, Hepatitis C virus infection cured after antiviral drug therapy  Polycystic liver disease     Musculoskeletal:  Musculoskeletal Normal    Neurological:  Neurology Normal    Endocrine:   Diabetes    Dermatological:  Skin Normal    Psych:  Psychiatric Normal           Physical Exam  General: Well nourished    Airway:  Mallampati: III   Mouth Opening: Normal  TM Distance: Normal  Tongue: Normal  Neck ROM: Normal ROM    Dental:  Intact        Anesthesia Plan  Type of Anesthesia, risks & benefits discussed:    Anesthesia Type: Gen ETT  Intra-op Monitoring Plan: Standard ASA Monitors  Post Op Pain Control Plan: multimodal analgesia  Induction:  IV  Airway Plan: Direct  Informed Consent: Informed consent signed with the Patient and all parties understand the risks and agree with anesthesia plan.  All questions answered. Patient consented to blood products? No  ASA Score: 3  Day of Surgery Review of History & Physical: H&P Update referred to the surgeon/provider.    Ready For Surgery From Anesthesia Perspective.     .

## 2022-07-14 NOTE — ANESTHESIA POSTPROCEDURE EVALUATION
Anesthesia Post Evaluation    Patient: Ronal Mazariegos    Procedure(s) Performed: * No procedures listed *    Final Anesthesia Type: general      Level of consciousness: awake and alert  Post-procedure vital signs: reviewed and stable  Pain control: Pain has been treated.  Airway patency: patent    PONV status: Absent or treated.  Anesthetic complications: no      Cardiovascular status: hemodynamically stable  Respiratory status: unassisted  Hydration status: euvolemic            Vitals Value Taken Time   /70 07/14/22 1553   Temp 36.6 °C (97.9 °F) 07/14/22 1552   Pulse 62 07/14/22 1600   Resp 16 07/14/22 1552   SpO2 97 % 07/14/22 1600   Vitals shown include unvalidated device data.      Event Time   Out of Recovery 15:53:00         Pain/Marva Score: Marva Score: 10 (7/14/2022  3:00 PM)

## 2022-07-14 NOTE — H&P
Radiology History & Physical      SUBJECTIVE:     Chief Complaint: SDH    History of Present Illness:  Ronal Mazariegos is a 71 y.o. male with medical history of PCKD - kidney transplant on immunosuppressants, Afib with recent management for intracranial abscess S/p Abx / right frontal craniotomy - with hold of AC for subsequent right sided SDH. Hence, plans for cerebral angiogram for right sided middle meningeal artery embolization for therapeutic goals.       Past Medical History:   Diagnosis Date    Anasarca 10/1/2021    Anemia of chronic disease     Atrial fibrillation     BPH (benign prostatic hypertrophy)     Chronic systolic heart failure 10/1/2021    CKD (chronic kidney disease) stage 2, GFR 60-89 ml/min     Hepatitis C virus infection cured after antiviral drug therapy     acquired through kidney transplant, treated / cured (SVR12 - 12/2021)    HTN (hypertension)     HTN (hypertension)     Polycystic kidney disease      Past Surgical History:   Procedure Laterality Date    AV FISTULA PLACEMENT Left 2016    CATARACT EXTRACTION, BILATERAL Bilateral     CRANIOTOMY Right 2/16/2022    Procedure: CRANIOTOMY;  Surgeon: Sunday Rosa DO;  Location: [facility] OR [address] [place] [address];  Service: Neurosurgery;  Laterality: Right;  R craniotomy for abscess drainage    KIDNEY TRANSPLANT N/A 5/4/2021    Procedure: TRANSPLANT, KIDNEY;  Surgeon: Lexy Bolden MD;  Location: [facility] OR [address] [place] [address];  Service: Transplant;  Laterality: N/A;       Home Meds:   Prior to Admission medications    Medication Sig Start Date End Date Taking? Authorizing Provider   calcitRIOL (ROCALTROL) 0.25 MCG Cap Take 1 capsule (0.25 mcg total) by mouth once daily.  Patient taking differently: Take 0.25 mcg by mouth every morning. 5/3/22  Yes Celeste Yen MD   ergocalciferol (ERGOCALCIFEROL) 50,000 unit Cap Take 1 capsule (50,000 Units total) by mouth every 7 days. X 12 months  Patient taking differently: Take 50,000 Units by mouth every 7  days. X 12 months  MONDAYS 5/3/22 5/3/23 Yes Celeste Yen MD   finasteride (PROSCAR) 5 mg tablet Take 5 mg by mouth every evening.   Yes Historical Provider   fish oil-omega-3 fatty acids 300-1,000 mg capsule Take 1 capsule by mouth every evening.   Yes Historical Provider   fluticasone propionate (FLONASE) 50 mcg/actuation nasal spray daily as needed. 3/20/22  Yes Historical Provider   magnesium oxide (MAG-OX) 400 mg (241.3 mg magnesium) tablet TAKE 2 TABLETS BY MOUTH THREE TIMES DAILY  Patient taking differently: Take by mouth 2 (two) times daily. 4 TABLETS TWICE DAILY 6/28/22  Yes Carlos Barcenas MD   metoprolol tartrate (LOPRESSOR) 50 MG tablet Take 1 tablet (50 mg total) by mouth 2 (two) times daily. 2/23/22  Yes Jaimie Menon MD   multivitamin Tab Take 1 tablet by mouth once daily. 5/6/21  Yes Celeste Yen MD   predniSONE (DELTASONE) 5 MG tablet Take by mouth daily. 5/7-6/6 20 mg, 6/7-7/6 15 mg, 7/7-8/6 10 mg, 5 mg daily thereafter starting 8/7/21  Patient taking differently: Take by mouth every morning. 5/19/22  Yes Celeste Yen MD   sodium bicarbonate 650 MG tablet Take 2 tablets (1,300 mg total) by mouth 2 (two) times daily. 2/24/22  Yes Jaimie Menon MD   sodium polystyrene (KAYEXALATE) powder Mix 4 LEVEL TEASPOONS (30 grams total) in 60mL water and drink by mouth once daily every Mon, Wed & Friday  (for high potassium), 5/5/22  Yes Celeste Yen MD   sulfamethoxazole-trimethoprim 800-160mg (BACTRIM DS) 800-160 mg Tab Take 2 tablets by mouth 3 (three) times daily. 2/23/22  Yes Jaimie Menon MD   tacrolimus (PROGRAF) 1 MG Cap Take 2 capsules (2 mg total) by mouth every morning AND 1 capsule (1 mg total) every evening. Z94.0;Kidney txp on 5/4/2021. 6/14/22  Yes Celeste Yen MD   tedizolid 200 mg Tab Take 1 tablet  (200 mg total) by mouth once daily. 6/14/22  Yes Aaron House MD   apixaban (ELIQUIS) 5 mg Tab Take 1 tablet (5 mg total)  by mouth 2 (two) times daily. DO NOT RESTART UNTIL APPROVED BY NEUROSURGERY 2/23/22   Jaimie Menon MD   sodium polystyrene (KAYEXALATE) powder Mix 4 LEVEL TEASPOONS (30 grams total) in 60mL water and drink by mouth once daily x 3 days (for high potassium) 5/5/22   Celeste Yen MD   famotidine (PEPCID) 20 MG tablet Take 1 tablet (20 mg total) by mouth every evening. 5/5/21 5/5/22  Celeste Yen MD   lisinopriL (PRINIVIL,ZESTRIL) 2.5 MG tablet Take 1 tablet (2.5 mg total) by mouth once daily. 2/24/22 5/5/22  Jaimie Menon MD   valGANciclovir (VALCYTE) 450 mg Tab Take 2 tablets (900 mg total) by mouth 2 (two) times daily. 12/23/21 5/5/22  Celeste Yen MD     Anticoagulants/Antiplatelets: no anticoagulation    Allergies: Review of patient's allergies indicates:  No Known Allergies  Sedation History:  no adverse reactions    Review of Systems:   Hematological: no known coagulopathies  Respiratory: no shortness of breath  Cardiovascular: no chest pain  Gastrointestinal: no abdominal pain  Genito-Urinary: no dysuria  Musculoskeletal: negative  Neurological: no TIA or stroke symptoms         OBJECTIVE:     Vital Signs (Most Recent)  Temp: 97.8 °F (36.6 °C) (07/14/22 0817)  Pulse: 92 (07/14/22 0817)  Resp: 18 (07/14/22 0817)  BP: 136/81 (07/14/22 0818)  SpO2: 100 % (07/14/22 0817)    Physical Exam:  ASA: 3  Mallampati: 2    General: no acute distress  Mental Status: alert and oriented to person, place and time  HEENT: normocephalic, atraumatic  Chest: unlabored breathing  Heart: regular heart rate  Abdomen: nondistended  Extremity: moves all extremities    Laboratory  Lab Results   Component Value Date    INR 1.3 (H) 02/16/2022       Lab Results   Component Value Date    WBC 6.2 07/05/2022    HGB 15.7 07/05/2022    HCT 51.4 07/05/2022    MCV 80.7 07/05/2022     (L) 07/05/2022      Lab Results   Component Value Date     (H) 05/05/2022     07/05/2022    K 5.1  07/05/2022     05/05/2022    CO2 25 07/05/2022    BUN 20.1 07/05/2022    CREATININE 1.64 (H) 07/05/2022    CALCIUM 10.2 (H) 07/05/2022    MG 1.80 07/05/2022    ALT 26 06/21/2022    AST 25 06/21/2022    ALBUMIN 3.8 07/05/2022    BILITOT 0.7 06/21/2022    BILIDIR 0.4 05/10/2022       ASSESSMENT/PLAN:     Sedation Plan: As per anesthesia    Patient will undergo: cerebral angiogram for right sided middle meningeal artery embolization for therapeutic goals.             Pan Chung MD     Neuro Endovascular Surgery Fellow   [facility]

## 2022-07-14 NOTE — TRANSFER OF CARE
"Anesthesia Transfer of Care Note    Patient: Ronal Mazariegos    Procedure(s) Performed: * No procedures listed *    Patient location: PACU    Anesthesia Type: MAC    Transport from OR: Transported from OR on 6-10 L/min O2 by face mask with adequate spontaneous ventilation    Post pain: adequate analgesia    Post assessment: no apparent anesthetic complications and tolerated procedure well    Post vital signs: stable    Level of consciousness: awake and alert    Nausea/Vomiting: no nausea/vomiting    Complications: none    Transfer of care protocol was followed      Last vitals:   Visit Vitals  /81   Pulse 70   Temp 36.6 °C (97.8 °F) (Oral)   Resp 18   Ht 6' 2" (1.88 m)   Wt 80.7 kg (178 lb)   SpO2 95%   BMI 22.85 kg/m²     "

## 2022-07-14 NOTE — PROCEDURES
Radiology Post-Procedure Note    Pre Op Diagnosis: Right sided SDH    Post Op Diagnosis: Right MMA embolization     Procedure: Cerebral angiogram    Procedure performed by: MY PAREDES    Written Informed Consent Obtained: Yes    Specimen Removed: NO    Estimated Blood Loss: Minimal    Procedure report:     A 5F sheath was placed into the right femoral artery and a 5F Ang catheter / Envoy was advanced into the aortic arch.  The right CCA, ECA arteries were subselected and angiography of the brain was performed after injection into each of these vessels.    Preliminary interpretation:     Right MMA was characterized - Ophthalmic anastomosis was noted from the anterior division. And, performed raissa embolization to the posterior parietal division using headway duo 156, under synchro.     Please see Imaging report for full details.    A right femoral artery angiogram was performed, the sheath removed and hemostasis achieved using mynx.  No hematoma was present at the time of hemostasis.    The patient tolerated the procedure well.         Pan Chung MD     Neuro Endovascular Surgery Fellow   [facility]

## 2022-07-14 NOTE — PLAN OF CARE
Patient is stable and ready for discharge. Instructions given to patient and family. Questions answered. Patient tolerating po liquids with no difficulty. Patient has no pain or states it is a tolerable level for them. Anesthesia consent and surgical consent in chart. Patient ambulated after bed rest with no bleeding or signs of hematoma.

## 2022-07-14 NOTE — SEDATION DOCUMENTATION
Hemostasis achieved via right groin with use of Minx closure device. Pt to lay flat for 2 hours until 1620

## 2022-07-15 ENCOUNTER — PATIENT MESSAGE (OUTPATIENT)
Dept: NEUROSURGERY | Facility: CLINIC | Age: 72
End: 2022-07-15
Payer: MEDICARE

## 2022-07-18 ENCOUNTER — DOCUMENT SCAN (OUTPATIENT)
Dept: HOME HEALTH SERVICES | Facility: HOSPITAL | Age: 72
End: 2022-07-18
Payer: MEDICARE

## 2022-07-18 ENCOUNTER — PATIENT MESSAGE (OUTPATIENT)
Dept: NEUROSURGERY | Facility: CLINIC | Age: 72
End: 2022-07-18
Payer: MEDICARE

## 2022-07-18 ENCOUNTER — LAB VISIT (OUTPATIENT)
Dept: LAB | Facility: HOSPITAL | Age: 72
End: 2022-07-18
Attending: INTERNAL MEDICINE
Payer: MEDICARE

## 2022-07-18 ENCOUNTER — TELEPHONE (OUTPATIENT)
Dept: NEUROSURGERY | Facility: CLINIC | Age: 72
End: 2022-07-18
Payer: MEDICARE

## 2022-07-18 DIAGNOSIS — B18.2 CHRONIC HEPATITIS C WITHOUT HEPATIC COMA: ICD-10-CM

## 2022-07-18 DIAGNOSIS — Z79.899 IMMUNOSUPPRESSIVE MANAGEMENT ENCOUNTER FOLLOWING KIDNEY TRANSPLANT: ICD-10-CM

## 2022-07-18 DIAGNOSIS — Z94.0 IMMUNOSUPPRESSIVE MANAGEMENT ENCOUNTER FOLLOWING KIDNEY TRANSPLANT: ICD-10-CM

## 2022-07-18 DIAGNOSIS — Z94.0 KIDNEY REPLACED BY TRANSPLANT: ICD-10-CM

## 2022-07-18 DIAGNOSIS — I62.00 NONTRAUMATIC SUBDURAL HEMORRHAGE, UNSPECIFIED: ICD-10-CM

## 2022-07-18 LAB
ABS NEUT (OLG): 4.1 X10(3)/MCL (ref 2.1–9.2)
ALBUMIN SERPL-MCNC: 3.6 GM/DL (ref 3.4–4.8)
ANISOCYTOSIS BLD QL SMEAR: ABNORMAL
BUN SERPL-MCNC: 21.3 MG/DL (ref 8.4–25.7)
BURR CELLS (OLG): ABNORMAL
CALCIUM SERPL-MCNC: 9.7 MG/DL (ref 8.8–10)
CHLORIDE SERPL-SCNC: 107 MMOL/L (ref 98–107)
CO2 SERPL-SCNC: 20 MMOL/L (ref 23–31)
CREAT SERPL-MCNC: 1.45 MG/DL (ref 0.73–1.18)
ERYTHROCYTE [DISTWIDTH] IN BLOOD BY AUTOMATED COUNT: 21.5 % (ref 11.5–17)
GLUCOSE SERPL-MCNC: 92 MG/DL (ref 82–115)
HCT VFR BLD AUTO: 47 % (ref 42–52)
HCV AB SERPL QL IA: NONREACTIVE
HGB BLD-MCNC: 14.4 GM/DL (ref 14–18)
IMM GRANULOCYTES # BLD AUTO: 0.43 X10(3)/MCL (ref 0–0.04)
IMM GRANULOCYTES NFR BLD AUTO: 6.8 %
INSTRUMENT WBC (OLG): 6.4 X10(3)/MCL
LYMPHOCYTES NFR BLD MANUAL: 1.79 X10(3)/MCL
LYMPHOCYTES NFR BLD MANUAL: 28 %
MACROCYTES BLD QL SMEAR: ABNORMAL
MAGNESIUM SERPL-MCNC: 2 MG/DL (ref 1.6–2.6)
MCH RBC QN AUTO: 25 PG (ref 27–31)
MCHC RBC AUTO-ENTMCNC: 30.6 MG/DL (ref 33–36)
MCV RBC AUTO: 81.6 FL (ref 80–94)
METAMYELOCYTES NFR BLD MANUAL: 3 %
MONOCYTES NFR BLD MANUAL: 0.58 X10(3)/MCL (ref 0.1–1.3)
MONOCYTES NFR BLD MANUAL: 9 %
NEUTROPHILS NFR BLD MANUAL: 61 %
NRBC BLD AUTO-RTO: 0 %
PHOSPHATE SERPL-MCNC: 2.6 MG/DL (ref 2.3–4.7)
PLATELET # BLD AUTO: 128 X10(3)/MCL (ref 130–400)
PLATELET # BLD EST: ABNORMAL 10*3/UL
PMV BLD AUTO: 9.4 FL (ref 7.4–10.4)
POIKILOCYTOSIS BLD QL SMEAR: ABNORMAL
POLYCHROMASIA BLD QL SMEAR: ABNORMAL
POTASSIUM SERPL-SCNC: 5 MMOL/L (ref 3.5–5.1)
RBC # BLD AUTO: 5.76 X10(6)/MCL (ref 4.7–6.1)
RBC MORPH BLD: ABNORMAL
SODIUM SERPL-SCNC: 135 MMOL/L (ref 136–145)
TEAR DROP CELL (OLG): ABNORMAL
WBC # SPEC AUTO: 6.4 X10(3)/MCL (ref 4.5–11.5)

## 2022-07-18 PROCEDURE — 80069 RENAL FUNCTION PANEL: CPT

## 2022-07-18 PROCEDURE — 36415 COLL VENOUS BLD VENIPUNCTURE: CPT

## 2022-07-18 PROCEDURE — 85025 COMPLETE CBC W/AUTO DIFF WBC: CPT

## 2022-07-18 PROCEDURE — 83735 ASSAY OF MAGNESIUM: CPT

## 2022-07-18 PROCEDURE — 86803 HEPATITIS C AB TEST: CPT

## 2022-07-19 LAB — TACROLIMUS TROUGH BLD-MCNC: 5 NG/ML

## 2022-07-26 ENCOUNTER — OFFICE VISIT (OUTPATIENT)
Dept: INFECTIOUS DISEASES | Facility: CLINIC | Age: 72
End: 2022-07-26
Payer: MEDICARE

## 2022-07-26 VITALS
HEIGHT: 74 IN | DIASTOLIC BLOOD PRESSURE: 79 MMHG | HEART RATE: 91 BPM | WEIGHT: 177.94 LBS | TEMPERATURE: 98 F | BODY MASS INDEX: 22.84 KG/M2 | SYSTOLIC BLOOD PRESSURE: 116 MMHG

## 2022-07-26 DIAGNOSIS — A43.9 NOCARDIA INFECTION: Primary | ICD-10-CM

## 2022-07-26 PROCEDURE — 99999 PR PBB SHADOW E&M-EST. PATIENT-LVL IV: ICD-10-PCS | Mod: PBBFAC,GC,, | Performed by: STUDENT IN AN ORGANIZED HEALTH CARE EDUCATION/TRAINING PROGRAM

## 2022-07-26 PROCEDURE — 99214 PR OFFICE/OUTPT VISIT, EST, LEVL IV, 30-39 MIN: ICD-10-PCS | Mod: S$PBB,GC,, | Performed by: STUDENT IN AN ORGANIZED HEALTH CARE EDUCATION/TRAINING PROGRAM

## 2022-07-26 PROCEDURE — 99214 OFFICE O/P EST MOD 30 MIN: CPT | Mod: PBBFAC | Performed by: STUDENT IN AN ORGANIZED HEALTH CARE EDUCATION/TRAINING PROGRAM

## 2022-07-26 PROCEDURE — 99999 PR PBB SHADOW E&M-EST. PATIENT-LVL IV: CPT | Mod: PBBFAC,GC,, | Performed by: STUDENT IN AN ORGANIZED HEALTH CARE EDUCATION/TRAINING PROGRAM

## 2022-07-26 PROCEDURE — 99214 OFFICE O/P EST MOD 30 MIN: CPT | Mod: S$PBB,GC,, | Performed by: STUDENT IN AN ORGANIZED HEALTH CARE EDUCATION/TRAINING PROGRAM

## 2022-07-26 NOTE — PROGRESS NOTES
INFECTIOUS DISEASE CLINIC  07/26/2022     Subjective:      Chief Complaint: Nocardia nova    History of Present Illness:    71-year-old male with polycystic kidney sidease s/p DBD KTx 5/2021 (CMV D+/R-, HCV NAWAF+, Simulect induction, CIT ~8 hours) on tacro/pred, Afib on AC, and Polycythemia vera (09/2021) presents to clinic for follow up.    Admitted 2/16-2/25 after fall at home with left-sided neurologic deficit.  Found to have right frontal mass, s/p craniotomy 2/17/22 with cultures + nocardia nova. CT C/A/P also with RML lesion suspected also due to nocardia nova. 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  TTE negative for vegetations, abnormal thickening of the aortic root unchanged from prior. Patient was discharged on IV meropenem and high dose PO bactrim (Started ~2/19).  Planned treatment typically 9-12 months.    ID clinic visits:  4/21/22 - switched meropenem to ceftriaxone 2g q12h based on sensitivities, and continued bactrim.    5/24/22 - planned to switch ceftriaxone to tedizolid.  Order for tedizolid sent to specialty pharmacy with start date 6/7/22.  Continued bactrim.  6/20/22 telephone encounter - discontinued ceftriaxone.  Started tedizolid 200mg daily.  Continued bactrim 2ds q8h.    Last saw nephrology 6/1/22, started lokelma  Last saw neurosurgery 6/7/22 - subacute/chronic subdural hematoma.  5/5/22 CT head stable appearing extra-axial mass with mixed density, recommended MMA embolization before restarting AC for atrial fibrillation  On 7/14/22 right MMA raissa embolization.    He is compliant with his Bactrim and Tedizolid; paid $693 last month for Tedizolid.    Hobbies: Hunting deer (eats venison) and hogs, fishing, and mowing the lawn.  Eats only well-cooked seafood.     Review of Systems   Constitutional: Negative for chills, decreased appetite, fever, malaise/fatigue and night sweats.   HENT: Negative for congestion and sore throat.    Eyes:  Negative for blurred vision, double vision, vision loss in left eye, vision loss in right eye and visual disturbance.   Cardiovascular: Negative for chest pain, dyspnea on exertion, irregular heartbeat and leg swelling.   Respiratory: Negative for cough, hemoptysis, shortness of breath and sputum production.    Hematologic/Lymphatic: Negative for adenopathy. Does not bruise/bleed easily.   Skin: Negative for rash and suspicious lesions.   Musculoskeletal: Negative for arthritis, joint pain, muscle weakness and myalgias.   Gastrointestinal: Negative for abdominal pain, constipation, diarrhea, heartburn, nausea and vomiting.   Genitourinary: Negative for dysuria, flank pain, frequency and genital sores.   Neurological: Negative for dizziness, focal weakness, numbness, paresthesias, sensory change, tremors and weakness.   Psychiatric/Behavioral: Negative for altered mental status and depression. The patient is not nervous/anxious.    Allergic/Immunologic: Negative for environmental allergies and persistent infections.         Past Medical History:   Diagnosis Date    Anasarca 10/1/2021    Anemia of chronic disease     Atrial fibrillation     BPH (benign prostatic hypertrophy)     Chronic systolic heart failure 10/1/2021    CKD (chronic kidney disease) stage 2, GFR 60-89 ml/min     Hepatitis C virus infection cured after antiviral drug therapy     acquired through kidney transplant, treated / cured (SVR12 - 12/2021)    HTN (hypertension)     HTN (hypertension)     Polycystic kidney disease      Past Surgical History:   Procedure Laterality Date    AV FISTULA PLACEMENT Left 2016    CATARACT EXTRACTION, BILATERAL Bilateral     CRANIOTOMY Right 2/16/2022    Procedure: CRANIOTOMY;  Surgeon: Sunday Rosa DO;  Location: [facility] OR [address] [place] [address];  Service: Neurosurgery;  Laterality: Right;  R craniotomy for abscess drainage    KIDNEY TRANSPLANT N/A 5/4/2021    Procedure: TRANSPLANT, KIDNEY;  Surgeon: Lexy BRADEN  MD Yuan;  Location: [facility] OR [address] [place] [address];  Service: Transplant;  Laterality: N/A;     Family History   Problem Relation Age of Onset    Polycystic kidney disease Father     Hypertension Father     Kidney disease Father     Polycystic kidney disease Sister     Hypertension Sister     Kidney disease Sister      Social History     Tobacco Use    Smoking status: Former Smoker     Packs/day: 2.00     Years: 10.00     Pack years: 20.00     Types: Cigarettes     Start date: 1972     Quit date: 1984     Years since quittin.6    Smokeless tobacco: Never Used   Substance Use Topics    Alcohol use: No     Comment: Pt reportsbeing a former beer drinker (2-3 cans per day) and quitting in .    Drug use: No       Review of patient's allergies indicates:  No Known Allergies      Objective:   VS (24h):   Vitals:    22 1034   BP: 116/79   Pulse: 91   Temp: 97.6 °F (36.4 °C)         Physical Exam  Constitutional:       General: He is not in acute distress.     Appearance: Normal appearance. He is well-developed and normal weight. He is not ill-appearing, toxic-appearing or diaphoretic.   HENT:      Head: Normocephalic.      Right Ear: External ear normal.      Left Ear: External ear normal.      Nose: Nose normal.   Eyes:      General: No scleral icterus.        Right eye: No discharge.         Left eye: No discharge.      Extraocular Movements: Extraocular movements intact.      Conjunctiva/sclera: Conjunctivae normal.   Cardiovascular:      Rate and Rhythm: Normal rate and regular rhythm.      Heart sounds: Normal heart sounds. No murmur heard.  Pulmonary:      Effort: Pulmonary effort is normal.      Breath sounds: Normal breath sounds.   Abdominal:      General: Bowel sounds are normal. There is no distension.      Palpations: Abdomen is soft.      Tenderness: There is no abdominal tenderness.   Musculoskeletal:         General: Normal range of motion.      Cervical back: Normal range of motion.       Right lower leg: No edema.      Left lower leg: No edema.   Skin:     General: Skin is warm and dry.      Coloration: Skin is not jaundiced.      Findings: No lesion.   Neurological:      Mental Status: He is alert and oriented to person, place, and time. Mental status is at baseline.      Cranial Nerves: No cranial nerve deficit.      Sensory: No sensory deficit.      Motor: No weakness.      Coordination: Coordination normal.      Gait: Gait normal.   Psychiatric:         Behavior: Behavior normal.           Labs:  Reviewed    Micro:   FINAL 03/31/2022 1033 Abnormal      Comment: SOURCE: BRAIN, Intracerebral Abscess, Brain   SUSC, AEROBIC ACTINOMYCETES                            FINAL   NOCARDIA NOVA     ----------------------------------------------------------   Organism     NOCARDIA NOVA   Antibiotic    DONELL (mcg/mL)  Interpretation   ----------------------------------------------------------   Amox/Clav            64/32       R   Ceftriaxone              2       S   Imipenem            <=0.12       S   Ciprofloxacin          >16       R   Moxifloxacin             8       R   Clarithromycin        0.06       S   Amikacin             <=0.5       S   Tobramycin              64       R   Doxycycline              4       I   Minocycline              4       I   TMP/SMX               1/19       S   Linezolid                2       S   Ciprofloxacin: Ciprofloxacin and levofloxacin are   interchangeable, but both are less active in vitro than   moxifloxacin.   Clarithromycin: Class representative for newer macrolides.        Radiology:   Reviewed    Immunization History   Administered Date(s) Administered    Hepatitis A, Adult 12/07/2016, 05/10/2017    Hepatitis B, Adult 12/07/2016, 01/09/2017    Influenza 09/26/2018, 09/24/2019    Pneumococcal Conjugate - 13 Valent 12/07/2016    Tdap 12/07/2016    Zoster 02/27/2014    Zoster Recombinant 03/26/2019, 06/04/2019         Assessment & Plan:     1. 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  -Continue Bactrim, tedizolid  -Expect 12 months of treatment ending 2/2022  -Discussed risk mitigation strategies regarding hobbies: wearing mask, gloves, and goggles when mowing lawn; let others hand animals when hunting and fishsing    Follow up in 2 months    Singh Gordon MD  Infectious Disease Fellow

## 2022-07-27 ENCOUNTER — LAB VISIT (OUTPATIENT)
Dept: LAB | Facility: HOSPITAL | Age: 72
End: 2022-07-27
Attending: INTERNAL MEDICINE
Payer: MEDICARE

## 2022-07-27 DIAGNOSIS — D63.8 MEGALOBLASTIC ANEMIA DUE TO FISH TAPEWORM: ICD-10-CM

## 2022-07-27 DIAGNOSIS — I12.9 PARENCHYMAL RENAL HYPERTENSION: ICD-10-CM

## 2022-07-27 DIAGNOSIS — Z94.0 KIDNEY REPLACED BY TRANSPLANT: ICD-10-CM

## 2022-07-27 DIAGNOSIS — N18.31 CHRONIC KIDNEY DISEASE (CKD) STAGE G3A/A1, MODERATELY DECREASED GLOMERULAR FILTRATION RATE (GFR) BETWEEN 45-59 ML/MIN/1.73 SQUARE METER AND ALBUMINURIA CREATININE RATIO LESS THAN 30 MG/G: Primary | ICD-10-CM

## 2022-07-27 DIAGNOSIS — E83.40 DISORDER OF MAGNESIUM METABOLISM: ICD-10-CM

## 2022-07-27 DIAGNOSIS — E21.3 HYPERPARATHYROIDISM, UNSPECIFIED: ICD-10-CM

## 2022-07-27 DIAGNOSIS — B70.0 MEGALOBLASTIC ANEMIA DUE TO FISH TAPEWORM: ICD-10-CM

## 2022-07-27 DIAGNOSIS — R60.1 ANASARCA: ICD-10-CM

## 2022-07-27 DIAGNOSIS — N28.1 ACQUIRED CYST OF KIDNEY: ICD-10-CM

## 2022-07-27 LAB
ABS NEUT (OLG): 3.83 X10(3)/MCL (ref 2.1–9.2)
ACANTHOCYTES (OLG): ABNORMAL
ALBUMIN SERPL-MCNC: 3.9 GM/DL (ref 3.4–4.8)
ALBUMIN/GLOB SERPL: 1.8 RATIO (ref 1.1–2)
ALP SERPL-CCNC: 86 UNIT/L (ref 40–150)
ALT SERPL-CCNC: 37 UNIT/L (ref 0–55)
ANISOCYTOSIS BLD QL SMEAR: ABNORMAL
AST SERPL-CCNC: 28 UNIT/L (ref 5–34)
BASOPHILS NFR BLD MANUAL: 0.06 X10(3)/MCL (ref 0–0.2)
BASOPHILS NFR BLD MANUAL: 1 %
BILIRUBIN DIRECT+TOT PNL SERPL-MCNC: 0.9 MG/DL
BUN SERPL-MCNC: 19.4 MG/DL (ref 8.4–25.7)
CALCIUM SERPL-MCNC: 9.7 MG/DL (ref 8.8–10)
CHLORIDE SERPL-SCNC: 106 MMOL/L (ref 98–107)
CO2 SERPL-SCNC: 20 MMOL/L (ref 23–31)
CREAT SERPL-MCNC: 1.62 MG/DL (ref 0.73–1.18)
DEPRECATED CALCIDIOL+CALCIFEROL SERPL-MC: 20.9 NG/ML (ref 30–80)
EOSINOPHIL NFR BLD MANUAL: 0.06 X10(3)/MCL (ref 0–0.9)
EOSINOPHIL NFR BLD MANUAL: 1 %
ERYTHROCYTE [DISTWIDTH] IN BLOOD BY AUTOMATED COUNT: 23.3 % (ref 11.5–17)
FERRITIN SERPL-MCNC: 72.59 NG/ML (ref 21.81–274.66)
GLOBULIN SER-MCNC: 2.2 GM/DL (ref 2.4–3.5)
GLUCOSE SERPL-MCNC: 88 MG/DL (ref 82–115)
HCT VFR BLD AUTO: 50.9 % (ref 42–52)
HGB BLD-MCNC: 15.3 GM/DL (ref 14–18)
IMM GRANULOCYTES # BLD AUTO: 0.3 X10(3)/MCL (ref 0–0.04)
IMM GRANULOCYTES NFR BLD AUTO: 5.1 %
INSTRUMENT WBC (OLG): 5.9 X10(3)/MCL
IRON SATN MFR SERPL: 17 % (ref 20–50)
IRON SERPL-MCNC: 64 UG/DL (ref 65–175)
LYMPHOCYTES NFR BLD MANUAL: 1.59 X10(3)/MCL
LYMPHOCYTES NFR BLD MANUAL: 27 %
MACROCYTES BLD QL SMEAR: ABNORMAL
MAGNESIUM SERPL-MCNC: 1.8 MG/DL (ref 1.6–2.6)
MCH RBC QN AUTO: 24.8 PG (ref 27–31)
MCHC RBC AUTO-ENTMCNC: 30.1 MG/DL (ref 33–36)
MCV RBC AUTO: 82.4 FL (ref 80–94)
METAMYELOCYTES NFR BLD MANUAL: 4 %
MONOCYTES NFR BLD MANUAL: 0.41 X10(3)/MCL (ref 0.1–1.3)
MONOCYTES NFR BLD MANUAL: 7 %
NEUTROPHILS NFR BLD MANUAL: 61 %
NRBC BLD AUTO-RTO: 0 %
OVALOCYTES (OLG): ABNORMAL
PHOSPHATE SERPL-MCNC: 3 MG/DL (ref 2.3–4.7)
PLATELET # BLD AUTO: 128 X10(3)/MCL (ref 130–400)
PLATELET # BLD EST: ABNORMAL 10*3/UL
PMV BLD AUTO: 8.3 FL (ref 7.4–10.4)
POIKILOCYTOSIS BLD QL SMEAR: ABNORMAL
POTASSIUM SERPL-SCNC: 5.2 MMOL/L (ref 3.5–5.1)
PROT SERPL-MCNC: 6.1 GM/DL (ref 5.8–7.6)
PTH-INTACT SERPL-MCNC: 135.7 PG/ML (ref 8.7–77)
RBC # BLD AUTO: 6.18 X10(6)/MCL (ref 4.7–6.1)
RBC MORPH BLD: ABNORMAL
SODIUM SERPL-SCNC: 136 MMOL/L (ref 136–145)
TEAR DROP CELL (OLG): ABNORMAL
TIBC SERPL-MCNC: 302 UG/DL (ref 69–240)
TIBC SERPL-MCNC: 366 UG/DL (ref 250–450)
WBC # SPEC AUTO: 5.9 X10(3)/MCL (ref 4.5–11.5)

## 2022-07-27 PROCEDURE — 84100 ASSAY OF PHOSPHORUS: CPT

## 2022-07-27 PROCEDURE — 83540 ASSAY OF IRON: CPT

## 2022-07-27 PROCEDURE — 83970 ASSAY OF PARATHORMONE: CPT

## 2022-07-27 PROCEDURE — 82306 VITAMIN D 25 HYDROXY: CPT | Mod: GA

## 2022-07-27 PROCEDURE — 83735 ASSAY OF MAGNESIUM: CPT

## 2022-07-27 PROCEDURE — 36415 COLL VENOUS BLD VENIPUNCTURE: CPT

## 2022-07-27 PROCEDURE — 80053 COMPREHEN METABOLIC PANEL: CPT

## 2022-07-27 PROCEDURE — 82728 ASSAY OF FERRITIN: CPT

## 2022-07-27 PROCEDURE — 85025 COMPLETE CBC W/AUTO DIFF WBC: CPT

## 2022-07-28 ENCOUNTER — PATIENT MESSAGE (OUTPATIENT)
Dept: TRANSPLANT | Facility: CLINIC | Age: 72
End: 2022-07-28
Payer: MEDICARE

## 2022-07-29 NOTE — PROGRESS NOTES
Cosigned by Rylie Pool MD   [Follow-Up Visit] : a follow-up [Initial Consultation] : an initial consultation [FreeTextEntry2] : severe pain

## 2022-08-01 ENCOUNTER — PATIENT MESSAGE (OUTPATIENT)
Dept: TRANSPLANT | Facility: CLINIC | Age: 72
End: 2022-08-01
Payer: MEDICARE

## 2022-08-01 ENCOUNTER — LAB VISIT (OUTPATIENT)
Dept: LAB | Facility: HOSPITAL | Age: 72
End: 2022-08-01
Payer: MEDICARE

## 2022-08-01 DIAGNOSIS — I10 ESSENTIAL HYPERTENSION, MALIGNANT: ICD-10-CM

## 2022-08-01 DIAGNOSIS — N28.1 ACQUIRED CYST OF KIDNEY: ICD-10-CM

## 2022-08-01 DIAGNOSIS — B70.0 MEGALOBLASTIC ANEMIA DUE TO FISH TAPEWORM: ICD-10-CM

## 2022-08-01 DIAGNOSIS — E83.40 DISORDER OF MAGNESIUM METABOLISM: ICD-10-CM

## 2022-08-01 DIAGNOSIS — R60.1 ANASARCA: ICD-10-CM

## 2022-08-01 DIAGNOSIS — R60.9 EDEMA, UNSPECIFIED TYPE: Primary | ICD-10-CM

## 2022-08-01 DIAGNOSIS — N18.31 CHRONIC KIDNEY DISEASE (CKD) STAGE G3A/A1, MODERATELY DECREASED GLOMERULAR FILTRATION RATE (GFR) BETWEEN 45-59 ML/MIN/1.73 SQUARE METER AND ALBUMINURIA CREATININE RATIO LESS THAN 30 MG/G: ICD-10-CM

## 2022-08-01 DIAGNOSIS — E21.3 HYPERPARATHYROIDISM, UNSPECIFIED: ICD-10-CM

## 2022-08-01 DIAGNOSIS — I12.9 PARENCHYMAL RENAL HYPERTENSION: ICD-10-CM

## 2022-08-01 DIAGNOSIS — Z94.0 KIDNEY REPLACED BY TRANSPLANT: ICD-10-CM

## 2022-08-01 DIAGNOSIS — Z79.899 ENCOUNTER FOR LONG-TERM (CURRENT) USE OF OTHER MEDICATIONS: ICD-10-CM

## 2022-08-01 DIAGNOSIS — I48.91 ATRIAL FIBRILLATION: ICD-10-CM

## 2022-08-01 DIAGNOSIS — D63.8 MEGALOBLASTIC ANEMIA DUE TO FISH TAPEWORM: ICD-10-CM

## 2022-08-01 LAB
ABS NEUT (OLG): 3.28 X10(3)/MCL (ref 2.1–9.2)
ALBUMIN SERPL-MCNC: 3.8 GM/DL (ref 3.4–4.8)
ALBUMIN/GLOB SERPL: 1.9 RATIO (ref 1.1–2)
ALP SERPL-CCNC: 82 UNIT/L (ref 40–150)
ALT SERPL-CCNC: 33 UNIT/L (ref 0–55)
AST SERPL-CCNC: 28 UNIT/L (ref 5–34)
BILIRUBIN DIRECT+TOT PNL SERPL-MCNC: 0.8 MG/DL
BUN SERPL-MCNC: 19 MG/DL (ref 8.4–25.7)
CALCIUM SERPL-MCNC: 9.5 MG/DL (ref 8.8–10)
CHLORIDE SERPL-SCNC: 103 MMOL/L (ref 98–107)
CHOLEST SERPL-MCNC: 162 MG/DL
CHOLEST/HDLC SERPL: 2 {RATIO} (ref 0–5)
CO2 SERPL-SCNC: 23 MMOL/L (ref 23–31)
CREAT SERPL-MCNC: 1.66 MG/DL (ref 0.73–1.18)
ERYTHROCYTE [DISTWIDTH] IN BLOOD BY AUTOMATED COUNT: 24.1 % (ref 11.5–17)
GLOBULIN SER-MCNC: 2 GM/DL (ref 2.4–3.5)
GLUCOSE SERPL-MCNC: 92 MG/DL (ref 82–115)
HCT VFR BLD AUTO: 47 % (ref 42–52)
HDLC SERPL-MCNC: 67 MG/DL (ref 35–60)
HGB BLD-MCNC: 14.5 GM/DL (ref 14–18)
IMM GRANULOCYTES # BLD AUTO: 0.23 X10(3)/MCL (ref 0–0.04)
IMM GRANULOCYTES NFR BLD AUTO: 5.4 %
INSTRUMENT WBC (OLG): 4.2 X10(3)/MCL
LDLC SERPL CALC-MCNC: 81 MG/DL (ref 50–140)
LYMPHOCYTES NFR BLD MANUAL: 0.71 X10(3)/MCL
LYMPHOCYTES NFR BLD MANUAL: 17 %
MAGNESIUM SERPL-MCNC: 1.7 MG/DL (ref 1.6–2.6)
MCH RBC QN AUTO: 25.4 PG (ref 27–31)
MCHC RBC AUTO-ENTMCNC: 30.9 MG/DL (ref 33–36)
MCV RBC AUTO: 82.5 FL (ref 80–94)
METAMYELOCYTES NFR BLD MANUAL: 3 %
MONOCYTES NFR BLD MANUAL: 0.21 X10(3)/MCL (ref 0.1–1.3)
MONOCYTES NFR BLD MANUAL: 5 %
NEUTROPHILS NFR BLD MANUAL: 75 %
NRBC BLD AUTO-RTO: 0 %
OVALOCYTES (OLG): ABNORMAL
PHOSPHATE SERPL-MCNC: 2.6 MG/DL (ref 2.3–4.7)
PLATELET # BLD AUTO: 94 X10(3)/MCL (ref 130–400)
PLATELET # BLD EST: ABNORMAL 10*3/UL
PMV BLD AUTO: 8.5 FL (ref 7.4–10.4)
POIKILOCYTOSIS BLD QL SMEAR: ABNORMAL
POTASSIUM SERPL-SCNC: 5.2 MMOL/L (ref 3.5–5.1)
PROT SERPL-MCNC: 5.8 GM/DL (ref 5.8–7.6)
RBC # BLD AUTO: 5.7 X10(6)/MCL (ref 4.7–6.1)
RBC MORPH BLD: ABNORMAL
SODIUM SERPL-SCNC: 136 MMOL/L (ref 136–145)
TRIGL SERPL-MCNC: 70 MG/DL (ref 34–140)
VLDLC SERPL CALC-MCNC: 14 MG/DL
WBC # SPEC AUTO: 4.2 X10(3)/MCL (ref 4.5–11.5)

## 2022-08-01 PROCEDURE — 83735 ASSAY OF MAGNESIUM: CPT

## 2022-08-01 PROCEDURE — 85025 COMPLETE CBC W/AUTO DIFF WBC: CPT

## 2022-08-01 PROCEDURE — 84100 ASSAY OF PHOSPHORUS: CPT

## 2022-08-01 PROCEDURE — 36415 COLL VENOUS BLD VENIPUNCTURE: CPT

## 2022-08-01 PROCEDURE — 80053 COMPREHEN METABOLIC PANEL: CPT

## 2022-08-01 PROCEDURE — 80061 LIPID PANEL: CPT

## 2022-08-01 NOTE — PROGRESS NOTES
Platelet is dropping. Please hematology consult . May be due to eliquis.  Cr stable.   High K: Is he taking his  kayexalate? If sow  How much?

## 2022-08-02 ENCOUNTER — PATIENT MESSAGE (OUTPATIENT)
Dept: TRANSPLANT | Facility: CLINIC | Age: 72
End: 2022-08-02
Payer: MEDICARE

## 2022-08-02 ENCOUNTER — TELEPHONE (OUTPATIENT)
Dept: TRANSPLANT | Facility: CLINIC | Age: 72
End: 2022-08-02
Payer: MEDICARE

## 2022-08-02 ENCOUNTER — OFFICE VISIT (OUTPATIENT)
Dept: NEUROSURGERY | Facility: CLINIC | Age: 72
End: 2022-08-02
Payer: MEDICARE

## 2022-08-02 ENCOUNTER — HOSPITAL ENCOUNTER (OUTPATIENT)
Dept: RADIOLOGY | Facility: HOSPITAL | Age: 72
Discharge: HOME | End: 2022-08-02
Payer: MEDICARE

## 2022-08-02 VITALS
TEMPERATURE: 98 F | BODY MASS INDEX: 22.84 KG/M2 | SYSTOLIC BLOOD PRESSURE: 131 MMHG | DIASTOLIC BLOOD PRESSURE: 86 MMHG | HEIGHT: 74 IN | WEIGHT: 177.94 LBS | HEART RATE: 87 BPM

## 2022-08-02 DIAGNOSIS — G06.0 INTRACRANIAL ABSCESS: ICD-10-CM

## 2022-08-02 DIAGNOSIS — D69.6 THROMBOCYTOPENIA: ICD-10-CM

## 2022-08-02 DIAGNOSIS — G06.0 CEREBRAL INTRACRANIAL ABSCESS: ICD-10-CM

## 2022-08-02 DIAGNOSIS — I62.00 NONTRAUMATIC SUBDURAL HEMORRHAGE, UNSPECIFIED: Primary | ICD-10-CM

## 2022-08-02 DIAGNOSIS — E87.5 HYPERKALEMIA: Primary | ICD-10-CM

## 2022-08-02 DIAGNOSIS — I48.21 PERMANENT ATRIAL FIBRILLATION: ICD-10-CM

## 2022-08-02 DIAGNOSIS — T50.905A DRUG-INDUCED LOW PLATELET COUNT: Primary | ICD-10-CM

## 2022-08-02 DIAGNOSIS — D69.59 DRUG-INDUCED LOW PLATELET COUNT: Primary | ICD-10-CM

## 2022-08-02 PROBLEM — S06.5XAA SUBDURAL HEMATOMA: Status: ACTIVE | Noted: 2022-08-02

## 2022-08-02 LAB — CMV DNA SERPL NAA+PROBE-ACNC: <35 IU/ML

## 2022-08-02 PROCEDURE — 99999 PR PBB SHADOW E&M-EST. PATIENT-LVL IV: ICD-10-PCS | Mod: PBBFAC,,, | Performed by: PHYSICIAN ASSISTANT

## 2022-08-02 PROCEDURE — 70450 CT HEAD/BRAIN W/O DYE: CPT | Mod: 26,,, | Performed by: RADIOLOGY

## 2022-08-02 PROCEDURE — 99215 OFFICE O/P EST HI 40 MIN: CPT | Mod: S$PBB,,, | Performed by: PHYSICIAN ASSISTANT

## 2022-08-02 PROCEDURE — 70450 CT HEAD/BRAIN W/O DYE: CPT | Mod: TC

## 2022-08-02 PROCEDURE — 70450 CT HEAD WITHOUT CONTRAST: ICD-10-PCS | Mod: 26,,, | Performed by: RADIOLOGY

## 2022-08-02 PROCEDURE — 99214 OFFICE O/P EST MOD 30 MIN: CPT | Mod: PBBFAC,25 | Performed by: PHYSICIAN ASSISTANT

## 2022-08-02 PROCEDURE — 99999 PR PBB SHADOW E&M-EST. PATIENT-LVL IV: CPT | Mod: PBBFAC,,, | Performed by: PHYSICIAN ASSISTANT

## 2022-08-02 PROCEDURE — 99215 PR OFFICE/OUTPT VISIT, EST, LEVL V, 40-54 MIN: ICD-10-PCS | Mod: S$PBB,,, | Performed by: PHYSICIAN ASSISTANT

## 2022-08-02 NOTE — TELEPHONE ENCOUNTER
----- Message from Celeste Yen MD sent at 8/1/2022  1:44 PM CDT -----  Platelet is dropping. Please hematology consult . May be due to eliquis.  Cr stable.   High K: Is he taking his  kayexalate? If sow  How much?

## 2022-08-02 NOTE — PROGRESS NOTES
Neurosurgery  Established Patient    SUBJECTIVE:     History of Present Illness:  Ronal Mazariegos is a 71 y.o. male with PMH of Afib previously on Eliquis, h/o kidney transplant on immunosuppressants, cardiomyopathy, and pulmonary hypertension who previously presented to the ED in February 2022 with left sided weakness, found to have 2.2 cm R frontal ring enhancing lesion consistent with intracranial abscess. He underwent R frontal craniotomy with abscess drainage and washout on 2/16/22 by Dr. Rosa. 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 He was noted to have developed a subacute R frontal SDH adjacent to his surgical bed. Pt then underwent R MMA embolization by Dr. Bourgeois on 7/14/22, presents to clinic today for follow up with updated CTH. Pt reports he has been doing very well. States he sometimes gets lightheaded when getting up too quickly, but this does not last. Denies any HA, dizziness, gait instability, visual deficit, speech difficulty, and focal weakness. No pain or tenderness at R groin site following angiogram. Denies any recent infections or infectious symptoms such as fevers or chills. He performs his own ADLs around the house, able to mow the grass and would like to begin going to the hunting club as he did previously. His Eliquis has remained on hold, states he is scheduled to follow up with his cardiologist later this month.    Review of patient's allergies indicates:  No Known Allergies    Current Outpatient Medications   Medication Sig Dispense Refill    apixaban (ELIQUIS) 5 mg Tab Take 1 tablet (5 mg total) by mouth 2 (two) times daily. DO NOT RESTART UNTIL APPROVED BY NEUROSURGERY 60 tablet 11    calcitRIOL (ROCALTROL) 0.25 MCG Cap Take 1 capsule (0.25 mcg total) by mouth once daily. (Patient taking differently: Take 0.25 mcg by mouth every morning.) 90 capsule 3    ergocalciferol (ERGOCALCIFEROL) 50,000 unit Cap Take 1 capsule (50,000 Units  total) by mouth every 7 days. X 12 months (Patient taking differently: Take 50,000 Units by mouth every 7 days. X 12 months  MONDAYS) 12 capsule 3    finasteride (PROSCAR) 5 mg tablet Take 5 mg by mouth every evening.      fish oil-omega-3 fatty acids 300-1,000 mg capsule Take 1 capsule by mouth every evening.      fluticasone propionate (FLONASE) 50 mcg/actuation nasal spray daily as needed.      magnesium oxide (MAG-OX) 400 mg (241.3 mg magnesium) tablet TAKE 2 TABLETS BY MOUTH THREE TIMES DAILY (Patient taking differently: Take by mouth 2 (two) times daily. 4 TABLETS TWICE DAILY) 180 tablet 10    metoprolol tartrate (LOPRESSOR) 50 MG tablet Take 1 tablet (50 mg total) by mouth 2 (two) times daily. 60 tablet 11    multivitamin Tab Take 1 tablet by mouth once daily.      predniSONE (DELTASONE) 5 MG tablet Take by mouth daily. 5/7-6/6 20 mg, 6/7-7/6 15 mg, 7/7-8/6 10 mg, 5 mg daily thereafter starting 8/7/21 (Patient taking differently: Take by mouth every morning.) 120 tablet 11    sodium bicarbonate 650 MG tablet Take 2 tablets (1,300 mg total) by mouth 2 (two) times daily. 120 tablet 11    sodium polystyrene (KAYEXALATE) powder Mix 4 LEVEL TEASPOONS (30 grams total) in 60mL water and drink by mouth once daily every Mon, Wed & Friday  (for high potassium), 454 g 4    sulfamethoxazole-trimethoprim 800-160mg (BACTRIM DS) 800-160 mg Tab Take 2 tablets by mouth 3 (three) times daily. 180 tablet 11    tacrolimus (PROGRAF) 1 MG Cap Take 2 capsules (2 mg total) by mouth every morning AND 1 capsule (1 mg total) every evening. Z94.0;Kidney txp on 5/4/2021. 90 capsule 11    tedizolid 200 mg Tab Take 1 tablet  (200 mg total) by mouth once daily. 30 tablet 3    sodium polystyrene (KAYEXALATE) powder Mix 4 LEVEL TEASPOONS (30 grams total) in 60mL water and drink by mouth once daily x 3 days (for high potassium) 45 g 0     No current facility-administered medications for this visit.       Past Medical History:  "  Diagnosis Date    Anasarca 10/1/2021    Anemia of chronic disease     Atrial fibrillation     BPH (benign prostatic hypertrophy)     Chronic systolic heart failure 10/1/2021    CKD (chronic kidney disease) stage 2, GFR 60-89 ml/min     Hepatitis C virus infection cured after antiviral drug therapy     acquired through kidney transplant, treated / cured (SVR12 - 2021)    HTN (hypertension)     HTN (hypertension)     Polycystic kidney disease      Past Surgical History:   Procedure Laterality Date    AV FISTULA PLACEMENT Left     CATARACT EXTRACTION, BILATERAL Bilateral     CRANIOTOMY Right 2022    Procedure: CRANIOTOMY;  Surgeon: Sunday Rosa DO;  Location: [facility] OR [address] [place] [address];  Service: Neurosurgery;  Laterality: Right;  R craniotomy for abscess drainage    KIDNEY TRANSPLANT N/A 2021    Procedure: TRANSPLANT, KIDNEY;  Surgeon: Lexy Bolden MD;  Location: [facility] OR [address] [place] [address];  Service: Transplant;  Laterality: N/A;     Family History     Problem Relation (Age of Onset)    Hypertension Father, Sister    Kidney disease Father, Sister    Polycystic kidney disease Father, Sister        Social History     Socioeconomic History    Marital status:     Number of children: 1   Tobacco Use    Smoking status: Former Smoker     Packs/day: 2.00     Years: 10.00     Pack years: 20.00     Types: Cigarettes     Start date: 1972     Quit date: 1984     Years since quittin.6    Smokeless tobacco: Never Used   Substance and Sexual Activity    Alcohol use: No     Comment: Pt reportsbeing a former beer drinker (2-3 cans per day) and quitting in .    Drug use: No    Sexual activity: Yes       Review of Systems  Positive per HPI, otherwise a pertinent ROS was performed and was negative.        OBJECTIVE:     Vital Signs  Temp: 97.8 °F (36.6 °C)  Pulse: 87  BP: 131/86  Pain Score: 0-No pain  Height: 6' 2" (188 cm)  Weight: 80.7 kg (177 lb 14.6 oz)  Body mass index is 22.84 " kg/m².    Neurosurgery Physical Exam  General: well developed, well nourished, no distress.   Head: normocephalic, atraumatic. Cranial incision appears well healed.   Neck: No tracheal deviation. No palpable masses. Full ROM.   Neurologic: Alert and oriented. Thought content appropriate.  GCS: E4 V5 M6; Total: 15  Mental Status: Awake, Alert, Oriented x 4  Language: No aphasia  Speech: No dysarthria  Cranial nerves: face symmetric, tongue midline, CN II-XII grossly intact.   Eyes: pupils equal, round, reactive to light with accomodation, EOMI.   Ears: No drainage.   Pulmonary: normal respirations, no signs of respiratory distress  Abdomen: soft, non-distended, not tender to palpation  Skin: Skin is warm, dry and intact.    Sensory: intact to light touch throughout  Motor Strength: Moves all extremities spontaneously with good tone.  Grossly full strength upper and lower extremities. No abnormal movements seen.      Cerebellar:   Finger-to-nose: intact bilaterally   Pronator drift: absent bilaterally  Gait: stable, able to ambulate unassisted without difficulty          Diagnostic Results:  Imaging was independently reviewed by myself, along with the associated radiology report.    CTH 5/5/22:  - Extra-axial high density fluid collection overlying R frontal craniotomy bed, consistent with subacte SDH, approximately 10 mm in maximal thickness, with mild mass effect on adjacent area of frontal lobe, no midline shift.    CTH 8/2/22:  - Previously seen R frontal high density extra-axial collection markedly improved and appears nearly resolved s/p MMA embolization, may be small amount of residual hematoma vs post-operative changes from prior craniotomy and abscess washout. No new hemorrhage.      ASSESSMENT/PLAN:     Ronal Mazariegos is a 71 y.o. male with PMH of Afib previously on Eliquis who is s/p craniotomy and washout of R frontal intracranial abscess on 2/17/22, postop course complicated by development of SDH within  the postoperative bed, s/p R MMA embolization on 7/14/22. Pt doing well clinically. SDH appears nearly resolved with no acute component.     - Okay to resume Eliquis from NSGY standpoint, however given other concerns on recent labwork with thrombocytopenia, advised pt to discuss with his Cardiologist prior to resuming his Eliquis to ensure no other contraindications  - Recommend repeat CTH in about 2 months to assess for any residual or recurrent SDH after pt has resumed his anti-coagulation  - Will arrange follow-up via virtual visit after CTH in 2 months  - Encouraged patient to call the clinic with any questions, concerns, or exam changes prior to follow up appt.     Time spent on this encounter: 65 minutes. This includes face to face time and non-face to face time preparing to see the patient (eg, review of tests), obtaining and/or reviewing separately obtained history, documenting clinical information in the electronic or other health record, independently interpreting results and communicating results to the patient/family/caregiver, or care coordinator.        Hannah Haney PA-C  Neurosurgery  [facility]

## 2022-08-02 NOTE — TELEPHONE ENCOUNTER
Notified patient of Dr. Yen's review of 8/1/22 lab results via [facility]. Waiting for patient's reply.     Hi Mr. Villeda,     Dr. Cronin wants you to increase your Lokelma to 5gm daily & repeat your potassium next Mon 8/8/22. Where is your prescription for Lokelma being filled, so I can have the prescription change routed to that pharmacy?    Thanks,     Tiara     ----- Message from Tiara Ferrell RN sent at 8/1/2022  6:18 PM CDT -----    ----- Message -----  From: Celeste Yen MD  Sent: 8/1/2022   1:44 PM CDT  To: [facility] Post-Kidney Transplant Clinical    Platelet is dropping. Please hematology consult . May be due to eliquis.  Cr stable.   High K: Is he taking his  kayexalate? If sow  How much?

## 2022-08-03 ENCOUNTER — TELEPHONE (OUTPATIENT)
Dept: HEMATOLOGY/ONCOLOGY | Facility: CLINIC | Age: 72
End: 2022-08-03
Payer: MEDICARE

## 2022-08-03 ENCOUNTER — PATIENT MESSAGE (OUTPATIENT)
Dept: TRANSPLANT | Facility: CLINIC | Age: 72
End: 2022-08-03
Payer: MEDICARE

## 2022-08-03 ENCOUNTER — TELEPHONE (OUTPATIENT)
Dept: TRANSPLANT | Facility: CLINIC | Age: 72
End: 2022-08-03
Payer: MEDICARE

## 2022-08-03 DIAGNOSIS — E87.5 HYPERKALEMIA: Primary | ICD-10-CM

## 2022-08-03 DIAGNOSIS — U07.1 COVID: ICD-10-CM

## 2022-08-03 DIAGNOSIS — Z94.0 KIDNEY REPLACED BY TRANSPLANT: ICD-10-CM

## 2022-08-03 LAB — TACROLIMUS TROUGH BLD-MCNC: 4.1 NG/ML

## 2022-08-03 NOTE — TELEPHONE ENCOUNTER
Spoke to patient in reference to Hematology referral from Dr. Yen and need to schedule f/u d/t already being an established patient.  Appointment scheduled notice via [employer].

## 2022-08-03 NOTE — TELEPHONE ENCOUNTER
"----- Message from Denis Pepper sent at 8/3/2022  4:35 PM CDT -----  Reschedule Existing Appointment        Appt Date: 8/5    Type of appt: F/u    Physician: Dioni    Reason for rescheduling? Requesting to change visit to Virtual; Pt has tested positive for Covid and if 8/5 unavailable, still requesting to r/s as Virtual for soonest date available     Caller: Ana (Transplant)    Contact Preference:  ext [phone]              Additional Information:  "Thank you for all that you do for our patients"     "

## 2022-08-04 ENCOUNTER — PATIENT MESSAGE (OUTPATIENT)
Dept: TRANSPLANT | Facility: CLINIC | Age: 72
End: 2022-08-04
Payer: MEDICARE

## 2022-08-05 ENCOUNTER — OFFICE VISIT (OUTPATIENT)
Dept: HEMATOLOGY/ONCOLOGY | Facility: CLINIC | Age: 72
End: 2022-08-05
Payer: MEDICARE

## 2022-08-05 DIAGNOSIS — D69.6 THROMBOCYTOPENIA: Primary | ICD-10-CM

## 2022-08-05 PROCEDURE — 99215 PR OFFICE/OUTPT VISIT, EST, LEVL V, 40-54 MIN: ICD-10-PCS | Mod: 95,,, | Performed by: INTERNAL MEDICINE

## 2022-08-05 PROCEDURE — 99215 OFFICE O/P EST HI 40 MIN: CPT | Mod: 95,,, | Performed by: INTERNAL MEDICINE

## 2022-08-05 NOTE — PROGRESS NOTES
The patient location is: [place]    The chief complaint leading to consultation is:Polycythemia vera    Visit type: audiovisual    Face to Face time with patient: 10 minutes  20 minutes of total time spent on the encounter, which includes face to face time and non-face to face time preparing to see the patient (eg, review of tests), Obtaining and/or reviewing separately obtained history, Documenting clinical information in the electronic or other health record, Independently interpreting results (not separately reported) and communicating results to the patient/family/caregiver, or Care coordination (not separately reported).         Each patient to whom he or she provides medical services by telemedicine is:  (1) informed of the relationship between the physician and patient and the respective role of any other health care provider with respect to management of the patient; and (2) notified that he or she may decline to receive medical services by telemedicine and may withdraw from such care at any time.    Notes:     Subjective:       Patient ID: Ronal Mazariegos is a 71 y.o. male.    Chief Complaint: No chief complaint on file.    HPI     New virtual visit for new polycythemia. Referred by renal transplant with a history of ESRD secondary to polycystic kidney disease.  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    Polycythemia starts acutely in late August/early September by lab review including outside labs from [facility]. Reports no medication changes other than BP meds. Now on Bystolic and Norvasc. Also history of afib and currently on DOAC.     Also reports severe lower extremity edema started around the same time. Has mild VICTORIA. Edema currently controlled with lasix. ECHO from January 2021 bland but outside ECHO from August 2021 has global hypertrophy and rising pulmonary hypertension. Repeat ECHO from October 2021 has severe pulmonary  hypretension, global hypokinesis, and strain pattern concerning for an infiltrative cardiomyopathy.    Has received single phlebotomy with repeat this week. Labs scheduled for tomorrow. Repeat ECHO with local cardiologist is scheduled 1/10 at [facility] Dr. Jimmy Bass. He has appt a few weeks later to review results.     ROS positive today for URI symptoms of congestion, cough, mild VICTORIA, increased fatigue.    1/27/2022 Return visit (virtual)  Interval labs indicate increased vs exogenous EPO production  Imaging of abdomen without source  Recommend MRI brain  Currently on phlebotomy therapy  Also ongoing cardiac evaluation for possible amyloid    4/7/2022 Return visit (Virtual)  Interval finding of frontal lobe brain abscess, normal Hgb/Hct since drainage  Cleared of cardiac amyloid by 3 cardiologists  No serologic evidence of monoclonal protein  Recent UIFE with faint lambda FLC    8/5/2022 Return Visit (Virtual)  Return visit for new issue- mild thrombocytopenia noted on labs  Multiple possible medications as culprit.   Eliquis mentioned by referring MD but he has not been taking for months.  Possibly due to Bactrim, prograft, or tedizolid.   Also he now has acute COVID infection which will also decrease platelet count  Other than sore throat from COVID he is asymptomatic      Review of Systems   Constitutional: Positive for appetite change. Negative for unexpected weight change.   HENT: Negative for mouth sores.    Eyes: Negative for visual disturbance.   Respiratory: Positive for cough. Negative for shortness of breath.    Cardiovascular: Negative for chest pain.   Gastrointestinal: Negative for abdominal pain and diarrhea.   Genitourinary: Negative for frequency.   Musculoskeletal: Negative for back pain.   Integumentary:  Negative for rash.   Neurological: Negative for headaches.   Hematological: Negative for adenopathy.   Psychiatric/Behavioral: The patient is not nervous/anxious.           Objective:    No exam 8/5/2022 due to telehealth visit  Physical Exam    Assessment:       Problem List Items Addressed This Visit    None         Plan:       Suspect mild thrombocytopenia from medication and now COVID infection  No intervention at this time.  Follow-up repeat CBC planned for 8/15

## 2022-08-08 ENCOUNTER — PATIENT MESSAGE (OUTPATIENT)
Dept: TRANSPLANT | Facility: CLINIC | Age: 72
End: 2022-08-08
Payer: MEDICARE

## 2022-08-11 ENCOUNTER — PATIENT MESSAGE (OUTPATIENT)
Dept: TRANSPLANT | Facility: CLINIC | Age: 72
End: 2022-08-11
Payer: MEDICARE

## 2022-08-15 ENCOUNTER — LAB VISIT (OUTPATIENT)
Dept: LAB | Facility: HOSPITAL | Age: 72
End: 2022-08-15
Attending: INTERNAL MEDICINE
Payer: MEDICARE

## 2022-08-15 DIAGNOSIS — T86.10 COMPLICATION OF TRANSPLANTED KIDNEY, UNSPECIFIED COMPLICATION: Primary | ICD-10-CM

## 2022-08-15 DIAGNOSIS — Z79.899 ENCOUNTER FOR LONG-TERM (CURRENT) USE OF OTHER MEDICATIONS: ICD-10-CM

## 2022-08-15 DIAGNOSIS — R80.9 PROTEINURIA, UNSPECIFIED TYPE: ICD-10-CM

## 2022-08-15 DIAGNOSIS — T86.10 COMPLICATION OF TRANSPLANTED KIDNEY, UNSPECIFIED COMPLICATION: ICD-10-CM

## 2022-08-15 DIAGNOSIS — Z94.0 KIDNEY REPLACED BY TRANSPLANT: ICD-10-CM

## 2022-08-15 DIAGNOSIS — E87.5 HYPERKALEMIA: ICD-10-CM

## 2022-08-15 LAB
ABS NEUT (OLG): 3.36 X10(3)/MCL (ref 2.1–9.2)
ACANTHOCYTES (OLG): ABNORMAL
ALBUMIN SERPL-MCNC: 3.6 GM/DL (ref 3.4–4.8)
ANISOCYTOSIS BLD QL SMEAR: ABNORMAL
BUN SERPL-MCNC: 24.3 MG/DL (ref 8.4–25.7)
BURR CELLS (OLG): ABNORMAL
CALCIUM SERPL-MCNC: 9.9 MG/DL (ref 8.8–10)
CHLORIDE SERPL-SCNC: 106 MMOL/L (ref 98–107)
CO2 SERPL-SCNC: 23 MMOL/L (ref 23–31)
CREAT SERPL-MCNC: 1.85 MG/DL (ref 0.73–1.18)
ELLIPTOCYTOSIS (OHS): ABNORMAL
EOSINOPHIL NFR BLD MANUAL: 0.06 X10(3)/MCL (ref 0–0.9)
EOSINOPHIL NFR BLD MANUAL: 1 %
ERYTHROCYTE [DISTWIDTH] IN BLOOD BY AUTOMATED COUNT: 25.9 % (ref 11.5–17)
GFR SERPLBLD CREATININE-BSD FMLA CKD-EPI: 39 MLS/MIN/1.73/M2
GLUCOSE SERPL-MCNC: 90 MG/DL (ref 82–115)
HCT VFR BLD AUTO: 49.2 % (ref 42–52)
HGB BLD-MCNC: 15.2 GM/DL (ref 14–18)
IMM GRANULOCYTES # BLD AUTO: 0.29 X10(3)/MCL (ref 0–0.04)
IMM GRANULOCYTES NFR BLD AUTO: 5.2 %
INSTRUMENT WBC (OLG): 6 X10(3)/MCL
LYMPHOCYTES NFR BLD MANUAL: 2.28 X10(3)/MCL
LYMPHOCYTES NFR BLD MANUAL: 38 %
MACROCYTES BLD QL SMEAR: ABNORMAL
MAGNESIUM SERPL-MCNC: 1.7 MG/DL (ref 1.6–2.6)
MCH RBC QN AUTO: 25.9 PG (ref 27–31)
MCHC RBC AUTO-ENTMCNC: 30.9 MG/DL (ref 33–36)
MCV RBC AUTO: 84 FL (ref 80–94)
MONOCYTES NFR BLD MANUAL: 0.3 X10(3)/MCL (ref 0.1–1.3)
MONOCYTES NFR BLD MANUAL: 5 %
NEUTROPHILS NFR BLD MANUAL: 56 %
NRBC BLD AUTO-RTO: 0 %
OVALOCYTES (OLG): ABNORMAL
PHOSPHATE SERPL-MCNC: 3.1 MG/DL (ref 2.3–4.7)
PLATELET # BLD AUTO: 145 X10(3)/MCL (ref 130–400)
PLATELET # BLD EST: NORMAL 10*3/UL
PMV BLD AUTO: 8.7 FL (ref 7.4–10.4)
POIKILOCYTOSIS BLD QL SMEAR: ABNORMAL
POTASSIUM SERPL-SCNC: 5.6 MMOL/L (ref 3.5–5.1)
RBC # BLD AUTO: 5.86 X10(6)/MCL (ref 4.7–6.1)
RBC MORPH BLD: ABNORMAL
SODIUM SERPL-SCNC: 137 MMOL/L (ref 136–145)
WBC # SPEC AUTO: 5.6 X10(3)/MCL (ref 4.5–11.5)

## 2022-08-15 PROCEDURE — 85025 COMPLETE CBC W/AUTO DIFF WBC: CPT

## 2022-08-15 PROCEDURE — 80069 RENAL FUNCTION PANEL: CPT

## 2022-08-15 PROCEDURE — 83735 ASSAY OF MAGNESIUM: CPT

## 2022-08-15 PROCEDURE — 36415 COLL VENOUS BLD VENIPUNCTURE: CPT

## 2022-08-15 NOTE — PROGRESS NOTES
High K on lokelma:  increase Lokelma to 10 gram BID from today. Kayexalate 30 gram  X 1 dose today. Please more hydration, avoid high K diet. Check renal panel on Wednesday . Check DSA, allosure, BK, UPCR on Wednesday  Pending Tac level

## 2022-08-15 NOTE — TELEPHONE ENCOUNTER
Notified patient of Dr. Brown's review of 8/15/22 lab results. Instructed patient to increase lokelma to 10gm twice daily; take kayexalate 30gm x 12 dose today; increase daily water intake to at least 3L; maintain a low potassium diet; repeat labs Wed 8/217/22. Patient reports he has enough Lokelma & Kayexalate at home to make the dose changes given. Patient verbalized understanding.     [facility] message also sent to patient:    Hi Mr. Perez,     Dr. Yen reviewed your 8/15/22 lab results. Per our phone discussion your potassium is elevated 5.6. She wants you to please increase your Lokelma to 10gm 2 x daily until you have the potassium level repeated Wed 8/17/22.   Take Kayexalate 30gm x 1 dose today 8/15/22 only.    Repeat labs this Wed 8/17/22. You will also have 2 other special test (Allosure & DSA) drawn along with the renal panel. Submit a urine specimen with labs this Wed too.     Hydrate with at least 3 liters of water daily & maintain a low potassium diet.     Thanks,     Tiara           ----- Message from Celeste Yen MD sent at 8/15/2022  9:09 AM CDT -----  High K on lokelma:  increase Lokelma to 10 gram BID from today. Kayexalate 30 gram  X 1 dose today. Please more hydration, avoid high K diet. Check renal panel on Wednesday . Check DSA, allosure, BK, UPCR on Wednesday  Pending Tac level

## 2022-08-16 LAB — TACROLIMUS TROUGH BLD-MCNC: 4.7 NG/ML

## 2022-08-17 ENCOUNTER — LAB VISIT (OUTPATIENT)
Dept: LAB | Facility: HOSPITAL | Age: 72
End: 2022-08-17
Attending: INTERNAL MEDICINE
Payer: MEDICARE

## 2022-08-17 DIAGNOSIS — T86.10 COMPLICATION OF TRANSPLANTED KIDNEY, UNSPECIFIED COMPLICATION: ICD-10-CM

## 2022-08-17 DIAGNOSIS — E87.5 HYPERKALEMIA: ICD-10-CM

## 2022-08-17 LAB
ALBUMIN SERPL-MCNC: 3.9 GM/DL (ref 3.4–4.8)
BUN SERPL-MCNC: 18.6 MG/DL (ref 8.4–25.7)
CALCIUM SERPL-MCNC: 10.1 MG/DL (ref 8.8–10)
CHLORIDE SERPL-SCNC: 106 MMOL/L (ref 98–107)
CMV DNA SERPL NAA+PROBE-ACNC: NORMAL IU/ML
CO2 SERPL-SCNC: 22 MMOL/L (ref 23–31)
CREAT SERPL-MCNC: 1.5 MG/DL (ref 0.73–1.18)
GFR SERPLBLD CREATININE-BSD FMLA CKD-EPI: 50 MLS/MIN/1.73/M2
GLUCOSE SERPL-MCNC: 88 MG/DL (ref 82–115)
PHOSPHATE SERPL-MCNC: 3 MG/DL (ref 2.3–4.7)
POTASSIUM SERPL-SCNC: 4.8 MMOL/L (ref 3.5–5.1)
SODIUM SERPL-SCNC: 137 MMOL/L (ref 136–145)

## 2022-08-17 PROCEDURE — 36415 COLL VENOUS BLD VENIPUNCTURE: CPT

## 2022-08-17 PROCEDURE — 80069 RENAL FUNCTION PANEL: CPT

## 2022-08-18 ENCOUNTER — PATIENT MESSAGE (OUTPATIENT)
Dept: TRANSPLANT | Facility: CLINIC | Age: 72
End: 2022-08-18
Payer: MEDICARE

## 2022-08-18 DIAGNOSIS — E87.5 HYPERKALEMIA: ICD-10-CM

## 2022-08-18 LAB — TACROLIMUS TROUGH BLD-MCNC: 4.9 NG/ML

## 2022-08-18 NOTE — TELEPHONE ENCOUNTER
Notified patient of Dr. Yen's review of 8/17/22 lab results via [facility].     [facility] message sent to patient:    Paco Villeda,      reviewed your 8/17/22 renal panel results. Your potassium is 4.8. She wants you to go back to Lokelma 10 gram by mouth daily. We'll need to recheck your potassium next week.       Thanks,     Tiara  ----- Message from Tiara Ferrell RN sent at 8/17/2022  5:12 PM CDT -----    ----- Message -----  From: Celeste Yen MD  Sent: 8/17/2022   9:29 AM CDT  To: [facility] Post-Kidney Transplant Clinical    K to 4.8. will go back to lokelma 10 gram daily from tomorrow. Check K on Monday

## 2022-08-19 LAB — BKV DNA SERPL NAA+PROBE-ACNC: NORMAL IU/ML

## 2022-08-22 RX ORDER — APIXABAN 5 MG/1
5 TABLET, FILM COATED ORAL 2 TIMES DAILY
Qty: 60 TABLET | Refills: 11 | Status: CANCELLED | OUTPATIENT
Start: 2022-08-22

## 2022-08-23 ENCOUNTER — LAB VISIT (OUTPATIENT)
Dept: LAB | Facility: HOSPITAL | Age: 72
End: 2022-08-23
Attending: INTERNAL MEDICINE
Payer: MEDICARE

## 2022-08-23 DIAGNOSIS — T86.10 COMPLICATION OF TRANSPLANTED KIDNEY, UNSPECIFIED COMPLICATION: ICD-10-CM

## 2022-08-23 DIAGNOSIS — Z94.0 KIDNEY REPLACED BY TRANSPLANT: ICD-10-CM

## 2022-08-23 LAB
ALBUMIN SERPL BCP-MCNC: 3.6 G/DL (ref 3.5–5.2)
ANION GAP SERPL CALC-SCNC: 11 MMOL/L (ref 8–16)
BUN SERPL-MCNC: 18 MG/DL (ref 8–23)
CALCIUM SERPL-MCNC: 9.8 MG/DL (ref 8.7–10.5)
CHLORIDE SERPL-SCNC: 105 MMOL/L (ref 95–110)
CO2 SERPL-SCNC: 20 MMOL/L (ref 23–29)
CREAT SERPL-MCNC: 1.7 MG/DL (ref 0.5–1.4)
EST. GFR  (NO RACE VARIABLE): 43 ML/MIN/1.73 M^2
GLUCOSE SERPL-MCNC: 86 MG/DL (ref 70–110)
PHOSPHATE SERPL-MCNC: 2.8 MG/DL (ref 2.7–4.5)
POTASSIUM SERPL-SCNC: 4.9 MMOL/L (ref 3.5–5.1)
SODIUM SERPL-SCNC: 136 MMOL/L (ref 136–145)

## 2022-08-23 PROCEDURE — 86832 HLA CLASS I HIGH DEFIN QUAL: CPT | Performed by: INTERNAL MEDICINE

## 2022-08-23 PROCEDURE — 80069 RENAL FUNCTION PANEL: CPT | Performed by: INTERNAL MEDICINE

## 2022-08-23 PROCEDURE — 86977 RBC SERUM PRETX INCUBJ/INHIB: CPT | Mod: 59 | Performed by: INTERNAL MEDICINE

## 2022-08-23 PROCEDURE — 36415 COLL VENOUS BLD VENIPUNCTURE: CPT | Performed by: INTERNAL MEDICINE

## 2022-08-23 PROCEDURE — 86833 HLA CLASS II HIGH DEFIN QUAL: CPT | Performed by: INTERNAL MEDICINE

## 2022-08-23 NOTE — PROGRESS NOTES
Acceptable K. On lokelma and low K diet. Check K weekly if his nephrologist not willing to follow K very closely.  renal panel every 3 weeks.

## 2022-08-26 LAB
ALLOSURE COMMENT: NORMAL
ALLOSURE SCORE KIDNEY: 0.12 %
CLASS I ANTIBODY COMMENTS - LUMINEX: NORMAL
CLASS II ANTIBODY COMMENTS - LUMINEX: NORMAL
DSA1 TESTING DATE: NORMAL
DSA12 TESTING DATE: NORMAL
DSA2 TESTING DATE: NORMAL
INFORMATION: NORMAL
SERUM COLLECTION DT - LUMINEX CLASS I: NORMAL
SERUM COLLECTION DT - LUMINEX CLASS II: NORMAL

## 2022-08-29 ENCOUNTER — LAB VISIT (OUTPATIENT)
Dept: LAB | Facility: HOSPITAL | Age: 72
End: 2022-08-29
Attending: INTERNAL MEDICINE
Payer: MEDICARE

## 2022-08-29 DIAGNOSIS — Z94.0 KIDNEY REPLACED BY TRANSPLANT: ICD-10-CM

## 2022-08-29 DIAGNOSIS — Z79.899 ENCOUNTER FOR LONG-TERM (CURRENT) USE OF OTHER MEDICATIONS: ICD-10-CM

## 2022-08-29 LAB
ABS NEUT (OLG): 4.02 X10(3)/MCL (ref 2.1–9.2)
ALBUMIN SERPL-MCNC: 3.7 GM/DL (ref 3.4–4.8)
ANISOCYTOSIS BLD QL SMEAR: ABNORMAL
BASOPHILS NFR BLD MANUAL: 0.06 X10(3)/MCL (ref 0–0.2)
BASOPHILS NFR BLD MANUAL: 1 %
BUN SERPL-MCNC: 19.1 MG/DL (ref 8.4–25.7)
CALCIUM SERPL-MCNC: 9.8 MG/DL (ref 8.8–10)
CHLORIDE SERPL-SCNC: 105 MMOL/L (ref 98–107)
CO2 SERPL-SCNC: 23 MMOL/L (ref 23–31)
CREAT SERPL-MCNC: 1.52 MG/DL (ref 0.73–1.18)
EOSINOPHIL NFR BLD MANUAL: 0.24 X10(3)/MCL (ref 0–0.9)
EOSINOPHIL NFR BLD MANUAL: 4 %
ERYTHROCYTE [DISTWIDTH] IN BLOOD BY AUTOMATED COUNT: 28.5 % (ref 11.5–17)
GFR SERPLBLD CREATININE-BSD FMLA CKD-EPI: 49 MLS/MIN/1.73/M2
GLUCOSE SERPL-MCNC: 92 MG/DL (ref 82–115)
HCT VFR BLD AUTO: 46.7 % (ref 42–52)
HGB BLD-MCNC: 14.4 GM/DL (ref 14–18)
IMM GRANULOCYTES # BLD AUTO: 0.45 X10(3)/MCL (ref 0–0.04)
IMM GRANULOCYTES NFR BLD AUTO: 7.8 %
INSTRUMENT WBC (OLG): 6 X10(3)/MCL
LYMPHOCYTES NFR BLD MANUAL: 1.32 X10(3)/MCL
LYMPHOCYTES NFR BLD MANUAL: 22 %
MACROCYTES BLD QL SMEAR: ABNORMAL
MAGNESIUM SERPL-MCNC: 1.9 MG/DL (ref 1.6–2.6)
MCH RBC QN AUTO: 27.2 PG (ref 27–31)
MCHC RBC AUTO-ENTMCNC: 30.8 MG/DL (ref 33–36)
MCV RBC AUTO: 88.3 FL (ref 80–94)
METAMYELOCYTES NFR BLD MANUAL: 1 %
MONOCYTES NFR BLD MANUAL: 0.36 X10(3)/MCL (ref 0.1–1.3)
MONOCYTES NFR BLD MANUAL: 6 %
MYELOCYTES NFR BLD MANUAL: 1 %
NEUTROPHILS NFR BLD MANUAL: 65 %
NRBC BLD AUTO-RTO: 0 %
PHOSPHATE SERPL-MCNC: 2.7 MG/DL (ref 2.3–4.7)
PLATELET # BLD AUTO: 113 X10(3)/MCL (ref 130–400)
PLATELET # BLD EST: ABNORMAL 10*3/UL
PMV BLD AUTO: 8.3 FL (ref 7.4–10.4)
POTASSIUM SERPL-SCNC: 5 MMOL/L (ref 3.5–5.1)
RBC # BLD AUTO: 5.29 X10(6)/MCL (ref 4.7–6.1)
RBC MORPH BLD: ABNORMAL
SODIUM SERPL-SCNC: 134 MMOL/L (ref 136–145)
WBC # SPEC AUTO: 5.8 X10(3)/MCL (ref 4.5–11.5)

## 2022-08-29 PROCEDURE — 80069 RENAL FUNCTION PANEL: CPT

## 2022-08-29 PROCEDURE — 83735 ASSAY OF MAGNESIUM: CPT

## 2022-08-29 PROCEDURE — 36415 COLL VENOUS BLD VENIPUNCTURE: CPT

## 2022-08-29 PROCEDURE — 85025 COMPLETE CBC W/AUTO DIFF WBC: CPT

## 2022-08-30 LAB — TACROLIMUS TROUGH BLD-MCNC: 5.1 NG/ML

## 2022-09-12 ENCOUNTER — LAB VISIT (OUTPATIENT)
Dept: LAB | Facility: HOSPITAL | Age: 72
End: 2022-09-12
Attending: INTERNAL MEDICINE
Payer: MEDICARE

## 2022-09-12 DIAGNOSIS — Z94.0 KIDNEY REPLACED BY TRANSPLANT: ICD-10-CM

## 2022-09-12 DIAGNOSIS — Z79.899 ENCOUNTER FOR LONG-TERM (CURRENT) USE OF OTHER MEDICATIONS: ICD-10-CM

## 2022-09-12 LAB
ABS NEUT (OLG): 3.54 X10(3)/MCL (ref 2.1–9.2)
ACANTHOCYTES (OLG): ABNORMAL
ALBUMIN SERPL-MCNC: 3.8 GM/DL (ref 3.4–4.8)
ANISOCYTOSIS BLD QL SMEAR: ABNORMAL
BUN SERPL-MCNC: 27.2 MG/DL (ref 8.4–25.7)
CALCIUM SERPL-MCNC: 9.8 MG/DL (ref 8.8–10)
CHLORIDE SERPL-SCNC: 106 MMOL/L (ref 98–107)
CO2 SERPL-SCNC: 24 MMOL/L (ref 23–31)
CREAT SERPL-MCNC: 1.77 MG/DL (ref 0.73–1.18)
EOSINOPHIL NFR BLD MANUAL: 0.06 X10(3)/MCL (ref 0–0.9)
EOSINOPHIL NFR BLD MANUAL: 1 %
ERYTHROCYTE [DISTWIDTH] IN BLOOD BY AUTOMATED COUNT: 26.6 % (ref 11.5–17)
GFR SERPLBLD CREATININE-BSD FMLA CKD-EPI: 41 MLS/MIN/1.73/M2
GLUCOSE SERPL-MCNC: 91 MG/DL (ref 82–115)
HCT VFR BLD AUTO: 47.8 % (ref 42–52)
HGB BLD-MCNC: 14.9 GM/DL (ref 14–18)
IMM GRANULOCYTES # BLD AUTO: 0.36 X10(3)/MCL (ref 0–0.04)
IMM GRANULOCYTES NFR BLD AUTO: 6.1 %
INSTRUMENT WBC (OLG): 6 X10(3)/MCL
LYMPHOCYTES NFR BLD MANUAL: 1.92 X10(3)/MCL
LYMPHOCYTES NFR BLD MANUAL: 32 %
MACROCYTES BLD QL SMEAR: ABNORMAL
MAGNESIUM SERPL-MCNC: 1.9 MG/DL (ref 1.6–2.6)
MCH RBC QN AUTO: 28.2 PG (ref 27–31)
MCHC RBC AUTO-ENTMCNC: 31.2 MG/DL (ref 33–36)
MCV RBC AUTO: 90.4 FL (ref 80–94)
METAMYELOCYTES NFR BLD MANUAL: 6 %
MONOCYTES NFR BLD MANUAL: 0.48 X10(3)/MCL (ref 0.1–1.3)
MONOCYTES NFR BLD MANUAL: 8 %
MYELOCYTES NFR BLD MANUAL: 2 %
NEUTROPHILS NFR BLD MANUAL: 51 %
NRBC BLD AUTO-RTO: 0 %
OVALOCYTES (OLG): ABNORMAL
PHOSPHATE SERPL-MCNC: 2.5 MG/DL (ref 2.3–4.7)
PLATELET # BLD AUTO: 110 X10(3)/MCL (ref 130–400)
PLATELET # BLD EST: ABNORMAL 10*3/UL
PMV BLD AUTO: 8.8 FL (ref 7.4–10.4)
POIKILOCYTOSIS BLD QL SMEAR: ABNORMAL
POTASSIUM SERPL-SCNC: 4.8 MMOL/L (ref 3.5–5.1)
RBC # BLD AUTO: 5.29 X10(6)/MCL (ref 4.7–6.1)
RBC MORPH BLD: ABNORMAL
SODIUM SERPL-SCNC: 137 MMOL/L (ref 136–145)
WBC # SPEC AUTO: 5.9 X10(3)/MCL (ref 4.5–11.5)

## 2022-09-12 PROCEDURE — 80197 ASSAY OF TACROLIMUS: CPT

## 2022-09-12 PROCEDURE — 36415 COLL VENOUS BLD VENIPUNCTURE: CPT

## 2022-09-12 PROCEDURE — 83735 ASSAY OF MAGNESIUM: CPT

## 2022-09-12 PROCEDURE — 80069 RENAL FUNCTION PANEL: CPT

## 2022-09-12 PROCEDURE — 85025 COMPLETE CBC W/AUTO DIFF WBC: CPT

## 2022-09-13 LAB — TACROLIMUS TROUGH BLD-MCNC: 5 NG/ML

## 2022-09-14 DIAGNOSIS — N28.9 KIDNEY DISEASE: ICD-10-CM

## 2022-09-14 DIAGNOSIS — Z79.899 IMMUNOSUPPRESSIVE MANAGEMENT ENCOUNTER FOLLOWING KIDNEY TRANSPLANT: Primary | ICD-10-CM

## 2022-09-14 DIAGNOSIS — R80.9 PROTEINURIA, UNSPECIFIED TYPE: ICD-10-CM

## 2022-09-14 DIAGNOSIS — Z94.0 IMMUNOSUPPRESSIVE MANAGEMENT ENCOUNTER FOLLOWING KIDNEY TRANSPLANT: Primary | ICD-10-CM

## 2022-09-21 RX ORDER — TEDIZOLID PHOSPHATE 200 MG/1
200 TABLET, FILM COATED ORAL DAILY
Qty: 30 TABLET | Refills: 3 | Status: CANCELLED | OUTPATIENT
Start: 2022-09-21

## 2022-09-23 RX ORDER — TEDIZOLID PHOSPHATE 200 MG/1
200 TABLET, FILM COATED ORAL DAILY
Qty: 30 TABLET | Refills: 3 | Status: CANCELLED | OUTPATIENT
Start: 2022-09-21

## 2022-09-26 ENCOUNTER — TELEPHONE (OUTPATIENT)
Dept: TRANSPLANT | Facility: CLINIC | Age: 72
End: 2022-09-26

## 2022-09-26 ENCOUNTER — LAB VISIT (OUTPATIENT)
Dept: LAB | Facility: HOSPITAL | Age: 72
End: 2022-09-26
Attending: INTERNAL MEDICINE
Payer: MEDICARE

## 2022-09-26 DIAGNOSIS — B25.9 CYTOMEGALOVIRUS INFECTION, UNSPECIFIED CYTOMEGALOVIRAL INFECTION TYPE: ICD-10-CM

## 2022-09-26 DIAGNOSIS — Z94.0 KIDNEY REPLACED BY TRANSPLANT: ICD-10-CM

## 2022-09-26 DIAGNOSIS — Z79.899 ENCOUNTER FOR LONG-TERM (CURRENT) USE OF OTHER MEDICATIONS: ICD-10-CM

## 2022-09-26 LAB
ABS NEUT (OLG): 2.78 X10(3)/MCL (ref 2.1–9.2)
ALBUMIN SERPL-MCNC: 3.9 GM/DL (ref 3.4–4.8)
ANISOCYTOSIS BLD QL SMEAR: ABNORMAL
BASOPHILS NFR BLD MANUAL: 0.05 X10(3)/MCL (ref 0–0.2)
BASOPHILS NFR BLD MANUAL: 1 %
BUN SERPL-MCNC: 21.1 MG/DL (ref 8.4–25.7)
CALCIUM SERPL-MCNC: 10 MG/DL (ref 8.8–10)
CHLORIDE SERPL-SCNC: 107 MMOL/L (ref 98–107)
CO2 SERPL-SCNC: 25 MMOL/L (ref 23–31)
CREAT SERPL-MCNC: 1.8 MG/DL (ref 0.73–1.18)
ERYTHROCYTE [DISTWIDTH] IN BLOOD BY AUTOMATED COUNT: 22.5 % (ref 11.5–17)
GFR SERPLBLD CREATININE-BSD FMLA CKD-EPI: 40 MLS/MIN/1.73/M2
GLUCOSE SERPL-MCNC: 85 MG/DL (ref 82–115)
HCT VFR BLD AUTO: 47.5 % (ref 42–52)
HGB BLD-MCNC: 15 GM/DL (ref 14–18)
IMM GRANULOCYTES # BLD AUTO: 0.33 X10(3)/MCL (ref 0–0.04)
IMM GRANULOCYTES NFR BLD AUTO: 6.9 %
INSTRUMENT WBC (OLG): 4.8 X10(3)/MCL
LYMPHOCYTES NFR BLD MANUAL: 1.78 X10(3)/MCL
LYMPHOCYTES NFR BLD MANUAL: 37 %
MACROCYTES BLD QL SMEAR: ABNORMAL
MAGNESIUM SERPL-MCNC: 2.2 MG/DL (ref 1.6–2.6)
MCH RBC QN AUTO: 29.9 PG (ref 27–31)
MCHC RBC AUTO-ENTMCNC: 31.6 MG/DL (ref 33–36)
MCV RBC AUTO: 94.6 FL (ref 80–94)
METAMYELOCYTES NFR BLD MANUAL: 2 %
MONOCYTES NFR BLD MANUAL: 0.24 X10(3)/MCL (ref 0.1–1.3)
MONOCYTES NFR BLD MANUAL: 5 %
MYELOCYTES NFR BLD MANUAL: 1 %
NEUTROPHILS NFR BLD MANUAL: 55 %
NRBC BLD AUTO-RTO: 0 %
PHOSPHATE SERPL-MCNC: 2.6 MG/DL (ref 2.3–4.7)
PLATELET # BLD AUTO: 139 X10(3)/MCL (ref 130–400)
PLATELET # BLD EST: ADEQUATE 10*3/UL
PMV BLD AUTO: 9.1 FL (ref 7.4–10.4)
POTASSIUM SERPL-SCNC: 4.9 MMOL/L (ref 3.5–5.1)
RBC # BLD AUTO: 5.02 X10(6)/MCL (ref 4.7–6.1)
RBC MORPH BLD: ABNORMAL
SODIUM SERPL-SCNC: 139 MMOL/L (ref 136–145)
WBC # SPEC AUTO: 4.8 X10(3)/MCL (ref 4.5–11.5)

## 2022-09-26 PROCEDURE — 36415 COLL VENOUS BLD VENIPUNCTURE: CPT

## 2022-09-26 PROCEDURE — 80197 ASSAY OF TACROLIMUS: CPT

## 2022-09-26 PROCEDURE — 80069 RENAL FUNCTION PANEL: CPT

## 2022-09-26 PROCEDURE — 83735 ASSAY OF MAGNESIUM: CPT

## 2022-09-26 NOTE — TELEPHONE ENCOUNTER
----- Message from Celeste Yen MD sent at 9/26/2022  9:40 AM CDT -----  High Ca: hold calcitriol. Check PTH and vitamin D in a month   K and Cr acceptable.  CMV <35. Please check CMV in 3 months

## 2022-09-26 NOTE — PROGRESS NOTES
High Ca: hold calcitriol. Check PTH and vitamin D in a month   K and Cr acceptable.  CMV <35. Please check CMV in 3 months

## 2022-09-27 RX ORDER — TEDIZOLID PHOSPHATE 200 MG/1
200 TABLET, FILM COATED ORAL DAILY
Qty: 30 TABLET | Refills: 3 | Status: SHIPPED | OUTPATIENT
Start: 2022-09-27 | End: 2023-01-30 | Stop reason: DRUGHIGH

## 2022-09-28 LAB
CMV DNA SERPL NAA+PROBE-ACNC: <35 IU/ML
TACROLIMUS TROUGH BLD-MCNC: 5.3 NG/ML

## 2022-09-30 ENCOUNTER — TELEPHONE (OUTPATIENT)
Dept: TRANSPLANT | Facility: CLINIC | Age: 72
End: 2022-09-30
Payer: MEDICARE

## 2022-09-30 ENCOUNTER — PATIENT MESSAGE (OUTPATIENT)
Dept: TRANSPLANT | Facility: CLINIC | Age: 72
End: 2022-09-30
Payer: MEDICARE

## 2022-09-30 NOTE — TELEPHONE ENCOUNTER
Notified patient of Dr. Yen's review of 9/26/22 lab results via [facility].     Hi  Ronal,     Dr. Yen reviewed your 9/26/22 lab results. Your calcium level is on the high end of the margin. She wants you to HOLD your Calcitriol until further notice. We'll recheck your PTH & Vitamin D level with your November labs.     Thanks,     Tiara

## 2022-10-03 ENCOUNTER — PATIENT MESSAGE (OUTPATIENT)
Dept: NEUROSURGERY | Facility: CLINIC | Age: 72
End: 2022-10-03
Payer: MEDICARE

## 2022-10-06 ENCOUNTER — HOSPITAL ENCOUNTER (OUTPATIENT)
Dept: RADIOLOGY | Facility: HOSPITAL | Age: 72
Discharge: HOME | End: 2022-10-06
Attending: PHYSICIAN ASSISTANT
Payer: MEDICARE

## 2022-10-06 DIAGNOSIS — I62.00 NONTRAUMATIC SUBDURAL HEMORRHAGE, UNSPECIFIED: ICD-10-CM

## 2022-10-06 PROCEDURE — 70450 CT HEAD/BRAIN W/O DYE: CPT | Mod: TC

## 2022-10-10 ENCOUNTER — OFFICE VISIT (OUTPATIENT)
Dept: NEUROSURGERY | Facility: CLINIC | Age: 72
End: 2022-10-10
Payer: MEDICARE

## 2022-10-10 DIAGNOSIS — S06.5XAA SUBDURAL HEMATOMA: ICD-10-CM

## 2022-10-10 DIAGNOSIS — G06.0 CEREBRAL INTRACRANIAL ABSCESS: Primary | ICD-10-CM

## 2022-10-10 PROCEDURE — 99215 OFFICE O/P EST HI 40 MIN: CPT | Mod: 95,,, | Performed by: PHYSICIAN ASSISTANT

## 2022-10-10 PROCEDURE — 99215 PR OFFICE/OUTPT VISIT, EST, LEVL V, 40-54 MIN: ICD-10-PCS | Mod: 95,,, | Performed by: PHYSICIAN ASSISTANT

## 2022-10-10 NOTE — PROGRESS NOTES
The patient location is: home  The chief complaint leading to consultation is: SDH, intracranial abscess    Visit type: audiovisual    Face to Face time with patient: 10 minutes  45 minutes of total time spent on the encounter, which includes face to face time and non-face to face time preparing to see the patient (eg, review of tests), Obtaining and/or reviewing separately obtained history, Documenting clinical information in the electronic or other health record, Independently interpreting results (not separately reported) and communicating results to the patient/family/caregiver, or Care coordination (not separately reported).         Each patient to whom he or she provides medical services by telemedicine is:  (1) informed of the relationship between the physician and patient and the respective role of any other health care provider with respect to management of the patient; and (2) notified that he or she may decline to receive medical services by telemedicine and may withdraw from such care at any time.    Notes:       Neurosurgery  Established Patient    SUBJECTIVE:     History of Present Illness:  Ronal Mazariegos is a 71 y.o. male with PMH of Afib previously on Eliquis, h/o kidney transplant on immunosuppressants, cardiomyopathy, and pulmonary hypertension who previously presented to the ED in February 2022 with left sided weakness, found to have 2.2 cm R frontal ring enhancing lesion consistent with intracranial abscess. He underwent R frontal craniotomy with abscess drainage and washout on 2/16/22 by Dr. Rosa. 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 He was noted to have developed a subacute R frontal SDH adjacent to his surgical bed. Pt then underwent R MMA embolization by Dr. Bourgeois on 7/14/22, follow up CTH on 8/2 showed near resolution of hemorrhage. Pt  presents to clinic today via virtual visit after undergoing updated CTH since resuming his Eliquis. Pt reports he has been doing well. Continues to report some disequilibrium and mild light headedness upon standing too quickly, also noticed this when descending down a ladder. Symptoms resolve quickly on their own. Denies room-spinning dizziness, near syncopal episodes, headaches, visual disturbance, speech difficulty, and focal weakness or sensory change. No infectious symptoms, remains complaint with IV tedizolid and PO bactrim per ID. States he has recently followed up with his cardiologist and has been back on his Eliquis.        Review of patient's allergies indicates:  No Known Allergies    Current Outpatient Medications   Medication Sig Dispense Refill    apixaban (ELIQUIS) 5 mg Tab Take 1 tablet (5 mg total) by mouth 2 (two) times daily. DO NOT RESTART UNTIL APPROVED BY NEUROSURGERY 60 tablet 11    ergocalciferol (ERGOCALCIFEROL) 50,000 unit Cap Take 1 capsule (50,000 Units total) by mouth every 7 days. X 12 months (Patient taking differently: Take 50,000 Units by mouth every 7 days. X 12 months  MONDAYS) 12 capsule 3    finasteride (PROSCAR) 5 mg tablet Take 5 mg by mouth every evening.      fish oil-omega-3 fatty acids 300-1,000 mg capsule Take 1 capsule by mouth every evening.      fluticasone propionate (FLONASE) 50 mcg/actuation nasal spray daily as needed.      magnesium oxide (MAG-OX) 400 mg (241.3 mg magnesium) tablet TAKE 2 TABLETS BY MOUTH THREE TIMES DAILY (Patient taking differently: Take by mouth 2 (two) times daily. 4 TABLETS TWICE DAILY) 180 tablet 10    metoprolol tartrate (LOPRESSOR) 50 MG tablet Take 1 tablet (50 mg total) by mouth 2 (two) times daily. 60 tablet 11    multivitamin Tab Take 1 tablet by mouth once daily.      predniSONE (DELTASONE) 5 MG tablet Take by mouth daily. 5/7-6/6 20 mg, 6/7-7/6 15 mg, 7/7-8/6 10 mg, 5 mg daily thereafter starting 8/7/21 (Patient taking differently:  Take by mouth every morning.) 120 tablet 11    sodium bicarbonate 650 MG tablet Take 2 tablets (1,300 mg total) by mouth 2 (two) times daily. 120 tablet 11    sodium polystyrene (KAYEXALATE) powder Mix 4 LEVEL TEASPOONS (30 grams total) in 60mL water and drink by mouth once daily every Mon, Wed & Friday  (for high potassium), 454 g 4    sodium zirconium cyclosilicate (LOKELMA) 10 gram packet Take 1 packet (10 g total) by mouth once daily. Mix entire contents of packet(s) into drinking glass containing 3 tablespoons of water; stir well and drink immediately. Add water and repeat until no powder remains to receive entire dose. 30 packet 11    sulfamethoxazole-trimethoprim 800-160mg (BACTRIM DS) 800-160 mg Tab Take 2 tablets by mouth 3 (three) times daily. 180 tablet 11    tacrolimus (PROGRAF) 1 MG Cap Take 2 capsules (2 mg total) by mouth every morning AND 1 capsule (1 mg total) every evening. Z94.0;Kidney txp on 5/4/2021. 90 capsule 11    tedizolid (SIVEXTRO) 200 mg Tab Take 1 tablet  (200 mg total) by mouth once daily. 30 tablet 3     No current facility-administered medications for this visit.       Past Medical History:   Diagnosis Date    Anasarca 10/1/2021    Anemia of chronic disease     Atrial fibrillation     BPH (benign prostatic hypertrophy)     Chronic systolic heart failure 10/1/2021    CKD (chronic kidney disease) stage 2, GFR 60-89 ml/min     Hepatitis C virus infection cured after antiviral drug therapy     acquired through kidney transplant, treated / cured (SVR12 - 12/2021)    HTN (hypertension)     HTN (hypertension)     Polycystic kidney disease      Past Surgical History:   Procedure Laterality Date    AV FISTULA PLACEMENT Left 2016    CATARACT EXTRACTION, BILATERAL Bilateral     CRANIOTOMY Right 2/16/2022    Procedure: CRANIOTOMY;  Surgeon: Sunday Rosa DO;  Location: [facility] OR [address] [place] [address];  Service: Neurosurgery;  Laterality: Right;  R craniotomy for abscess drainage    KIDNEY TRANSPLANT N/A  2021    Procedure: TRANSPLANT, KIDNEY;  Surgeon: Lexy Bolden MD;  Location: [facility] OR [address] [place] [address];  Service: Transplant;  Laterality: N/A;     Family History       Problem Relation (Age of Onset)    Hypertension Father, Sister    Kidney disease Father, Sister    Polycystic kidney disease Father, Sister          Social History     Socioeconomic History    Marital status:     Number of children: 1   Tobacco Use    Smoking status: Former     Packs/day: 2.00     Years: 10.00     Pack years: 20.00     Types: Cigarettes     Start date: 1972     Quit date: 1984     Years since quittin.8    Smokeless tobacco: Never   Substance and Sexual Activity    Alcohol use: No     Comment: Pt reportsbeing a former beer drinker (2-3 cans per day) and quitting in .    Drug use: No    Sexual activity: Yes       Review of Systems  Positive per HPI, otherwise a pertinent ROS was performed and was negative.        OBJECTIVE:     Vital Signs     There is no height or weight on file to calculate BMI.    Neurosurgery Physical Exam  General: well developed, well nourished, no distress.   Head: normocephalic, atraumatic  Neck: appears supple, full ROM  Neurologic: Alert and oriented. Thought content appropriate.  Language: No aphasia, goal-directed  Speech: Fluent, no dysarthria  Cranial nerves: Face grossly symmetric, tongue grossly midline  Eyes: Unable to assess pupils. EOMI appear grossly intact.  Pulmonary: no signs of respiratory distress, no labored breathing  Motor Strength: Moves all extremities spontaneously with good tone. BUE and BLE are at least 3/5. No abnormal movements seen. No appreciable motor asymmetry.  Finger-to-nose: intact bilaterally   Pronator drift: absent bilaterally          Diagnostic Results:  Imaging was independently reviewed by myself, along with the associated radiology report.    CTH 10/6/22:  - No acute hemorrhage, prior SDH appears to be resolved with small area of  encephalomalacia in the right frontal lobe as seen on previous imaging      ASSESSMENT/PLAN:     Ronal Mazariegos is a 72 y.o. male who previously presented with R frontal intracranial abscess s/p evacuation, subsequent development of R-sided SDH, s/p R MMA embolization with resolution of hemorrhage. Doing well clinically. Remains on course of IV Abx with tedizolid per ID with plan for total course of 9-12 months.    - Okay to continue Eliquis at this time  - Recommend updated MRI brain w/wo contrast to ensure no radiologic evidence of residual infection, may follow back up with Dr. Rosa after completed  - Encouraged patient to call the clinic with any questions, concerns, or exam changes prior to follow up appt.     Time spent on this encounter: 45 minutes. This includes face to face time and non-face to face time preparing to see the patient (eg, review of tests), obtaining and/or reviewing separately obtained history, documenting clinical information in the electronic or other health record, independently interpreting results and communicating results to the patient/family/caregiver, or care coordinator.        Hannah Haney PA-C  Neurosurgery  [facility]

## 2022-10-11 ENCOUNTER — PATIENT MESSAGE (OUTPATIENT)
Dept: TRANSPLANT | Facility: CLINIC | Age: 72
End: 2022-10-11
Payer: MEDICARE

## 2022-10-12 ENCOUNTER — LAB VISIT (OUTPATIENT)
Dept: LAB | Facility: HOSPITAL | Age: 72
End: 2022-10-12
Attending: INTERNAL MEDICINE
Payer: MEDICARE

## 2022-10-12 DIAGNOSIS — Z94.0 KIDNEY REPLACED BY TRANSPLANT: ICD-10-CM

## 2022-10-12 DIAGNOSIS — Z79.899 ENCOUNTER FOR LONG-TERM (CURRENT) USE OF OTHER MEDICATIONS: ICD-10-CM

## 2022-10-12 LAB
ALBUMIN SERPL-MCNC: 3.8 GM/DL (ref 3.4–4.8)
BASOPHILS # BLD AUTO: 0.03 X10(3)/MCL (ref 0–0.2)
BASOPHILS NFR BLD AUTO: 0.6 %
BUN SERPL-MCNC: 16.3 MG/DL (ref 8.4–25.7)
CALCIUM SERPL-MCNC: 9.6 MG/DL (ref 8.8–10)
CHLORIDE SERPL-SCNC: 106 MMOL/L (ref 98–107)
CO2 SERPL-SCNC: 22 MMOL/L (ref 23–31)
CREAT SERPL-MCNC: 1.57 MG/DL (ref 0.73–1.18)
EOSINOPHIL # BLD AUTO: 0.05 X10(3)/MCL (ref 0–0.9)
EOSINOPHIL NFR BLD AUTO: 1 %
ERYTHROCYTE [DISTWIDTH] IN BLOOD BY AUTOMATED COUNT: 17.9 % (ref 11.5–17)
GFR SERPLBLD CREATININE-BSD FMLA CKD-EPI: 47 MLS/MIN/1.73/M2
GLUCOSE SERPL-MCNC: 86 MG/DL (ref 82–115)
HCT VFR BLD AUTO: 46.5 % (ref 42–52)
HGB BLD-MCNC: 15.2 GM/DL (ref 14–18)
IMM GRANULOCYTES # BLD AUTO: 0.05 X10(3)/MCL (ref 0–0.04)
IMM GRANULOCYTES NFR BLD AUTO: 1 %
LYMPHOCYTES # BLD AUTO: 2.43 X10(3)/MCL (ref 0.6–4.6)
LYMPHOCYTES NFR BLD AUTO: 48.5 %
MCH RBC QN AUTO: 31 PG (ref 27–31)
MCHC RBC AUTO-ENTMCNC: 32.7 MG/DL (ref 33–36)
MCV RBC AUTO: 94.9 FL (ref 80–94)
MONOCYTES # BLD AUTO: 0.58 X10(3)/MCL (ref 0.1–1.3)
MONOCYTES NFR BLD AUTO: 11.6 %
NEUTROPHILS # BLD AUTO: 1.9 X10(3)/MCL (ref 2.1–9.2)
NEUTROPHILS NFR BLD AUTO: 37.3 %
NRBC BLD AUTO-RTO: 0 %
PHOSPHATE SERPL-MCNC: 2.9 MG/DL (ref 2.3–4.7)
PLATELET # BLD AUTO: 138 X10(3)/MCL (ref 130–400)
PMV BLD AUTO: 8.8 FL (ref 7.4–10.4)
POTASSIUM SERPL-SCNC: 4.9 MMOL/L (ref 3.5–5.1)
RBC # BLD AUTO: 4.9 X10(6)/MCL (ref 4.7–6.1)
SODIUM SERPL-SCNC: 133 MMOL/L (ref 136–145)
WBC # SPEC AUTO: 5 X10(3)/MCL (ref 4.5–11.5)

## 2022-10-12 PROCEDURE — 36415 COLL VENOUS BLD VENIPUNCTURE: CPT

## 2022-10-12 PROCEDURE — 85025 COMPLETE CBC W/AUTO DIFF WBC: CPT

## 2022-10-12 PROCEDURE — 80197 ASSAY OF TACROLIMUS: CPT

## 2022-10-12 PROCEDURE — 80069 RENAL FUNCTION PANEL: CPT

## 2022-10-13 ENCOUNTER — PATIENT MESSAGE (OUTPATIENT)
Dept: TRANSPLANT | Facility: CLINIC | Age: 72
End: 2022-10-13
Payer: MEDICARE

## 2022-10-13 LAB — TACROLIMUS TROUGH BLD-MCNC: 5 NG/ML

## 2022-10-19 ENCOUNTER — PATIENT MESSAGE (OUTPATIENT)
Dept: NEUROSURGERY | Facility: CLINIC | Age: 72
End: 2022-10-19
Payer: MEDICARE

## 2022-10-19 ENCOUNTER — TELEPHONE (OUTPATIENT)
Dept: NEUROSURGERY | Facility: CLINIC | Age: 72
End: 2022-10-19
Payer: MEDICARE

## 2022-10-20 ENCOUNTER — PATIENT MESSAGE (OUTPATIENT)
Dept: NEUROSURGERY | Facility: CLINIC | Age: 72
End: 2022-10-20
Payer: MEDICARE

## 2022-10-25 ENCOUNTER — LAB VISIT (OUTPATIENT)
Dept: LAB | Facility: HOSPITAL | Age: 72
End: 2022-10-25
Attending: INTERNAL MEDICINE
Payer: MEDICARE

## 2022-10-25 DIAGNOSIS — R60.1 GENERALIZED EDEMA: ICD-10-CM

## 2022-10-25 DIAGNOSIS — Z94.0 HISTORY OF RENAL TRANSPLANT: ICD-10-CM

## 2022-10-25 DIAGNOSIS — I12.9 HYPERTENSIVE NEPHROPATHY: ICD-10-CM

## 2022-10-25 DIAGNOSIS — E83.42 HYPOMAGNESEMIA: ICD-10-CM

## 2022-10-25 DIAGNOSIS — B99.9 ANEMIA DUE TO CHRONIC INFECTIOUS DISEASE: ICD-10-CM

## 2022-10-25 DIAGNOSIS — N18.31 CHRONIC KIDNEY DISEASE (CKD) STAGE G3A/A1, MODERATELY DECREASED GLOMERULAR FILTRATION RATE (GFR) BETWEEN 45-59 ML/MIN/1.73 SQUARE METER AND ALBUMINURIA CREATININE RATIO LESS THAN 30 MG/G: Primary | ICD-10-CM

## 2022-10-25 DIAGNOSIS — E21.3 HYPERPARATHYROIDISM, UNSPECIFIED: ICD-10-CM

## 2022-10-25 DIAGNOSIS — D63.8 ANEMIA DUE TO CHRONIC INFECTIOUS DISEASE: ICD-10-CM

## 2022-10-25 LAB
ALBUMIN SERPL-MCNC: 3.7 GM/DL (ref 3.4–4.8)
ALBUMIN/GLOB SERPL: 2.2 RATIO (ref 1.1–2)
ALP SERPL-CCNC: 75 UNIT/L (ref 40–150)
ALT SERPL-CCNC: 35 UNIT/L (ref 0–55)
AST SERPL-CCNC: 36 UNIT/L (ref 5–34)
BASOPHILS # BLD AUTO: 0.03 X10(3)/MCL (ref 0–0.2)
BASOPHILS NFR BLD AUTO: 0.8 %
BILIRUBIN DIRECT+TOT PNL SERPL-MCNC: 1.2 MG/DL
BUN SERPL-MCNC: 20 MG/DL (ref 8.4–25.7)
CALCIUM SERPL-MCNC: 9.6 MG/DL (ref 8.8–10)
CHLORIDE SERPL-SCNC: 106 MMOL/L (ref 98–107)
CO2 SERPL-SCNC: 23 MMOL/L (ref 23–31)
CREAT SERPL-MCNC: 1.79 MG/DL (ref 0.73–1.18)
EOSINOPHIL # BLD AUTO: 0.04 X10(3)/MCL (ref 0–0.9)
EOSINOPHIL NFR BLD AUTO: 1 %
ERYTHROCYTE [DISTWIDTH] IN BLOOD BY AUTOMATED COUNT: 17.1 % (ref 11.5–17)
FERRITIN SERPL-MCNC: 38.7 NG/ML (ref 21.81–274.66)
GFR SERPLBLD CREATININE-BSD FMLA CKD-EPI: 40 MLS/MIN/1.73/M2
GLOBULIN SER-MCNC: 1.7 GM/DL (ref 2.4–3.5)
GLUCOSE SERPL-MCNC: 88 MG/DL (ref 82–115)
HCT VFR BLD AUTO: 46.1 % (ref 42–52)
HGB BLD-MCNC: 15 GM/DL (ref 14–18)
IMM GRANULOCYTES # BLD AUTO: 0.01 X10(3)/MCL (ref 0–0.04)
IMM GRANULOCYTES NFR BLD AUTO: 0.3 %
IRON SATN MFR SERPL: 9 % (ref 20–50)
IRON SERPL-MCNC: 29 UG/DL (ref 65–175)
LYMPHOCYTES # BLD AUTO: 1.9 X10(3)/MCL (ref 0.6–4.6)
LYMPHOCYTES NFR BLD AUTO: 47.5 %
MAGNESIUM SERPL-MCNC: 1.9 MG/DL (ref 1.6–2.6)
MCH RBC QN AUTO: 31.1 PG (ref 27–31)
MCHC RBC AUTO-ENTMCNC: 32.5 MG/DL (ref 33–36)
MCV RBC AUTO: 95.6 FL (ref 80–94)
MONOCYTES # BLD AUTO: 0.51 X10(3)/MCL (ref 0.1–1.3)
MONOCYTES NFR BLD AUTO: 12.8 %
NEUTROPHILS # BLD AUTO: 1.5 X10(3)/MCL (ref 2.1–9.2)
NEUTROPHILS NFR BLD AUTO: 37.6 %
NRBC BLD AUTO-RTO: 0 %
PHOSPHATE SERPL-MCNC: 2.8 MG/DL (ref 2.3–4.7)
PLATELET # BLD AUTO: 117 X10(3)/MCL (ref 130–400)
PMV BLD AUTO: 9.3 FL (ref 7.4–10.4)
POTASSIUM SERPL-SCNC: 4.9 MMOL/L (ref 3.5–5.1)
PROT SERPL-MCNC: 5.4 GM/DL (ref 5.8–7.6)
PTH-INTACT SERPL-MCNC: 173.3 PG/ML (ref 8.7–77)
RBC # BLD AUTO: 4.82 X10(6)/MCL (ref 4.7–6.1)
SODIUM SERPL-SCNC: 135 MMOL/L (ref 136–145)
TIBC SERPL-MCNC: 300 UG/DL (ref 69–240)
TIBC SERPL-MCNC: 329 UG/DL (ref 250–450)
TRANSFERRIN SERPL-MCNC: 298 MG/DL (ref 163–344)
WBC # SPEC AUTO: 4 X10(3)/MCL (ref 4.5–11.5)

## 2022-10-25 PROCEDURE — 80053 COMPREHEN METABOLIC PANEL: CPT

## 2022-10-25 PROCEDURE — 83735 ASSAY OF MAGNESIUM: CPT

## 2022-10-25 PROCEDURE — 36415 COLL VENOUS BLD VENIPUNCTURE: CPT

## 2022-10-25 PROCEDURE — 84100 ASSAY OF PHOSPHORUS: CPT

## 2022-10-25 PROCEDURE — 85025 COMPLETE CBC W/AUTO DIFF WBC: CPT

## 2022-10-25 PROCEDURE — 83540 ASSAY OF IRON: CPT

## 2022-10-25 PROCEDURE — 83970 ASSAY OF PARATHORMONE: CPT

## 2022-10-25 PROCEDURE — 82728 ASSAY OF FERRITIN: CPT

## 2022-10-28 ENCOUNTER — PATIENT MESSAGE (OUTPATIENT)
Dept: TRANSPLANT | Facility: CLINIC | Age: 72
End: 2022-10-28
Payer: MEDICARE

## 2022-11-07 ENCOUNTER — HOSPITAL ENCOUNTER (OUTPATIENT)
Dept: RADIOLOGY | Facility: HOSPITAL | Age: 72
Discharge: HOME | End: 2022-11-07
Attending: PHYSICIAN ASSISTANT
Payer: MEDICARE

## 2022-11-07 ENCOUNTER — TELEPHONE (OUTPATIENT)
Dept: NEUROSURGERY | Facility: CLINIC | Age: 72
End: 2022-11-07
Payer: MEDICARE

## 2022-11-07 DIAGNOSIS — G06.0 CEREBRAL INTRACRANIAL ABSCESS: ICD-10-CM

## 2022-11-07 DIAGNOSIS — S06.5XAA SUBDURAL HEMATOMA: ICD-10-CM

## 2022-11-07 RX ORDER — LORAZEPAM 0.5 MG/1
0.5 TABLET ORAL ONCE
Qty: 2 TABLET | Refills: 0 | Status: SHIPPED | OUTPATIENT
Start: 2022-11-07 | End: 2023-01-08 | Stop reason: ALTCHOICE

## 2022-11-07 NOTE — TELEPHONE ENCOUNTER
Informed patient that his medication will be called in 3 days before Mri appointment patient verbally understood.

## 2022-11-07 NOTE — TELEPHONE ENCOUNTER
----- Message from Solo Spangler sent at 11/7/2022  3:09 PM CST -----  Name of Who is Calling: ANUJ WARREN [[id number]]            What is the request in detail: Patient is requesting call back to get anxiety medicine for mri appointment 12/1              Can the clinic reply by [facility]: no              What Number to Call Back if not in [facility]: [phone]

## 2022-11-07 NOTE — TELEPHONE ENCOUNTER
----- Message from Solo Spangler sent at 11/7/2022  3:09 PM CST -----  Name of Who is Calling: ANUJ WARREN [[id number]]            What is the request in detail: Patient is requesting call back to get anxiety medicine for mri appointment 12/1              Can the clinic reply by [facility]: no              What Number to Call Back if not in [facility]: [phone]

## 2022-11-08 ENCOUNTER — LAB VISIT (OUTPATIENT)
Dept: LAB | Facility: HOSPITAL | Age: 72
End: 2022-11-08
Attending: INTERNAL MEDICINE
Payer: MEDICARE

## 2022-11-08 DIAGNOSIS — Z94.0 IMMUNOSUPPRESSIVE MANAGEMENT ENCOUNTER FOLLOWING KIDNEY TRANSPLANT: ICD-10-CM

## 2022-11-08 DIAGNOSIS — T86.10 COMPLICATION OF TRANSPLANTED KIDNEY, UNSPECIFIED COMPLICATION: ICD-10-CM

## 2022-11-08 DIAGNOSIS — N28.9 KIDNEY DISEASE: ICD-10-CM

## 2022-11-08 DIAGNOSIS — Z79.899 ENCOUNTER FOR LONG-TERM (CURRENT) USE OF OTHER MEDICATIONS: ICD-10-CM

## 2022-11-08 DIAGNOSIS — R80.9 PROTEINURIA, UNSPECIFIED TYPE: ICD-10-CM

## 2022-11-08 DIAGNOSIS — Z79.899 IMMUNOSUPPRESSIVE MANAGEMENT ENCOUNTER FOLLOWING KIDNEY TRANSPLANT: ICD-10-CM

## 2022-11-08 DIAGNOSIS — Z94.0 KIDNEY REPLACED BY TRANSPLANT: ICD-10-CM

## 2022-11-08 LAB
ALBUMIN SERPL-MCNC: 3.6 GM/DL (ref 3.4–4.8)
ALBUMIN SERPL-MCNC: 3.6 GM/DL (ref 3.4–4.8)
ALP SERPL-CCNC: 83 UNIT/L (ref 40–150)
ALT SERPL-CCNC: 32 UNIT/L (ref 0–55)
APPEARANCE UR: CLEAR
AST SERPL-CCNC: 27 UNIT/L (ref 5–34)
BACTERIA #/AREA URNS AUTO: NORMAL /HPF
BASOPHILS # BLD AUTO: 0.03 X10(3)/MCL (ref 0–0.2)
BASOPHILS NFR BLD AUTO: 0.8 %
BILIRUB UR QL STRIP.AUTO: NEGATIVE MG/DL
BILIRUBIN DIRECT+TOT PNL SERPL-MCNC: 0.4 MG/DL (ref 0–0.5)
BILIRUBIN DIRECT+TOT PNL SERPL-MCNC: 0.6 MG/DL (ref 0–0.8)
BILIRUBIN DIRECT+TOT PNL SERPL-MCNC: 1 MG/DL
BUN SERPL-MCNC: 16.9 MG/DL (ref 8.4–25.7)
CALCIUM SERPL-MCNC: 9.6 MG/DL (ref 8.8–10)
CHLORIDE SERPL-SCNC: 107 MMOL/L (ref 98–107)
CO2 SERPL-SCNC: 23 MMOL/L (ref 23–31)
COLOR UR AUTO: ABNORMAL
CREAT SERPL-MCNC: 1.63 MG/DL (ref 0.73–1.18)
CREAT UR-MCNC: 99.6 MG/DL (ref 63–166)
EOSINOPHIL # BLD AUTO: 0.14 X10(3)/MCL (ref 0–0.9)
EOSINOPHIL NFR BLD AUTO: 3.7 %
ERYTHROCYTE [DISTWIDTH] IN BLOOD BY AUTOMATED COUNT: 16.4 % (ref 11.5–17)
GFR SERPLBLD CREATININE-BSD FMLA CKD-EPI: 45 MLS/MIN/1.73/M2
GLUCOSE SERPL-MCNC: 88 MG/DL (ref 82–115)
GLUCOSE UR QL STRIP.AUTO: NEGATIVE MG/DL
HCT VFR BLD AUTO: 47.6 % (ref 42–52)
HGB BLD-MCNC: 14.8 GM/DL (ref 14–18)
IMM GRANULOCYTES # BLD AUTO: 0.01 X10(3)/MCL (ref 0–0.04)
IMM GRANULOCYTES NFR BLD AUTO: 0.3 %
KETONES UR QL STRIP.AUTO: NEGATIVE MG/DL
LEUKOCYTE ESTERASE UR QL STRIP.AUTO: NEGATIVE UNIT/L
LYMPHOCYTES # BLD AUTO: 1.59 X10(3)/MCL (ref 0.6–4.6)
LYMPHOCYTES NFR BLD AUTO: 41.8 %
MAGNESIUM SERPL-MCNC: 1.9 MG/DL (ref 1.6–2.6)
MCH RBC QN AUTO: 30.2 PG (ref 27–31)
MCHC RBC AUTO-ENTMCNC: 31.1 MG/DL (ref 33–36)
MCV RBC AUTO: 97.1 FL (ref 80–94)
MONOCYTES # BLD AUTO: 0.53 X10(3)/MCL (ref 0.1–1.3)
MONOCYTES NFR BLD AUTO: 13.9 %
NEUTROPHILS # BLD AUTO: 1.5 X10(3)/MCL (ref 2.1–9.2)
NEUTROPHILS NFR BLD AUTO: 39.5 %
NITRITE UR QL STRIP.AUTO: NEGATIVE
NRBC BLD AUTO-RTO: 0 %
PATH REV: NORMAL
PH UR STRIP.AUTO: 7.5 [PH]
PHOSPHATE SERPL-MCNC: 2.9 MG/DL (ref 2.3–4.7)
PLATELET # BLD AUTO: 124 X10(3)/MCL (ref 130–400)
PMV BLD AUTO: 9.2 FL (ref 7.4–10.4)
POTASSIUM SERPL-SCNC: 5.3 MMOL/L (ref 3.5–5.1)
PROT SERPL-MCNC: 5.4 GM/DL (ref 5.8–7.6)
PROT UR QL STRIP.AUTO: NEGATIVE MG/DL
PROT UR STRIP-MCNC: 8.9 MG/DL
RBC # BLD AUTO: 4.9 X10(6)/MCL (ref 4.7–6.1)
RBC #/AREA URNS AUTO: <5 /HPF
RBC UR QL AUTO: ABNORMAL UNIT/L
SODIUM SERPL-SCNC: 136 MMOL/L (ref 136–145)
SP GR UR STRIP.AUTO: 1.02 (ref 1–1.03)
SQUAMOUS #/AREA URNS AUTO: <5 /HPF
URINE PROTEIN/CREATININE RATIO (OHS): 0.1
UROBILINOGEN UR STRIP-ACNC: 0.2 MG/DL
WBC # SPEC AUTO: 3.8 X10(3)/MCL (ref 4.5–11.5)
WBC #/AREA URNS AUTO: <5 /HPF

## 2022-11-08 PROCEDURE — 83735 ASSAY OF MAGNESIUM: CPT

## 2022-11-08 PROCEDURE — 84156 ASSAY OF PROTEIN URINE: CPT

## 2022-11-08 PROCEDURE — 36415 COLL VENOUS BLD VENIPUNCTURE: CPT

## 2022-11-08 PROCEDURE — 81001 URINALYSIS AUTO W/SCOPE: CPT

## 2022-11-08 PROCEDURE — 85025 COMPLETE CBC W/AUTO DIFF WBC: CPT

## 2022-11-08 PROCEDURE — 80069 RENAL FUNCTION PANEL: CPT

## 2022-11-08 PROCEDURE — 80076 HEPATIC FUNCTION PANEL: CPT | Mod: 59

## 2022-11-08 PROCEDURE — 80197 ASSAY OF TACROLIMUS: CPT

## 2022-11-08 NOTE — PROGRESS NOTES
Lokelma to 10 gram BID X 2 days then 10 gram daily. Check K on Friday   Check CMV PCR with next lab

## 2022-11-09 DIAGNOSIS — T86.10 COMPLICATION OF TRANSPLANTED KIDNEY, UNSPECIFIED COMPLICATION: Primary | ICD-10-CM

## 2022-11-09 PROCEDURE — 87799 DETECT AGENT NOS DNA QUANT: CPT | Performed by: INTERNAL MEDICINE

## 2022-11-10 LAB
BKV DNA SERPL NAA+PROBE-ACNC: NORMAL IU/ML
TACROLIMUS TROUGH BLD-MCNC: 7.1 NG/ML

## 2022-11-14 ENCOUNTER — OFFICE VISIT (OUTPATIENT)
Dept: TRANSPLANT | Facility: CLINIC | Age: 72
End: 2022-11-14
Payer: MEDICARE

## 2022-11-14 VITALS
WEIGHT: 181.69 LBS | BODY MASS INDEX: 23.32 KG/M2 | RESPIRATION RATE: 18 BRPM | DIASTOLIC BLOOD PRESSURE: 72 MMHG | SYSTOLIC BLOOD PRESSURE: 133 MMHG | HEART RATE: 89 BPM | TEMPERATURE: 98 F | OXYGEN SATURATION: 98 % | HEIGHT: 74 IN

## 2022-11-14 DIAGNOSIS — Z94.0 KIDNEY REPLACED BY TRANSPLANT: Primary | ICD-10-CM

## 2022-11-14 DIAGNOSIS — D63.8 ANEMIA OF CHRONIC DISEASE: ICD-10-CM

## 2022-11-14 DIAGNOSIS — Z29.89 PROPHYLACTIC IMMUNOTHERAPY: ICD-10-CM

## 2022-11-14 DIAGNOSIS — I15.0 RENOVASCULAR HYPERTENSION: ICD-10-CM

## 2022-11-14 DIAGNOSIS — Z79.60 LONG-TERM USE OF IMMUNOSUPPRESSANT MEDICATION: ICD-10-CM

## 2022-11-14 DIAGNOSIS — Z79.01 LONG TERM (CURRENT) USE OF ANTICOAGULANTS: Chronic | ICD-10-CM

## 2022-11-14 DIAGNOSIS — I48.21 PERMANENT ATRIAL FIBRILLATION: ICD-10-CM

## 2022-11-14 DIAGNOSIS — D70.2 DRUG-INDUCED NEUTROPENIA: ICD-10-CM

## 2022-11-14 LAB
CREAT SERPL-MCNC: 1.6 MG/DL (ref 0.5–1.4)
SAMPLE: ABNORMAL

## 2022-11-14 PROCEDURE — 99215 OFFICE O/P EST HI 40 MIN: CPT | Mod: S$PBB,,, | Performed by: NURSE PRACTITIONER

## 2022-11-14 PROCEDURE — 99215 OFFICE O/P EST HI 40 MIN: CPT | Mod: PBBFAC | Performed by: NURSE PRACTITIONER

## 2022-11-14 PROCEDURE — 99999 PR PBB SHADOW E&M-EST. PATIENT-LVL V: ICD-10-PCS | Mod: PBBFAC,,, | Performed by: NURSE PRACTITIONER

## 2022-11-14 PROCEDURE — 99215 PR OFFICE/OUTPT VISIT, EST, LEVL V, 40-54 MIN: ICD-10-PCS | Mod: S$PBB,,, | Performed by: NURSE PRACTITIONER

## 2022-11-14 PROCEDURE — 99999 PR PBB SHADOW E&M-EST. PATIENT-LVL V: CPT | Mod: PBBFAC,,, | Performed by: NURSE PRACTITIONER

## 2022-11-14 NOTE — PROGRESS NOTES
Kidney Post-Transplant Assessment    Referring Physician: Alexis Zamorano  Current Nephrologist: Alexis Zamorano    ORGAN: RIGHT KIDNEY  Donor Type: donation after brain death  PHS Increased Risk: yes  Cold Ischemia: 486 mins  Induction Medications: simulect - basiliximab    Subjective:     CC:  Reassessment of renal allograft function and management of chronic immunosuppression.    HPI:  Mr. Mazariegos is a 72 y.o. 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  He has CKD stage 2 - GFR 60-89 and his  Baseline Cr 1.1-1.4, scr today 1.6  He takes prednisone and tacrolimus for maintenance immunosuppression.  Recent hospitalizations or ER visits since his previous clinic visit.        Last seen by txp 5/24/22     Post Transplant Course :  - Afib: warfarin switched to eliquis. However eliquis is on hold per neurosurgery after craniotomy in Feb 2022  - CMV viremia: resolved  - Intracranial Nocardia infection:  Admitted at hospital with  left sided weakness. MRI demonstrated a right frontal 2.2cm ring enhancing lesion, intracranial abscess. He is s/p right frontal craniotomy for abscess drainage on 2/17/22. Cultures positive for Nocardia novis. On IV Ceftriaxone and bactrim.  Patient following up with ID and neurosurgery. Aspirin restarted. Eliquis still on hold.   2/25/22: hospital d/c; 2/17/22 - craniotomy for frontal brain mass; + nocardia nova, was seen by ID today    treated for Nocardia - Bactrim DS 2 tabs q8 + meropenem 1g q8 - being followed by ID and neurosurgery  MMF on HOLD due to + nocardia & CMV/leukopenia    f/u with hepatology for polycystic liver dz,       Interval HX:   REPORTS HE IS STILL TAKING BACTRIM AND Sivextro   Intake Is drinking water 2L  UOP-no problems   BP  BP Readings from Last 3 Encounters:   11/14/22 133/72   08/02/22 131/86   07/26/22 116/79     Peripheral edema-  "mild  Appetite-good     Lab /diagnostic results reviewed with patient today.   All questions answered    Past Medical History:   Diagnosis Date    Anasarca 10/1/2021    Anemia of chronic disease     Atrial fibrillation     BPH (benign prostatic hypertrophy)     Chronic systolic heart failure 10/1/2021    CKD (chronic kidney disease) stage 2, GFR 60-89 ml/min     Hepatitis C virus infection cured after antiviral drug therapy     acquired through kidney transplant, treated / cured (SVR12 - 12/2021)    HTN (hypertension)     HTN (hypertension)     Polycystic kidney disease        Review of Systems   Constitutional:  Negative for activity change, appetite change, chills, fatigue, fever and unexpected weight change.   HENT:  Negative for congestion, facial swelling, postnasal drip, rhinorrhea, sinus pressure, sore throat and trouble swallowing.    Eyes:  Negative for pain, redness and visual disturbance.   Respiratory:  Negative for cough, chest tightness, shortness of breath and wheezing.    Cardiovascular:  Positive for palpitations and leg swelling. Negative for chest pain.        Hx Afib   Gastrointestinal:  Negative for abdominal pain, diarrhea, nausea and vomiting.   Genitourinary:  Negative for dysuria, flank pain and urgency.   Musculoskeletal:  Negative for gait problem, neck pain and neck stiffness.   Skin:  Positive for wound. Negative for rash.        Staples right head intact    Allergic/Immunologic: Positive for immunocompromised state. Negative for environmental allergies and food allergies.   Neurological:  Negative for dizziness, weakness, light-headedness and headaches.   Hematological:  Bruises/bleeds easily.        Eliquis--restarted on 3/3/2022   Psychiatric/Behavioral:  Negative for agitation and confusion. The patient is not nervous/anxious.      Objective:     Blood pressure 133/72, pulse 89, temperature 97.7 °F (36.5 °C), temperature source Temporal, resp. rate 18, height 6' 2" (1.88 m), weight " 82.4 kg (181 lb 10.5 oz), SpO2 98 %.body mass index is 23.32 kg/m².    Physical Exam  Vitals reviewed.   Constitutional:       Appearance: Normal appearance. He is well-developed.   HENT:      Head: Normocephalic.        Comments:    Eyes:      Conjunctiva/sclera: Conjunctivae normal.      Pupils: Pupils are equal, round, and reactive to light.   Cardiovascular:      Rate and Rhythm: Normal rate and regular rhythm.      Heart sounds: Normal heart sounds.   Pulmonary:      Effort: Pulmonary effort is normal.      Breath sounds: Normal breath sounds.   Chest:       Abdominal:      General: Bowel sounds are normal.      Palpations: Abdomen is soft.       Musculoskeletal:         General: Normal range of motion.        Arms:       Cervical back: Normal range of motion and neck supple.      Right lower leg: Edema present.      Left lower leg: Edema present.      Comments: Trace peripheral edema bilaterally    Skin:     General: Skin is warm and dry.   Neurological:      Mental Status: He is alert and oriented to person, place, and time.      Motor: No abnormal muscle tone.   Psychiatric:         Behavior: Behavior normal.       Labs:  Lab Results   Component Value Date    WBC 3.8 (L) 11/08/2022    HGB 14.8 11/08/2022    HCT 47.6 11/08/2022     11/08/2022    K 5.3 (H) 11/08/2022     08/23/2022    CO2 23 11/08/2022    BUN 16.9 11/08/2022    CREATININE 1.63 (H) 11/08/2022    EGFRNONAA 44 08/01/2022    EGFRIFAFRICA 54 04/26/2022    GLUCOSE 88 11/08/2022    CALCIUM 9.6 11/08/2022    PHOS 2.9 11/08/2022    MG 1.90 11/08/2022    ALBUMIN 3.6 11/08/2022    ALBUMIN 3.6 11/08/2022    AST 27 11/08/2022    ALT 32 11/08/2022    UTPCR 0.17 08/25/2021    .3 (H) 10/25/2022    TACROLIMUS 7.5 02/25/2022       Lab Results   Component Value Date    EXTANC  03/07/2022      Comment:      Labs repeated to reassess K+;pt took kayexalate 30 gram daily X 2 days    EXTWBC 6.2 05/23/2022    EXTSEGS 62 05/23/2022    EXTPLATELETS  205 05/23/2022    EXTHEMOGLOBI 13.9 (A) 05/23/2022    EXTHEMATOCRI 45.5 05/23/2022    EXTCREATININ 1.41 05/23/2022    EXTSODIUM 134 (A) 05/23/2022    EXTPOTASSIUM 5.6 (A) 05/23/2022    EXTBUN 17.7 05/23/2022    EXTCO2 20 (A) 05/23/2022    EXTCALCIUM 9.4 05/23/2022    EXTPHOSPHORU 2.8 03/07/2022    EXTGLUCOSE 105 05/23/2022    EXTALBUMIN 3.5 05/23/2022    EXTAST 28 05/23/2022    EXTALT 29 05/23/2022    EXTBILITOTAL 1.0 05/23/2022       Lab Results   Component Value Date    EXTTACROLVL 7.2 04/04/2022    EXTPROTCRE 0.37 11/01/2021    EXTPTHINTACT 244.9 (H) 08/02/2021    EXTPROTEINUA NEG 02/07/2022    EXTWBCUA NONE SEEN 02/07/2022    EXTRBCUA NONE SEEN 02/07/2022       Labs were reviewed with the patient.    Assessment:     1. Kidney replaced by transplant    2. Long-term use of immunosuppressant medication    3. Prophylactic immunotherapy    4. Renovascular hypertension    5. Anemia of chronic disease    6. Drug-induced neutropenia    7. Permanent atrial fibrillation    8. Long term (current) use of anticoagulants--Eliquis          Plan:         Low K diet --lokelma as prescribed   Scheduled for MRI W brain with neuro surgery   F/u with ID as recommended for Nocardia tx mgmt       Follow-up:   1. CKD stage: 2   2. Immunosuppression:   Prograf trough 7.1 , which is therapeutic, target 4-7.  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  -Continue Bactrim, tedizolid  -Expect 12 months of treatment ending 2/2022    3. Allograft Function:   Continue good po hydration.   Lab Results   Component Value Date    CREATININE 1.63 (H) 11/08/2022 11/8/2022  1yr 6mo   eGFR mls/min/1.73/m2 45           HCVAB+/NAWAF+ donor -s/p  Epclusa tx and HX polycystic liver dz--mgmt deferred to hepatology    4.  Hypertension management: advise low salt diet and home BP monitoring    Afib--Eliquis as prescribed, mgmt deferred to cardiology  Lopressor, amlodipine      5. Metabolic Bone Disease/Secondary Hyperparathyroidism:stable  Will monitor PTH, CA and Vit D/guidelines,   Lab Results   Component Value Date    .3 (H) 10/25/2022    CALCIUM 9.6 11/08/2022    PHOS 2.9 11/08/2022 11/8/2022  1yr 6mo   Magnesium 1.60 - 2.60 mg/dL 1.90         6. Electrolytes:  Will monitor /guidelines   Lab Results   Component Value Date     11/08/2022    K 5.3 (H) 11/08/2022     08/23/2022    CO2 23 11/08/2022   Hyperkalemia-- lokelma daily as prescribed, low k diet       7. Anemia: stable.   Polycythemia,  gets prn  therapeutic phlebotomy    No need for intervention,  Will monitor /guidelines    Lab Results   Component Value Date    WBC 3.8 (L) 11/08/2022    HGB 14.8 11/08/2022    HCT 47.6 11/08/2022    MCV 97.1 (H) 11/08/2022     (L) 11/08/2022       8.  Cytopenias:   will monitor as per our guidelines. Medicine list reviewed including potential causes of drug-induced cytopenias. ANC 1500    9.Proteinuria: continue p/c ratio as per guidelines            8/17/2022  1yr 3mo   Prot/Creat Ratio, Urine <=200.0 mg/gm Cr 117.6       10. CMV and BK virus infection screening:  will continue to monitor/ guidelines          11. Weight education: provided during the clinic visit   Body mass index is 23.32 kg/m².          12.Patient safety education regarding immunosuppression including prophylaxis posttransplant for CMV, PCP : Education provided about vaccination and prevention of infections         Follow-up:   Clinic: return to transplant clinic weekly for the first month after transplant; every 2 weeks during months 2-3; then at 6-, 9-, 12-, 18-, 24-, and 36- months post-transplant to reassess for complications from immunosuppression toxicity and monitor for rejection.  Annually thereafter.    Labs: since patient remains  at high risk for rejection and drug-related complications that warrant close monitoring, labs will be ordered as follows: continue twice weekly CBC, renal panel, and drug level for first month; then same labs once weekly through 3rd month post-transplant.  Urine for UA and protein/creatinine ratio monthly.  Serum BK - PCR at 1-, 3-, 6-, 9-, 12-, 18-, 24-, 36- 48-, and 60 months post-transplant.  Hepatic panel at 1-, 2-, 3-, 6-, 9-, 12-, 18-, 24-, and 36- months post-transplant.    Jessy England NP       Education:   Material provided to the patient.  Patient reminded to call with any health changes since these can be early signs of significant complications.  Also, I advised the patient to be sure any new medications or changes of old medications are discussed with either a pharmacist or physician knowledgeable with transplant to avoid rejection/drug toxicity related to significant drug interactions.    UNOS Patient Status  Functional Status: 80% - Normal activity with effort: some symptoms of disease  Physical Capacity: No Limitations

## 2022-11-14 NOTE — LETTER
November 14, 2022        Liam Ryan  [address]  [place] [address]  Phone: [phone]  Fax: [phone]             [facility]- Transplant 1st Fl  [address]  [facility] [address]  Phone: [phone]   Patient: Ronal Mazraiegos   MR Number: [id number]   YOB: 1950   Date of Visit: 11/14/2022       Dear Dr. Liam Ryan    Thank you for referring Ronal Mazariegos to me for evaluation. Attached you will find relevant portions of my assessment and plan of care.    If you have questions, please do not hesitate to call me. I look forward to following Ronal Mazariegos along with you.    Sincerely,    Jessy England, NP    Enclosure    If you would like to receive this communication electronically, please contact [email or web address] or [phone] to request [employer] access.    [employer] is a tool which provides read-only access to select patient information with whom you have a relationship. Its easy to use and provides real time access to review your patients record including encounter summaries, notes, results, and demographic information.    If you feel you have received this communication in error or would no longer like to receive these types of communications, please e-mail [email or web address]

## 2022-11-23 ENCOUNTER — PATIENT MESSAGE (OUTPATIENT)
Dept: TRANSPLANT | Facility: CLINIC | Age: 72
End: 2022-11-23
Payer: MEDICARE

## 2022-11-30 ENCOUNTER — PATIENT MESSAGE (OUTPATIENT)
Dept: TRANSPLANT | Facility: CLINIC | Age: 72
End: 2022-11-30
Payer: MEDICARE

## 2022-11-30 ENCOUNTER — HOSPITAL ENCOUNTER (OUTPATIENT)
Dept: RADIOLOGY | Facility: HOSPITAL | Age: 72
Discharge: HOME | End: 2022-11-30
Attending: INTERNAL MEDICINE
Payer: MEDICARE

## 2022-11-30 DIAGNOSIS — E87.5 HYPERKALEMIA: Primary | ICD-10-CM

## 2022-11-30 DIAGNOSIS — Q44.6 POLYCYSTIC LIVER DISEASE: ICD-10-CM

## 2022-11-30 PROCEDURE — 76705 US ABDOMEN LIMITED: ICD-10-PCS | Mod: 26,,, | Performed by: RADIOLOGY

## 2022-11-30 PROCEDURE — 76705 ECHO EXAM OF ABDOMEN: CPT | Mod: 26,,, | Performed by: RADIOLOGY

## 2022-11-30 PROCEDURE — 76705 ECHO EXAM OF ABDOMEN: CPT | Mod: TC

## 2022-11-30 NOTE — TELEPHONE ENCOUNTER
Notified patient of Dr. Barcenas review of 11/30/22 lab results via [facility].     Hi Mr. Villeda,     Dr. Barcenas reviewed your 11/30/221 lab results. Your potassium is elevated=5.5. He wants you to increase your Lokelma to 10gm (2 packs) twice a day for 2 days then 10gm once daily; repeat your potassium level next Monday 12/5/22. Maintain a low potassium diet and try to keep up with drinking at least 3 Liters of water daily.   Dr. Barcenas also wants to make sure you're taking sodium bicarbonate 1300mg (2 tablets) twice daily as prescribed.     Thanks,     Tiara     ----- Message from Carlos Barcenas MD sent at 11/30/2022  9:46 AM CST -----  Increase lokelma to 10 gm bid for 2 days and then 10 gm daily repeat a Potassium level next Monday   Verify he is taking sodium bicarbonate correctly   Low K diet  Encourage hydration      group instruction/verbal instruction

## 2022-12-01 ENCOUNTER — HOSPITAL ENCOUNTER (OUTPATIENT)
Dept: RADIOLOGY | Facility: HOSPITAL | Age: 72
Discharge: HOME | End: 2022-12-01
Attending: PHYSICIAN ASSISTANT
Payer: MEDICARE

## 2022-12-01 DIAGNOSIS — R80.9 PROTEINURIA, UNSPECIFIED TYPE: Primary | ICD-10-CM

## 2022-12-01 DIAGNOSIS — Z94.0 IMMUNOSUPPRESSIVE MANAGEMENT ENCOUNTER FOLLOWING KIDNEY TRANSPLANT: ICD-10-CM

## 2022-12-01 DIAGNOSIS — N28.9 KIDNEY DISEASE: ICD-10-CM

## 2022-12-01 DIAGNOSIS — Z94.0 KIDNEY REPLACED BY TRANSPLANT: ICD-10-CM

## 2022-12-01 DIAGNOSIS — Z79.899 IMMUNOSUPPRESSIVE MANAGEMENT ENCOUNTER FOLLOWING KIDNEY TRANSPLANT: ICD-10-CM

## 2022-12-01 PROCEDURE — 70553 MRI BRAIN STEM W/O & W/DYE: CPT | Mod: TC

## 2022-12-01 PROCEDURE — 25500020 PHARM REV CODE 255: Performed by: PHYSICIAN ASSISTANT

## 2022-12-01 PROCEDURE — A9577 INJ MULTIHANCE: HCPCS | Performed by: PHYSICIAN ASSISTANT

## 2022-12-01 RX ADMIN — GADOBENATE DIMEGLUMINE 20 ML: 529 INJECTION, SOLUTION INTRAVENOUS at 08:12

## 2022-12-05 ENCOUNTER — LAB VISIT (OUTPATIENT)
Dept: LAB | Facility: HOSPITAL | Age: 72
End: 2022-12-05
Attending: INTERNAL MEDICINE
Payer: MEDICARE

## 2022-12-05 ENCOUNTER — PATIENT MESSAGE (OUTPATIENT)
Dept: TRANSPLANT | Facility: CLINIC | Age: 72
End: 2022-12-05
Payer: MEDICARE

## 2022-12-05 ENCOUNTER — OFFICE VISIT (OUTPATIENT)
Dept: NEUROSURGERY | Facility: CLINIC | Age: 72
End: 2022-12-05
Payer: MEDICARE

## 2022-12-05 DIAGNOSIS — E87.5 HYPERKALEMIA: ICD-10-CM

## 2022-12-05 DIAGNOSIS — Z94.0 KIDNEY REPLACED BY TRANSPLANT: Primary | ICD-10-CM

## 2022-12-05 DIAGNOSIS — G06.0 CEREBRAL INTRACRANIAL ABSCESS: Primary | ICD-10-CM

## 2022-12-05 LAB — POTASSIUM SERPL-SCNC: 5.7 MMOL/L (ref 3.5–5.1)

## 2022-12-05 PROCEDURE — 99212 PR OFFICE/OUTPT VISIT, EST, LEVL II, 10-19 MIN: ICD-10-PCS | Mod: 95,,, | Performed by: STUDENT IN AN ORGANIZED HEALTH CARE EDUCATION/TRAINING PROGRAM

## 2022-12-05 PROCEDURE — 36415 COLL VENOUS BLD VENIPUNCTURE: CPT

## 2022-12-05 PROCEDURE — 84132 ASSAY OF SERUM POTASSIUM: CPT

## 2022-12-05 PROCEDURE — 99212 OFFICE O/P EST SF 10 MIN: CPT | Mod: 95,,, | Performed by: STUDENT IN AN ORGANIZED HEALTH CARE EDUCATION/TRAINING PROGRAM

## 2022-12-05 RX ORDER — SODIUM POLYSTYRENE SULFONATE 4.1 MEQ/G
POWDER, FOR SUSPENSION ORAL; RECTAL
Qty: 454 G | Refills: 2 | Status: SHIPPED | OUTPATIENT
Start: 2022-12-05 | End: 2023-01-12 | Stop reason: SDUPTHER

## 2022-12-05 NOTE — PROGRESS NOTES
Kayexalate 30 gram every day x 3 days (start today). Check K on Wednesday  Is he taking his lokelma?

## 2022-12-05 NOTE — TELEPHONE ENCOUNTER
Notified patient of Dr. Yen's review of 12/5/22 lab results via [facility].     Hi Mr. Villeda,     Your potassium is still elevated =5.7. She wants you to please take Kayexalate 30 gram every day x 3 days (start today). We'll need to recheck your potassium on Wednesday 12/7.     Do you still have a large bottle of Kayexalate at home, enough to take your first dose today?    Thanks,     Tiara         ----- Message from Celeste Yen MD sent at 12/5/2022  8:58 AM CST -----  Kayexalate 30 gram every day x 3 days (start today). Check K on Wednesday  Is he taking his lokelma?

## 2022-12-05 NOTE — PROGRESS NOTES
Neurosurgery  Established Patient    SUBJECTIVE:     History of Present Illness:  Ronal Mazariegos is a 71 y.o. male with PMH of Afib previously on Eliquis, h/o kidney transplant on immunosuppressants, cardiomyopathy, and pulmonary hypertension who previously presented to the ED in February 2022 with left sided weakness, found to have 2.2 cm R frontal ring enhancing lesion consistent with intracranial abscess. He underwent R frontal craniotomy with abscess drainage and washout on 2/16/22 by Dr. Rosa. 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 He was noted to have developed a subacute R frontal SDH adjacent to his surgical bed. Pt then underwent R MMA embolization by Dr. Bourgeois on 7/14/22, follow up CTH on 8/2 showed near resolution of hemorrhage. Pt presents to clinic today via virtual visit after undergoing updated CTH since resuming his Eliquis. Pt reports he has been doing well. Continues to report some disequilibrium and mild light headedness upon standing too quickly, also noticed this when descending down a ladder. Symptoms resolve quickly on their own. Denies room-spinning dizziness, near syncopal episodes, headaches, visual disturbance, speech difficulty, and focal weakness or sensory change. No infectious symptoms, remains complaint with IV tedizolid and PO bactrim per ID. States he has recently followed up with his cardiologist and has been back on his Eliquis.    Interval fu 12/5/22:  Pt denies HAs, focal numbness, weakness.  He still endorses light headedness when sitting up too abruptly but has had no falls.  He is still on 2 abx per ID.    Review of patient's allergies indicates:  No Known Allergies    Current Outpatient Medications   Medication Sig Dispense Refill    apixaban (ELIQUIS) 5 mg Tab Take 1 tablet (5 mg total) by mouth 2 (two) times daily. DO  NOT RESTART UNTIL APPROVED BY NEUROSURGERY 60 tablet 11    ergocalciferol (ERGOCALCIFEROL) 50,000 unit Cap Take 1 capsule (50,000 Units total) by mouth every 7 days. X 12 months (Patient taking differently: Take 50,000 Units by mouth every 7 days. X 12 months  MONDAYS) 12 capsule 3    finasteride (PROSCAR) 5 mg tablet Take 5 mg by mouth every evening.      fish oil-omega-3 fatty acids 300-1,000 mg capsule Take 1 capsule by mouth every evening.      fluticasone propionate (FLONASE) 50 mcg/actuation nasal spray daily as needed.      LORazepam (ATIVAN) 0.5 MG tablet Take 1 tablet (0.5 mg total) by mouth once. Take 1 tab 20-30 minutes prior to MRI; may repeat if needed for 1 dose 2 tablet 0    magnesium oxide (MAG-OX) 400 mg (241.3 mg magnesium) tablet TAKE 2 TABLETS BY MOUTH THREE TIMES DAILY (Patient taking differently: Take by mouth 2 (two) times daily. 4 TABLETS TWICE DAILY) 180 tablet 10    metoprolol tartrate (LOPRESSOR) 50 MG tablet Take 1 tablet (50 mg total) by mouth 2 (two) times daily. 60 tablet 11    multivitamin Tab Take 1 tablet by mouth once daily.      predniSONE (DELTASONE) 5 MG tablet Take by mouth daily. 5/7-6/6 20 mg, 6/7-7/6 15 mg, 7/7-8/6 10 mg, 5 mg daily thereafter starting 8/7/21 (Patient taking differently: Take by mouth every morning.) 120 tablet 11    sodium bicarbonate 650 MG tablet Take 2 tablets (1,300 mg total) by mouth 2 (two) times daily. 120 tablet 11    sodium polystyrene (KAYEXALATE) powder Mix 8 LEVEL TEASPOONS (30grams total) in 120mL water and drink by mouth once daily x 3 days,then TAKE AS DIRECTED BY TRANSPLANT (for high potassium). 454 g 2    sodium zirconium cyclosilicate (LOKELMA) 10 gram packet Take 1 packet (10 g total) by mouth 2 (two) times a day for 2 days, THEN 1 packet (10 g total) once daily. Mix entire contents of packet(s) into drinking glass containing 3 tablespoons of water; stir well and drink immediately. Add water and repeat until no powder remains to receive  entire dose.. 34 packet 3    sulfamethoxazole-trimethoprim 800-160mg (BACTRIM DS) 800-160 mg Tab Take 2 tablets by mouth 3 (three) times daily. 180 tablet 11    tacrolimus (PROGRAF) 1 MG Cap Take 2 capsules (2 mg total) by mouth every morning AND 1 capsule (1 mg total) every evening. Z94.0;Kidney txp on 2021. 90 capsule 11    tedizolid (SIVEXTRO) 200 mg Tab Take 1 tablet  (200 mg total) by mouth once daily. 30 tablet 3     No current facility-administered medications for this visit.       Past Medical History:   Diagnosis Date    Anasarca 10/1/2021    Anemia of chronic disease     Atrial fibrillation     BPH (benign prostatic hypertrophy)     Chronic systolic heart failure 10/1/2021    CKD (chronic kidney disease) stage 2, GFR 60-89 ml/min     Hepatitis C virus infection cured after antiviral drug therapy     acquired through kidney transplant, treated / cured (SVR - 2021)    HTN (hypertension)     HTN (hypertension)     Polycystic kidney disease      Past Surgical History:   Procedure Laterality Date    AV FISTULA PLACEMENT Left     CATARACT EXTRACTION, BILATERAL Bilateral     CRANIOTOMY Right 2022    Procedure: CRANIOTOMY;  Surgeon: Sunday Rosa DO;  Location: [facility] OR [address] [place] [address];  Service: Neurosurgery;  Laterality: Right;  R craniotomy for abscess drainage    KIDNEY TRANSPLANT N/A 2021    Procedure: TRANSPLANT, KIDNEY;  Surgeon: Lexy Bolden MD;  Location: [facility] OR [address] [place] [address];  Service: Transplant;  Laterality: N/A;     Family History       Problem Relation (Age of Onset)    Hypertension Father, Sister    Kidney disease Father, Sister    Polycystic kidney disease Father, Sister          Social History     Socioeconomic History    Marital status:     Number of children: 1   Tobacco Use    Smoking status: Former     Packs/day: 2.00     Years: 10.00     Pack years: 20.00     Types: Cigarettes     Start date: 1972     Quit date: 1984     Years since quittin.0     Smokeless tobacco: Never   Substance and Sexual Activity    Alcohol use: No     Comment: Pt reportsbeing a former beer drinker (2-3 cans per day) and quitting in 2014.    Drug use: No    Sexual activity: Yes       Review of Systems  14 point ROS was negative    OBJECTIVE:     Vital Signs     There is no height or weight on file to calculate BMI.    Neurosurgery Physical Exam  Virtual visit    Diagnostic Results:  MRI brain w and w/o: no significant enhancement in right frontal lobe or swelling where prior right frontal abscess was located.  No return of right subdural fluid collection.  Reviewed    ASSESSMENT/PLAN:     Ronal Mazariegos is a 72 y.o. male who previously presented with R frontal intracranial abscess s/p evacuation, subsequent development of R-sided SDH, s/p R MMA embolization with resolution of hemorrhage. Doing well clinically. Remains on abx per ID team.  His MRI brain is described above and shows resolution of the prior abscess and no return of the right SDH.  -Fu in 6 mo with MRI brain w and w/o.  -Defer to ID team duration of abx.

## 2022-12-06 ENCOUNTER — PATIENT MESSAGE (OUTPATIENT)
Dept: INFECTIOUS DISEASES | Facility: CLINIC | Age: 72
End: 2022-12-06
Payer: MEDICARE

## 2022-12-07 ENCOUNTER — LAB VISIT (OUTPATIENT)
Dept: LAB | Facility: HOSPITAL | Age: 72
End: 2022-12-07
Attending: INTERNAL MEDICINE
Payer: MEDICARE

## 2022-12-07 ENCOUNTER — PATIENT MESSAGE (OUTPATIENT)
Dept: TRANSPLANT | Facility: CLINIC | Age: 72
End: 2022-12-07
Payer: MEDICARE

## 2022-12-07 DIAGNOSIS — E87.5 HYPERKALEMIA: ICD-10-CM

## 2022-12-07 LAB
ALBUMIN SERPL-MCNC: 3.9 GM/DL (ref 3.4–4.8)
BUN SERPL-MCNC: 20.6 MG/DL (ref 8.4–25.7)
CALCIUM SERPL-MCNC: 9.8 MG/DL (ref 8.8–10)
CHLORIDE SERPL-SCNC: 107 MMOL/L (ref 98–107)
CO2 SERPL-SCNC: 23 MMOL/L (ref 23–31)
CREAT SERPL-MCNC: 1.54 MG/DL (ref 0.73–1.18)
GFR SERPLBLD CREATININE-BSD FMLA CKD-EPI: 48 MLS/MIN/1.73/M2
GLUCOSE SERPL-MCNC: 80 MG/DL (ref 82–115)
PHOSPHATE SERPL-MCNC: 3.2 MG/DL (ref 2.3–4.7)
POTASSIUM SERPL-SCNC: 4.3 MMOL/L (ref 3.5–5.1)
SODIUM SERPL-SCNC: 139 MMOL/L (ref 136–145)

## 2022-12-07 PROCEDURE — 80069 RENAL FUNCTION PANEL: CPT

## 2022-12-07 PROCEDURE — 36415 COLL VENOUS BLD VENIPUNCTURE: CPT

## 2022-12-12 DIAGNOSIS — E87.5 HYPERKALEMIA: Primary | ICD-10-CM

## 2022-12-14 ENCOUNTER — LAB VISIT (OUTPATIENT)
Dept: LAB | Facility: HOSPITAL | Age: 72
End: 2022-12-14
Attending: INTERNAL MEDICINE
Payer: MEDICARE

## 2022-12-14 DIAGNOSIS — E87.5 HYPERKALEMIA: ICD-10-CM

## 2022-12-14 LAB — POTASSIUM SERPL-SCNC: 4.6 MMOL/L (ref 3.5–5.1)

## 2022-12-14 PROCEDURE — 36415 COLL VENOUS BLD VENIPUNCTURE: CPT

## 2022-12-14 PROCEDURE — 84132 ASSAY OF SERUM POTASSIUM: CPT

## 2022-12-20 ENCOUNTER — LAB VISIT (OUTPATIENT)
Dept: LAB | Facility: HOSPITAL | Age: 72
End: 2022-12-20
Attending: INTERNAL MEDICINE
Payer: MEDICARE

## 2022-12-20 DIAGNOSIS — Z79.899 ENCOUNTER FOR LONG-TERM (CURRENT) USE OF OTHER MEDICATIONS: ICD-10-CM

## 2022-12-20 DIAGNOSIS — Z94.0 KIDNEY REPLACED BY TRANSPLANT: ICD-10-CM

## 2022-12-20 LAB
ALBUMIN SERPL-MCNC: 3.7 G/DL (ref 3.4–4.8)
BASOPHILS # BLD AUTO: 0.03 X10(3)/MCL (ref 0–0.2)
BASOPHILS NFR BLD AUTO: 0.6 %
BUN SERPL-MCNC: 16 MG/DL (ref 8.4–25.7)
CALCIUM SERPL-MCNC: 9.8 MG/DL (ref 8.8–10)
CHLORIDE SERPL-SCNC: 106 MMOL/L (ref 98–107)
CO2 SERPL-SCNC: 26 MMOL/L (ref 23–31)
CREAT SERPL-MCNC: 1.7 MG/DL (ref 0.73–1.18)
EOSINOPHIL # BLD AUTO: 0.06 X10(3)/MCL (ref 0–0.9)
EOSINOPHIL NFR BLD AUTO: 1.3 %
ERYTHROCYTE [DISTWIDTH] IN BLOOD BY AUTOMATED COUNT: 22.5 % (ref 11.6–14.4)
GFR SERPLBLD CREATININE-BSD FMLA CKD-EPI: 43 MLS/MIN/1.73/M2
GLUCOSE SERPL-MCNC: 89 MG/DL (ref 82–115)
HCT VFR BLD AUTO: 50 % (ref 42–52)
HGB BLD-MCNC: 16.4 GM/DL (ref 14–18)
IMM GRANULOCYTES # BLD AUTO: 0.02 X10(3)/MCL (ref 0–0.04)
IMM GRANULOCYTES NFR BLD AUTO: 0.4 %
LYMPHOCYTES # BLD AUTO: 2.4 X10(3)/MCL (ref 0.6–4.6)
LYMPHOCYTES NFR BLD AUTO: 51.3 %
MAGNESIUM SERPL-MCNC: 1.7 MG/DL (ref 1.6–2.6)
MCH RBC QN AUTO: 32.6 PG
MCHC RBC AUTO-ENTMCNC: 32.8 MG/DL (ref 33–36)
MCV RBC AUTO: 99.4 FL (ref 80–94)
MONOCYTES # BLD AUTO: 0.52 X10(3)/MCL (ref 0.1–1.3)
MONOCYTES NFR BLD AUTO: 11.1 %
NEUTROPHILS # BLD AUTO: 1.65 X10(3)/MCL (ref 2.1–9.2)
NEUTROPHILS NFR BLD AUTO: 35.3 %
NRBC BLD AUTO-RTO: 0 % (ref 0–1)
PHOSPHATE SERPL-MCNC: 3 MG/DL (ref 2.3–4.7)
PLATELET # BLD AUTO: 114 X10(3)/MCL (ref 140–371)
PMV BLD AUTO: 9.7 FL (ref 9.4–12.4)
POTASSIUM SERPL-SCNC: 4.9 MMOL/L (ref 3.5–5.1)
RBC # BLD AUTO: 5.03 X10(6)/MCL (ref 4.7–6.1)
SODIUM SERPL-SCNC: 139 MMOL/L (ref 136–145)
WBC # SPEC AUTO: 4.7 X10(3)/MCL (ref 4.5–11.5)

## 2022-12-20 PROCEDURE — 36415 COLL VENOUS BLD VENIPUNCTURE: CPT

## 2022-12-20 PROCEDURE — 83735 ASSAY OF MAGNESIUM: CPT

## 2022-12-20 PROCEDURE — 85025 COMPLETE CBC W/AUTO DIFF WBC: CPT

## 2022-12-20 PROCEDURE — 80069 RENAL FUNCTION PANEL: CPT

## 2022-12-20 PROCEDURE — 80197 ASSAY OF TACROLIMUS: CPT

## 2022-12-21 ENCOUNTER — OFFICE VISIT (OUTPATIENT)
Dept: HEPATOLOGY | Facility: CLINIC | Age: 72
End: 2022-12-21
Payer: MEDICARE

## 2022-12-21 ENCOUNTER — TELEPHONE (OUTPATIENT)
Dept: HEPATOLOGY | Facility: CLINIC | Age: 72
End: 2022-12-21

## 2022-12-21 DIAGNOSIS — Q44.6 POLYCYSTIC LIVER DISEASE: ICD-10-CM

## 2022-12-21 DIAGNOSIS — Z86.19 HEPATITIS C VIRUS INFECTION CURED AFTER ANTIVIRAL DRUG THERAPY: ICD-10-CM

## 2022-12-21 DIAGNOSIS — Z94.0 KIDNEY REPLACED BY TRANSPLANT: Primary | ICD-10-CM

## 2022-12-21 LAB — TACROLIMUS TROUGH BLD-MCNC: 6.5 NG/ML

## 2022-12-21 PROCEDURE — 99214 OFFICE O/P EST MOD 30 MIN: CPT | Mod: 95,,, | Performed by: INTERNAL MEDICINE

## 2022-12-21 PROCEDURE — 99214 PR OFFICE/OUTPT VISIT, EST, LEVL IV, 30-39 MIN: ICD-10-PCS | Mod: 95,,, | Performed by: INTERNAL MEDICINE

## 2022-12-21 NOTE — TELEPHONE ENCOUNTER
----- Message from Jg Angel MD sent at 12/21/2022 10:06 AM CST -----  Video clinic in 1 year with US

## 2022-12-21 NOTE — PROGRESS NOTES
Subjective:       Patient ID: Ronal Mazariegos is a 72 y.o. male.    Chief Complaint: No chief complaint on file.  The patient location is: Home  The chief complaint leading to consultation is: Liver cysts    Visit type: audiovisual    Face to Face time with patient: 20 minutes of total time spent on the encounter, which includes face to face time and non-face to face time preparing to see the patient (eg, review of tests), Obtaining and/or reviewing separately obtained history, Documenting clinical information in the electronic or other health record, Independently interpreting results (not separately reported) and communicating results to the patient/family/caregiver, or Care coordination (not separately reported).     Each patient to whom he or she provides medical services by telemedicine is:  (1) informed of the relationship between the physician and patient and the respective role of any other health care provider with respect to management of the patient; and (2) notified that he or she may decline to receive medical services by telemedicine and may withdraw from such care at any time.    Notes:     HPI  I saw this 72 y.o. man who had a kidney Tx on 5/3/21- HCV +ve donor  Afib/chronic heart failure  I last saw him in March 2022. He has now recoevered from a brain abscess and surgery.    - SVR post Tx on 12/17/2021    U/S 7/2020 - several liver cysts up to 3.5cm, spleen 8.5cm   CT abdomen 5/4/21 (1 day post kidney transplant) - subtle nodular contour of liver, several liver cysts up to 3.5cm  FibroSURE 10/1/21 - A0-1, F3  Transaminases normal (teens-20s)  Persistent mild TBili elevation, predominantly unconjugated    F3 on fibrosure  FibroScan  - no evidence of cirrhosis or steatosis:  KPa 8.2,   KPa 10.7,       - recent platelets 114- medication related    Abdo CT: 5/4/21  1. Findings suggesting sequela of polycystic kidney disease and to a lesser extent polycystic liver disease, including  innumerable simple and minimally complex appearing cortical cysts and scattered parenchymal calcifications throughout each kidney, allowing for lack of intravenous contrast.  Cannot exclude nonobstructing small nephroliths.  No hydronephrosis on either side or definitive radiodense ureteral calculus.  2. Hepatomegaly with subtle hepatic nodular contour which could represent sequela of underlying cirrhosis.  Clinical correlation and with LFTs advised.  3. Diffuse mesenteric stranding with pelvic small volume free fluid suggesting mesenteric edema and nonspecific ascites tracking to the dependent pelvis.  There is also suspected mild body wall anasarca.  4. Prostatomegaly.  5. Atherosclerosis.    Abdo US: 11/30/22  1. Multiple cysts again seen scattered throughout the liver and both kidneys consistent with known polycystic kidney disease.  2. Findings suggesting multiple tiny less than 2 mm gallbladder polyps    2 D echo: 10/1/21  The left ventricle is normal in size with concentric hypertrophy and mildly decreased systolic function. The estimated ejection fraction is 45%.  The left ventricular global longitudinal strain is -8.9% with apical sparing consistent with an infiltrative (e.g., amyloid) cardiomyopathy.  Normal right ventricular size with mildly reduced right ventricular systolic function.  Severe biatrial enlargement.  Mild mitral regurgitation.  Atrial fibrillation observed.  Small circumferential pericardial effusion.  Pulmonary hypertension; the estimated PA systolic pressure is 52 mmHg.  Intermediate central venous pressure (8 mmHg).    Review of Systems   Constitutional:  Negative for activity change, appetite change, chills, fatigue, fever and unexpected weight change.   HENT:  Negative for hearing loss.    Eyes:  Negative for discharge and visual disturbance.   Respiratory:  Negative for cough, chest tightness, shortness of breath and wheezing.    Cardiovascular:  Negative for chest pain, palpitations  and leg swelling.   Gastrointestinal:  Negative for abdominal distention, abdominal pain, constipation, diarrhea and nausea.   Genitourinary:  Negative for dysuria and frequency.   Musculoskeletal:  Negative for arthralgias and back pain.   Skin:  Negative for pallor and rash.   Neurological:  Negative for dizziness, tremors, speech difficulty and headaches.   Hematological:  Negative for adenopathy.   Psychiatric/Behavioral:  Negative for agitation and confusion.        Lab Results   Component Value Date    ALT 32 11/08/2022    AST 27 11/08/2022    GGT 54 10/01/2021    ALKPHOS 83 11/08/2022    BILITOT 1.0 11/08/2022     Past Medical History:   Diagnosis Date    Anasarca 10/1/2021    Anemia of chronic disease     Atrial fibrillation     BPH (benign prostatic hypertrophy)     Chronic systolic heart failure 10/1/2021    CKD (chronic kidney disease) stage 2, GFR 60-89 ml/min     Hepatitis C virus infection cured after antiviral drug therapy     acquired through kidney transplant, treated / cured (SVR12 - 12/2021)    HTN (hypertension)     HTN (hypertension)     Polycystic kidney disease      Past Surgical History:   Procedure Laterality Date    AV FISTULA PLACEMENT Left 2016    CATARACT EXTRACTION, BILATERAL Bilateral     CRANIOTOMY Right 2/16/2022    Procedure: CRANIOTOMY;  Surgeon: Sunday Rosa DO;  Location: [facility] OR [address] [place] [address];  Service: Neurosurgery;  Laterality: Right;  R craniotomy for abscess drainage    KIDNEY TRANSPLANT N/A 5/4/2021    Procedure: TRANSPLANT, KIDNEY;  Surgeon: Lexy Bolden MD;  Location: [facility] OR [address] [place] [address];  Service: Transplant;  Laterality: N/A;     Current Outpatient Medications   Medication Sig    apixaban (ELIQUIS) 5 mg Tab Take 1 tablet (5 mg total) by mouth 2 (two) times daily. DO NOT RESTART UNTIL APPROVED BY NEUROSURGERY    ergocalciferol (ERGOCALCIFEROL) 50,000 unit Cap Take 1 capsule (50,000 Units total) by mouth every 7 days. X 12 months (Patient taking differently: Take 50,000  Units by mouth every 7 days. X 12 months  MONDAYS)    finasteride (PROSCAR) 5 mg tablet Take 5 mg by mouth every evening.    fish oil-omega-3 fatty acids 300-1,000 mg capsule Take 1 capsule by mouth every evening.    fluticasone propionate (FLONASE) 50 mcg/actuation nasal spray daily as needed.    LORazepam (ATIVAN) 0.5 MG tablet Take 1 tablet (0.5 mg total) by mouth once. Take 1 tab 20-30 minutes prior to MRI; may repeat if needed for 1 dose    magnesium oxide (MAG-OX) 400 mg (241.3 mg magnesium) tablet TAKE 2 TABLETS BY MOUTH THREE TIMES DAILY (Patient taking differently: Take by mouth 2 (two) times daily. 4 TABLETS TWICE DAILY)    metoprolol tartrate (LOPRESSOR) 50 MG tablet Take 1 tablet (50 mg total) by mouth 2 (two) times daily.    multivitamin Tab Take 1 tablet by mouth once daily.    predniSONE (DELTASONE) 5 MG tablet Take by mouth daily. 5/7-6/6 20 mg, 6/7-7/6 15 mg, 7/7-8/6 10 mg, 5 mg daily thereafter starting 8/7/21 (Patient taking differently: Take by mouth every morning.)    sodium bicarbonate 650 MG tablet Take 2 tablets (1,300 mg total) by mouth 2 (two) times daily.    sodium polystyrene (KAYEXALATE) powder Mix 8 LEVEL TEASPOONS (30grams total) in 120mL water and drink by mouth once daily x 3 days,then TAKE AS DIRECTED BY TRANSPLANT (for high potassium).    sodium zirconium cyclosilicate (LOKELMA) 10 gram packet Take 1 packet (10 g total) by mouth 2 (two) times a day for 2 days, THEN 1 packet (10 g total) once daily. Mix entire contents of packet(s) into drinking glass containing 3 tablespoons of water; stir well and drink immediately. Add water and repeat until no powder remains to receive entire dose..    sulfamethoxazole-trimethoprim 800-160mg (BACTRIM DS) 800-160 mg Tab Take 2 tablets by mouth 3 (three) times daily.    tacrolimus (PROGRAF) 1 MG Cap Take 2 capsules (2 mg total) by mouth every morning AND 1 capsule (1 mg total) every evening. Z94.0;Kidney txp on 5/4/2021.    tedizolid (SIVEXTRO)  200 mg Tab Take 1 tablet  (200 mg total) by mouth once daily.     No current facility-administered medications for this visit.       Objective:        EXAM NOT DONE- VIDEO VISIT    Assessment:       1. Kidney replaced by transplant    2. Hepatitis C virus infection cured after antiviral drug therapy    3. Polycystic liver disease          Plan:   He had a successful kidney Tx in May 2021 and his post transplant HCV infection has now been successfully treated.  His polycystic liver disease appears stable.    - reassured  - clinic in 1 year with liver US

## 2022-12-30 ENCOUNTER — HOSPITAL ENCOUNTER (INPATIENT)
Facility: HOSPITAL | Age: 72
LOS: 4 days | Discharge: HOME HEALTH | DRG: 602 | End: 2023-01-03
Attending: EMERGENCY MEDICINE | Admitting: INTERNAL MEDICINE
Payer: MEDICARE

## 2022-12-30 ENCOUNTER — ANESTHESIA (OUTPATIENT)
Dept: SURGERY | Facility: HOSPITAL | Age: 72
DRG: 602 | End: 2022-12-30
Payer: MEDICARE

## 2022-12-30 ENCOUNTER — ANESTHESIA EVENT (OUTPATIENT)
Dept: SURGERY | Facility: HOSPITAL | Age: 72
DRG: 602 | End: 2022-12-30
Payer: MEDICARE

## 2022-12-30 DIAGNOSIS — Z91.89 AT RISK FOR OPPORTUNISTIC INFECTIONS: ICD-10-CM

## 2022-12-30 DIAGNOSIS — I48.91 ATRIAL FIBRILLATION WITH RAPID VENTRICULAR RESPONSE: ICD-10-CM

## 2022-12-30 DIAGNOSIS — E87.5 HYPERKALEMIA: ICD-10-CM

## 2022-12-30 DIAGNOSIS — L03.114 LEFT ARM CELLULITIS: Primary | ICD-10-CM

## 2022-12-30 DIAGNOSIS — R60.1 ANASARCA: ICD-10-CM

## 2022-12-30 DIAGNOSIS — R00.0 TACHYCARDIA: ICD-10-CM

## 2022-12-30 DIAGNOSIS — R60.0 EDEMA OF LEFT UPPER ARM: ICD-10-CM

## 2022-12-30 DIAGNOSIS — Z94.0 HISTORY OF RENAL TRANSPLANT: ICD-10-CM

## 2022-12-30 DIAGNOSIS — M79.89 NECROTIZING SOFT TISSUE INFECTION: ICD-10-CM

## 2022-12-30 LAB
ALBUMIN SERPL-MCNC: 3.8 G/DL (ref 3.4–4.8)
ALBUMIN/GLOB SERPL: 1.9 RATIO (ref 1.1–2)
ALP SERPL-CCNC: 82 UNIT/L (ref 40–150)
ALT SERPL-CCNC: 42 UNIT/L (ref 0–55)
AST SERPL-CCNC: 37 UNIT/L (ref 5–34)
BASOPHILS # BLD AUTO: 0.03 X10(3)/MCL (ref 0–0.2)
BASOPHILS NFR BLD AUTO: 0.3 %
BILIRUBIN DIRECT+TOT PNL SERPL-MCNC: 1.9 MG/DL
BUN SERPL-MCNC: 21 MG/DL (ref 8.4–25.7)
CALCIUM SERPL-MCNC: 9.6 MG/DL (ref 8.8–10)
CHLORIDE SERPL-SCNC: 102 MMOL/L (ref 98–107)
CO2 SERPL-SCNC: 20 MMOL/L (ref 23–31)
CREAT SERPL-MCNC: 1.58 MG/DL (ref 0.73–1.18)
CRP SERPL-MCNC: 9 MG/L
EOSINOPHIL # BLD AUTO: 0 X10(3)/MCL (ref 0–0.9)
EOSINOPHIL NFR BLD AUTO: 0 %
ERYTHROCYTE [DISTWIDTH] IN BLOOD BY AUTOMATED COUNT: 21.5 % (ref 11.6–14.4)
GFR SERPLBLD CREATININE-BSD FMLA CKD-EPI: 47 MLS/MIN/1.73/M2
GLOBULIN SER-MCNC: 2 GM/DL (ref 2.4–3.5)
GLUCOSE SERPL-MCNC: 108 MG/DL (ref 82–115)
GROUP & RH: NORMAL
HCT VFR BLD AUTO: 51.7 % (ref 42–52)
HGB BLD-MCNC: 17.2 GM/DL (ref 14–18)
IMM GRANULOCYTES # BLD AUTO: 0.04 X10(3)/MCL (ref 0–0.04)
IMM GRANULOCYTES NFR BLD AUTO: 0.4 %
INDIRECT COOMBS GEL: NORMAL
INR BLD: 1.46 (ref 0–1.3)
LACTATE SERPL-SCNC: 4.6 MMOL/L (ref 0.5–2.2)
LACTATE SERPL-SCNC: 5 MMOL/L (ref 0.5–2.2)
LACTATE SERPL-SCNC: 6.8 MMOL/L (ref 0.5–2.2)
LYMPHOCYTES # BLD AUTO: 0.85 X10(3)/MCL (ref 0.6–4.6)
LYMPHOCYTES NFR BLD AUTO: 9.5 %
MCH RBC QN AUTO: 32.8 PG
MCHC RBC AUTO-ENTMCNC: 33.3 MG/DL (ref 33–36)
MCV RBC AUTO: 98.5 FL (ref 80–94)
MONOCYTES # BLD AUTO: 0.64 X10(3)/MCL (ref 0.1–1.3)
MONOCYTES NFR BLD AUTO: 7.2 %
NEUTROPHILS # BLD AUTO: 7.36 X10(3)/MCL (ref 2.1–9.2)
NEUTROPHILS NFR BLD AUTO: 82.6 %
NRBC BLD AUTO-RTO: 0 % (ref 0–1)
PLATELET # BLD AUTO: 105 X10(3)/MCL (ref 140–371)
PMV BLD AUTO: 10.1 FL (ref 9.4–12.4)
POTASSIUM SERPL-SCNC: 5.1 MMOL/L (ref 3.5–5.1)
PROT SERPL-MCNC: 5.8 GM/DL (ref 5.8–7.6)
PROTHROMBIN TIME: 17.5 SECONDS (ref 12.5–14.5)
RBC # BLD AUTO: 5.25 X10(6)/MCL (ref 4.7–6.1)
SODIUM SERPL-SCNC: 135 MMOL/L (ref 136–145)
TROPONIN I SERPL-MCNC: 0.02 NG/ML (ref 0–0.04)
WBC # SPEC AUTO: 8.9 X10(3)/MCL (ref 4.5–11.5)

## 2022-12-30 PROCEDURE — 63600175 PHARM REV CODE 636 W HCPCS: Performed by: EMERGENCY MEDICINE

## 2022-12-30 PROCEDURE — 71000039 HC RECOVERY, EACH ADD'L HOUR: Performed by: SURGERY

## 2022-12-30 PROCEDURE — 93010 EKG 12-LEAD: ICD-10-PCS | Mod: ,,, | Performed by: INTERNAL MEDICINE

## 2022-12-30 PROCEDURE — 36000704 HC OR TIME LEV I 1ST 15 MIN: Performed by: SURGERY

## 2022-12-30 PROCEDURE — 84484 ASSAY OF TROPONIN QUANT: CPT | Performed by: NURSE PRACTITIONER

## 2022-12-30 PROCEDURE — 85610 PROTHROMBIN TIME: CPT | Performed by: NURSE PRACTITIONER

## 2022-12-30 PROCEDURE — 21400001 HC TELEMETRY ROOM

## 2022-12-30 PROCEDURE — 88307 TISSUE EXAM BY PATHOLOGIST: CPT | Performed by: SURGERY

## 2022-12-30 PROCEDURE — 93010 ELECTROCARDIOGRAM REPORT: CPT | Mod: ,,, | Performed by: INTERNAL MEDICINE

## 2022-12-30 PROCEDURE — 63600175 PHARM REV CODE 636 W HCPCS: Performed by: NURSE ANESTHETIST, CERTIFIED REGISTERED

## 2022-12-30 PROCEDURE — 86900 BLOOD TYPING SEROLOGIC ABO: CPT | Performed by: EMERGENCY MEDICINE

## 2022-12-30 PROCEDURE — 63600175 PHARM REV CODE 636 W HCPCS: Performed by: SURGERY

## 2022-12-30 PROCEDURE — 93005 ELECTROCARDIOGRAM TRACING: CPT

## 2022-12-30 PROCEDURE — 87206 SMEAR FLUORESCENT/ACID STAI: CPT | Performed by: SURGERY

## 2022-12-30 PROCEDURE — 87070 CULTURE OTHR SPECIMN AEROBIC: CPT | Performed by: SURGERY

## 2022-12-30 PROCEDURE — 25000003 PHARM REV CODE 250: Performed by: EMERGENCY MEDICINE

## 2022-12-30 PROCEDURE — 87205 SMEAR GRAM STAIN: CPT | Performed by: SURGERY

## 2022-12-30 PROCEDURE — 86140 C-REACTIVE PROTEIN: CPT | Performed by: EMERGENCY MEDICINE

## 2022-12-30 PROCEDURE — 80053 COMPREHEN METABOLIC PANEL: CPT | Performed by: NURSE PRACTITIONER

## 2022-12-30 PROCEDURE — 96365 THER/PROPH/DIAG IV INF INIT: CPT

## 2022-12-30 PROCEDURE — 99291 CRITICAL CARE FIRST HOUR: CPT | Mod: 25

## 2022-12-30 PROCEDURE — 87102 FUNGUS ISOLATION CULTURE: CPT | Performed by: SURGERY

## 2022-12-30 PROCEDURE — 87040 BLOOD CULTURE FOR BACTERIA: CPT | Performed by: NURSE PRACTITIONER

## 2022-12-30 PROCEDURE — 37000008 HC ANESTHESIA 1ST 15 MINUTES: Performed by: SURGERY

## 2022-12-30 PROCEDURE — 25000003 PHARM REV CODE 250: Performed by: NURSE ANESTHETIST, CERTIFIED REGISTERED

## 2022-12-30 PROCEDURE — 36000705 HC OR TIME LEV I EA ADD 15 MIN: Performed by: SURGERY

## 2022-12-30 PROCEDURE — 96375 TX/PRO/DX INJ NEW DRUG ADDON: CPT

## 2022-12-30 PROCEDURE — 83605 ASSAY OF LACTIC ACID: CPT | Performed by: NURSE PRACTITIONER

## 2022-12-30 PROCEDURE — 87220 TISSUE EXAM FOR FUNGI: CPT | Performed by: SURGERY

## 2022-12-30 PROCEDURE — 37000009 HC ANESTHESIA EA ADD 15 MINS: Performed by: SURGERY

## 2022-12-30 PROCEDURE — 87075 CULTR BACTERIA EXCEPT BLOOD: CPT | Performed by: SURGERY

## 2022-12-30 PROCEDURE — 71000033 HC RECOVERY, INTIAL HOUR: Performed by: SURGERY

## 2022-12-30 PROCEDURE — 11000001 HC ACUTE MED/SURG PRIVATE ROOM

## 2022-12-30 PROCEDURE — 85025 COMPLETE CBC W/AUTO DIFF WBC: CPT | Performed by: NURSE PRACTITIONER

## 2022-12-30 RX ORDER — SODIUM CHLORIDE 0.9 % (FLUSH) 0.9 %
10 SYRINGE (ML) INJECTION
Status: DISCONTINUED | OUTPATIENT
Start: 2022-12-30 | End: 2023-01-02

## 2022-12-30 RX ORDER — FENTANYL CITRATE 50 UG/ML
INJECTION, SOLUTION INTRAMUSCULAR; INTRAVENOUS
Status: DISCONTINUED | OUTPATIENT
Start: 2022-12-30 | End: 2022-12-30

## 2022-12-30 RX ORDER — OXYCODONE HYDROCHLORIDE 5 MG/1
10 TABLET ORAL EVERY 6 HOURS PRN
Status: DISCONTINUED | OUTPATIENT
Start: 2022-12-30 | End: 2023-01-03 | Stop reason: HOSPADM

## 2022-12-30 RX ORDER — LIDOCAINE HYDROCHLORIDE 20 MG/ML
INJECTION, SOLUTION EPIDURAL; INFILTRATION; INTRACAUDAL; PERINEURAL
Status: DISCONTINUED | OUTPATIENT
Start: 2022-12-30 | End: 2022-12-30

## 2022-12-30 RX ORDER — ACETAMINOPHEN 325 MG/1
650 TABLET ORAL EVERY 6 HOURS PRN
Status: DISCONTINUED | OUTPATIENT
Start: 2022-12-30 | End: 2023-01-03 | Stop reason: HOSPADM

## 2022-12-30 RX ORDER — HYDROMORPHONE HYDROCHLORIDE 2 MG/ML
0.2 INJECTION, SOLUTION INTRAMUSCULAR; INTRAVENOUS; SUBCUTANEOUS EVERY 6 HOURS PRN
Status: DISCONTINUED | OUTPATIENT
Start: 2022-12-30 | End: 2023-01-03 | Stop reason: HOSPADM

## 2022-12-30 RX ORDER — DILTIAZEM HYDROCHLORIDE 5 MG/ML
20 INJECTION INTRAVENOUS
Status: COMPLETED | OUTPATIENT
Start: 2022-12-30 | End: 2022-12-30

## 2022-12-30 RX ORDER — DEXAMETHASONE SODIUM PHOSPHATE 4 MG/ML
INJECTION, SOLUTION INTRA-ARTICULAR; INTRALESIONAL; INTRAMUSCULAR; INTRAVENOUS; SOFT TISSUE
Status: DISCONTINUED | OUTPATIENT
Start: 2022-12-30 | End: 2022-12-30

## 2022-12-30 RX ORDER — ROCURONIUM BROMIDE 10 MG/ML
INJECTION, SOLUTION INTRAVENOUS
Status: DISCONTINUED | OUTPATIENT
Start: 2022-12-30 | End: 2022-12-30

## 2022-12-30 RX ORDER — HYDROMORPHONE HYDROCHLORIDE 2 MG/ML
0.2 INJECTION, SOLUTION INTRAMUSCULAR; INTRAVENOUS; SUBCUTANEOUS EVERY 5 MIN PRN
Status: DISCONTINUED | OUTPATIENT
Start: 2022-12-30 | End: 2023-01-02

## 2022-12-30 RX ORDER — ONDANSETRON HYDROCHLORIDE 2 MG/ML
INJECTION, SOLUTION INTRAMUSCULAR; INTRAVENOUS
Status: DISCONTINUED | OUTPATIENT
Start: 2022-12-30 | End: 2022-12-30

## 2022-12-30 RX ORDER — ONDANSETRON 2 MG/ML
4 INJECTION INTRAMUSCULAR; INTRAVENOUS DAILY PRN
Status: DISCONTINUED | OUTPATIENT
Start: 2022-12-30 | End: 2023-01-02

## 2022-12-30 RX ORDER — CLINDAMYCIN PHOSPHATE 600 MG/50ML
600 INJECTION, SOLUTION INTRAVENOUS
Status: COMPLETED | OUTPATIENT
Start: 2022-12-30 | End: 2022-12-30

## 2022-12-30 RX ORDER — CEFAZOLIN SODIUM 1 G/3ML
INJECTION, POWDER, FOR SOLUTION INTRAMUSCULAR; INTRAVENOUS
Status: DISCONTINUED | OUTPATIENT
Start: 2022-12-30 | End: 2022-12-30 | Stop reason: HOSPADM

## 2022-12-30 RX ORDER — PROPOFOL 10 MG/ML
VIAL (ML) INTRAVENOUS
Status: DISCONTINUED | OUTPATIENT
Start: 2022-12-30 | End: 2022-12-30

## 2022-12-30 RX ORDER — OXYCODONE HYDROCHLORIDE 5 MG/1
5 TABLET ORAL EVERY 6 HOURS PRN
Status: DISCONTINUED | OUTPATIENT
Start: 2022-12-30 | End: 2023-01-03 | Stop reason: HOSPADM

## 2022-12-30 RX ADMIN — FENTANYL CITRATE 100 MCG: 50 INJECTION, SOLUTION INTRAMUSCULAR; INTRAVENOUS at 08:12

## 2022-12-30 RX ADMIN — LIDOCAINE HYDROCHLORIDE 80 MG: 20 INJECTION, SOLUTION EPIDURAL; INFILTRATION; INTRACAUDAL; PERINEURAL at 08:12

## 2022-12-30 RX ADMIN — DILTIAZEM HYDROCHLORIDE 20 MG: 5 INJECTION, SOLUTION INTRAVENOUS at 07:12

## 2022-12-30 RX ADMIN — PROPOFOL 150 MG: 10 INJECTION, EMULSION INTRAVENOUS at 08:12

## 2022-12-30 RX ADMIN — DEXAMETHASONE SODIUM PHOSPHATE 4 MG: 4 INJECTION, SOLUTION INTRA-ARTICULAR; INTRALESIONAL; INTRAMUSCULAR; INTRAVENOUS; SOFT TISSUE at 08:12

## 2022-12-30 RX ADMIN — CLINDAMYCIN PHOSPHATE 600 MG: 600 INJECTION, SOLUTION INTRAVENOUS at 07:12

## 2022-12-30 RX ADMIN — VANCOMYCIN HYDROCHLORIDE 1750 MG: 1 INJECTION, POWDER, LYOPHILIZED, FOR SOLUTION INTRAVENOUS at 04:12

## 2022-12-30 RX ADMIN — ONDANSETRON 4 MG: 2 INJECTION INTRAMUSCULAR; INTRAVENOUS at 08:12

## 2022-12-30 RX ADMIN — ROCURONIUM BROMIDE 70 MG: 10 INJECTION, SOLUTION INTRAVENOUS at 08:12

## 2022-12-30 RX ADMIN — SODIUM CHLORIDE, POTASSIUM CHLORIDE, SODIUM LACTATE AND CALCIUM CHLORIDE 2500 ML: 600; 310; 30; 20 INJECTION, SOLUTION INTRAVENOUS at 02:12

## 2022-12-30 RX ADMIN — SODIUM CHLORIDE, SODIUM GLUCONATE, SODIUM ACETATE, POTASSIUM CHLORIDE AND MAGNESIUM CHLORIDE: 526; 502; 368; 37; 30 INJECTION, SOLUTION INTRAVENOUS at 08:12

## 2022-12-30 RX ADMIN — CEFEPIME 2 G: 2 INJECTION, POWDER, FOR SOLUTION INTRAVENOUS at 02:12

## 2022-12-30 NOTE — ED PROVIDER NOTES
Encounter Date: 12/30/2022       History     Chief Complaint   Patient presents with    Arm Injury     Pt states that he has abrasion to left forearm a week ago. Since then he has had swelling pain and discoloration to arm. Dialysis fistula also noted to affected extremity. States that he was evaluated for clots and was negative. Pt reports HTN at home. Pt hypertensive and tachycardic in triage. 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  He reports the swelling started a little over a week ago.  He was seen by his primary care physician with vascular ultrasound performed 2 days ago which demonstrated no DVT.  States since that time, the redness and now purple discoloration of the skin has progressed along with increased warmth.  Area adjacent to his left forearm AV fistula.  Not currently using this fistula for dialysis treatments.  He does note that he had a small scratch on the arm after injuring an RV is a plastic several days ago.  Did not think much of the wound at that time.  Denies any noted fevers.  Reports compliant with his antirejection medications.  He is on Eliquis anticoagulation for atrial fibrillation, last dose this morning.    The history is provided by the patient.   Arm Injury   The incident occurred several days ago. The incident occurred at home. The injury mechanism was a cut/puncture wound. He came to the ER via by private vehicle. There is an injury to the Left forearm. He reports no foreign bodies present. Pertinent negatives include no chest pain, no nausea and no weakness.   Review of patient's allergies indicates:  No Known Allergies  Past Medical  History:   Diagnosis Date    Anasarca 10/1/2021    Anemia of chronic disease     Atrial fibrillation     BPH (benign prostatic hypertrophy)     Chronic systolic heart failure 10/1/2021    CKD (chronic kidney disease) stage 2, GFR 60-89 ml/min     Hepatitis C virus infection cured after antiviral drug therapy     acquired through kidney transplant, treated / cured (SVR12 - 2021)    HTN (hypertension)     HTN (hypertension)     Polycystic kidney disease      Past Surgical History:   Procedure Laterality Date    AV FISTULA PLACEMENT Left     CATARACT EXTRACTION, BILATERAL Bilateral     CRANIOTOMY Right 2022    Procedure: CRANIOTOMY;  Surgeon: Sunday Rosa DO;  Location: [facility] OR [address] [place] [address];  Service: Neurosurgery;  Laterality: Right;  R craniotomy for abscess drainage    KIDNEY TRANSPLANT N/A 2021    Procedure: TRANSPLANT, KIDNEY;  Surgeon: Lexy Bolden MD;  Location: [facility] OR [address] [place] [address];  Service: Transplant;  Laterality: N/A;     Family History   Problem Relation Age of Onset    Polycystic kidney disease Father     Hypertension Father     Kidney disease Father     Polycystic kidney disease Sister     Hypertension Sister     Kidney disease Sister      Social History     Tobacco Use    Smoking status: Former     Packs/day: 2.00     Years: 10.00     Pack years: 20.00     Types: Cigarettes     Start date: 1972     Quit date: 1984     Years since quittin.0    Smokeless tobacco: Never   Substance Use Topics    Alcohol use: No     Comment: Pt reportsbeing a former beer drinker (2-3 cans per day) and quitting in .    Drug use: No     Review of Systems   Constitutional:  Negative for fever.   HENT:  Negative for sore throat.    Respiratory:  Negative for shortness of breath.    Cardiovascular:  Negative for chest pain.   Gastrointestinal:  Negative for nausea.   Genitourinary:  Negative for dysuria.   Musculoskeletal:  Positive for myalgias. Negative for back pain.   Skin:  Positive for  rash and wound.   Allergic/Immunologic: Positive for immunocompromised state.   Neurological:  Negative for weakness.   Hematological:  Does not bruise/bleed easily.   All other systems reviewed and are negative.    Physical Exam     Initial Vitals [12/30/22 1147]   BP Pulse Resp Temp SpO2   (!) 156/109 (!) 140 18 98.6 °F (37 °C) 96 %      MAP       --         Physical Exam    Nursing note and vitals reviewed.  Constitutional: He appears well-developed and well-nourished. No distress.   HENT:   Head: Normocephalic and atraumatic.   Mouth/Throat: Oropharynx is clear and moist.   Eyes: Conjunctivae are normal. Pupils are equal, round, and reactive to light.   Neck: Neck supple.   Normal range of motion.  Cardiovascular:  Normal rate, regular rhythm, normal heart sounds and intact distal pulses.           Palpable thrill over left forearm AV fistula   Pulmonary/Chest: Breath sounds normal. No respiratory distress.   Abdominal: Abdomen is soft. He exhibits no distension. There is no abdominal tenderness.   Musculoskeletal:         General: Tenderness and edema present. Normal range of motion.      Cervical back: Normal range of motion and neck supple.     Neurological: He is alert and oriented to person, place, and time.   Skin: Skin is warm and dry.   See images below, pitting edema of the left forearm with associated erythema, warmth, induration at some areas of ecchymosis  Right forearm with old bruising from IV stick.                 ED Course   Critical Care    Date/Time: 12/30/2022 5:09 PM  Performed by: Yasmeen Sarmiento MD  Authorized by: Yasmeen Sarmiento MD   Direct patient critical care time: 36 minutes  Ordering / reviewing critical care time: 6 minutes  Documentation critical care time: 5 minutes  Consulting other physicians critical care time: 8 minutes  Total critical care time (exclusive of procedural time) : 55 minutes  Critical care time was exclusive of separately billable procedures and treating other  patients.  Critical care was necessary to treat or prevent imminent or life-threatening deterioration of the following conditions: sepsis, circulatory failure and cardiac failure.  Critical care was time spent personally by me on the following activities: discussions with consultants, interpretation of cardiac output measurements, evaluation of patient's response to treatment, development of treatment plan with patient or surrogate, examination of patient, obtaining history from patient or surrogate, ordering and performing treatments and interventions, ordering and review of laboratory studies, ordering and review of radiographic studies, pulse oximetry and re-evaluation of patient's condition.      Labs Reviewed   COMPREHENSIVE METABOLIC PANEL - Abnormal; Notable for the following components:       Result Value    Sodium Level 135 (*)     Carbon Dioxide 20 (*)     Creatinine 1.58 (*)     Globulin 2.0 (*)     Bilirubin Total 1.9 (*)     Aspartate Aminotransferase 37 (*)     All other components within normal limits   LACTIC ACID, PLASMA - Abnormal; Notable for the following components:    Lactic Acid Level 5.0 (*)     All other components within normal limits   CBC WITH DIFFERENTIAL - Abnormal; Notable for the following components:    MCV 98.5 (*)     RDW 21.5 (*)     Platelet 105 (*)     All other components within normal limits   PROTIME-INR - Abnormal; Notable for the following components:    PT 17.5 (*)     INR 1.46 (*)     All other components within normal limits   LACTIC ACID, PLASMA - Abnormal; Notable for the following components:    Lactic Acid Level 4.6 (*)     All other components within normal limits   C-REACTIVE PROTEIN - Abnormal; Notable for the following components:    C-Reactive Protein 9.00 (*)     All other components within normal limits   LACTIC ACID, PLASMA - Abnormal; Notable for the following components:    Lactic Acid Level 6.8 (*)     All other components within normal limits   TROPONIN I  - Normal   CBC W/ AUTO DIFFERENTIAL    Narrative:     The following orders were created for panel order CBC Auto Differential.  Procedure                               Abnormality         Status                     ---------                               -----------         ------                     CBC with Differential[[id number]]        Abnormal            Final result                 Please view results for these tests on the individual orders.   TYPE & SCREEN          Imaging Results              CT Forearm (Radius/Ulna) Without Contrast Left (Final result)  Result time 12/30/22 16:54:00      Final result by Karoline Gilbert MD (12/30/22 16:54:00)                   Impression:      Subcutaneous edema with small amount of subcutaneous emphysema in the forearm, may be infectious.  No drainable fluid collection identified.      Electronically signed by: Karoline Gilbert  Date:    12/30/2022  Time:    16:54               Narrative:    EXAMINATION:  CT FOREARM (RADIUS/ULNA) WITHOUT CONTRAST LEFT    CLINICAL HISTORY:  Soft tissue infection suspected, forearm, xray done;    TECHNIQUE:  Noncontrast CT images of the left forearm.  Axial, coronal, and sagittal reformatted images were obtained. Dose length product is 111 mGycm. Automatic exposure control, adjustment of mA/kV or iterative reconstruction technique was used to limit radiation dose.    COMPARISON:  X-rays dated 12/30/2022    FINDINGS:  There is no acute fracture or malalignment.  There are no destructive bone changes.    There is subcutaneous edema in the forearm, with fluid tracking along the superficial fascial planes.  There is a small amount of subcutaneous emphysema in the subcutaneous soft tissues of medial aspect of the forearm.  There is no evidence of a drainable fluid collection.                                       X-Ray Forearm Left (Final result)  Result time 12/30/22 14:44:29      Final result by Quentin Russo MD (12/30/22 14:44:29)                    Impression:      Soft tissue swelling and pockets of subcu 2 gas which might be related to an infection as above      Electronically signed by: Quentin Russo  Date:    12/30/2022  Time:    14:44               Narrative:    EXAMINATION:  XR FOREARM LEFT    CLINICAL HISTORY:  Cellulitis of left upper limb    COMPARISON:  None.    FINDINGS:  No acute displaced fractures or dislocations.    Joint spaces preserved.    No blastic or lytic lesions.    There is evidence of soft tissue swelling on the radial aspect of the forearm with pockets of subcutaneous gas the changes might be related to an infection clinical correlation is suggested                                       Medications   vancomycin (VANCOCIN) 1,750 mg in dextrose 5 % 500 mL IVPB (1,750 mg Intravenous New Bag 12/30/22 1654)   clindamycin in D5W 600 mg/50 mL IVPB 600 mg (has no administration in time range)   ceFEPIme (MAXIPIME) 2 g in dextrose 5 % in water (D5W) 5 % 50 mL IVPB (MB+) (0 g Intravenous Stopped 12/30/22 1513)   lactated ringers bolus 2,500 mL (2,500 mLs Intravenous New Bag 12/30/22 1429)     Medical Decision Making:   Initial Assessment:   Mr. Mazariegos presented to the emergency department for evaluation of progressively worsening left forearm swelling, pain, erythema and now ecchymosis in the setting of immunosuppression with previous complicated infectious course involving brain abscess.  He is tachycardic and hypertensive on ED arrival, likely atrial fibrillation with rapid ventricular response; however, will hold off on aggressive rate control as concern possible compensatory tachycardia related to sepsis.  Differential Diagnosis:   Physical examination suggests cellulitis of the left forearm adjacent to his dialysis fistula site.  With severity of edema and ecchymotic changes of the skin, concern for potential necrotizing soft tissue infection.  Additionally, the amount of swelling proximally concern for possible  obstructive process such as DVT.  Given his immunosuppressed status, opportunistic infections or certainly a consideration.  Additionally, in the setting of presumed sepsis, acute renal insufficiency with the possibility of transplant rejection possibility as well.  Electrolyte derangements considered and evaluated with laboratory testing.  Clinical Tests:   Lab Tests: Ordered and Reviewed       <> Summary of Lab: Significantly elevated lactic acid, stable mild renal insufficiency, elevated INR, elevated inflammatory markers  Radiological Study: Ordered and Reviewed  ED Management:  Laboratory studies as above, significantly elevated lactic acid.  He was given greater than 30 milliliter/kilogram fluid bolus and lactic acid slightly improved but persistently elevated.  Renal function relatively well-preserved at this time.  X-ray of the forearm demonstrated some subcutaneous emphysema in addition to edema concerning for potential necrotizing soft tissue infection.  Broad-spectrum antibiotics initiated while in the emergency department.  Noncontrast CT of the left forearm obtained, contrast avoided given renal transplant history, demonstrates subcutaneous emphysema and fluid along the fascial planes concerning once again for potential necrotizing fasciitis.  Case discussed with the surgical hospitalist who evaluated the patient the bedside and advised surgical evaluation and management indicated; however, recommended attempt to transfer to a transplant center, ideally the patient's previous transplant center, for continuity of care and specialized following of his immunosuppressant medications and other indices.  I did call the transfer center; however, [facility] is currently on divert and has no capacity to accept the patient at this time.  General surgery attending evaluated the patient and agreed to proceed with OR at this time.  Given his age and comorbid conditions, requested that he be admitted to the  medicine service.  Hospitalist agreeable to admission at this time.  Consultation placed to local nephrology as well to help in comanagement.           ED Course as of 12/31/22 2011   Fri Dec 30, 2022   1709 Surgery paged to discuss concern for necrotizing soft tissue infection [KS]   1716 Discussed with surgical hospitalist resident. Will evaluate at bedside.  [KS]   1729 Discussed with Dr. Davis, requested that the patient be transferred to [facility] if possible for continuity of care with his renal transplant team as likely to have potential complications in the setting of sepsis/severe infection. [KS]   1825 Discussed with [facility], there on total adult diversion at this time and can not accept the patient.  They state that the renal transplant team will be happy to answer any questions and offer any recommendations as needed.  [KS]      ED Course User Index  [KS] Yasmeen Sarmiento MD                 Clinical Impression:   Final diagnoses:  [R00.0] Tachycardia  [R60.0] Edema of left upper arm  [L03.114] Left arm cellulitis (Primary)  [M79.89] Necrotizing soft tissue infection  [I48.91] Atrial fibrillation with rapid ventricular response  [Z94.0] History of renal transplant        ED Disposition Condition    Admit Stable                Yasmeen Sarmiento MD  12/31/22 2011

## 2022-12-30 NOTE — CONSULTS
Acute Care Surgery   Consult Note    Patient Name: Ronal Mazariegos  YOB: 1950  Date: 12/30/2022 5:31 PM  Date of Admission: 12/30/2022  HD#0  POD#* No surgery found *    PRESENTING HISTORY   Chief Complaint/Reason for Admission: LUE infection    History of Present Illness:  Ronal Mazariegos is a 73 yo M with complex medical history including renal transplant, a fib (on Eliquis), heart failure, HTN and recent intracranial abscess who presented to the ED with a 12 day history of LUE swelling. He reports he then began noticing skin discoloration the past two days with associated worsening pain. He has bruising diffusely from his Eliquis use but notes this discoloration is different. He has a fistula in the LUE that is still functional but has not been used since his transplant. He initially denied trauma to the arm but did state that he may have scratched it. Denies fevers, chills, or any other symptoms. Patient currently on immunosuppression.     Workup in ED with WBC of 8.9, plt 105, BUN/Cr 21/1.58, CRP 9, lactate 6.8 from 5 at arrival despite fluid resuscitation. US negative for DVT. CT obtained with subcutaneous edema and emphysema. Cultures obtained and he was given Vanc and clinda in the ED. Surgery consulted for further evaluation.     Review of Systems:  12 point ROS negative except as stated in HPI    PAST HISTORY:   Past medical history:  Past Medical History:   Diagnosis Date    Anasarca 10/1/2021    Anemia of chronic disease     Atrial fibrillation     BPH (benign prostatic hypertrophy)     Chronic systolic heart failure 10/1/2021    CKD (chronic kidney disease) stage 2, GFR 60-89 ml/min     Hepatitis C virus infection cured after antiviral drug therapy     acquired through kidney transplant, treated / cured (SVR12 - 12/2021)    HTN (hypertension)     HTN (hypertension)     Polycystic kidney disease        Past surgical history:  Past Surgical History:   Procedure Laterality Date    AV  FISTULA PLACEMENT Left 2016    CATARACT EXTRACTION, BILATERAL Bilateral     CRANIOTOMY Right 2022    Procedure: CRANIOTOMY;  Surgeon: Sunday Rosa DO;  Location: [facility] OR [address] [place] [address];  Service: Neurosurgery;  Laterality: Right;  R craniotomy for abscess drainage    KIDNEY TRANSPLANT N/A 2021    Procedure: TRANSPLANT, KIDNEY;  Surgeon: Lexy Bolden MD;  Location: [facility] OR [address] [place] [address];  Service: Transplant;  Laterality: N/A;       Family history:  Family History   Problem Relation Age of Onset    Polycystic kidney disease Father     Hypertension Father     Kidney disease Father     Polycystic kidney disease Sister     Hypertension Sister     Kidney disease Sister        Social history:  Social History     Socioeconomic History    Marital status:     Number of children: 1   Tobacco Use    Smoking status: Former     Packs/day: 2.00     Years: 10.00     Pack years: 20.00     Types: Cigarettes     Start date: 1972     Quit date: 1984     Years since quittin.0    Smokeless tobacco: Never   Substance and Sexual Activity    Alcohol use: No     Comment: Pt reportsbeing a former beer drinker (2-3 cans per day) and quitting in .    Drug use: No    Sexual activity: Yes     Social History     Tobacco Use   Smoking Status Former    Packs/day: 2.00    Years: 10.00    Pack years: 20.00    Types: Cigarettes    Start date: 1972    Quit date: 1984    Years since quittin.0   Smokeless Tobacco Never      Social History     Substance and Sexual Activity   Alcohol Use No    Comment: Pt reportsbeing a former beer drinker (2-3 cans per day) and quitting in .        MEDICATIONS & ALLERGIES:     No current facility-administered medications on file prior to encounter.     Current Outpatient Medications on File Prior to Encounter   Medication Sig    apixaban (ELIQUIS) 5 mg Tab Take 1 tablet (5 mg total) by mouth 2 (two) times daily. DO NOT RESTART UNTIL APPROVED BY NEUROSURGERY     ergocalciferol (ERGOCALCIFEROL) 50,000 unit Cap Take 1 capsule (50,000 Units total) by mouth every 7 days. X 12 months (Patient taking differently: Take 50,000 Units by mouth every 7 days. X 12 months  MONDAYS)    finasteride (PROSCAR) 5 mg tablet Take 5 mg by mouth every evening.    fish oil-omega-3 fatty acids 300-1,000 mg capsule Take 1 capsule by mouth every evening.    fluticasone propionate (FLONASE) 50 mcg/actuation nasal spray daily as needed.    magnesium oxide (MAG-OX) 400 mg (241.3 mg magnesium) tablet TAKE 2 TABLETS BY MOUTH THREE TIMES DAILY (Patient taking differently: Take by mouth 2 (two) times daily. 4 TABLETS TWICE DAILY)    metoprolol tartrate (LOPRESSOR) 50 MG tablet Take 1 tablet (50 mg total) by mouth 2 (two) times daily.    multivitamin Tab Take 1 tablet by mouth once daily.    predniSONE (DELTASONE) 5 MG tablet Take by mouth daily. 5/7-6/6 20 mg, 6/7-7/6 15 mg, 7/7-8/6 10 mg, 5 mg daily thereafter starting 8/7/21 (Patient taking differently: Take by mouth every morning.)    sodium bicarbonate 650 MG tablet Take 2 tablets (1,300 mg total) by mouth 2 (two) times daily.    sodium polystyrene (KAYEXALATE) powder Mix 8 LEVEL TEASPOONS (30grams total) in 120mL water and drink by mouth once daily x 3 days,then TAKE AS DIRECTED BY TRANSPLANT (for high potassium).    sodium zirconium cyclosilicate (LOKELMA) 10 gram packet Take 1 packet (10 g total) by mouth 2 (two) times a day for 2 days, THEN 1 packet (10 g total) once daily. Mix entire contents of packet(s) into drinking glass containing 3 tablespoons of water; stir well and drink immediately. Add water and repeat until no powder remains to receive entire dose..    sulfamethoxazole-trimethoprim 800-160mg (BACTRIM DS) 800-160 mg Tab Take 2 tablets by mouth 3 (three) times daily.    tacrolimus (PROGRAF) 1 MG Cap Take 2 capsules (2 mg total) by mouth every morning AND 1 capsule (1 mg total) every evening. Z94.0;Kidney txp on 5/4/2021.    tedizolid  (SIVEXTRO) 200 mg Tab Take 1 tablet  (200 mg total) by mouth once daily.    LORazepam (ATIVAN) 0.5 MG tablet Take 1 tablet (0.5 mg total) by mouth once. Take 1 tab 20-30 minutes prior to MRI; may repeat if needed for 1 dose    [DISCONTINUED] famotidine (PEPCID) 20 MG tablet Take 1 tablet (20 mg total) by mouth every evening.    [DISCONTINUED] lisinopriL (PRINIVIL,ZESTRIL) 2.5 MG tablet Take 1 tablet (2.5 mg total) by mouth once daily.    [DISCONTINUED] valGANciclovir (VALCYTE) 450 mg Tab Take 2 tablets (900 mg total) by mouth 2 (two) times daily.       Allergies: Review of patient's allergies indicates:  No Known Allergies    Scheduled Meds:   clindamycin (CLEOCIN) IVPB  600 mg Intravenous ED 1 Time    vancomycin (VANCOCIN) IVPB  20 mg/kg Intravenous ED 1 Time       Continuous Infusions:    PRN Meds:    OBJECTIVE:   Vital Signs:  VITAL SIGNS: 24 HR MIN & MAX LAST   Temp  Min: 98.6 °F (37 °C)  Max: 98.6 °F (37 °C)  98.6 °F (37 °C)   BP  Min: 145/91  Max: 156/109  (!) 145/91    Pulse  Min: 135  Max: 140  (!) 135    Resp  Min: 18  Max: 19  19    SpO2  Min: 96 %  Max: 99 %  99 %      HT:    WT: 88.9 kg (196 lb)  BMI:       No intake/output data recorded.  No intake/output data recorded.    Physical Exam:  General: Chronically ill appearing  HEENT: Previous craniotomy site healed  CV: RR, 2+ radial pulses bilaterally, LUE AVF with palpable thrill, slightly aneurysmal appearing  Resp: NWOB on RA  GI:  Abdomen soft, non-tender, non-distended, no guarding, no rebound  :  Deferred  MSK: Scattered ecchymoses to all extremities, LUE with swelling of the lateral dorsal forearm with associated weeping and dark skin discoloration, AVF medially  Neuro:  CNII-XII grossly intact, alert and oriented to person, place, and time    Labs:  Troponin:  Recent Labs     12/30/22  1358   TROPONINI 0.017     CBC:  Recent Labs     12/30/22  1356   WBC 8.9   RBC 5.25   HGB 17.2   HCT 51.7   *   MCV 98.5*   MCH 32.8   MCHC 33.3      CMP:  Recent Labs     12/30/22  1550   CALCIUM 9.6   ALBUMIN 3.8   *   K 5.1   CO2 20*   BUN 21.0   CREATININE 1.58*   ALKPHOS 82   ALT 42   AST 37*   BILITOT 1.9*     Lactic Acid:  No results for input(s): LACTATE in the last 72 hours.  Etoh:  No results for input(s): ALCOHOLMEDIC in the last 72 hours.  Drug Screen:  No results for input(s): PCDSCOMETHA, COCAINEMETAB, OPIATESCREEN, BARBITURATES, AMPHETAMINES, MARIJUANATHC, PCDSOPHENCYN, CREATRANDUR, TOXINFO in the last 72 hours.  ABG:  No results for input(s): PH, PCO2, PO2, HCO3, BE, POCSATURATED in the last 72 hours.    Diagnostic Results:  X-Ray Forearm Left    Result Date: 12/30/2022  Soft tissue swelling and pockets of subcu 2 gas which might be related to an infection as above Electronically signed by: Quentin Russo Date:    12/30/2022 Time:    14:44    CT Forearm (Radius/Ulna) Without Contrast Left    Result Date: 12/30/2022  Subcutaneous edema with small amount of subcutaneous emphysema in the forearm, may be infectious.  No drainable fluid collection identified. Electronically signed by: Karoline Gilbert Date:    12/30/2022 Time:    16:54    CT Forearm (Radius/Ulna) Without Contrast Left   Final Result      Subcutaneous edema with small amount of subcutaneous emphysema in the forearm, may be infectious.  No drainable fluid collection identified.         Electronically signed by: Karoline Gilbert   Date:    12/30/2022   Time:    16:54      X-Ray Forearm Left   Final Result      Soft tissue swelling and pockets of subcu 2 gas which might be related to an infection as above         Electronically signed by: Quentin Russo   Date:    12/30/2022   Time:    14:44          ASSESSMENT & PLAN:    Ronal Mazariegos is a 71 yo M with complex medical history including renal transplant, a fib (on Eliquis), heart failure, HTN who presents with a 12 day history of LUE swelling and discoloration concerning for possible necrotizing infection.     Plan:  -  Agree with admission to medicine for medical comorbidities   - Would ideally transfer to transplant center as soon as possible  - Will take to OR for exploration, debridement of LUE  - Broad spectrum antibiotics  - Further plan of care pending OR findings  - Hold jasmyne Melton MD  [employer] General Surgery, PGY-2   12/30/2022 5:31 PM

## 2022-12-30 NOTE — FIRST PROVIDER EVALUATION
Medical screening examination initiated.  I have conducted a focused provider triage encounter, findings are as follows:    Brief history of present illness:  Patient state left arm pain and swelling. States wound to left forearm. States that he had a kidney transplant in Mat of 2021.    There were no vitals filed for this visit.    Pertinent physical exam:  Awake, alert, ambulatory      Brief workup plan:  labs    Preliminary workup initiated; this workup will be continued and followed by the physician or advanced practice provider that is assigned to the patient when roomed.

## 2022-12-31 PROBLEM — L03.114 LEFT ARM CELLULITIS: Status: ACTIVE | Noted: 2022-12-31

## 2022-12-31 LAB
CREAT UR-MCNC: 32.8 MG/DL (ref 63–166)
GRAM STN SPEC: NORMAL
KOH PREP SPEC: NORMAL
LACTATE SERPL-SCNC: 4 MMOL/L (ref 0.5–2.2)
M AVIUM PARATB TISS QL ZN STN: NORMAL
M AVIUM PARATB TISS QL ZN STN: NORMAL
PHOSPHATE SERPL-MCNC: 3.2 MG/DL (ref 2.3–4.7)
PROT UR STRIP-MCNC: <6.8 MG/DL
SODIUM UR-SCNC: 55 MMOL/L

## 2022-12-31 PROCEDURE — 84156 ASSAY OF PROTEIN URINE: CPT | Performed by: NURSE PRACTITIONER

## 2022-12-31 PROCEDURE — 25000003 PHARM REV CODE 250: Performed by: INTERNAL MEDICINE

## 2022-12-31 PROCEDURE — 63600175 PHARM REV CODE 636 W HCPCS: Performed by: INTERNAL MEDICINE

## 2022-12-31 PROCEDURE — 84300 ASSAY OF URINE SODIUM: CPT | Performed by: NURSE PRACTITIONER

## 2022-12-31 PROCEDURE — 25000003 PHARM REV CODE 250: Performed by: GENERAL PRACTICE

## 2022-12-31 PROCEDURE — 63600175 PHARM REV CODE 636 W HCPCS: Performed by: GENERAL PRACTICE

## 2022-12-31 PROCEDURE — 86635 COCCIDIOIDES ANTIBODY: CPT | Performed by: GENERAL PRACTICE

## 2022-12-31 PROCEDURE — 87449 NOS EACH ORGANISM AG IA: CPT | Performed by: GENERAL PRACTICE

## 2022-12-31 PROCEDURE — 25000003 PHARM REV CODE 250: Performed by: NURSE PRACTITIONER

## 2022-12-31 PROCEDURE — 63600175 PHARM REV CODE 636 W HCPCS: Performed by: STUDENT IN AN ORGANIZED HEALTH CARE EDUCATION/TRAINING PROGRAM

## 2022-12-31 PROCEDURE — 36415 COLL VENOUS BLD VENIPUNCTURE: CPT | Performed by: STUDENT IN AN ORGANIZED HEALTH CARE EDUCATION/TRAINING PROGRAM

## 2022-12-31 PROCEDURE — 36415 COLL VENOUS BLD VENIPUNCTURE: CPT | Performed by: GENERAL PRACTICE

## 2022-12-31 PROCEDURE — 94761 N-INVAS EAR/PLS OXIMETRY MLT: CPT

## 2022-12-31 PROCEDURE — 82570 ASSAY OF URINE CREATININE: CPT | Performed by: NURSE PRACTITIONER

## 2022-12-31 PROCEDURE — 84100 ASSAY OF PHOSPHORUS: CPT | Performed by: NURSE PRACTITIONER

## 2022-12-31 PROCEDURE — 21400001 HC TELEMETRY ROOM

## 2022-12-31 PROCEDURE — 83605 ASSAY OF LACTIC ACID: CPT | Performed by: STUDENT IN AN ORGANIZED HEALTH CARE EDUCATION/TRAINING PROGRAM

## 2022-12-31 PROCEDURE — 87385 HISTOPLASMA CAPSUL AG IA: CPT | Performed by: GENERAL PRACTICE

## 2022-12-31 PROCEDURE — 27000221 HC OXYGEN, UP TO 24 HOURS

## 2022-12-31 PROCEDURE — 87899 AGENT NOS ASSAY W/OPTIC: CPT | Performed by: GENERAL PRACTICE

## 2022-12-31 RX ORDER — LACTOBACILLUS ACIDOPHILUS 500MM CELL
2 CAPSULE ORAL
Status: DISCONTINUED | OUTPATIENT
Start: 2022-12-31 | End: 2023-01-03 | Stop reason: HOSPADM

## 2022-12-31 RX ORDER — TACROLIMUS 1 MG/1
1 CAPSULE ORAL EVERY EVENING
Status: DISCONTINUED | OUTPATIENT
Start: 2022-12-31 | End: 2023-01-03 | Stop reason: HOSPADM

## 2022-12-31 RX ORDER — SULFAMETHOXAZOLE AND TRIMETHOPRIM 800; 160 MG/1; MG/1
2 TABLET ORAL 3 TIMES DAILY
Status: DISCONTINUED | OUTPATIENT
Start: 2022-12-31 | End: 2023-01-03 | Stop reason: HOSPADM

## 2022-12-31 RX ORDER — SODIUM CHLORIDE 9 MG/ML
INJECTION, SOLUTION INTRAVENOUS CONTINUOUS
Status: ACTIVE | OUTPATIENT
Start: 2022-12-31 | End: 2023-01-01

## 2022-12-31 RX ORDER — LANOLIN ALCOHOL/MO/W.PET/CERES
400 CREAM (GRAM) TOPICAL 2 TIMES DAILY
Status: DISCONTINUED | OUTPATIENT
Start: 2022-12-31 | End: 2023-01-03 | Stop reason: HOSPADM

## 2022-12-31 RX ORDER — FINASTERIDE 5 MG/1
5 TABLET, FILM COATED ORAL NIGHTLY
Status: DISCONTINUED | OUTPATIENT
Start: 2022-12-31 | End: 2023-01-03 | Stop reason: HOSPADM

## 2022-12-31 RX ORDER — CLINDAMYCIN PHOSPHATE 600 MG/50ML
600 INJECTION, SOLUTION INTRAVENOUS
Status: DISCONTINUED | OUTPATIENT
Start: 2022-12-31 | End: 2023-01-02

## 2022-12-31 RX ORDER — SODIUM BICARBONATE 650 MG/1
1300 TABLET ORAL 2 TIMES DAILY
Status: DISCONTINUED | OUTPATIENT
Start: 2022-12-31 | End: 2023-01-03 | Stop reason: HOSPADM

## 2022-12-31 RX ORDER — PREDNISONE 5 MG/1
5 TABLET ORAL DAILY
Status: DISCONTINUED | OUTPATIENT
Start: 2023-01-01 | End: 2023-01-03 | Stop reason: HOSPADM

## 2022-12-31 RX ORDER — FLUCONAZOLE 2 MG/ML
400 INJECTION, SOLUTION INTRAVENOUS
Status: DISCONTINUED | OUTPATIENT
Start: 2023-01-01 | End: 2023-01-02

## 2022-12-31 RX ORDER — METOPROLOL TARTRATE 50 MG/1
50 TABLET ORAL 2 TIMES DAILY
Status: DISCONTINUED | OUTPATIENT
Start: 2022-12-31 | End: 2023-01-03 | Stop reason: HOSPADM

## 2022-12-31 RX ORDER — TACROLIMUS 1 MG/1
2 CAPSULE ORAL EVERY MORNING
Status: DISCONTINUED | OUTPATIENT
Start: 2023-01-01 | End: 2023-01-03 | Stop reason: HOSPADM

## 2022-12-31 RX ORDER — ENOXAPARIN SODIUM 100 MG/ML
90 INJECTION SUBCUTANEOUS EVERY 12 HOURS
Status: DISCONTINUED | OUTPATIENT
Start: 2022-12-31 | End: 2023-01-01

## 2022-12-31 RX ORDER — VANCOMYCIN HCL IN 5 % DEXTROSE 1G/250ML
1000 PLASTIC BAG, INJECTION (ML) INTRAVENOUS
Status: DISCONTINUED | OUTPATIENT
Start: 2022-12-31 | End: 2023-01-02

## 2022-12-31 RX ORDER — FLUCONAZOLE 2 MG/ML
200 INJECTION, SOLUTION INTRAVENOUS
Status: DISCONTINUED | OUTPATIENT
Start: 2022-12-31 | End: 2022-12-31

## 2022-12-31 RX ADMIN — CLINDAMYCIN PHOSPHATE 600 MG: 600 INJECTION, SOLUTION INTRAVENOUS at 05:12

## 2022-12-31 RX ADMIN — Medication 2 CAPSULE: at 12:12

## 2022-12-31 RX ADMIN — SULFAMETHOXAZOLE AND TRIMETHOPRIM 2 TABLET: 800; 160 TABLET ORAL at 03:12

## 2022-12-31 RX ADMIN — CEFEPIME 1 G: 1 INJECTION, POWDER, FOR SOLUTION INTRAMUSCULAR; INTRAVENOUS at 09:12

## 2022-12-31 RX ADMIN — MEROPENEM 1 G: 1 INJECTION, POWDER, FOR SOLUTION INTRAVENOUS at 11:12

## 2022-12-31 RX ADMIN — ENOXAPARIN SODIUM 90 MG: 100 INJECTION SUBCUTANEOUS at 11:12

## 2022-12-31 RX ADMIN — METOPROLOL TARTRATE 50 MG: 50 TABLET, FILM COATED ORAL at 09:12

## 2022-12-31 RX ADMIN — TACROLIMUS 1 MG: 1 CAPSULE ORAL at 06:12

## 2022-12-31 RX ADMIN — Medication 400 MG: at 09:12

## 2022-12-31 RX ADMIN — SULFAMETHOXAZOLE AND TRIMETHOPRIM 2 TABLET: 800; 160 TABLET ORAL at 09:12

## 2022-12-31 RX ADMIN — CEFEPIME 1 G: 1 INJECTION, POWDER, FOR SOLUTION INTRAMUSCULAR; INTRAVENOUS at 02:12

## 2022-12-31 RX ADMIN — Medication 400 MG: at 08:12

## 2022-12-31 RX ADMIN — METOPROLOL TARTRATE 50 MG: 50 TABLET, FILM COATED ORAL at 08:12

## 2022-12-31 RX ADMIN — SODIUM BICARBONATE 650 MG TABLET 1300 MG: at 08:12

## 2022-12-31 RX ADMIN — Medication 2 CAPSULE: at 05:12

## 2022-12-31 RX ADMIN — ENOXAPARIN SODIUM 90 MG: 100 INJECTION SUBCUTANEOUS at 08:12

## 2022-12-31 RX ADMIN — CLINDAMYCIN PHOSPHATE 600 MG: 600 INJECTION, SOLUTION INTRAVENOUS at 11:12

## 2022-12-31 RX ADMIN — SODIUM BICARBONATE 650 MG TABLET 1300 MG: at 09:12

## 2022-12-31 RX ADMIN — SULFAMETHOXAZOLE AND TRIMETHOPRIM 2 TABLET: 800; 160 TABLET ORAL at 08:12

## 2022-12-31 RX ADMIN — VANCOMYCIN HYDROCHLORIDE 1000 MG: 1 INJECTION, POWDER, LYOPHILIZED, FOR SOLUTION INTRAVENOUS at 05:12

## 2022-12-31 RX ADMIN — MEROPENEM 1 G: 1 INJECTION, POWDER, FOR SOLUTION INTRAVENOUS at 05:12

## 2022-12-31 RX ADMIN — FINASTERIDE 5 MG: 5 TABLET, FILM COATED ORAL at 08:12

## 2022-12-31 RX ADMIN — SODIUM CHLORIDE: 9 INJECTION, SOLUTION INTRAVENOUS at 03:12

## 2022-12-31 NOTE — NURSING
Nurses Note -- 4 Eyes      12/31/2022   12:41 AM      Skin assessed during: Transfer      [x] No Pressure Injuries Present    []Prevention Measures Documented      [] Yes- Altered Skin Integrity Present or Discovered   [x] LDA Added if Not in Epic (Describe Wound)   [] New Altered Skin Integrity was Present on Admit and Documented in LDA   [] Wound Image Taken    Wound Care Consulted? No, post op I&D.    Attending Nurse:  Yvonne Stanton RN     Second RN/Staff Member:  Aleyda KIMBALL

## 2022-12-31 NOTE — ANESTHESIA POSTPROCEDURE EVALUATION
Anesthesia Post Evaluation    Patient: Ronal Mazariegos    Procedure(s) Performed: Procedure(s) (LRB):  INCISION AND DRAINAGE, UPPER EXTREMITY (Left)    Final Anesthesia Type: general      Patient location during evaluation: PACU  Patient participation: Yes- Able to Participate  Level of consciousness: awake and alert  Post-procedure vital signs: reviewed and stable  Pain management: adequate  Airway patency: patent  ROHAN mitigation strategies: Multimodal analgesia        Cardiovascular status: stable  Respiratory status: unassisted  Hydration status: euvolemic  Follow-up not needed.          Vitals Value Taken Time   /78 12/30/22 2251   Temp 36.7 °C (98.1 °F) 12/30/22 2141   Pulse 74 12/30/22 2257   Resp 18 12/30/22 2257   SpO2 100 % 12/30/22 2257   Vitals shown include unvalidated device data.      Event Time   Out of Recovery 22:50:00         Pain/Marva Score: Marva Score: 8 (12/30/2022  9:55 PM)

## 2022-12-31 NOTE — PROGRESS NOTES
Trauma/Acute Care Surgery   Daily Progress Note     HD#1  POD#1 Day Post-Op    SUBJECTIVE / INTERVAL EVENTS  NAEON AF VSS  S/p I&D of left forearm  Reports significant decrease in swelling and pain  Took down dressing at bedside, looked clean, decreased swelling, replaced wet/2/dry dressing  Nausea resolved, hungry  Feeling overall better  Antibiotics on board      OBJECTIVE    Vitals  Temp:  [98.1 °F (36.7 °C)-99 °F (37.2 °C)] 98.3 °F (36.8 °C)  Pulse:  [] 120  Resp:  [12-21] 20  SpO2:  [95 %-100 %] 96 %  BP: (110-163)/() 122/80      Physical Exam:  General: Chronically ill appearing  HEENT: Previous craniotomy site healed  CV: RR, 2+ radial pulses bilaterally, LUE AVF with palpable thrill, slightly aneurysmal appearing  Resp: NWOB on RA  GI:  Abdomen soft, non-tender, non-distended, no guarding, no rebound  :  Deferred  MSK: Scattered ecchymoses to all extremities, LUE with swelling of the lateral dorsal forearm with associated weeping and dark skin discoloration, swelling improved from pre-op, incision with clean base, serous drainage, improved pain, wet to dry dressing replaced, AVF medially  Neuro:  CNII-XII grossly intact, alert and oriented to person, place, and time       Labs    Lab Results   Component Value Date    WBC 8.9 12/30/2022    HGB 17.2 12/30/2022    HCT 51.7 12/30/2022    MCV 98.5 (H) 12/30/2022     (L) 12/30/2022           Recent Labs     12/30/22  1550   *   K 5.1   CO2 20*   BUN 21.0   CREATININE 1.58*   CALCIUM 9.6   ALBUMIN 3.8   BILITOT 1.9*   AST 37*   ALKPHOS 82   ALT 42        Micro  Microbiology Results (last 7 days)       Procedure Component Value Units Date/Time    AFB Smear [[id number]] Collected: 12/30/22 2123    Order Status: Completed Specimen: Tissue from Arm, Left Updated: 12/31/22 0904     AFB Smear No AFB seen (Direct smear only)    AFB Smear [[id number]] Collected: 12/30/22 2118    Order Status: Completed Specimen: Wound from Arm, Left Updated:  12/31/22 0904     AFB Smear No AFB seen (Direct smear only)    Gram Stain [[id number]] Collected: 12/30/22 2118    Order Status: Completed Specimen: Wound from Arm, Left Updated: 12/31/22 0756     GRAM STAIN Rare WBC observed      No bacteria seen    Gram Stain [[id number]] Collected: 12/30/22 2123    Order Status: Completed Specimen: Tissue from Arm, Left Updated: 12/31/22 0755     GRAM STAIN Few WBC observed      No bacteria seen    Wound Culture [[id number]] Collected: 12/30/22 2118    Order Status: Completed Specimen: Wound from Arm, Left Updated: 12/31/22 0752     Wound Culture No Growth At 24 Hours    Tissue Culture - Aerobic [[id number]] Collected: 12/30/22 2123    Order Status: Completed Specimen: Tissue from Arm, Left Updated: 12/31/22 0749     CULTURE, TISSUE- AEROBIC ([facility]) No Growth At 24 Hours    LESVIA Prep [[id number]]  (Abnormal) Collected: 12/30/22 2123    Order Status: Completed Specimen: Tissue from Arm, Left Updated: 12/30/22 2324     KOH Prep Budding Yeast observed    Anaerobic Culture [[id number]] Collected: 12/30/22 2118    Order Status: Sent Specimen: Wound from Arm, Left Updated: 12/30/22 2142    Fungal Culture [[id number]] Collected: 12/30/22 2118    Order Status: Sent Specimen: Wound from Arm, Left Updated: 12/30/22 2142    Mycobacteria and Nocardia Culture [[id number]] Collected: 12/30/22 2118    Order Status: Sent Specimen: Wound from Arm, Left Updated: 12/30/22 2142    Anaerobic Culture [[id number]] Collected: 12/30/22 2123    Order Status: Sent Specimen: Tissue from Arm, Left Updated: 12/30/22 2142    Fungal Culture [[id number]] Collected: 12/30/22 2123    Order Status: Sent Specimen: Tissue from Arm, Left Updated: 12/30/22 2142    Blood Culture [[id number]] Collected: 12/30/22 1309    Order Status: Resulted Specimen: Blood Updated: 12/30/22 1322    Blood Culture [[id number]] Collected: 12/30/22 1309    Order Status: Resulted Specimen: Blood Updated: 12/30/22 1322             Imaging  No new  images    ASSESSMENT & PLAN  Ronal Mazariegos is a 71 yo M with complex medical history including renal transplant, a fib (on Eliquis), heart failure, HTN who presents with a 12 day history of LUE swelling and discoloration concerning for possible necrotizing infection. S/p OR for I&D, did not find abscess or Nec Fasc, left open with wet to dry dressing.     Plan:  - Would ideally transfer to transplant center as soon as possible  - Continue wet to dry dressing changes BID  -Consult wound care  - Continue Broad spectrum antibiotics  - Restart anticoagulation  -Surgery will sign off at this time, please call with questions, concerns, changes in pt condition.     Cynthia Hair MD  [employer] General Surgery, PGY-4    12/31/2022  9:18 AM

## 2022-12-31 NOTE — CARE UPDATE
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 Debridement done and cultures collected including LESVIA and AFB smears and mycobacterial/nocardial cultures. KOH smear revealing possible budding yeast.     Discussed with microbiology lab who reviewed smears and repeated KOH smear, no fungal elements seen at this time and results corrected. Patient is hemodynamically stable. He is on Cefepime, Vancomycin, TMP/SMX, Clindamycin and Fluconazole.     Given history of nocardiosis, questionable initial budding yeast found, and immune suppression would recommend the following while cultures are pending:  -Stop Cefepime  -Start Meropenem and continue TMP/SMX (cover Nocardia and potential infecting organisms in the arm)  -Continue Vancomycin and Clindamycin for now (can likely discontinue Clinda in 48 hrs if stable)  -Since fungal concerns in cellulitis such as this one would be Histo, Crypto, Blasto, Sporothrix, and less likely a resistant Candida, agree with Fluconazole for now, would increase to 400mg for now and check serologies for histo, blasto, crypto, cocci  -If no fungal growth, will likely be able to de-escalate and simplify regimen as cultures become available    Ayo Stevenson MD  Infectious Disease  [facility]

## 2022-12-31 NOTE — ANESTHESIA PROCEDURE NOTES
Intubation    Date/Time: 12/30/2022 8:51 PM  Performed by: Yamil Youssef CRNA  Authorized by: Scar Jonas MD     Intubation:     Induction:  Intravenous    Intubated:  Postinduction    Mask Ventilation:  Easy mask    Attempts:  1    Attempted By:  CRNA    Method of Intubation:  Direct    Blade:  Ponce 2    Laryngeal View Grade: Grade I - full view of cords      Difficult Airway Encountered?: No      Complications:  None    Airway Device:  Oral endotracheal tube    Airway Device Size:  7.0    Style/Cuff Inflation:  Cuffed    Tube secured:  23    Secured at:  The lips    Placement Verified By:  Capnometry    Complicating Factors:  None    Findings Post-Intubation:  BS equal bilateral

## 2022-12-31 NOTE — BRIEF OP NOTE
[facility] Med [facility]urg  Surgery Department  Operative Note    SUMMARY     Date of Procedure: 12/30/2022     Procedure: Procedure(s) (LRB):  INCISION AND DRAINAGE, UPPER EXTREMITY (Left)     Surgeon(s) and Role:     * Elijah Davis Jr., MD - Primary     * Jake Melton MD - Resident Assisting    Assisting Surgeon: None    Pre-Operative Diagnosis: Edema of left upper arm [R60.0]  Left arm cellulitis [L03.114]    Post-Operative Diagnosis: Post-Op Diagnosis Codes:     * Edema of left upper arm [R60.0]     * Left arm cellulitis [L03.114]    Anesthesia: General    Operative Findings (including complications, if any): Skin changes to left forearm with diffuse edema, no obvious necrotic tissues or abscess cavity present to arm     Description of Technical Procedures: I&D of LUE           Implants: * No implants in log *    Specimens:   Specimen (24h ago, onward)      None           Condition: Fair    Disposition: PACU - hemodynamically stable.    Jake Melton MD  [employer] General Surgery, PGY-2

## 2022-12-31 NOTE — H&P
[facility] Hospital Medicine History & Physical Examination       Patient Name: oRnal Mazariegos  MRN: [id number]  Patient Class: IP- Inpatient   Admission Date: 12/30/2022 11:50 AM  Length of Stay: 1  Admitting Service: Hospital Medicine   Attending Physician: Bong Dudley MD   Primary Care Provider: Stephen Hartman MD  History source: EMR, patient and/or patient's family    CHIEF COMPLAINT   Arm Injury (Pt states that he has abrasion to left forearm a week ago. Since then he has had swelling pain and discoloration to arm. Dialysis fistula also noted to affected extremity. States that he was evaluated for clots and was negative. Pt reports HTN at home. Pt hypertensive and tachycardic in triage. Hx of afib and kidney transplant)      HISTORY OF PRESENT ILLNESS:   Patient is a 72-year-old male with past medical history below most significant for polycystic kidney disease status post renal transplant 05/20/2021 on immunosuppressants and prophylactic antibiotics who presents to the ER secondary to forearm pain, swelling and discoloration after having an abrasion a week ago.  He is a complicated history including a brain abscess earlier this year that required evacuation/craniotomy, prior history of CMV viremia likely all due to his immunosuppressed state.    He arrived to the ER tachycardic in AFib with RVR, hypertensive maintaining normal sats on room air.  Lactic acid was noted to be elevated at 5, remainder of laboratory work was unremarkable.  A CT of his left forearm was obtained and showed subcutaneous edema and a small amount of subcutaneous emphysema.  He was started on broad-spectrum antibiotics and blood cultures were obtained, and consultation placed to General surgery who took the patient to the OR for incision and drainage.    PAST MEDICAL HISTORY:   Chronic  kidney disease status post renal transplant 5/2021  Polycystic kidney disease  Paroxysmal atrial fibrillation  on Eliquis   Hypertension   History of HCV infection cured    History of CMV viremia   Heart failure with recovered EF 50% (2022)    PAST SURGICAL HISTORY:     Past Surgical History:   Procedure Laterality Date    AV FISTULA PLACEMENT Left     CATARACT EXTRACTION, BILATERAL Bilateral     CRANIOTOMY Right 2022    Procedure: CRANIOTOMY;  Surgeon: Sunday Rosa DO;  Location: [address];  Service: Neurosurgery;  Laterality: Right;  R craniotomy for abscess drainage    KIDNEY TRANSPLANT N/A 2021    Procedure: TRANSPLANT, KIDNEY;  Surgeon: Lexy Bolden MD;  Location: [address];  Service: Transplant;  Laterality: N/A;       ALLERGIES:   Patient has no known allergies.    FAMILY HISTORY:   Reviewed and non-contributory     SOCIAL HISTORY:     Social History     Tobacco Use    Smoking status: Former     Packs/day: 2.00     Years: 10.00     Pack years: 20.00     Types: Cigarettes     Start date: 1972     Quit date: 1984     Years since quittin.0    Smokeless tobacco: Never   Substance Use Topics    Alcohol use: No     Comment: Pt reportsbeing a former beer drinker (2-3 cans per day) and quitting in .        HOME MEDICATIONS:     apixaban (ELIQUIS) 5 mg Tab Take 1 tablet (5 mg total) by mouth 2 (two) times daily. DO NOT RESTART UNTIL APPROVED BY NEUROSURGERY   ergocalciferol (ERGOCALCIFEROL) 50,000 unit Cap Take 1 capsule (50,000 Units total) by mouth every 7 days. X 12 months  Patient taking differently: Take 50,000 Units by mouth every 7 days. X 12 months     finasteride (PROSCAR) 5 mg tablet Take 5 mg by mouth every evening.   fish oil-omega-3 fatty acids 300-1,000 mg capsule Take 1 capsule by mouth every evening.   fluticasone propionate (FLONASE) 50 mcg/actuation nasal spray daily as needed.   magnesium oxide (MAG-OX) 400 mg (241.3 mg magnesium) tablet TAKE 2 TABLETS BY MOUTH THREE TIMES DAILY  Patient taking differently: Take by mouth 2 (two) times  daily. 4 TABLETS TWICE DAILY   metoprolol tartrate (LOPRESSOR) 50 MG tablet Take 1 tablet (50 mg total) by mouth 2 (two) times daily.   multivitamin Tab Take 1 tablet by mouth once daily.   predniSONE (DELTASONE) 5 MG tablet Take by mouth daily. 5/7-6/6 20 mg, 6/7-7/6 15 mg, 7/7-8/6 10 mg, 5 mg daily thereafter starting 8/7/21  Patient taking differently: Take by mouth every morning.   sodium bicarbonate 650 MG tablet Take 2 tablets (1,300 mg total) by mouth 2 (two) times daily.   sodium polystyrene (KAYEXALATE) powder Mix 8 LEVEL TEASPOONS (30grams total) in 120mL water and drink by mouth once daily x 3 days,then TAKE AS DIRECTED BY TRANSPLANT (for high potassium).   sodium zirconium cyclosilicate (LOKELMA) 10 gram packet Take 1 packet (10 g total) by mouth 2 (two) times a day for 2 days, THEN 1 packet (10 g total) once daily. Mix entire contents of packet(s) into drinking glass containing 3 tablespoons of water; stir well and drink immediately. Add water and repeat until no powder remains to receive entire dose..   sulfamethoxazole-trimethoprim 800-160mg (BACTRIM DS) 800-160 mg Tab Take 2 tablets by mouth 3 (three) times daily.   tacrolimus (PROGRAF) 1 MG Cap Take 2 capsules (2 mg total) by mouth every morning AND 1 capsule (1 mg total) every evening. Z94.0;Kidney txp on 5/4/2021.   tedizolid (SIVEXTRO) 200 mg Tab Take 1 tablet  (200 mg total) by mouth once daily.   LORazepam (ATIVAN) 0.5 MG tablet Take 1 tablet (0.5 mg total) by mouth once. Take 1 tab 20-30 minutes prior to MRI; may repeat if needed for 1 dose   famotidine (PEPCID) 20 MG tablet Take 1 tablet (20 mg total) by mouth every evening.   lisinopriL (PRINIVIL,ZESTRIL) 2.5 MG tablet Take 1 tablet (2.5 mg total) by mouth once daily.   valGANciclovir (VALCYTE) 450 mg Tab Take 2 tablets (900 mg total) by mouth 2 (two) times daily.       REVIEW OF SYSTEMS:   Except as documented, all other systems reviewed and negative     PHYSICAL EXAM:   T 98.5 °F (36.9 °C)    /83   P 85   RR 18   O2 95 %  GENERAL: awake, alert, oriented and in no acute distress, non-toxic appearing   HEENT: normocephalic atraumatic   NECK: supple   LUNGS: Clear bilaterally, no wheezing or rales, no accessory muscle use   CVS:  Irregularly irregular, normal peripheral perfusion  ABD: Soft, non-tender, non-distended, bowel sounds present  EXTREMITIES: no clubbing or cyanosis, left arm wrapped in surgical bandage  SKIN: Warm, dry.   NEURO: alert and oriented, grossly without focal deficits   PSYCHIATRIC: Cooperative    LABS AND IMAGING:     Recent Labs     12/30/22  1356   WBC 8.9   RBC 5.25   HGB 17.2   HCT 51.7   MCV 98.5*   MCH 32.8   MCHC 33.3   RDW 21.5*   *     Recent Labs     12/30/22  1309 12/30/22  1550 12/30/22  1802   LACTIC 5.0* 4.6* 6.8*     Recent Labs     12/30/22  1356   INR 1.46*     No results for input(s): HGBA1C, CHOL, TRIG, LDL, VLDL, HDL in the last 72 hours.   Recent Labs     12/30/22  1550   *   K 5.1   CHLORIDE 102   CO2 20*   BUN 21.0   CREATININE 1.58*   GLUCOSE 108   CALCIUM 9.6   ALBUMIN 3.8   GLOBULIN 2.0*   ALKPHOS 82   ALT 42   AST 37*   BILITOT 1.9*   CRP 9.00*     Recent Labs     12/30/22  1358   TROPONINI 0.017          CT Forearm (Radius/Ulna) Without Contrast Left  Impression: Subcutaneous edema with small amount of subcutaneous emphysema in the forearm, may be infectious.  No drainable fluid collection identified.  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     At least 55 min was spent on this history and physical.  Time seen: 1220AM   Bong Dudley MD        72 years old male currently being managed for left upper extremity necrotizing fasciitis status post I& D.  Id consulted for antibiotics adjustment.  Had significant lactic acidosis trend till resolve.  Await Nephrology on recommendations of resume improved graft on prednisone for his kidney transplant.    Currently on full-dose Lovenox will change to home dose and it is in the next 24 hours.

## 2022-12-31 NOTE — CONSULTS
Nephrology Initial Consult Note    Patient Name: Ronal Mazariegos  Age: 72 y.o.  : 1950  MRN: [id number]  Admission Date: 2022    Reason for Consult:      Medical Management  Chief Complaint   Patient presents with    Arm Injury     Pt states that he has abrasion to left forearm a week ago. Since then he has had swelling pain and discoloration to arm. Dialysis fistula also noted to affected extremity. States that he was evaluated for clots and was negative. Pt reports HTN at home. Pt hypertensive and tachycardic in triage. Hx of afib and kidney transplant         Self, Aaareferral    HPI:     Ronal Mazariegos is a 72 y.o. male presented to the ER with forearm pain swelling and discoloration following abrasion approximately 1 week ago.  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  He has PMH significant for CMV viremia, polycystic kidney disease status post renal transplant 2021 he is on immunosuppressants.  He was on prophylactic antibiotics as well.  On presentation to the ER he was also tachycardic in AFib with RVR, hypertensive.  CT scan of the left forearm obtained showed subcutaneous edema and small amount of subcutaneous emphysema.  Started on broad-spectrum antibiotics, cultures were obtained and general surgery was consulted for I&D.  He is noted to have elevated renal indices and we were consulted for management of DEBORAH.    He underwent surgical debridement of the arm.  He is on vancomycin and clindamycin.  He was also started on meropenem id recommended continuing TMP/SMX.  Cefepime was discontinued.   Checking serologies for histo, blasto, crypto, cocci.  He states he is urinating a lot.  We will need strict intake and output.  He did eat and drink well today but states this is the 1st time in a few days then he is eating well.  He denies any nausea vomiting or chest pain.  He does have some mild intermittent shortness breath.    Current  Facility-Administered Medications   Medication Dose Route Frequency Provider Last Rate Last Admin    acetaminophen tablet 650 mg  650 mg Oral Q6H PRN Jake Melton MD        clindamycin in D5W 600 mg/50 mL IVPB 600 mg  600 mg Intravenous Q6H Hawa Trent  mL/hr at 12/31/22 1101 600 mg at 12/31/22 1101    enoxaparin injection 90 mg  90 mg Subcutaneous Q12H Bong Dudley MD   90 mg at 12/31/22 1101    finasteride tablet 5 mg  5 mg Oral QHS Bong Dudley MD        [START ON 1/1/2023] fluconazole (DIFLUCAN) IVPB 400 mg  400 mg Intravenous Q24H Ayo Stevenson MD        HYDROmorphone (PF) injection 0.2 mg  0.2 mg Intravenous Q5 Min PRN Will MD Sumi        HYDROmorphone (PF) injection 0.2 mg  0.2 mg Intravenous Q6H PRN Jake Melton MD        lactated ringers bolus 1,000 mL  1,000 mL Intravenous Once Jake Melton MD        Lactobacillus acidophilus capsule 2 capsule  2 capsule Oral TID  Hawa Trent MD        magnesium oxide tablet 400 mg  400 mg Oral BID Bong Dudley MD   400 mg at 12/31/22 0935    meropenem (MERREM) 1 g in sodium chloride 0.9 % 100 mL IVPB (MB+)  1 g Intravenous Q8H Ayo Stevenson  mL/hr at 12/31/22 1101 1 g at 12/31/22 1101    metoprolol tartrate (LOPRESSOR) tablet 50 mg  50 mg Oral BID Bong Dudley MD   50 mg at 12/31/22 0935    ondansetron injection 4 mg  4 mg Intravenous Daily PRN Will MD Sumi        oxyCODONE immediate release tablet 10 mg  10 mg Oral Q6H PRN Jake Melton MD        oxyCODONE immediate release tablet 5 mg  5 mg Oral Q6H PRN Jake Melton MD        sodium bicarbonate tablet 1,300 mg  1,300 mg Oral BID Bong Dudley MD   1,300 mg at 12/31/22 0934    sodium chloride 0.9% flush 10 mL  10 mL Intravenous PRN Will MD Sumi        sulfamethoxazole-trimethoprim 800-160mg per tablet 2 tablet  2 tablet Oral TID Bong Dudley MD   2 tablet at 12/31/22 4495    vancomycin - pharmacy to dose   Intravenous  pharmacy to manage frequency Bong Dudley MD        vancomycin in dextrose 5 % 1 gram/250 mL IVPB 1,000 mg  1,000 mg Intravenous Q12H Bong Dudley MD   Stopped at 22 0648       Stephen Hartman MD    Past Medical History:   Diagnosis Date    Anasarca 10/1/2021    Anemia of chronic disease     Atrial fibrillation     BPH (benign prostatic hypertrophy)     Chronic systolic heart failure 10/1/2021    CKD (chronic kidney disease) stage 2, GFR 60-89 ml/min     Hepatitis C virus infection cured after antiviral drug therapy     acquired through kidney transplant, treated / cured (SVR12 - 2021)    HTN (hypertension)     HTN (hypertension)     Polycystic kidney disease       Past Surgical History:   Procedure Laterality Date    AV FISTULA PLACEMENT Left     CATARACT EXTRACTION, BILATERAL Bilateral     CRANIOTOMY Right 2022    Procedure: CRANIOTOMY;  Surgeon: Sunday Rosa DO;  Location: [facility] OR [address] [place] [address];  Service: Neurosurgery;  Laterality: Right;  R craniotomy for abscess drainage    KIDNEY TRANSPLANT N/A 2021    Procedure: TRANSPLANT, KIDNEY;  Surgeon: Lexy Bolden MD;  Location: [facility] OR [address] [place] [address];  Service: Transplant;  Laterality: N/A;      Family History   Problem Relation Age of Onset    Polycystic kidney disease Father     Hypertension Father     Kidney disease Father     Polycystic kidney disease Sister     Hypertension Sister     Kidney disease Sister      Social History     Tobacco Use    Smoking status: Former     Packs/day: 2.00     Years: 10.00     Pack years: 20.00     Types: Cigarettes     Start date: 1972     Quit date: 1984     Years since quittin.0    Smokeless tobacco: Never   Substance Use Topics    Alcohol use: No     Comment: Pt reportsbeing a former beer drinker (2-3 cans per day) and quitting in .     Medications Prior to Admission   Medication Sig Dispense Refill Last Dose    apixaban (ELIQUIS) 5 mg Tab Take 1 tablet (5 mg total) by  mouth 2 (two) times daily. DO NOT RESTART UNTIL APPROVED BY NEUROSURGERY 60 tablet 11 12/30/2022    ergocalciferol (ERGOCALCIFEROL) 50,000 unit Cap Take 1 capsule (50,000 Units total) by mouth every 7 days. X 12 months (Patient taking differently: Take 50,000 Units by mouth every 7 days. X 12 months  MONDAYS) 12 capsule 3 12/30/2022    finasteride (PROSCAR) 5 mg tablet Take 5 mg by mouth every evening.   12/29/2022    fish oil-omega-3 fatty acids 300-1,000 mg capsule Take 1 capsule by mouth every evening.   12/30/2022    fluticasone propionate (FLONASE) 50 mcg/actuation nasal spray daily as needed.   12/30/2022    magnesium oxide (MAG-OX) 400 mg (241.3 mg magnesium) tablet TAKE 2 TABLETS BY MOUTH THREE TIMES DAILY (Patient taking differently: Take 2,000 mg by mouth 2 (two) times daily. 4 TABLETS TWICE DAILY) 180 tablet 10 12/30/2022    metoprolol tartrate (LOPRESSOR) 50 MG tablet Take 1 tablet (50 mg total) by mouth 2 (two) times daily. 60 tablet 11 12/30/2022    multivitamin Tab Take 1 tablet by mouth once daily.   12/30/2022    predniSONE (DELTASONE) 5 MG tablet Take by mouth daily. 5/7-6/6 20 mg, 6/7-7/6 15 mg, 7/7-8/6 10 mg, 5 mg daily thereafter starting 8/7/21 (Patient taking differently: Take by mouth every morning.) 120 tablet 11 12/30/2022    sodium bicarbonate 650 MG tablet Take 2 tablets (1,300 mg total) by mouth 2 (two) times daily. 120 tablet 11 12/30/2022    sodium polystyrene (KAYEXALATE) powder Mix 8 LEVEL TEASPOONS (30grams total) in 120mL water and drink by mouth once daily x 3 days,then TAKE AS DIRECTED BY TRANSPLANT (for high potassium). 454 g 2 12/29/2022    sodium zirconium cyclosilicate (LOKELMA) 10 gram packet Take 1 packet (10 g total) by mouth 2 (two) times a day for 2 days, THEN 1 packet (10 g total) once daily. Mix entire contents of packet(s) into drinking glass containing 3 tablespoons of water; stir well and drink immediately. Add water and repeat until no powder remains to receive  entire dose.. 34 packet 3 12/29/2022    sulfamethoxazole-trimethoprim 800-160mg (BACTRIM DS) 800-160 mg Tab Take 2 tablets by mouth 3 (three) times daily. 180 tablet 11 12/30/2022    tacrolimus (PROGRAF) 1 MG Cap Take 2 capsules (2 mg total) by mouth every morning AND 1 capsule (1 mg total) every evening. Z94.0;Kidney txp on 5/4/2021. 90 capsule 11 12/30/2022    tedizolid (SIVEXTRO) 200 mg Tab Take 1 tablet  (200 mg total) by mouth once daily. 30 tablet 3 12/30/2022    LORazepam (ATIVAN) 0.5 MG tablet Take 1 tablet (0.5 mg total) by mouth once. Take 1 tab 20-30 minutes prior to MRI; may repeat if needed for 1 dose 2 tablet 0      Review of patient's allergies indicates:  No Known Allergies         Review of Systems:     Comprehensive 10pt ROS negative except as noted per HPI.         Objective:       VITAL SIGNS: 24 HR MIN & MAX LAST    Temp  Min: 98.1 °F (36.7 °C)  Max: 99 °F (37.2 °C)  98.1 °F (36.7 °C)        BP  Min: 110/73  Max: 163/102  128/74     Pulse  Min: 76  Max: 135  92     Resp  Min: 12  Max: 21  20    SpO2  Min: 95 %  Max: 100 %  95 %      GEN:  Pleasant, sitting up in bed, in NAD  HEENT: Conjunctiva anicteric, pupils equal  CV: RRR +S1,S2 without murmur  PULM: CTAB, unlabored  ABD: Soft, NT/ND abdomen with NABS  EXT: No cyanosis or edema; occlusive dressing to left forearm clean dry and intact some mild swelling noted to the are  SKIN: Warm and dry  PSYCH:  No focal neurological deficits  Vascular access:  24h I&O:   Intake/Output Summary (Last 24 hours) at 12/31/2022 1238  Last data filed at 12/30/2022 2131  Gross per 24 hour   Intake 1000 ml   Output --   Net 1000 ml              Component Value Date/Time     (L) 12/30/2022 1550     12/27/2022 0650     11/30/2022 0758     08/23/2022 0726    K 5.1 12/30/2022 1550    K 5.4 (H) 12/27/2022 0650    K 5.5 (H) 11/30/2022 0758    K 4.9 08/23/2022 0726     11/30/2022 0758     08/23/2022 0726    CO2 20 (L) 12/30/2022 1550     CO2 22 (L) 12/27/2022 0650    CO2 20 (L) 11/30/2022 0758    CO2 20 (L) 08/23/2022 0726    BUN 21.0 12/30/2022 1550    BUN 17.4 12/27/2022 0650    BUN 20 11/30/2022 0758    BUN 18 08/23/2022 0726    CREATININE 1.58 (H) 12/30/2022 1550    CREATININE 1.72 (H) 12/27/2022 0650    CREATININE 1.6 (H) 11/30/2022 0758    CREATININE 1.7 (H) 08/23/2022 0726    CALCIUM 9.6 12/30/2022 1550    CALCIUM 9.7 12/27/2022 0650    CALCIUM 9.9 11/30/2022 0758    CALCIUM 9.8 08/23/2022 0726    PHOS 2.9 12/27/2022 0650    PHOS 2.9 11/30/2022 0758            Component Value Date/Time    WBC 8.9 12/30/2022 1356    WBC 5.4 12/27/2022 0650    WBC 5.62 04/21/2022 1009    WBC 6.76 02/25/2022 0339    HGB 17.2 12/30/2022 1356    HGB 16.6 12/27/2022 0650    HGB 14.6 04/21/2022 1009    HGB 15.0 02/25/2022 0339    HCT 51.7 12/30/2022 1356    HCT 52.1 (H) 12/27/2022 0650    HCT 47.4 04/21/2022 1009    HCT 46.2 02/25/2022 0339    HCT 44 02/17/2022 0211    HCT 32 (L) 05/04/2021 2156     (L) 12/30/2022 1356    PLT 92 (L) 12/27/2022 0650     04/21/2022 1009     02/25/2022 0339         CT Forearm (Radius/Ulna) Without Contrast Left   Final Result      Subcutaneous edema with small amount of subcutaneous emphysema in the forearm, may be infectious.  No drainable fluid collection identified.         Electronically signed by: Karoline Gilbert   Date:    12/30/2022   Time:    16:54      X-Ray Forearm Left   Final Result      Soft tissue swelling and pockets of subcu 2 gas which might be related to an infection as above         Electronically signed by: Quentin Russo   Date:    12/30/2022   Time:    14:44          Assessment / Plan:       Active Hospital Problems    Diagnosis  POA    *Left arm cellulitis [L03.114]  Yes      Resolved Hospital Problems   No resolved problems to display.     1. Polycystic kidney disease, status post renal transplant 05/20/2021--on immunosuppressants and prophylactic antibiotics   2. DEBORAH  3. Paroxysmal  atrial fibrillation on Eliquis    4. History of HCV infection 2021  5. History of CMV viremia  6. Heart failure with EF 50% (02/2022)  7. Left arm cellulitis, s/p I&D of hyperkalemia.  8. Hyperkalemia     Continue immunosuppressants.  Id is following.  Renal indices are improved today.  Continue IV antibiotics.  We will monitor closely.  Recheck labs in a.m..  Need to get in touch with the transplant team to discuss case an immunosuppressant medications.  Check retroperitoneal ultrasound, urine studies, phos, PTH and iron studies.  Gentle hydration with normal saline at 75 cc/hour.  Potassium okay at 5.1, but he states he does take Lokelma 10 mg daily for management    Mercedes Fu NP

## 2022-12-31 NOTE — TRANSFER OF CARE
Anesthesia Transfer of Care Note    Patient: Ronal Mazariegos    Procedure(s) Performed: Procedure(s) (LRB):  INCISION AND DRAINAGE, UPPER EXTREMITY (Left)    Patient location: PACU    Anesthesia Type: general    Transport from OR: Transported from OR on room air with adequate spontaneous ventilation    Post pain: adequate analgesia    Post assessment: no apparent anesthetic complications and tolerated procedure well    Post vital signs: stable    Level of consciousness: responds to stimulation    Nausea/Vomiting: no nausea/vomiting    Complications: none    Transfer of care protocol was followed      Last vitals:   Visit Vitals  /89 (BP Location: Right arm)   Pulse 92   Temp 37.2 °C (99 °F) (Oral)   Resp 18   Wt 88.9 kg (196 lb)   SpO2 95%   BMI 25.16 kg/m²

## 2022-12-31 NOTE — ANESTHESIA POSTPROCEDURE EVALUATION
Anesthesia Post Evaluation    Patient: Ronal Mazariegos    Procedure(s) Performed: Procedure(s) (LRB):  INCISION AND DRAINAGE, UPPER EXTREMITY (Left)    Final Anesthesia Type: general      Patient location during evaluation: PACU  Patient participation: Yes- Able to Participate  Level of consciousness: awake and alert  Post-procedure vital signs: reviewed and stable  Pain management: adequate  Airway patency: patent  ROHAN mitigation strategies: Multimodal analgesia    Anesthetic complications: no      Cardiovascular status: stable  Respiratory status: unassisted  Hydration status: euvolemic  Follow-up not needed.          Vitals Value Taken Time   /78 12/30/22 2251   Temp 36.7 °C (98.1 °F) 12/30/22 2141   Pulse 74 12/30/22 2257   Resp 18 12/30/22 2257   SpO2 100 % 12/30/22 2257   Vitals shown include unvalidated device data.      Event Time   Out of Recovery 22:50:00         Pain/Marva Score: Marva Score: 8 (12/30/2022  9:55 PM)

## 2022-12-31 NOTE — ANESTHESIA PREPROCEDURE EVALUATION
12/30/2022  Ronal Mazariegos is a 72 y.o., male     .Pt states that he has abrasion to left forearm a week ago. Since then he has had swelling pain and discoloration to arm. Dialysis fistula also noted to affected extremity. States that he was evaluated for clots and was negative. Pt reports HTN at home. Pt hypertensive and tachycardic in triage. Hx of afib and kidney transplant      Pre-op Assessment    I have reviewed the Patient Summary Reports.     I have reviewed the Nursing Notes. I have reviewed the NPO Status.   I have reviewed the Medications.     Review of Systems  Hematology/Oncology:         -- Anemia: -- Thrombocytosis:    Cardiovascular:   Hypertension Dysrhythmias atrial fibrillation CHF  Hypertension    Renal/:   Chronic Renal Disease (s/p transplant), CKD    Hepatic/GI:   Liver Disease,        Physical Exam  General: Well nourished    Airway:  Mallampati: II / II  Mouth Opening: Normal  TM Distance: Normal  Tongue: Normal  Neck ROM: Normal ROM    Dental:  Intact    Chest/Lungs:  Clear to auscultation    Heart:  Rate: Normal        Anesthesia Plan  Type of Anesthesia, risks & benefits discussed:    Anesthesia Type: Gen ETT  Intra-op Monitoring Plan: Standard ASA Monitors  Post Op Pain Control Plan: multimodal analgesia  Induction:  IV  Airway Plan: Direct  Informed Consent: Informed consent signed with the Patient and all parties understand the risks and agree with anesthesia plan.  All questions answered. Patient consented to blood products? Yes  ASA Score: 3 Emergent    Ready For Surgery From Anesthesia Perspective.     .

## 2023-01-01 LAB
ALBUMIN SERPL-MCNC: 2.9 G/DL (ref 3.4–4.8)
ALBUMIN/GLOB SERPL: 1.5 RATIO (ref 1.1–2)
ALP SERPL-CCNC: 65 UNIT/L (ref 40–150)
ALT SERPL-CCNC: 27 UNIT/L (ref 0–55)
AST SERPL-CCNC: 27 UNIT/L (ref 5–34)
BASOPHILS # BLD AUTO: 0.02 X10(3)/MCL (ref 0–0.2)
BASOPHILS NFR BLD AUTO: 0.2 %
BILIRUBIN DIRECT+TOT PNL SERPL-MCNC: 1.5 MG/DL
BUN SERPL-MCNC: 20.5 MG/DL (ref 8.4–25.7)
CALCIUM SERPL-MCNC: 9 MG/DL (ref 8.8–10)
CHLORIDE SERPL-SCNC: 106 MMOL/L (ref 98–107)
CO2 SERPL-SCNC: 19 MMOL/L (ref 23–31)
CREAT SERPL-MCNC: 1.27 MG/DL (ref 0.73–1.18)
EOSINOPHIL # BLD AUTO: 0.02 X10(3)/MCL (ref 0–0.9)
EOSINOPHIL NFR BLD AUTO: 0.2 %
ERYTHROCYTE [DISTWIDTH] IN BLOOD BY AUTOMATED COUNT: 20.7 % (ref 11.6–14.4)
FERRITIN SERPL-MCNC: 180.9 NG/ML (ref 21.81–274.66)
GFR SERPLBLD CREATININE-BSD FMLA CKD-EPI: >60 MLS/MIN/1.73/M2
GLOBULIN SER-MCNC: 1.9 GM/DL (ref 2.4–3.5)
GLUCOSE SERPL-MCNC: 116 MG/DL (ref 82–115)
HCT VFR BLD AUTO: 46.5 % (ref 42–52)
HGB BLD-MCNC: 15.8 GM/DL (ref 14–18)
IMM GRANULOCYTES # BLD AUTO: 0.05 X10(3)/MCL (ref 0–0.04)
IMM GRANULOCYTES NFR BLD AUTO: 0.5 %
IRON SATN MFR SERPL: 12 % (ref 20–50)
IRON SERPL-MCNC: 29 UG/DL (ref 65–175)
LACTATE SERPL-SCNC: 2.2 MMOL/L (ref 0.5–2.2)
LACTATE SERPL-SCNC: 2.6 MMOL/L (ref 0.5–2.2)
LYMPHOCYTES # BLD AUTO: 3.05 X10(3)/MCL (ref 0.6–4.6)
LYMPHOCYTES NFR BLD AUTO: 31.8 %
MAGNESIUM SERPL-MCNC: 1.7 MG/DL (ref 1.6–2.6)
MCH RBC QN AUTO: 32.8 PG
MCHC RBC AUTO-ENTMCNC: 34 MG/DL (ref 33–36)
MCV RBC AUTO: 96.7 FL (ref 80–94)
MONOCYTES # BLD AUTO: 0.78 X10(3)/MCL (ref 0.1–1.3)
MONOCYTES NFR BLD AUTO: 8.1 %
NEUTROPHILS # BLD AUTO: 5.68 X10(3)/MCL (ref 2.1–9.2)
NEUTROPHILS NFR BLD AUTO: 59.2 %
NRBC BLD AUTO-RTO: 0 % (ref 0–1)
PLATELET # BLD AUTO: 80 X10(3)/MCL (ref 140–371)
PMV BLD AUTO: 9.3 FL (ref 9.4–12.4)
POTASSIUM SERPL-SCNC: 5.1 MMOL/L (ref 3.5–5.1)
PROT SERPL-MCNC: 4.8 GM/DL (ref 5.8–7.6)
PTH-INTACT SERPL-MCNC: 147.1 PG/ML (ref 8.7–77)
RBC # BLD AUTO: 4.81 X10(6)/MCL (ref 4.7–6.1)
SODIUM SERPL-SCNC: 132 MMOL/L (ref 136–145)
TIBC SERPL-MCNC: 205 UG/DL (ref 69–240)
TIBC SERPL-MCNC: 234 UG/DL (ref 250–450)
VANCOMYCIN TROUGH SERPL-MCNC: 12.8 UG/ML (ref 15–20)
WBC # SPEC AUTO: 9.6 X10(3)/MCL (ref 4.5–11.5)

## 2023-01-01 PROCEDURE — 83605 ASSAY OF LACTIC ACID: CPT | Performed by: INTERNAL MEDICINE

## 2023-01-01 PROCEDURE — 63600175 PHARM REV CODE 636 W HCPCS: Performed by: STUDENT IN AN ORGANIZED HEALTH CARE EDUCATION/TRAINING PROGRAM

## 2023-01-01 PROCEDURE — 85025 COMPLETE CBC W/AUTO DIFF WBC: CPT | Performed by: INTERNAL MEDICINE

## 2023-01-01 PROCEDURE — 80053 COMPREHEN METABOLIC PANEL: CPT | Performed by: INTERNAL MEDICINE

## 2023-01-01 PROCEDURE — 36415 COLL VENOUS BLD VENIPUNCTURE: CPT | Performed by: INTERNAL MEDICINE

## 2023-01-01 PROCEDURE — 25000003 PHARM REV CODE 250: Performed by: GENERAL PRACTICE

## 2023-01-01 PROCEDURE — 80202 ASSAY OF VANCOMYCIN: CPT | Performed by: INTERNAL MEDICINE

## 2023-01-01 PROCEDURE — 27000221 HC OXYGEN, UP TO 24 HOURS

## 2023-01-01 PROCEDURE — 83970 ASSAY OF PARATHORMONE: CPT | Performed by: NURSE PRACTITIONER

## 2023-01-01 PROCEDURE — 83735 ASSAY OF MAGNESIUM: CPT | Performed by: INTERNAL MEDICINE

## 2023-01-01 PROCEDURE — 21400001 HC TELEMETRY ROOM

## 2023-01-01 PROCEDURE — 63600175 PHARM REV CODE 636 W HCPCS: Performed by: GENERAL PRACTICE

## 2023-01-01 PROCEDURE — 83550 IRON BINDING TEST: CPT | Performed by: NURSE PRACTITIONER

## 2023-01-01 PROCEDURE — 82728 ASSAY OF FERRITIN: CPT | Performed by: NURSE PRACTITIONER

## 2023-01-01 PROCEDURE — 63600175 PHARM REV CODE 636 W HCPCS: Performed by: INTERNAL MEDICINE

## 2023-01-01 PROCEDURE — 25000003 PHARM REV CODE 250: Performed by: INTERNAL MEDICINE

## 2023-01-01 PROCEDURE — 25000003 PHARM REV CODE 250: Performed by: NURSE PRACTITIONER

## 2023-01-01 RX ADMIN — PREDNISONE 5 MG: 5 TABLET ORAL at 08:01

## 2023-01-01 RX ADMIN — VANCOMYCIN HYDROCHLORIDE 1000 MG: 1 INJECTION, POWDER, LYOPHILIZED, FOR SOLUTION INTRAVENOUS at 10:01

## 2023-01-01 RX ADMIN — SODIUM CHLORIDE: 9 INJECTION, SOLUTION INTRAVENOUS at 12:01

## 2023-01-01 RX ADMIN — MEROPENEM 1 G: 1 INJECTION, POWDER, FOR SOLUTION INTRAVENOUS at 03:01

## 2023-01-01 RX ADMIN — METOPROLOL TARTRATE 50 MG: 50 TABLET, FILM COATED ORAL at 09:01

## 2023-01-01 RX ADMIN — CLINDAMYCIN PHOSPHATE 600 MG: 600 INJECTION, SOLUTION INTRAVENOUS at 06:01

## 2023-01-01 RX ADMIN — TACROLIMUS 1 MG: 1 CAPSULE ORAL at 04:01

## 2023-01-01 RX ADMIN — FLUCONAZOLE 400 MG: 2 INJECTION, SOLUTION INTRAVENOUS at 10:01

## 2023-01-01 RX ADMIN — CLINDAMYCIN PHOSPHATE 600 MG: 600 INJECTION, SOLUTION INTRAVENOUS at 05:01

## 2023-01-01 RX ADMIN — METOPROLOL TARTRATE 50 MG: 50 TABLET, FILM COATED ORAL at 08:01

## 2023-01-01 RX ADMIN — CLINDAMYCIN PHOSPHATE 600 MG: 600 INJECTION, SOLUTION INTRAVENOUS at 12:01

## 2023-01-01 RX ADMIN — CLINDAMYCIN PHOSPHATE 600 MG: 600 INJECTION, SOLUTION INTRAVENOUS at 10:01

## 2023-01-01 RX ADMIN — MEROPENEM 1 G: 1 INJECTION, POWDER, FOR SOLUTION INTRAVENOUS at 04:01

## 2023-01-01 RX ADMIN — SODIUM ZIRCONIUM CYCLOSILICATE 10 G: 10 POWDER, FOR SUSPENSION ORAL at 08:01

## 2023-01-01 RX ADMIN — TACROLIMUS 2 MG: 1 CAPSULE ORAL at 08:01

## 2023-01-01 RX ADMIN — SULFAMETHOXAZOLE AND TRIMETHOPRIM 2 TABLET: 800; 160 TABLET ORAL at 09:01

## 2023-01-01 RX ADMIN — APIXABAN 5 MG: 5 TABLET, FILM COATED ORAL at 09:01

## 2023-01-01 RX ADMIN — SODIUM BICARBONATE 650 MG TABLET 1300 MG: at 08:01

## 2023-01-01 RX ADMIN — Medication 400 MG: at 08:01

## 2023-01-01 RX ADMIN — FINASTERIDE 5 MG: 5 TABLET, FILM COATED ORAL at 09:01

## 2023-01-01 RX ADMIN — VANCOMYCIN HYDROCHLORIDE 1000 MG: 1 INJECTION, POWDER, LYOPHILIZED, FOR SOLUTION INTRAVENOUS at 08:01

## 2023-01-01 RX ADMIN — CLINDAMYCIN PHOSPHATE 600 MG: 600 INJECTION, SOLUTION INTRAVENOUS at 11:01

## 2023-01-01 RX ADMIN — Medication 2 CAPSULE: at 08:01

## 2023-01-01 RX ADMIN — SULFAMETHOXAZOLE AND TRIMETHOPRIM 2 TABLET: 800; 160 TABLET ORAL at 08:01

## 2023-01-01 RX ADMIN — APIXABAN 5 MG: 5 TABLET, FILM COATED ORAL at 10:01

## 2023-01-01 RX ADMIN — Medication 400 MG: at 09:01

## 2023-01-01 RX ADMIN — SULFAMETHOXAZOLE AND TRIMETHOPRIM 2 TABLET: 800; 160 TABLET ORAL at 02:01

## 2023-01-01 RX ADMIN — MEROPENEM 1 G: 1 INJECTION, POWDER, FOR SOLUTION INTRAVENOUS at 10:01

## 2023-01-01 RX ADMIN — Medication 2 CAPSULE: at 12:01

## 2023-01-01 RX ADMIN — ENOXAPARIN SODIUM 90 MG: 100 INJECTION SUBCUTANEOUS at 08:01

## 2023-01-01 RX ADMIN — Medication 2 CAPSULE: at 04:01

## 2023-01-01 RX ADMIN — SODIUM BICARBONATE 650 MG TABLET 1300 MG: at 09:01

## 2023-01-01 NOTE — PROGRESS NOTES
Pharmacokinetic Assessment Follow Up: IV Vancomycin    Vancomycin serum concentration assessment(s):    The trough level was drawn correctly and can be used to guide therapy at this time. The measurement is within the desired definitive target range of 10 to 20 mcg/mL.    Vancomycin Regimen Plan:    Continue regimen to Vancomycin 1000 mg IV every 12 hours with next serum trough concentration measured at 07:00 prior to 6th dose on 01/02    Drug levels (last 3 results):  Recent Labs   Lab Result Units 01/01/23  0619   Vancomycin Trough ug/ml 12.8*       Pharmacy will continue to follow and monitor vancomycin.    Please contact pharmacy at extension [phone] for questions regarding this assessment.    Thank you for the consult,   Karena Humphreys       Patient brief summary:  Ronal Mazariegos is a 72 y.o. male initiated on antimicrobial therapy with IV Vancomycin for treatment of skin & soft tissue infection    The patient's current regimen is 1000mg q12h    Drug Allergies:   Review of patient's allergies indicates:  No Known Allergies    Actual Body Weight:   88.9kg    Renal Function:   Estimated Creatinine Clearance: 50.6 mL/min (A) (based on SCr of 1.58 mg/dL (H)).,     Dialysis Method (if applicable):  N/A (kidney transplant )    CBC (last 72 hours):  Recent Labs   Lab Result Units 12/30/22  1356 01/01/23  0619   WBC x10(3)/mcL 8.9 9.6   Hgb gm/dL 17.2 15.8   Hct % 51.7 46.5   Platelet x10(3)/mcL 105* 80*   Mono % % 7.2 8.1   Eos % % 0.0 0.2   Basophil % % 0.3 0.2       Metabolic Panel (last 72 hours):  Recent Labs   Lab Result Units 12/30/22  1550 12/31/22  1316 12/31/22  2030   Sodium Level mmol/L 135*  --   --    Urine Sodium mmol/L  --   --  55.0   Potassium Level mmol/L 5.1  --   --    Chloride mmol/L 102  --   --    Carbon Dioxide mmol/L 20*  --   --    Glucose Level mg/dL 108  --   --    Blood Urea Nitrogen mg/dL 21.0  --   --    Creatinine mg/dL 1.58*  --   --    Urine Creatinine mg/dL  --   --  32.8*   Albumin  Level g/dL 3.8  --   --    Bilirubin Total mg/dL 1.9*  --   --    Alkaline Phosphatase unit/L 82  --   --    Aspartate Aminotransferase unit/L 37*  --   --    Alanine Aminotransferase unit/L 42  --   --    Phosphorus Level mg/dL  --  3.2  --        Vancomycin Administrations:  vancomycin given in the last 96 hours                     vancomycin in dextrose 5 % 1 gram/250 mL IVPB 1,000 mg (mg) 1,000 mg New Bag 01/01/23 0826     1,000 mg New Bag 12/31/22 1757     1,000 mg New Bag  0518    vancomycin (VANCOCIN) 1,750 mg in dextrose 5 % 500 mL IVPB (mg) 1,750 mg New Bag 12/30/22 1654                    Microbiologic Results:  Microbiology Results (last 7 days)       Procedure Component Value Units Date/Time    Anaerobic Culture [[id number]] Collected: 12/30/22 2118    Order Status: Completed Specimen: Wound from Arm, Left Updated: 01/01/23 0740     Anaerobe Culture No Anaerobes Isolated    Anaerobic Culture [[id number]] Collected: 12/30/22 2123    Order Status: Completed Specimen: Tissue from Arm, Left Updated: 01/01/23 0737     Anaerobe Culture No Anaerobes Isolated    Blood Culture [[id number]]  (Normal) Collected: 12/30/22 1309    Order Status: Completed Specimen: Blood Updated: 12/31/22 1500     CULTURE, BLOOD ([facility]) No Growth At 24 Hours    Blood Culture [[id number]]  (Normal) Collected: 12/30/22 1309    Order Status: Completed Specimen: Blood Updated: 12/31/22 1500     CULTURE, BLOOD ([facility]) No Growth At 24 Hours    Cryptococcal antigen, blood [[id number]] Collected: 12/31/22 1029    Order Status: Sent Specimen: Blood, Venous Updated: 12/31/22 1040    KOH Prep [[id number]] Collected: 12/30/22 2123    Order Status: Completed Specimen: Tissue from Arm, Left Updated: 12/31/22 1001     KOH Prep No fungal elements seen     Comment: Corrected results to No fungal elements seen previous report was Budding yeast notified to Dr Stevenson       Mycobacteria and Nocardia Culture [[id number]]     Order Status: Canceled Specimen: Tissue      AFB Smear [[id number]] Collected: 12/30/22 2123    Order Status: Completed Specimen: Tissue from Arm, Left Updated: 12/31/22 0904     AFB Smear No AFB seen (Direct smear only)    AFB Smear [[id number]] Collected: 12/30/22 2118    Order Status: Completed Specimen: Wound from Arm, Left Updated: 12/31/22 0904     AFB Smear No AFB seen (Direct smear only)    Gram Stain [[id number]] Collected: 12/30/22 2118    Order Status: Completed Specimen: Wound from Arm, Left Updated: 12/31/22 0756     GRAM STAIN Rare WBC observed      No bacteria seen    Gram Stain [[id number]] Collected: 12/30/22 2123    Order Status: Completed Specimen: Tissue from Arm, Left Updated: 12/31/22 0755     GRAM STAIN Few WBC observed      No bacteria seen    Wound Culture [[id number]] Collected: 12/30/22 2118    Order Status: Completed Specimen: Wound from Arm, Left Updated: 12/31/22 0752     Wound Culture No Growth At 24 Hours    Tissue Culture - Aerobic [[id number]] Collected: 12/30/22 2123    Order Status: Completed Specimen: Tissue from Arm, Left Updated: 12/31/22 0749     CULTURE, TISSUE- AEROBIC ([facility]) No Growth At 24 Hours    Fungal Culture [[id number]] Collected: 12/30/22 2118    Order Status: Sent Specimen: Wound from Arm, Left Updated: 12/30/22 2142    Mycobacteria and Nocardia Culture [[id number]] Collected: 12/30/22 2118    Order Status: Sent Specimen: Wound from Arm, Left Updated: 12/30/22 2142    Fungal Culture [[id number]] Collected: 12/30/22 2123    Order Status: Sent Specimen: Tissue from Arm, Left Updated: 12/30/22 2142

## 2023-01-01 NOTE — PROGRESS NOTES
Nephrology Initial Consult Note    Patient Name: Ronal Mazariegos  Age: 72 y.o.  : 1950  MRN: [id number]  Admission Date: 2022    Reason for Consult:      Medical Management  Chief Complaint   Patient presents with    Arm Injury     Pt states that he has abrasion to left forearm a week ago. Since then he has had swelling pain and discoloration to arm. Dialysis fistula also noted to affected extremity. States that he was evaluated for clots and was negative. Pt reports HTN at home. Pt hypertensive and tachycardic in triage. Hx of afib and kidney transplant         Self, Aaareferral    HPI:     Ronal Mazariegos is a 72 y.o. male presented to the ER with forearm pain swelling and discoloration following abrasion approximately 1 week ago.  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  He has PMH significant for CMV viremia, polycystic kidney disease status post renal transplant 2021 he is on immunosuppressants.  He was on prophylactic antibiotics as well.  On presentation to the ER he was also tachycardic in AFib with RVR, hypertensive.  CT scan of the left forearm obtained showed subcutaneous edema and small amount of subcutaneous emphysema.  Started on broad-spectrum antibiotics, cultures were obtained and general surgery was consulted for I&D.  He is noted to have elevated renal indices and we were consulted for management of DEBORAH.    He underwent surgical debridement of the arm.  He is on vancomycin and clindamycin.  He was also started on meropenem id recommended continuing TMP/SMX.  Cefepime was discontinued.   Checking serologies for histo, blasto, crypto, cocci.  He states he is urinating a lot.  We will need strict intake and output.  He did eat and drink well today but states this is the 1st time in a few days then he is eating well.  He denies any nausea vomiting or chest pain.  He does have some mild intermittent shortness breath.    23-am labs still  pending. 1100cc UO last 24h.  Transplant Doppler ultrasound reported resistive indices within normal limits at 0.6 and 0.7.  He says appetite is coming back he is eating well now.  And he denies any nausea or vomiting.  He has some mild shortness of breath with exertion but otherwise denies issues.    Current Facility-Administered Medications   Medication Dose Route Frequency Provider Last Rate Last Admin    0.9%  NaCl infusion   Intravenous Continuous ANT Sharma 75 mL/hr at 01/01/23 0010 New Bag at 01/01/23 0010    acetaminophen tablet 650 mg  650 mg Oral Q6H PRN Jake Melton MD        apixaban tablet 5 mg  5 mg Oral BID Hawa Trent MD   5 mg at 01/01/23 1012    clindamycin in D5W 600 mg/50 mL IVPB 600 mg  600 mg Intravenous Q6H Hawa Trent MD   Stopped at 01/01/23 0658    finasteride tablet 5 mg  5 mg Oral QHS Bong Dudley MD   5 mg at 12/31/22 2049    fluconazole (DIFLUCAN) IVPB 400 mg  400 mg Intravenous Q24H Ayo Stevenson  mL/hr at 01/01/23 1012 400 mg at 01/01/23 1012    HYDROmorphone (PF) injection 0.2 mg  0.2 mg Intravenous Q5 Min PRN Will MD Sumi        HYDROmorphone (PF) injection 0.2 mg  0.2 mg Intravenous Q6H PRN Jake Melton MD        Lactobacillus acidophilus capsule 2 capsule  2 capsule Oral TID WM Hawa Trent MD   2 capsule at 01/01/23 0825    magnesium oxide tablet 400 mg  400 mg Oral BID Bong Dudley MD   400 mg at 01/01/23 0825    meropenem (MERREM) 1 g in sodium chloride 0.9 % 100 mL IVPB (MB+)  1 g Intravenous Q8H Ayo Stevenson MD   Stopped at 01/01/23 1113    metoprolol tartrate (LOPRESSOR) tablet 50 mg  50 mg Oral BID Bong Dudley MD   50 mg at 01/01/23 0825    ondansetron injection 4 mg  4 mg Intravenous Daily PRN Will MD Sumi        oxyCODONE immediate release tablet 10 mg  10 mg Oral Q6H PRN Jake Melton MD        oxyCODONE immediate release tablet 5 mg  5 mg Oral Q6H PRN Jake Melton MD        predniSONE tablet 5 mg  5  mg Oral Daily Harris T Khalif, DO   5 mg at 01/01/23 0825    sodium bicarbonate tablet 1,300 mg  1,300 mg Oral BID Bong Dudley MD   1,300 mg at 01/01/23 0825    sodium chloride 0.9% flush 10 mL  10 mL Intravenous PRN Scar Jonas MD        sodium zirconium cyclosilicate packet 10 g  10 g Oral Daily Mercedes FuANT   10 g at 01/01/23 0825    sulfamethoxazole-trimethoprim 800-160mg per tablet 2 tablet  2 tablet Oral TID Bong Dudley MD   2 tablet at 01/01/23 0825    tacrolimus capsule 1 mg  1 mg Oral Daily PM Harris T Khalif, DO   1 mg at 12/31/22 1841    tacrolimus capsule 2 mg  2 mg Oral Daily AM Harris JOSE Cuadra, DO   2 mg at 01/01/23 0825    vancomycin - pharmacy to dose   Intravenous pharmacy to manage frequency Bong Dudley MD        vancomycin in dextrose 5 % 1 gram/250 mL IVPB 1,000 mg  1,000 mg Intravenous Q12H Bong Dudley MD   Stopped at 01/01/23 0956       Stephen Hartman MD    Past Medical History:   Diagnosis Date    Anasarca 10/1/2021    Anemia of chronic disease     Atrial fibrillation     BPH (benign prostatic hypertrophy)     Chronic systolic heart failure 10/1/2021    CKD (chronic kidney disease) stage 2, GFR 60-89 ml/min     Hepatitis C virus infection cured after antiviral drug therapy     acquired through kidney transplant, treated / cured (SVR12 - 12/2021)    HTN (hypertension)     HTN (hypertension)     Polycystic kidney disease       Past Surgical History:   Procedure Laterality Date    AV FISTULA PLACEMENT Left 2016    CATARACT EXTRACTION, BILATERAL Bilateral     CRANIOTOMY Right 2/16/2022    Procedure: CRANIOTOMY;  Surgeon: Sunday Rosa DO;  Location: [facility] OR [address] [place] [address];  Service: Neurosurgery;  Laterality: Right;  R craniotomy for abscess drainage    KIDNEY TRANSPLANT N/A 5/4/2021    Procedure: TRANSPLANT, KIDNEY;  Surgeon: Lexy Bolden MD;  Location: [facility] OR [address] [place] [address];  Service: Transplant;  Laterality: N/A;      Family History    Problem Relation Age of Onset    Polycystic kidney disease Father     Hypertension Father     Kidney disease Father     Polycystic kidney disease Sister     Hypertension Sister     Kidney disease Sister      Social History     Tobacco Use    Smoking status: Former     Packs/day: 2.00     Years: 10.00     Pack years: 20.00     Types: Cigarettes     Start date: 1972     Quit date: 1984     Years since quittin.0    Smokeless tobacco: Never   Substance Use Topics    Alcohol use: No     Comment: Pt reportsbeing a former beer drinker (2-3 cans per day) and quitting in .     Medications Prior to Admission   Medication Sig Dispense Refill Last Dose    apixaban (ELIQUIS) 5 mg Tab Take 1 tablet (5 mg total) by mouth 2 (two) times daily. DO NOT RESTART UNTIL APPROVED BY NEUROSURGERY 60 tablet 11 2022    ergocalciferol (ERGOCALCIFEROL) 50,000 unit Cap Take 1 capsule (50,000 Units total) by mouth every 7 days. X 12 months (Patient taking differently: Take 50,000 Units by mouth every 7 days. X 12 months  ) 12 capsule 3 2022    finasteride (PROSCAR) 5 mg tablet Take 5 mg by mouth every evening.   2022    fish oil-omega-3 fatty acids 300-1,000 mg capsule Take 1 capsule by mouth every evening.   2022    fluticasone propionate (FLONASE) 50 mcg/actuation nasal spray daily as needed.   2022    magnesium oxide (MAG-OX) 400 mg (241.3 mg magnesium) tablet TAKE 2 TABLETS BY MOUTH THREE TIMES DAILY (Patient taking differently: Take 2,000 mg by mouth 2 (two) times daily. 4 TABLETS TWICE DAILY) 180 tablet 10 2022    metoprolol tartrate (LOPRESSOR) 50 MG tablet Take 1 tablet (50 mg total) by mouth 2 (two) times daily. 60 tablet 11 2022    multivitamin Tab Take 1 tablet by mouth once daily.   2022    predniSONE (DELTASONE) 5 MG tablet Take by mouth daily. 5/7-6/6 20 mg, 6/7-7/6 15 mg, 7/7-8/6 10 mg, 5 mg daily thereafter starting 21 (Patient taking differently: Take  by mouth every morning.) 120 tablet 11 12/30/2022    sodium bicarbonate 650 MG tablet Take 2 tablets (1,300 mg total) by mouth 2 (two) times daily. 120 tablet 11 12/30/2022    sodium polystyrene (KAYEXALATE) powder Mix 8 LEVEL TEASPOONS (30grams total) in 120mL water and drink by mouth once daily x 3 days,then TAKE AS DIRECTED BY TRANSPLANT (for high potassium). 454 g 2 12/29/2022    sodium zirconium cyclosilicate (LOKELMA) 10 gram packet Take 1 packet (10 g total) by mouth 2 (two) times a day for 2 days, THEN 1 packet (10 g total) once daily. Mix entire contents of packet(s) into drinking glass containing 3 tablespoons of water; stir well and drink immediately. Add water and repeat until no powder remains to receive entire dose.. 34 packet 3 12/29/2022    sulfamethoxazole-trimethoprim 800-160mg (BACTRIM DS) 800-160 mg Tab Take 2 tablets by mouth 3 (three) times daily. 180 tablet 11 12/30/2022    tacrolimus (PROGRAF) 1 MG Cap Take 2 capsules (2 mg total) by mouth every morning AND 1 capsule (1 mg total) every evening. Z94.0;Kidney txp on 5/4/2021. 90 capsule 11 12/30/2022    tedizolid (SIVEXTRO) 200 mg Tab Take 1 tablet  (200 mg total) by mouth once daily. 30 tablet 3 12/30/2022    LORazepam (ATIVAN) 0.5 MG tablet Take 1 tablet (0.5 mg total) by mouth once. Take 1 tab 20-30 minutes prior to MRI; may repeat if needed for 1 dose 2 tablet 0      Review of patient's allergies indicates:  No Known Allergies         Review of Systems:     Comprehensive 10pt ROS negative except as noted per HPI.         Objective:       VITAL SIGNS: 24 HR MIN & MAX LAST    Temp  Min: 98 °F (36.7 °C)  Max: 98.6 °F (37 °C)  98.6 °F (37 °C)        BP  Min: 130/80  Max: 154/89  (!) 132/92     Pulse  Min: 100  Max: 115  109     Resp  Min: 18  Max: 23  18    SpO2  Min: 95 %  Max: 97 %  95 %      GEN:  Pleasant, sitting up in bed, in NAD  HEENT: Conjunctiva anicteric, pupils equal  CV: RRR +S1,S2 without murmur  PULM: CTAB, unlabored  ABD: Soft,  NT/ND abdomen with NABS  EXT: No cyanosis or edema; occlusive dressing to left forearm clean dry and intact some mild swelling noted to the are  SKIN: Warm and dry  PSYCH:  No focal neurological deficits  Vascular access:  24h I&O:   Intake/Output Summary (Last 24 hours) at 1/1/2023 1109  Last data filed at 12/31/2022 1500  Gross per 24 hour   Intake 1060 ml   Output 1100 ml   Net -40 ml                Component Value Date/Time     (L) 12/30/2022 1550     12/27/2022 0650     11/30/2022 0758     08/23/2022 0726    K 5.1 12/30/2022 1550    K 5.4 (H) 12/27/2022 0650    K 5.5 (H) 11/30/2022 0758    K 4.9 08/23/2022 0726     11/30/2022 0758     08/23/2022 0726    CO2 20 (L) 12/30/2022 1550    CO2 22 (L) 12/27/2022 0650    CO2 20 (L) 11/30/2022 0758    CO2 20 (L) 08/23/2022 0726    BUN 21.0 12/30/2022 1550    BUN 17.4 12/27/2022 0650    BUN 20 11/30/2022 0758    BUN 18 08/23/2022 0726    CREATININE 1.58 (H) 12/30/2022 1550    CREATININE 1.72 (H) 12/27/2022 0650    CREATININE 1.6 (H) 11/30/2022 0758    CREATININE 1.7 (H) 08/23/2022 0726    CALCIUM 9.6 12/30/2022 1550    CALCIUM 9.7 12/27/2022 0650    CALCIUM 9.9 11/30/2022 0758    CALCIUM 9.8 08/23/2022 0726    PHOS 3.2 12/31/2022 1316    PHOS 2.9 11/30/2022 0758            Component Value Date/Time    WBC 9.6 01/01/2023 0619    WBC 8.9 12/30/2022 1356    WBC 5.62 04/21/2022 1009    WBC 6.76 02/25/2022 0339    HGB 15.8 01/01/2023 0619    HGB 17.2 12/30/2022 1356    HGB 14.6 04/21/2022 1009    HGB 15.0 02/25/2022 0339    HCT 46.5 01/01/2023 0619    HCT 51.7 12/30/2022 1356    HCT 47.4 04/21/2022 1009    HCT 46.2 02/25/2022 0339    HCT 44 02/17/2022 0211    HCT 32 (L) 05/04/2021 2156    PLT 80 (L) 01/01/2023 0619     (L) 12/30/2022 1356     04/21/2022 1009     02/25/2022 0339         US Transplant Kidney With Doppler   Final Result      Resistive indices within normal limits at 0.6 and 0.7.         Electronically  signed by: Jose Antonio Frye   Date:    01/01/2023   Time:    10:17      CT Forearm (Radius/Ulna) Without Contrast Left   Final Result      Subcutaneous edema with small amount of subcutaneous emphysema in the forearm, may be infectious.  No drainable fluid collection identified.         Electronically signed by: Karoline Gilbert   Date:    12/30/2022   Time:    16:54      X-Ray Forearm Left   Final Result      Soft tissue swelling and pockets of subcu 2 gas which might be related to an infection as above         Electronically signed by: Quentin Russo   Date:    12/30/2022   Time:    14:44          Assessment / Plan:       Active Hospital Problems    Diagnosis  POA    *Left arm cellulitis [L03.114]  Yes      Resolved Hospital Problems   No resolved problems to display.     1. Polycystic kidney disease, status post renal transplant 05/20/2021--on immunosuppressants and prophylactic antibiotics   2. DEBORAH  3. Paroxysmal atrial fibrillation on Eliquis    4. History of HCV infection 2021  5. History of CMV viremia  6. Heart failure with EF 50% (02/2022)  7. Left arm cellulitis, s/p I&D of hyperkalemia.  8. Hyperkalemia     He had good urine output.  States he is feeling much better today.  We will continue present treatment for now.  Pending chemistries we will determine if we need to adjust with regard to IV hydration.    Mercedes Fu NP

## 2023-01-01 NOTE — PROGRESS NOTES
[facility]  Hospital Medicine Progress Note        CHIEF COMPLAINT   Arm Injury (Pt states that he has abrasion to left forearm a week ago. Since then he has had swelling pain and discoloration to arm. Dialysis fistula also noted to affected extremity. States that he was evaluated for clots and was negative. Pt reports HTN at home. Pt hypertensive and tachycardic in triage. Hx of afib and kidney transplant)        HISTORY OF PRESENT ILLNESS:   Patient is a 72-year-old male with past medical history below most significant for polycystic kidney disease status post renal transplant 05/20/2021 on immunosuppressants and prophylactic antibiotics who presents to the ER secondary to forearm pain, swelling and discoloration after having an abrasion a week ago.  He is a complicated history including a brain abscess earlier this year that required evacuation/craniotomy, prior history of CMV viremia likely all due to his immunosuppressed state.     He arrived to the ER tachycardic in AFib with RVR, hypertensive maintaining normal sats on room air.  Lactic acid was noted to be elevated at 5, remainder of laboratory work was unremarkable.  A CT of his left forearm was obtained and showed subcutaneous edema and a small amount of subcutaneous emphysema.  He was started on broad-spectrum antibiotics and blood cultures were obtained, and consultation placed to General surgery who took the patient to the OR for incision and drainage.     Patient currently being managed for light upper extremity necrotizing fasciitis in an immunocompromised patient.      Today's information  Patient seen and examined at bedside, wife at bedside   Patient complaining of multiple blood sticks.    Left upper extremity redness and swelling is improving  Patient was afebrile.  No leukocytosis   Lactic acid still elevated at 2.6 , however will stop q6 trend, and  repeat in the morning, showing a downward trend   Appreciate ID  input patient currently on meropenem vancomycin and clindamycin, fluconazole  Patient was status post I and D by surgery on 12/31 with no much findings of what was described on CT read.    Resume home dose Eliquis and discontinue Lovenox   Appreciate nephrology input to resume home dose immunosuppressants       Exam  GENERAL: awake, alert, oriented and in no acute distress, non-toxic appearing   HEENT: normocephalic atraumatic   NECK: supple   LUNGS: Clear bilaterally, no wheezing or rales, no accessory muscle use   CVS:  Irregularly irregular, normal peripheral perfusion  ABD: Soft, non-tender, non-distended, bowel sounds present  EXTREMITIES: no clubbing or cyanosis, left arm wrapped in surgical bandage  SKIN: Warm, dry.   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    Resume home dose Eliquis and discontinue Lovenox   Appreciate nephrology input to resume home dose immunosuppressants   f/up wound cultures , blood cx x 2     DVT prophylaxis: resume eliquis  Code status: full                     Critical Care diagnoses necrotizing fasciitis   Critical care time  greater than 35 minutes    VITAL SIGNS: 24 HRS MIN & MAX LAST   Temp  Min: 97.9 °F (36.6 °C)  Max: 98.6 °F (37 °C) 97.9 °F (36.6 °C)   BP  Min: 130/80  Max: 154/89 (!) 132/98     Pulse  Min: 100  Max: 115  107   Resp  Min: 18  Max: 23 (!) 22     SpO2  Min: 95 %  Max: 97 % 97 %       Recent Labs   Lab 12/27/22  0650 12/30/22  1356 01/01/23  0619   WBC 5.4 8.9 9.6   RBC 4.98 5.25 4.81   HGB 16.6 17.2 15.8   HCT 52.1* 51.7 46.5   .6* 98.5* 96.7*   MCH 33.3 32.8 32.8   MCHC 31.9* 33.3 34.0   RDW 22.2* 21.5* 20.7*   PLT 92* 105* 80*   MPV 9.8 10.1 9.3*       Recent Labs   Lab 12/27/22  0650 12/30/22  1550 01/01/23  0956    135* 132*   K 5.4* 5.1 5.1   CO2 22* 20* 19*   BUN 17.4 21.0 20.5   CREATININE 1.72* 1.58* 1.27*   CALCIUM 9.7 9.6 9.0   MG 1.80  --  1.70   ALBUMIN 3.7 3.8 2.9*   ALKPHOS 82 82 65   ALT 37 42 27   AST 41* 37* 27   BILITOT 1.3 1.9* 1.5          Microbiology Results (last 7 days)       Procedure Component Value Units Date/Time    Blood Culture [[id number]]  (Normal) Collected: 12/30/22 1309    Order Status: Completed Specimen: Blood Updated: 01/01/23 1500     CULTURE, BLOOD ([facility]) No Growth At 48 Hours    Blood Culture [[id number]]  (Normal) Collected: 12/30/22 1309    Order Status: Completed Specimen: Blood Updated: 01/01/23 1500     CULTURE, BLOOD ([facility]) No Growth At 48 Hours    Tissue Culture - Aerobic [[id number]]  (Abnormal) Collected: 12/30/22 2123    Order Status: Completed Specimen: Tissue from Arm, Left Updated: 01/01/23 0924     CULTURE, TISSUE- AEROBIC ([facility]) Few Gram-negative Rods    Anaerobic Culture [[id number]] Collected: 12/30/22 2118    Order Status: Completed Specimen: Wound from Arm, Left Updated: 01/01/23 0740     Anaerobe Culture No Anaerobes Isolated    Anaerobic Culture [[id number]] Collected: 12/30/22 2123    Order Status: Completed Specimen: Tissue from Arm, Left Updated: 01/01/23 0737     Anaerobe Culture No Anaerobes Isolated    Cryptococcal antigen, blood [[id number]]  Collected: 12/31/22 1029    Order Status: Sent Specimen: Blood, Venous Updated: 12/31/22 1040    KOH Prep [[id number]] Collected: 12/30/22 2123    Order Status: Completed Specimen: Tissue from Arm, Left Updated: 12/31/22 1001     KOH Prep No fungal elements seen     Comment: Corrected results to No fungal elements seen previous report was Budding yeast notified to Dr Stevenson       Mycobacteria and Nocardia Culture [[id number]]     Order Status: Canceled Specimen: Tissue     AFB Smear [[id number]] Collected: 12/30/22 2123    Order Status: Completed Specimen: Tissue from Arm, Left Updated: 12/31/22 0904     AFB Smear No AFB seen (Direct smear only)    AFB Smear [[id number]] Collected: 12/30/22 2118    Order Status: Completed Specimen: Wound from Arm, Left Updated: 12/31/22 0904     AFB Smear No AFB seen (Direct smear only)    Gram Stain [[id number]] Collected: 12/30/22 2118    Order Status: Completed Specimen: Wound from Arm, Left Updated: 12/31/22 0756     GRAM STAIN Rare WBC observed      No bacteria seen    Gram Stain [[id number]] Collected: 12/30/22 2123    Order Status: Completed Specimen: Tissue from Arm, Left Updated: 12/31/22 0755     GRAM STAIN Few WBC observed      No bacteria seen    Wound Culture [[id number]] Collected: 12/30/22 2118    Order Status: Completed Specimen: Wound from Arm, Left Updated: 12/31/22 0752     Wound Culture No Growth At 24 Hours    Fungal Culture [[id number]] Collected: 12/30/22 2118    Order Status: Sent Specimen: Wound from Arm, Left Updated: 12/30/22 2142    Mycobacteria and Nocardia Culture [[id number]] Collected: 12/30/22 2118    Order Status: Sent Specimen: Wound from Arm, Left Updated: 12/30/22 2142    Fungal Culture [[id number]] Collected: 12/30/22 2123    Order Status: Sent Specimen: Tissue from Arm, Left Updated: 12/30/22 2142             See below for Radiology    Scheduled Med:   apixaban  5 mg Oral BID    clindamycin (CLEOCIN) IVPB  600 mg Intravenous Q6H    finasteride  5 mg Oral  QHS    fluconazole (DIFLUCAN) IV (PEDS and ADULTS)  400 mg Intravenous Q24H    Lactobacillus acidophilus  2 capsule Oral TID WM    magnesium oxide  400 mg Oral BID    meropenem (MERREM) IVPB  1 g Intravenous Q8H    metoprolol tartrate  50 mg Oral BID    predniSONE  5 mg Oral Daily    sodium bicarbonate  1,300 mg Oral BID    sodium zirconium cyclosilicate  10 g Oral Daily    sulfamethoxazole-trimethoprim 800-160mg  2 tablet Oral TID    tacrolimus  1 mg Oral Daily PM    tacrolimus  2 mg Oral Daily AM    vancomycin (VANCOCIN) IVPB  1,000 mg Intravenous Q12H        Continuous Infusions:   sodium chloride 0.9% 75 mL/hr at 01/01/23 0010        PRN Meds:  acetaminophen, HYDROmorphone, HYDROmorphone, ondansetron, oxyCODONE, oxyCODONE, sodium chloride 0.9%, Pharmacy to dose Vancomycin consult **AND** vancomycin - pharmacy to dose         VTE prophylaxis:     Patient condition:  Stable/Fair/Guarded/ Serious/ Critical    Anticipated discharge and Disposition:         All diagnosis and differential diagnosis have been reviewed; assessment and plan has been documented; I have personally reviewed the labs and test results that are presently available; I have reviewed the patients medication list; I have reviewed the consulting providers response and recommendations. I have reviewed or attempted to review medical records based upon their availability    All of the patient's questions have been  addressed and answered. Patient's is agreeable to the above stated plan. I will continue to monitor closely and make adjustments to medical management as needed.  _____________________________________________________________________    Nutrition Status:    Radiology:  US Transplant Kidney With Doppler  Narrative: EXAMINATION:  US TRANSPLANT KIDNEY WITH DOPPLER    CLINICAL HISTORY:  flaco s/p renal transplant;    TECHNIQUE:  Serial sonographic images were obtained of the transplant kidney.  Arterial Doppler was  obtained.    COMPARISON:  None    FINDINGS:  The right kidney measures 10.7 x 5.5 x 4.7 cm.  Resistive indices of the segmental arteries of approximately 0.6.  The renal artery versus Devitt disease is 0.71.  The iliac artery velocity of 90.3 cm a 2nd.  There is no hydronephrosis.  There is no stone appreciated.  The bladder is unremarkable.  Impression: Resistive indices within normal limits at 0.6 and 0.7.    Electronically signed by: Jose Antonio Frye  Date:    01/01/2023  Time:    10:17      Hawa Trent MD   01/01/2023

## 2023-01-02 ENCOUNTER — PATIENT MESSAGE (OUTPATIENT)
Dept: TRANSPLANT | Facility: CLINIC | Age: 73
End: 2023-01-02
Payer: MEDICARE

## 2023-01-02 LAB
ANION GAP SERPL CALC-SCNC: 6 MEQ/L
BACTERIA SPEC ANAEROBE CULT: NORMAL
BACTERIA SPEC ANAEROBE CULT: NORMAL
BACTERIA SPEC CULT: ABNORMAL
BACTERIA SPEC CULT: NO GROWTH
BASOPHILS # BLD AUTO: 0.02 X10(3)/MCL (ref 0–0.2)
BASOPHILS NFR BLD AUTO: 0.4 %
BUN SERPL-MCNC: 18.8 MG/DL (ref 8.4–25.7)
CALCIUM SERPL-MCNC: 8.8 MG/DL (ref 8.8–10)
CHLORIDE SERPL-SCNC: 107 MMOL/L (ref 98–107)
CO2 SERPL-SCNC: 21 MMOL/L (ref 23–31)
CREAT SERPL-MCNC: 1.22 MG/DL (ref 0.73–1.18)
CREAT/UREA NIT SERPL: 15
CRYPTOC AG SER QL IA.RAPID: NEGATIVE
CRYPTOC AG TITR CSF IA: NORMAL {TITER}
EOSINOPHIL # BLD AUTO: 0.07 X10(3)/MCL (ref 0–0.9)
EOSINOPHIL NFR BLD AUTO: 1.3 %
ERYTHROCYTE [DISTWIDTH] IN BLOOD BY AUTOMATED COUNT: 20.4 % (ref 11.6–14.4)
GFR SERPLBLD CREATININE-BSD FMLA CKD-EPI: >60 MLS/MIN/1.73/M2
GLUCOSE SERPL-MCNC: 85 MG/DL (ref 82–115)
HCT VFR BLD AUTO: 44.6 % (ref 42–52)
HGB BLD-MCNC: 15 GM/DL (ref 14–18)
IMM GRANULOCYTES # BLD AUTO: 0.03 X10(3)/MCL (ref 0–0.04)
IMM GRANULOCYTES NFR BLD AUTO: 0.5 %
LACTATE SERPL-SCNC: 1.3 MMOL/L (ref 0.5–2.2)
LYMPHOCYTES # BLD AUTO: 1.74 X10(3)/MCL (ref 0.6–4.6)
LYMPHOCYTES NFR BLD AUTO: 31.4 %
MCH RBC QN AUTO: 33.1 PG
MCHC RBC AUTO-ENTMCNC: 33.6 MG/DL (ref 33–36)
MCV RBC AUTO: 98.5 FL (ref 80–94)
MONOCYTES # BLD AUTO: 0.59 X10(3)/MCL (ref 0.1–1.3)
MONOCYTES NFR BLD AUTO: 10.6 %
NEUTROPHILS # BLD AUTO: 3.1 X10(3)/MCL (ref 2.1–9.2)
NEUTROPHILS NFR BLD AUTO: 55.8 %
NRBC BLD AUTO-RTO: 0 % (ref 0–1)
PLATELET # BLD AUTO: 87 X10(3)/MCL (ref 140–371)
PMV BLD AUTO: 9.9 FL (ref 9.4–12.4)
POTASSIUM SERPL-SCNC: 4.6 MMOL/L (ref 3.5–5.1)
RBC # BLD AUTO: 4.53 X10(6)/MCL (ref 4.7–6.1)
SODIUM SERPL-SCNC: 134 MMOL/L (ref 136–145)
VANCOMYCIN TROUGH SERPL-MCNC: 20.3 UG/ML (ref 15–20)
WBC # SPEC AUTO: 5.6 X10(3)/MCL (ref 4.5–11.5)

## 2023-01-02 PROCEDURE — 63600175 PHARM REV CODE 636 W HCPCS: Performed by: GENERAL PRACTICE

## 2023-01-02 PROCEDURE — 80202 ASSAY OF VANCOMYCIN: CPT | Performed by: INTERNAL MEDICINE

## 2023-01-02 PROCEDURE — 99223 1ST HOSP IP/OBS HIGH 75: CPT | Mod: ,,, | Performed by: GENERAL PRACTICE

## 2023-01-02 PROCEDURE — 83605 ASSAY OF LACTIC ACID: CPT | Performed by: INTERNAL MEDICINE

## 2023-01-02 PROCEDURE — 85025 COMPLETE CBC W/AUTO DIFF WBC: CPT | Performed by: NURSE PRACTITIONER

## 2023-01-02 PROCEDURE — 80048 BASIC METABOLIC PNL TOTAL CA: CPT | Performed by: INTERNAL MEDICINE

## 2023-01-02 PROCEDURE — 63600175 PHARM REV CODE 636 W HCPCS: Performed by: STUDENT IN AN ORGANIZED HEALTH CARE EDUCATION/TRAINING PROGRAM

## 2023-01-02 PROCEDURE — 25000003 PHARM REV CODE 250: Performed by: NURSE PRACTITIONER

## 2023-01-02 PROCEDURE — 25000003 PHARM REV CODE 250: Performed by: INTERNAL MEDICINE

## 2023-01-02 PROCEDURE — 21400001 HC TELEMETRY ROOM

## 2023-01-02 PROCEDURE — 36415 COLL VENOUS BLD VENIPUNCTURE: CPT | Performed by: INTERNAL MEDICINE

## 2023-01-02 PROCEDURE — 99223 PR INITIAL HOSPITAL CARE,LEVL III: ICD-10-PCS | Mod: ,,, | Performed by: GENERAL PRACTICE

## 2023-01-02 PROCEDURE — 25000003 PHARM REV CODE 250: Performed by: GENERAL PRACTICE

## 2023-01-02 RX ORDER — DOXYCYCLINE HYCLATE 100 MG
100 TABLET ORAL EVERY 12 HOURS
Status: DISCONTINUED | OUTPATIENT
Start: 2023-01-03 | End: 2023-01-03 | Stop reason: HOSPADM

## 2023-01-02 RX ORDER — DOXYCYCLINE HYCLATE 100 MG
200 TABLET ORAL ONCE
Status: COMPLETED | OUTPATIENT
Start: 2023-01-02 | End: 2023-01-02

## 2023-01-02 RX ADMIN — MEROPENEM 1 G: 1 INJECTION, POWDER, FOR SOLUTION INTRAVENOUS at 11:01

## 2023-01-02 RX ADMIN — APIXABAN 5 MG: 5 TABLET, FILM COATED ORAL at 08:01

## 2023-01-02 RX ADMIN — FLUCONAZOLE 400 MG: 2 INJECTION, SOLUTION INTRAVENOUS at 09:01

## 2023-01-02 RX ADMIN — SULFAMETHOXAZOLE AND TRIMETHOPRIM 2 TABLET: 800; 160 TABLET ORAL at 02:01

## 2023-01-02 RX ADMIN — Medication 2 CAPSULE: at 11:01

## 2023-01-02 RX ADMIN — DOXYCYCLINE HYCLATE 200 MG: 100 TABLET, COATED ORAL at 05:01

## 2023-01-02 RX ADMIN — PREDNISONE 5 MG: 5 TABLET ORAL at 08:01

## 2023-01-02 RX ADMIN — SODIUM BICARBONATE 650 MG TABLET 1300 MG: at 08:01

## 2023-01-02 RX ADMIN — METOPROLOL TARTRATE 50 MG: 50 TABLET, FILM COATED ORAL at 08:01

## 2023-01-02 RX ADMIN — Medication 2 CAPSULE: at 05:01

## 2023-01-02 RX ADMIN — CLINDAMYCIN PHOSPHATE 600 MG: 600 INJECTION, SOLUTION INTRAVENOUS at 06:01

## 2023-01-02 RX ADMIN — TACROLIMUS 2 MG: 1 CAPSULE ORAL at 08:01

## 2023-01-02 RX ADMIN — SULFAMETHOXAZOLE AND TRIMETHOPRIM 2 TABLET: 800; 160 TABLET ORAL at 08:01

## 2023-01-02 RX ADMIN — FINASTERIDE 5 MG: 5 TABLET, FILM COATED ORAL at 08:01

## 2023-01-02 RX ADMIN — Medication 400 MG: at 08:01

## 2023-01-02 RX ADMIN — TACROLIMUS 1 MG: 1 CAPSULE ORAL at 05:01

## 2023-01-02 RX ADMIN — Medication 2 CAPSULE: at 06:01

## 2023-01-02 RX ADMIN — SODIUM ZIRCONIUM CYCLOSILICATE 10 G: 10 POWDER, FOR SUSPENSION ORAL at 08:01

## 2023-01-02 RX ADMIN — Medication 400 MG: at 09:01

## 2023-01-02 RX ADMIN — MEROPENEM 1 G: 1 INJECTION, POWDER, FOR SOLUTION INTRAVENOUS at 03:01

## 2023-01-02 NOTE — PROGRESS NOTES
Pharmacokinetic Assessment Follow Up: IV Vancomycin    Vancomycin serum concentration assessment(s):    The trough level was drawn correctly and can be used to guide therapy at this time. The measurement is within the desired definitive target range of 15 to 20 mcg/mL.  Drawn 5 hours early-corrects to normal  Vancomycin Regimen Plan:    Continue regimen to Vancomycin 1000 mg IV every 12 hours with next serum trough concentration measured at 0900 prior to next dose on 1/4    Scheduled Administration Times    10-22    Drug levels (last 3 results):  Recent Labs   Lab Result Units 01/01/23  0619 01/02/23  0440   Vancomycin Trough ug/ml 12.8* 20.3*       Vancomycin Administrations:  vancomycin given in the last 96 hours                     vancomycin in dextrose 5 % 1 gram/250 mL IVPB 1,000 mg (mg) 1,000 mg New Bag 01/01/23 2254     1,000 mg New Bag  0826     1,000 mg New Bag 12/31/22 1757     1,000 mg New Bag  0518    vancomycin (VANCOCIN) 1,750 mg in dextrose 5 % 500 mL IVPB (mg) 1,750 mg New Bag 12/30/22 1654                    Pharmacy will continue to follow and monitor vancomycin.    Please contact pharmacy at extension [phone] for questions regarding this assessment.    Thank you for the consult,   Aziza Lobato       Patient brief summary:  Ronal Mazariegos is a 72 y.o. male initiated on antimicrobial therapy with IV Vancomycin for treatment of bone/joint infection    The patient's current regimen is 1000mg q12h    Drug Allergies:   Review of patient's allergies indicates:  No Known Allergies    Actual Body Weight:   88.9 kg    Renal Function:   Estimated Creatinine Clearance: 65.5 mL/min (A) (based on SCr of 1.22 mg/dL (H)).,     Dialysis Method (if applicable):  N/A    CBC (last 72 hours):  Recent Labs   Lab Result Units 12/30/22  1356 01/01/23  0619 01/02/23  0440   WBC x10(3)/mcL 8.9 9.6 5.6   Hgb gm/dL 17.2 15.8 15.0   Hct % 51.7 46.5 44.6   Platelet x10(3)/mcL 105* 80* 87*   Mono % % 7.2 8.1 10.6   Eos % % 0.0  0.2 1.3   Basophil % % 0.3 0.2 0.4       Metabolic Panel (last 72 hours):  Recent Labs   Lab Result Units 12/30/22  1550 12/31/22  1316 12/31/22  2030 01/01/23  0956 01/02/23  0440   Sodium Level mmol/L 135*  --   --  132* 134*   Urine Sodium mmol/L  --   --  55.0  --   --    Potassium Level mmol/L 5.1  --   --  5.1 4.6   Chloride mmol/L 102  --   --  106 107   Carbon Dioxide mmol/L 20*  --   --  19* 21*   Glucose Level mg/dL 108  --   --  116* 85   Blood Urea Nitrogen mg/dL 21.0  --   --  20.5 18.8   Creatinine mg/dL 1.58*  --   --  1.27* 1.22*   Urine Creatinine mg/dL  --   --  32.8*  --   --    Albumin Level g/dL 3.8  --   --  2.9*  --    Bilirubin Total mg/dL 1.9*  --   --  1.5  --    Alkaline Phosphatase unit/L 82  --   --  65  --    Aspartate Aminotransferase unit/L 37*  --   --  27  --    Alanine Aminotransferase unit/L 42  --   --  27  --    Magnesium Level mg/dL  --   --   --  1.70  --    Phosphorus Level mg/dL  --  3.2  --   --   --        Microbiologic Results:  Microbiology Results (last 7 days)       Procedure Component Value Units Date/Time    Anaerobic Culture [[id number]] Collected: 12/30/22 2123    Order Status: Completed Specimen: Tissue from Arm, Left Updated: 01/02/23 0704     Anaerobe Culture No Anaerobes Isolated    Anaerobic Culture [[id number]] Collected: 12/30/22 2118    Order Status: Completed Specimen: Wound from Arm, Left Updated: 01/02/23 0704     Anaerobe Culture No Anaerobes Isolated    Blood Culture [[id number]]  (Normal) Collected: 12/30/22 1309    Order Status: Completed Specimen: Blood Updated: 01/01/23 1500     CULTURE, BLOOD ([facility]) No Growth At 48 Hours    Blood Culture [[id number]]  (Normal) Collected: 12/30/22 1309    Order Status: Completed Specimen: Blood Updated: 01/01/23 1500     CULTURE, BLOOD ([facility]) No Growth At 48 Hours    Tissue Culture - Aerobic [[id number]]  (Abnormal) Collected: 12/30/22 2123    Order Status: Completed Specimen: Tissue from Arm, Left Updated: 01/01/23 0998      CULTURE, TISSUE- AEROBIC ([facility]) Few Gram-negative Rods    Cryptococcal antigen, blood [[id number]] Collected: 12/31/22 1029    Order Status: Sent Specimen: Blood, Venous Updated: 12/31/22 1040    KOH Prep [[id number]] Collected: 12/30/22 2123    Order Status: Completed Specimen: Tissue from Arm, Left Updated: 12/31/22 1001     KOH Prep No fungal elements seen     Comment: Corrected results to No fungal elements seen previous report was Budding yeast notified to Dr Stevenson       Mycobacteria and Nocardia Culture [[id number]]     Order Status: Canceled Specimen: Tissue     AFB Smear [[id number]] Collected: 12/30/22 2123    Order Status: Completed Specimen: Tissue from Arm, Left Updated: 12/31/22 0904     AFB Smear No AFB seen (Direct smear only)    AFB Smear [[id number]] Collected: 12/30/22 2118    Order Status: Completed Specimen: Wound from Arm, Left Updated: 12/31/22 0904     AFB Smear No AFB seen (Direct smear only)    Gram Stain [[id number]] Collected: 12/30/22 2118    Order Status: Completed Specimen: Wound from Arm, Left Updated: 12/31/22 0756     GRAM STAIN Rare WBC observed      No bacteria seen    Gram Stain [[id number]] Collected: 12/30/22 2123    Order Status: Completed Specimen: Tissue from Arm, Left Updated: 12/31/22 0755     GRAM STAIN Few WBC observed      No bacteria seen    Wound Culture [[id number]] Collected: 12/30/22 2118    Order Status: Completed Specimen: Wound from Arm, Left Updated: 12/31/22 0752     Wound Culture No Growth At 24 Hours    Fungal Culture [[id number]] Collected: 12/30/22 2118    Order Status: Sent Specimen: Wound from Arm, Left Updated: 12/30/22 2142    Mycobacteria and Nocardia Culture [[id number]] Collected: 12/30/22 2118    Order Status: Sent Specimen: Wound from Arm, Left Updated: 12/30/22 2142    Fungal Culture [[id number]] Collected: 12/30/22 2123    Order Status: Sent Specimen: Tissue from Arm, Left Updated: 12/30/22 2142

## 2023-01-02 NOTE — CONSULTS
WOCN consulted for left forearm. Unable to assess at this time. Patient states that nurse had just changed dressing. Surgery team has recommendations in place for wet to dry dressings. Will follow up.

## 2023-01-02 NOTE — PLAN OF CARE
Problem: Adult Inpatient Plan of Care  Goal: Plan of Care Review  Outcome: Ongoing, Progressing  Goal: Patient-Specific Goal (Individualized)  Outcome: Ongoing, Progressing  Goal: Absence of Hospital-Acquired Illness or Injury  Outcome: Ongoing, Progressing  Goal: Optimal Comfort and Wellbeing  Outcome: Ongoing, Progressing  Intervention: Monitor Pain and Promote Comfort  Flowsheets (Taken 1/2/2023 1517)  Pain Management Interventions:   care clustered   pillow support provided   quiet environment facilitated   relaxation techniques promoted   position adjusted  Goal: Readiness for Transition of Care  Outcome: Ongoing, Progressing  Intervention: Mutually Develop Transition Plan  Flowsheets (Taken 1/2/2023 1517)  Communicated ADRIAN with patient/caregiver: Yes  Do you expect to return to your current living situation?: Yes  Do you have help at home or someone to help you manage your care at home?: Yes     Problem: Fall Injury Risk  Goal: Absence of Fall and Fall-Related Injury  Outcome: Ongoing, Progressing  Intervention: Identify and Manage Contributors  Flowsheets (Taken 1/2/2023 1517)  Medication Review/Management: medications reviewed

## 2023-01-02 NOTE — PROGRESS NOTES
Nephrology consult follow up note    HPI:      Ronal Mazariegos is a 72 y.o. male  presented to the ER with forearm pain swelling and discoloration following abrasion approximately 1 week ago.  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  He has PMH significant for CMV viremia, polycystic kidney disease status post renal transplant 05/20/2021 he is on immunosuppressants.  He was on prophylactic antibiotics as well. Found to have LUE necrotizing fasciitis. Nephrology following for chronic immunosuppression and renal transplant.     Interval history:     NAEON. Overall feeling well. No complaints. NO CP, SOB, abd pain, N/V, or LE edema.      Review of Systems:     Comprehensive 10pt ROS negative except as noted per HPI.    Past medical, family, surgical, and social history reviewed and unchanged from initial consult note.     Objective:       VITAL SIGNS: 24 HR MIN & MAX LAST    Temp  Min: 97.5 °F (36.4 °C)  Max: 98.1 °F (36.7 °C)  97.5 °F (36.4 °C)        BP  Min: 131/91  Max: 156/95  137/85     Pulse  Min: 102  Max: 122  (!) 114     Resp  Min: 16  Max: 22  (!) 22    SpO2  Min: 96 %  Max: 97 %  96 %      GEN: Chronically ill appearing WM in NAD  CV: RRR +S1,S2 without murmur  PULM: CTAB, unlabored  ABD: Soft, NT/ND abdomen with NABS. Renal transplant in RLQ.   EXT: No cyanosis or edema. LUE with dry dressing.   SKIN: Warm and dry  PSYCH: Awake, alert and appropriately conversant.   Vascular access: no HD access            Component Value Date/Time     (L) 01/02/2023 0440     (L) 01/01/2023 0956     11/30/2022 0758     08/23/2022 0726    K 4.6 01/02/2023 0440    K 5.1 01/01/2023 0956    K 5.5 (H) 11/30/2022 0758    K 4.9 08/23/2022 0726    CHLORIDE 107 01/02/2023 0440    CHLORIDE 106 01/01/2023 0956    CO2 21 (L) 01/02/2023 0440    CO2 19 (L) 01/01/2023 0956    CO2 20 (L) 11/30/2022 0758    CO2 20 (L) 08/23/2022 0726    BUN 18.8 01/02/2023 0440    BUN 20.5  01/01/2023 0956    BUN 20 11/30/2022 0758    BUN 18 08/23/2022 0726    CREATININE 1.22 (H) 01/02/2023 0440    CREATININE 1.27 (H) 01/01/2023 0956    CREATININE 1.6 (H) 11/30/2022 0758    CREATININE 1.7 (H) 08/23/2022 0726    CALCIUM 8.8 01/02/2023 0440    CALCIUM 9.0 01/01/2023 0956    CALCIUM 9.9 11/30/2022 0758    CALCIUM 9.8 08/23/2022 0726    PHOS 3.2 12/31/2022 1316    PHOS 2.9 11/30/2022 0758            Component Value Date/Time    WBC 5.6 01/02/2023 0440    WBC 9.6 01/01/2023 0619    WBC 5.62 04/21/2022 1009    WBC 6.76 02/25/2022 0339    HGB 15.0 01/02/2023 0440    HGB 15.8 01/01/2023 0619    HGB 14.6 04/21/2022 1009    HGB 15.0 02/25/2022 0339    HCT 44.6 01/02/2023 0440    HCT 46.5 01/01/2023 0619    HCT 47.4 04/21/2022 1009    HCT 46.2 02/25/2022 0339    HCT 44 02/17/2022 0211    HCT 32 (L) 05/04/2021 2156    PLT 87 (L) 01/02/2023 0440    PLT 80 (L) 01/01/2023 0619     04/21/2022 1009     02/25/2022 0339         Imaging reviewed      Assessment / Plan:       Active Hospital Problems    Diagnosis  POA    *Left arm cellulitis [L03.114]  Yes      Resolved Hospital Problems   No resolved problems to display.     1. Polycystic kidney disease, status post renal transplant 05/20/2021--on immunosuppressants and prophylactic antibiotics   2. DEBORAH  3. Paroxysmal atrial fibrillation on Eliquis    4. History of HCV infection 2021  5. History of CMV viremia  6. Heart failure with EF 50% (02/2022)  7. Left arm necrotizing fasciitis   8. Hyperkalemia    Plan:  Renal function stable, at baseline. Continue immunosuppression with prednisone 5mg daily, and Tacrolimus 2/1. Will follow.     Harris Cuadra DO  Nephrology  [facility]  Clinic number: [phone]

## 2023-01-02 NOTE — PROGRESS NOTES
[facility]  Hospital Medicine Progress Note        CHIEF COMPLAINT   Arm Injury (Pt states that he has abrasion to left forearm a week ago. Since then he has had swelling pain and discoloration to arm. Dialysis fistula also noted to affected extremity. States that he was evaluated for clots and was negative. Pt reports HTN at home. Pt hypertensive and tachycardic in triage. Hx of afib and kidney transplant)        HISTORY OF PRESENT ILLNESS:   Patient is a 72-year-old male with past medical history below most significant for polycystic kidney disease status post renal transplant 05/20/2021 on immunosuppressants and prophylactic antibiotics who presents to the ER secondary to forearm pain, swelling and discoloration after having an abrasion a week ago.  He is a complicated history including a brain abscess earlier this year that required evacuation/craniotomy, prior history of CMV viremia likely all due to his immunosuppressed state.     He arrived to the ER tachycardic in AFib with RVR, hypertensive maintaining normal sats on room air.  Lactic acid was noted to be elevated at 5, remainder of laboratory work was unremarkable.  A CT of his left forearm was obtained and showed subcutaneous edema and a small amount of subcutaneous emphysema.  He was started on broad-spectrum antibiotics and blood cultures were obtained, and consultation placed to General surgery who took the patient to the OR for incision and drainage.     Patient currently being managed for light upper extremity necrotizing fasciitis in an immunocompromised patient.    Appreciate ID input patient currently on meropenem vancomycin and clindamycin, fluconazole  Patient was status post I and D by surgery on 12/31 with no much findings of what was described on CT read.  Appreciate nephrology input to resume home dose immunosuppressants       Today's information  Patient seen and examined at bedside, family at bedside   Vitals  reviewed and stable   Patient complaining of wondering if his immunosuppressants have been resumed provided reassurance this have been resumed   Creatinine is improving, lactic acidosis has resolved  Ultrasound of the transplant kidney within normal indices  Wound cultures growing Klebsiella sensitive to meropenem.  Will discontinue fluconazole, vancomycin and clindamycin   Await ID input  on home antibiotic recommendations and duration        Exam  GENERAL: awake, alert, oriented and in no acute distress, non-toxic appearing   HEENT: normocephalic atraumatic   NECK: supple   LUNGS: Clear bilaterally, no wheezing or rales, no accessory muscle use   CVS:  Irregularly irregular, normal peripheral perfusion  ABD: Soft, non-tender, non-distended, bowel sounds present  EXTREMITIES: no clubbing or cyanosis, left arm wrapped in surgical bandage  SKIN: Warm, dry.   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  Will discontinue fluconazole, vancomycin and clindamycin   Await ID input  on home antibiotic recommendations and duration  Creatinine is improving, lactic acidosis has resolved  Ultrasound of the transplant kidney within normal indices  Patient was status post I and D by surgery on 12/31 with no much findings of what was described on CT read.   Appreciate nephrology input to resume home dose  immunosuppressants   f/up wound cultures , blood cx x 2     DVT prophylaxis: resume eliquis  Code status: full                      Critical Care diagnoses necrotizing fasciitis   Critical care time greater than 35 minutes      VITAL SIGNS: 24 HRS MIN & MAX LAST   Temp  Min: 97.5 °F (36.4 °C)  Max: 98.3 °F (36.8 °C) 98.3 °F (36.8 °C)   BP  Min: 131/91  Max: 156/95 (!) 141/89     Pulse  Min: 102  Max: 122  (!) 117     Resp  Min: 16  Max: 22 18   SpO2  Min: 94 %  Max: 97 % (!) 94 %         Recent Labs   Lab 12/30/22  1356 01/01/23  0619 01/02/23  0440   WBC 8.9 9.6 5.6   RBC 5.25 4.81 4.53*   HGB 17.2 15.8 15.0   HCT 51.7 46.5 44.6   MCV 98.5* 96.7* 98.5*   MCH 32.8 32.8 33.1   MCHC 33.3 34.0 33.6   RDW 21.5* 20.7* 20.4*   * 80* 87*   MPV 10.1 9.3* 9.9       Recent Labs   Lab 12/27/22  0650 12/30/22  1550 01/01/23  0956 01/02/23  0440    135* 132* 134*   K 5.4* 5.1 5.1 4.6   CO2 22* 20* 19* 21*   BUN 17.4 21.0 20.5 18.8   CREATININE 1.72* 1.58* 1.27* 1.22*   CALCIUM 9.7 9.6 9.0 8.8   MG 1.80  --  1.70  --    ALBUMIN 3.7 3.8 2.9*  --    ALKPHOS 82 82 65  --    ALT 37 42 27  --    AST 41* 37* 27  --    BILITOT 1.3 1.9* 1.5  --           Microbiology Results (last 7 days)       Procedure Component Value Units Date/Time    Cryptococcal antigen, blood [[id number]] Collected: 12/31/22 1029    Order Status: Completed Specimen: Blood, Venous Updated: 01/02/23 1158     CRYPTOCOCCAL ANTIGEN, SERUM ([facility]) Negative     CRYPTOCOCCAL ANTIGEN TITER ([facility]) --    Wound Culture [[id number]] Collected: 12/30/22 2118    Order Status: Completed Specimen: Wound from Arm, Left Updated: 01/02/23 0735     Wound Culture No Growth    Tissue Culture - Aerobic [[id number]]  (Abnormal)  (Susceptibility) Collected: 12/30/22 2123    Order Status: Completed Specimen: Tissue from Arm, Left Updated: 01/02/23 0735     CULTURE, TISSUE- AEROBIC ([facility]) Few Klebsiella pneumoniae ssp pneumoniae    Anaerobic Culture [[id number]] Collected: 12/30/22 2123     Order Status: Completed Specimen: Tissue from Arm, Left Updated: 01/02/23 0704     Anaerobe Culture No Anaerobes Isolated    Anaerobic Culture [[id number]] Collected: 12/30/22 2118    Order Status: Completed Specimen: Wound from Arm, Left Updated: 01/02/23 0704     Anaerobe Culture No Anaerobes Isolated    Blood Culture [[id number]]  (Normal) Collected: 12/30/22 1309    Order Status: Completed Specimen: Blood Updated: 01/01/23 1500     CULTURE, BLOOD ([facility]) No Growth At 48 Hours    Blood Culture [[id number]]  (Normal) Collected: 12/30/22 1309    Order Status: Completed Specimen: Blood Updated: 01/01/23 1500     CULTURE, BLOOD ([facility]) No Growth At 48 Hours    LESVIA Prep [[id number]] Collected: 12/30/22 2123    Order Status: Completed Specimen: Tissue from Arm, Left Updated: 12/31/22 1001     KOH Prep No fungal elements seen     Comment: Corrected results to No fungal elements seen previous report was Budding yeast notified to Dr Stevenson       Mycobacteria and Nocardia Culture [[id number]]     Order Status: Canceled Specimen: Tissue     AFB Smear [[id number]] Collected: 12/30/22 2123    Order Status: Completed Specimen: Tissue from Arm, Left Updated: 12/31/22 0904     AFB Smear No AFB seen (Direct smear only)    AFB Smear [[id number]] Collected: 12/30/22 2118    Order Status: Completed Specimen: Wound from Arm, Left Updated: 12/31/22 0904     AFB Smear No AFB seen (Direct smear only)    Gram Stain [[id number]] Collected: 12/30/22 2118    Order Status: Completed Specimen: Wound from Arm, Left Updated: 12/31/22 0756     GRAM STAIN Rare WBC observed      No bacteria seen    Gram Stain [[id number]] Collected: 12/30/22 2123    Order Status: Completed Specimen: Tissue from Arm, Left Updated: 12/31/22 0755     GRAM STAIN Few WBC observed      No bacteria seen    Fungal Culture [[id number]] Collected: 12/30/22 2118    Order Status: Sent Specimen: Wound from Arm, Left Updated: 12/30/22 2142    Mycobacteria and Nocardia Culture [[id number]]  Collected: 12/30/22 2118    Order Status: Sent Specimen: Wound from Arm, Left Updated: 12/30/22 2142    Fungal Culture [[id number]] Collected: 12/30/22 2123    Order Status: Sent Specimen: Tissue from Arm, Left Updated: 12/30/22 2142             See below for Radiology    Scheduled Med:   apixaban  5 mg Oral BID    finasteride  5 mg Oral QHS    Lactobacillus acidophilus  2 capsule Oral TID WM    magnesium oxide  400 mg Oral BID    meropenem (MERREM) IVPB  1 g Intravenous Q8H    metoprolol tartrate  50 mg Oral BID    predniSONE  5 mg Oral Daily    sodium bicarbonate  1,300 mg Oral BID    sodium zirconium cyclosilicate  10 g Oral Daily    sulfamethoxazole-trimethoprim 800-160mg  2 tablet Oral TID    tacrolimus  1 mg Oral Daily PM    tacrolimus  2 mg Oral Daily AM        Continuous Infusions:       PRN Meds:  acetaminophen, HYDROmorphone, oxyCODONE, oxyCODONE         VTE prophylaxis:     Patient condition:  Stable/Fair/Guarded/ Serious/ Critical    Anticipated discharge and Disposition:         All diagnosis and differential diagnosis have been reviewed; assessment and plan has been documented; I have personally reviewed the labs and test results that are presently available; I have reviewed the patients medication list; I have reviewed the consulting providers response and recommendations. I have reviewed or attempted to review medical records based upon their availability    All of the patient's questions have been  addressed and answered. Patient's is agreeable to the above stated plan. I will continue to monitor closely and make adjustments to medical management as needed.  _____________________________________________________________________    Nutrition Status:    Radiology:  US Transplant Kidney With Doppler  Narrative: EXAMINATION:  US TRANSPLANT KIDNEY WITH DOPPLER    CLINICAL HISTORY:  flaco s/p renal transplant;    TECHNIQUE:  Serial sonographic images were obtained of the transplant kidney.  Arterial Doppler  was obtained.    COMPARISON:  None    FINDINGS:  The right kidney measures 10.7 x 5.5 x 4.7 cm.  Resistive indices of the segmental arteries of approximately 0.6.  The renal artery versus Devitt disease is 0.71.  The iliac artery velocity of 90.3 cm a 2nd.  There is no hydronephrosis.  There is no stone appreciated.  The bladder is unremarkable.  Impression: Resistive indices within normal limits at 0.6 and 0.7.    Electronically signed by: Jose Antonio Frye  Date:    01/01/2023  Time:    10:17      Hawa Trent MD   01/02/2023

## 2023-01-02 NOTE — CONSULTS
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 OR cultures positive for Klebsiella pneumoniae R to quinolones. ID is consulted for assistance in management.     Had OR debridement on 12/30/2022 with no significant evidence of necrotizing fasciitis found per OR note. Feels much better and reports significant improvement in the LUE. WBC normal and no fevers.     Past Medical History:   Diagnosis Date    Anasarca 10/1/2021    Anemia of chronic disease     Atrial fibrillation     BPH (benign prostatic hypertrophy)     Chronic systolic heart failure 10/1/2021    CKD (chronic kidney disease) stage 2, GFR 60-89 ml/min     Hepatitis C virus infection cured after antiviral drug therapy     acquired through kidney transplant, treated / cured (SVR12 - 12/2021)    HTN (hypertension)     HTN (hypertension)     Polycystic kidney disease         Past Surgical History:   Procedure Laterality Date    AV FISTULA PLACEMENT Left 2016    CATARACT EXTRACTION, BILATERAL Bilateral     CRANIOTOMY Right 2/16/2022    Procedure: CRANIOTOMY;  Surgeon: Sunday Rosa DO;  Location: [address];  Service: Neurosurgery;  Laterality: Right;  R craniotomy for abscess drainage    KIDNEY TRANSPLANT N/A 5/4/2021     Procedure: TRANSPLANT, KIDNEY;  Surgeon: Lexy Bolden MD;  Location: [facility] OR [address] [place] [address];  Service: Transplant;  Laterality: N/A;       Review of patient's allergies indicates:  No Known Allergies     Family History       Problem Relation (Age of Onset)    Hypertension Father, Sister    Kidney disease Father, Sister    Polycystic kidney disease Father, Sister             Social History     Socioeconomic History    Marital status:     Number of children: 1   Tobacco Use    Smoking status: Former     Packs/day: 2.00     Years: 10.00     Pack years: 20.00     Types: Cigarettes     Start date: 1972     Quit date: 1984     Years since quittin.0    Smokeless tobacco: Never   Substance and Sexual Activity    Alcohol use: No     Comment: Pt reportsbeing a former beer drinker (2-3 cans per day) and quitting in .    Drug use: No    Sexual activity: Yes        Lines/Drains/Airways       Drain  Duration                  Hemodialysis AV Fistula Left forearm -- days              Peripheral Intravenous Line  Duration                  Peripheral IV - Single Lumen 22 1920 22 G Anterior;Right Wrist 2 days                     Medication:  Medications Prior to Admission   Medication Sig    apixaban (ELIQUIS) 5 mg Tab Take 1 tablet (5 mg total) by mouth 2 (two) times daily. DO NOT RESTART UNTIL APPROVED BY NEUROSURGERY    ergocalciferol (ERGOCALCIFEROL) 50,000 unit Cap Take 1 capsule (50,000 Units total) by mouth every 7 days. X 12 months (Patient taking differently: Take 50,000 Units by mouth every 7 days. X 12 months  )    finasteride (PROSCAR) 5 mg tablet Take 5 mg by mouth every evening.    fish oil-omega-3 fatty acids 300-1,000 mg capsule Take 1 capsule by mouth every evening.    fluticasone propionate (FLONASE) 50 mcg/actuation nasal spray daily as needed.    magnesium oxide (MAG-OX) 400 mg (241.3 mg magnesium) tablet TAKE 2 TABLETS BY MOUTH THREE TIMES DAILY (Patient taking differently:  Take 2,000 mg by mouth 2 (two) times daily. 4 TABLETS TWICE DAILY)    metoprolol tartrate (LOPRESSOR) 50 MG tablet Take 1 tablet (50 mg total) by mouth 2 (two) times daily.    multivitamin Tab Take 1 tablet by mouth once daily.    predniSONE (DELTASONE) 5 MG tablet Take by mouth daily. 5/7-6/6 20 mg, 6/7-7/6 15 mg, 7/7-8/6 10 mg, 5 mg daily thereafter starting 8/7/21 (Patient taking differently: Take by mouth every morning.)    sodium bicarbonate 650 MG tablet Take 2 tablets (1,300 mg total) by mouth 2 (two) times daily.    sodium polystyrene (KAYEXALATE) powder Mix 8 LEVEL TEASPOONS (30grams total) in 120mL water and drink by mouth once daily x 3 days,then TAKE AS DIRECTED BY TRANSPLANT (for high potassium).    sodium zirconium cyclosilicate (LOKELMA) 10 gram packet Take 1 packet (10 g total) by mouth 2 (two) times a day for 2 days, THEN 1 packet (10 g total) once daily. Mix entire contents of packet(s) into drinking glass containing 3 tablespoons of water; stir well and drink immediately. Add water and repeat until no powder remains to receive entire dose..    sulfamethoxazole-trimethoprim 800-160mg (BACTRIM DS) 800-160 mg Tab Take 2 tablets by mouth 3 (three) times daily.    tacrolimus (PROGRAF) 1 MG Cap Take 2 capsules (2 mg total) by mouth every morning AND 1 capsule (1 mg total) every evening. Z94.0;Kidney txp on 5/4/2021.    tedizolid (SIVEXTRO) 200 mg Tab Take 1 tablet  (200 mg total) by mouth once daily.    LORazepam (ATIVAN) 0.5 MG tablet Take 1 tablet (0.5 mg total) by mouth once. Take 1 tab 20-30 minutes prior to MRI; may repeat if needed for 1 dose          Antimicrobials:  Antibiotics (From admission, onward)      Start     Stop Route Frequency Ordered    01/02/23 2100  doxycycline tablet 100 mg         -- Oral Every 12 hours 01/02/23 1719 01/02/23 1830  doxycycline tablet 200 mg         -- Oral Once 01/02/23 1719 12/31/22 0900  sulfamethoxazole-trimethoprim 800-160mg per tablet 2 tablet          -- Oral 3 times daily 12/31/22 0053             Antifungals (From admission, onward)      None            Antivirals (From admission, onward)      None               Review of Systems   Review of Systems   All other systems reviewed and are negative.      Objective:     Vital Signs (Most Recent):  Temp: 98.1 °F (36.7 °C) (01/02/23 1550)  Pulse: 109 (01/02/23 1550)  Resp: 18 (01/02/23 1151)  BP: (!) 153/99 (01/02/23 1550)  SpO2: 96 % (01/02/23 1550)    Vital Signs (24h Range):  Temp:  [97.5 °F (36.4 °C)-98.3 °F (36.8 °C)] 98.1 °F (36.7 °C)  Pulse:  [102-122] 109  Resp:  [18-22] 18  SpO2:  [94 %-97 %] 96 %  BP: (131-153)/() 153/99      Weight:   Wt Readings from Last 1 Encounters:   12/30/22 88.9 kg (196 lb)      Body mass index is Body mass index is 25.16 kg/m².     Estimated Creatinine Clearance: Estimated Creatinine Clearance: 65.5 mL/min (A) (based on SCr of 1.22 mg/dL (H)).     Physical Exam  Physical Exam  Constitutional:       Appearance: Normal appearance.   HENT:      Head: Normocephalic and atraumatic.      Mouth/Throat:      Pharynx: No oropharyngeal exudate or posterior oropharyngeal erythema.   Eyes:      Extraocular Movements: Extraocular movements intact.      Pupils: Pupils are equal, round, and reactive to light.   Cardiovascular:      Rate and Rhythm: Normal rate and regular rhythm.      Heart sounds: No murmur heard.  Pulmonary:      Effort: No respiratory distress.      Breath sounds: No wheezing, rhonchi or rales.   Abdominal:      General: Bowel sounds are normal. There is no distension.      Palpations: Abdomen is soft.      Tenderness: There is no abdominal tenderness.   Musculoskeletal:         General: No swelling or tenderness.      Cervical back: Neck supple. No rigidity or tenderness.      Comments: LUE in post operative dressing, clean   Lymphadenopathy:      Cervical: No cervical adenopathy.   Skin:     Findings: No lesion or rash.   Neurological:      General: No focal deficit  present.      Mental Status: He is alert and oriented to person, place, and time. Mental status is at baseline.      Cranial Nerves: No cranial nerve deficit.      Motor: No weakness.   Psychiatric:         Mood and Affect: Mood normal.         Behavior: Behavior normal.         Significant Labs: CBC:   Recent Labs   Lab 01/01/23  0619 01/02/23 0440   WBC 9.6 5.6   HGB 15.8 15.0   HCT 46.5 44.6   PLT 80* 87*     CMP:   Recent Labs   Lab 01/01/23  0956 01/02/23  0440   * 134*   K 5.1 4.6   CO2 19* 21*   BUN 20.5 18.8   CREATININE 1.27* 1.22*   CALCIUM 9.0 8.8   ALBUMIN 2.9*  --    BILITOT 1.5  --    ALKPHOS 65  --    AST 27  --    ALT 27  --          Microbiology Results (last 7 days)       Procedure Component Value Units Date/Time    Blood Culture [[id number]]  (Normal) Collected: 12/30/22 1309    Order Status: Completed Specimen: Blood Updated: 01/02/23 1501     CULTURE, BLOOD ([facility]) No Growth At 72 Hours    Blood Culture [[id number]]  (Normal) Collected: 12/30/22 1309    Order Status: Completed Specimen: Blood Updated: 01/02/23 1501     CULTURE, BLOOD ([facility]) No Growth At 72 Hours    Cryptococcal antigen, blood [[id number]] Collected: 12/31/22 1029    Order Status: Completed Specimen: Blood, Venous Updated: 01/02/23 1158     CRYPTOCOCCAL ANTIGEN, SERUM ([facility]) Negative     CRYPTOCOCCAL ANTIGEN TITER ([facility]) --    Wound Culture [[id number]] Collected: 12/30/22 2118    Order Status: Completed Specimen: Wound from Arm, Left Updated: 01/02/23 0735     Wound Culture No Growth    Tissue Culture - Aerobic [[id number]]  (Abnormal)  (Susceptibility) Collected: 12/30/22 2123    Order Status: Completed Specimen: Tissue from Arm, Left Updated: 01/02/23 0735     CULTURE, TISSUE- AEROBIC ([facility]) Few Klebsiella pneumoniae ssp pneumoniae    Anaerobic Culture [[id number]] Collected: 12/30/22 2123    Order Status: Completed Specimen: Tissue from Arm, Left Updated: 01/02/23 0704     Anaerobe Culture No Anaerobes Isolated    Anaerobic  Culture [[id number]] Collected: 12/30/22 2118    Order Status: Completed Specimen: Wound from Arm, Left Updated: 01/02/23 0704     Anaerobe Culture No Anaerobes Isolated    KOH Prep [[id number]] Collected: 12/30/22 2123    Order Status: Completed Specimen: Tissue from Arm, Left Updated: 12/31/22 1001     KOH Prep No fungal elements seen     Comment: Corrected results to No fungal elements seen previous report was Budding yeast notified to Dr Stevenson       Mycobacteria and Nocardia Culture [[id number]]     Order Status: Canceled Specimen: Tissue     AFB Smear [[id number]] Collected: 12/30/22 2123    Order Status: Completed Specimen: Tissue from Arm, Left Updated: 12/31/22 0904     AFB Smear No AFB seen (Direct smear only)    AFB Smear [[id number]] Collected: 12/30/22 2118    Order Status: Completed Specimen: Wound from Arm, Left Updated: 12/31/22 0904     AFB Smear No AFB seen (Direct smear only)    Gram Stain [[id number]] Collected: 12/30/22 2118    Order Status: Completed Specimen: Wound from Arm, Left Updated: 12/31/22 0756     GRAM STAIN Rare WBC observed      No bacteria seen    Gram Stain [[id number]] Collected: 12/30/22 2123    Order Status: Completed Specimen: Tissue from Arm, Left Updated: 12/31/22 0755     GRAM STAIN Few WBC observed      No bacteria seen    Fungal Culture [[id number]] Collected: 12/30/22 2118    Order Status: Sent Specimen: Wound from Arm, Left Updated: 12/30/22 2142    Mycobacteria and Nocardia Culture [[id number]] Collected: 12/30/22 2118    Order Status: Sent Specimen: Wound from Arm, Left Updated: 12/30/22 2142    Fungal Culture [[id number]] Collected: 12/30/22 2123    Order Status: Sent Specimen: Tissue from Arm, Left Updated: 12/30/22 2142             Significant Imaging: I have reviewed all pertinent imaging results/findings within the past 24 hours.    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 OR cultures positive for Klebsiella pneumoniae R to quinolones. ID is consulted for assistance in management.     ADELE cellulitis  Sepsis  Cerebral nocardiosis  S/p kidney transplant on Tacrolimus and Prednisone  DEBORAH   History of CMV viremia   CHF    -Significant improvement post operatively and after starting antibiotics on the L arm  -Negative blood crypto Ag, other fungal studies pending  -OR cultures with Klebsiella pneumoniae  -Negative AFB stains intraoperatively    Plan:  -Stop Meropenem  -Start Doxycycline 200mg POx1 followed by 100mg PO q12h   -Continue 2 week course from OR time 12/30/2022  -Anticipated end date 01/13/2022  -Discussed with patient and wife at bedside potential side effects of doxycycline as noted below  -Advised to return to ED should the arm start to worsen again after discharge as may need to have re-exploration +/- course of IV antibiotics  -Continue TMP/SMX at current dose 1 tablet DS q8h  -Restart Tedizolid 200mg PO q24h   -Agree with discontinuation of Clinda, Vanc and Fluconazole  -Needs follow up with outpatient ID physician     -Tetracyclines should be taken away from any dairy products, iron, calcium, magnesium or other cations that could affect its absorption   -Tetracyclines can be associated with photosensitivity, patient should avoid/protect themselves from sunlight as possible while taking them  -Tetracyclines can be associated with pill esophagitis patient should not lay down for about 15 to 20 minutes after taking the pill, pills should be taken with a full glass of water      Thank you for your consult. ID will continue following. Please contact us with  any questions or concerns.    Ayo Stevenson MD  Infectious Disease  [facility]

## 2023-01-03 VITALS
DIASTOLIC BLOOD PRESSURE: 82 MMHG | TEMPERATURE: 98 F | WEIGHT: 196 LBS | HEART RATE: 119 BPM | OXYGEN SATURATION: 96 % | SYSTOLIC BLOOD PRESSURE: 144 MMHG | RESPIRATION RATE: 18 BRPM | BODY MASS INDEX: 25.15 KG/M2 | HEIGHT: 74 IN

## 2023-01-03 LAB
ANION GAP SERPL CALC-SCNC: 6 MEQ/L
BUN SERPL-MCNC: 16.7 MG/DL (ref 8.4–25.7)
CALCIUM SERPL-MCNC: 9.7 MG/DL (ref 8.8–10)
CHLORIDE SERPL-SCNC: 106 MMOL/L (ref 98–107)
CO2 SERPL-SCNC: 22 MMOL/L (ref 23–31)
CREAT SERPL-MCNC: 1.36 MG/DL (ref 0.73–1.18)
CREAT/UREA NIT SERPL: 12
GFR SERPLBLD CREATININE-BSD FMLA CKD-EPI: 56 MLS/MIN/1.73/M2
GLUCOSE SERPL-MCNC: 87 MG/DL (ref 82–115)
POTASSIUM SERPL-SCNC: 4.9 MMOL/L (ref 3.5–5.1)
SODIUM SERPL-SCNC: 134 MMOL/L (ref 136–145)

## 2023-01-03 PROCEDURE — 80048 BASIC METABOLIC PNL TOTAL CA: CPT | Performed by: INTERNAL MEDICINE

## 2023-01-03 PROCEDURE — 97161 PT EVAL LOW COMPLEX 20 MIN: CPT

## 2023-01-03 PROCEDURE — 25000003 PHARM REV CODE 250: Performed by: INTERNAL MEDICINE

## 2023-01-03 PROCEDURE — 25000003 PHARM REV CODE 250: Performed by: GENERAL PRACTICE

## 2023-01-03 PROCEDURE — 36415 COLL VENOUS BLD VENIPUNCTURE: CPT | Performed by: INTERNAL MEDICINE

## 2023-01-03 PROCEDURE — 63600175 PHARM REV CODE 636 W HCPCS: Performed by: STUDENT IN AN ORGANIZED HEALTH CARE EDUCATION/TRAINING PROGRAM

## 2023-01-03 PROCEDURE — 25000003 PHARM REV CODE 250: Performed by: NURSE PRACTITIONER

## 2023-01-03 RX ORDER — DOXYCYCLINE HYCLATE 100 MG
100 TABLET ORAL EVERY 12 HOURS
Qty: 20 TABLET | Refills: 0 | Status: SHIPPED | OUTPATIENT
Start: 2023-01-03 | End: 2023-01-13

## 2023-01-03 RX ORDER — PREDNISONE 5 MG/1
5 TABLET ORAL DAILY
Qty: 30 TABLET | Refills: 11 | Status: SHIPPED | OUTPATIENT
Start: 2023-01-03 | End: 2023-01-13 | Stop reason: SDUPTHER

## 2023-01-03 RX ORDER — OXYCODONE HYDROCHLORIDE 5 MG/1
5 TABLET ORAL EVERY 6 HOURS PRN
Qty: 28 TABLET | Refills: 0 | Status: SHIPPED | OUTPATIENT
Start: 2023-01-03 | End: 2023-01-10

## 2023-01-03 RX ADMIN — SODIUM ZIRCONIUM CYCLOSILICATE 10 G: 10 POWDER, FOR SUSPENSION ORAL at 09:01

## 2023-01-03 RX ADMIN — TACROLIMUS 2 MG: 1 CAPSULE ORAL at 09:01

## 2023-01-03 RX ADMIN — PREDNISONE 5 MG: 5 TABLET ORAL at 09:01

## 2023-01-03 RX ADMIN — DOXYCYCLINE HYCLATE 100 MG: 100 TABLET, COATED ORAL at 05:01

## 2023-01-03 RX ADMIN — METOPROLOL TARTRATE 50 MG: 50 TABLET, FILM COATED ORAL at 09:01

## 2023-01-03 RX ADMIN — Medication 2 CAPSULE: at 09:01

## 2023-01-03 RX ADMIN — SODIUM BICARBONATE 650 MG TABLET 1300 MG: at 09:01

## 2023-01-03 RX ADMIN — APIXABAN 5 MG: 5 TABLET, FILM COATED ORAL at 09:01

## 2023-01-03 RX ADMIN — Medication 2 CAPSULE: at 12:01

## 2023-01-03 RX ADMIN — SULFAMETHOXAZOLE AND TRIMETHOPRIM 2 TABLET: 800; 160 TABLET ORAL at 09:01

## 2023-01-03 RX ADMIN — Medication 400 MG: at 09:01

## 2023-01-03 NOTE — NURSING
Patient discharged home. VSS. No distress noted. Prescriptions sent to patient's pharmacy. Follow-Up appointments made for patient. Education provided to patient on incision and how to change dressing. Patient verbalizes understanding of teaching. All questions answered. Transported to [facility] by wheelchair.

## 2023-01-03 NOTE — PROGRESS NOTES
[facility] - 8th Floor Med Surg  Wound Care    Patient Name:  Ronal Mazariegos   MRN:  [id number]  Date: 1/3/2023  Diagnosis: Left arm cellulitis    History:     Past Medical History:   Diagnosis Date    Anasarca 10/1/2021    Anemia of chronic disease     Atrial fibrillation     BPH (benign prostatic hypertrophy)     Chronic systolic heart failure 10/1/2021    CKD (chronic kidney disease) stage 2, GFR 60-89 ml/min     Hepatitis C virus infection cured after antiviral drug therapy     acquired through kidney transplant, treated / cured (SVR12 - 2021)    HTN (hypertension)     HTN (hypertension)     Polycystic kidney disease        Social History     Socioeconomic History    Marital status:     Number of children: 1   Tobacco Use    Smoking status: Former     Packs/day: 2.00     Years: 10.00     Pack years: 20.00     Types: Cigarettes     Start date: 1972     Quit date: 1984     Years since quittin.0    Smokeless tobacco: Never   Substance and Sexual Activity    Alcohol use: No     Comment: Pt reportsbeing a former beer drinker (2-3 cans per day) and quitting in .    Drug use: No    Sexual activity: Yes       Precautions:     Allergies as of 2022    (No Known Allergies)       WO Assessment Details/Treatment        23 1114        Incision/Site 22 Left Arm   Date First Assessed/Time First Assessed: 22   Side: Left  Location: Arm   Wound Image    Dressing Appearance Dry;Intact;Clean   Drainage Amount Moderate   Drainage Characteristics/Odor Serosanguineous   Appearance Pink;Yellow;Fibrin   Red (%), Wound Tissue Color 80 %   Yellow (%), Wound Tissue Color 20 %   Periwound Area Redness;Scar tissue;Swelling   Wound Edges Defined   Wound Length (cm) 8 cm   Wound Width (cm) 2 cm   Wound Depth (cm) 1 cm   Wound Volume (cm^3) 16 cm^3   Wound Surface Area (cm^2) 16 cm^2   Care Cleansed with:;Sterile normal saline   Dressing Applied     WOCN consulted for  Left forearm. S/p OR for I&D 12/30/2022, did not find abscess or Nec Fasc, left open with wet to dry dressing. Recommendations per surgery team Is for wet to dry dressing changes BID. Patient educated on dressing changes, s/s of infection  and handwashing, he voiced understanding. Will follow up.   01/03/2023

## 2023-01-03 NOTE — PLAN OF CARE
Problem: Adult Inpatient Plan of Care  Goal: Plan of Care Review  Outcome: Met  Goal: Patient-Specific Goal (Individualized)  Outcome: Met  Goal: Absence of Hospital-Acquired Illness or Injury  Outcome: Met  Goal: Optimal Comfort and Wellbeing  Outcome: Met  Intervention: Monitor Pain and Promote Comfort  Flowsheets (Taken 1/3/2023 1130)  Pain Management Interventions:   care clustered   pillow support provided   quiet environment facilitated  Goal: Readiness for Transition of Care  Outcome: Met     Problem: Fall Injury Risk  Goal: Absence of Fall and Fall-Related Injury  Outcome: Met

## 2023-01-03 NOTE — PROGRESS NOTES
Nephrology consult follow up note    HPI:      Ronal Mazariegos is a 72 y.o. male  presented to the ER with forearm pain swelling and discoloration following abrasion approximately 1 week ago.  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  He has PMH significant for CMV viremia, polycystic kidney disease status post renal transplant 05/20/2021 he is on immunosuppressants.  He was on prophylactic antibiotics as well. Found to have LUE necrotizing fasciitis. Nephrology following for chronic immunosuppression and renal transplant.     Interval history:     NAEON. Only complaint is left arm swelling. NO CP, SOB, abd pain, N/V, or LE edema.      Review of Systems:     Comprehensive 10pt ROS negative except as noted per HPI.    Past medical, family, surgical, and social history reviewed and unchanged from initial consult note.     Objective:       VITAL SIGNS: 24 HR MIN & MAX LAST    Temp  Min: 97.8 °F (36.6 °C)  Max: 98.5 °F (36.9 °C)  97.9 °F (36.6 °C)        BP  Min: 134/82  Max: 162/101  (!) 159/90     Pulse  Min: 108  Max: 119  108     Resp  Min: 18  Max: 20  20    SpO2  Min: 93 %  Max: 96 %  96 %      GEN: Chronically ill appearing WM in NAD  CV: RRR +S1,S2 without murmur  PULM: CTAB, unlabored  ABD: Soft, NT/ND abdomen with NABS. Renal transplant in RLQ.   EXT: No cyanosis or edema. LUE with dry dressing.   SKIN: Warm and dry  PSYCH: Awake, alert and appropriately conversant.   Vascular access: no HD access            Component Value Date/Time     (L) 01/03/2023 0502     (L) 01/02/2023 0440     11/30/2022 0758     08/23/2022 0726    K 4.9 01/03/2023 0502    K 4.6 01/02/2023 0440    K 5.5 (H) 11/30/2022 0758    K 4.9 08/23/2022 0726    CHLORIDE 106 01/03/2023 0502    CHLORIDE 107 01/02/2023 0440    CO2 22 (L) 01/03/2023 0502    CO2 21 (L) 01/02/2023 0440    CO2 20 (L) 11/30/2022 0758    CO2 20 (L) 08/23/2022 0726    BUN 16.7 01/03/2023 0502    BUN 18.8  01/02/2023 0440    BUN 20 11/30/2022 0758    BUN 18 08/23/2022 0726    CREATININE 1.36 (H) 01/03/2023 0502    CREATININE 1.22 (H) 01/02/2023 0440    CREATININE 1.6 (H) 11/30/2022 0758    CREATININE 1.7 (H) 08/23/2022 0726    CALCIUM 9.7 01/03/2023 0502    CALCIUM 8.8 01/02/2023 0440    CALCIUM 9.9 11/30/2022 0758    CALCIUM 9.8 08/23/2022 0726    PHOS 3.2 12/31/2022 1316    PHOS 2.9 11/30/2022 0758            Component Value Date/Time    WBC 5.6 01/02/2023 0440    WBC 9.6 01/01/2023 0619    WBC 5.62 04/21/2022 1009    WBC 6.76 02/25/2022 0339    HGB 15.0 01/02/2023 0440    HGB 15.8 01/01/2023 0619    HGB 14.6 04/21/2022 1009    HGB 15.0 02/25/2022 0339    HCT 44.6 01/02/2023 0440    HCT 46.5 01/01/2023 0619    HCT 47.4 04/21/2022 1009    HCT 46.2 02/25/2022 0339    HCT 44 02/17/2022 0211    HCT 32 (L) 05/04/2021 2156    PLT 87 (L) 01/02/2023 0440    PLT 80 (L) 01/01/2023 0619     04/21/2022 1009     02/25/2022 0339         Imaging reviewed      Assessment / Plan:       Active Hospital Problems    Diagnosis  POA    *Left arm cellulitis [L03.114]  Yes      Resolved Hospital Problems   No resolved problems to display.     1. Polycystic kidney disease, status post renal transplant 05/20/2021--on immunosuppressants and prophylactic antibiotics   2. DEBORAH  3. Paroxysmal atrial fibrillation on Eliquis    4. History of HCV infection 2021  5. History of CMV viremia  6. Heart failure with EF 50% (02/2022)  7. Left arm necrotizing fasciitis   8. Hyperkalemia    Plan:  Renal function stable, at baseline. Continue immunosuppression with prednisone 5mg daily, and Tacrolimus 2/1. Will follow.     Harris Cuadra DO  Nephrology  [facility]  Clinic number: [phone]

## 2023-01-03 NOTE — PLAN OF CARE
Discharge planning discussed with patient. FOC obtained for [facility]. Referral sent via care port.

## 2023-01-03 NOTE — PT/OT/SLP EVAL
"Physical Therapy Evaluation and Discharge Note    Patient Name:  Ronal Mazariegos   MRN:  [id number]    Recommendations:     Discharge Recommendations: outpatient PT  Discharge Equipment Recommendations: none   Barriers to discharge: None    Assessment:     Ronal Mazariegos is a 72 y.o. male admitted with a medical diagnosis of Left arm cellulitis. .  At this time, patient is functioning at their prior level of function and does not require further acute PT services.     Recent Surgery: Procedure(s) (LRB):  INCISION AND DRAINAGE, UPPER EXTREMITY (Left) 4 Days Post-Op    Plan:     During this hospitalization, patient does not require further acute PT services.  Please re-consult if situation changes.      Subjective     Chief Complaint: none noted  Patient/Family Comments/goals: "to get back home; I feel pretty good after this surgery."  Pain/Comfort:  Pain Rating 1: 0/10  Pain Rating Post-Intervention 1: 0/10    Patients cultural, spiritual, Jewish conflicts given the current situation: no    Living Environment:  Pt notes that he lives with wife in single story home with 1 step to enter. He notes that he is able to get help from his sisters if needed since wife works during the day.  Prior to admission, patients level of function was Independent.  Equipment used at home: none.  DME owned (not currently used): rolling walker and bedside commode.  Upon discharge, patient will have assistance from Wife and sisters.    Objective:     Communicated with RN prior to session.  Patient found HOB elevated with peripheral IV, telemetry upon PT entry to room.    General Precautions: Standard, fall    Orthopedic Precautions:N/A   Braces: N/A  Respiratory Status: Room air    Exams:  Cognitive Exam:  Patient is oriented to Person, Place, Time, and Situation  Gross Motor Coordination:  WFL  Sensation:    -       Intact  light/touch B LE's grossly  RLE ROM: WFL  RLE Strength: WFL  LLE ROM: WFL  LLE Strength: WFL    Functional " Mobility:  Bed Mobility:     Supine to Sit: modified independence  Transfers:     Sit to Stand:  modified independence with no AD  Gait: 400ft with RW (for energy conservation) at Supervision A. (No LOB and no SOB; good gait speed with step through gait patter)  Balance: Dynamic and static stance with RW and with no AD (good no increased postural sway)    Treatment and Education:  Pt educated to use RW at home for energy conservation and will suggest outpatient PT for improving activity tolerance. .    Patient left HOB elevated with all lines intact, call button in reach, and RN notified.    Vitals   Vitals at Rest  BP  159/90   HR  106   O2 Sat  95%   Pain         GOALS:   Multidisciplinary Problems       Physical Therapy Goals          Problem: Physical Therapy    Goal Priority Disciplines Outcome Goal Variances Interventions   Physical Therapy Goal     PT, PT/OT                          History:     Past Medical History:   Diagnosis Date    Anasarca 10/1/2021    Anemia of chronic disease     Atrial fibrillation     BPH (benign prostatic hypertrophy)     Chronic systolic heart failure 10/1/2021    CKD (chronic kidney disease) stage 2, GFR 60-89 ml/min     Hepatitis C virus infection cured after antiviral drug therapy     acquired through kidney transplant, treated / cured (SVR12 - 12/2021)    HTN (hypertension)     HTN (hypertension)     Polycystic kidney disease        Past Surgical History:   Procedure Laterality Date    AV FISTULA PLACEMENT Left 2016    CATARACT EXTRACTION, BILATERAL Bilateral     CRANIOTOMY Right 2/16/2022    Procedure: CRANIOTOMY;  Surgeon: Sunday Rosa DO;  Location: [address];  Service: Neurosurgery;  Laterality: Right;  R craniotomy for abscess drainage    INCISION AND DRAINAGE, UPPER EXTREMITY Left 12/30/2022    Procedure: INCISION AND DRAINAGE, UPPER EXTREMITY;  Surgeon: Elijah Davis Jr., MD;  Location: [facility];  Service: General;  Laterality: Left;    KIDNEY TRANSPLANT  N/A 5/4/2021    Procedure: TRANSPLANT, KIDNEY;  Surgeon: Lexy Bolden MD;  Location: [facility] OR [address] [place] [address];  Service: Transplant;  Laterality: N/A;       Time Tracking:     PT Received On: 01/03/23  PT Start Time: 0847     PT Stop Time: 0908  PT Total Time (min): 21 min     Billable Minutes: Evaluation 21 01/03/2023

## 2023-01-04 ENCOUNTER — PATIENT OUTREACH (OUTPATIENT)
Dept: ADMINISTRATIVE | Facility: CLINIC | Age: 73
End: 2023-01-04
Payer: MEDICARE

## 2023-01-04 LAB
BACTERIA BLD CULT: NORMAL
BACTERIA BLD CULT: NORMAL
C IMMITIS AB SER QL IA: NEGATIVE
H CAPSUL AG UR QL IA: NOT DETECTED
H CAPSUL AG UR-MCNC: NOT DETECTED NG/ML

## 2023-01-04 NOTE — DISCHARGE SUMMARY
[facility] Hospital Medicine Discharge Summary    Admit Date: 12/30/2022  Discharge Date and Time: 1/3/34891:15 PM  Admitting Physician:  Team  Discharging Physician: Hawa Trent MD.  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 Since then he has had swelling pain and discoloration to arm. Dialysis fistula also noted to affected extremity. States that he was evaluated for clots and was negative. Pt reports HTN at home. Pt hypertensive and tachycardic in triage. Hx of afib and kidney transplant)        HISTORY OF PRESENT ILLNESS:   Patient is a 72-year-old male with past medical history below most significant for polycystic kidney disease status post renal transplant 05/20/2021 on immunosuppressants and prophylactic antibiotics who presents to the ER secondary to forearm pain, swelling and discoloration after having an abrasion a week ago.  He is a complicated history including a brain abscess earlier this year that required evacuation/craniotomy, prior history of CMV viremia likely all due to his immunosuppressed state.     He arrived to the ER tachycardic in AFib with RVR, hypertensive maintaining normal sats on room air.  Lactic acid was noted to be elevated at 5, remainder of  laboratory work was unremarkable.  A CT of his left forearm was obtained and showed subcutaneous edema and a small amount of subcutaneous emphysema.  He was started on broad-spectrum antibiotics and blood cultures were obtained, and consultation placed to General surgery who took the patient to the OR for incision and drainage.     Patient currently being managed for light upper extremity necrotizing fasciitis in an immunocompromised patient.    Appreciate ID input patient currently on meropenem vancomycin and clindamycin, fluconazole  Patient was status post I and D by surgery on 12/31 with no much findings of what was described on CT read.  Appreciate nephrology input to resume home dose immunosuppressants       Today's information  Patient seen and examined at bedside, family at bedside   Vitals reviewed and stable   Patient complaining of wondering if his immunosuppressants have been resumed provided reassurance this have been resumed   Creatinine is improving, lactic acidosis has resolved  Ultrasound of the transplant kidney within normal indices  Wound cultures growing Klebsiella sensitive to meropenem.  Will discontinue fluconazole, vancomycin and clindamycin   Await ID input  on home antibiotic recommendations and duration- acute is Disease recommended to transition to p.o. doxycycline bid for 14 days          Exam  GENERAL: awake, alert, oriented and in no acute distress, non-toxic appearing   HEENT: normocephalic atraumatic   NECK: supple   LUNGS: Clear bilaterally, no wheezing or rales, no accessory muscle use   CVS:  Irregularly irregular, normal peripheral perfusion  ABD: Soft, non-tender, non-distended, bowel sounds present  EXTREMITIES: no clubbing or cyanosis, left arm wrapped in surgical bandage  SKIN: Warm, dry.   NEURO: alert and oriented, grossly without focal deficits   PSYCHIATRIC: Cooperative       Pt was seen and examined on the day of discharge  Vitals:  VITAL SIGNS: 24 HRS MIN & MAX LAST    Temp  Min: 97.8 °F (36.6 °C)  Max: 98.5 °F (36.9 °C) 98.2 °F (36.8 °C)   BP  Min: 134/82  Max: 162/101 (!) 144/82     Pulse  Min: 108  Max: 119  (!) 119     Resp  Min: 18  Max: 20 18   SpO2  Min: 93 %  Max: 96 % 96 %         Procedures Performed: No admission procedures for hospital encounter.     Significant Diagnostic Studies: See Full reports for all details    Recent Labs   Lab 12/30/22  1356 01/01/23  0619 01/02/23  0440   WBC 8.9 9.6 5.6   RBC 5.25 4.81 4.53*   HGB 17.2 15.8 15.0   HCT 51.7 46.5 44.6   MCV 98.5* 96.7* 98.5*   MCH 32.8 32.8 33.1   MCHC 33.3 34.0 33.6   RDW 21.5* 20.7* 20.4*   * 80* 87*   MPV 10.1 9.3* 9.9       Recent Labs   Lab 12/30/22  1550 01/01/23  0956 01/02/23  0440 01/03/23  0502   * 132* 134* 134*   K 5.1 5.1 4.6 4.9   CO2 20* 19* 21* 22*   BUN 21.0 20.5 18.8 16.7   CREATININE 1.58* 1.27* 1.22* 1.36*   CALCIUM 9.6 9.0 8.8 9.7   MG  --  1.70  --   --    ALBUMIN 3.8 2.9*  --   --    ALKPHOS 82 65  --   --    ALT 42 27  --   --    AST 37* 27  --   --    BILITOT 1.9* 1.5  --   --         Microbiology Results (last 7 days)       Procedure Component Value Units Date/Time    Blood Culture [[id number]]  (Normal) Collected: 12/30/22 1309    Order Status: Completed Specimen: Blood Updated: 01/03/23 1500     CULTURE, BLOOD ([facility]) No Growth At 96 Hours    Blood Culture [[id number]]  (Normal) Collected: 12/30/22 1309    Order Status: Completed Specimen: Blood Updated: 01/03/23 1500     CULTURE, BLOOD ([facility]) No Growth At 96 Hours    Cryptococcal antigen, blood [[id number]] Collected: 12/31/22 1029    Order Status: Completed Specimen: Blood, Venous Updated: 01/02/23 1158     CRYPTOCOCCAL ANTIGEN, SERUM ([facility]) Negative     CRYPTOCOCCAL ANTIGEN TITER ([facility]) --    Wound Culture [[id number]] Collected: 12/30/22 2118    Order Status: Completed Specimen: Wound from Arm, Left Updated: 01/02/23 0735     Wound Culture No Growth    Tissue Culture - Aerobic [[id number]]  (Abnormal)  (Susceptibility)  Collected: 12/30/22 2123    Order Status: Completed Specimen: Tissue from Arm, Left Updated: 01/02/23 0735     CULTURE, TISSUE- AEROBIC ([facility]) Few Klebsiella pneumoniae ssp pneumoniae    Anaerobic Culture [[id number]] Collected: 12/30/22 2123    Order Status: Completed Specimen: Tissue from Arm, Left Updated: 01/02/23 0704     Anaerobe Culture No Anaerobes Isolated    Anaerobic Culture [[id number]] Collected: 12/30/22 2118    Order Status: Completed Specimen: Wound from Arm, Left Updated: 01/02/23 0704     Anaerobe Culture No Anaerobes Isolated    KOH Prep [[id number]] Collected: 12/30/22 2123    Order Status: Completed Specimen: Tissue from Arm, Left Updated: 12/31/22 1001     KOH Prep No fungal elements seen     Comment: Corrected results to No fungal elements seen previous report was Budding yeast notified to Dr Stevenson       Mycobacteria and Nocardia Culture [[id number]]     Order Status: Canceled Specimen: Tissue     AFB Smear [[id number]] Collected: 12/30/22 2123    Order Status: Completed Specimen: Tissue from Arm, Left Updated: 12/31/22 0904     AFB Smear No AFB seen (Direct smear only)    AFB Smear [[id number]] Collected: 12/30/22 2118    Order Status: Completed Specimen: Wound from Arm, Left Updated: 12/31/22 0904     AFB Smear No AFB seen (Direct smear only)    Gram Stain [[id number]] Collected: 12/30/22 2118    Order Status: Completed Specimen: Wound from Arm, Left Updated: 12/31/22 0756     GRAM STAIN Rare WBC observed      No bacteria seen    Gram Stain [[id number]] Collected: 12/30/22 2123    Order Status: Completed Specimen: Tissue from Arm, Left Updated: 12/31/22 0755     GRAM STAIN Few WBC observed      No bacteria seen    Fungal Culture [[id number]] Collected: 12/30/22 2118    Order Status: Sent Specimen: Wound from Arm, Left Updated: 12/30/22 2142    Mycobacteria and Nocardia Culture [[id number]] Collected: 12/30/22 2118    Order Status: Canceled Specimen: Wound from Arm, Left Updated: 12/30/22 2142    Fungal  Culture [[id number]] Collected: 12/30/22 2123    Order Status: Sent Specimen: Tissue from Arm, Left Updated: 12/30/22 2142             CV Ultrasound doppler venous arm left  There was no evidence of deep or superficial vein thrombosis in the left   upper extremity         Medication List        START taking these medications      doxycycline 100 MG tablet  Commonly known as: VIBRA-TABS  Take 1 tablet (100 mg total) by mouth every 12 (twelve) hours. for 10 days     oxyCODONE 5 MG immediate release tablet  Commonly known as: ROXICODONE  Take 1 tablet (5 mg total) by mouth every 6 (six) hours as needed for Pain.            CHANGE how you take these medications      magnesium oxide 400 mg (241.3 mg magnesium) tablet  Commonly known as: MAG-OX  TAKE 2 TABLETS BY MOUTH THREE TIMES DAILY  What changed:   how much to take  when to take this  additional instructions     predniSONE 5 MG tablet  Commonly known as: DELTASONE  Take 1 tablet (5 mg total) by mouth once daily.  What changed:   how much to take  how to take this  when to take this     sodium zirconium cyclosilicate 10 gram packet  Commonly known as: LOKELMA  Take 1 packet (10 g total) by mouth 2 (two) times a day for 2 days, THEN 1 packet (10 g total) once daily. Mix entire contents of packet(s) into drinking glass containing 3 tablespoons of water; stir well and drink immediately. Add water and repeat until no powder remains to receive entire dose..  Start taking on: January 3, 2023  What changed: See the new instructions.            CONTINUE taking these medications      apixaban 5 mg Tab  Commonly known as: ELIQUIS     finasteride 5 mg tablet  Commonly known as: PROSCAR     fish oil-omega-3 fatty acids 300-1,000 mg capsule     fluticasone propionate 50 mcg/actuation nasal spray  Commonly known as: FLONASE     LORazepam 0.5 MG tablet  Commonly known as: ATIVAN  Take 1 tablet (0.5 mg total) by mouth once. Take 1 tab 20-30 minutes prior to MRI; may repeat if needed  for 1 dose     metoprolol tartrate 50 MG tablet  Commonly known as: LOPRESSOR  Take 1 tablet (50 mg total) by mouth 2 (two) times daily.     multivitamin Tab  Take 1 tablet by mouth once daily.     SIVEXTRO 200 mg Tab  Generic drug: tedizolid  Take 1 tablet  (200 mg total) by mouth once daily.     sodium bicarbonate 650 MG tablet  Take 2 tablets (1,300 mg total) by mouth 2 (two) times daily.     sodium polystyrene powder  Commonly known as: KAYEXALATE  Mix 8 LEVEL TEASPOONS (30grams total) in 120mL water and drink by mouth once daily x 3 days,then TAKE AS DIRECTED BY TRANSPLANT (for high potassium).     sulfamethoxazole-trimethoprim 800-160mg 800-160 mg Tab  Commonly known as: BACTRIM DS  Take 2 tablets by mouth 3 (three) times daily.     tacrolimus 1 MG Cap  Commonly known as: PROGRAF  Take 2 capsules (2 mg total) by mouth every morning AND 1 capsule (1 mg total) every evening. Z94.0;Kidney txp on 5/4/2021.            STOP taking these medications      VITAMIN D2 50,000 unit Cap  Generic drug: ergocalciferol               Where to Get Your Medications        These medications were sent to [facility]/pharmacy #[id number] - [address] [address]      Phone: [phone]   doxycycline 100 MG tablet  oxyCODONE 5 MG immediate release tablet  predniSONE 5 MG tablet  sodium zirconium cyclosilicate 10 gram packet          Explained in detail to the patient about the discharge plan, medications, and follow-up visits. Pt understands and agrees with the treatment plan  Discharge Disposition: Home-Health Care [facility]   Discharged Condition: stable  Diet-    Medications Per [place] med rec  Activities as tolerated   Follow-up Information       Stephen Hartman MD. Schedule an appointment as soon as possible for a visit on 1/5/2023.    Specialty: Family Medicine  Why: @10:20am  Contact information:  [address]   [place] [address] [phone]               Singh Gordon MD. Schedule an appointment  as soon as possible for a visit in 2 week(s).    Specialty: Infectious Diseases  Contact information:  [address]  [facility] [address]  [phone]               [facility] Follow up.    Specialties: Home Health Services, Home Therapy Services, Home Living Aide Services  Why: This is your home health agency.  Contact information:  [address]  [facility] [id number] [phone]                         For further questions contact hospitalist office    Discharge time 33 minutes    For worsening symptoms, chest pain, shortness of breath, increased abdominal pain, high grade fever, stroke or stroke like symptoms, immediately go to the nearest Emergency Room or call 911 as soon as possible.      Hawa So M.D on 1/3/2023. at 6:15 PM.           O-Z Plasty Text: The defect edges were debeveled with a #15 scalpel blade.  Given the location of the defect, shape of the defect and the proximity to free margins an O-Z plasty (double transposition flap) was deemed most appropriate.  Using a sterile surgical marker, the appropriate transposition flaps were drawn incorporating the defect and placing the expected incisions within the relaxed skin tension lines where possible.    The area thus outlined was incised deep to adipose tissue with a #15 scalpel blade.  The skin margins were undermined to an appropriate distance in all directions utilizing iris scissors.  Hemostasis was achieved with electrocautery.  The flaps were then transposed into place, one clockwise and the other counterclockwise, and anchored with interrupted buried subcutaneous sutures.

## 2023-01-05 ENCOUNTER — PATIENT MESSAGE (OUTPATIENT)
Dept: ADMINISTRATIVE | Facility: OTHER | Age: 73
End: 2023-01-05
Payer: MEDICARE

## 2023-01-05 ENCOUNTER — TELEPHONE (OUTPATIENT)
Dept: ADMINISTRATIVE | Facility: CLINIC | Age: 73
End: 2023-01-05
Payer: MEDICARE

## 2023-01-05 LAB
1,3 BETA GLUCAN SER QL: NEGATIVE
1,3 BETA GLUCAN SER-MCNC: <31 PG/ML
B DERMAT AG UR QL IA: DETECTED
BLASTOMYCES AG UR IA-MCNC: 1.29 U/ML

## 2023-01-06 ENCOUNTER — PATIENT MESSAGE (OUTPATIENT)
Dept: ADMINISTRATIVE | Facility: OTHER | Age: 73
End: 2023-01-06
Payer: MEDICARE

## 2023-01-08 ENCOUNTER — HOSPITAL ENCOUNTER (EMERGENCY)
Facility: HOSPITAL | Age: 73
Discharge: HOME | End: 2023-01-08
Attending: EMERGENCY MEDICINE
Payer: MEDICARE

## 2023-01-08 VITALS
OXYGEN SATURATION: 97 % | TEMPERATURE: 99 F | WEIGHT: 186 LBS | RESPIRATION RATE: 18 BRPM | HEART RATE: 88 BPM | DIASTOLIC BLOOD PRESSURE: 100 MMHG | BODY MASS INDEX: 23.87 KG/M2 | SYSTOLIC BLOOD PRESSURE: 160 MMHG | HEIGHT: 74 IN

## 2023-01-08 DIAGNOSIS — M79.89 LEFT ARM SWELLING: Primary | ICD-10-CM

## 2023-01-08 DIAGNOSIS — R60.9 SWELLING: ICD-10-CM

## 2023-01-08 DIAGNOSIS — E87.20 LACTIC ACIDOSIS: ICD-10-CM

## 2023-01-08 LAB
ALBUMIN SERPL-MCNC: 3.7 G/DL (ref 3.4–4.8)
ALBUMIN/GLOB SERPL: 1.8 RATIO (ref 1.1–2)
ALP SERPL-CCNC: 87 UNIT/L (ref 40–150)
ALT SERPL-CCNC: 40 UNIT/L (ref 0–55)
APPEARANCE UR: ABNORMAL
AST SERPL-CCNC: 35 UNIT/L (ref 5–34)
BACTERIA #/AREA URNS AUTO: NORMAL /HPF
BASOPHILS # BLD AUTO: 0.04 X10(3)/MCL (ref 0–0.2)
BASOPHILS NFR BLD AUTO: 0.9 %
BILIRUB UR QL STRIP.AUTO: NEGATIVE MG/DL
BILIRUBIN DIRECT+TOT PNL SERPL-MCNC: 1.3 MG/DL
BUN SERPL-MCNC: 20.3 MG/DL (ref 8.4–25.7)
CALCIUM SERPL-MCNC: 9.8 MG/DL (ref 8.8–10)
CHLORIDE SERPL-SCNC: 105 MMOL/L (ref 98–107)
CO2 SERPL-SCNC: 22 MMOL/L (ref 23–31)
COLOR UR AUTO: ABNORMAL
CREAT SERPL-MCNC: 1.49 MG/DL (ref 0.73–1.18)
EOSINOPHIL # BLD AUTO: 0.06 X10(3)/MCL (ref 0–0.9)
EOSINOPHIL NFR BLD AUTO: 1.3 %
ERYTHROCYTE [DISTWIDTH] IN BLOOD BY AUTOMATED COUNT: 18.6 % (ref 11.6–14.4)
FLUAV AG UPPER RESP QL IA.RAPID: NOT DETECTED
FLUBV AG UPPER RESP QL IA.RAPID: NOT DETECTED
GFR SERPLBLD CREATININE-BSD FMLA CKD-EPI: 50 MLS/MIN/1.73/M2
GLOBULIN SER-MCNC: 2.1 GM/DL (ref 2.4–3.5)
GLUCOSE SERPL-MCNC: 131 MG/DL (ref 82–115)
GLUCOSE UR QL STRIP.AUTO: NEGATIVE MG/DL
HCT VFR BLD AUTO: 52 % (ref 42–52)
HGB BLD-MCNC: 17.3 GM/DL (ref 14–18)
IMM GRANULOCYTES # BLD AUTO: 0.06 X10(3)/MCL (ref 0–0.04)
IMM GRANULOCYTES NFR BLD AUTO: 1.3 %
KETONES UR QL STRIP.AUTO: NEGATIVE MG/DL
LACTATE SERPL-SCNC: 2.1 MMOL/L (ref 0.5–2.2)
LACTATE SERPL-SCNC: 2.4 MMOL/L (ref 0.5–2.2)
LACTATE SERPL-SCNC: 2.7 MMOL/L (ref 0.5–2.2)
LEUKOCYTE ESTERASE UR QL STRIP.AUTO: NEGATIVE UNIT/L
LYMPHOCYTES # BLD AUTO: 1.84 X10(3)/MCL (ref 0.6–4.6)
LYMPHOCYTES NFR BLD AUTO: 40.7 %
MAGNESIUM SERPL-MCNC: 1.7 MG/DL (ref 1.6–2.6)
MCH RBC QN AUTO: 32.4 PG
MCHC RBC AUTO-ENTMCNC: 33.3 MG/DL (ref 33–36)
MCV RBC AUTO: 97.4 FL (ref 80–94)
MONOCYTES # BLD AUTO: 0.47 X10(3)/MCL (ref 0.1–1.3)
MONOCYTES NFR BLD AUTO: 10.4 %
NEUTROPHILS # BLD AUTO: 2.05 X10(3)/MCL (ref 2.1–9.2)
NEUTROPHILS NFR BLD AUTO: 45.4 %
NITRITE UR QL STRIP.AUTO: NEGATIVE
NRBC BLD AUTO-RTO: 0 % (ref 0–1)
PH UR STRIP.AUTO: 7.5 [PH]
PHOSPHATE SERPL-MCNC: 3.2 MG/DL (ref 2.3–4.7)
PLATELET # BLD AUTO: 168 X10(3)/MCL (ref 140–371)
PMV BLD AUTO: 9 FL (ref 9.4–12.4)
POTASSIUM SERPL-SCNC: 4.8 MMOL/L (ref 3.5–5.1)
PROT SERPL-MCNC: 5.8 GM/DL (ref 5.8–7.6)
PROT UR QL STRIP.AUTO: ABNORMAL MG/DL
RBC # BLD AUTO: 5.34 X10(6)/MCL (ref 4.7–6.1)
RBC #/AREA URNS AUTO: <5 /HPF
RBC UR QL AUTO: NEGATIVE UNIT/L
SARS-COV-2 RNA RESP QL NAA+PROBE: NOT DETECTED
SODIUM SERPL-SCNC: 135 MMOL/L (ref 136–145)
SP GR UR STRIP.AUTO: 1.01 (ref 1–1.03)
SQUAMOUS #/AREA URNS AUTO: <5 /HPF
UROBILINOGEN UR STRIP-ACNC: 1 MG/DL
WBC # SPEC AUTO: 4.5 X10(3)/MCL (ref 4.5–11.5)
WBC #/AREA URNS AUTO: <5 /HPF

## 2023-01-08 PROCEDURE — 25000003 PHARM REV CODE 250: Performed by: EMERGENCY MEDICINE

## 2023-01-08 PROCEDURE — 87077 CULTURE AEROBIC IDENTIFY: CPT | Performed by: EMERGENCY MEDICINE

## 2023-01-08 PROCEDURE — 83605 ASSAY OF LACTIC ACID: CPT | Performed by: EMERGENCY MEDICINE

## 2023-01-08 PROCEDURE — 96360 HYDRATION IV INFUSION INIT: CPT

## 2023-01-08 PROCEDURE — 81001 URINALYSIS AUTO W/SCOPE: CPT | Performed by: EMERGENCY MEDICINE

## 2023-01-08 PROCEDURE — 83735 ASSAY OF MAGNESIUM: CPT | Performed by: EMERGENCY MEDICINE

## 2023-01-08 PROCEDURE — 80053 COMPREHEN METABOLIC PANEL: CPT | Performed by: EMERGENCY MEDICINE

## 2023-01-08 PROCEDURE — 84100 ASSAY OF PHOSPHORUS: CPT | Performed by: EMERGENCY MEDICINE

## 2023-01-08 PROCEDURE — 0240U COVID/FLU A&B PCR: CPT | Performed by: EMERGENCY MEDICINE

## 2023-01-08 PROCEDURE — 85025 COMPLETE CBC W/AUTO DIFF WBC: CPT | Performed by: EMERGENCY MEDICINE

## 2023-01-08 PROCEDURE — 99284 EMERGENCY DEPT VISIT MOD MDM: CPT | Mod: 25,CS

## 2023-01-08 RX ORDER — SULFAMETHOXAZOLE AND TRIMETHOPRIM 800; 160 MG/1; MG/1
TABLET ORAL
COMMUNITY
End: 2023-01-08 | Stop reason: SDUPTHER

## 2023-01-08 RX ORDER — IRON,CARBONYL/ASCORBIC ACID 100-250 MG
1 TABLET ORAL
COMMUNITY
Start: 2022-12-29 | End: 2024-01-29

## 2023-01-08 RX ORDER — AMOXICILLIN AND CLAVULANATE POTASSIUM 875; 125 MG/1; MG/1
875 TABLET, FILM COATED ORAL
Status: ON HOLD | COMMUNITY
Start: 2021-09-27 | End: 2023-02-11 | Stop reason: ALTCHOICE

## 2023-01-08 RX ORDER — TEDIZOLID PHOSPHATE 200 MG/1
TABLET, FILM COATED ORAL
COMMUNITY
End: 2023-01-08 | Stop reason: SDUPTHER

## 2023-01-08 RX ORDER — FLUTICASONE PROPIONATE 50 MCG
SPRAY, SUSPENSION (ML) NASAL
COMMUNITY
Start: 2021-09-27 | End: 2023-01-08 | Stop reason: SDUPTHER

## 2023-01-08 RX ORDER — SODIUM CHLORIDE 9 MG/ML
500 INJECTION, SOLUTION INTRAVENOUS
Status: COMPLETED | OUTPATIENT
Start: 2023-01-08 | End: 2023-01-08

## 2023-01-08 RX ORDER — LANOLIN ALCOHOL/MO/W.PET/CERES
1600 CREAM (GRAM) TOPICAL
COMMUNITY
Start: 2022-07-13 | End: 2023-01-08 | Stop reason: SDUPTHER

## 2023-01-08 RX ORDER — TACROLIMUS 1 MG/1
1 CAPSULE ORAL
COMMUNITY
Start: 2022-06-14 | End: 2023-01-08 | Stop reason: SDUPTHER

## 2023-01-08 RX ADMIN — SODIUM CHLORIDE 500 ML: 9 INJECTION, SOLUTION INTRAVENOUS at 10:01

## 2023-01-08 NOTE — CONSULTS
General Surgery  Consult Note     CC: drainage from LUE I&D     Subjective:  HPI: 72M with PMH of ESRD 2/2 PCKD s/p kidney txp, HTN, treated HCV, HFpEF (EF 50%), recent LUE abscess s/p I&D 12/30 presents with increasing drainage and edema of the LUE. He denies any fevers, chills, purulent drainage. He has been doing dressing changes 2-3 times daily. His main complaint is upper extremity edema along with persistent serous drainage. He otherwise has been tolerating his diet with no nausea or vomiting. Of note he does have LUE AVF as well - no longer using since transplant.      PMH:  Past Medical History:   Diagnosis Date    Anasarca 10/1/2021    Anemia of chronic disease     Atrial fibrillation     BPH (benign prostatic hypertrophy)     Chronic systolic heart failure 10/1/2021    CKD (chronic kidney disease) stage 2, GFR 60-89 ml/min     Hepatitis C virus infection cured after antiviral drug therapy     acquired through kidney transplant, treated / cured (SVR12 - 12/2021)    HTN (hypertension)     HTN (hypertension)     Polycystic kidney disease       PSH:  Past Surgical History:   Procedure Laterality Date    AV FISTULA PLACEMENT Left 2016    CATARACT EXTRACTION, BILATERAL Bilateral     CRANIOTOMY Right 2/16/2022    Procedure: CRANIOTOMY;  Surgeon: Sunday Rosa DO;  Location: [facility] OR [address] [place] [address];  Service: Neurosurgery;  Laterality: Right;  R craniotomy for abscess drainage    INCISION AND DRAINAGE, UPPER EXTREMITY Left 12/30/2022    Procedure: INCISION AND DRAINAGE, UPPER EXTREMITY;  Surgeon: Elijah Davis Jr., MD;  Location: [facility];  Service: General;  Laterality: Left;    KIDNEY TRANSPLANT N/A 5/4/2021    Procedure: TRANSPLANT, KIDNEY;  Surgeon: Lexy Bolden MD;  Location: [address];  Service: Transplant;  Laterality: N/A;      Medications:  Current Outpatient Medications   Medication Instructions    amoxicillin-clavulanate 875-125mg (AUGMENTIN) 875-125 mg per tablet 875 mg, Oral    apixaban  (ELIQUIS) 5 mg, Oral, 2 times daily, DO NOT RESTART UNTIL APPROVED BY NEUROSURGERY    doxycycline (VIBRA-TABS) 100 mg, Oral, Every 12 hours    finasteride (PROSCAR) 5 mg, Oral, Nightly    fish oil-omega-3 fatty acids 300-1,000 mg capsule 1 capsule, Oral, Nightly    fluticasone propionate (FLONASE) 50 mcg/actuation nasal spray Daily PRN    iron-vitamin C 100-250 mg, ICAR-C, 100-250 mg Tab 1 tablet, Oral    magnesium oxide (MAG-OX) 400 mg (241.3 mg magnesium) tablet TAKE 2 TABLETS BY MOUTH THREE TIMES DAILY    metoprolol tartrate (LOPRESSOR) 50 mg, Oral, 2 times daily    multivitamin Tab 1 tablet, Oral, Daily    oxyCODONE (ROXICODONE) 5 mg, Oral, Every 6 hours PRN    predniSONE (DELTASONE) 5 mg, Oral, Daily    sodium bicarbonate 1,300 mg, Oral, 2 times daily    sodium polystyrene (KAYEXALATE) powder Mix 8 LEVEL TEASPOONS (30grams total) in 120mL water and drink by mouth once daily x 3 days,then TAKE AS DIRECTED BY TRANSPLANT (for high potassium).    sodium zirconium cyclosilicate (LOKELMA) 10 gram packet Take 1 packet (10 g total) by mouth 2 (two) times a day for 2 days, THEN 1 packet (10 g total) once daily. Mix entire contents of packet(s) into drinking glass containing 3 tablespoons of water; stir well and drink immediately. Add water and repeat until no powder remains to receive entire dose..    sulfamethoxazole-trimethoprim 800-160mg (BACTRIM DS) 800-160 mg Tab 2 tablets, Oral, 3 times daily    tacrolimus (PROGRAF) 1 MG Cap Take 2 capsules (2 mg total) by mouth every morning AND 1 capsule (1 mg total) every evening. Z94.0;Kidney txp on 5/4/2021.    tedizolid (SIVEXTRO) 200 mg Tab Take 1 tablet  (200 mg total) by mouth once daily.     Allergies:  Review of patient's allergies indicates:  No Known Allergies     Social Hx:  Social History     Socioeconomic History    Marital status:     Number of children: 1   Tobacco Use    Smoking status: Former     Packs/day: 2.00     Years: 10.00     Pack years: 20.00      "Types: Cigarettes     Start date: 1972     Quit date: 1984     Years since quittin.1    Smokeless tobacco: Never   Substance and Sexual Activity    Alcohol use: No     Comment: Pt reportsbeing a former beer drinker (2-3 cans per day) and quitting in .    Drug use: No    Sexual activity: Yes     FHx:  Family History   Problem Relation Age of Onset    Polycystic kidney disease Father     Hypertension Father     Kidney disease Father     Polycystic kidney disease Sister     Hypertension Sister     Kidney disease Sister      Objective:  Vitals:  Vitals:    23 0826 23 1054 23 1214   BP: (!) 155/116 (!) 153/85 (!) 150/94   BP Location:  Right arm    Pulse: (!) 127 96 92   Resp: 20 20 20   Temp: 97.4 °F (36.3 °C)     TempSrc: Oral     SpO2: 97% 96% 96%   Weight: 84.4 kg (186 lb)     Height: 6' 2" (1.88 m)       Physical Exam:  Gen: NAD  Neuro: awake, alert, answering questions appropriately  CV: RRR  Resp: non-labored breathing, CHANEL  Abd: soft, ND, NT  Ext: moves all 4 spontaneously and purposefully  LUE AVF with good thrill and aneurysmal. Incision with no surrounding erythema or purulent, wound bed is very clean, no granulation tissue, no bleeding or purulent drainage  Skin: warm, well perfused     Labs:  WBC 4.5  H/H 17/53      BUN/Cr 20/1.49  K 4.8    AST/ALT/ALP 35/40/87  Tbili 1.3    Lactic 2.4     Imaging:  No fluid collections on US     Assessment/Plan:  72M with PMH of ESRD 2/2 PCKD s/p kidney txp, HTN, treated HCV, HFpEF (EF 50%), recent LUE abscess s/p I&D  presents with increasing drainage and edema of the LUE.     -Patient's incision very clean with no signs of infection, serous drainage, some edema likely exacerbated by AVF  -No further debridement needed at this time  -To follow-up in general surgery clinic for consideration of loose delayed closure  -Elevated lactic, will need further medicine work-up as necessary      Samreen Frank MD  [employer] General " Surgery - PGY4

## 2023-01-08 NOTE — ED PROVIDER NOTES
Encounter Date: 1/8/2023    SCRIBE #1 NOTE: I, Duke Bates, am scribing for, and in the presence of,  Patricia Tate MD. I have scribed the following portions of the note - Other sections scribed: HPI, ROS, PE, MDM.     History     Chief Complaint   Patient presents with    Arm Swelling     Complaining of left arm redness and swelling x2 days. Denies fever. Stated that he had left arm cellulitis with debridement 12/30 by Ryan. Hx of kidney transplant May 2021. Fistula noted with thrill to left arm. Stated that he currently taking doxycyline.       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 Pt reports that his LUE swelling has improved since this morning with simple movement of the extremity. Pt states the wound was drained by Dr. Davis at the hospital on 12/31/2022 and that he was followed by infectious disease at the time. He was ultimately discharged with an open wound on Doxycycline with which he has been compliant.  He also reports he has been changing the dressing twice daily as instructed with wet to dry dressings.  Pt denies LUE pain, bleeding, foul smelling drainage, fever.     The history is provided by the patient. No  was used.   Wound Check   Treatments since wound repair include oral antibiotics. There has been clear discharge from the wound. The redness has worsened. The swelling has worsened. There is no pain present. He has no difficulty moving the affected extremity or digit.   Review of patient's allergies indicates:  No Known Allergies  Past Medical History:   Diagnosis Date    Anasarca 10/1/2021    Anemia of chronic disease     Atrial fibrillation     BPH (benign prostatic hypertrophy)     Chronic  systolic heart failure 10/1/2021    CKD (chronic kidney disease) stage 2, GFR 60-89 ml/min     Hepatitis C virus infection cured after antiviral drug therapy     acquired through kidney transplant, treated / cured (SVR - 2021)    HTN (hypertension)     HTN (hypertension)     Polycystic kidney disease      Past Surgical History:   Procedure Laterality Date    AV FISTULA PLACEMENT Left     CATARACT EXTRACTION, BILATERAL Bilateral     CRANIOTOMY Right 2022    Procedure: CRANIOTOMY;  Surgeon: Sunday Rosa DO;  Location: [facility] OR [address] [place] [address];  Service: Neurosurgery;  Laterality: Right;  R craniotomy for abscess drainage    INCISION AND DRAINAGE, UPPER EXTREMITY Left 2022    Procedure: INCISION AND DRAINAGE, UPPER EXTREMITY;  Surgeon: Elijah Davis Jr., MD;  Location: [facility];  Service: General;  Laterality: Left;    KIDNEY TRANSPLANT N/A 2021    Procedure: TRANSPLANT, KIDNEY;  Surgeon: Lexy Bolden MD;  Location: [facility] OR [address] [place] [address];  Service: Transplant;  Laterality: N/A;     Family History   Problem Relation Age of Onset    Polycystic kidney disease Father     Hypertension Father     Kidney disease Father     Polycystic kidney disease Sister     Hypertension Sister     Kidney disease Sister      Social History     Tobacco Use    Smoking status: Former     Packs/day: 2.00     Years: 10.00     Pack years: 20.00     Types: Cigarettes     Start date: 1972     Quit date: 1984     Years since quittin.1    Smokeless tobacco: Never   Substance Use Topics    Alcohol use: No     Comment: Pt reportsbeing a former beer drinker (2-3 cans per day) and quitting in .    Drug use: No     Review of Systems   Constitutional:  Negative for fever.   HENT:  Negative for sore throat.    Eyes:  Negative for visual disturbance.   Respiratory:  Negative for cough and shortness of breath.    Cardiovascular:  Negative for chest pain.   Gastrointestinal:  Negative for abdominal pain, constipation,  diarrhea, nausea and vomiting.   Genitourinary:  Negative for dysuria and hematuria.   Skin:  Positive for wound. Negative for rash.        Pt has a wound to the LUE with drainage and surrounding edema   Neurological:  Negative for syncope and headaches.   All other systems reviewed and are negative.    Physical Exam     Initial Vitals [01/08/23 0826]   BP Pulse Resp Temp SpO2   (!) 155/116 (!) 127 20 97.4 °F (36.3 °C) 97 %      MAP       --         Physical Exam    Nursing note and vitals reviewed.  Constitutional: He appears well-developed and well-nourished. No distress.   Well appearing   HENT:   Head: Normocephalic and atraumatic.   Mouth/Throat: Oropharynx is clear and moist.   Eyes: Conjunctivae and EOM are normal. Pupils are equal, round, and reactive to light.   Neck: Neck supple.   Cardiovascular:  Normal rate and intact distal pulses.           Irregularly irregular rhythm   Pulmonary/Chest: Breath sounds normal. No respiratory distress.   Abdominal: Abdomen is soft. There is no abdominal tenderness.   Musculoskeletal:         General: No tenderness. Normal range of motion.      Cervical back: Neck supple.      Lumbar back: Normal.      Comments: Pt's fistula to the LUE has a palpable thrill.      Neurological: He is alert and oriented to person, place, and time. He has normal strength. No cranial nerve deficit or sensory deficit.   Skin: Skin is warm and dry.   Pt has an open wound to the posterior aspect of the L forearm with clean packing in place.  Packing removed, serous drainage noted on packing as well as overlying gauze; no foul smell or purulent drainage, wound edges are very clean without surrounding erythema or edema appreciated.  No fluctuance or induration.  There is a small amount of healing bruising around the edges of the wound.  It is not tender.       ED Course   Procedures  Labs Reviewed   COMPREHENSIVE METABOLIC PANEL - Abnormal; Notable for the following components:       Result Value     Sodium Level 135 (*)     Carbon Dioxide 22 (*)     Glucose Level 131 (*)     Creatinine 1.49 (*)     Globulin 2.1 (*)     Aspartate Aminotransferase 35 (*)     All other components within normal limits   URINALYSIS, REFLEX TO URINE CULTURE - Abnormal; Notable for the following components:    Appearance, UA Cloudy (*)     Protein, UA Trace (*)     All other components within normal limits    Narrative:      URINE STABILITY IS 2 HOURS AT ROOM TEMP OR    SIX HOURS REFRIGERATED. PERFORMING TESTING ON    SPECIMENS GREATER THAN THIS AGE MAY AFFECT THE    FOLLOWING TESTS:    PH          SPECIFIC GRAVITY           BLOOD    CLARITY     BILIRUBIN               UROBILINOGEN   LACTIC ACID, PLASMA - Abnormal; Notable for the following components:    Lactic Acid Level 2.7 (*)     All other components within normal limits   CBC WITH DIFFERENTIAL - Abnormal; Notable for the following components:    MCV 97.4 (*)     RDW 18.6 (*)     MPV 9.0 (*)     Neut # 2.05 (*)     IG# 0.06 (*)     All other components within normal limits   LACTIC ACID, PLASMA - Abnormal; Notable for the following components:    Lactic Acid Level 2.4 (*)     All other components within normal limits   MAGNESIUM - Normal   PHOSPHORUS - Normal   LACTIC ACID, PLASMA - Normal   URINALYSIS, MICROSCOPIC - Normal   COVID/FLU A&B PCR - Normal    Narrative:     The Xpert Xpress SARS-CoV-2/FLU/RSV plus is a rapid, multiplexed real-time PCR test intended for the simultaneous qualitative detection and differentiation of SARS-CoV-2, Influenza A, Influenza B, and respiratory syncytial virus (RSV) viral RNA in either nasopharyngeal swab or nasal swab specimens.         BLOOD CULTURE [facility]   BLOOD CULTURE [facility]   CBC W/ AUTO DIFFERENTIAL    Narrative:     The following orders were created for panel order CBC auto differential.  Procedure                               Abnormality         Status                     ---------                               -----------         ------                      CBC with Differential[[id number]]        Abnormal            Final result                 Please view results for these tests on the individual orders.          Imaging Results              X-Ray Chest PA And Lateral (Final result)  Result time 01/08/23 12:15:42      Final result by Leif Hughes MD (01/08/23 12:15:42)                   Impression:      Bibasilar effusions.      Electronically signed by: Leif Hughes MD  Date:    01/08/2023  Time:    12:15               Narrative:    EXAMINATION:  XR CHEST PA AND LATERAL    CLINICAL HISTORY:  Acidosis, unspecified    TECHNIQUE:  PA and lateral views of the chest were performed.    COMPARISON:  02/16/2022    FINDINGS:  Cardiomediastinal silhouette and pulmonary vasculature are normal.    Bibasilar effusions are identified with atelectatic changes.                                       X-Ray Forearm Left (Final result)  Result time 01/08/23 10:40:16      Final result by Chalo Galvan MD (01/08/23 10:40:16)                   Impression:      1. No acute osseous findings.  2. Increased soft tissue emphysema.      Electronically signed by: Chalo Galvan  Date:    01/08/2023  Time:    10:40               Narrative:    EXAMINATION:  XR FOREARM LEFT    CLINICAL HISTORY:  Edema, unspecified    COMPARISON:  30 December 2022    FINDINGS:  Two views of the left forearm demonstrate no fracture, dislocation or osseous destruction.  There are vascular calcifications and suspected surgical clips.  There is increased soft tissue emphysema proximal forearm anteriorly and medially.                                    X-Rays:   Independently Interpreted Readings:   Chest X-Ray: Bilateral pleural effusions   Other Readings:  X-ray of the left forearm shows air at the site of the wound, no obvious cortical involvement  Medications   0.9%  NaCl infusion (0 mLs Intravenous Stopped 1/8/23 1203)     Medical Decision Making:   History:   Old Medical Records: I decided to  obtain old medical records.  Old Records Summarized: records from clinic visits.  Initial Assessment:   72-year-old male presents for evaluation of swelling to his left upper extremity that largely resolved prior to my examination with mobilizing the extremity.  There may be some dependent edema there depending on how he slept.  I have discussed this with him.  The wound itself appears very clean, he is nontoxic, afebrile.  Labs ordered in triage prior to full evaluation at which time his heart rate was noted to be elevated.  Elevated heart rate likely secondary to AFib however blood cultures and lactic acid were obtained.  Had I seen the patient's wound at this time, I would not have ordered a lactic acid.  It is however slightly elevated.  It was redrawn prior to any intervention and is lower.  He is currently on antibiotics for an infection which could be the cause of mild elevation.  He has no other obvious focal source of infection.  I will give him a small IV fluid bolus and repeated to ensure it is stable and or decreasing.  Labs otherwise unremarkable including urinalysis.  X-ray without any bony involvement concerning for osteomyelitis.  With the lactic acid being elevated, I will add a chest x-ray, COVID and influenza swabs to ensure no occult infection.  General surgery has been consulted for evaluation of the wound.  Lastly, I did consider ordering an ultrasound of the fistula as well as a soft tissue ultrasound and venous ultrasound the left upper extremity to assess for abscess, patency of his fistula, and DVT, however after exam I am not concerned for abscess.  His fistula does have a palpable thrill and even further he does not use this as he is no longer on dialysis.  I am not concerned for DVT as he has no swelling on my exam and has no tenderness.  He is also on anticoagulation therapy.  Differential Diagnosis:   Dependent edema, less likely wound infection, osteomyelitis, or DVT and  others  Clinical Tests:   Lab Tests: Ordered and Reviewed  Radiological Study: Ordered and Reviewed  Medical Tests: Ordered and Reviewed  Other:   I have discussed this case with another health care provider.        Scribe Attestation:   Scribe #1: I performed the above scribed service and the documentation accurately describes the services I performed. I attest to the accuracy of the note.    Attending Attestation:           Physician Attestation for Scribe:  Physician Attestation Statement for Scribe #1: I, Patricia Tate MD, reviewed documentation, as scribed by Duke Bates in my presence, and it is both accurate and complete.           ED Course as of 01/08/23 1725   Sun Jan 08, 2023   1020 Consult: Surgery will come to evaluate at this time.  [ZT]   1215 Reexamine.  Patient has remained stable.  He has no acute complaints at this time.  He remains afebrile, hemodynamically stable.  Surgery has evaluated the wound and states he can be discharged from their standpoint.  He will be contacted this week with appointment information for them to re-evaluate the wound and clinic for closure at that time.  Chest x-ray reviewed, small bilateral pleural effusions are noted.  This is not an acute finding after reviewing his previous chest x-rays.  Is not currently complaining of chest pain, shortness of breath, orthopnea or PND.  Oxygen is 97% on room air.  Was at 1 point on Lasix but is no longer taking this.  He is able to see his primary care provider this week for re-evaluation/continued evaluation and further management.  He is strongly advised to do this.  Strict return precautions have been discussed.  Repeat lactic acid still pending. [RB]   1324 Patient has remained stable.  Repeat lactic acid essentially unchanged.  He only received a small fluid bolus.  Does not appear toxic at all, I am not concerned at this time for infection.  I have discussed this with him.  Again I have also given him strict return  precautions considering he is immunocompromised and he is to see his primary care provider this week and follow up with surgery as scheduled.  I do believe he will return for any issues and he is discharged in stable condition [RB]      ED Course User Index  [RB] Patricia Tate MD  [ZT] Duke Paulo                   Clinical Impression:   Final diagnoses:  [M79.89] Left arm swelling (Primary)  [R60.9] Swelling  [E87.20] Lactic acidosis        ED Disposition Condition    Discharge Stable          ED Prescriptions    None       Follow-up Information       Follow up With Specialties Details Why Contact Info    Stephen Hartman MD Family Medicine Call in 1 day to make an appointment this week [address] [facility] [id number] [phone]      [facility] - Emergency Dept Emergency Medicine  As needed, If symptoms worsen [address] [facility] [id number] [phone]             Patricia Tate MD  01/08/23 3645

## 2023-01-10 ENCOUNTER — LAB VISIT (OUTPATIENT)
Dept: LAB | Facility: HOSPITAL | Age: 73
End: 2023-01-10
Attending: INTERNAL MEDICINE
Payer: MEDICARE

## 2023-01-10 DIAGNOSIS — Z94.0 KIDNEY REPLACED BY TRANSPLANT: ICD-10-CM

## 2023-01-10 DIAGNOSIS — Z79.899 ENCOUNTER FOR LONG-TERM (CURRENT) USE OF OTHER MEDICATIONS: ICD-10-CM

## 2023-01-10 LAB
ALBUMIN SERPL-MCNC: 3.6 G/DL (ref 3.4–4.8)
BASOPHILS # BLD AUTO: 0.03 X10(3)/MCL (ref 0–0.2)
BASOPHILS NFR BLD AUTO: 0.6 %
BUN SERPL-MCNC: 20.9 MG/DL (ref 8.4–25.7)
CALCIUM SERPL-MCNC: 9.7 MG/DL (ref 8.8–10)
CHLORIDE SERPL-SCNC: 107 MMOL/L (ref 98–107)
CO2 SERPL-SCNC: 20 MMOL/L (ref 23–31)
CREAT SERPL-MCNC: 1.55 MG/DL (ref 0.73–1.18)
EOSINOPHIL # BLD AUTO: 0.04 X10(3)/MCL (ref 0–0.9)
EOSINOPHIL NFR BLD AUTO: 0.8 %
ERYTHROCYTE [DISTWIDTH] IN BLOOD BY AUTOMATED COUNT: 18.3 % (ref 11.5–17)
GFR SERPLBLD CREATININE-BSD FMLA CKD-EPI: 48 MLS/MIN/1.73/M2
GLUCOSE SERPL-MCNC: 98 MG/DL (ref 82–115)
HCT VFR BLD AUTO: 51.7 % (ref 42–52)
HGB BLD-MCNC: 16.9 GM/DL (ref 14–18)
IMM GRANULOCYTES # BLD AUTO: 0.03 X10(3)/MCL (ref 0–0.04)
IMM GRANULOCYTES NFR BLD AUTO: 0.6 %
LYMPHOCYTES # BLD AUTO: 2.28 X10(3)/MCL (ref 0.6–4.6)
LYMPHOCYTES NFR BLD AUTO: 46.6 %
MAGNESIUM SERPL-MCNC: 1.6 MG/DL (ref 1.6–2.6)
MCH RBC QN AUTO: 32.4 PG
MCHC RBC AUTO-ENTMCNC: 32.7 MG/DL (ref 33–36)
MCV RBC AUTO: 99 FL (ref 80–94)
MONOCYTES # BLD AUTO: 0.55 X10(3)/MCL (ref 0.1–1.3)
MONOCYTES NFR BLD AUTO: 11.2 %
NEUTROPHILS # BLD AUTO: 1.96 X10(3)/MCL (ref 2.1–9.2)
NEUTROPHILS NFR BLD AUTO: 40.2 %
NRBC BLD AUTO-RTO: 0 %
PHOSPHATE SERPL-MCNC: 2.9 MG/DL (ref 2.3–4.7)
PLATELET # BLD AUTO: 153 X10(3)/MCL (ref 130–400)
PMV BLD AUTO: 8.6 FL (ref 7.4–10.4)
POTASSIUM SERPL-SCNC: 5 MMOL/L (ref 3.5–5.1)
RBC # BLD AUTO: 5.22 X10(6)/MCL (ref 4.7–6.1)
SODIUM SERPL-SCNC: 138 MMOL/L (ref 136–145)
WBC # SPEC AUTO: 4.9 X10(3)/MCL (ref 4.5–11.5)

## 2023-01-10 PROCEDURE — 83735 ASSAY OF MAGNESIUM: CPT

## 2023-01-10 PROCEDURE — 36415 COLL VENOUS BLD VENIPUNCTURE: CPT

## 2023-01-10 PROCEDURE — 85025 COMPLETE CBC W/AUTO DIFF WBC: CPT

## 2023-01-10 PROCEDURE — 80069 RENAL FUNCTION PANEL: CPT

## 2023-01-10 PROCEDURE — 80197 ASSAY OF TACROLIMUS: CPT

## 2023-01-11 ENCOUNTER — PATIENT MESSAGE (OUTPATIENT)
Dept: TRANSPLANT | Facility: CLINIC | Age: 73
End: 2023-01-11
Payer: MEDICARE

## 2023-01-11 LAB
BACTERIA BLD CULT: ABNORMAL
GRAM STN SPEC: ABNORMAL
TACROLIMUS TROUGH BLD-MCNC: 9.3 NG/ML

## 2023-01-12 DIAGNOSIS — E87.5 HYPERKALEMIA: ICD-10-CM

## 2023-01-12 RX ORDER — SODIUM POLYSTYRENE SULFONATE 4.1 MEQ/G
30 POWDER, FOR SUSPENSION ORAL; RECTAL DAILY
Qty: 900 G | Refills: 3 | Status: SHIPPED | OUTPATIENT
Start: 2023-01-12 | End: 2023-01-30 | Stop reason: DRUGHIGH

## 2023-01-12 NOTE — TELEPHONE ENCOUNTER
Coordinator received a [facility] message from patient regarding quantity of Kayexalate.     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     Updated prescription will be sent to patient's local pharmacy.     [facility] message received from patient:    I am currently taking sodium polystyrene sulfonate powder once a day. I'm taking 10 grams a day. The problem is the prescription as written only allows 1 bottle per 30 days. The bottle is 454 grams and taking 30 a day is a 15 day supply. I will need an updated script if I stay on this powder so as to have a 30 day supply. My infectious disease treatment will end in a few weeks and hopefully I can be taken off of Bactrim unless it is needed for the care of my kidney.

## 2023-01-12 NOTE — TELEPHONE ENCOUNTER
----- Message from Alley Davis sent at 1/12/2023 11:56 AM CST -----  Regarding: Return Call  Pt is returning call to coordinator regarding medication        [phone]

## 2023-01-13 DIAGNOSIS — E55.9 VITAMIN D DEFICIENCY: ICD-10-CM

## 2023-01-13 LAB
BACTERIA BLD CULT: NORMAL
ESTROGEN SERPL-MCNC: NORMAL PG/ML
INSULIN SERPL-ACNC: NORMAL U[IU]/ML
LAB AP CLINICAL INFORMATION: NORMAL
LAB AP GROSS DESCRIPTION: NORMAL
LAB AP REPORT FOOTNOTES: NORMAL
T3RU NFR SERPL: NORMAL %

## 2023-01-13 RX ORDER — ERGOCALCIFEROL 1.25 MG/1
50000 CAPSULE ORAL
Qty: 12 CAPSULE | Refills: 3 | Status: CANCELLED | OUTPATIENT
Start: 2023-01-13 | End: 2024-01-13

## 2023-01-13 RX ORDER — PREDNISONE 5 MG/1
5 TABLET ORAL DAILY
Qty: 30 TABLET | Refills: 11 | Status: CANCELLED | OUTPATIENT
Start: 2023-01-13 | End: 2024-01-13

## 2023-01-17 ENCOUNTER — OFFICE VISIT (OUTPATIENT)
Dept: INFECTIOUS DISEASES | Facility: CLINIC | Age: 73
End: 2023-01-17
Payer: MEDICARE

## 2023-01-17 ENCOUNTER — PATIENT MESSAGE (OUTPATIENT)
Dept: TRANSPLANT | Facility: CLINIC | Age: 73
End: 2023-01-17
Payer: MEDICARE

## 2023-01-17 VITALS
WEIGHT: 186.31 LBS | DIASTOLIC BLOOD PRESSURE: 96 MMHG | BODY MASS INDEX: 23.91 KG/M2 | SYSTOLIC BLOOD PRESSURE: 153 MMHG | HEART RATE: 85 BPM | TEMPERATURE: 98 F | HEIGHT: 74 IN

## 2023-01-17 DIAGNOSIS — A43.9 NOCARDIA INFECTION: Primary | ICD-10-CM

## 2023-01-17 DIAGNOSIS — L03.114 LEFT ARM CELLULITIS: ICD-10-CM

## 2023-01-17 DIAGNOSIS — G06.0 INTRACRANIAL ABSCESS: ICD-10-CM

## 2023-01-17 DIAGNOSIS — Z94.0 HISTORY OF RENAL TRANSPLANT: Primary | ICD-10-CM

## 2023-01-17 PROCEDURE — 99999 PR PBB SHADOW E&M-EST. PATIENT-LVL V: CPT | Mod: PBBFAC,GC,, | Performed by: STUDENT IN AN ORGANIZED HEALTH CARE EDUCATION/TRAINING PROGRAM

## 2023-01-17 PROCEDURE — 99215 OFFICE O/P EST HI 40 MIN: CPT | Mod: PBBFAC | Performed by: STUDENT IN AN ORGANIZED HEALTH CARE EDUCATION/TRAINING PROGRAM

## 2023-01-17 PROCEDURE — 99999 PR PBB SHADOW E&M-EST. PATIENT-LVL V: ICD-10-PCS | Mod: PBBFAC,GC,, | Performed by: STUDENT IN AN ORGANIZED HEALTH CARE EDUCATION/TRAINING PROGRAM

## 2023-01-17 PROCEDURE — 99214 OFFICE O/P EST MOD 30 MIN: CPT | Mod: S$PBB,GC,, | Performed by: STUDENT IN AN ORGANIZED HEALTH CARE EDUCATION/TRAINING PROGRAM

## 2023-01-17 PROCEDURE — 99214 PR OFFICE/OUTPT VISIT, EST, LEVL IV, 30-39 MIN: ICD-10-PCS | Mod: S$PBB,GC,, | Performed by: STUDENT IN AN ORGANIZED HEALTH CARE EDUCATION/TRAINING PROGRAM

## 2023-01-17 RX ORDER — PREDNISONE 5 MG/1
5 TABLET ORAL DAILY
Qty: 30 TABLET | Refills: 11 | Status: SHIPPED | OUTPATIENT
Start: 2023-01-17 | End: 2024-01-08 | Stop reason: SDUPTHER

## 2023-01-17 NOTE — PROGRESS NOTES
INFECTIOUS DISEASE CLINIC  01/17/2023     Subjective:      Chief Complaint: Nocardia nova     History of Present Illness:     71-year-old male with polycystic kidney sidease s/p DBD KTx 5/2021 (CMV D+/R-, HCV NAWAF+, Simulect induction, CIT ~8 hours) on tacro/pred, HCV+ donor SVR, Afib on AC, and Polycythemia vera (09/2021) presents to clinic for follow up.     Admitted 2/16-2/25 after fall at home with left-sided neurologic deficit.  Found to have right frontal mass, s/p craniotomy 2/17/22 with cultures + nocardia nova. CT C/A/P also with RML lesion suspected also due to nocardia nova. 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  TTE negative for vegetations, abnormal thickening of the aortic root unchanged from prior. Patient was discharged on IV meropenem and high dose PO bactrim (Started ~2/19).  Planned treatment typically 9-12 months.     ID clinic visits:  4/21/22 - switched meropenem to ceftriaxone 2g q12h based on sensitivities, and continued bactrim.    5/24/22 - planned to switch ceftriaxone to tedizolid.  Order for tedizolid sent to specialty pharmacy with start date 6/7/22.  Continued bactrim.  6/20/22 telephone encounter - discontinued ceftriaxone.  Started tedizolid 200mg daily.  Continued bactrim 2ds q8h.    On lokelma per nephrology.     6/7/22 - subacute/chronic subdural hematoma.  5/5/22 CT head stable appearing extra-axial mass with mixed density, recommended MMA embolization before restarting AC for atrial fibrillation.  7/14/22 s/p right MMA raissa embolization.     Interval history:  He is compliant with his Bactrim and Tedizolid, though has to pay $3100 for next refill of Tedizolid and is has a difficult time affording this.    12/1/22 MRI:  Operative changes related to right frontal parenchymal abscess drainage.  Right superior frontal lobe enhancement less evident with local mild gliotic changes and small encephalomalacia.  Right diffuse dural  thickening and enhancement but without new fluid collection.    Seen by neurosurgery 12/5/22; resolution of hemorrhage s/p R MMA embolization; resolution of prior abscess.  Planned f/u with them and repeat MRI brain in 6 months.    12/30-1/3/23 admitted to [facility] with concern for nec fasc of left forearm s/p debridement by general surgery (based on op note no significant necrosis found).  Cultures grew Klebsiella pneumoniae R to quinolones.  ID Dr Ayo Stevenson was consulted and recommended continuing Nocardia treatment, along with 2 weeks of doxycycline.  The patient is receiving wound care, packing at home but has yet to f/u with general surgery there for delayed closure.    Hobbies:  Hunting deer (eats venison) and hogs, fishing, and mowing the lawn.  Has avoided these activities since transplant and subsequent infections.  Eats only well-cooked seafood.     Review of Systems   Constitutional: Negative for chills, decreased appetite, fever, malaise/fatigue and night sweats.   HENT:  Negative for congestion and sore throat.    Eyes:  Negative for blurred vision, double vision, vision loss in left eye, vision loss in right eye and visual disturbance.   Cardiovascular:  Negative for chest pain, dyspnea on exertion, irregular heartbeat and leg swelling.   Respiratory:  Negative for cough, hemoptysis, shortness of breath and sputum production.    Hematologic/Lymphatic: Negative for adenopathy. Does not bruise/bleed easily.   Skin:  Negative for rash and suspicious lesions.   Musculoskeletal:  Negative for arthritis, joint pain, muscle weakness and myalgias.   Gastrointestinal:  Negative for abdominal pain, constipation, diarrhea, heartburn, nausea and vomiting.   Genitourinary:  Negative for dysuria, flank pain, frequency and genital sores.   Neurological:  Negative for dizziness, focal weakness, numbness, paresthesias, sensory change, tremors and weakness.   Psychiatric/Behavioral:  Negative for  altered mental status and depression. The patient is not nervous/anxious.    Allergic/Immunologic: Negative for environmental allergies and persistent infections.       Past Medical History:   Diagnosis Date    Anasarca 10/1/2021    Anemia of chronic disease     Atrial fibrillation     BPH (benign prostatic hypertrophy)     Chronic systolic heart failure 10/1/2021    CKD (chronic kidney disease) stage 2, GFR 60-89 ml/min     Hepatitis C virus infection cured after antiviral drug therapy     acquired through kidney transplant, treated / cured (SVR - 2021)    HTN (hypertension)     HTN (hypertension)     Polycystic kidney disease      Past Surgical History:   Procedure Laterality Date    AV FISTULA PLACEMENT Left     CATARACT EXTRACTION, BILATERAL Bilateral     CRANIOTOMY Right 2022    Procedure: CRANIOTOMY;  Surgeon: Sunday Rosa DO;  Location: [facility] OR [address] [place] [address];  Service: Neurosurgery;  Laterality: Right;  R craniotomy for abscess drainage    INCISION AND DRAINAGE, UPPER EXTREMITY Left 2022    Procedure: INCISION AND DRAINAGE, UPPER EXTREMITY;  Surgeon: Elijah Davis Jr., MD;  Location: [facility];  Service: General;  Laterality: Left;    KIDNEY TRANSPLANT N/A 2021    Procedure: TRANSPLANT, KIDNEY;  Surgeon: Lexy Bolden MD;  Location: [facility] OR [address] [place] [address];  Service: Transplant;  Laterality: N/A;     Family History   Problem Relation Age of Onset    Polycystic kidney disease Father     Hypertension Father     Kidney disease Father     Polycystic kidney disease Sister     Hypertension Sister     Kidney disease Sister      Social History     Tobacco Use    Smoking status: Former     Packs/day: 2.00     Years: 10.00     Pack years: 20.00     Types: Cigarettes     Start date: 1972     Quit date: 1984     Years since quittin.1    Smokeless tobacco: Never   Substance Use Topics    Alcohol use: No     Comment: Pt reportsbeing a former beer drinker (2-3 cans per day) and quitting  in 2014.    Drug use: No       Review of patient's allergies indicates:  No Known Allergies      Objective:   VS (24h):   Vitals:    01/17/23 1052   BP: (!) 153/96   Pulse: 85   Temp: 98 °F (36.7 °C)         Physical Exam  Constitutional:       General: He is not in acute distress.     Appearance: Normal appearance. He is well-developed and normal weight. He is not ill-appearing, toxic-appearing or diaphoretic.   HENT:      Head: Normocephalic.      Right Ear: External ear normal.      Left Ear: External ear normal.      Nose: Nose normal.   Eyes:      General: No scleral icterus.        Right eye: No discharge.         Left eye: No discharge.      Extraocular Movements: Extraocular movements intact.      Conjunctiva/sclera: Conjunctivae normal.   Cardiovascular:      Rate and Rhythm: Normal rate and regular rhythm.      Heart sounds: Normal heart sounds. No murmur heard.  Pulmonary:      Effort: Pulmonary effort is normal.      Breath sounds: Normal breath sounds.   Abdominal:      General: Bowel sounds are normal. There is no distension.      Palpations: Abdomen is soft.      Tenderness: There is no abdominal tenderness.   Musculoskeletal:         General: Normal range of motion.      Cervical back: Normal range of motion.      Right lower leg: No edema.      Left lower leg: No edema.   Skin:     General: Skin is warm and dry.      Coloration: Skin is not jaundiced.      Findings: Lesion present.      Comments: -left forearm medial aspect surgical site without discharge; possible small amount of eschar along the edge   Neurological:      Mental Status: He is alert and oriented to person, place, and time. Mental status is at baseline.      Cranial Nerves: No cranial nerve deficit.      Sensory: No sensory deficit.      Motor: No weakness.      Coordination: Coordination normal.      Gait: Gait normal.   Psychiatric:         Behavior: Behavior normal.           Labs:  reviewed    Micro:   reviewed    Radiology:    reviewed    Immunization History   Administered Date(s) Administered    COVID-19 Vaccine 01/11/2021, 02/08/2021, 11/18/2021, 10/28/2022    COVID-19, MRNA, LN-S, PF (MODERNA FULL 0.5 ML DOSE) 01/11/2021, 02/08/2021    Hepatitis A, Adult 12/07/2016, 05/10/2017    Hepatitis B, Adult 12/07/2016, 01/09/2017, 03/12/2018, 04/09/2018, 06/13/2018, 08/13/2018, 10/12/2020    Influenza 09/26/2018, 09/24/2019, 10/05/2020    Influenza - Quadrivalent - High Dose - PF (65 years and older) 10/11/2022    Pneumococcal Conjugate - 13 Valent 12/07/2016    Pneumococcal Polysaccharide - 23 Valent 05/29/2020    Tdap 12/07/2016, 02/16/2022    Zoster 02/27/2014    Zoster Recombinant 03/26/2019, 06/04/2019         Assessment & Plan:     1. Nocardia infection    2. Intracranial abscess    3. 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  Brain MRI with resolution of abscess, residual encephalomalacia, ess evident enhancement and gliotic changes.  CT chest with resolution of RML lesion.  -Financial restrictions regarding Tedizolid refills.  He has ~3 days left.  Discussed with patient plan to finish 3 more days of Tedizolid and Bactrim then discontinue abx therapy.  This will still be nearly 1 whole year of treatment.  -Repeat MRI brain per neurosurgery  -Repeat CT chest before next visit    LUE cellulitis s/p debridement  -General surgery referral to [facility]  -Will discuss updated nocardia plan with Dr Stevenson, has f/u with him 4/2023    Follow up in 4 months      Singh Gordon MD  Infectious Disease Fellow

## 2023-01-20 ENCOUNTER — TELEPHONE (OUTPATIENT)
Dept: SURGERY | Facility: CLINIC | Age: 73
End: 2023-01-20
Payer: MEDICARE

## 2023-01-20 LAB
BACTERIA SPEC AEROBE CULT: ABNORMAL
BACTERIA SPEC AEROBE CULT: ABNORMAL

## 2023-01-20 NOTE — TELEPHONE ENCOUNTER
I called the patient in response to a referral received for cellulitis in his arm.  The patient wanted to be seen by a doctor in [place] instead of [facility].  I tried to contact the general surgery staff at [facility] and was given Dr. Davis's office number.  I called them and they were closed.  I called the patient back and he said he was contacted by Dr. Davis's office this morning and will schedule a procedure to close his wound in the next 2-3 weeks.  Meanwhile, home health is coming to his house to for wound checks and dressing changes.  He felt uneasy about this wound being open for so long, so I suggested he call his PCP and let them know what was going on.  I also gave him my direct line in case he ran into a problem with scheduling.

## 2023-01-23 ENCOUNTER — LAB VISIT (OUTPATIENT)
Dept: LAB | Facility: HOSPITAL | Age: 73
End: 2023-01-23
Attending: INTERNAL MEDICINE
Payer: MEDICARE

## 2023-01-23 DIAGNOSIS — Z94.0 KIDNEY REPLACED BY TRANSPLANT: ICD-10-CM

## 2023-01-23 LAB
ALBUMIN SERPL-MCNC: 3.5 G/DL (ref 3.4–4.8)
BUN SERPL-MCNC: 17.9 MG/DL (ref 8.4–25.7)
CALCIUM SERPL-MCNC: 9.5 MG/DL (ref 8.8–10)
CHLORIDE SERPL-SCNC: 108 MMOL/L (ref 98–107)
CO2 SERPL-SCNC: 21 MMOL/L (ref 23–31)
CREAT SERPL-MCNC: 1.62 MG/DL (ref 0.73–1.18)
GFR SERPLBLD CREATININE-BSD FMLA CKD-EPI: 45 MLS/MIN/1.73/M2
GLUCOSE SERPL-MCNC: 96 MG/DL (ref 82–115)
PHOSPHATE SERPL-MCNC: 3.3 MG/DL (ref 2.3–4.7)
POTASSIUM SERPL-SCNC: 4.6 MMOL/L (ref 3.5–5.1)
SODIUM SERPL-SCNC: 137 MMOL/L (ref 136–145)

## 2023-01-23 PROCEDURE — 36415 COLL VENOUS BLD VENIPUNCTURE: CPT

## 2023-01-23 PROCEDURE — 80069 RENAL FUNCTION PANEL: CPT

## 2023-01-23 NOTE — OP NOTE
PATIENT:  Ronal Mazariegos      : 1950       DATE OF SURGERY:   2022        SURGEON:  Elijah Davis MD       ASSISTANT:  Jake Melton MD (resident)          PREOPERATIVE DIAGNOSIS:  Abscess to Left Upper Extremity          POSTOPERATIVE DIAGNOSIS:  Same.           OPERATIONS:  Incision and drainage of Left Upper Exremity          Anesthesia:  General / Local      Estimated blood loss: Minimal (< 20)     Blood administered:  None.      Lap and instrument counts correct x 2 at the end of the case.                 INDICATIONS/SIGNIFICANT HISTORY:  The patient is a 72 y.o. year old male who seen with complaints of a left upper extremity abscess.   Risks and Benefits of incision and drainage was discussed with the patient, who voiced understanding of risks and benefits and elected to proceed with surgery.           PROCEDURE IN DETAIL:  Once informed consents were obtained, the patient was taken to the operating room and placed on the operating table.  After general anesthesia was induced, the marked area was prepped and draped in a standard surgical fashion.  Local anesthesia was obtained with marcaine.  An incision was made around the fluctuant area and it was dissected with blunt and sharp dissection and purulent fluid was removed.  The wound was then irrigated with betadine tinged solution and dried and bleeding stopped with cautery.  The wound was then packed with sterile gauze.  The patient tolerated the procedure well and was transported to recovery room in good condition.

## 2023-01-24 ENCOUNTER — TELEPHONE (OUTPATIENT)
Dept: MEDSURG UNIT | Facility: HOSPITAL | Age: 73
End: 2023-01-24
Payer: MEDICARE

## 2023-01-24 NOTE — TELEPHONE ENCOUNTER
[facility] - Post discharge call     Spoke with pt regarding follow up. Apparently there was some confusion. His arm is healing. Changing the drsg Bid with saline moistened gauze. He states that it is draining that sounds like serosanguineous. Moderate amount. I told him to start packing with dry gauze while the wound is draining so much. No fevers or increased pain. He asked if we were suturing it closed. He is autoimmune compromised so he is concerned about infection. I told him we would look at it in office and make a plan then. He will send me a picture of the wound as well.

## 2023-01-27 NOTE — PHYSICIAN QUERY
PT Name: Ronal Mazariegos  MR #: [id number]     DOCUMENTATION CLARIFICATION      CDS/: Maryann Christine RN          Contact Information: [email or web address]      This form is a permanent document in the medical record.     Query Date: January 27, 2023    Dear Provider,  By submitting this query, we are merely seeking further clarification of documentation.  Please utilize your independent clinical judgment when addressing the question(s) below.     The Medical Record contains the following:    Supporting Clinical Findings Location in Medical Record   Pt with progressively worsening left forearm swelling, pain, erythema and now ecchymosis in the setting of immunosuppression   X-ray of the forearm demonstrated some subcutaneous emphysema in addition to edema concerning for potential necrotizing soft tissue infection.  General surgery attending evaluated the patient and agreed to proceed with OR at this time 12/30/2022 ED Provider Note   S/p OR for I&D, did not find abscess or Nec Fasc 12/31/2022 General surgery PN   Cellulitis of the left upper extremity   continue broad-spectrum antibiotics 12/31/2022 H&P   status post I and D by surgery on 12/31 with no much findings of what was described on CT read  Discharge Diagnoses:  Left upper extremity necrotizing fasciitis status post I&D on 12/31 , improving   Cellulitis of the left upper extremity s/p I and D 1/3/2023 Discharge Summary     Please clarify if the Necrotizing Fasciitis diagnosis has been:    [  ] Ruled Out    [xxx  ] Ruled In   [  ] Other/Clarification of findings (please specify): _______________    [   ] Clinically undetermined       Please document in your progress notes daily for the duration of treatment, until resolved, and include in your discharge summary.    Form No. [id number]

## 2023-01-27 NOTE — PHYSICIAN QUERY
"PT Name: Ronal Mazariegos  MR #: [id number]    DOCUMENTATION CLARIFICATION     CDS/: Maryann Christine RN          Contact Information: [email or web address]    This form is a permanent document in the medical record.    Query Date: January 27, 2023  By submitting this query, we are merely seeking further clarification of documentation. Please utilize your independent clinical judgment when addressing the question(s) below.    The Medical Record contains the following:   Indicator Supporting Clinical Findings Location in Medical Record    Documentation of "Debridement"       x Documentation of "I&D" DATE OF SURGERY:   12/30/2022  PREOPERATIVE DIAGNOSIS:  Abscess to Left Upper Extremity   POSTOPERATIVE DIAGNOSIS:  Same.    OPERATIONS:  Incision and drainage of Left Upper Exremity  PROCEDURE IN DETAIL:  Once informed consents were obtained, the patient was taken to the operating room and placed on the operating table.  After general anesthesia was induced, the marked area was prepped and draped in a standard surgical fashion.  Local anesthesia was obtained with marcaine.  An incision was made around the fluctuant area and it was dissected with blunt and sharp dissection and purulent fluid was removed.  The wound was then irrigated with betadine tinged solution and dried and bleeding stopped with cautery.  The wound was then packed with sterile gauze.   1/3/2023 OP Report    Other       Excisional debridement is the surgical removal or cutting away of such tissue, necrosis, or slough and is classified to the root operation "Excision." Use of a sharp instrument does not always indicate that an excisional debridement was performed. Minor removal of loose fragments with scissors or using a sharp instrument to scrape away tissue is not an excisional debridement. Excisional debridement involves the use of a scalpel to remove devitalized tissue.  Nonexcisional debridement is the nonoperative brushing, irrigating, " "scrubbing, or washing of devitalized tissue, necrosis, slough, or foreign material. Most nonexcisional debridement procedures are classified to the root operation "Extraction" (pulling or stripping out or off all or a portion of a body part by the use of force).     Provider, please provide further clarification on the procedure performed on Left upper extremity:       [   ] Incision and Drainage to depth of skin only   [   x] Incision and Drainage to depth of subcutaneous and fascia   [   ] Incision and Drainage to other depth (please specify): _____________     [   ] Other Procedure (please specify): _____________   [  ] Clinically Undetermined     Reference:    ICD-10-CM/PCS Coding Clinic Third Quarter ICD-10, Effective with discharges: October 7, 2015 [facility] [employer] § Excisional and nonexcisional debridement (2015).    Form No. [id number]   "

## 2023-01-30 ENCOUNTER — LAB VISIT (OUTPATIENT)
Dept: LAB | Facility: HOSPITAL | Age: 73
End: 2023-01-30
Attending: INTERNAL MEDICINE
Payer: MEDICARE

## 2023-01-30 ENCOUNTER — TELEPHONE (OUTPATIENT)
Dept: TRANSPLANT | Facility: CLINIC | Age: 73
End: 2023-01-30
Payer: MEDICARE

## 2023-01-30 ENCOUNTER — PATIENT MESSAGE (OUTPATIENT)
Dept: TRANSPLANT | Facility: CLINIC | Age: 73
End: 2023-01-30
Payer: MEDICARE

## 2023-01-30 DIAGNOSIS — E87.5 HYPERKALEMIA: ICD-10-CM

## 2023-01-30 DIAGNOSIS — T86.10 COMPLICATION OF TRANSPLANTED KIDNEY, UNSPECIFIED COMPLICATION: Primary | ICD-10-CM

## 2023-01-30 LAB
FUNGUS SPEC CULT: NORMAL
FUNGUS SPEC CULT: NORMAL
POTASSIUM SERPL-SCNC: 3.9 MMOL/L (ref 3.5–5.1)

## 2023-01-30 PROCEDURE — 36415 COLL VENOUS BLD VENIPUNCTURE: CPT

## 2023-01-30 PROCEDURE — 84132 ASSAY OF SERUM POTASSIUM: CPT

## 2023-01-30 NOTE — TELEPHONE ENCOUNTER
----- Message from Celeste Yen MD sent at 1/30/2023  1:13 PM CST -----  Lets stop kayexalate and check K and cylex on Friday   ----- Message -----  From: Tiara Ferrell RN  Sent: 1/30/2023   1:04 PM CST  To: Celeste Yen MD    Patient report ID stopped the Bactrim on 1/12/23. He is currently taking Kayexalate 30gm daily. Do you want to decrease or stop the Kayexalate?

## 2023-01-31 ENCOUNTER — PATIENT MESSAGE (OUTPATIENT)
Dept: TRANSPLANT | Facility: CLINIC | Age: 73
End: 2023-01-31
Payer: MEDICARE

## 2023-01-31 ENCOUNTER — DOCUMENTATION ONLY (OUTPATIENT)
Dept: SURGERY | Facility: HOSPITAL | Age: 73
End: 2023-01-31
Payer: MEDICARE

## 2023-01-31 ENCOUNTER — LAB VISIT (OUTPATIENT)
Dept: LAB | Facility: HOSPITAL | Age: 73
End: 2023-01-31
Attending: INTERNAL MEDICINE
Payer: MEDICARE

## 2023-01-31 DIAGNOSIS — N28.1 ACQUIRED CYST OF KIDNEY: ICD-10-CM

## 2023-01-31 DIAGNOSIS — N18.31 CHRONIC KIDNEY DISEASE (CKD) STAGE G3A/A1, MODERATELY DECREASED GLOMERULAR FILTRATION RATE (GFR) BETWEEN 45-59 ML/MIN/1.73 SQUARE METER AND ALBUMINURIA CREATININE RATIO LESS THAN 30 MG/G: Primary | ICD-10-CM

## 2023-01-31 LAB
ALBUMIN SERPL-MCNC: 3.5 G/DL (ref 3.4–4.8)
ALBUMIN/GLOB SERPL: 1.8 RATIO (ref 1.1–2)
ALP SERPL-CCNC: 91 UNIT/L (ref 40–150)
ALT SERPL-CCNC: 44 UNIT/L (ref 0–55)
AST SERPL-CCNC: 38 UNIT/L (ref 5–34)
BASOPHILS # BLD AUTO: 0.03 X10(3)/MCL (ref 0–0.2)
BASOPHILS NFR BLD AUTO: 0.5 %
BILIRUBIN DIRECT+TOT PNL SERPL-MCNC: 1.6 MG/DL
BUN SERPL-MCNC: 25 MG/DL (ref 8.4–25.7)
CALCIUM SERPL-MCNC: 9.7 MG/DL (ref 8.8–10)
CHLORIDE SERPL-SCNC: 106 MMOL/L (ref 98–107)
CO2 SERPL-SCNC: 28 MMOL/L (ref 23–31)
CREAT SERPL-MCNC: 1.37 MG/DL (ref 0.73–1.18)
EOSINOPHIL # BLD AUTO: 0.02 X10(3)/MCL (ref 0–0.9)
EOSINOPHIL NFR BLD AUTO: 0.4 %
ERYTHROCYTE [DISTWIDTH] IN BLOOD BY AUTOMATED COUNT: 15.9 % (ref 11.5–17)
GFR SERPLBLD CREATININE-BSD FMLA CKD-EPI: 55 MLS/MIN/1.73/M2
GLOBULIN SER-MCNC: 1.9 GM/DL (ref 2.4–3.5)
GLUCOSE SERPL-MCNC: 108 MG/DL (ref 82–115)
HCT VFR BLD AUTO: 54.6 % (ref 42–52)
HGB BLD-MCNC: 17.3 GM/DL (ref 14–18)
IMM GRANULOCYTES # BLD AUTO: 0.01 X10(3)/MCL (ref 0–0.04)
IMM GRANULOCYTES NFR BLD AUTO: 0.2 %
LYMPHOCYTES # BLD AUTO: 2.47 X10(3)/MCL (ref 0.6–4.6)
LYMPHOCYTES NFR BLD AUTO: 44.9 %
MCH RBC QN AUTO: 32.6 PG
MCHC RBC AUTO-ENTMCNC: 31.7 MG/DL (ref 33–36)
MCV RBC AUTO: 103 FL (ref 80–94)
MONOCYTES # BLD AUTO: 0.61 X10(3)/MCL (ref 0.1–1.3)
MONOCYTES NFR BLD AUTO: 11.1 %
NEUTROPHILS # BLD AUTO: 2.36 X10(3)/MCL (ref 2.1–9.2)
NEUTROPHILS NFR BLD AUTO: 42.9 %
NRBC BLD AUTO-RTO: 0 %
PLATELET # BLD AUTO: 107 X10(3)/MCL (ref 130–400)
PMV BLD AUTO: 9.9 FL (ref 7.4–10.4)
POTASSIUM SERPL-SCNC: 4.5 MMOL/L (ref 3.5–5.1)
PROT SERPL-MCNC: 5.4 GM/DL (ref 5.8–7.6)
RBC # BLD AUTO: 5.3 X10(6)/MCL (ref 4.7–6.1)
SODIUM SERPL-SCNC: 142 MMOL/L (ref 136–145)
WBC # SPEC AUTO: 5.5 X10(3)/MCL (ref 4.5–11.5)

## 2023-01-31 PROCEDURE — 80053 COMPREHEN METABOLIC PANEL: CPT

## 2023-01-31 PROCEDURE — 36415 COLL VENOUS BLD VENIPUNCTURE: CPT

## 2023-01-31 PROCEDURE — 85025 COMPLETE CBC W/AUTO DIFF WBC: CPT

## 2023-01-31 NOTE — PROGRESS NOTES
S:  72M s.p L FA I&D in OR. Complicated by history of CKD with kidney transplant and fairly recent stroke and crani for intra-cranial abscess. Went to ER for increased drainage. Here for wound check. Having swelling in arm. No drainage. Healing by patients report.     O:  Gen: AAO x3. Well appearing.  Cards: Normal BUE peripheral pulses  Ext: LUE with fistula with thrill. On posterior side opposite the fistula there is a well granulated I&D site.     A/P:  L FA I&D site    Healing well.  Call PRN  Shower daily  Wet to dry or dry dressings  No indication for further abx or treatment

## 2023-02-01 NOTE — PROGRESS NOTES
Cr improving after bactrim stopped. K stable   Hb is high. Needs phlebotomy every few months   Please check K next week

## 2023-02-02 ENCOUNTER — HOSPITAL ENCOUNTER (EMERGENCY)
Facility: HOSPITAL | Age: 73
Discharge: HOME | End: 2023-02-02
Attending: EMERGENCY MEDICINE
Payer: MEDICARE

## 2023-02-02 VITALS
DIASTOLIC BLOOD PRESSURE: 99 MMHG | HEIGHT: 74 IN | HEART RATE: 86 BPM | TEMPERATURE: 97 F | RESPIRATION RATE: 15 BRPM | SYSTOLIC BLOOD PRESSURE: 160 MMHG | WEIGHT: 204 LBS | OXYGEN SATURATION: 95 % | BODY MASS INDEX: 26.18 KG/M2

## 2023-02-02 DIAGNOSIS — R06.02 SOB (SHORTNESS OF BREATH): ICD-10-CM

## 2023-02-02 DIAGNOSIS — R60.9 SWELLING: ICD-10-CM

## 2023-02-02 DIAGNOSIS — R60.0 PERIPHERAL EDEMA: Primary | ICD-10-CM

## 2023-02-02 DIAGNOSIS — I48.91 ATRIAL FIBRILLATION WITH RVR: ICD-10-CM

## 2023-02-02 DIAGNOSIS — R91.8 PULMONARY INFILTRATE: ICD-10-CM

## 2023-02-02 LAB
ALBUMIN SERPL-MCNC: 4 G/DL (ref 3.4–4.8)
ALBUMIN/GLOB SERPL: 1.7 RATIO (ref 1.1–2)
ALP SERPL-CCNC: 101 UNIT/L (ref 40–150)
ALT SERPL-CCNC: 49 UNIT/L (ref 0–55)
AST SERPL-CCNC: 40 UNIT/L (ref 5–34)
BASOPHILS # BLD AUTO: 0.04 X10(3)/MCL (ref 0–0.2)
BASOPHILS NFR BLD AUTO: 0.6 %
BILIRUBIN DIRECT+TOT PNL SERPL-MCNC: 2.4 MG/DL
BNP BLD-MCNC: 1465.3 PG/ML
BUN SERPL-MCNC: 21 MG/DL (ref 8.4–25.7)
CALCIUM SERPL-MCNC: 10.3 MG/DL (ref 8.8–10)
CHLORIDE SERPL-SCNC: 105 MMOL/L (ref 98–107)
CO2 SERPL-SCNC: 25 MMOL/L (ref 23–31)
CREAT SERPL-MCNC: 1.43 MG/DL (ref 0.73–1.18)
EOSINOPHIL # BLD AUTO: 0.06 X10(3)/MCL (ref 0–0.9)
EOSINOPHIL NFR BLD AUTO: 0.9 %
ERYTHROCYTE [DISTWIDTH] IN BLOOD BY AUTOMATED COUNT: 15.3 % (ref 11.5–17)
GFR SERPLBLD CREATININE-BSD FMLA CKD-EPI: 53 MLS/MIN/1.73/M2
GLOBULIN SER-MCNC: 2.3 GM/DL (ref 2.4–3.5)
GLUCOSE SERPL-MCNC: 103 MG/DL (ref 82–115)
HCT VFR BLD AUTO: 58.9 % (ref 42–52)
HGB BLD-MCNC: 19 GM/DL (ref 14–18)
IMM GRANULOCYTES # BLD AUTO: 0.03 X10(3)/MCL (ref 0–0.04)
IMM GRANULOCYTES NFR BLD AUTO: 0.5 %
INR BLD: 1.54 (ref 0–1.3)
LYMPHOCYTES # BLD AUTO: 2.32 X10(3)/MCL (ref 0.6–4.6)
LYMPHOCYTES NFR BLD AUTO: 36.5 %
MCH RBC QN AUTO: 32.2 PG
MCHC RBC AUTO-ENTMCNC: 32.3 MG/DL (ref 33–36)
MCV RBC AUTO: 99.8 FL (ref 80–94)
MONOCYTES # BLD AUTO: 0.77 X10(3)/MCL (ref 0.1–1.3)
MONOCYTES NFR BLD AUTO: 12.1 %
NEUTROPHILS # BLD AUTO: 3.13 X10(3)/MCL (ref 2.1–9.2)
NEUTROPHILS NFR BLD AUTO: 49.4 %
NRBC BLD AUTO-RTO: 0 %
PLATELET # BLD AUTO: 108 X10(3)/MCL (ref 130–400)
PMV BLD AUTO: 10.1 FL (ref 7.4–10.4)
POTASSIUM SERPL-SCNC: 4.3 MMOL/L (ref 3.5–5.1)
PROT SERPL-MCNC: 6.3 GM/DL (ref 5.8–7.6)
PROTHROMBIN TIME: 18.3 SECONDS (ref 12.5–14.5)
RBC # BLD AUTO: 5.9 X10(6)/MCL (ref 4.7–6.1)
SODIUM SERPL-SCNC: 140 MMOL/L (ref 136–145)
TROPONIN I SERPL-MCNC: <0.01 NG/ML (ref 0–0.04)
WBC # SPEC AUTO: 6.4 X10(3)/MCL (ref 4.5–11.5)

## 2023-02-02 PROCEDURE — 25000003 PHARM REV CODE 250: Performed by: EMERGENCY MEDICINE

## 2023-02-02 PROCEDURE — 85610 PROTHROMBIN TIME: CPT | Performed by: EMERGENCY MEDICINE

## 2023-02-02 PROCEDURE — 85025 COMPLETE CBC W/AUTO DIFF WBC: CPT | Performed by: EMERGENCY MEDICINE

## 2023-02-02 PROCEDURE — 96375 TX/PRO/DX INJ NEW DRUG ADDON: CPT

## 2023-02-02 PROCEDURE — 83880 ASSAY OF NATRIURETIC PEPTIDE: CPT | Performed by: EMERGENCY MEDICINE

## 2023-02-02 PROCEDURE — 80053 COMPREHEN METABOLIC PANEL: CPT | Performed by: EMERGENCY MEDICINE

## 2023-02-02 PROCEDURE — 63600175 PHARM REV CODE 636 W HCPCS: Performed by: EMERGENCY MEDICINE

## 2023-02-02 PROCEDURE — 96374 THER/PROPH/DIAG INJ IV PUSH: CPT

## 2023-02-02 PROCEDURE — 84484 ASSAY OF TROPONIN QUANT: CPT | Performed by: EMERGENCY MEDICINE

## 2023-02-02 PROCEDURE — 93010 EKG 12-LEAD: ICD-10-PCS | Mod: ,,, | Performed by: INTERNAL MEDICINE

## 2023-02-02 PROCEDURE — 93010 ELECTROCARDIOGRAM REPORT: CPT | Mod: ,,, | Performed by: INTERNAL MEDICINE

## 2023-02-02 PROCEDURE — 99285 EMERGENCY DEPT VISIT HI MDM: CPT | Mod: 25

## 2023-02-02 PROCEDURE — 93005 ELECTROCARDIOGRAM TRACING: CPT

## 2023-02-02 RX ORDER — DILTIAZEM HYDROCHLORIDE 5 MG/ML
10 INJECTION INTRAVENOUS
Status: COMPLETED | OUTPATIENT
Start: 2023-02-02 | End: 2023-02-02

## 2023-02-02 RX ORDER — DOXYCYCLINE 100 MG/1
100 CAPSULE ORAL 2 TIMES DAILY
Qty: 14 CAPSULE | Refills: 0 | OUTPATIENT
Start: 2023-02-02 | End: 2023-02-12

## 2023-02-02 RX ORDER — FUROSEMIDE 10 MG/ML
40 INJECTION INTRAMUSCULAR; INTRAVENOUS
Status: COMPLETED | OUTPATIENT
Start: 2023-02-02 | End: 2023-02-02

## 2023-02-02 RX ORDER — FUROSEMIDE 20 MG/1
20 TABLET ORAL DAILY
Qty: 5 TABLET | Refills: 0 | Status: SHIPPED | OUTPATIENT
Start: 2023-02-02 | End: 2023-02-02 | Stop reason: SDUPTHER

## 2023-02-02 RX ORDER — HYDRALAZINE HYDROCHLORIDE 20 MG/ML
10 INJECTION INTRAMUSCULAR; INTRAVENOUS
Status: COMPLETED | OUTPATIENT
Start: 2023-02-02 | End: 2023-02-02

## 2023-02-02 RX ORDER — CLONIDINE HYDROCHLORIDE 0.1 MG/1
0.1 TABLET ORAL EVERY 8 HOURS PRN
Qty: 30 TABLET | Refills: 1 | Status: ON HOLD | OUTPATIENT
Start: 2023-02-02 | End: 2023-02-11 | Stop reason: ALTCHOICE

## 2023-02-02 RX ORDER — DOXYCYCLINE 100 MG/1
100 CAPSULE ORAL 2 TIMES DAILY
Qty: 14 CAPSULE | Refills: 0 | Status: SHIPPED | OUTPATIENT
Start: 2023-02-02 | End: 2023-02-02 | Stop reason: SDUPTHER

## 2023-02-02 RX ORDER — LABETALOL HYDROCHLORIDE 5 MG/ML
10 INJECTION, SOLUTION INTRAVENOUS
Status: COMPLETED | OUTPATIENT
Start: 2023-02-02 | End: 2023-02-02

## 2023-02-02 RX ORDER — CLONIDINE HYDROCHLORIDE 0.1 MG/1
0.1 TABLET ORAL EVERY 8 HOURS PRN
Qty: 30 TABLET | Refills: 1 | Status: SHIPPED | OUTPATIENT
Start: 2023-02-02 | End: 2023-02-02 | Stop reason: SDUPTHER

## 2023-02-02 RX ORDER — FUROSEMIDE 20 MG/1
20 TABLET ORAL DAILY
Qty: 5 TABLET | Refills: 0 | Status: ON HOLD | OUTPATIENT
Start: 2023-02-02 | End: 2023-02-12 | Stop reason: HOSPADM

## 2023-02-02 RX ADMIN — FUROSEMIDE 40 MG: 10 INJECTION, SOLUTION INTRAMUSCULAR; INTRAVENOUS at 12:02

## 2023-02-02 RX ADMIN — HYDRALAZINE HYDROCHLORIDE 10 MG: 20 INJECTION INTRAMUSCULAR; INTRAVENOUS at 10:02

## 2023-02-02 RX ADMIN — DILTIAZEM HYDROCHLORIDE 10 MG: 5 INJECTION INTRAVENOUS at 12:02

## 2023-02-02 RX ADMIN — LABETALOL HYDROCHLORIDE 10 MG: 5 INJECTION, SOLUTION INTRAVENOUS at 11:02

## 2023-02-02 NOTE — ED PROVIDER NOTES
Encounter Date: 2/2/2023    SCRIBE #1 NOTE: I, Blossom Contreras, am scribing for, and in the presence of,  Abiel Ray MD. I have scribed the following portions of the note - the EKG reading. Other sections scribed: HPI, ROS and physical.     History     Chief Complaint   Patient presents with    Hypertension     Elevated blood pressure, 12 lbs weight gain, slight sob with activity, feels run down. Dr gillette changed med yesterday increased lopressor dose from 100mg to 200mg a day and added ramipril 5mg daily, decreased na bicarb to 1 tab. Pt recently had left arm surgery w/ infection after, finished antibiotics 10 days ago. Hx chf, renal transplant, afib.      73 y/o male with a medical hx of A-fib, HTN, anemia, CHF presents to the ED for hypertension onset 6 weeks. The pt reports a recent L forearm surgery completed two weeks ago secondary to infection. The pt states that he has completed all does of antibiotics , but still has swelling in his forearm that is worse than what it was right after the surgery. The pt also states that he had a brain surgery completed about a year ago for a similar infection. The pt states that his kidney transplant occurred in May of 2021, and he is compliant with his rejection medication. The pt also reports gradual elevated BP. He states that yesterday he had some of his medication dosages altered for his BP. Reports some SOB, elevated BP, leg swelling, weight change, arm swelling, fatigue. Denies cough, fever, CP, n/v/d.     The history is provided by the patient. No  was used.   Hypertension   This is a new problem. The current episode started several weeks ago. The problem has been gradually worsening. Associated symptoms include malaise/fatigue and shortness of breath. Pertinent negatives include no chest pain, no palpitations and no dizziness. There are no associated agents to hypertension. Risk factors include being male.   Review of patient's  allergies indicates:  No Known Allergies  Past Medical History:   Diagnosis Date    Anasarca 10/1/2021    Anemia of chronic disease     Atrial fibrillation     BPH (benign prostatic hypertrophy)     Chronic systolic heart failure 10/1/2021    CKD (chronic kidney disease) stage 2, GFR 60-89 ml/min     Hepatitis C virus infection cured after antiviral drug therapy     acquired through kidney transplant, treated / cured (SVR - 2021)    HTN (hypertension)     HTN (hypertension)     Polycystic kidney disease      Past Surgical History:   Procedure Laterality Date    AV FISTULA PLACEMENT Left     CATARACT EXTRACTION, BILATERAL Bilateral     CRANIOTOMY Right 2022    Procedure: CRANIOTOMY;  Surgeon: Sunday Rosa DO;  Location: [facility] OR [address] [place] [address];  Service: Neurosurgery;  Laterality: Right;  R craniotomy for abscess drainage    INCISION AND DRAINAGE, UPPER EXTREMITY Left 2022    Procedure: INCISION AND DRAINAGE, UPPER EXTREMITY;  Surgeon: Elijah Davis Jr., MD;  Location: [facility];  Service: General;  Laterality: Left;    KIDNEY TRANSPLANT N/A 2021    Procedure: TRANSPLANT, KIDNEY;  Surgeon: Lexy Bolden MD;  Location: [facility] OR [address] [place] [address];  Service: Transplant;  Laterality: N/A;     Family History   Problem Relation Age of Onset    Polycystic kidney disease Father     Hypertension Father     Kidney disease Father     Polycystic kidney disease Sister     Hypertension Sister     Kidney disease Sister      Social History     Tobacco Use    Smoking status: Former     Packs/day: 2.00     Years: 10.00     Pack years: 20.00     Types: Cigarettes     Start date: 1972     Quit date: 1984     Years since quittin.1    Smokeless tobacco: Never   Substance Use Topics    Alcohol use: No     Comment: Pt reportsbeing a former beer drinker (2-3 cans per day) and quitting in .    Drug use: No     Review of Systems   Constitutional:  Positive for malaise/fatigue and unexpected weight change.  Negative for appetite change, chills, diaphoresis, fatigue and fever.   HENT:  Negative for congestion, ear discharge, ear pain, postnasal drip, rhinorrhea, sinus pressure, sneezing, sore throat and voice change.    Eyes:  Negative for discharge, itching and visual disturbance.   Respiratory:  Positive for shortness of breath. Negative for cough and wheezing.    Cardiovascular:  Negative for chest pain, palpitations and leg swelling.   Gastrointestinal:  Negative for abdominal pain, nausea and vomiting.   Endocrine: Negative for polydipsia, polyphagia and polyuria.   Genitourinary:  Negative for difficulty urinating, dysuria, frequency, hematuria, penile discharge, penile pain, penile swelling and urgency.   Musculoskeletal:  Negative for arthralgias and myalgias.   Skin:  Negative for rash and wound.   Neurological:  Negative for dizziness, seizures, syncope and weakness.   Hematological:  Negative for adenopathy. Does not bruise/bleed easily.   Psychiatric/Behavioral:  Negative for agitation and self-injury. The patient is not nervous/anxious.      Physical Exam     Initial Vitals [02/02/23 0852]   BP Pulse Resp Temp SpO2   (!) 160/117 103 20 (!) 95.4 °F (35.2 °C) 98 %      MAP       --         Physical Exam    Nursing note and vitals reviewed.  Constitutional: He appears well-developed and well-nourished. He is not diaphoretic. No distress.   HENT:   Head: Normocephalic and atraumatic.   Right Ear: External ear normal.   Left Ear: External ear normal.   Nose: Nose normal.   Eyes: Pupils are equal, round, and reactive to light. Right eye exhibits no discharge. Left eye exhibits no discharge. No scleral icterus.   Neck:   Normal range of motion.  Cardiovascular:  Normal rate, regular rhythm and normal heart sounds.           AV fistula L arm good pulse and thrill   Pulmonary/Chest: No respiratory distress.   Abdominal: Abdomen is soft. He exhibits no distension. There is no abdominal tenderness.   Musculoskeletal:          General: Normal range of motion.      Cervical back: Normal range of motion.      Right lower le+ Pitting Edema present.      Left lower le+ Pitting Edema present.      Comments: Patient has a bandage to the medial aspect of the left forearm secondary to his recent surgery.  There is no tenderness to palpation.  There is some pitting edema to the forearm.  The compartments are not tight.     Neurological: He is alert and oriented to person, place, and time.   Skin: Skin is dry. Capillary refill takes less than 2 seconds.       ED Course   Procedures  Labs Reviewed   COMPREHENSIVE METABOLIC PANEL - Abnormal; Notable for the following components:       Result Value    Creatinine 1.43 (*)     Calcium Level Total 10.3 (*)     Globulin 2.3 (*)     Bilirubin Total 2.4 (*)     Aspartate Aminotransferase 40 (*)     All other components within normal limits   B-TYPE NATRIURETIC PEPTIDE - Abnormal; Notable for the following components:    Natriuretic Peptide 1,465.3 (*)     All other components within normal limits   PROTIME-INR - Abnormal; Notable for the following components:    PT 18.3 (*)     INR 1.54 (*)     All other components within normal limits   CBC WITH DIFFERENTIAL - Abnormal; Notable for the following components:    Hgb 19.0 (*)     Hct 58.9 (*)     MCV 99.8 (*)     MCHC 32.3 (*)     Platelet 108 (*)     All other components within normal limits   TROPONIN I - Normal   CBC W/ AUTO DIFFERENTIAL    Narrative:     The following orders were created for panel order CBC auto differential.  Procedure                               Abnormality         Status                     ---------                               -----------         ------                     CBC with Differential[[id number]]        Abnormal            Final result                 Please view results for these tests on the individual orders.   RAINBOW DRAW    Narrative:     The following orders were created for panel order [place]  Draw.  Procedure                               Abnormality         Status                     ---------                               -----------         ------                     Gold Top Hold[[id number]]                                                                 Please view results for these tests on the individual orders.   GOLD TOP HOLD     EKG Readings: (Independently Interpreted)   Initial Reading: No STEMI. Rhythm: Atrial Fibrillation. Heart Rate: 95. Ectopy: PACs. Conduction: Normal. ST Segments: Normal ST Segments. T Waves: Normal. Axis: Right Axis Deviation. Clinical Impression: Accelerated Junctional Rhythm with PACs   0917     Imaging Results              X-Ray Chest AP Portable (Final result)  Result time 02/02/23 09:17:43      Final result by Jose Antonio Frye MD (02/02/23 09:17:43)                   Impression:      The cardiomediastinal silhouette is unchanged with small bilateral pleural effusions similar to prior.  Slight interval increase in left lower lobe opacification.  Developing process is not excluded.      Electronically signed by: Jose Antonio Frye  Date:    02/02/2023  Time:    09:17               Narrative:    EXAMINATION:  XR CHEST AP PORTABLE    CLINICAL HISTORY:  shortness of breath;    TECHNIQUE:  Single view of the chest    COMPARISON:  01/08/2023    FINDINGS:  The cardiomediastinal silhouette is unchanged with small bilateral pleural effusions similar to prior.  Slight interval increase in left lower lobe opacification.  Developing process is not excluded.                                       Medications   hydrALAZINE injection 10 mg (10 mg Intravenous Given 2/2/23 1015)   labetaloL injection 10 mg (10 mg Intravenous Given 2/2/23 1145)   furosemide injection 40 mg (40 mg Intravenous Given 2/2/23 1200)   diltiaZEM injection 10 mg (10 mg Intravenous Given 2/2/23 1245)     Medical Decision Making:   Initial Assessment:   As per HPI  Differential Diagnosis:   Differential Diagnosis  includes, but is not limited to:   CHF. DVT Left upper extremity. Acute kidney injury. Peripheral edema, atrial fibrillation  Independently Interpreted Test(s):   I have ordered and independently interpreted EKG Reading(s) - see prior notes  Clinical Tests:   Lab Tests: Reviewed and Ordered       <> Summary of Lab: All lab stable  Radiological Study: Ordered and Reviewed  Medical Tests: Ordered and Reviewed  ED Management:  Patient was given hydralazine and labetalol while in the ER secondary to elevated blood pressure.  Patient was also given Lasix 40 mg IV push secondary to peripheral edema.  Chest x-ray shows small bilateral effusions which have been present on previous x-rays.  Patient was also given Cardizem 10 mg IV push secondary to tachycardia, atrial fib with RVR.  Patient denies any shortness of breath or chest pain.  Patient also denies history of fever chills or cough.        Scribe Attestation:   Scribe #1: I performed the above scribed service and the documentation accurately describes the services I performed. I attest to the accuracy of the note.    Attending Attestation:           Physician Attestation for Scribe:  Physician Attestation Statement for Scribe #1: I, Abiel Ray MD, reviewed documentation, as scribed by Blossom Contreras in my presence, and it is both accurate and complete.           ED Course as of 02/02/23 1356   Thu Feb 02, 2023   1234 Patient is currently tachycardic, AFib rhythm in the 110-120 range.  Patient has a history of AFib.  Will give Cardizem 10 mg IV push [KG]   1324 After Lasix, patient's heart rate is in the 90s, still in AFib.  Patient states he ambulated to the bathroom without difficulty.  Patient denies any shortness of breath or chest pain. [KG]      ED Course User Index  [KG] Abiel Ray MD                 Clinical Impression:   Final diagnoses:  [R06.02] SOB (shortness of breath)  [R60.9] Swelling  [R60.9] Peripheral edema (Primary)  [I48.91] Atrial  fibrillation with RVR  [R91.8] Pulmonary infiltrate        ED Disposition Condition    Discharge Stable          ED Prescriptions       Medication Sig Dispense Start Date End Date Auth. Provider    furosemide (LASIX) 20 MG tablet  (Status: Discontinued) Take 1 tablet (20 mg total) by mouth once daily. 5 tablet 2/2/2023 2/2/2023 Abiel Ray MD    doxycycline (VIBRAMYCIN) 100 MG Cap  (Status: Discontinued) Take 1 capsule (100 mg total) by mouth 2 (two) times daily. for 7 days 14 capsule 2/2/2023 2/2/2023 Abiel Ray MD    cloNIDine (CATAPRES) 0.1 MG tablet  (Status: Discontinued) Take 1 tablet (0.1 mg total) by mouth every 8 (eight) hours as needed (Take 1 clonidine 0.1 mg by mouth every 8 hours as needed for systolic blood pressure greater than 170). 30 tablet 2/2/2023 2/2/2023 Abiel Ray MD    cloNIDine (CATAPRES) 0.1 MG tablet Take 1 tablet (0.1 mg total) by mouth every 8 (eight) hours as needed (Take 1 clonidine 0.1 mg by mouth every 8 hours as needed for systolic blood pressure greater than 170). 30 tablet 2/2/2023 -- Abiel Ray MD    doxycycline (VIBRAMYCIN) 100 MG Cap Take 1 capsule (100 mg total) by mouth 2 (two) times daily. for 7 days 14 capsule 2/2/2023 2/9/2023 Abiel Ray MD    furosemide (LASIX) 20 MG tablet Take 1 tablet (20 mg total) by mouth once daily. 5 tablet 2/2/2023 -- Abiel Ray MD          Follow-up Information       Follow up With Specialties Details Why Contact Info    Stephen Hartman MD Family Medicine In 3 days  [address] [facility] [address] [phone]      [facility] - Emergency Dept Emergency Medicine  As needed, If symptoms worsen [address] [facility] [address] [phone]             Abiel Ray MD  02/02/23 6935       Abiel Ray MD  02/02/23 1330       Abiel Ray MD  02/02/23 9126

## 2023-02-02 NOTE — ED NOTES
Patent left upper extremity arterial system with normal waveforms consistent with an AV fistula.   Patient stated AV fistula is no longer in use.     Also due to swelling, venous ultrasound performed but not saved due to venous ultrasound not being ordered, however patient is NEGATIVE for DVT!

## 2023-02-08 ENCOUNTER — PATIENT MESSAGE (OUTPATIENT)
Dept: TRANSPLANT | Facility: CLINIC | Age: 73
End: 2023-02-08
Payer: MEDICARE

## 2023-02-08 DIAGNOSIS — E87.5 HYPERKALEMIA: ICD-10-CM

## 2023-02-08 DIAGNOSIS — Z94.0 KIDNEY REPLACED BY TRANSPLANT: Primary | ICD-10-CM

## 2023-02-08 NOTE — TELEPHONE ENCOUNTER
Notified patient of Dr. Yen's review of 2/7/23 lab results via [facility].     [facility] message sent to patient.     Hi Mr. Villeda,     Dr. Yen reviewed your 2/7/23 lab results. Your potassium is 5.3. She wants you to take Kayexalate 30 gram Monday, Wednesday & Friday. She recheck your potassium on Friday 217/23. She also wants to know if you're still taking lasix?    I'll have the 2/17/23 lab appointment scheduled.     Do you still have enough Kayexalate at home?      Tiara Anglin Dr. also wants to start you on Sensipar 30mg daily. The prescription will be sent to your local [facility] pharmacy.     ThanksTiara     ----- Message from Celeste Yen MD sent at 2/7/2023  9:26 AM CST -----  Sensipar 30 mg daily  Low K diet. Kayexlate 30 gram MW. Check K on Friday   Is he still on lasix?

## 2023-02-09 ENCOUNTER — PATIENT MESSAGE (OUTPATIENT)
Dept: TRANSPLANT | Facility: CLINIC | Age: 73
End: 2023-02-09
Payer: MEDICARE

## 2023-02-09 RX ORDER — SODIUM POLYSTYRENE SULFONATE 4.1 MEQ/G
30 POWDER, FOR SUSPENSION ORAL; RECTAL
Qty: 453 G | Refills: 0 | Status: SHIPPED | OUTPATIENT
Start: 2023-02-10 | End: 2023-05-11 | Stop reason: SDUPTHER

## 2023-02-09 RX ORDER — CINACALCET 30 MG/1
30 TABLET, FILM COATED ORAL
Qty: 30 TABLET | Refills: 11 | Status: ON HOLD | OUTPATIENT
Start: 2023-02-09 | End: 2023-02-11 | Stop reason: ALTCHOICE

## 2023-02-09 NOTE — TELEPHONE ENCOUNTER
Notified patient of Dr. Yen's review of 2/7/23 lab results via [facility].     Hi Mr. Villeda,     Dr. Yen reviewed your 2/7/23 prograf level = 7.4. She wants you to decrease your prograf dose to 1mg twice daily. We'll recheck the level in 2 weeks (on 2/24/23).     Thanks,     Tiara       ----- Message from Tiara Ferrell RN sent at 2/9/2023  1:19 PM CST -----    ----- Message -----  From: Celeste Yen MD  Sent: 2/9/2023   7:59 AM CST  To: [facility]    Tac to 1 mg BID and check the level in 2 weeks

## 2023-02-10 ENCOUNTER — PATIENT MESSAGE (OUTPATIENT)
Dept: TRANSPLANT | Facility: CLINIC | Age: 73
End: 2023-02-10
Payer: MEDICARE

## 2023-02-10 ENCOUNTER — HOSPITAL ENCOUNTER (INPATIENT)
Facility: HOSPITAL | Age: 73
LOS: 2 days | Discharge: HOME | DRG: 291 | End: 2023-02-12
Attending: EMERGENCY MEDICINE | Admitting: INTERNAL MEDICINE
Payer: MEDICARE

## 2023-02-10 DIAGNOSIS — I48.91 ATRIAL FIBRILLATION WITH RAPID VENTRICULAR RESPONSE: Primary | ICD-10-CM

## 2023-02-10 DIAGNOSIS — R06.02 SOB (SHORTNESS OF BREATH): ICD-10-CM

## 2023-02-10 DIAGNOSIS — Z94.0 RENAL TRANSPLANT RECIPIENT: ICD-10-CM

## 2023-02-10 DIAGNOSIS — R07.9 CHEST PAIN: ICD-10-CM

## 2023-02-10 DIAGNOSIS — I50.9 CONGESTIVE HEART FAILURE, UNSPECIFIED HF CHRONICITY, UNSPECIFIED HEART FAILURE TYPE: ICD-10-CM

## 2023-02-10 DIAGNOSIS — I50.9 CHF (CONGESTIVE HEART FAILURE): ICD-10-CM

## 2023-02-10 LAB
ALBUMIN SERPL-MCNC: 3.7 G/DL (ref 3.4–4.8)
ALBUMIN/GLOB SERPL: 1.6 RATIO (ref 1.1–2)
ALP SERPL-CCNC: 95 UNIT/L (ref 40–150)
ALT SERPL-CCNC: 30 UNIT/L (ref 0–55)
AST SERPL-CCNC: 29 UNIT/L (ref 5–34)
BASOPHILS # BLD AUTO: 0.03 X10(3)/MCL (ref 0–0.2)
BASOPHILS NFR BLD AUTO: 0.3 %
BILIRUBIN DIRECT+TOT PNL SERPL-MCNC: 2.7 MG/DL
BNP BLD-MCNC: 1516.5 PG/ML
BUN SERPL-MCNC: 20.4 MG/DL (ref 8.4–25.7)
CALCIUM SERPL-MCNC: 9.7 MG/DL (ref 8.8–10)
CHLORIDE SERPL-SCNC: 107 MMOL/L (ref 98–107)
CO2 SERPL-SCNC: 23 MMOL/L (ref 23–31)
CREAT SERPL-MCNC: 1.5 MG/DL (ref 0.73–1.18)
EOSINOPHIL # BLD AUTO: 0.05 X10(3)/MCL (ref 0–0.9)
EOSINOPHIL NFR BLD AUTO: 0.5 %
ERYTHROCYTE [DISTWIDTH] IN BLOOD BY AUTOMATED COUNT: 14.6 % (ref 11.5–17)
FLUAV AG UPPER RESP QL IA.RAPID: NOT DETECTED
FLUBV AG UPPER RESP QL IA.RAPID: NOT DETECTED
GFR SERPLBLD CREATININE-BSD FMLA CKD-EPI: 50 MLS/MIN/1.73/M2
GLOBULIN SER-MCNC: 2.3 GM/DL (ref 2.4–3.5)
GLUCOSE SERPL-MCNC: 106 MG/DL (ref 82–115)
HCT VFR BLD AUTO: 55.3 % (ref 42–52)
HGB BLD-MCNC: 17.9 GM/DL (ref 14–18)
IMM GRANULOCYTES # BLD AUTO: 0.04 X10(3)/MCL (ref 0–0.04)
IMM GRANULOCYTES NFR BLD AUTO: 0.4 %
LACTATE SERPL-SCNC: 1.8 MMOL/L (ref 0.5–2.2)
LACTATE SERPL-SCNC: 2.8 MMOL/L (ref 0.5–2.2)
LYMPHOCYTES # BLD AUTO: 3.18 X10(3)/MCL (ref 0.6–4.6)
LYMPHOCYTES NFR BLD AUTO: 31.5 %
MAGNESIUM SERPL-MCNC: 1.6 MG/DL (ref 1.6–2.6)
MCH RBC QN AUTO: 31.8 PG
MCHC RBC AUTO-ENTMCNC: 32.4 MG/DL (ref 33–36)
MCV RBC AUTO: 98.2 FL (ref 80–94)
MONOCYTES # BLD AUTO: 0.85 X10(3)/MCL (ref 0.1–1.3)
MONOCYTES NFR BLD AUTO: 8.4 %
NEUTROPHILS # BLD AUTO: 5.93 X10(3)/MCL (ref 2.1–9.2)
NEUTROPHILS NFR BLD AUTO: 58.9 %
NRBC BLD AUTO-RTO: 0 %
PLATELET # BLD AUTO: 116 X10(3)/MCL (ref 130–400)
PMV BLD AUTO: 10.1 FL (ref 7.4–10.4)
POTASSIUM SERPL-SCNC: 4.8 MMOL/L (ref 3.5–5.1)
PROT SERPL-MCNC: 6 GM/DL (ref 5.8–7.6)
RBC # BLD AUTO: 5.63 X10(6)/MCL (ref 4.7–6.1)
SARS-COV-2 RNA RESP QL NAA+PROBE: NOT DETECTED
SODIUM SERPL-SCNC: 141 MMOL/L (ref 136–145)
TROPONIN I SERPL-MCNC: <0.01 NG/ML (ref 0–0.04)
WBC # SPEC AUTO: 10.1 X10(3)/MCL (ref 4.5–11.5)

## 2023-02-10 PROCEDURE — 96375 TX/PRO/DX INJ NEW DRUG ADDON: CPT

## 2023-02-10 PROCEDURE — 0240U COVID/FLU A&B PCR: CPT | Performed by: NURSE PRACTITIONER

## 2023-02-10 PROCEDURE — 93005 ELECTROCARDIOGRAM TRACING: CPT

## 2023-02-10 PROCEDURE — 80053 COMPREHEN METABOLIC PANEL: CPT | Performed by: NURSE PRACTITIONER

## 2023-02-10 PROCEDURE — 99285 EMERGENCY DEPT VISIT HI MDM: CPT | Mod: 25

## 2023-02-10 PROCEDURE — 96361 HYDRATE IV INFUSION ADD-ON: CPT

## 2023-02-10 PROCEDURE — 87040 BLOOD CULTURE FOR BACTERIA: CPT | Performed by: NURSE PRACTITIONER

## 2023-02-10 PROCEDURE — 25000003 PHARM REV CODE 250: Performed by: EMERGENCY MEDICINE

## 2023-02-10 PROCEDURE — 63600175 PHARM REV CODE 636 W HCPCS: Performed by: EMERGENCY MEDICINE

## 2023-02-10 PROCEDURE — 85025 COMPLETE CBC W/AUTO DIFF WBC: CPT | Performed by: NURSE PRACTITIONER

## 2023-02-10 PROCEDURE — 83880 ASSAY OF NATRIURETIC PEPTIDE: CPT | Performed by: NURSE PRACTITIONER

## 2023-02-10 PROCEDURE — 96374 THER/PROPH/DIAG INJ IV PUSH: CPT

## 2023-02-10 PROCEDURE — 83605 ASSAY OF LACTIC ACID: CPT | Performed by: NURSE PRACTITIONER

## 2023-02-10 PROCEDURE — 93010 ELECTROCARDIOGRAM REPORT: CPT | Mod: ,,, | Performed by: INTERNAL MEDICINE

## 2023-02-10 PROCEDURE — 11000001 HC ACUTE MED/SURG PRIVATE ROOM

## 2023-02-10 PROCEDURE — 93010 EKG 12-LEAD: ICD-10-PCS | Mod: ,,, | Performed by: INTERNAL MEDICINE

## 2023-02-10 PROCEDURE — 83735 ASSAY OF MAGNESIUM: CPT | Performed by: EMERGENCY MEDICINE

## 2023-02-10 PROCEDURE — 84484 ASSAY OF TROPONIN QUANT: CPT | Performed by: NURSE PRACTITIONER

## 2023-02-10 PROCEDURE — 25000003 PHARM REV CODE 250: Performed by: NURSE PRACTITIONER

## 2023-02-10 RX ORDER — TACROLIMUS 1 MG/1
1 CAPSULE ORAL EVERY 12 HOURS
Qty: 60 CAPSULE | Refills: 11 | Status: SHIPPED | OUTPATIENT
Start: 2023-02-10 | End: 2023-02-16 | Stop reason: SDUPTHER

## 2023-02-10 RX ORDER — METOPROLOL TARTRATE 1 MG/ML
5 INJECTION, SOLUTION INTRAVENOUS EVERY 5 MIN PRN
Status: DISCONTINUED | OUTPATIENT
Start: 2023-02-10 | End: 2023-02-10

## 2023-02-10 RX ORDER — FUROSEMIDE 10 MG/ML
40 INJECTION INTRAMUSCULAR; INTRAVENOUS
Status: COMPLETED | OUTPATIENT
Start: 2023-02-10 | End: 2023-02-10

## 2023-02-10 RX ORDER — SODIUM CHLORIDE 9 MG/ML
1000 INJECTION, SOLUTION INTRAVENOUS
Status: COMPLETED | OUTPATIENT
Start: 2023-02-10 | End: 2023-02-10

## 2023-02-10 RX ORDER — SODIUM CHLORIDE 9 MG/ML
1000 INJECTION, SOLUTION INTRAVENOUS
Status: DISCONTINUED | OUTPATIENT
Start: 2023-02-10 | End: 2023-02-10

## 2023-02-10 RX ADMIN — METOPROLOL TARTRATE 5 MG: 1 INJECTION, SOLUTION INTRAVENOUS at 06:02

## 2023-02-10 RX ADMIN — FUROSEMIDE 40 MG: 10 INJECTION, SOLUTION INTRAMUSCULAR; INTRAVENOUS at 09:02

## 2023-02-10 RX ADMIN — SODIUM CHLORIDE 1000 ML: 9 INJECTION, SOLUTION INTRAVENOUS at 05:02

## 2023-02-10 NOTE — FIRST PROVIDER EVALUATION
Medical screening examination initiated.  I have conducted a focused provider triage encounter, findings are as follows:  Chief Complaint   Patient presents with    Shortness of Breath     C/o SOB, LE swelling, and shaking that began last Thursday. Pt has hx of CHF and was hospitalized last week. Pt denies chest pain and fevers. Took Lasix for 5 days. Hx of kidney transplant in May 2021.          Brief history of present illness:  71 y/o male presents with worsening sob, weakness. He was in ER recently for this and sent home on fluid pills for couple days. Kidney transplant may 2021 year ago - left upper arm fistula.    There were no vitals filed for this visit.    Pertinent physical exam:  alert, shaking/tremors, sob with talking and exertion, coughing    Brief workup plan:  ekg, labs, xray    Preliminary workup initiated; this workup will be continued and followed by the physician or advanced practice provider that is assigned to the patient when roomed.

## 2023-02-11 PROBLEM — I50.9 ACUTE CHF (CONGESTIVE HEART FAILURE): Status: ACTIVE | Noted: 2023-02-11

## 2023-02-11 LAB
ALBUMIN SERPL-MCNC: 3 G/DL (ref 3.4–4.8)
ALBUMIN/GLOB SERPL: 1.6 RATIO (ref 1.1–2)
ALP SERPL-CCNC: 76 UNIT/L (ref 40–150)
ALT SERPL-CCNC: 21 UNIT/L (ref 0–55)
APPEARANCE UR: CLEAR
AST SERPL-CCNC: 22 UNIT/L (ref 5–34)
AV INDEX (PROSTH): 0.4
AV MEAN GRADIENT: 5 MMHG
AV PEAK GRADIENT: 9 MMHG
AV VALVE AREA: 1.4 CM2
AV VELOCITY RATIO: 0.4
BACTERIA #/AREA URNS AUTO: NORMAL /HPF
BILIRUB UR QL STRIP.AUTO: NEGATIVE MG/DL
BILIRUBIN DIRECT+TOT PNL SERPL-MCNC: 3.3 MG/DL
BSA FOR ECHO PROCEDURE: 2.16 M2
BUN SERPL-MCNC: 19 MG/DL (ref 8.4–25.7)
CALCIUM SERPL-MCNC: 9.2 MG/DL (ref 8.8–10)
CHLORIDE SERPL-SCNC: 108 MMOL/L (ref 98–107)
CO2 SERPL-SCNC: 22 MMOL/L (ref 23–31)
COLOR UR AUTO: YELLOW
CREAT SERPL-MCNC: 1.22 MG/DL (ref 0.73–1.18)
CREAT UR-MCNC: 18.2 MG/DL (ref 63–166)
CV ECHO LV RWT: 1.17 CM
DOP CALC AO PEAK VEL: 1.51 M/S
DOP CALC AO VTI: 26.2 CM
DOP CALC LVOT AREA: 3.5 CM2
DOP CALC LVOT DIAMETER: 2.1 CM
DOP CALC LVOT PEAK VEL: 0.61 M/S
DOP CALC LVOT STROKE VOLUME: 36.7 CM3
DOP CALC MV VTI: 24.6 CM
DOP CALCLVOT PEAK VEL VTI: 10.6 CM
E WAVE DECELERATION TIME: 177 MSEC
E/A RATIO: 1.93
E/E' RATIO: 9.63 M/S
ECHO LV POSTERIOR WALL: 2.27 CM (ref 0.6–1.1)
EJECTION FRACTION: 60 %
ERYTHROCYTE [DISTWIDTH] IN BLOOD BY AUTOMATED COUNT: 14.6 % (ref 11.5–17)
FRACTIONAL SHORTENING: 34 % (ref 28–44)
GFR SERPLBLD CREATININE-BSD FMLA CKD-EPI: >60 MLS/MIN/1.73/M2
GLOBULIN SER-MCNC: 1.9 GM/DL (ref 2.4–3.5)
GLUCOSE SERPL-MCNC: 94 MG/DL (ref 82–115)
GLUCOSE UR QL STRIP.AUTO: NEGATIVE MG/DL
HCT VFR BLD AUTO: 50.7 % (ref 42–52)
HGB BLD-MCNC: 16.7 GM/DL (ref 14–18)
INTERVENTRICULAR SEPTUM: 1.64 CM (ref 0.6–1.1)
KETONES UR QL STRIP.AUTO: NEGATIVE MG/DL
LEFT ATRIUM SIZE: 5.4 CM
LEFT ATRIUM VOLUME INDEX MOD: 47.7 ML/M2
LEFT ATRIUM VOLUME MOD: 103 CM3
LEFT INTERNAL DIMENSION IN SYSTOLE: 2.57 CM (ref 2.1–4)
LEFT VENTRICLE DIASTOLIC VOLUME INDEX: 30.32 ML/M2
LEFT VENTRICLE DIASTOLIC VOLUME: 65.5 ML
LEFT VENTRICLE MASS INDEX: 160 G/M2
LEFT VENTRICLE SYSTOLIC VOLUME INDEX: 11.1 ML/M2
LEFT VENTRICLE SYSTOLIC VOLUME: 23.9 ML
LEFT VENTRICULAR INTERNAL DIMENSION IN DIASTOLE: 3.89 CM (ref 3.5–6)
LEFT VENTRICULAR MASS: 346.45 G
LEUKOCYTE ESTERASE UR QL STRIP.AUTO: NEGATIVE UNIT/L
LV LATERAL E/E' RATIO: 12.83 M/S
LV SEPTAL E/E' RATIO: 7.7 M/S
LVOT MG: 1 MMHG
LVOT MV: 0.39 CM/S
MAGNESIUM SERPL-MCNC: 1.9 MG/DL (ref 1.6–2.6)
MCH RBC QN AUTO: 31.7 PG
MCHC RBC AUTO-ENTMCNC: 32.9 MG/DL (ref 33–36)
MCV RBC AUTO: 96.4 FL (ref 80–94)
MV MEAN GRADIENT: 1 MMHG
MV PEAK A VEL: 0.4 M/S
MV PEAK E VEL: 0.77 M/S
MV PEAK GRADIENT: 3 MMHG
MV VALVE AREA BY CONTINUITY EQUATION: 1.49 CM2
NITRITE UR QL STRIP.AUTO: NEGATIVE
NRBC BLD AUTO-RTO: 0 %
PH UR STRIP.AUTO: 6.5 [PH]
PHOSPHATE SERPL-MCNC: 3 MG/DL (ref 2.3–4.7)
PISA TR MAX VEL: 2.04 M/S
PLATELET # BLD AUTO: 91 X10(3)/MCL (ref 130–400)
PMV BLD AUTO: 10.6 FL (ref 7.4–10.4)
POTASSIUM SERPL-SCNC: 4.4 MMOL/L (ref 3.5–5.1)
PROT SERPL-MCNC: 4.9 GM/DL (ref 5.8–7.6)
PROT UR QL STRIP.AUTO: NEGATIVE MG/DL
PROT UR STRIP-MCNC: <6.8 MG/DL
PV PEAK VELOCITY: 1.05 CM/S
RA PRESSURE: 8 MMHG
RA WIDTH: 4.79 CM
RBC # BLD AUTO: 5.26 X10(6)/MCL (ref 4.7–6.1)
RBC #/AREA URNS AUTO: <5 /HPF
RBC UR QL AUTO: NEGATIVE UNIT/L
RIGHT VENTRICULAR END-DIASTOLIC DIMENSION: 3.07 CM
SODIUM SERPL-SCNC: 140 MMOL/L (ref 136–145)
SP GR UR STRIP.AUTO: 1.01 (ref 1–1.03)
SQUAMOUS #/AREA URNS AUTO: <5 /HPF
TDI LATERAL: 0.06 M/S
TDI SEPTAL: 0.1 M/S
TDI: 0.08 M/S
TR MAX PG: 17 MMHG
TRICUSPID ANNULAR PLANE SYSTOLIC EXCURSION: 1.83 CM
TV REST PULMONARY ARTERY PRESSURE: 25 MMHG
UROBILINOGEN UR STRIP-ACNC: 0.2 MG/DL
WBC # SPEC AUTO: 9.5 X10(3)/MCL (ref 4.5–11.5)
WBC #/AREA URNS AUTO: <5 /HPF

## 2023-02-11 PROCEDURE — 84100 ASSAY OF PHOSPHORUS: CPT | Performed by: INTERNAL MEDICINE

## 2023-02-11 PROCEDURE — 85027 COMPLETE CBC AUTOMATED: CPT | Performed by: INTERNAL MEDICINE

## 2023-02-11 PROCEDURE — 63600175 PHARM REV CODE 636 W HCPCS: Performed by: INTERNAL MEDICINE

## 2023-02-11 PROCEDURE — 80053 COMPREHEN METABOLIC PANEL: CPT | Performed by: INTERNAL MEDICINE

## 2023-02-11 PROCEDURE — 83735 ASSAY OF MAGNESIUM: CPT | Performed by: INTERNAL MEDICINE

## 2023-02-11 PROCEDURE — 81003 URINALYSIS AUTO W/O SCOPE: CPT | Performed by: INTERNAL MEDICINE

## 2023-02-11 PROCEDURE — 25000003 PHARM REV CODE 250: Performed by: INTERNAL MEDICINE

## 2023-02-11 PROCEDURE — 84156 ASSAY OF PROTEIN URINE: CPT | Performed by: INTERNAL MEDICINE

## 2023-02-11 PROCEDURE — 21400001 HC TELEMETRY ROOM

## 2023-02-11 RX ORDER — AMOXICILLIN 250 MG
2 CAPSULE ORAL 2 TIMES DAILY PRN
Status: DISCONTINUED | OUTPATIENT
Start: 2023-02-11 | End: 2023-02-12 | Stop reason: HOSPADM

## 2023-02-11 RX ORDER — MAGNESIUM SULFATE HEPTAHYDRATE 40 MG/ML
4 INJECTION, SOLUTION INTRAVENOUS ONCE
Status: COMPLETED | OUTPATIENT
Start: 2023-02-11 | End: 2023-02-11

## 2023-02-11 RX ORDER — ACETAMINOPHEN 325 MG/1
650 TABLET ORAL EVERY 4 HOURS PRN
Status: DISCONTINUED | OUTPATIENT
Start: 2023-02-11 | End: 2023-02-12 | Stop reason: HOSPADM

## 2023-02-11 RX ORDER — PROCHLORPERAZINE EDISYLATE 5 MG/ML
5 INJECTION INTRAMUSCULAR; INTRAVENOUS EVERY 6 HOURS PRN
Status: DISCONTINUED | OUTPATIENT
Start: 2023-02-11 | End: 2023-02-12 | Stop reason: HOSPADM

## 2023-02-11 RX ORDER — FINASTERIDE 5 MG/1
5 TABLET, FILM COATED ORAL NIGHTLY
Status: DISCONTINUED | OUTPATIENT
Start: 2023-02-11 | End: 2023-02-12 | Stop reason: HOSPADM

## 2023-02-11 RX ORDER — CLONIDINE HYDROCHLORIDE 0.2 MG/1
0.2 TABLET ORAL 3 TIMES DAILY PRN
Status: DISCONTINUED | OUTPATIENT
Start: 2023-02-11 | End: 2023-02-12 | Stop reason: HOSPADM

## 2023-02-11 RX ORDER — SIMETHICONE 80 MG
1 TABLET,CHEWABLE ORAL 4 TIMES DAILY PRN
Status: DISCONTINUED | OUTPATIENT
Start: 2023-02-11 | End: 2023-02-12 | Stop reason: HOSPADM

## 2023-02-11 RX ORDER — DOXAZOSIN 1 MG/1
2 TABLET ORAL DAILY
Status: DISCONTINUED | OUTPATIENT
Start: 2023-02-11 | End: 2023-02-12 | Stop reason: HOSPADM

## 2023-02-11 RX ORDER — TALC
6 POWDER (GRAM) TOPICAL NIGHTLY PRN
Status: DISCONTINUED | OUTPATIENT
Start: 2023-02-11 | End: 2023-02-12 | Stop reason: HOSPADM

## 2023-02-11 RX ORDER — ACETAMINOPHEN 500 MG
1000 TABLET ORAL EVERY 6 HOURS PRN
Status: DISCONTINUED | OUTPATIENT
Start: 2023-02-11 | End: 2023-02-12 | Stop reason: HOSPADM

## 2023-02-11 RX ORDER — FUROSEMIDE 10 MG/ML
40 INJECTION INTRAMUSCULAR; INTRAVENOUS DAILY
Status: DISCONTINUED | OUTPATIENT
Start: 2023-02-12 | End: 2023-02-12

## 2023-02-11 RX ORDER — FLUTICASONE PROPIONATE 50 MCG
1 SPRAY, SUSPENSION (ML) NASAL DAILY PRN
Status: DISCONTINUED | OUTPATIENT
Start: 2023-02-11 | End: 2023-02-12 | Stop reason: HOSPADM

## 2023-02-11 RX ORDER — METOPROLOL TARTRATE 50 MG/1
100 TABLET ORAL 2 TIMES DAILY
Status: DISCONTINUED | OUTPATIENT
Start: 2023-02-11 | End: 2023-02-12 | Stop reason: HOSPADM

## 2023-02-11 RX ORDER — SODIUM BICARBONATE 650 MG/1
1300 TABLET ORAL 2 TIMES DAILY
Status: DISCONTINUED | OUTPATIENT
Start: 2023-02-11 | End: 2023-02-12 | Stop reason: HOSPADM

## 2023-02-11 RX ORDER — CINACALCET 30 MG/1
30 TABLET, FILM COATED ORAL
Status: DISCONTINUED | OUTPATIENT
Start: 2023-02-11 | End: 2023-02-11

## 2023-02-11 RX ORDER — MAG HYDROX/ALUMINUM HYD/SIMETH 200-200-20
30 SUSPENSION, ORAL (FINAL DOSE FORM) ORAL 4 TIMES DAILY PRN
Status: DISCONTINUED | OUTPATIENT
Start: 2023-02-11 | End: 2023-02-12 | Stop reason: HOSPADM

## 2023-02-11 RX ORDER — ERGOCALCIFEROL 1.25 MG/1
50000 CAPSULE ORAL
Status: DISCONTINUED | OUTPATIENT
Start: 2023-02-11 | End: 2023-02-12 | Stop reason: HOSPADM

## 2023-02-11 RX ORDER — GLUCOSAM/CHONDRO/HERB 149/HYAL 750-100 MG
1 TABLET ORAL DAILY
Status: DISCONTINUED | OUTPATIENT
Start: 2023-02-11 | End: 2023-02-12 | Stop reason: HOSPADM

## 2023-02-11 RX ORDER — TACROLIMUS 1 MG/1
1 CAPSULE ORAL 2 TIMES DAILY
Status: DISCONTINUED | OUTPATIENT
Start: 2023-02-11 | End: 2023-02-12 | Stop reason: HOSPADM

## 2023-02-11 RX ORDER — RAMIPRIL 5 MG/1
5 CAPSULE ORAL DAILY
COMMUNITY
Start: 2023-02-06 | End: 2023-07-12 | Stop reason: DRUGHIGH

## 2023-02-11 RX ORDER — METOPROLOL TARTRATE 1 MG/ML
5 INJECTION, SOLUTION INTRAVENOUS EVERY 6 HOURS PRN
Status: DISCONTINUED | OUTPATIENT
Start: 2023-02-11 | End: 2023-02-12 | Stop reason: HOSPADM

## 2023-02-11 RX ORDER — FUROSEMIDE 10 MG/ML
40 INJECTION INTRAMUSCULAR; INTRAVENOUS 2 TIMES DAILY
Status: DISCONTINUED | OUTPATIENT
Start: 2023-02-11 | End: 2023-02-11

## 2023-02-11 RX ORDER — LISINOPRIL 10 MG/1
20 TABLET ORAL DAILY
Status: DISCONTINUED | OUTPATIENT
Start: 2023-02-11 | End: 2023-02-12 | Stop reason: HOSPADM

## 2023-02-11 RX ORDER — NIFEDIPINE 60 MG/1
60 TABLET, EXTENDED RELEASE ORAL DAILY
Status: DISCONTINUED | OUTPATIENT
Start: 2023-02-11 | End: 2023-02-11

## 2023-02-11 RX ORDER — ONDANSETRON 2 MG/ML
4 INJECTION INTRAMUSCULAR; INTRAVENOUS EVERY 4 HOURS PRN
Status: DISCONTINUED | OUTPATIENT
Start: 2023-02-11 | End: 2023-02-12 | Stop reason: HOSPADM

## 2023-02-11 RX ORDER — SODIUM CHLORIDE 0.9 % (FLUSH) 0.9 %
10 SYRINGE (ML) INJECTION
Status: DISCONTINUED | OUTPATIENT
Start: 2023-02-11 | End: 2023-02-12 | Stop reason: HOSPADM

## 2023-02-11 RX ORDER — POLYETHYLENE GLYCOL 3350 17 G/17G
17 POWDER, FOR SOLUTION ORAL 2 TIMES DAILY PRN
Status: DISCONTINUED | OUTPATIENT
Start: 2023-02-11 | End: 2023-02-12 | Stop reason: HOSPADM

## 2023-02-11 RX ORDER — DOXAZOSIN 2 MG/1
2 TABLET ORAL DAILY
COMMUNITY
Start: 2023-02-06 | End: 2023-07-12 | Stop reason: DRUGHIGH

## 2023-02-11 RX ORDER — DOXAZOSIN 1 MG/1
2 TABLET ORAL DAILY
Status: DISCONTINUED | OUTPATIENT
Start: 2023-02-11 | End: 2023-02-11

## 2023-02-11 RX ORDER — METOPROLOL TARTRATE 100 MG/1
100 TABLET ORAL 2 TIMES DAILY
COMMUNITY
Start: 2023-01-09 | End: 2023-07-12 | Stop reason: DRUGHIGH

## 2023-02-11 RX ORDER — HYDRALAZINE HYDROCHLORIDE 20 MG/ML
20 INJECTION INTRAMUSCULAR; INTRAVENOUS EVERY 4 HOURS PRN
Status: DISCONTINUED | OUTPATIENT
Start: 2023-02-11 | End: 2023-02-12 | Stop reason: HOSPADM

## 2023-02-11 RX ORDER — LANOLIN ALCOHOL/MO/W.PET/CERES
800 CREAM (GRAM) TOPICAL 2 TIMES DAILY
Status: DISCONTINUED | OUTPATIENT
Start: 2023-02-11 | End: 2023-02-12 | Stop reason: HOSPADM

## 2023-02-11 RX ORDER — PREDNISONE 5 MG/1
5 TABLET ORAL DAILY
Status: DISCONTINUED | OUTPATIENT
Start: 2023-02-11 | End: 2023-02-12 | Stop reason: HOSPADM

## 2023-02-11 RX ORDER — LABETALOL HYDROCHLORIDE 5 MG/ML
10 INJECTION, SOLUTION INTRAVENOUS EVERY 4 HOURS PRN
Status: DISCONTINUED | OUTPATIENT
Start: 2023-02-11 | End: 2023-02-12 | Stop reason: HOSPADM

## 2023-02-11 RX ORDER — METOPROLOL TARTRATE 50 MG/1
50 TABLET ORAL 2 TIMES DAILY
Status: DISCONTINUED | OUTPATIENT
Start: 2023-02-11 | End: 2023-02-11

## 2023-02-11 RX ADMIN — APIXABAN 5 MG: 5 TABLET, FILM COATED ORAL at 08:02

## 2023-02-11 RX ADMIN — PREDNISONE 5 MG: 5 TABLET ORAL at 08:02

## 2023-02-11 RX ADMIN — MAGNESIUM SULFATE HEPTAHYDRATE 4 G: 40 INJECTION, SOLUTION INTRAVENOUS at 02:02

## 2023-02-11 RX ADMIN — Medication 800 MG: at 08:02

## 2023-02-11 RX ADMIN — LISINOPRIL 20 MG: 10 TABLET ORAL at 08:02

## 2023-02-11 RX ADMIN — NIFEDIPINE 60 MG: 60 TABLET, FILM COATED, EXTENDED RELEASE ORAL at 08:02

## 2023-02-11 RX ADMIN — FUROSEMIDE 40 MG: 10 INJECTION, SOLUTION INTRAMUSCULAR; INTRAVENOUS at 08:02

## 2023-02-11 RX ADMIN — FUROSEMIDE 40 MG: 10 INJECTION, SOLUTION INTRAMUSCULAR; INTRAVENOUS at 06:02

## 2023-02-11 RX ADMIN — OMEGA-3 FATTY ACIDS CAP 1000 MG 1 CAPSULE: 1000 CAP at 08:02

## 2023-02-11 RX ADMIN — ERGOCALCIFEROL 50000 UNITS: 1.25 CAPSULE ORAL at 08:02

## 2023-02-11 RX ADMIN — METOPROLOL TARTRATE 100 MG: 50 TABLET, FILM COATED ORAL at 08:02

## 2023-02-11 RX ADMIN — DOXAZOSIN 2 MG: 1 TABLET ORAL at 08:02

## 2023-02-11 RX ADMIN — TACROLIMUS 1 MG: 1 CAPSULE ORAL at 08:02

## 2023-02-11 RX ADMIN — FINASTERIDE 5 MG: 5 TABLET, FILM COATED ORAL at 08:02

## 2023-02-11 RX ADMIN — SODIUM BICARBONATE 1300 MG: 650 TABLET ORAL at 08:02

## 2023-02-11 RX ADMIN — THERA TABS 1 TABLET: TAB at 08:02

## 2023-02-11 RX ADMIN — TACROLIMUS 1 MG: 1 CAPSULE ORAL at 06:02

## 2023-02-11 NOTE — H&P
[facility] Hospital Medicine - H&P Note    Patient Name: Ronal Mazariegos  : 1950  MRN: [id number]  PCP: Stephen [place]man MD  Admitting Physician: Sherry Pozo MD  Admission Class: IP- Inpatient   Length of Stay: 1  Face-to-Face encounter date: 2023  Code status: Full    Chief Complaint   Shortness of breath    History of Present Illness   This is a 72-year-old male with medical history notable for CKD 3a in a kidney transplant on prednisone and tacrolimus, NICM/Chronic HFrEF (last EF 50% 2022), chronic persistent atrial fibrillation on Eliquis, hypertension and BPH.    Present to the ED with chief complaint of worsening shortness of breath and bilateral lower extremity swelling and that progressively getting worse over the past 6 weeks.Report his symptoms gradually started after his recent hospitalization in 2022. Report dyspnea on minimal exertion and increasing bilateral lower extremity edema, abdominal distention and bloating, and over the past week he noted his blood pressure has been high and was feeling generally weak.  Denies cough, chest pain, fever or chills.  He visited the ED on 2023 was given Lasix 40 mg IV and was noted to be in AFib with RVR and received 1 dose of IV Cardizem and was discharged home with Lasix 20 mg daily for 5 days.  Report he followed up with his nephrologist and his metoprolol tartrate was increased to 100 mg twice daily, and doxazosin was added.  Report his symptom did not improve, his dyspnea worsened as well as his generalized body edema prompted him to return to the ED.     On arrival to ED he was tachycardic, tachypneic and hypertensive, EKG with AFib and RVR, saturating 95% on room air.  Chest x-ray on my review small bilateral pleural effusions and prominent perihilar pulmonary vascular markings.  Labs notable for BNP 1516, troponin < 0.01, creatinine stable at 1.5, potassium 4.8, magnesium 1.6.    He was given  metoprolol 5 mg IV for 3 doses, Lasix 40 mg IV.  His ventricular rate improved and had significant urine output over 1.5 L, referred to hospital medicine service for further evaluation and management.    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 ( Cr 1.3 -1.5)  Chronic immunosuppression  Chronic persistent atrial fibrillation  Chronic HFrEF   TTE 2/19/2022:  LVEF 50%, normal LV diastolic function, normal RV size and function, severe LA enlargement, moderate RA enlargement, PASP 29, trivial small pericardial effusion  TTE 10/1/2021:  LVEF 45%, LV global longitudinal strain 8.9% with apical sparing consistent with an infiltrative cardiomyopathy (e.g., amyloid), severe biatrial enlargement  Lexiscan 07/09/2020:  Normal myocardial perfusion  Hypertension  Hepatitis-C acquired through kidney transplant, treated with Epclusa and maintained SVR  BPH  Chronic mild thrombocytopenia  H/O Cerebral nocardiosis and right cerebral abscess requiring craniotomy and drainage in 2/2022, 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  H/O CMV viremia  LUE cellulitis/abscess December 2022 status post surgical drainage with operative culture growing Klebsiella, completed 2 week course of doxycycline post op.    Past Surgical History     Past Surgical History:   Procedure Laterality Date    AV FISTULA PLACEMENT Left 2016    CATARACT EXTRACTION, BILATERAL Bilateral     CRANIOTOMY Right 2/16/2022    Procedure: CRANIOTOMY;  Surgeon: Sunday Rosa DO;  Location: [facility] OR [address] [place] [address];  Service: Neurosurgery;  Laterality: Right;  R craniotomy for abscess drainage    INCISION AND DRAINAGE, UPPER EXTREMITY Left 12/30/2022    Procedure: INCISION AND DRAINAGE, UPPER EXTREMITY;   Surgeon: Elijah Davis Jr., MD;  Location: [facility];  Service: General;  Laterality: Left;    KIDNEY TRANSPLANT N/A 2021    Procedure: TRANSPLANT, KIDNEY;  Surgeon: Lexy Bolden MD;  Location: [facility] OR [address] [place] [address];  Service: Transplant;  Laterality: N/A;       Social History     Social History     Tobacco Use    Smoking status: Former     Packs/day: 2.00     Years: 10.00     Pack years: 20.00     Types: Cigarettes     Start date: 1972     Quit date: 1984     Years since quittin.2    Smokeless tobacco: Never   Substance Use Topics    Alcohol use: No     Comment: Pt reportsbeing a former beer drinker (2-3 cans per day) and quitting in .        Family History   Reviewed and negative    Allergies   Patient has no known allergies.    Home Medications     Prior to Admission medications    Medication Sig Start Date End Date Taking? Authorizing Provider   apixaban (ELIQUIS) 5 mg Tab Take 1 tablet (5 mg total) by mouth 2 (two) times daily. DO NOT RESTART UNTIL APPROVED BY NEUROSURGERY 22  Yes Jaimie Menon MD   doxazosin (CARDURA) 2 MG tablet Take 2 mg by mouth once daily. 23  Yes Historical Provider   ergocalciferol (VITAMIN D2) 50,000 unit Cap Take 1 capsule (50,000 Units total) by mouth every 7 days. X 12 months 23  Yes    finasteride (PROSCAR) 5 mg tablet Take 5 mg by mouth every evening.   Yes Historical Provider   fish oil-omega-3 fatty acids 300-1,000 mg capsule Take 1 capsule by mouth every evening.   Yes Historical Provider   fluticasone propionate (FLONASE) 50 mcg/actuation nasal spray daily as needed. 3/20/22  Yes Historical Provider   furosemide (LASIX) 20 MG tablet Take 1 tablet (20 mg total) by mouth once daily. 23  Yes Abiel Ray MD   iron-vitamin C 100-250 mg, ICAR-C, 100-250 mg Tab Take 1 tablet by mouth. 22  Yes Historical Provider   LOPRESSOR 100 mg tablet Take 100 mg by mouth 2 (two) times daily. 23  Yes Historical Provider    magnesium oxide (MAG-OX) 400 mg (241.3 mg magnesium) tablet TAKE 2 TABLETS BY MOUTH THREE TIMES DAILY  Patient taking differently: Take 800 mg by mouth 2 (two) times daily. 6/28/22  Yes Carlos Barcenas MD   multivitamin Tab Take 1 tablet by mouth once daily. 5/6/21  Yes Celeste Yen MD   predniSONE (DELTASONE) 5 MG tablet Take 1 tablet (5 mg total) by mouth once daily. 1/17/23 1/17/24 Yes Celeste Yen MD   ramipriL (ALTACE) 5 MG capsule Take 5 mg by mouth once daily. 2/6/23  Yes Historical Provider   sodium bicarbonate 650 MG tablet Take 2 tablets (1,300 mg total) by mouth 2 (two) times daily. 2/24/22  Yes Jaimie Menon MD   sodium polystyrene (KAYEXALATE) powder Take 30 g by mouth every Mon, Wed, Fri. Mix 8 LEVEL TEASPOONS (30grams total) in 120mL water and drink by mouth once daily, then as DIRECTED BY TRANSPLANT. 2/10/23  Yes Celeste Yen MD   tacrolimus (PROGRAF) 1 MG Cap Take 1 capsule (1 mg total) by mouth every 12 (twelve) hours. Z94.0;Kidney txp on 5/4/21 2/10/23  Yes Celeste Yen MD       Physical Exam   Vital Signs  Temp:  [97.7 °F (36.5 °C)]   Pulse:  [100-136]   Resp:  [22-37]   BP: (140-188)/()   SpO2:  [92 %-100 %]    General: Appears comfortable  HEENT: NC/AT  Neck:  JVD up to mandible  Chest: Bilateral rales at lower chest  CVS: Irregular rhythm. Normal S1/S2. +4 pitting edema up to pelvis  Abdomen: distended, normoactive BS, shifting dullness, soft and non-tender.  MSK: No obvious deformity or joint swelling  Skin: Warm and dry  Neuro: AAOx3, no focal neurological deficit  Psych: Cooperative    Labs     Recent Labs     02/10/23  1806   WBC 10.1   RBC 5.63   HGB 17.9   HCT 55.3*   MCV 98.2*   MCH 31.8   MCHC 32.4*   RDW 14.6   *      Recent Labs     02/10/23  1806      K 4.8   CHLORIDE 107   CO2 23   BUN 20.4   CREATININE 1.50*   EGFRNORACEVR 50   GLUCOSE 106   CALCIUM 9.7   MG 1.60   ALBUMIN 3.7   GLOBULIN 2.3*   ALKPHOS 95   ALT 30   AST 29    BILITOT 2.7*   BNP 1,516.5*     Recent Labs     02/10/23  1806 02/10/23  2022   LACTIC 2.8* 1.8     Recent Labs     02/10/23  1806   TROPONINI <0.010        Microbiology Results (last 7 days)       Procedure Component Value Units Date/Time    Blood culture #1 **CANNOT BE ORDERED STAT** [[id number]] Collected: 02/10/23 1806    Order Status: Resulted Specimen: Blood Updated: 02/10/23 1814    Blood culture #2 **CANNOT BE ORDERED STAT** [[id number]] Collected: 02/10/23 1806    Order Status: Resulted Specimen: Blood Updated: 02/10/23 1814           Imaging     X-Ray Chest 1 View   Final Result        Assessment & Plan   Acute on chronic heart failure with reduced ejection fraction  Chronic persistent atrial fibrillation with RVR  Hypertensive urgency  CKD 3A in kidney transplant-stable  Chronic thrombocytopenia-stable    HX Hepatitis-C acquired through kidney transplant-treated/SVR  HX Immunosuppression related infections: Cerebral nocardiosis -completed 1 year of TMP-SMX & tidizolid, CMV viremia    Plan:    Ordered transthoracic echo  Lasix 40 mg IV q12h  Accurate I/O, daily weight  Maintain K> 4, Mg>2  Magnesium sulfate 4 g IV x1  Resume metoprolol tartrate 100mg BID  PRN IV metoprolol for HR > 110 sustained  Resume doxazosin and lisinopril 20 mg daily (ramipril 5 mg equivalent)  Start nifedipine XL 60 mg daily for better blood pressure control, can titrate to BID if needed  Remaining home medication reviewed and resumed  VTE Prophylaxis:  Continue Eliquis  Patient cardiologist Dr. Bass and Nephrologist Dr. Ryan consulted by Dr. Mckee in ED    Critical care time:  35 minutes  Critical care diagnosis:  Atrial fibrillation with RVR, acute on chronic systolic heart failure    Sherry Pozo MD  Internal Medicine

## 2023-02-11 NOTE — CONSULTS
Cardiology History and Physical    Patient Demographics:  Name: Ronal Mazariegos  Age:  72 y.o.  MRN:  [id number]  Admission Date: 2/10/2023    Chief Complaint upon admit:    Chief Complaint   Patient presents with    Shortness of Breath     C/o SOB, LE swelling, and shaking that began last Thursday. Pt has hx of CHF and was hospitalized last week. Pt denies chest pain and fevers. Took Lasix for 5 days. Hx of kidney transplant in May 2021.     Increasing shortness of breath with ankle edema and hypertension    History of Present Illness:  71 y/o male with a history of HTN, CHF, CKD, and A fib admitted to the emergency room with shivering and SOB onset on 02/10/2023 afternoon. Pt reports that he was here at the ED last Thursday for left arm swelling after his left arm incision and drainage done on . Pt also begin experiencing leg swelling about 8 weeks before the surgery.  He felt that his blood pressure has been increasing and also heart rate has been increasing.  He has persistent atrial fibrillation but now in controlled ventricular rate.  His BNP was elevated at 1516.  Troponin was less than 0.01    Past Medical History:   Diagnosis Date    Atrial fibrillation     BPH (benign prostatic hypertrophy)     Chronic kidney disease, stage 3a, in kindney transplant (2021)     Chronic systolic heart failure 10/01/2021    H/O disseminated/cerebral nocardiosis     Hepatitis C virus infection cured after antiviral drug therapy     acquired through kidney transplant, treated / cured ([facility] - 2021)    HTN (hypertension)     Polycystic kidney disease        Social history:   Positive for smoking habit, no alcohol abuse.   Social History     Socioeconomic History    Marital status:     Number of children: 1   Tobacco Use    Smoking status: Former     Packs/day: 2.00     Years: 10.00     Pack years: 20.00     Types: Cigarettes     Start date: 1972     Quit date: 1984     Years since quittin.2     Smokeless tobacco: Never   Substance and Sexual Activity    Alcohol use: No     Comment: Pt reportsbeing a former beer drinker (2-3 cans per day) and quitting in 2014.    Drug use: No    Sexual activity: Yes       Past Surgical History:   Procedure Laterality Date    AV FISTULA PLACEMENT Left 2016    CATARACT EXTRACTION, BILATERAL Bilateral     CRANIOTOMY Right 2/16/2022    Procedure: CRANIOTOMY;  Surgeon: Sunday Rosa DO;  Location: [facility] OR [address] [place] [address];  Service: Neurosurgery;  Laterality: Right;  R craniotomy for abscess drainage    INCISION AND DRAINAGE, UPPER EXTREMITY Left 12/30/2022    Procedure: INCISION AND DRAINAGE, UPPER EXTREMITY;  Surgeon: Elijah Davis Jr., MD;  Location: [facility];  Service: General;  Laterality: Left;    KIDNEY TRANSPLANT N/A 5/4/2021    Procedure: TRANSPLANT, KIDNEY;  Surgeon: Lexy Bolden MD;  Location: [facility] OR [address] [place] [address];  Service: Transplant;  Laterality: N/A;       Family history:  Family history is positive for heart disease  Family History   Problem Relation Age of Onset    Polycystic kidney disease Father     Hypertension Father     Kidney disease Father     Polycystic kidney disease Sister     Hypertension Sister     Kidney disease Sister        Allergies: Review of patient's allergies indicates:  No Known Allergies    Prior to Admission medications    Medication Sig Start Date End Date Taking? Authorizing Provider   apixaban (ELIQUIS) 5 mg Tab Take 1 tablet (5 mg total) by mouth 2 (two) times daily. DO NOT RESTART UNTIL APPROVED BY NEUROSURGERY 2/23/22  Yes Jaimie Menon MD   doxazosin (CARDURA) 2 MG tablet Take 2 mg by mouth once daily. 2/6/23  Yes Historical Provider   ergocalciferol (VITAMIN D2) 50,000 unit Cap Take 1 capsule (50,000 Units total) by mouth every 7 days. X 12 months 1/17/23  Yes    finasteride (PROSCAR) 5 mg tablet Take 5 mg by mouth every evening.   Yes Historical Provider   fish oil-omega-3 fatty acids 300-1,000 mg capsule Take 1  capsule by mouth every evening.   Yes Historical Provider   fluticasone propionate (FLONASE) 50 mcg/actuation nasal spray daily as needed. 3/20/22  Yes Historical Provider   furosemide (LASIX) 20 MG tablet Take 1 tablet (20 mg total) by mouth once daily. 2/2/23  Yes Abiel Ray MD   iron-vitamin C 100-250 mg, ICAR-C, 100-250 mg Tab Take 1 tablet by mouth. 12/29/22  Yes Historical Provider   LOPRESSOR 100 mg tablet Take 100 mg by mouth 2 (two) times daily. 1/9/23  Yes Historical Provider   magnesium oxide (MAG-OX) 400 mg (241.3 mg magnesium) tablet TAKE 2 TABLETS BY MOUTH THREE TIMES DAILY  Patient taking differently: Take 800 mg by mouth 2 (two) times daily. 6/28/22  Yes Carlos Barcenas MD   multivitamin Tab Take 1 tablet by mouth once daily. 5/6/21  Yes Celeste Yen MD   predniSONE (DELTASONE) 5 MG tablet Take 1 tablet (5 mg total) by mouth once daily. 1/17/23 1/17/24 Yes Celeste Yen MD   ramipriL (ALTACE) 5 MG capsule Take 5 mg by mouth once daily. 2/6/23  Yes Historical Provider   sodium bicarbonate 650 MG tablet Take 2 tablets (1,300 mg total) by mouth 2 (two) times daily. 2/24/22  Yes Jaimie Menon MD   sodium polystyrene (KAYEXALATE) powder Take 30 g by mouth every Mon, Wed, Fri. Mix 8 LEVEL TEASPOONS (30grams total) in 120mL water and drink by mouth once daily, then as DIRECTED BY TRANSPLANT. 2/10/23  Yes Celeste Yen MD   tacrolimus (PROGRAF) 1 MG Cap Take 1 capsule (1 mg total) by mouth every 12 (twelve) hours. Z94.0;Kidney txp on 5/4/21 2/10/23  Yes Celeste Yen MD   cinacalcet (SENSIPAR) 30 MG Tab Take 1 tablet (30 mg total) by mouth daily with breakfast. 2/9/23 2/11/23 Yes Celeste Yen MD   metoprolol tartrate (LOPRESSOR) 50 MG tablet Take 1 tablet (50 mg total) by mouth 2 (two) times daily.  Patient taking differently: Take 100 mg by mouth 2 (two) times daily. 2/23/22 2/11/23 Yes Jaimie Menon MD   amoxicillin-clavulanate 875-125mg (AUGMENTIN)  875-125 mg per tablet Take 875 mg by mouth. 9/27/21 2/11/23  Historical Provider   cloNIDine (CATAPRES) 0.1 MG tablet Take 1 tablet (0.1 mg total) by mouth every 8 (eight) hours as needed (Take 1 clonidine 0.1 mg by mouth every 8 hours as needed for systolic blood pressure greater than 170). 2/2/23 2/11/23  Abiel Ray MD   famotidine (PEPCID) 20 MG tablet Take 1 tablet (20 mg total) by mouth every evening. 5/5/21 5/5/22  Celeste Yen MD   lisinopriL (PRINIVIL,ZESTRIL) 2.5 MG tablet Take 1 tablet (2.5 mg total) by mouth once daily. 2/24/22 5/5/22  Jaimie Menon MD   valGANciclovir (VALCYTE) 450 mg Tab Take 2 tablets (900 mg total) by mouth 2 (two) times daily. 12/23/21 5/5/22  Celeste Yen MD         Current Facility-Administered Medications:     acetaminophen tablet 1,000 mg, 1,000 mg, Oral, Q6H PRN, Sherry Pozo MD    acetaminophen tablet 650 mg, 650 mg, Oral, Q4H PRN, Sherry Pozo MD    aluminum-magnesium hydroxide-simethicone 200-200-20 mg/5 mL suspension 30 mL, 30 mL, Oral, QID PRN, Sherry Pozo MD    apixaban tablet 5 mg, 5 mg, Oral, BID, Sherry Pozo MD    cloNIDine tablet 0.2 mg, 0.2 mg, Oral, TID PRN, Sherry Pozo MD    doxazosin tablet 2 mg, 2 mg, Oral, Daily, Sherry Pozo MD    ergocalciferol capsule 50,000 Units, 50,000 Units, Oral, Q7 Days, Sherry Pozo MD    finasteride tablet 5 mg, 5 mg, Oral, QHS, Sherry Pozo MD    fluticasone propionate 50 mcg/actuation nasal spray 50 mcg, 1 spray, Each Nostril, Daily PRN, Sherry Pozo MD    furosemide injection 40 mg, 40 mg, Intravenous, BID, Sherry Pozo MD    hydrALAZINE injection 20 mg, 20 mg, Intravenous, Q4H PRN, Sherry Pozo MD    labetaloL injection 10 mg, 10 mg, Intravenous, Q4H PRN, Sherry Pozo MD    lisinopriL tablet 20 mg, 20 mg, Oral, Daily, Sherry Pozo MD    magnesium oxide tablet 800 mg, 800 mg, Oral, BID, Sherry Pozo MD    melatonin tablet 6 mg, 6 mg, Oral, Nightly PRN, Sherry Pozo MD    metoprolol injection 5 mg, 5  "mg, Intravenous, Q6H PRN, Sherry Pozo MD    metoprolol tartrate (LOPRESSOR) tablet 100 mg, 100 mg, Oral, BID, Sherry Pozo MD    multivitamin tablet, 1 tablet, Oral, Daily, Sherry Pozo MD    NIFEdipine 24 hr tablet 60 mg, 60 mg, Oral, Daily, Sherry Pozo MD    omega 3-dha-epa-fish oil 1,000 mg (120 mg-180 mg) Cap 1 capsule, 1 capsule, Oral, Daily, Sherry Pozo MD    ondansetron injection 4 mg, 4 mg, Intravenous, Q4H PRN, Sherry Pozo MD    polyethylene glycol packet 17 g, 17 g, Oral, BID PRN, Sherry Pozo MD    predniSONE tablet 5 mg, 5 mg, Oral, Daily, Sherry Pozo MD    prochlorperazine injection Soln 5 mg, 5 mg, Intravenous, Q6H PRN, Sherry Pozo MD    senna-docusate 8.6-50 mg per tablet 2 tablet, 2 tablet, Oral, BID PRN, Sherry Pozo MD    simethicone chewable tablet 80 mg, 1 tablet, Oral, QID PRN, Sherry Pozo MD    sodium bicarbonate tablet 1,300 mg, 1,300 mg, Oral, BID, Sherry Pozo MD    sodium chloride 0.9% flush 10 mL, 10 mL, Intravenous, PRN, Sherry Pozo MD    [START ON 2/13/2023] sodium polystyrene 15 gram/60 mL suspension 30 g, 30 g, Oral, Every Mon, Wed, Fri, Sherry Pozo MD    tacrolimus capsule 1 mg, 1 mg, Oral, BID, Sherry Pozo MD    Active Hospital Problems    Diagnosis  POA    *Permanent atrial fibrillation [I48.21]  Yes    Acute CHF (congestive heart failure) [I50.9]  Yes      Resolved Hospital Problems   No resolved problems to display.       Blood pressure (!) 151/88, pulse 90, temperature 97.9 °F (36.6 °C), temperature source Oral, resp. rate 20, height 6' 2" (1.88 m), weight 89.5 kg (197 lb 5 oz), SpO2 95 %.         ROS    ROS  Constitutional: Negative for activity change, appetite change, chills and diaphoresis.   HENT: Negative for congestion, dental problem, drooling and ear discharge.    Eyes: Negative for pain, discharge and itching.   Respiratory:  Positive for shortness of breath.    Cardiovascular: Negative for chest pain.   Endocrine: Negative for cold intolerance and heat " intolerance.   Genitourinary: Negative for difficulty urinating, dyspareunia and dysuria.   Allergic/Immunologic: Negative for environmental allergies and food allergies.   Neurological: Negative for seizures, facial asymmetry, light-headedness, numbness and headaches.   All other systems reviewed and are negative.    Physical Exam  General Appearance:  Alert, cooperative, no distress, appears stated age   Head:  Normocephalic, without obvious abnormality, atraumatic   Eyes:  PERRL, conjunctiva/corneas clear, EOM's intact, fundi benign, both eyes   Ears:  Normal TM's and external ear canals, both ears   Nose: Nares normal, septum midline, mucosa normal, no drainage or sinus tenderness   Throat: Lips, mucosa, and tongue reece   Neck: Supple, symmetrical, trachea midline, no adenopathy, thyroid: not enlarged, symmetric, no tenderness/mass/nodules, no carotid bruit or JVD   Back:   Symmetric, ROM normal, no CVA tenderness   Lungs:   Clear to auscultation bilaterally, respirations unlabored   Chest Wall:  No tenderness or deformity   Heart:  Regular rate and rhythm, S1, S2 normal, no murmur, rub or gallop   Abdomen:   Soft, non-tender, bowel sounds active all four quadrants,  no masses, no organomegaly           Extremities: Extremities normal, atraumatic, no cyanosis or edema   Pulses: 2+ and symmetric   Skin: Skin color, texture, turgor normal, no rashes or lesions   Lymph nodes: Cervical, supraclavicular, and axillary nodes normal   Neurologic: No focal deficit       Inpatient Diagnostics:  Recent Results (from the past 24 hour(s))   COVID/FLU A&B PCR    Collection Time: 02/10/23  5:26 PM   Result Value Ref Range    Influenza A PCR Not Detected Not Detected    Influenza B PCR Not Detected Not Detected    SARS-CoV-2 PCR Not Detected Not Detected, Negative, Invalid   CBC with Differential    Collection Time: 02/10/23  6:06 PM   Result Value Ref Range    WBC 10.1 4.5 - 11.5 x10(3)/mcL    RBC 5.63 4.70 - 6.10 x10(6)/mcL     Hgb 17.9 14.0 - 18.0 gm/dL    Hct 55.3 (H) 42.0 - 52.0 %    MCV 98.2 (H) 80.0 - 94.0 fL    MCH 31.8 pg    MCHC 32.4 (L) 33.0 - 36.0 mg/dL    RDW 14.6 11.5 - 17.0 %    Platelet 116 (L) 130 - 400 x10(3)/mcL    MPV 10.1 7.4 - 10.4 fL    Neut % 58.9 %    Lymph % 31.5 %    Mono % 8.4 %    Eos % 0.5 %    Basophil % 0.3 %    Lymph # 3.18 0.6 - 4.6 x10(3)/mcL    Neut # 5.93 2.1 - 9.2 x10(3)/mcL    Mono # 0.85 0.1 - 1.3 x10(3)/mcL    Eos # 0.05 0 - 0.9 x10(3)/mcL    Baso # 0.03 0 - 0.2 x10(3)/mcL    IG# 0.04 0 - 0.04 x10(3)/mcL    IG% 0.4 %    NRBC% 0.0 %   Comprehensive Metabolic Panel    Collection Time: 02/10/23  6:06 PM   Result Value Ref Range    Sodium Level 141 136 - 145 mmol/L    Potassium Level 4.8 3.5 - 5.1 mmol/L    Chloride 107 98 - 107 mmol/L    Carbon Dioxide 23 23 - 31 mmol/L    Glucose Level 106 82 - 115 mg/dL    Blood Urea Nitrogen 20.4 8.4 - 25.7 mg/dL    Creatinine 1.50 (H) 0.73 - 1.18 mg/dL    Calcium Level Total 9.7 8.8 - 10.0 mg/dL    Protein Total 6.0 5.8 - 7.6 gm/dL    Albumin Level 3.7 3.4 - 4.8 g/dL    Globulin 2.3 (L) 2.4 - 3.5 gm/dL    Albumin/Globulin Ratio 1.6 1.1 - 2.0 ratio    Bilirubin Total 2.7 (H) <=1.5 mg/dL    Alkaline Phosphatase 95 40 - 150 unit/L    Alanine Aminotransferase 30 0 - 55 unit/L    Aspartate Aminotransferase 29 5 - 34 unit/L    eGFR 50 mls/min/1.73/m2   Lactic Acid, Plasma    Collection Time: 02/10/23  6:06 PM   Result Value Ref Range    Lactic Acid Level 2.8 (H) 0.5 - 2.2 mmol/L   Troponin I    Collection Time: 02/10/23  6:06 PM   Result Value Ref Range    Troponin-I <0.010 0.000 - 0.045 ng/mL   BNP    Collection Time: 02/10/23  6:06 PM   Result Value Ref Range    Natriuretic Peptide 1,516.5 (H) <=100.0 pg/mL   Magnesium    Collection Time: 02/10/23  6:06 PM   Result Value Ref Range    Magnesium Level 1.60 1.60 - 2.60 mg/dL   Lactic Acid, Plasma    Collection Time: 02/10/23  8:22 PM   Result Value Ref Range    Lactic Acid Level 1.8 0.5 - 2.2 mmol/L   Protein/Creatinine  Ratio, Urine    Collection Time: 02/11/23  2:10 AM   Result Value Ref Range    Urine Protein Level <6.8 mg/dL    Urine Creatinine 18.2 (L) 63.0 - 166.0 mg/dL   Urinalysis, Reflex to Urine Culture    Collection Time: 02/11/23  2:10 AM    Specimen: Urine   Result Value Ref Range    Color, UA Yellow Yellow, Light-Yellow, Dark Yellow, Mariela, Straw    Appearance, UA Clear Clear    Specific Gravity, UA 1.007 1.001 - 1.030    pH, UA 6.5 5.0 - 8.5    Protein, UA Negative Negative mg/dL    Glucose, UA Negative Negative, Normal mg/dL    Ketones, UA Negative Negative mg/dL    Blood, UA Negative Negative unit/L    Bilirubin, UA Negative Negative mg/dL    Urobilinogen, UA 0.2 0.2, 1.0, Normal mg/dL    Nitrites, UA Negative Negative    Leukocyte Esterase, UA Negative Negative unit/L   Urinalysis, Microscopic    Collection Time: 02/11/23  2:10 AM   Result Value Ref Range    RBC, UA <5 <=5 /HPF    WBC, UA <5 <=5 /HPF    Squamous Epithelial Cells, UA <5 <=5 /HPF    Bacteria, UA None Seen None Seen, Rare, Occasional /HPF           Patient Active Problem List    Diagnosis Date Noted    Acute CHF (congestive heart failure) 02/11/2023    Left arm cellulitis 12/31/2022    COVID 08/03/2022    Nontraumatic subdural hemorrhage, unspecified 08/02/2022    HTN (hypertension)     Nocardia infection 04/22/2022    Prediabetes 02/19/2022    Polycythemia vera 02/19/2022    Cardiomyopathy 02/18/2022    Hyponatremia 02/17/2022    Intracranial abscess 02/16/2022    Vasogenic brain edema 02/16/2022    Polycystic liver disease 01/07/2022    Hepatitis C virus infection cured after antiviral drug therapy     Chronic systolic heart failure 10/01/2021    Anasarca 10/01/2021    Drug-induced neutropenia 08/02/2021    Hyperglycemia 05/06/2021    Kidney replaced by transplant 05/05/2021    Long-term use of immunosuppressant medication 05/05/2021    Prophylactic immunotherapy 05/05/2021    At risk for opportunistic infections 05/04/2021    Permanent atrial  fibrillation 03/16/2018    Long term (current) use of anticoagulants--Eliquis 03/16/2018    Anemia of chronic disease     Benign prostatic hyperplasia      Assessment:  1. Congestive heart failure possible HFpEF with elevated BNP level of 1516  2. Permanent atrial fibrillation currently with controlled ventricular rate on Eliquis  3. Recent left upper extremity cellulitis  4. History of renal transplant            Plan:  1. Echocardiogram today to assess left ventricular function  2. Continue IV Lasix for diuretic treatment         Components of this note were documented using voice recognition systems; and are subject to errors not corrected at proof reading.  Please contact the author for any clarifications.     Samantha Heaton  2/11/2023

## 2023-02-11 NOTE — ED PROVIDER NOTES
Encounter Date: 2/10/2023    SCRIBE #1 NOTE: I, Roque Pritchett, am scribing for, and in the presence of,  Adair Mckee MD. I have scribed the following portions of the note - Other sections scribed: HPI, ROS, PE, MDM, EKG.     History     Chief Complaint   Patient presents with    Shortness of Breath     C/o SOB, LE swelling, and shaking that began last Thursday. Pt has hx of CHF and was hospitalized last week. Pt denies chest pain and fevers. Took Lasix for 5 days. Hx of kidney transplant in May 2021.      73 y/o male with a history of HTN, CHF, CKD, and A fib presents to the ED with shivering and SOB onset this afternoon. Pt reports that he was here at the ED last Thursday for left arm swelling after his left arm incision and drainage done on December 30. Pt also begin experiencing leg swelling about 8 weeks before the surgery. Notes that his medication dosage has increased recently, as he now takes 100mg low pressor 2 tables per day instead of 50. PCP Dr. Stephen Hartman, Cardiologist Dr. Jimmy Bass, Nephrologist Dr. Liam Ryan.     The history is provided by the patient and the spouse. No  was used.   Shortness of Breath  This is a new problem. Episode onset: today. The problem has been gradually worsening. Associated symptoms include leg swelling. Pertinent negatives include no fever, no headaches, no sore throat, no cough, no chest pain, no vomiting, no abdominal pain and no rash. Associated medical issues include heart failure and recent surgery.   Review of patient's allergies indicates:  No Known Allergies  Past Medical History:   Diagnosis Date    Anasarca 10/1/2021    Anemia of chronic disease     Atrial fibrillation     BPH (benign prostatic hypertrophy)     Chronic systolic heart failure 10/1/2021    CKD (chronic kidney disease) stage 2, GFR 60-89 ml/min     Hepatitis C virus infection cured after antiviral drug therapy     acquired through kidney transplant, treated / cured  (SVR12 - 2021)    HTN (hypertension)     HTN (hypertension)     Polycystic kidney disease      Past Surgical History:   Procedure Laterality Date    AV FISTULA PLACEMENT Left     CATARACT EXTRACTION, BILATERAL Bilateral     CRANIOTOMY Right 2022    Procedure: CRANIOTOMY;  Surgeon: Sunday Rosa DO;  Location: [facility] OR [address] [place] [address];  Service: Neurosurgery;  Laterality: Right;  R craniotomy for abscess drainage    INCISION AND DRAINAGE, UPPER EXTREMITY Left 2022    Procedure: INCISION AND DRAINAGE, UPPER EXTREMITY;  Surgeon: Elijah Davis Jr., MD;  Location: [facility];  Service: General;  Laterality: Left;    KIDNEY TRANSPLANT N/A 2021    Procedure: TRANSPLANT, KIDNEY;  Surgeon: Lexy Bolden MD;  Location: [facility] OR [address] [place] [address];  Service: Transplant;  Laterality: N/A;     Family History   Problem Relation Age of Onset    Polycystic kidney disease Father     Hypertension Father     Kidney disease Father     Polycystic kidney disease Sister     Hypertension Sister     Kidney disease Sister      Social History     Tobacco Use    Smoking status: Former     Packs/day: 2.00     Years: 10.00     Pack years: 20.00     Types: Cigarettes     Start date: 1972     Quit date: 1984     Years since quittin.2    Smokeless tobacco: Never   Substance Use Topics    Alcohol use: No     Comment: Pt reportsbeing a former beer drinker (2-3 cans per day) and quitting in .    Drug use: No     Review of Systems   Constitutional:  Positive for chills. Negative for fatigue and fever.   HENT:  Negative for congestion and sore throat.    Eyes:  Negative for visual disturbance.   Respiratory:  Positive for shortness of breath. Negative for cough.    Cardiovascular:  Positive for leg swelling. Negative for chest pain.   Gastrointestinal:  Negative for abdominal pain, diarrhea, nausea and vomiting.   Genitourinary:  Negative for dysuria.   Musculoskeletal:  Negative for myalgias.   Skin:  Negative for rash.    Neurological:  Negative for weakness, numbness and headaches.   All other systems reviewed and are negative.    Physical Exam     Initial Vitals [02/10/23 1718]   BP Pulse Resp Temp SpO2   (!) 179/129 (!) 136 (!) 22 97.7 °F (36.5 °C) 95 %      MAP       --         Physical Exam    Nursing note and vitals reviewed.  Constitutional: He appears well-developed and well-nourished.   Shivering  Mildly-ill appearing   HENT:   Head: Normocephalic and atraumatic.   Mouth/Throat: Oropharynx is clear and moist.   Eyes: Pupils are equal, round, and reactive to light.   Neck: Neck supple.   Normal range of motion.  Cardiovascular:  Regular rhythm, normal heart sounds and intact distal pulses.   Tachycardia present.         Pulmonary/Chest: Breath sounds normal. No respiratory distress.   Abdominal: Abdomen is soft. Bowel sounds are normal. There is no abdominal tenderness. There is no rebound and no guarding.   Musculoskeletal:         General: No tenderness. Normal range of motion.      Cervical back: Normal range of motion and neck supple.      Comments: AV fistula to left upper forearm  2+ lower extremity edema     Neurological: He is alert and oriented to person, place, and time. He has normal strength. No sensory deficit. GCS score is 15. GCS eye subscore is 4. GCS verbal subscore is 5. GCS motor subscore is 6.   Skin: Skin is warm and dry. Capillary refill takes less than 2 seconds.   Open wound with packing to the left upper arm   Psychiatric: He has a normal mood and affect.       ED Course   Procedures  Labs Reviewed   CBC WITH DIFFERENTIAL - Abnormal; Notable for the following components:       Result Value    Hct 55.3 (*)     MCV 98.2 (*)     MCHC 32.4 (*)     Platelet 116 (*)     All other components within normal limits   COMPREHENSIVE METABOLIC PANEL - Abnormal; Notable for the following components:    Creatinine 1.50 (*)     Globulin 2.3 (*)     Bilirubin Total 2.7 (*)     All other components within normal  limits   LACTIC ACID, PLASMA - Abnormal; Notable for the following components:    Lactic Acid Level 2.8 (*)     All other components within normal limits   B-TYPE NATRIURETIC PEPTIDE - Abnormal; Notable for the following components:    Natriuretic Peptide 1,516.5 (*)     All other components within normal limits   TROPONIN I - Normal   COVID/FLU A&B PCR - Normal    Narrative:     The Xpert Xpress SARS-CoV-2/FLU/RSV plus is a rapid, multiplexed real-time PCR test intended for the simultaneous qualitative detection and differentiation of SARS-CoV-2, Influenza A, Influenza B, and respiratory syncytial virus (RSV) viral RNA in either nasopharyngeal swab or nasal swab specimens.         MAGNESIUM - Normal   LACTIC ACID, PLASMA - Normal   BLOOD CULTURE OLG   BLOOD CULTURE OLG     EKG Readings: (Independently Interpreted)   Initial Reading: No STEMI. Rhythm: Atrial Fibrillation. Heart Rate: 144. Ectopy: No Ectopy. Conduction: Normal. Axis: Normal. Clinical Impression: Atrial Fibrillation with RVR   Time: 17:21  Non-specific ST-T wave changes     Imaging Results              X-Ray Chest 1 View (Final result)  Result time 02/10/23 17:41:42      Final result by Devin Mendoza MD (02/10/23 17:41:42)                   Narrative:    EXAMINATION  XR CHEST 1 VIEW    CLINICAL HISTORY  sob;    TECHNIQUE  A total of 1 frontal image(s) submitted of the chest.    COMPARISON  2 February 2023    FINDINGS  Lines/tubes/devices: None visualized.    The cardiac silhouette and central vascular structures are unchanged.  The trachea is midline. Irregular hazy opacification of the right infrahilar lung is again noted, with similar blunting of the costophrenic angle.  Subtle left basilar infiltrate is improved.  Upper lung zones remain clear.  There is no convincing pneumothorax.  There is no large pleural effusion or convincing pneumothorax.    Regional osseous structures and extrathoracic soft tissues are similar.    IMPRESSION  1. Persistent  right infrahilar infiltrate and small pleural effusion.  2. Improved left basilar aeration.      Electronically signed by: Devin Mendoza  Date:    02/10/2023  Time:    17:41                                     Medications   metoprolol injection 5 mg (5 mg Intravenous Given 2/10/23 1835)   furosemide injection 40 mg (has no administration in time range)   0.9%  NaCl infusion (0 mLs Intravenous Stopped 2/10/23 2128)     Medical Decision Making:   Clinical Tests:   Lab Tests: Ordered and Reviewed  Radiological Study: Ordered and Reviewed    Medical Decision Making  The differential diagnosis includes, but is not limited to: sepsis, pneumonia, COVID-19, influenza, CHF, renal failure, and volume overload.    Amount and/or Complexity of Data Reviewed  Independent Historian: spouse  External Data Reviewed: notes.     Details: Reviewed prior notes from visit last week, wearing patient had dosage adjustments of Lasix and was discharged.  Labs: ordered.     Details: Patient with elevated BNP of 1500 which is higher than last week.  Normal renal function tests, normal troponin.  Normal white blood cell count.  Initial lactic acid slightly elevated above 2, but improved on repeat.  Radiology: ordered.  ECG/medicine tests: independent interpretation performed. Decision-making details documented in ED Course.  Discussion of management or test interpretation with external provider(s): Discussed with hospitalist.  Will admit, continue diuresis, rate controlled with Cardiology consultation    Risk  Decision regarding hospitalization.  Risk Details: Patient given IV Lopressor for rate control while in the ED for improvement.  Admission discussed with hospitalist             [place] Attestation:   Scribe #1: I performed the above scribed service and the documentation accurately describes the services I performed. I attest to the accuracy of the note.    Attending Attestation:           Physician Attestation for Scribe:  Physician  Attestation Statement for Scribe #1: I, Adair Mckee MD, reviewed documentation, as scribed by Roque Pritchett in my presence, and it is both accurate and complete.           ED Course as of 02/10/23 2144   Fri Feb 10, 2023   2141 Patient with elevated heart rate, will need to control with IV Lopressor  Heart rate between 100 and 110 after 3 doses [MP]      ED Course User Index  [MP] Adair Mckee MD                 Clinical Impression:   Final diagnoses:  [R06.02] SOB (shortness of breath)  [I48.91] Atrial fibrillation with rapid ventricular response (Primary)  [I50.9] Congestive heart failure, unspecified HF chronicity, unspecified heart failure type  [Z94.0] Renal transplant recipient        ED Disposition Condition    Admit Stable                Adair Mckee MD  02/10/23 2144

## 2023-02-11 NOTE — NURSING
Nurses Note -- 4 Eyes      2/11/2023   3:09 AM      Skin assessed during: Admit      [x] No Pressure Injuries Present    []Prevention Measures Documented      [] Yes- Altered Skin Integrity Present or Discovered   [] LDA Added if Not in Epic (Describe Wound)   [] New Altered Skin Integrity was Present on Admit and Documented in LDA   [] Wound Image Taken    Wound Care Consulted? No    Attending Nurse:  Tiara Mccartney RN     Second RN/Staff Member:  Michelle Zuluaga RN

## 2023-02-11 NOTE — CONSULTS
[facility]    Nephrology Consultation  Patient Name: Ronal Mazariegos  Age: 72 y.o.  : 1950  MRN: [id number]  Admission Date: 2/10/2023    Date of Consultation: 2023    Consultation Requested By: Adair Mckee MD    Reason for Consultation: DEBORAH    Chief Complaint: Shortness of Breath (C/o SOB, LE swelling, and shaking that began last Thursday. Pt has hx of CHF and was hospitalized last week. Pt denies chest pain and fevers. Took Lasix for 5 days. Hx of kidney transplant in May 2021. )      History of Present Illness:  Mr Ronal Mazariegos is a 72 y.o. White male with past medical history of end-stage renal disease secondary to polycystic kidney disease, status post kidney transplant 2021, CMV viremia heart preserved ejection fraction, BPH, and paroxysmal atrial fibrillation.  Patient is followed by Dr. Ryan nephrology care as well as by [facility] in [place].  Patient presented to the emergency department on 02/10/2023 with complaints of worsening lower extremity swelling, shortness of breath and abdominal distention.  Additional symptoms include weakness and palpitation.  Patient denied fever, chills, chest pain or cough.  He can not recall definitive precipitating or alleviating factors.  On admission, patient was noted to be tachycardic and hypertensive, was admitted to hospitalist service and treated with intravenous metoprolol.   Nephrology was consulted for assistance with management of CKD.    Patient has No Known Allergies.     Review of Systems   Constitutional:  Positive for fatigue.   HENT: Negative.     Eyes: Negative.    Respiratory:  Positive for shortness of breath.    Cardiovascular:  Positive for palpitations and leg swelling.   Gastrointestinal: Negative.    Endocrine: Negative.    Genitourinary: Negative.    Musculoskeletal: Negative.    Allergic/Immunologic: Negative.    Neurological: Negative.    Hematological:  "Negative.    Psychiatric/Behavioral: Negative.       Past Medical History:  has a past medical history of Atrial fibrillation, BPH (benign prostatic hypertrophy), Chronic kidney disease, stage 3a, in kindney transplant (5/14/2021), Chronic systolic heart failure, H/O disseminated/cerebral nocardiosis, Hepatitis C virus infection cured after antiviral drug therapy, HTN (hypertension), and Polycystic kidney disease.    Procedure History:  has a past surgical history that includes AV fistula placement (Left, 2016); Cataract extraction, bilateral (Bilateral); Kidney transplant (N/A, 5/4/2021); Craniotomy (Right, 2/16/2022); and incision and drainage, upper extremity (Left, 12/30/2022).    Family History: family history includes Hypertension in his father and sister; Kidney disease in his father and sister; Polycystic kidney disease in his father and sister.    Social History:  reports that he quit smoking about 38 years ago. His smoking use included cigarettes. He started smoking about 50 years ago. He has a 20.00 pack-year smoking history. He has never used smokeless tobacco. He reports that he does not drink alcohol and does not use drugs.    Physical Exam:   BP (!) 151/88   Pulse 90   Temp 97.9 °F (36.6 °C) (Oral)   Resp 20   Ht 6' 2" (1.88 m)   Wt 89.5 kg (197 lb 5 oz)   SpO2 95%   BMI 25.33 kg/m²  Body mass index is 25.33 kg/m².  General appearance: Patient is in no acute distress.  Skin:  Ecchymoses to bilateral upper extremities  HEENT: PERRLA, EOMI, no scleral icterus, no JVD. Neck is supple.  Chest: Respirations are unlabored. Lungs sounds are clear.   Heart: S1, S2.   Abdomen: Benign.  : Deferred.  Extremities:  Bilateral lower extremity pitting 2+ edema, peripheral pulses are palpable.  Left forearm AV fistula with audible bruit and palpable thrill  Neuro: No focal deficits.       Inpatient Medications:     Current Facility-Administered Medications:     acetaminophen tablet 1,000 mg, 1,000 mg, Oral, " Q6H PRN, Sherry Pozo MD    acetaminophen tablet 650 mg, 650 mg, Oral, Q4H PRN, Sherry Pozo MD    aluminum-magnesium hydroxide-simethicone 200-200-20 mg/5 mL suspension 30 mL, 30 mL, Oral, QID PRN, Sherry Pozo MD    apixaban tablet 5 mg, 5 mg, Oral, BID, Sherry Pozo MD, 5 mg at 02/11/23 0836    cloNIDine tablet 0.2 mg, 0.2 mg, Oral, TID PRN, Sherry Pozo MD    doxazosin tablet 2 mg, 2 mg, Oral, Daily, Sherry Pozo MD, 2 mg at 02/11/23 0836    ergocalciferol capsule 50,000 Units, 50,000 Units, Oral, Q7 Days, Sherry Pozo MD, 50,000 Units at 02/11/23 0836    finasteride tablet 5 mg, 5 mg, Oral, QHS, Sherry Pozo MD    fluticasone propionate 50 mcg/actuation nasal spray 50 mcg, 1 spray, Each Nostril, Daily PRN, Sherry Pozo MD    furosemide injection 40 mg, 40 mg, Intravenous, BID, Sherry Pozo MD, 40 mg at 02/11/23 0839    hydrALAZINE injection 20 mg, 20 mg, Intravenous, Q4H PRN, Sherry Pozo MD    labetaloL injection 10 mg, 10 mg, Intravenous, Q4H PRN, Sherry Pozo MD    lisinopriL tablet 20 mg, 20 mg, Oral, Daily, Sherry Pozo MD, 20 mg at 02/11/23 0828    magnesium oxide tablet 800 mg, 800 mg, Oral, BID, Sherry Pozo MD, 800 mg at 02/11/23 0829    melatonin tablet 6 mg, 6 mg, Oral, Nightly PRN, Sherry Pozo MD    metoprolol injection 5 mg, 5 mg, Intravenous, Q6H PRN, Sherry Pozo MD    metoprolol tartrate (LOPRESSOR) tablet 100 mg, 100 mg, Oral, BID, Sherry Pozo MD, 100 mg at 02/11/23 0835    multivitamin tablet, 1 tablet, Oral, Daily, Sherry Pozo MD, 1 tablet at 02/11/23 0838    NIFEdipine 24 hr tablet 60 mg, 60 mg, Oral, Daily, Sherry Pozo MD, 60 mg at 02/11/23 0835    omega 3-dha-epa-fish oil 1,000 mg (120 mg-180 mg) Cap 1 capsule, 1 capsule, Oral, Daily, Sherry Pozo MD, 1 capsule at 02/11/23 0837    ondansetron injection 4 mg, 4 mg, Intravenous, Q4H PRN, Sherry Pozo MD    polyethylene glycol packet 17 g, 17 g, Oral, BID PRN, Sherry Pozo MD    predniSONE tablet 5 mg, 5 mg, Oral, Daily, Sherry Pozo MD,  5 mg at 02/11/23 0837    prochlorperazine injection Soln 5 mg, 5 mg, Intravenous, Q6H PRN, Sherry Pozo MD    senna-docusate 8.6-50 mg per tablet 2 tablet, 2 tablet, Oral, BID PRN, Sherry Pozo MD    simethicone chewable tablet 80 mg, 1 tablet, Oral, QID PRN, Sherry Pozo MD    sodium bicarbonate tablet 1,300 mg, 1,300 mg, Oral, BID, Sherry Pozo MD, 1,300 mg at 02/11/23 0837    sodium chloride 0.9% flush 10 mL, 10 mL, Intravenous, PRN, Sherry Pozo MD    [START ON 2/13/2023] sodium polystyrene 15 gram/60 mL suspension 30 g, 30 g, Oral, Every Mon, Wed, Fri, Sherry Pozo MD    tacrolimus capsule 1 mg, 1 mg, Oral, BID, Sherry Pozo MD, 1 mg at 02/11/23 0836     Imaging:  Reviewed    Laboratory Data:   Lab Results   Component Value Date    WBC 9.5 02/11/2023    HGB 16.7 02/11/2023    HCT 50.7 02/11/2023    PLT 91 (L) 02/11/2023     Lab Results   Component Value Date     02/11/2023    K 4.4 02/11/2023    CHLORIDE 108 (H) 02/11/2023    CO2 22 (L) 02/11/2023    BUN 19.0 02/11/2023    CREATININE 1.22 (H) 02/11/2023    EGFRNORACEVR >60 02/11/2023    GLUCOSE 94 02/11/2023    CALCIUM 9.2 02/11/2023    ALKPHOS 76 02/11/2023    LABPROT 4.9 (L) 02/11/2023    ALBUMIN 3.0 (L) 02/11/2023    BILIDIR 0.4 11/08/2022    IBILI 0.60 11/08/2022    AST 22 02/11/2023    ALT 21 02/11/2023    MG 1.90 02/11/2023    PHOS 3.0 02/11/2023      Lab Results   Component Value Date    COLORUA Yellow 02/11/2023    APPEARANCEUA Clear 02/11/2023    SGUA 1.007 02/11/2023    PHUA 6.5 02/11/2023    PROTEINUA Negative 02/11/2023    GLUCOSEUA Negative 02/11/2023    KETONESUA Negative 02/11/2023    BLOODUA Negative 02/11/2023    NITRITESUA Negative 02/11/2023    LEUKOCYTESUR Negative 02/11/2023    RBCUA <5 02/11/2023    WBCUA <5 02/11/2023    BACTERIA None Seen 02/11/2023    HYALINECASTS 0 05/20/2021    CREATRANDUR 18.2 (L) 02/11/2023    PROTEINURINE <6.8 02/11/2023    UPROTCREA 0.1 11/08/2022      Lab Results   Component Value Date    .1 (H)  01/01/2023    MYZAASKO04RB 22.7 (L) 12/27/2022       Lab Results   Component Value Date    IRON 29 (L) 01/01/2023    TIBC 234 (L) 01/01/2023    TRANS 298 10/25/2022    LABIRON 12 (L) 01/01/2023    FERRITIN 180.90 01/01/2023     05/04/2021       Lab Results   Component Value Date    HEPBCAB Negative 05/04/2021         Impression:   Chronic allograft nephropathy   Polycystic kidney disease--> ESRD, status post kidney transplant in May of 2021  Metabolic acidosis  Volume overload   Immunosuppressed state  Atrial fibrillation  Heart failure with reduced ejection fraction  Hypertension  Chronic thrombocytopenia  Hepatitis-C  BPH  History of CMV viremia    Plan:   Urinary output has been excellent at current dose of furosemide, patient reports symptomatic improvement since yesterday.  Renal indices improved as well.  Ventricular response is controlled, vital signs have been stable.  Continue current antihypertensive regimen, furosemide at current dose, tacrolimus and prednisone for immunosuppression and sodium bicarbonate for management of metabolic acidosis.    Thank you very much for your consultation.     Zuleima Gomes NP  [facility] Neph[facility]  2/11/2023 10:27 AM

## 2023-02-12 VITALS
RESPIRATION RATE: 18 BRPM | HEART RATE: 89 BPM | OXYGEN SATURATION: 97 % | DIASTOLIC BLOOD PRESSURE: 81 MMHG | BODY MASS INDEX: 23.88 KG/M2 | SYSTOLIC BLOOD PRESSURE: 128 MMHG | HEIGHT: 74 IN | TEMPERATURE: 98 F | WEIGHT: 186.06 LBS

## 2023-02-12 LAB
ANION GAP SERPL CALC-SCNC: 11 MEQ/L
BASOPHILS # BLD AUTO: 0.03 X10(3)/MCL (ref 0–0.2)
BASOPHILS NFR BLD AUTO: 0.5 %
BUN SERPL-MCNC: 19.3 MG/DL (ref 8.4–25.7)
CALCIUM SERPL-MCNC: 9.1 MG/DL (ref 8.8–10)
CHLORIDE SERPL-SCNC: 105 MMOL/L (ref 98–107)
CO2 SERPL-SCNC: 21 MMOL/L (ref 23–31)
CREAT SERPL-MCNC: 1.05 MG/DL (ref 0.73–1.18)
CREAT/UREA NIT SERPL: 18
EOSINOPHIL # BLD AUTO: 0.1 X10(3)/MCL (ref 0–0.9)
EOSINOPHIL NFR BLD AUTO: 1.5 %
ERYTHROCYTE [DISTWIDTH] IN BLOOD BY AUTOMATED COUNT: 14.4 % (ref 11.5–17)
GFR SERPLBLD CREATININE-BSD FMLA CKD-EPI: >60 MLS/MIN/1.73/M2
GLUCOSE SERPL-MCNC: 90 MG/DL (ref 82–115)
HCT VFR BLD AUTO: 50.1 % (ref 42–52)
HGB BLD-MCNC: 16.7 GM/DL (ref 14–18)
IMM GRANULOCYTES # BLD AUTO: 0.02 X10(3)/MCL (ref 0–0.04)
IMM GRANULOCYTES NFR BLD AUTO: 0.3 %
LYMPHOCYTES # BLD AUTO: 2.78 X10(3)/MCL (ref 0.6–4.6)
LYMPHOCYTES NFR BLD AUTO: 42.6 %
MAGNESIUM SERPL-MCNC: 1.6 MG/DL (ref 1.6–2.6)
MCH RBC QN AUTO: 31.7 PG
MCHC RBC AUTO-ENTMCNC: 33.3 MG/DL (ref 33–36)
MCV RBC AUTO: 95.2 FL (ref 80–94)
MONOCYTES # BLD AUTO: 0.64 X10(3)/MCL (ref 0.1–1.3)
MONOCYTES NFR BLD AUTO: 9.8 %
NEUTROPHILS # BLD AUTO: 2.95 X10(3)/MCL (ref 2.1–9.2)
NEUTROPHILS NFR BLD AUTO: 45.3 %
NRBC BLD AUTO-RTO: 0 %
PHOSPHATE SERPL-MCNC: 2.7 MG/DL (ref 2.3–4.7)
PLATELET # BLD AUTO: 102 X10(3)/MCL (ref 130–400)
PMV BLD AUTO: 10 FL (ref 7.4–10.4)
POTASSIUM SERPL-SCNC: 4 MMOL/L (ref 3.5–5.1)
RBC # BLD AUTO: 5.26 X10(6)/MCL (ref 4.7–6.1)
SODIUM SERPL-SCNC: 137 MMOL/L (ref 136–145)
WBC # SPEC AUTO: 6.5 X10(3)/MCL (ref 4.5–11.5)

## 2023-02-12 PROCEDURE — 84100 ASSAY OF PHOSPHORUS: CPT | Performed by: INTERNAL MEDICINE

## 2023-02-12 PROCEDURE — 83735 ASSAY OF MAGNESIUM: CPT | Performed by: INTERNAL MEDICINE

## 2023-02-12 PROCEDURE — 25000003 PHARM REV CODE 250: Performed by: INTERNAL MEDICINE

## 2023-02-12 PROCEDURE — 63600175 PHARM REV CODE 636 W HCPCS: Performed by: INTERNAL MEDICINE

## 2023-02-12 PROCEDURE — 85025 COMPLETE CBC W/AUTO DIFF WBC: CPT | Performed by: INTERNAL MEDICINE

## 2023-02-12 PROCEDURE — 80048 BASIC METABOLIC PNL TOTAL CA: CPT | Performed by: INTERNAL MEDICINE

## 2023-02-12 RX ORDER — FUROSEMIDE 40 MG/1
40 TABLET ORAL DAILY
Status: DISCONTINUED | OUTPATIENT
Start: 2023-02-12 | End: 2023-02-12 | Stop reason: HOSPADM

## 2023-02-12 RX ORDER — PREDNISONE 20 MG/1
40 TABLET ORAL DAILY
Qty: 6 TABLET | Refills: 0 | Status: SHIPPED | OUTPATIENT
Start: 2023-02-12 | End: 2023-02-15

## 2023-02-12 RX ORDER — FUROSEMIDE 40 MG/1
40 TABLET ORAL DAILY
Qty: 30 TABLET | Refills: 0 | Status: SHIPPED | OUTPATIENT
Start: 2023-02-13 | End: 2023-07-12 | Stop reason: DRUGHIGH

## 2023-02-12 RX ADMIN — FUROSEMIDE 40 MG: 40 TABLET ORAL at 08:02

## 2023-02-12 RX ADMIN — APIXABAN 5 MG: 5 TABLET, FILM COATED ORAL at 08:02

## 2023-02-12 RX ADMIN — METOPROLOL TARTRATE 100 MG: 50 TABLET, FILM COATED ORAL at 08:02

## 2023-02-12 RX ADMIN — OMEGA-3 FATTY ACIDS CAP 1000 MG 1 CAPSULE: 1000 CAP at 08:02

## 2023-02-12 RX ADMIN — Medication 800 MG: at 08:02

## 2023-02-12 RX ADMIN — PREDNISONE 5 MG: 5 TABLET ORAL at 08:02

## 2023-02-12 RX ADMIN — DOXAZOSIN 2 MG: 1 TABLET ORAL at 08:02

## 2023-02-12 RX ADMIN — SODIUM BICARBONATE 1300 MG: 650 TABLET ORAL at 08:02

## 2023-02-12 RX ADMIN — THERA TABS 1 TABLET: TAB at 08:02

## 2023-02-12 RX ADMIN — LISINOPRIL 20 MG: 10 TABLET ORAL at 08:02

## 2023-02-12 RX ADMIN — TACROLIMUS 1 MG: 1 CAPSULE ORAL at 08:02

## 2023-02-12 NOTE — PROGRESS NOTES
[facility]  Hospital Medicine Progress Note        Chief Complaint: Inpatient Follow-up for SOB    HPI:   This is a 72-year-old male with medical history notable for CKD 3a in a kidney transplant on prednisone and tacrolimus, NICM/Chronic HFrEF (last EF 50% February 2022), chronic persistent atrial fibrillation on Eliquis, hypertension and BPH.     Present to the ED with chief complaint of worsening shortness of breath and bilateral lower extremity swelling and that progressively getting worse over the past 6 weeks.Report his symptoms gradually started after his recent hospitalization in December 2022. Report dyspnea on minimal exertion and increasing bilateral lower extremity edema, abdominal distention and bloating, and over the past week he noted his blood pressure has been high and was feeling generally weak.  Denies cough, chest pain, fever or chills.  He visited the ED on 02/02/2023 was given Lasix 40 mg IV and was noted to be in AFib with RVR and received 1 dose of IV Cardizem and was discharged home with Lasix 20 mg daily for 5 days.  Report he followed up with his nephrologist and his metoprolol tartrate was increased to 100 mg twice daily, and doxazosin was added.  Report his symptom did not improve, his dyspnea worsened as well as his generalized body edema prompted him to return to the ED.      On arrival to ED he was tachycardic, tachypneic and hypertensive, EKG with AFib and RVR, saturating 95% on room air.  Chest x-ray on my review small bilateral pleural effusions and prominent perihilar pulmonary vascular markings.  Labs notable for BNP 1516, troponin < 0.01, creatinine stable at 1.5, potassium 4.8, magnesium 1.6.     He was given metoprolol 5 mg IV for 3 doses, Lasix 40 mg IV.  His ventricular rate improved and had significant urine output over 1.5 L, referred to hospital medicine service for further evaluation and management.  Interval Hx:   Excellent response to IV  furosemide trial. Net -3.8 liter since admission. SpO2 98 to 96% on RA. BP control improved with volume removal . Will hold off on Nifedipine for now. De escalate lasix to 40 mg iv daily. Rate currently controlled. Creatinine improved to 1.2>1.5. Echo revealed LVEF 55-60%, severe LA enlargement , small pericardial effusion, no tamponade.     Objective/physical exam:  General: In no acute distress, afebrile  Chest: Clear to auscultation bilaterally  Heart: irregular rhythm, +S1, S2, no appreciable murmur  Abdomen: Soft, nontender, BS +  MSK: Warm, 1+ pitting  lower extremity / pedal edema, no clubbing or cyanosis  Neurologic: Alert and oriented x4, Cranial nerve II-XII intact, Strength 5/5 in all 4 extremities    VITAL SIGNS: 24 HRS MIN & MAX LAST   Temp  Min: 97.4 °F (36.3 °C)  Max: 98.4 °F (36.9 °C) 97.6 °F (36.4 °C)   BP  Min: 100/70  Max: 153/91 100/70   Pulse  Min: 80  Max: 109  90   Resp  Min: 18  Max: 29 18   SpO2  Min: 92 %  Max: 98 % 96 %       Recent Labs   Lab 02/07/23  0733 02/10/23  1806 02/11/23  0522   WBC 5.2 10.1 9.5   RBC 5.69 5.63 5.26   HGB 18.1* 17.9 16.7   HCT 57.0* 55.3* 50.7   .2* 98.2* 96.4*   MCH 31.8 31.8 31.7   MCHC 31.8* 32.4* 32.9*   RDW 14.9 14.6 14.6   * 116* 91*   MPV 10.1 10.1 10.6*       Recent Labs   Lab 02/07/23  0733 02/10/23  1806 02/11/23  0522    141 140   K 5.3* 4.8 4.4   CO2 26 23 22*   BUN 24.6 20.4 19.0   CREATININE 1.49* 1.50* 1.22*   CALCIUM 10.2* 9.7 9.2   MG 2.00 1.60 1.90   ALBUMIN 3.6 3.7 3.0*   ALKPHOS  --  95 76   ALT  --  30 21   AST  --  29 22   BILITOT  --  2.7* 3.3*          Microbiology Results (last 7 days)       Procedure Component Value Units Date/Time    Blood culture #1 **CANNOT BE ORDERED STAT** [[id number]]  (Normal) Collected: 02/10/23 1806    Order Status: Completed Specimen: Blood Updated: 02/11/23 1900     CULTURE, BLOOD ([facility]) No Growth At 24 Hours    Blood culture #2 **CANNOT BE ORDERED STAT** [[id number]]  (Normal) Collected:  02/10/23 1806    Order Status: Completed Specimen: Blood Updated: 02/11/23 1900     CULTURE, BLOOD ([facility]) No Growth At 24 Hours             See below for Radiology    Scheduled Med:   apixaban  5 mg Oral BID    doxazosin  2 mg Oral Daily    ergocalciferol  50,000 Units Oral Q7 Days    finasteride  5 mg Oral QHS    furosemide (LASIX) injection  40 mg Intravenous BID    lisinopriL  20 mg Oral Daily    magnesium oxide  800 mg Oral BID    metoprolol tartrate  100 mg Oral BID    multivitamin  1 tablet Oral Daily    omega 3-dha-epa-fish oil  1 capsule Oral Daily    predniSONE  5 mg Oral Daily    sodium bicarbonate  1,300 mg Oral BID    [START ON 2/13/2023] sodium polystyrene  30 g Oral Every Mon, Wed, Fri    tacrolimus  1 mg Oral BID        Continuous Infusions:       PRN Meds:  acetaminophen, acetaminophen, aluminum-magnesium hydroxide-simethicone, cloNIDine, fluticasone propionate, hydrALAZINE, labetalol, melatonin, metoprolol, ondansetron, polyethylene glycol, prochlorperazine, senna-docusate 8.6-50 mg, simethicone, sodium chloride 0.9%       Assessment/Plan:  Acute on chronic diastolic heart failure   Chronic persistent atrial fibrillation with RVR- currently CVR  Hypertensive urgency- improved   CKD 3A in kidney transplant-stable  Chronic thrombocytopenia-stable     HX Hepatitis-C acquired through kidney transplant-treated/SVR  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 Pt is currently net -3.8 L in the last 24h. BP control is much better with volume removal   De escalate lasix to 40 mg IV daily   Hold Nifedipine   Currently rate is controlled with BB  S.cr improved to 1.2>1.5 with diuretic treatment   Continue ACEi, Doxazosin   Monitor UOP, renal parameter   Continue strict I/O's, low sodium diet       Critical care diagnosis:     Acute on chronic diastolic heart failure require IV  Furosemide       Critical care interventions: Hands-on evaluation, review of labs/radiographs/records and discussion with patient and family if present  Critical care time spent: 35 minutes         VTE prophylaxis: Eliquis    Patient condition:  Fair     Anticipated discharge and Disposition:         All diagnosis and differential diagnosis have been reviewed; assessment and plan has been documented; I have personally reviewed the labs and test results that are presently available; I have reviewed the patients medication list; I have reviewed the consulting providers response and recommendations. I have reviewed or attempted to review medical records based upon their availability    All of the patient's questions have been  addressed and answered. Patient's is agreeable to the above stated plan. I will continue to monitor closely and make adjustments to medical management as needed.  _____________________________________________________________________    Nutrition Status:    Radiology:  Echo  · Mild-moderate Concentric hypertrophy and normal systolic function.  · The estimated ejection fraction is 55-60%.  · Normal right ventricular size with normal right ventricular systolic   function.  · Severe left atrial enlargement.  · Moderate right atrial enlargement.  · The estimated PA systolic pressure is 25 mmHg.  · Small circumferential pericardial effusion. No tamponade physiology.  · Indeterminate left ventricular diastolic function.         Ayaka Gonzales MD   02/11/2023

## 2023-02-12 NOTE — PROGRESS NOTES
Progress Note    Chief Complaint:  Permanent atrial fibrillation     History of present illness:  Mr. Mazariegos is resting comfortably on bedside chair this morning, with the wife in the room.  He is feeling much better with diuretic therapy and is back to his baseline.  He wished to be discharged today.    ROS  Constitutional: Negative for activity change, appetite change, chills and diaphoresis.   HENT: Negative for congestion, dental problem, drooling and ear discharge.    Eyes: Negative for pain, discharge and itching.   Respiratory: Negative for apnea, choking and chest tightness.    Cardiovascular: Negative for chest pain.   Endocrine: Negative for cold intolerance and heat intolerance.   Genitourinary: Negative for difficulty urinating, dyspareunia and dysuria.   Allergic/Immunologic: Negative for environmental allergies and food allergies.   Neurological: Negative for seizures, facial asymmetry, light-headedness, numbness and headaches.   All other systems reviewed and are negative.    Scheduled Meds:   apixaban  5 mg Oral BID    doxazosin  2 mg Oral Daily    ergocalciferol  50,000 Units Oral Q7 Days    finasteride  5 mg Oral QHS    furosemide  40 mg Oral Daily    lisinopriL  20 mg Oral Daily    magnesium oxide  800 mg Oral BID    metoprolol tartrate  100 mg Oral BID    multivitamin  1 tablet Oral Daily    omega 3-dha-epa-fish oil  1 capsule Oral Daily    predniSONE  5 mg Oral Daily    sodium bicarbonate  1,300 mg Oral BID    [START ON 2/13/2023] sodium polystyrene  30 g Oral Every Mon, Wed, Fri    tacrolimus  1 mg Oral BID     Continuous Infusions:  PRN Meds:acetaminophen, acetaminophen, aluminum-magnesium hydroxide-simethicone, cloNIDine, fluticasone propionate, hydrALAZINE, labetalol, melatonin, metoprolol, ondansetron, polyethylene glycol, prochlorperazine, senna-docusate 8.6-50 mg, simethicone, sodium chloride 0.9%    Objective:  Physical Exam:   /87   Pulse 100   Temp  "97.8 °F (36.6 °C) (Oral)   Resp 20   Ht 6' 2" (1.88 m)   Wt 84.4 kg (186 lb 1.1 oz)   SpO2 96%   BMI 23.89 kg/m²     General Appearance:  Alert, cooperative, no distress, appears stated age   Head:  Normocephalic, without obvious abnormality, atraumatic   Eyes:  PERRL, conjunctiva/corneas clear, EOM's intact, fundi benign, both eyes   Ears:  Normal TM's and external ear canals, both ears   Nose: Nares normal, septum midline, mucosa normal, no drainage or sinus tenderness   Throat: Lips, mucosa, and tongue normal; teeth and gums normal   Neck: Supple, symmetrical, trachea midline, no adenopathy, thyroid: not enlarged, symmetric, no tenderness/mass/nodules, no carotid bruit or JVD   Back:   Symmetric, no curvature, ROM normal, no CVA tenderness   Lungs:   Clear to auscultation bilaterally, respirations unlabored   Chest Wall:  No tenderness or deformity   Heart:  Regular rate and rhythm, S1, S2 normal, no murmur, rub or gallop   Abdomen:   Soft, non-tender, bowel sounds active all four quadrants,  no masses, no organomegaly           Extremities: Extremities normal, atraumatic, no cyanosis or edema   Pulses: 2+ and symmetric   Skin: Skin color, texture, turgor normal, no rashes or lesions   Lymph nodes: Cervical, supraclavicular, and axillary nodes normal   Neurologic: Normal         Cardiographics          Lab Review   Lab Results   Component Value Date     02/12/2023     11/30/2022    K 4.0 02/12/2023    K 5.5 (H) 11/30/2022     11/30/2022    CO2 21 (L) 02/12/2023    CO2 20 (L) 11/30/2022    BUN 19.3 02/12/2023    BUN 20 11/30/2022    CREATININE 1.05 02/12/2023    CREATININE 1.6 (H) 11/30/2022    GLUCOSE 90 02/12/2023    CALCIUM 9.1 02/12/2023    CALCIUM 9.9 11/30/2022     Lab Results   Component Value Date     02/12/2023     02/11/2023     02/10/2023     11/30/2022     08/23/2022     05/05/2022    K 4.0 02/12/2023    K 4.4 02/11/2023    K 4.8 02/10/2023    " K 5.5 (H) 11/30/2022    K 4.9 08/23/2022    K 5.5 (H) 05/05/2022    CO2 21 (L) 02/12/2023    CO2 22 (L) 02/11/2023    CO2 23 02/10/2023    CO2 20 (L) 11/30/2022    CO2 20 (L) 08/23/2022    CO2 23 05/05/2022    BUN 19.3 02/12/2023    BUN 19.0 02/11/2023    BUN 20.4 02/10/2023    BUN 20 11/30/2022    BUN 18 08/23/2022    BUN 23 05/05/2022    CREATININE 1.05 02/12/2023    CREATININE 1.22 (H) 02/11/2023    CREATININE 1.50 (H) 02/10/2023    CREATININE 1.6 (H) 11/30/2022    CREATININE 1.7 (H) 08/23/2022    CREATININE 1.9 (H) 05/05/2022    GLUCOSE 90 02/12/2023    GLUCOSE 94 02/11/2023    GLUCOSE 106 02/10/2023    CALCIUM 9.1 02/12/2023    CALCIUM 9.2 02/11/2023    CALCIUM 9.7 02/10/2023    CALCIUM 9.9 11/30/2022    CALCIUM 9.8 08/23/2022    CALCIUM 10.0 05/05/2022     Lab Results   Component Value Date    TROPONINI <0.010 02/10/2023       Problem/Plan    Patient Active Problem List   Diagnosis    Anemia of chronic disease    Benign prostatic hyperplasia    Permanent atrial fibrillation    Long term (current) use of anticoagulants--Eliquis    At risk for opportunistic infections    Kidney replaced by transplant    Long-term use of immunosuppressant medication    Prophylactic immunotherapy    Hyperglycemia    Drug-induced neutropenia    Chronic systolic heart failure    Anasarca    Hepatitis C virus infection cured after antiviral drug therapy    Polycystic liver disease    Intracranial abscess    Vasogenic brain edema    Hyponatremia    Cardiomyopathy    Prediabetes    Polycythemia vera    Nocardia infection    HTN (hypertension)    Nontraumatic subdural hemorrhage, unspecified    COVID    Left arm cellulitis    Acute CHF (congestive heart failure)       Assessment:  1. Congestive heart failure possible HFpEF with elevated BNP level of 1516  2. Permanent atrial fibrillation currently with controlled ventricular rate on Eliquis  3. Recent left upper extremity cellulitis  4. History of renal transplant             Plan:  1.  Echocardiogram showed normal left ventricular systolic function with ejection fraction of 60% with minimal pericardial effusion  2. Change IV Lasix to p.o. Lasix 40 mg p.o.Q.d.    May discharge home from a cardiac standpoint.  The patient had appointment to see Dr. Bass on Wednesday.       Components of this note were documented using voice recognition systems; and are subject to errors not corrected at proof reading.  Please contact the author for any clarifications.     Samantha Heaton  2/12/2023  7:59 AM

## 2023-02-12 NOTE — PROGRESS NOTES
"[facility]    Nephrology Progress Note    Patient Name: Ronal Mazariegos  Age: 72 y.o.  : 1950  MRN: [id number]  Admission Date: 2/10/2023    Chief complaint: Shortness of Breath (C/o SOB, LE swelling, and shaking that began last Thursday. Pt has hx of CHF and was hospitalized last week. Pt denies chest pain and fevers. Took Lasix for 5 days. Hx of kidney transplant in May 2021. )      Hospital course  Ronal Mazariegos is a 72 y.o. White male with past medical history of end-stage renal disease secondary to polycystic kidney disease, status post kidney transplant 2021, CMV viremia heart preserved ejection fraction, BPH, and paroxysmal atrial fibrillation.  Patient is followed by Dr. Ryan nephrology care as well as by [facility] in [place].  Patient presented to the emergency department on 02/10/2023 with complaints of worsening lower extremity swelling, shortness of breath and abdominal distention.  Additional symptoms include weakness and palpitation.  Patient denied fever, chills, chest pain or cough.  He can not recall definitive precipitating or alleviating factors.  On admission, patient was noted to be tachycardic and hypertensive, was admitted to hospitalist service and treated with intravenous metoprolol.   Nephrology was consulted for assistance with management of CKD.    Subjective  Reports feeling much better today, edema is improving, abdominal bloating resolved completely.  No palpitations.    Review of Systems  Cardiovascular:  Negative for chest pain   Respiratory: Negative for shortness of breath     Objective  /75   Pulse 78   Temp 97.5 °F (36.4 °C) (Oral)   Resp 16   Ht 6' 2" (1.88 m)   Wt 84.4 kg (186 lb 1.1 oz)   SpO2 96%   BMI 23.89 kg/m²     Intake/Output Summary (Last 24 hours) at 2023 0914  Last data filed at 2023 0500  Gross per 24 hour   Intake 2060 ml   Output 6925 ml   Net -4865 ml       Physical " Exam  General appearance: Patient is in no acute distress.  Skin: No rashes or wounds.  HEENT: PERRLA, EOMI, no scleral icterus, no JVD. Neck is supple.  Chest: Respirations are unlabored. Lungs sounds are clear.   Heart: S1, S2.   Abdomen: Benign.  : Deferred.  Extremities: BLE 2+ edema, peripheral pulses are palpable.   Neuro: No focal deficits.     Medications    Current Facility-Administered Medications:     acetaminophen tablet 1,000 mg, 1,000 mg, Oral, Q6H PRN, Sherry Pozo MD    acetaminophen tablet 650 mg, 650 mg, Oral, Q4H PRN, Sherry Pozo MD    aluminum-magnesium hydroxide-simethicone 200-200-20 mg/5 mL suspension 30 mL, 30 mL, Oral, QID PRN, Sherry Pozo MD    apixaban tablet 5 mg, 5 mg, Oral, BID, Sherry Pozo MD, 5 mg at 02/12/23 0853    cloNIDine tablet 0.2 mg, 0.2 mg, Oral, TID PRN, Sherry Pozo MD    doxazosin tablet 2 mg, 2 mg, Oral, Daily, Sherry Pozo MD, 2 mg at 02/12/23 0851    ergocalciferol capsule 50,000 Units, 50,000 Units, Oral, Q7 Days, Sherry Pozo MD, 50,000 Units at 02/11/23 0836    finasteride tablet 5 mg, 5 mg, Oral, QHS, Sherry Pozo MD, 5 mg at 02/11/23 2009    fluticasone propionate 50 mcg/actuation nasal spray 50 mcg, 1 spray, Each Nostril, Daily PRN, Sherry Pozo MD    furosemide tablet 40 mg, 40 mg, Oral, Daily, Samantha Heaton MD, 40 mg at 02/12/23 0851    hydrALAZINE injection 20 mg, 20 mg, Intravenous, Q4H PRN, Sherry Pozo MD    labetaloL injection 10 mg, 10 mg, Intravenous, Q4H PRN, Sherry Pozo MD    lisinopriL tablet 20 mg, 20 mg, Oral, Daily, Sherry Pozo MD, 20 mg at 02/12/23 0852    magnesium oxide tablet 800 mg, 800 mg, Oral, BID, Sherry Pozo MD, 800 mg at 02/12/23 0852    melatonin tablet 6 mg, 6 mg, Oral, Nightly PRN, Sherry Pozo MD    metoprolol injection 5 mg, 5 mg, Intravenous, Q6H PRN, Sherry Pozo MD    metoprolol tartrate (LOPRESSOR) tablet 100 mg, 100 mg, Oral, BID, Sherry Pozo MD, 100 mg at 02/12/23 0852    multivitamin tablet, 1 tablet, Oral,  Daily, Sherry Pozo MD, 1 tablet at 02/12/23 0852    omega 3-dha-epa-fish oil 1,000 mg (120 mg-180 mg) Cap 1 capsule, 1 capsule, Oral, Daily, Sherry Pozo MD, 1 capsule at 02/12/23 0852    ondansetron injection 4 mg, 4 mg, Intravenous, Q4H PRN, Sherry Pozo MD    polyethylene glycol packet 17 g, 17 g, Oral, BID PRN, Sherry Pozo MD    predniSONE tablet 5 mg, 5 mg, Oral, Daily, Sherry Pozo MD, 5 mg at 02/12/23 0852    prochlorperazine injection Soln 5 mg, 5 mg, Intravenous, Q6H PRN, Sherry Pozo MD    senna-docusate 8.6-50 mg per tablet 2 tablet, 2 tablet, Oral, BID PRN, Sherry Pozo MD    simethicone chewable tablet 80 mg, 1 tablet, Oral, QID PRN, Sherry Pozo MD    sodium bicarbonate tablet 1,300 mg, 1,300 mg, Oral, BID, Sherry Pozo MD, 1,300 mg at 02/12/23 0852    sodium chloride 0.9% flush 10 mL, 10 mL, Intravenous, PRN, Sherry Pozo MD    [START ON 2/13/2023] sodium polystyrene 15 gram/60 mL suspension 30 g, 30 g, Oral, Every Mon, Wed, Fri, Sherry Pozo MD    tacrolimus capsule 1 mg, 1 mg, Oral, BID, Sherry Pozo MD, 1 mg at 02/12/23 0851     Imaging:    Reviewed    Laboratory Data:  Lab Results   Component Value Date    WBC 6.5 02/12/2023    HGB 16.7 02/12/2023    HCT 50.1 02/12/2023     (L) 02/12/2023     Lab Results   Component Value Date     02/12/2023    K 4.0 02/12/2023    CHLORIDE 105 02/12/2023    CO2 21 (L) 02/12/2023    BUN 19.3 02/12/2023    CREATININE 1.05 02/12/2023    EGFRNORACEVR >60 02/12/2023    GLUCOSE 90 02/12/2023    CALCIUM 9.1 02/12/2023    ALKPHOS 76 02/11/2023    LABPROT 4.9 (L) 02/11/2023    ALBUMIN 3.0 (L) 02/11/2023    BILIDIR 0.4 11/08/2022    IBILI 0.60 11/08/2022    AST 22 02/11/2023    ALT 21 02/11/2023    MG 1.60 02/12/2023    PHOS 2.7 02/12/2023      Lab Results   Component Value Date    HGBA1C 5.9 (H) 02/17/2022    GLUCOSE 90 02/12/2023     Lab Results   Component Value Date    .1 (H) 01/01/2023    [id number] 22.7 (L) 12/27/2022       Lab Results    Component Value Date    IRON 29 (L) 01/01/2023    TIBC 234 (L) 01/01/2023    TRANS 298 10/25/2022    LABIRON 12 (L) 01/01/2023    FERRITIN 180.90 01/01/2023     05/04/2021       Lab Results   Component Value Date    HEPBCAB Negative 05/04/2021         Impression  Chronic allograft nephropathy   Polycystic kidney disease--> ESRD, status post kidney transplant in May of 2021  Metabolic acidosis  Volume overload   Immunosuppressed state  Atrial fibrillation  Heart failure with reduced ejection fraction  Hypertension  Chronic thrombocytopenia  Hepatitis-C  BPH  History of CMV viremia    Plan  Patient has responded very well to diuresis, renal indices have improved.  Agree with continuing furosemide 40 mg by mouth daily on discharge.  Would continue bicarbonate replacement as well.    Zuleima Gomes NP  [facility]  2/12/2023

## 2023-02-13 ENCOUNTER — PATIENT OUTREACH (OUTPATIENT)
Dept: ADMINISTRATIVE | Facility: CLINIC | Age: 73
End: 2023-02-13
Payer: MEDICARE

## 2023-02-13 NOTE — DISCHARGE SUMMARY
[facility]  Hospital Medicine Discharge Summary    Admit Date: 2/10/2023  Discharge Date and Time: 2/12/20239:50 PM  Admitting Physician:  Team  Discharging Physician: Ayaka Gonzales MD.  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     Present to the ED with chief complaint of worsening shortness of breath and bilateral lower extremity swelling and that progressively getting worse over the past 6 weeks.Report his symptoms gradually started after his recent hospitalization in December 2022. Report dyspnea on minimal exertion and increasing bilateral lower extremity edema, abdominal distention and bloating, and over the past week he noted his blood pressure has been high and was feeling generally weak.  Denies cough, chest pain, fever or chills.  He visited the ED on 02/02/2023 was given Lasix 40 mg IV and was noted to be in AFib with RVR and received 1 dose of IV Cardizem and was discharged home with Lasix 20 mg daily for 5 days.  Report he followed up with his nephrologist and his metoprolol tartrate was increased to 100 mg twice daily, and doxazosin was added.  Report his symptom did not improve, his dyspnea worsened as well as his generalized body edema prompted him to  return to the ED.      On arrival to ED he was tachycardic, tachypneic and hypertensive, EKG with AFib and RVR, saturating 95% on room air.  Chest x-ray on my review small bilateral pleural effusions and prominent perihilar pulmonary vascular markings.  Labs notable for BNP 1516, troponin < 0.01, creatinine stable at 1.5, potassium 4.8, magnesium 1.6.     He was given metoprolol 5 mg IV for 3 doses, Lasix 40 mg IV.  His ventricular rate improved and had significant urine output over 1.5 L, referred to hospital medicine service for further evaluation and management.    Excellent response to IV furosemide trial 40 mg bid. Net -3.8 liter since admission. SpO2 98 to 96% on RA. BP control improved with volume removal . Will hold off on Nifedipine for now. De escalate lasix to 40 mg iv daily. Rate currently controlled. Creatinine improved to 1.2>1.5. Echo revealed LVEF 55-60%, severe LA enlargement , small pericardial effusion, no tamponade.     Creatinine further improved to 1.0 with diuresis. Pt became near euvolemic. Lasix transitioned to oral 40 mg daily . Pt feels better and wants to go home except he has developed viral laryngitis with hoarseness of voice. Prednisone dose increased to 40 mg daily x 3 days , then resume maintenance dose 5 mg daily . Pt recently completed oral doxycyline and no further role of antibiotic treatment. Cardiology and Nephrology cleared pt for discharge . Follow up with PCP and primary Cardiologist.          Pt was seen and examined on the day of discharge  Vitals:  VITAL SIGNS: 24 HRS MIN & MAX LAST   Temp  Min: 97.5 °F (36.4 °C)  Max: 97.8 °F (36.6 °C) 97.8 °F (36.6 °C)   BP  Min: 111/76  Max: 134/87 128/81   Pulse  Min: 78  Max: 100  89   Resp  Min: 16  Max: 20 18   SpO2  Min: 95 %  Max: 97 % 97 %       Physical Exam:    General: In no acute distress, afebrile  Chest: Clear to auscultation bilaterally  Heart: irregular rhythm, +S1, S2, no appreciable murmur  Abdomen: Soft, nontender, BS  +  MSK: Warm, trace   lower extremity / pedal edema, no clubbing or cyanosis  Neurologic: Alert and oriented x4, Cranial nerve II-XII intact, Strength 5/5 in all 4 extremities      Procedures Performed: No admission procedures for hospital encounter.     Significant Diagnostic Studies: See Full reports for all details    Recent Labs   Lab 02/10/23  1806 02/11/23  0522 02/12/23  0355   WBC 10.1 9.5 6.5   RBC 5.63 5.26 5.26   HGB 17.9 16.7 16.7   HCT 55.3* 50.7 50.1   MCV 98.2* 96.4* 95.2*   MCH 31.8 31.7 31.7   MCHC 32.4* 32.9* 33.3   RDW 14.6 14.6 14.4   * 91* 102*   MPV 10.1 10.6* 10.0       Recent Labs   Lab 02/07/23  0733 02/10/23  1806 02/11/23  0522 02/12/23  0355    141 140 137   K 5.3* 4.8 4.4 4.0   CO2 26 23 22* 21*   BUN 24.6 20.4 19.0 19.3   CREATININE 1.49* 1.50* 1.22* 1.05   CALCIUM 10.2* 9.7 9.2 9.1   MG 2.00 1.60 1.90 1.60   ALBUMIN 3.6 3.7 3.0*  --    ALKPHOS  --  95 76  --    ALT  --  30 21  --    AST  --  29 22  --    BILITOT  --  2.7* 3.3*  --         Microbiology Results (last 7 days)       ** No results found for the last 168 hours. **             Echo  · Mild-moderate Concentric hypertrophy and normal systolic function.  · The estimated ejection fraction is 55-60%.  · Normal right ventricular size with normal right ventricular systolic   function.  · Severe left atrial enlargement.  · Moderate right atrial enlargement.  · The estimated PA systolic pressure is 25 mmHg.  · Small circumferential pericardial effusion. No tamponade physiology.  · Indeterminate left ventricular diastolic function.            Medication List        CHANGE how you take these medications      furosemide 40 MG tablet  Commonly known as: LASIX  Take 1 tablet (40 mg total) by mouth once daily.  Start taking on: February 13, 2023  What changed:   medication strength  how much to take     magnesium oxide 400 mg (241.3 mg magnesium) tablet  Commonly known as: MAG-OX  TAKE 2 TABLETS BY MOUTH THREE TIMES DAILY  What  changed: when to take this     * predniSONE 5 MG tablet  Commonly known as: DELTASONE  Take 1 tablet (5 mg total) by mouth once daily.  What changed: Another medication with the same name was added. Make sure you understand how and when to take each.     * predniSONE 20 MG tablet  Commonly known as: DELTASONE  Take 2 tablets (40 mg total) by mouth once daily. for 3 days  What changed: You were already taking a medication with the same name, and this prescription was added. Make sure you understand how and when to take each.           * This list has 2 medication(s) that are the same as other medications prescribed for you. Read the directions carefully, and ask your doctor or other care provider to review them with you.                CONTINUE taking these medications      apixaban 5 mg Tab  Commonly known as: ELIQUIS     doxazosin 2 MG tablet  Commonly known as: CARDURA     finasteride 5 mg tablet  Commonly known as: PROSCAR     fish oil-omega-3 fatty acids 300-1,000 mg capsule     fluticasone propionate 50 mcg/actuation nasal spray  Commonly known as: FLONASE     iron-vitamin C 100-250 mg (ICAR-C) 100-250 mg Tab     LOPRESSOR 100 MG tablet  Generic drug: metoprolol tartrate     multivitamin Tab  Take 1 tablet by mouth once daily.     ramipriL 5 MG capsule  Commonly known as: ALTACE     sodium bicarbonate 650 MG tablet  Take 2 tablets (1,300 mg total) by mouth 2 (two) times daily.     sodium polystyrene powder  Commonly known as: KAYEXALATE  Take 30 g by mouth every Mon, Wed, Fri. Mix 8 LEVEL TEASPOONS (30grams total) in 120mL water and drink by mouth once daily, then as DIRECTED BY TRANSPLANT.     tacrolimus 1 MG Cap  Commonly known as: PROGRAF  Take 1 capsule (1 mg total) by mouth every 12 (twelve) hours. Z94.0;Kidney txp on 5/4/21     VITAMIN D2 50,000 unit Cap  Generic drug: ergocalciferol  Take 1 capsule (50,000 Units total) by mouth every 7 days. X 12 months            STOP taking these medications       doxycycline 100 MG Cap  Commonly known as: VIBRAMYCIN               Where to Get Your Medications        These medications were sent to [facility]/pharmacy #[id number] - [place], [place] - [address]  [address] [place] [address] [address]      Phone: [phone]   furosemide 40 MG tablet  predniSONE 20 MG tablet          Explained in detail to the patient about the discharge plan, medications, and follow-up visits. Pt understands and agrees with the treatment plan  Discharge Disposition: Home or Self Care   Discharged Condition: stable  Diet-    Medications Per DC med rec  Activities as tolerated   Follow-up Information       Stephen Hartman MD. Schedule an appointment as soon as possible for a visit in 3 day(s).    Specialty: Family Medicine  Why: 6th Floor  will call you with an apppointment date and time!  Contact information:  [address] [address] [phone]               Jimmy Bass MD. Schedule an appointment as soon as possible for a visit.    Specialty: Cardiology  Why: Keep appointment with Dr. Bass as scheduled  Contact information:  [address] [place] [address]  [phone]               Liam Ryan MD. Go in 1 week(s).    Specialty: Nephrology  Why: 6th Floor  will call you Monday with your appointment date and time!  Contact information:  [address] [place] [address]  [phone]                           For further questions contact hospitalist office    Discharge time 33 minutes    For worsening symptoms, chest pain, shortness of breath, increased abdominal pain, high grade fever, stroke or stroke like symptoms, immediately go to the nearest Emergency Room or call 911 as soon as possible.      Ayaka Serna M.D on 2/12/2023. at 9:50 PM.

## 2023-02-13 NOTE — PROGRESS NOTES
C3 nurse spoke with Ronal Mazariegos for a TCC post hospital discharge follow up call. The patient has a scheduled [facility] appointment with Stephen Hartman MD ([facility]) on 2/15/2023 @ 0820. Pt states he spoke with his PCP today and they both discussed how pt would wait to see him until he followed up with cardiology.

## 2023-02-13 NOTE — ASSESSMENT & PLAN NOTE
Continue prograf at current dose   Check levels daily to assess for adverse events   Hold prednisone while on dexamethasone   Continue holding MMF      No

## 2023-02-14 ENCOUNTER — PATIENT MESSAGE (OUTPATIENT)
Dept: ADMINISTRATIVE | Facility: OTHER | Age: 73
End: 2023-02-14
Payer: MEDICARE

## 2023-02-14 ENCOUNTER — PATIENT MESSAGE (OUTPATIENT)
Dept: ADMINISTRATIVE | Facility: CLINIC | Age: 73
End: 2023-02-14
Payer: MEDICARE

## 2023-02-14 ENCOUNTER — TELEPHONE (OUTPATIENT)
Dept: ADMINISTRATIVE | Facility: CLINIC | Age: 73
End: 2023-02-14
Payer: MEDICARE

## 2023-02-15 ENCOUNTER — PATIENT MESSAGE (OUTPATIENT)
Dept: ADMINISTRATIVE | Facility: OTHER | Age: 73
End: 2023-02-15
Payer: MEDICARE

## 2023-02-15 LAB
BACTERIA BLD CULT: NORMAL
BACTERIA BLD CULT: NORMAL

## 2023-02-16 DIAGNOSIS — Z94.0 KIDNEY REPLACED BY TRANSPLANT: ICD-10-CM

## 2023-02-17 ENCOUNTER — LAB VISIT (OUTPATIENT)
Dept: LAB | Facility: HOSPITAL | Age: 73
End: 2023-02-17
Attending: INTERNAL MEDICINE
Payer: MEDICARE

## 2023-02-17 ENCOUNTER — PATIENT MESSAGE (OUTPATIENT)
Dept: TRANSPLANT | Facility: CLINIC | Age: 73
End: 2023-02-17
Payer: MEDICARE

## 2023-02-17 ENCOUNTER — TELEPHONE (OUTPATIENT)
Dept: TRANSPLANT | Facility: CLINIC | Age: 73
End: 2023-02-17
Payer: MEDICARE

## 2023-02-17 DIAGNOSIS — E87.5 HYPERKALEMIA: ICD-10-CM

## 2023-02-17 LAB — POTASSIUM SERPL-SCNC: 4.4 MMOL/L (ref 3.5–5.1)

## 2023-02-17 PROCEDURE — 36415 COLL VENOUS BLD VENIPUNCTURE: CPT

## 2023-02-17 PROCEDURE — 84132 ASSAY OF SERUM POTASSIUM: CPT

## 2023-02-17 RX ORDER — TACROLIMUS 1 MG/1
1 CAPSULE ORAL EVERY 12 HOURS
Qty: 60 CAPSULE | Refills: 11 | Status: SHIPPED | OUTPATIENT
Start: 2023-02-17 | End: 2023-11-09 | Stop reason: DRUGHIGH

## 2023-02-17 NOTE — TELEPHONE ENCOUNTER
Message sent to patient.----- Message from Celeste Yen MD sent at 2/17/2023  8:01 AM CST -----  K remains normal after bactrim stopped. No need for lokelma or kayexalate at this point. He needs to  follow with his nephrologist every couple months.   Lab in a month

## 2023-02-17 NOTE — PROGRESS NOTES
K remains normal after bactrim stopped. No need for lokelma or kayexalate at this point. He needs to  follow with his nephrologist every couple months.   Lab in a month

## 2023-02-24 ENCOUNTER — LAB VISIT (OUTPATIENT)
Dept: LAB | Facility: HOSPITAL | Age: 73
End: 2023-02-24
Attending: INTERNAL MEDICINE
Payer: MEDICARE

## 2023-02-24 DIAGNOSIS — Z79.899 IMMUNOSUPPRESSIVE MANAGEMENT ENCOUNTER FOLLOWING KIDNEY TRANSPLANT: ICD-10-CM

## 2023-02-24 DIAGNOSIS — T86.10 COMPLICATION OF TRANSPLANTED KIDNEY, UNSPECIFIED COMPLICATION: ICD-10-CM

## 2023-02-24 DIAGNOSIS — Z94.0 IMMUNOSUPPRESSIVE MANAGEMENT ENCOUNTER FOLLOWING KIDNEY TRANSPLANT: ICD-10-CM

## 2023-02-24 LAB
ALBUMIN SERPL-MCNC: 3.5 G/DL (ref 3.4–4.8)
BUN SERPL-MCNC: 16.5 MG/DL (ref 8.4–25.7)
CALCIUM SERPL-MCNC: 9.6 MG/DL (ref 8.8–10)
CHLORIDE SERPL-SCNC: 106 MMOL/L (ref 98–107)
CO2 SERPL-SCNC: 31 MMOL/L (ref 23–31)
CREAT SERPL-MCNC: 1.26 MG/DL (ref 0.73–1.18)
GFR SERPLBLD CREATININE-BSD FMLA CKD-EPI: >60 MLS/MIN/1.73/M2
GLUCOSE SERPL-MCNC: 96 MG/DL (ref 82–115)
PHOSPHATE SERPL-MCNC: 3 MG/DL (ref 2.3–4.7)
POTASSIUM SERPL-SCNC: 4.1 MMOL/L (ref 3.5–5.1)
SODIUM SERPL-SCNC: 139 MMOL/L (ref 136–145)

## 2023-02-24 PROCEDURE — 80069 RENAL FUNCTION PANEL: CPT

## 2023-02-24 PROCEDURE — 80197 ASSAY OF TACROLIMUS: CPT

## 2023-02-24 PROCEDURE — 36415 COLL VENOUS BLD VENIPUNCTURE: CPT

## 2023-02-25 LAB — TACROLIMUS TROUGH BLD-MCNC: 3.2 NG/ML

## 2023-02-27 ENCOUNTER — TELEPHONE (OUTPATIENT)
Dept: TRANSPLANT | Facility: CLINIC | Age: 73
End: 2023-02-27
Payer: MEDICARE

## 2023-02-27 NOTE — TELEPHONE ENCOUNTER
----- Message from Celeste Yen MD sent at 2/27/2023 12:53 PM CST -----  Tac to 1.5/1 mg if it is a true level. Check tac level in a week

## 2023-02-28 ENCOUNTER — PATIENT MESSAGE (OUTPATIENT)
Dept: TRANSPLANT | Facility: CLINIC | Age: 73
End: 2023-02-28
Payer: MEDICARE

## 2023-03-01 DIAGNOSIS — Z94.0 KIDNEY REPLACED BY TRANSPLANT: Primary | ICD-10-CM

## 2023-03-01 RX ORDER — TACROLIMUS 0.5 MG/1
0.5 CAPSULE ORAL EVERY MORNING
Qty: 90 CAPSULE | Refills: 3 | Status: SHIPPED | OUTPATIENT
Start: 2023-03-01 | End: 2023-11-09 | Stop reason: DRUGHIGH

## 2023-03-01 NOTE — TELEPHONE ENCOUNTER
Message sent to patient.----- Message from Celeste Yen MD sent at 2/27/2023 12:53 PM CST -----  Tac to 1.5/1 mg if it is a true level. Check tac level in a week

## 2023-03-01 NOTE — TELEPHONE ENCOUNTER
Message sent to patient. Patient will take 2/1 until 0.5mg arrives. Labs to be scheduled.----- Message from Celeste Yen MD sent at 3/1/2023 12:06 PM CST -----  Sure. Check tac level and K level next week.   ----- Message -----  From: Letha Rai RN  Sent: 3/1/2023  11:55 AM CST  To: Celeste Yen MD    I sent you a message regarding patient will not receive the 0.5mg for a few days. Did you want him to increase dose with the 1mg or keep the same until 0.5mg arrives?  ----- Message -----  From: Celeste Yen MD  Sent: 3/1/2023  11:21 AM CST  To: [facility]-Kidney Transplant Clinical    Did we address the tac level?

## 2023-03-02 ENCOUNTER — PATIENT MESSAGE (OUTPATIENT)
Dept: TRANSPLANT | Facility: CLINIC | Age: 73
End: 2023-03-02
Payer: MEDICARE

## 2023-03-07 ENCOUNTER — TELEPHONE (OUTPATIENT)
Dept: TRANSPLANT | Facility: CLINIC | Age: 73
End: 2023-03-07
Payer: MEDICARE

## 2023-03-07 ENCOUNTER — PATIENT MESSAGE (OUTPATIENT)
Dept: TRANSPLANT | Facility: CLINIC | Age: 73
End: 2023-03-07
Payer: MEDICARE

## 2023-03-07 ENCOUNTER — LAB VISIT (OUTPATIENT)
Dept: LAB | Facility: HOSPITAL | Age: 73
End: 2023-03-07
Attending: INTERNAL MEDICINE
Payer: MEDICARE

## 2023-03-07 DIAGNOSIS — E87.20 METABOLIC ACIDOSIS: ICD-10-CM

## 2023-03-07 DIAGNOSIS — D63.8 ANEMIA OF CHRONIC DISEASE: Primary | ICD-10-CM

## 2023-03-07 DIAGNOSIS — Z79.899 ENCOUNTER FOR LONG-TERM (CURRENT) USE OF OTHER MEDICATIONS: ICD-10-CM

## 2023-03-07 DIAGNOSIS — Z94.0 KIDNEY REPLACED BY TRANSPLANT: ICD-10-CM

## 2023-03-07 DIAGNOSIS — E21.3 HYPERPARATHYROIDISM, UNSPECIFIED: ICD-10-CM

## 2023-03-07 DIAGNOSIS — B70.0 MEGALOBLASTIC ANEMIA DUE TO FISH TAPEWORM: ICD-10-CM

## 2023-03-07 DIAGNOSIS — D63.8 MEGALOBLASTIC ANEMIA DUE TO FISH TAPEWORM: ICD-10-CM

## 2023-03-07 DIAGNOSIS — N18.31 CHRONIC KIDNEY DISEASE (CKD) STAGE G3A/A1, MODERATELY DECREASED GLOMERULAR FILTRATION RATE (GFR) BETWEEN 45-59 ML/MIN/1.73 SQUARE METER AND ALBUMINURIA CREATININE RATIO LESS THAN 30 MG/G: ICD-10-CM

## 2023-03-07 DIAGNOSIS — Z94.0 IMMUNOSUPPRESSIVE MANAGEMENT ENCOUNTER FOLLOWING KIDNEY TRANSPLANT: ICD-10-CM

## 2023-03-07 DIAGNOSIS — Z79.899 IMMUNOSUPPRESSIVE MANAGEMENT ENCOUNTER FOLLOWING KIDNEY TRANSPLANT: ICD-10-CM

## 2023-03-07 LAB
ALBUMIN SERPL-MCNC: 3.4 G/DL (ref 3.4–4.8)
ALBUMIN/GLOB SERPL: 1.6 RATIO (ref 1.1–2)
ALP SERPL-CCNC: 84 UNIT/L (ref 40–150)
ALT SERPL-CCNC: 18 UNIT/L (ref 0–55)
AST SERPL-CCNC: 20 UNIT/L (ref 5–34)
BASOPHILS # BLD AUTO: 0.03 X10(3)/MCL (ref 0–0.2)
BASOPHILS NFR BLD AUTO: 0.6 %
BILIRUBIN DIRECT+TOT PNL SERPL-MCNC: 2.1 MG/DL
BUN SERPL-MCNC: 22.1 MG/DL (ref 8.4–25.7)
CALCIUM SERPL-MCNC: 9.4 MG/DL (ref 8.8–10)
CHLORIDE SERPL-SCNC: 110 MMOL/L (ref 98–107)
CO2 SERPL-SCNC: 28 MMOL/L (ref 23–31)
CREAT SERPL-MCNC: 1.46 MG/DL (ref 0.73–1.18)
EOSINOPHIL # BLD AUTO: 0.12 X10(3)/MCL (ref 0–0.9)
EOSINOPHIL NFR BLD AUTO: 2.4 %
ERYTHROCYTE [DISTWIDTH] IN BLOOD BY AUTOMATED COUNT: 14.3 % (ref 11.5–17)
GFR SERPLBLD CREATININE-BSD FMLA CKD-EPI: 51 MLS/MIN/1.73/M2
GLOBULIN SER-MCNC: 2.1 GM/DL (ref 2.4–3.5)
GLUCOSE SERPL-MCNC: 96 MG/DL (ref 82–115)
HCT VFR BLD AUTO: 54.6 % (ref 42–52)
HGB BLD-MCNC: 16.9 G/DL (ref 14–18)
IMM GRANULOCYTES # BLD AUTO: 0.01 X10(3)/MCL (ref 0–0.04)
IMM GRANULOCYTES NFR BLD AUTO: 0.2 %
LYMPHOCYTES # BLD AUTO: 2.58 X10(3)/MCL (ref 0.6–4.6)
LYMPHOCYTES NFR BLD AUTO: 51.7 %
MAGNESIUM SERPL-MCNC: 2.1 MG/DL (ref 1.6–2.6)
MCH RBC QN AUTO: 29.6 PG
MCHC RBC AUTO-ENTMCNC: 31 G/DL (ref 33–36)
MCV RBC AUTO: 95.6 FL (ref 80–94)
MONOCYTES # BLD AUTO: 0.49 X10(3)/MCL (ref 0.1–1.3)
MONOCYTES NFR BLD AUTO: 9.8 %
NEUTROPHILS # BLD AUTO: 1.76 X10(3)/MCL (ref 2.1–9.2)
NEUTROPHILS NFR BLD AUTO: 35.3 %
NRBC BLD AUTO-RTO: 0 %
PHOSPHATE SERPL-MCNC: 3 MG/DL (ref 2.3–4.7)
PLATELET # BLD AUTO: 143 X10(3)/MCL (ref 130–400)
PMV BLD AUTO: 9.8 FL (ref 7.4–10.4)
POTASSIUM SERPL-SCNC: 5.6 MMOL/L (ref 3.5–5.1)
PROT SERPL-MCNC: 5.5 GM/DL (ref 5.8–7.6)
RBC # BLD AUTO: 5.71 X10(6)/MCL (ref 4.7–6.1)
SODIUM SERPL-SCNC: 143 MMOL/L (ref 136–145)
WBC # SPEC AUTO: 5 X10(3)/MCL (ref 4.5–11.5)

## 2023-03-07 PROCEDURE — 83735 ASSAY OF MAGNESIUM: CPT

## 2023-03-07 PROCEDURE — 85025 COMPLETE CBC W/AUTO DIFF WBC: CPT

## 2023-03-07 PROCEDURE — 80197 ASSAY OF TACROLIMUS: CPT

## 2023-03-07 PROCEDURE — 36415 COLL VENOUS BLD VENIPUNCTURE: CPT

## 2023-03-07 PROCEDURE — 80053 COMPREHEN METABOLIC PANEL: CPT

## 2023-03-07 PROCEDURE — 84100 ASSAY OF PHOSPHORUS: CPT

## 2023-03-07 NOTE — TELEPHONE ENCOUNTER
Message sent to patient.----- Message from Celeste Yen MD sent at 3/7/2023  9:28 AM CST -----  Is he on kayexalate every other day?   If so, Kayexalate 30 gram daily X 3 days then 30 gram every other day . Low K diet. More water intake.   Check K on Thursday

## 2023-03-07 NOTE — PROGRESS NOTES
Is he on kayexalate every other day?   If so, Kayexalate 30 gram daily X 3 days then 30 gram every other day . Low K diet. More water intake.   Check K on Thursday

## 2023-03-08 LAB — TACROLIMUS TROUGH BLD-MCNC: 4.2 NG/ML

## 2023-03-09 ENCOUNTER — LAB VISIT (OUTPATIENT)
Dept: LAB | Facility: HOSPITAL | Age: 73
End: 2023-03-09
Attending: INTERNAL MEDICINE
Payer: MEDICARE

## 2023-03-09 ENCOUNTER — TELEPHONE (OUTPATIENT)
Dept: TRANSPLANT | Facility: CLINIC | Age: 73
End: 2023-03-09
Payer: MEDICARE

## 2023-03-09 ENCOUNTER — PATIENT MESSAGE (OUTPATIENT)
Dept: TRANSPLANT | Facility: CLINIC | Age: 73
End: 2023-03-09
Payer: MEDICARE

## 2023-03-09 DIAGNOSIS — Z94.0 KIDNEY REPLACED BY TRANSPLANT: Primary | ICD-10-CM

## 2023-03-09 DIAGNOSIS — E87.5 HYPERKALEMIA: ICD-10-CM

## 2023-03-09 LAB — POTASSIUM SERPL-SCNC: 4.8 MMOL/L (ref 3.5–5.1)

## 2023-03-09 PROCEDURE — 36415 COLL VENOUS BLD VENIPUNCTURE: CPT

## 2023-03-09 PROCEDURE — 84132 ASSAY OF SERUM POTASSIUM: CPT

## 2023-03-09 NOTE — PROGRESS NOTES
The K reviewed, no change required. Continue kayexalate. Check K next week. Please ask him to follow with his general nephrologist for high K.   He can be off MMF.   When does he see ID again?

## 2023-03-09 NOTE — TELEPHONE ENCOUNTER
Message sent to patient. Labs to be scheduled. Patient follows with nephrology and has appt with ID on 4/5/23.----- Message from Celeste Yen MD sent at 3/9/2023 10:39 AM CST -----  The K reviewed, no change required. Continue kayexalate. Check K next week. Please ask him to follow with his general nephrologist for high K.   He can be off MMF.   When does he see ID again?

## 2023-03-16 ENCOUNTER — LAB VISIT (OUTPATIENT)
Dept: LAB | Facility: HOSPITAL | Age: 73
End: 2023-03-16
Attending: INTERNAL MEDICINE
Payer: MEDICARE

## 2023-03-16 ENCOUNTER — TELEPHONE (OUTPATIENT)
Dept: TRANSPLANT | Facility: CLINIC | Age: 73
End: 2023-03-16
Payer: MEDICARE

## 2023-03-16 DIAGNOSIS — Z94.0 KIDNEY REPLACED BY TRANSPLANT: ICD-10-CM

## 2023-03-16 DIAGNOSIS — E87.5 HYPERKALEMIA: ICD-10-CM

## 2023-03-16 LAB — POTASSIUM SERPL-SCNC: 4.7 MMOL/L (ref 3.5–5.1)

## 2023-03-16 PROCEDURE — 36415 COLL VENOUS BLD VENIPUNCTURE: CPT

## 2023-03-16 PROCEDURE — 80197 ASSAY OF TACROLIMUS: CPT | Mod: 90

## 2023-03-16 PROCEDURE — 84132 ASSAY OF SERUM POTASSIUM: CPT

## 2023-03-16 NOTE — PROGRESS NOTES
Stable K on Kayexalate. Continue low K diet His insurance not covering lokelma and valtassa. He needs to see  local nephrologist for his K soon. Needs close monitoring.   Check lab in a month.

## 2023-03-16 NOTE — TELEPHONE ENCOUNTER
Message sent to patient.----- Message from Celeste Yen MD sent at 3/16/2023  8:42 AM CDT -----  Stable K on Kayexalate. Continue low K diet His insurance not covering lokelma and valtassa. He needs to see  local nephrologist for his K soon. Needs close monitoring.   Check lab in a month.

## 2023-03-17 ENCOUNTER — PATIENT MESSAGE (OUTPATIENT)
Dept: TRANSPLANT | Facility: CLINIC | Age: 73
End: 2023-03-17
Payer: MEDICARE

## 2023-03-17 LAB — TACROLIMUS TROUGH BLD-MCNC: 4.5 NG/ML

## 2023-04-04 ENCOUNTER — TELEPHONE (OUTPATIENT)
Dept: TRANSPLANT | Facility: CLINIC | Age: 73
End: 2023-04-04
Payer: MEDICARE

## 2023-04-04 ENCOUNTER — PATIENT MESSAGE (OUTPATIENT)
Dept: TRANSPLANT | Facility: CLINIC | Age: 73
End: 2023-04-04
Payer: MEDICARE

## 2023-04-04 ENCOUNTER — LAB VISIT (OUTPATIENT)
Dept: LAB | Facility: HOSPITAL | Age: 73
End: 2023-04-04
Attending: INTERNAL MEDICINE
Payer: MEDICARE

## 2023-04-04 DIAGNOSIS — B70.0 MEGALOBLASTIC ANEMIA DUE TO FISH TAPEWORM: ICD-10-CM

## 2023-04-04 DIAGNOSIS — Z94.0 IMMUNOSUPPRESSIVE MANAGEMENT ENCOUNTER FOLLOWING KIDNEY TRANSPLANT: ICD-10-CM

## 2023-04-04 DIAGNOSIS — E21.3 HYPERPARATHYROIDISM, UNSPECIFIED: ICD-10-CM

## 2023-04-04 DIAGNOSIS — D63.8 MEGALOBLASTIC ANEMIA DUE TO FISH TAPEWORM: ICD-10-CM

## 2023-04-04 DIAGNOSIS — Z79.899 ENCOUNTER FOR LONG-TERM (CURRENT) USE OF OTHER MEDICATIONS: ICD-10-CM

## 2023-04-04 DIAGNOSIS — Z79.899 IMMUNOSUPPRESSIVE MANAGEMENT ENCOUNTER FOLLOWING KIDNEY TRANSPLANT: ICD-10-CM

## 2023-04-04 DIAGNOSIS — Z94.0 KIDNEY REPLACED BY TRANSPLANT: ICD-10-CM

## 2023-04-04 LAB
ALBUMIN SERPL-MCNC: 3.4 G/DL (ref 3.4–4.8)
BASOPHILS # BLD AUTO: 0.03 X10(3)/MCL (ref 0–0.2)
BASOPHILS NFR BLD AUTO: 0.6 %
BUN SERPL-MCNC: 23.7 MG/DL (ref 8.4–25.7)
CALCIUM SERPL-MCNC: 9.5 MG/DL (ref 8.8–10)
CHLORIDE SERPL-SCNC: 106 MMOL/L (ref 98–107)
CO2 SERPL-SCNC: 27 MMOL/L (ref 23–31)
CREAT SERPL-MCNC: 1.39 MG/DL (ref 0.73–1.18)
EOSINOPHIL # BLD AUTO: 0.08 X10(3)/MCL (ref 0–0.9)
EOSINOPHIL NFR BLD AUTO: 1.6 %
ERYTHROCYTE [DISTWIDTH] IN BLOOD BY AUTOMATED COUNT: 15.1 % (ref 11.5–17)
GFR SERPLBLD CREATININE-BSD FMLA CKD-EPI: 55 MLS/MIN/1.73/M2
GLUCOSE SERPL-MCNC: 90 MG/DL (ref 82–115)
HCT VFR BLD AUTO: 54.6 % (ref 42–52)
HGB BLD-MCNC: 17.2 G/DL (ref 14–18)
IMM GRANULOCYTES # BLD AUTO: 0.03 X10(3)/MCL (ref 0–0.04)
IMM GRANULOCYTES NFR BLD AUTO: 0.6 %
LYMPHOCYTES # BLD AUTO: 2.58 X10(3)/MCL (ref 0.6–4.6)
LYMPHOCYTES NFR BLD AUTO: 50.4 %
MAGNESIUM SERPL-MCNC: 1.9 MG/DL (ref 1.6–2.6)
MCH RBC QN AUTO: 28.7 PG (ref 27–31)
MCHC RBC AUTO-ENTMCNC: 31.5 G/DL (ref 33–36)
MCV RBC AUTO: 91 FL (ref 80–94)
MONOCYTES # BLD AUTO: 0.47 X10(3)/MCL (ref 0.1–1.3)
MONOCYTES NFR BLD AUTO: 9.2 %
NEUTROPHILS # BLD AUTO: 1.93 X10(3)/MCL (ref 2.1–9.2)
NEUTROPHILS NFR BLD AUTO: 37.6 %
NRBC BLD AUTO-RTO: 0 %
PHOSPHATE SERPL-MCNC: 2.9 MG/DL (ref 2.3–4.7)
PLATELET # BLD AUTO: 142 X10(3)/MCL (ref 130–400)
PMV BLD AUTO: 9.5 FL (ref 7.4–10.4)
POTASSIUM SERPL-SCNC: 4.3 MMOL/L (ref 3.5–5.1)
RBC # BLD AUTO: 6 X10(6)/MCL (ref 4.7–6.1)
SODIUM SERPL-SCNC: 140 MMOL/L (ref 136–145)
WBC # SPEC AUTO: 5.1 X10(3)/MCL (ref 4.5–11.5)

## 2023-04-04 PROCEDURE — 85025 COMPLETE CBC W/AUTO DIFF WBC: CPT

## 2023-04-04 PROCEDURE — 80197 ASSAY OF TACROLIMUS: CPT | Mod: 90

## 2023-04-04 PROCEDURE — 36415 COLL VENOUS BLD VENIPUNCTURE: CPT

## 2023-04-04 PROCEDURE — 83735 ASSAY OF MAGNESIUM: CPT

## 2023-04-04 PROCEDURE — 80069 RENAL FUNCTION PANEL: CPT

## 2023-04-04 NOTE — TELEPHONE ENCOUNTER
Message sent to patient.----- Message from Celeste Yen MD sent at 4/4/2023  2:36 PM CDT -----  Continue kayexalate . Check K in 2 weeks   ----- Message -----  From: Letha Rai RN  Sent: 4/4/2023   1:47 PM CDT  To: Celeste Yen MD    Yes. Patient confirmed still taking Kayexalate MWF  ----- Message -----  From: Celeste Yen MD  Sent: 4/4/2023   9:31 AM CDT  To: [facility] Post-Kidney Transplant Clinical    K is stable. Is he on kayexalate 30 gram MWF?  Pending prograf level

## 2023-04-05 ENCOUNTER — OFFICE VISIT (OUTPATIENT)
Dept: INFECTIOUS DISEASES | Facility: CLINIC | Age: 73
End: 2023-04-05
Payer: MEDICARE

## 2023-04-05 VITALS
SYSTOLIC BLOOD PRESSURE: 146 MMHG | RESPIRATION RATE: 18 BRPM | HEART RATE: 70 BPM | HEIGHT: 74 IN | OXYGEN SATURATION: 99 % | WEIGHT: 185 LBS | BODY MASS INDEX: 23.74 KG/M2 | DIASTOLIC BLOOD PRESSURE: 92 MMHG

## 2023-04-05 DIAGNOSIS — Z94.0 KIDNEY REPLACED BY TRANSPLANT: ICD-10-CM

## 2023-04-05 DIAGNOSIS — L03.114 LEFT ARM CELLULITIS: ICD-10-CM

## 2023-04-05 DIAGNOSIS — G06.0 INTRACRANIAL ABSCESS: ICD-10-CM

## 2023-04-05 DIAGNOSIS — A43.9 NOCARDIA INFECTION: Primary | ICD-10-CM

## 2023-04-05 DIAGNOSIS — Z79.60 LONG-TERM USE OF IMMUNOSUPPRESSANT MEDICATION: ICD-10-CM

## 2023-04-05 DIAGNOSIS — D45 POLYCYTHEMIA VERA: ICD-10-CM

## 2023-04-05 DIAGNOSIS — Z86.19 HEPATITIS C VIRUS INFECTION CURED AFTER ANTIVIRAL DRUG THERAPY: ICD-10-CM

## 2023-04-05 LAB — TACROLIMUS TROUGH BLD-MCNC: 4.1 NG/ML

## 2023-04-05 PROCEDURE — 99999 PR PBB SHADOW E&M-EST. PATIENT-LVL V: CPT | Mod: PBBFAC,,, | Performed by: GENERAL PRACTICE

## 2023-04-05 PROCEDURE — 99214 OFFICE O/P EST MOD 30 MIN: CPT | Mod: S$PBB,,, | Performed by: GENERAL PRACTICE

## 2023-04-05 PROCEDURE — 99215 OFFICE O/P EST HI 40 MIN: CPT | Mod: PBBFAC | Performed by: GENERAL PRACTICE

## 2023-04-05 PROCEDURE — 99999 PR PBB SHADOW E&M-EST. PATIENT-LVL V: ICD-10-PCS | Mod: PBBFAC,,, | Performed by: GENERAL PRACTICE

## 2023-04-05 PROCEDURE — 99214 PR OFFICE/OUTPT VISIT, EST, LEVL IV, 30-39 MIN: ICD-10-PCS | Mod: S$PBB,,, | Performed by: GENERAL PRACTICE

## 2023-04-05 NOTE — PROGRESS NOTES
Subjective:       Patient ID: Ronal Mazariegos 72 y.o.     Chief Complaint: No chief complaint on file.       HPI:  01/17/2023 ID follow up in [facility]:  71-year-old male with polycystic kidney sidease s/p DBD KTx 5/2021 (CMV D+/R-, HCV NAWAF+, Simulect induction, CIT ~8 hours) on tacro/pred, HCV+ donor SVR, Afib on AC, and Polycythemia vera (09/2021) presents to clinic for follow up.     Admitted 2/16-2/25 after fall at home with left-sided neurologic deficit.  Found to have right frontal mass, s/p craniotomy 2/17/22 with cultures + nocardia nova. CT C/A/P also with RML lesion suspected also due to nocardia nova. 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  TTE negative for vegetations, abnormal thickening of the aortic root unchanged from prior. Patient was discharged on IV meropenem and high dose PO bactrim (Started ~2/19).  Planned treatment typically 9-12 months.     ID clinic visits:  4/21/22 - switched meropenem to ceftriaxone 2g q12h based on sensitivities, and continued bactrim.    5/24/22 - planned to switch ceftriaxone to tedizolid.  Order for tedizolid sent to specialty pharmacy with start date 6/7/22. Continued bactrim.  6/20/22 telephone encounter - discontinued ceftriaxone. Started tedizolid 200mg daily.  Continued bactrim 2ds q8h.     On kelma per nephrology.     6/7/22 - subacute/chronic subdural hematoma.  5/5/22 CT head stable appearing extra-axial mass with mixed density, recommended MMA embolization before restarting AC for atrial fibrillation.  7/14/22 s/p right MMA raissa embolization.     Interval history:  He is compliant with his Bactrim and Tedizolid, though has to pay $3100 for next refill of Tedizolid and is has a difficult time affording this.     12/1/22 MRI:  Operative changes related to right frontal parenchymal abscess drainage.  Right superior frontal lobe enhancement less evident with local mild gliotic changes and small  encephalomalacia.  Right diffuse dural thickening and enhancement but without new fluid collection.     Seen by neurosurgery 12/5/22; resolution of hemorrhage s/p R MMA embolization; resolution of prior abscess.  Planned f/u with them and repeat MRI brain in 6 months.     12/30-1/3/23 admitted to [facility] with concern for nec fasc of left forearm s/p debridement by general surgery (based on op note no significant necrosis found).  Cultures grew Klebsiella pneumoniae R to quinolones.  ID Dr Ayo Stevenson was consulted and recommended continuing Nocardia treatment, along with 2 weeks of doxycycline.  The patient is receiving wound care, packing at home but has yet to f/u with general surgery there for delayed closure.    04/05/2023:  L arm has healed well  Stopped The Tedizolid 01/2023  for treatment of Nocardia    He was admitted 02/10 - 02/12 for increased shortness of breath and hypertensive crisis, was found to be in volume overload diuresed, symptoms improved, discharged to home on updated dose diuretic.  During his hospitalization, had a chest x-ray that had a concerning finding on the right middle lobe, the site of his previous Nocardia infection. However on presentation today he reports his shortness of breath has been significantly improved, he is back performing his activities of daily living.  He is even mowing his lawn, he reports putting a mask and protection on while doing that.  He denies any headaches, no new neurologic symptoms.  All-in-all he has been doing relatively well since stopping his antibiotic.  He is scheduled with Neurosurgery for a repeat MRI of the brain in 06/2023.    Past Medical History:   Diagnosis Date    Atrial fibrillation     BPH (benign prostatic hypertrophy)     Chronic kidney disease, stage 3a, in kindney transplant (5/14/2021)     Chronic systolic heart failure 10/01/2021    H/O disseminated/cerebral nocardiosis     Hepatitis C virus infection cured after  antiviral drug therapy     acquired through kidney transplant, treated / cured (SVR - 2021)    HTN (hypertension)     Polycystic kidney disease         Past Surgical History:   Procedure Laterality Date    AV FISTULA PLACEMENT Left 2016    BRAIN SURGERY  2022    Crainiotomy    CATARACT EXTRACTION, BILATERAL Bilateral     CRANIOTOMY Right 2022    Procedure: CRANIOTOMY;  Surgeon: Sunday Rosa DO;  Location: [facility] OR [address] [place] [address];  Service: Neurosurgery;  Laterality: Right;  R craniotomy for abscess drainage    HERNIA REPAIR  2017    INCISION AND DRAINAGE, UPPER EXTREMITY Left 2022    Procedure: INCISION AND DRAINAGE, UPPER EXTREMITY;  Surgeon: Elijah Davis Jr., MD;  Location: [facility];  Service: General;  Laterality: Left;    KIDNEY TRANSPLANT N/A 2021    Procedure: TRANSPLANT, KIDNEY;  Surgeon: Lexy Bolden MD;  Location: [facility] OR [address] [place] [address];  Service: Transplant;  Laterality: N/A;        Social History     Socioeconomic History    Marital status:     Number of children: 1   Tobacco Use    Smoking status: Former     Packs/day: 2.00     Years: 10.00     Pack years: 20.00     Types: Cigarettes     Start date: 1972     Quit date: 1984     Years since quittin.3    Smokeless tobacco: Never   Substance and Sexual Activity    Alcohol use: No     Comment: Pt reportsbeing a former beer drinker (2-3 cans per day) and quitting in .    Drug use: Never    Sexual activity: Not Currently     Partners: Female     Birth control/protection: None        Family History   Problem Relation Age of Onset    Heart disease Mother     Polycystic kidney disease Father     Hypertension Father     Kidney disease Father     Heart disease Father     Polycystic kidney disease Sister     Hypertension Sister     Kidney disease Sister         Review of patient's allergies indicates:  No Known Allergies     Immunization History   Administered Date(s) Administered    COVID-19 Vaccine 2021,  "02/08/2021, 11/18/2021, 10/28/2022    COVID-19, MRNA, LN-S, PF (MODERNA FULL 0.5 ML DOSE) 01/11/2021, 02/08/2021    Hepatitis A, Adult 12/07/2016, 05/10/2017    Hepatitis B, Adult 12/07/2016, 01/09/2017, 03/12/2018, 04/09/2018, 06/13/2018, 08/13/2018, 10/12/2020    Influenza 09/26/2018, 09/24/2019, 10/05/2020    Influenza - Quadrivalent - High Dose - PF (65 years and older) 10/11/2022    Pneumococcal Conjugate - 13 Valent 12/07/2016    Pneumococcal Polysaccharide - 23 Valent 05/29/2020    Tdap 12/07/2016, 02/16/2022    Zoster 02/27/2014    Zoster Recombinant 03/26/2019, 06/04/2019        Review of Systems   All other systems reviewed and are negative.       Objective:      BP (!) 146/92 (BP Location: Right arm)   Pulse 70   Resp 18   Ht 6' 2" (1.88 m)   Wt 83.9 kg (185 lb)   SpO2 99%   BMI 23.75 kg/m²      Physical Exam  Constitutional:       Appearance: Normal appearance.   HENT:      Head: Normocephalic and atraumatic.      Mouth/Throat:      Pharynx: No oropharyngeal exudate or posterior oropharyngeal erythema.   Eyes:      Extraocular Movements: Extraocular movements intact.      Pupils: Pupils are equal, round, and reactive to light.   Cardiovascular:      Rate and Rhythm: Normal rate and regular rhythm.      Heart sounds: No murmur heard.     Comments: Left upper extremity AV fistula without significant complications.  Pulmonary:      Effort: No respiratory distress.      Breath sounds: No wheezing, rhonchi or rales.   Abdominal:      General: Bowel sounds are normal. There is no distension.      Palpations: Abdomen is soft.      Tenderness: There is no abdominal tenderness.   Musculoskeletal:         General: No swelling or tenderness.      Cervical back: Neck supple. No rigidity or tenderness.   Lymphadenopathy:      Cervical: No cervical adenopathy.   Skin:     Findings: No lesion or rash.      Comments: Left forearm wound has healed very well, no drainage, no open wounds.   Neurological:      " General: No focal deficit present.      Mental Status: He is alert and oriented to person, place, and time. Mental status is at baseline.      Cranial Nerves: No cranial nerve deficit.      Motor: No weakness.   Psychiatric:         Mood and Affect: Mood normal.         Behavior: Behavior normal.        Labs: Reviewed most recent relevant labs available, notable results highlighted in this note    Imaging: Reviewed most recent relevant imaging studies available, notable results highlighted in this note    Assessment:       Problem List Items Addressed This Visit          Renal/    Kidney replaced by transplant       ID    Intracranial abscess    Nocardia infection - Primary    Relevant Orders    CT Chest Without Contrast    Left arm cellulitis       Oncology    Polycythemia vera       GI    Hepatitis C virus infection cured after antiviral drug therapy       Palliative Care    Long-term use of immunosuppressant medication          Plan:       -patient completed 11 months of therapy for Nocardia brain abscess and pulmonary infection including meropenem, ceftriaxone, to linezolid, Bactrim   -discontinued 01/2023   -completed course of therapy as well for left upper extremity necrotizing fasciitis which has healed completely   -stable respiratory status, back to daily activities, no new neurologic symptoms   -chest x-ray in 02/2023 with questionable findings in the right middle lobe which was previously site of myocardial infection, this is concerning since the patient is off therapy for Nocardia, however his clinical presentation is reassuring   -repeat CT scan of the chest to evaluate for pulmonary findings   -repeat MRI of the brain is scheduled for 06/2023, patient with no new neurologic symptoms, no need to obtain an MRI earlier   -discussed with the patient    Follow up in about 3 months (around 7/5/2023).

## 2023-04-06 ENCOUNTER — PATIENT MESSAGE (OUTPATIENT)
Dept: TRANSPLANT | Facility: CLINIC | Age: 73
End: 2023-04-06
Payer: MEDICARE

## 2023-04-06 DIAGNOSIS — F41.9 ANXIETY: Primary | ICD-10-CM

## 2023-04-06 RX ORDER — LORAZEPAM 0.5 MG/1
0.5 TABLET ORAL ONCE
Qty: 1 TABLET | Refills: 0 | Status: SHIPPED | OUTPATIENT
Start: 2023-04-06 | End: 2023-04-12 | Stop reason: SDUPTHER

## 2023-04-12 DIAGNOSIS — F41.9 ANXIETY: ICD-10-CM

## 2023-04-12 RX ORDER — LORAZEPAM 0.5 MG/1
0.5 TABLET ORAL ONCE
Qty: 1 TABLET | Refills: 0 | Status: SHIPPED | OUTPATIENT
Start: 2023-04-12 | End: 2023-04-13 | Stop reason: SDUPTHER

## 2023-04-13 DIAGNOSIS — F41.9 ANXIETY: ICD-10-CM

## 2023-04-13 RX ORDER — LORAZEPAM 0.5 MG/1
0.5 TABLET ORAL ONCE
Qty: 1 TABLET | Refills: 0 | Status: SHIPPED | OUTPATIENT
Start: 2023-04-13 | End: 2023-05-02

## 2023-04-14 ENCOUNTER — HOSPITAL ENCOUNTER (OUTPATIENT)
Dept: RADIOLOGY | Facility: HOSPITAL | Age: 73
Discharge: HOME | End: 2023-04-14
Attending: STUDENT IN AN ORGANIZED HEALTH CARE EDUCATION/TRAINING PROGRAM
Payer: MEDICARE

## 2023-04-14 ENCOUNTER — HOSPITAL ENCOUNTER (OUTPATIENT)
Dept: RADIOLOGY | Facility: HOSPITAL | Age: 73
Discharge: HOME | End: 2023-04-14
Attending: GENERAL PRACTICE
Payer: MEDICARE

## 2023-04-14 DIAGNOSIS — A43.9 NOCARDIA INFECTION: ICD-10-CM

## 2023-04-14 DIAGNOSIS — G06.0 CEREBRAL INTRACRANIAL ABSCESS: ICD-10-CM

## 2023-04-14 LAB
CREAT SERPL-MCNC: 1.3 MG/DL (ref 0.5–1.4)
SAMPLE: NORMAL

## 2023-04-14 PROCEDURE — A9577 INJ MULTIHANCE: HCPCS | Performed by: STUDENT IN AN ORGANIZED HEALTH CARE EDUCATION/TRAINING PROGRAM

## 2023-04-14 PROCEDURE — 25500020 PHARM REV CODE 255: Performed by: STUDENT IN AN ORGANIZED HEALTH CARE EDUCATION/TRAINING PROGRAM

## 2023-04-14 PROCEDURE — 70553 MRI BRAIN STEM W/O & W/DYE: CPT | Mod: TC

## 2023-04-14 PROCEDURE — 71250 CT THORAX DX C-: CPT | Mod: TC

## 2023-04-14 RX ADMIN — GADOBENATE DIMEGLUMINE 20 ML: 529 INJECTION, SOLUTION INTRAVENOUS at 02:04

## 2023-04-18 ENCOUNTER — LAB VISIT (OUTPATIENT)
Dept: LAB | Facility: HOSPITAL | Age: 73
End: 2023-04-18
Attending: INTERNAL MEDICINE
Payer: MEDICARE

## 2023-04-18 DIAGNOSIS — E87.5 HYPERKALEMIA: ICD-10-CM

## 2023-04-18 LAB — POTASSIUM SERPL-SCNC: 4.2 MMOL/L (ref 3.5–5.1)

## 2023-04-18 PROCEDURE — 36415 COLL VENOUS BLD VENIPUNCTURE: CPT

## 2023-04-18 PROCEDURE — 84132 ASSAY OF SERUM POTASSIUM: CPT

## 2023-05-01 ENCOUNTER — TELEPHONE (OUTPATIENT)
Dept: INFECTIOUS DISEASES | Facility: CLINIC | Age: 73
End: 2023-05-01
Payer: MEDICARE

## 2023-05-01 DIAGNOSIS — A43.9 NOCARDIOSIS: Primary | ICD-10-CM

## 2023-05-01 NOTE — TELEPHONE ENCOUNTER
With new nodules, worsening pleural effusion.  Patient clinically doing well.  He is however immunosuppressed and renal transplant patient with history pulmonary and cerebral nocardiosis.  Will refer to Pulmonary physician, likely will need BAL, discussed with Dr. Keith.  Patient advised to contact us should he have any clinical symptoms.

## 2023-05-02 ENCOUNTER — LAB VISIT (OUTPATIENT)
Dept: LAB | Facility: HOSPITAL | Age: 73
End: 2023-05-02
Attending: INTERNAL MEDICINE
Payer: MEDICARE

## 2023-05-02 DIAGNOSIS — Z79.899 ENCOUNTER FOR LONG-TERM (CURRENT) USE OF OTHER MEDICATIONS: ICD-10-CM

## 2023-05-02 DIAGNOSIS — E21.3 HYPERPARATHYROIDISM, UNSPECIFIED: ICD-10-CM

## 2023-05-02 DIAGNOSIS — B70.0 MEGALOBLASTIC ANEMIA DUE TO FISH TAPEWORM: ICD-10-CM

## 2023-05-02 DIAGNOSIS — Z79.899 IMMUNOSUPPRESSIVE MANAGEMENT ENCOUNTER FOLLOWING KIDNEY TRANSPLANT: ICD-10-CM

## 2023-05-02 DIAGNOSIS — T86.10 COMPLICATION OF TRANSPLANTED KIDNEY, UNSPECIFIED COMPLICATION: ICD-10-CM

## 2023-05-02 DIAGNOSIS — Z94.0 KIDNEY REPLACED BY TRANSPLANT: ICD-10-CM

## 2023-05-02 DIAGNOSIS — R91.8 LUNG MASS: Primary | ICD-10-CM

## 2023-05-02 DIAGNOSIS — D63.8 MEGALOBLASTIC ANEMIA DUE TO FISH TAPEWORM: ICD-10-CM

## 2023-05-02 DIAGNOSIS — Z94.0 IMMUNOSUPPRESSIVE MANAGEMENT ENCOUNTER FOLLOWING KIDNEY TRANSPLANT: ICD-10-CM

## 2023-05-02 LAB
ALBUMIN SERPL-MCNC: 3.4 G/DL (ref 3.4–4.8)
ALBUMIN SERPL-MCNC: 3.7 G/DL (ref 3.4–4.8)
ALBUMIN SERPL-MCNC: 3.7 G/DL (ref 3.4–4.8)
ALBUMIN/GLOB SERPL: 1.4 RATIO (ref 1.1–2)
ALP SERPL-CCNC: 92 UNIT/L (ref 40–150)
ALP SERPL-CCNC: 99 UNIT/L (ref 40–150)
ALT SERPL-CCNC: 22 UNIT/L (ref 0–55)
ALT SERPL-CCNC: 23 UNIT/L (ref 0–55)
APTT PPP: 35.5 SECONDS (ref 23.2–33.7)
AST SERPL-CCNC: 27 UNIT/L (ref 5–34)
AST SERPL-CCNC: 35 UNIT/L (ref 5–34)
BASOPHILS # BLD AUTO: 0.02 X10(3)/MCL
BASOPHILS # BLD AUTO: 0.03 X10(3)/MCL
BASOPHILS NFR BLD AUTO: 0.4 %
BASOPHILS NFR BLD AUTO: 0.6 %
BILIRUBIN DIRECT+TOT PNL SERPL-MCNC: 1 MG/DL (ref 0–?)
BILIRUBIN DIRECT+TOT PNL SERPL-MCNC: 1.4 MG/DL (ref 0–0.8)
BILIRUBIN DIRECT+TOT PNL SERPL-MCNC: 2.1 MG/DL
BILIRUBIN DIRECT+TOT PNL SERPL-MCNC: 2.4 MG/DL
BUN SERPL-MCNC: 20.2 MG/DL (ref 8.4–25.7)
BUN SERPL-MCNC: 20.6 MG/DL (ref 8.4–25.7)
CALCIUM SERPL-MCNC: 9.6 MG/DL (ref 8.8–10)
CALCIUM SERPL-MCNC: 9.6 MG/DL (ref 8.8–10)
CHLORIDE SERPL-SCNC: 108 MMOL/L (ref 98–107)
CHLORIDE SERPL-SCNC: 112 MMOL/L (ref 98–107)
CO2 SERPL-SCNC: 19 MMOL/L (ref 23–31)
CO2 SERPL-SCNC: 25 MMOL/L (ref 23–31)
CREAT SERPL-MCNC: 1.18 MG/DL (ref 0.73–1.18)
CREAT SERPL-MCNC: 1.28 MG/DL (ref 0.73–1.18)
EOSINOPHIL # BLD AUTO: 0.06 X10(3)/MCL (ref 0–0.9)
EOSINOPHIL # BLD AUTO: 0.09 X10(3)/MCL (ref 0–0.9)
EOSINOPHIL NFR BLD AUTO: 1.2 %
EOSINOPHIL NFR BLD AUTO: 1.8 %
ERYTHROCYTE [DISTWIDTH] IN BLOOD BY AUTOMATED COUNT: 17.4 % (ref 11.5–17)
ERYTHROCYTE [DISTWIDTH] IN BLOOD BY AUTOMATED COUNT: 17.8 % (ref 11.5–17)
GFR SERPLBLD CREATININE-BSD FMLA CKD-EPI: 60 MLS/MIN/1.73/M2
GFR SERPLBLD CREATININE-BSD FMLA CKD-EPI: >60 MLS/MIN/1.73/M2
GLOBULIN SER-MCNC: 2.5 GM/DL (ref 2.4–3.5)
GLUCOSE SERPL-MCNC: 104 MG/DL (ref 82–115)
GLUCOSE SERPL-MCNC: 91 MG/DL (ref 82–115)
HCT VFR BLD AUTO: 55.4 % (ref 42–52)
HCT VFR BLD AUTO: 58.4 % (ref 42–52)
HGB BLD-MCNC: 17.6 G/DL (ref 14–18)
HGB BLD-MCNC: 18.3 G/DL (ref 14–18)
IMM GRANULOCYTES # BLD AUTO: 0.02 X10(3)/MCL (ref 0–0.04)
IMM GRANULOCYTES # BLD AUTO: 0.02 X10(3)/MCL (ref 0–0.04)
IMM GRANULOCYTES NFR BLD AUTO: 0.4 %
IMM GRANULOCYTES NFR BLD AUTO: 0.4 %
INR BLD: 1.6 (ref 0–1.3)
LYMPHOCYTES # BLD AUTO: 1.92 X10(3)/MCL (ref 0.6–4.6)
LYMPHOCYTES # BLD AUTO: 2.18 X10(3)/MCL (ref 0.6–4.6)
LYMPHOCYTES NFR BLD AUTO: 37.5 %
LYMPHOCYTES NFR BLD AUTO: 42.5 %
MAGNESIUM SERPL-MCNC: 2 MG/DL (ref 1.6–2.6)
MCH RBC QN AUTO: 28.3 PG (ref 27–31)
MCH RBC QN AUTO: 28.3 PG (ref 27–31)
MCHC RBC AUTO-ENTMCNC: 31.3 G/DL (ref 33–36)
MCHC RBC AUTO-ENTMCNC: 31.8 G/DL (ref 33–36)
MCV RBC AUTO: 89.2 FL (ref 80–94)
MCV RBC AUTO: 90.3 FL (ref 80–94)
MONOCYTES # BLD AUTO: 0.42 X10(3)/MCL (ref 0.1–1.3)
MONOCYTES # BLD AUTO: 0.53 X10(3)/MCL (ref 0.1–1.3)
MONOCYTES NFR BLD AUTO: 10.3 %
MONOCYTES NFR BLD AUTO: 8.2 %
NEUTROPHILS # BLD AUTO: 2.28 X10(3)/MCL (ref 2.1–9.2)
NEUTROPHILS # BLD AUTO: 2.68 X10(3)/MCL (ref 2.1–9.2)
NEUTROPHILS NFR BLD AUTO: 44.4 %
NEUTROPHILS NFR BLD AUTO: 52.3 %
NRBC BLD AUTO-RTO: 0 %
NRBC BLD AUTO-RTO: 0 %
PATH REV: NORMAL
PHOSPHATE SERPL-MCNC: 3.2 MG/DL (ref 2.3–4.7)
PLATELET # BLD AUTO: 84 X10(3)/MCL (ref 130–400)
PLATELET # BLD AUTO: 92 X10(3)/MCL (ref 130–400)
PMV BLD AUTO: 9.4 FL (ref 7.4–10.4)
PMV BLD AUTO: 9.6 FL (ref 7.4–10.4)
POTASSIUM SERPL-SCNC: 4.3 MMOL/L (ref 3.5–5.1)
POTASSIUM SERPL-SCNC: 4.6 MMOL/L (ref 3.5–5.1)
PROT SERPL-MCNC: 5.8 GM/DL (ref 5.8–7.6)
PROT SERPL-MCNC: 5.9 GM/DL (ref 5.8–7.6)
PROTHROMBIN TIME: 18.8 SECONDS (ref 12.5–14.5)
RBC # BLD AUTO: 6.21 X10(6)/MCL (ref 4.7–6.1)
RBC # BLD AUTO: 6.47 X10(6)/MCL (ref 4.7–6.1)
SODIUM SERPL-SCNC: 137 MMOL/L (ref 136–145)
SODIUM SERPL-SCNC: 140 MMOL/L (ref 136–145)
WBC # SPEC AUTO: 5.12 X10(3)/MCL (ref 4.5–11.5)
WBC # SPEC AUTO: 5.13 X10(3)/MCL (ref 4.5–11.5)

## 2023-05-02 PROCEDURE — 83735 ASSAY OF MAGNESIUM: CPT

## 2023-05-02 PROCEDURE — 80197 ASSAY OF TACROLIMUS: CPT | Mod: 90

## 2023-05-02 PROCEDURE — 85730 THROMBOPLASTIN TIME PARTIAL: CPT

## 2023-05-02 PROCEDURE — 80076 HEPATIC FUNCTION PANEL: CPT

## 2023-05-02 PROCEDURE — 85025 COMPLETE CBC W/AUTO DIFF WBC: CPT | Mod: 91

## 2023-05-02 PROCEDURE — 80053 COMPREHEN METABOLIC PANEL: CPT

## 2023-05-02 PROCEDURE — 85610 PROTHROMBIN TIME: CPT

## 2023-05-02 PROCEDURE — 93010 ELECTROCARDIOGRAM REPORT: CPT | Mod: ,,, | Performed by: INTERNAL MEDICINE

## 2023-05-02 PROCEDURE — 93010 EKG 12-LEAD: ICD-10-PCS | Mod: ,,, | Performed by: INTERNAL MEDICINE

## 2023-05-02 PROCEDURE — 36415 COLL VENOUS BLD VENIPUNCTURE: CPT

## 2023-05-02 PROCEDURE — 84100 ASSAY OF PHOSPHORUS: CPT

## 2023-05-02 PROCEDURE — 87799 DETECT AGENT NOS DNA QUANT: CPT | Mod: 90

## 2023-05-02 PROCEDURE — 85025 COMPLETE CBC W/AUTO DIFF WBC: CPT

## 2023-05-02 NOTE — H&P (VIEW-ONLY)
Subjective:        Chief Complaint: [facility] referral from Dr. Ayo Stevenson      HPI: Ronal Mazariegos is a 72 y.o. male.  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  He was admitted 02/2022 with new onset left sided neurologic deficit, and was found to have a right frontal mass by CT.  He underwent right frontal craniotomy 02/17/2022, revealing no evidence of malignancy with pathology consistent with an abscess, cultures growing Nocardia nova. He presented 07/2022 with a subacute/chronic subdural hematoma, and underwent right MMA embolization.  He has been undergoing treatment for his Nocardia since initial diagnosis per Dr. Ayo Stevenson, completed Bactrim and Tedizolid 01/2023.  He recently underwent CT of the chest 04/14/2023, demonstrating a right middle lobe pulmonary nodule that was not present on prior CT chest performed 06/07/2022.  He currently admits to some postnasal drip which he feels results in cough productive of scant amounts of secretions.  He denies fever, chills, or night sweats.  He denies hemoptysis, chest pain, back pain, or shoulder pain.  He previously smoked up to 2 packs per day for about 10 years, quit in 1984.  He has no history of tuberculosis or known exposures to tuberculosis.      Review of patient's allergies indicates:  No Known Allergies    Current Outpatient Medications   Medication Instructions    apixaban (ELIQUIS) 5 mg, Oral, 2 times daily, DO NOT RESTART UNTIL APPROVED BY NEUROSURGERY    doxazosin (CARDURA) 2 mg, Oral, Daily    ergocalciferol (VITAMIN D2) 50,000 unit Cap Take 1 capsule (50,000 Units total) by mouth every 7 days. X 12 months    finasteride (PROSCAR) 5 mg, Oral, Nightly    fish oil-omega-3 fatty acids 300-1,000 mg capsule 1 capsule, Oral, Nightly    fluticasone propionate (FLONASE) 50 mcg/actuation nasal spray Daily PRN    furosemide  (LASIX) 40 mg, Oral, Daily    iron-vitamin C 100-250 mg, ICAR-C, 100-250 mg Tab 1 tablet, Oral    LOPRESSOR 100 mg, Oral, 2 times daily    LORazepam (ATIVAN) 0.5 mg, Oral, Once    magnesium oxide (MAG-OX) 400 mg (241.3 mg magnesium) tablet TAKE 2 TABLETS BY MOUTH THREE TIMES DAILY    multivitamin Tab 1 tablet, Oral, Daily    predniSONE (DELTASONE) 5 mg, Oral, Daily    ramipriL (ALTACE) 5 mg, Oral, Daily    sodium polystyrene (KAYEXALATE) powder 30 g, Oral, Every Mon, Wed, Fri, Mix 8 LEVEL TEASPOONS (30grams total) in 120mL water and drink by mouth once daily, then as DIRECTED BY TRANSPLANT.    tacrolimus (PROGRAF) 1 mg, Oral, Every 12 hours, Z94.0;Kidney txp on 5/4/21    tacrolimus (PROGRAF) 0.5 mg, Oral, Every morning, TAKE ALONG WITH 1MG FOR TOTAL DOSE OF 1.5MG EVERY MORNING.       Past Medical History:   Diagnosis Date    Atrial fibrillation     BPH (benign prostatic hypertrophy)     Chronic kidney disease, stage 3a, in [facility] transplant (5/14/2021)     Chronic systolic heart failure 10/01/2021    H/O disseminated/cerebral nocardiosis     Hepatitis C virus infection cured after antiviral drug therapy     acquired through kidney transplant, treated / cured (SVR12 - 12/2021)    HTN (hypertension)     Polycystic kidney disease        Past Surgical History:   Procedure Laterality Date    AV FISTULA PLACEMENT Left 2016    BRAIN SURGERY  Feb. 2022    Crainiotomy    CATARACT EXTRACTION, BILATERAL Bilateral     CRANIOTOMY Right 02/16/2022    Procedure: CRANIOTOMY;  Surgeon: Sunday Rosa DO;  Location: [address];  Service: Neurosurgery;  Laterality: Right;  R craniotomy for abscess drainage    HERNIA REPAIR  2017    INCISION AND DRAINAGE, UPPER EXTREMITY Left 12/30/2022    Procedure: INCISION AND DRAINAGE, UPPER EXTREMITY;  Surgeon: Elijah Davis Jr., MD;  Location: [facility];  Service: General;  Laterality: Left;    KIDNEY TRANSPLANT N/A 05/04/2021    Procedure: TRANSPLANT, KIDNEY;  Surgeon: Lexy BRADEN  MD Yuan;  Location: [facility] OR [address] [place] [address];  Service: Transplant;  Laterality: N/A;       Social History     Socioeconomic History    Marital status:     Number of children: 1   Tobacco Use    Smoking status: Former     Packs/day: 2.00     Years: 10.00     Pack years: 20.00     Types: Cigarettes     Start date: 1972     Quit date: 1984     Years since quittin.4    Smokeless tobacco: Never   Substance and Sexual Activity    Alcohol use: No     Comment: Pt reportsbeing a former beer drinker (2-3 cans per day) and quitting in .    Drug use: Never    Sexual activity: Not Currently     Partners: Female     Birth control/protection: None       Review of Systems   HENT:  Positive for hearing loss.    Eyes:  Negative for discharge.   Respiratory:  Negative for wheezing.    Cardiovascular:  Negative for chest pain and palpitations.   Gastrointestinal:  Positive for diarrhea. Negative for blood in stool, constipation and vomiting.   Genitourinary:  Negative for hematuria and urgency.   Musculoskeletal:  Negative for neck pain.   Neurological:  Positive for weakness. Negative for headaches.   Endo/Heme/Allergies:  Negative for polydipsia.     Objective:   There were no vitals taken for this visit.    Physical Exam  Constitutional:       Appearance: Normal appearance.   HENT:      Nose: Nose normal.      Mouth/Throat:      Mouth: Mucous membranes are moist.      Pharynx: Oropharynx is clear.   Eyes:      Conjunctiva/sclera: Conjunctivae normal.      Pupils: Pupils are equal, round, and reactive to light.   Cardiovascular:      Rate and Rhythm: Normal rate and regular rhythm.      Heart sounds: No murmur heard.  Pulmonary:      Breath sounds: Normal breath sounds. No wheezing, rhonchi or rales.   Abdominal:      General: Bowel sounds are normal.   Musculoskeletal:      Right lower leg: Edema (Trace pretibial) present.      Left lower leg: Edema (Trace pretibial) present.   Lymphadenopathy:      Cervical:  No cervical adenopathy.   Skin:     General: Skin is warm and dry.   Neurological:      Mental Status: He is alert and oriented to person, place, and time.       Lab:    Lab Results   Component Value Date/Time     05/02/2023 07:42 AM     11/30/2022 07:58 AM    K 4.3 05/02/2023 07:42 AM    K 5.5 (H) 11/30/2022 07:58 AM     11/30/2022 07:58 AM    CO2 25 05/02/2023 07:42 AM    CO2 20 (L) 11/30/2022 07:58 AM    BUN 20.6 05/02/2023 07:42 AM    BUN 20 11/30/2022 07:58 AM    CREATININE 1.28 (H) 05/02/2023 07:42 AM    CREATININE 1.6 (H) 11/30/2022 07:58 AM    CALCIUM 9.6 05/02/2023 07:42 AM    CALCIUM 9.9 11/30/2022 07:58 AM    MG 2.00 05/02/2023 07:42 AM    MG 1.7 02/25/2022 03:39 AM    AST 27 05/02/2023 07:42 AM    AST 23 04/21/2022 10:09 AM    ALT 22 05/02/2023 07:42 AM    ALT 19 04/21/2022 10:09 AM    ALKPHOS 99 05/02/2023 07:42 AM    ALKPHOS 117 04/21/2022 10:09 AM    ALBUMIN 3.7 05/02/2023 07:42 AM    ALBUMIN 3.7 05/02/2023 07:42 AM    ALBUMIN 3.9 11/30/2022 07:58 AM    ALBUMIN 53.19 12/29/2021 11:50 AM    BNP 1,516.5 (H) 02/10/2023 06:06 PM    INR 1.54 (H) 02/02/2023 09:14 AM    INR 1.3 (H) 02/16/2022 10:46 PM    PROTIME 18.3 (H) 02/02/2023 09:14 AM    PTT 24.6 02/16/2022 07:30 AM     Lab Results   Component Value Date/Time    WBC 5.13 05/02/2023 07:42 AM    WBC 5.62 04/21/2022 10:09 AM    HGB 18.3 (H) 05/02/2023 07:42 AM    HGB 14.6 04/21/2022 10:09 AM    HCT 58.4 (H) 05/02/2023 07:42 AM    HCT 47.4 04/21/2022 10:09 AM    HCT 44 02/17/2022 02:11 AM    PLT 92 (L) 05/02/2023 07:42 AM     04/21/2022 10:09 AM    MCV 90.3 05/02/2023 07:42 AM    MCV 83 04/21/2022 10:09 AM    RDW 17.8 (H) 05/02/2023 07:42 AM    RDW 20.8 (H) 04/21/2022 10:09 AM     Lab Results   Component Value Date/Time    PH 7.399 02/17/2022 02:11 AM    PO2 379 (H) 02/17/2022 02:11 AM    PCO2 37.4 02/17/2022 02:11 AM    HCO3 23.1 (L) 02/17/2022 02:11 AM       Imaging:   CT chest (04/14/2023, my reading of images):  Study is  performed without IV contrast.  There is calcification of the aorta.  No obvious mediastinal or hilar adenopathy, although lack of IV contrast limits full mediastinal assessment.  There is minimal symmetrical bilateral noninflammatory bronchiectasis bilateral upper lobes, right middle lobe, left lingula, and bilateral lower lobes (unchanged over prior CT chest 06/07/2022).  There is mild linear atelectasis in the left lingula medial aspect, noted new in comparison to prior CT of the chest 06/07/2022.  There is a slightly stellate and irregular shaped right middle lobe nodule noted right middle lobe (axial image 82), that was not noted on prior CT of the chest performed 06/07/2022.  There is right middle lobe posterior reticular attenuation that appears slightly more prominent in comparison to prior CT of the chest 06/07/2022, which was markedly improved compared to 02/19/2022 demonstrating a 2.1 cm right middle lobe posterior pleural-based irregular soft tissue attenuation at that time.  There are a few mm of bilateral posterior dependent pleural fluid noted.  There is a small amounts of pericardial fluid noted.    Assessment:     Polycystic kidney disease, post cadaveric renal allograft transplantation 05/2021, on chronic immunosuppression with tacrolimus and prednisone.  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  CT chest at that time demonstrated right middle lobe consolidation that was noted near completely resolved on follow-up CT of chest 06/07/2022.  Abnormal pulmonary CT findings  Small anterior right middle lobe stellate shaped nodule that is new in comparison to prior CTs of the chest 06/07/2022 and 02/19/2022  Minimal reticular attenuation right middle lobe posterior aspect, noted stable over CT chest 06/07/2022, and markedly improved over CT chest 02/19/2022, suspicious for infectious etiology, highly likely Nocardia 02/2022.  Left lingular linear  atelectasis, new over prior CT of the chest 06/07/2022  Paroxysmal atrial fibrillation, on anticoagulation with Eliquis.  Chronic heart failure with preserved ejection fraction, followed by Dr. Bass.  Last echocardiogram 02/11/2023 with normal LVSF, EF 55-60%    Plan:     Will proceed to fiberoptic bronchoscopy with bronchoalveolar lavage right middle lobe for mycobacterial/Nocardia cultures.  Patient was instructed to hold his Eliquis at this point.  [facility] for pre anesthesia evaluation.  Will obtain CBC with diff, PT/PTT/INR, and CBC.  Further to follow after above      Brandy Hernandez MD, [facility]  Pulmonary/Critical Care

## 2023-05-02 NOTE — PROGRESS NOTES
Does he follow with hepatologist for high bili?  Hb 18. Please phlebotomy ASAP. On eliquis. Hematology consult . CBC  a week after phlebotomy   Native kidney and tx kidney US to r/o mass.

## 2023-05-03 ENCOUNTER — ANESTHESIA EVENT (OUTPATIENT)
Dept: ENDOSCOPY | Facility: HOSPITAL | Age: 73
End: 2023-05-03
Payer: MEDICARE

## 2023-05-03 ENCOUNTER — TELEPHONE (OUTPATIENT)
Dept: TRANSPLANT | Facility: CLINIC | Age: 73
End: 2023-05-03
Payer: MEDICARE

## 2023-05-03 ENCOUNTER — PATIENT MESSAGE (OUTPATIENT)
Dept: TRANSPLANT | Facility: CLINIC | Age: 73
End: 2023-05-03
Payer: MEDICARE

## 2023-05-03 DIAGNOSIS — D75.1 POLYCYTHEMIA: ICD-10-CM

## 2023-05-03 DIAGNOSIS — D58.2 ELEVATED HEMOGLOBIN: Primary | ICD-10-CM

## 2023-05-03 LAB
BKV DNA SERPL NAA+PROBE-ACNC: NORMAL IU/ML
TACROLIMUS TROUGH BLD-MCNC: 4.3 NG/ML

## 2023-05-03 RX ORDER — ONDANSETRON 2 MG/ML
4 INJECTION INTRAMUSCULAR; INTRAVENOUS ONCE AS NEEDED
Status: CANCELLED | OUTPATIENT
Start: 2023-05-03 | End: 2034-09-29

## 2023-05-03 RX ORDER — BUMETANIDE 1 MG/1
TABLET ORAL
COMMUNITY
Start: 2023-05-01

## 2023-05-03 NOTE — TELEPHONE ENCOUNTER
----- Message from Celeste Yen MD sent at 5/2/2023 12:19 PM CDT -----  Does he follow with hepatologist for high bili?  Hb 18. Please phlebotomy ASAP. On eliquis. Hematology consult . CBC  a week after phlebotomy   Native kidney and tx kidney US to r/o mass.

## 2023-05-03 NOTE — TELEPHONE ENCOUNTER
Notified patient of Dr. Yen's review of 5/2/223 lab results via telephone & [facility] message.     Informed patient of elevated H/H which needs phlebotomy ASAP. Patient request to have it on Mon 5/8/23 because he's having a bronchoscopy tomorrow & he's coming to [place] for transplant clinic appointment. Both retroperitoneal & transplant kidney ultrasounds will be scheduled on Mon 5/8/23. Will try to schedule hepatology & hematology clinic appointment for next week.  will notify patient of appointments. Patient verbalized understanding.     [facility] message sent to patient:    Hi Mr. Villeda,     Dr. eYn reviewed your 5/2/23 lab results. Your Hgb & Hct are elevated. She wants you to have a phlebotomy as soon as possible. I realize you have a procedure tomorrow. The phlebotomy is scheduled Mon 5/8/23 after your 10AM transplant clinic appointment.     She also wants you to have a transplant & native kidney ultrasounds to see what's causing the increase of your red blood cells. Follow up with hematology to further evaluate the abnormal levels.     Dr. Yen also wants you to follow up with the hepatologist (liver doctor) to further evaluate your elevated bilirubin level.     Tomorrow Luis Eduardo will schedule the appointments. I told her you may not be able to talk to her until after your procedure so if she needs to contact you.      Thanks,     Tiara     ----- Message from Celeste Yen MD sent at 5/2/2023 12:19 PM CDT -----  Does he follow with hepatologist for high bili?  Hb 18. Please phlebotomy ASAP. On eliquis. Hematology consult . CBC  a week after phlebotomy   Native kidney and tx kidney US to r/o mass.

## 2023-05-04 ENCOUNTER — HOSPITAL ENCOUNTER (OUTPATIENT)
Facility: HOSPITAL | Age: 73
Discharge: HOME | End: 2023-05-04
Attending: INTERNAL MEDICINE | Admitting: INTERNAL MEDICINE
Payer: MEDICARE

## 2023-05-04 ENCOUNTER — PATIENT MESSAGE (OUTPATIENT)
Dept: TRANSPLANT | Facility: CLINIC | Age: 73
End: 2023-05-04
Payer: MEDICARE

## 2023-05-04 ENCOUNTER — ANESTHESIA (OUTPATIENT)
Dept: ENDOSCOPY | Facility: HOSPITAL | Age: 73
End: 2023-05-04
Payer: MEDICARE

## 2023-05-04 VITALS
OXYGEN SATURATION: 85 % | TEMPERATURE: 97 F | SYSTOLIC BLOOD PRESSURE: 166 MMHG | DIASTOLIC BLOOD PRESSURE: 95 MMHG | RESPIRATION RATE: 18 BRPM | HEART RATE: 74 BPM

## 2023-05-04 DIAGNOSIS — R91.8 MULTIPLE PULMONARY NODULES: ICD-10-CM

## 2023-05-04 LAB — KOH PREP SPEC: NORMAL

## 2023-05-04 PROCEDURE — 37000008 HC ANESTHESIA 1ST 15 MINUTES: Performed by: INTERNAL MEDICINE

## 2023-05-04 PROCEDURE — 25000003 PHARM REV CODE 250: Performed by: INTERNAL MEDICINE

## 2023-05-04 PROCEDURE — D9220A PRA ANESTHESIA: Mod: ANES,,, | Performed by: ANESTHESIOLOGY

## 2023-05-04 PROCEDURE — 88305 TISSUE EXAM BY PATHOLOGIST: CPT | Performed by: INTERNAL MEDICINE

## 2023-05-04 PROCEDURE — 99900026 HC AIRWAY MAINTENANCE (STAT)

## 2023-05-04 PROCEDURE — 87206 SMEAR FLUORESCENT/ACID STAI: CPT | Mod: 90 | Performed by: INTERNAL MEDICINE

## 2023-05-04 PROCEDURE — 63600175 PHARM REV CODE 636 W HCPCS: Performed by: NURSE ANESTHETIST, CERTIFIED REGISTERED

## 2023-05-04 PROCEDURE — D9220A PRA ANESTHESIA: ICD-10-PCS | Mod: CRNA,,, | Performed by: NURSE ANESTHETIST, CERTIFIED REGISTERED

## 2023-05-04 PROCEDURE — 87102 FUNGUS ISOLATION CULTURE: CPT | Performed by: INTERNAL MEDICINE

## 2023-05-04 PROCEDURE — 31624 DX BRONCHOSCOPE/LAVAGE: CPT | Performed by: INTERNAL MEDICINE

## 2023-05-04 PROCEDURE — 25000003 PHARM REV CODE 250: Performed by: NURSE ANESTHETIST, CERTIFIED REGISTERED

## 2023-05-04 PROCEDURE — 87206 SMEAR FLUORESCENT/ACID STAI: CPT | Performed by: INTERNAL MEDICINE

## 2023-05-04 PROCEDURE — 88112 CYTOPATH CELL ENHANCE TECH: CPT

## 2023-05-04 PROCEDURE — 87220 TISSUE EXAM FOR FUNGI: CPT | Performed by: INTERNAL MEDICINE

## 2023-05-04 PROCEDURE — D9220A PRA ANESTHESIA: Mod: CRNA,,, | Performed by: NURSE ANESTHETIST, CERTIFIED REGISTERED

## 2023-05-04 PROCEDURE — 87070 CULTURE OTHR SPECIMN AEROBIC: CPT | Performed by: INTERNAL MEDICINE

## 2023-05-04 PROCEDURE — 99900035 HC TECH TIME PER 15 MIN (STAT)

## 2023-05-04 PROCEDURE — 37000009 HC ANESTHESIA EA ADD 15 MINS: Performed by: INTERNAL MEDICINE

## 2023-05-04 PROCEDURE — D9220A PRA ANESTHESIA: ICD-10-PCS | Mod: ANES,,, | Performed by: ANESTHESIOLOGY

## 2023-05-04 PROCEDURE — 87116 MYCOBACTERIA CULTURE: CPT | Performed by: INTERNAL MEDICINE

## 2023-05-04 RX ORDER — PROPOFOL 10 MG/ML
VIAL (ML) INTRAVENOUS CONTINUOUS PRN
Status: DISCONTINUED | OUTPATIENT
Start: 2023-05-04 | End: 2023-05-04

## 2023-05-04 RX ORDER — LIDOCAINE HYDROCHLORIDE 10 MG/ML
1 INJECTION, SOLUTION EPIDURAL; INFILTRATION; INTRACAUDAL; PERINEURAL ONCE
Status: DISCONTINUED | OUTPATIENT
Start: 2023-05-04 | End: 2023-05-04 | Stop reason: HOSPADM

## 2023-05-04 RX ORDER — LIDOCAINE HYDROCHLORIDE 40 MG/ML
4 SOLUTION TOPICAL
Status: DISCONTINUED | OUTPATIENT
Start: 2023-05-04 | End: 2023-05-04 | Stop reason: HOSPADM

## 2023-05-04 RX ORDER — KETAMINE HYDROCHLORIDE 100 MG/ML
INJECTION, SOLUTION INTRAMUSCULAR; INTRAVENOUS
Status: DISCONTINUED | OUTPATIENT
Start: 2023-05-04 | End: 2023-05-04

## 2023-05-04 RX ORDER — LIDOCAINE HYDROCHLORIDE 20 MG/ML
15 SOLUTION OROPHARYNGEAL ONCE
Status: COMPLETED | OUTPATIENT
Start: 2023-05-04 | End: 2023-05-04

## 2023-05-04 RX ORDER — PROPOFOL 10 MG/ML
VIAL (ML) INTRAVENOUS
Status: DISCONTINUED | OUTPATIENT
Start: 2023-05-04 | End: 2023-05-04

## 2023-05-04 RX ORDER — LIDOCAINE HYDROCHLORIDE 20 MG/ML
INJECTION INTRAVENOUS
Status: DISCONTINUED | OUTPATIENT
Start: 2023-05-04 | End: 2023-05-04

## 2023-05-04 RX ADMIN — SODIUM CHLORIDE, SODIUM GLUCONATE, SODIUM ACETATE, POTASSIUM CHLORIDE AND MAGNESIUM CHLORIDE: 526; 502; 368; 37; 30 INJECTION, SOLUTION INTRAVENOUS at 07:05

## 2023-05-04 RX ADMIN — PROPOFOL 140 MG: 10 INJECTION, EMULSION INTRAVENOUS at 07:05

## 2023-05-04 RX ADMIN — LIDOCAINE HYDROCHLORIDE 4 ML: 40 SOLUTION TOPICAL at 07:05

## 2023-05-04 RX ADMIN — LIDOCAINE HYDROCHLORIDE 80 MG: 20 INJECTION INTRAVENOUS at 07:05

## 2023-05-04 RX ADMIN — LIDOCAINE HYDROCHLORIDE 15 ML: 20 SOLUTION ORAL; TOPICAL at 07:05

## 2023-05-04 RX ADMIN — Medication 75 MCG/KG/MIN: at 07:05

## 2023-05-04 RX ADMIN — KETAMINE HYDROCHLORIDE 20 MG: 100 INJECTION INTRAMUSCULAR; INTRAVENOUS at 07:05

## 2023-05-04 NOTE — ANESTHESIA POSTPROCEDURE EVALUATION
Anesthesia Post Evaluation    Patient: Ronal Mazariegos    Procedure(s) Performed: Procedure(s) (LRB):  BRONCHOSCOPY, FIBEROPTIC (N/A)  LAVAGE, BRONCHOALVEOLAR    Final Anesthesia Type: general      Patient location during evaluation: OPS  Patient participation: Yes- Able to Participate  Level of consciousness: awake and oriented  Post-procedure vital signs: reviewed and stable  Pain management: adequate  Airway patency: patent    PONV status at discharge: No PONV  Anesthetic complications: no      Cardiovascular status: hemodynamically stable  Respiratory status: unassisted and spontaneous ventilation  Hydration status: euvolemic  Follow-up not needed.          Vitals Value Taken Time   /95 05/04/23 1002   Temp 36.3 °C (97.4 °F) 05/04/23 0846   Pulse 74 05/04/23 1002   Resp 18 05/04/23 1702   SpO2 85 % 05/04/23 1002         No case tracking events are documented in the log.      Pain/Marva Score: Marva Score: 10 (5/4/2023  9:50 AM)  Modified Marva Score: 20 (5/4/2023  9:50 AM)

## 2023-05-04 NOTE — ANESTHESIA PROCEDURE NOTES
Intubation    Date/Time: 5/4/2023 7:58 AM  Performed by: Dinora Self CRNA  Authorized by: Albert Silva MD     Intubation:     Induction:  Intravenous    Intubated:  Postinduction    Mask Ventilation:  Not attempted    Attempts:  1    Attempted By:  Student    Difficult Airway Encountered?: No      Complications:  None    Airway Device:  Supraglottic airway/LMA (igel)    Airway Device Size:  5.0    Secured at:  The lips    Placement Verified By:  Capnometry    Complicating Factors:  None    Findings Post-Intubation:  BS equal bilateral and atraumatic/condition of teeth unchanged

## 2023-05-04 NOTE — DISCHARGE INSTRUCTIONS
-Your physician will be in contact with you regarding your results and if any follow up instructions are needed.    - NO driving and NO alcohol consumption for 24 hours and while taking narcotic pain medication.    - Mild cough and/or sore throat is expected. Red streaks may be present in mucus. Hard candies, peppermints, throat lozenges, gargling with warm water and salt may help    - Although no incisions were made monitor for signs of infection such as fever or chills.    - Report to your nearest ER and/or call your doctor if you experience any SUDDEN/SEVERE chest/abdominal pain, weakness, trouble breathing, uncontrolled pain, coughing up more than 8oz of blood.

## 2023-05-04 NOTE — ANESTHESIA PREPROCEDURE EVALUATION
05/03/2023  Ronal Mazariegos is a 72 y.o., male , who presents for the following:    Procedure: BRONCHOSCOPY, FIBEROPTIC (Bronchus)   Anesthesia type: General   Diagnosis: Pulmonary nodule [R91.1]   Pre-op diagnosis: Pulmonary nodule [R91.1]   Location: [facility] BRONCHOSCOPY LAB   Surgeons: Jung Hernandez Jr., MD, [facility]     ASSOCIATED DIAGNOSES:  1. Polycystic kidney disease, post cadaveric renal allograft transplantation 05/2021, on chronic immunosuppression with tacrolimus and prednisone.  2. 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  CT chest at that time demonstrated right middle lobe consolidation that was noted near completely resolved on follow-up CT of chest 06/07/2022.  3. Abnormal pulmonary CT findings  a. Small anterior right middle lobe stellate shaped nodule that is new in comparison to prior CTs of the chest 06/07/2022 and 02/19/2022  b. Minimal reticular attenuation right middle lobe posterior aspect, noted stable over CT chest 06/07/2022, and markedly improved over CT chest 02/19/2022, suspicious for infectious etiology, highly likely Nocardia 02/2022.  c. Left lingular linear atelectasis, new over prior CT of the chest 06/07/2022  4. Paroxysmal atrial fibrillation, on anticoagulation with Eliquis.  5. Chronic heart failure with preserved ejection fraction, followed by Dr. Bass.  Last echocardiogram 02/11/2023 with normal LVSF, EF 55-60%     LAB:   04/18 0732 05/02 0742 05/02 1116   HGB --     18.3 High      17.6       WBC --     5.13     5.12       Platelets --     92 Low      84 Low        NA --     140     137       K+ 4.2     4.3     4.6       Creatinine --     1.28 High      1.18       Glucose --     91     104       INR --     --     1.60 High                Pre-op Assessment    I have reviewed the Patient Summary Reports.      I have reviewed the Nursing Notes. I have reviewed the NPO Status.   I have reviewed the Medications.     Review of Systems  Anesthesia Hx:  No problems with previous Anesthesia  Denies Family Hx of Anesthesia complications.   Denies Personal Hx of Anesthesia complications.   Social:  Former Smoker    Cardiovascular:   Hypertension CHF A.FIB  CHF / Cardiomyopathy   Pulmonary:  Pulmonary Normal    Renal/:   Chronic Renal Disease BPH CKD, s/p Renal Tx  Polycystic Kidney Dz   Hepatic/GI:   Polycystic Liver Dz  Previously treated for Hepatitis C   Neurological:   H/O Disseminated cerebral nocardiosis   Endocrine:  Endocrine Normal        Physical Exam  General: Alert and Oriented    Airway:  Mallampati: III   Mouth Opening: Small, but > 3cm  TM Distance: 4 - 6 cm  Tongue: Normal  Neck ROM: Normal ROM    Dental:  Intact    Chest/Lungs:  Normal Respiratory Rate    Heart:  Rate: Normal  Rhythm: Regular Rhythm        Anesthesia Plan  Type of Anesthesia, risks & benefits discussed:    Anesthesia Type: Gen Supraglottic Airway  Intra-op Monitoring Plan: Standard ASA Monitors  Post Op Pain Control Plan: IV/PO Opioids PRN  Induction:  IV  Airway Plan: Direct  Informed Consent: Informed consent signed with the Patient and all parties understand the risks and agree with anesthesia plan.  All questions answered.   ASA Score: 3  Day of Surgery Review of History & Physical: H&P Update referred to the surgeon/provider.  Anesthesia Plan Notes:       Ready For Surgery From Anesthesia Perspective.     .

## 2023-05-04 NOTE — TRANSFER OF CARE
Anesthesia Transfer of Care Note    Patient: Ronal Mazariegos    Procedure(s) Performed: Procedure(s) (LRB):  BRONCHOSCOPY, FIBEROPTIC (N/A)  LAVAGE, BRONCHOALVEOLAR    Patient location: [facility]    Anesthesia Type: general    Transport from OR: Transported from OR on 2-3 L/min O2 by NC with adequate spontaneous ventilation    Post pain: adequate analgesia    Post assessment: no apparent anesthetic complications and tolerated procedure well    Post vital signs: stable    Level of consciousness: awake, alert and oriented    Nausea/Vomiting: no nausea/vomiting    Complications: none    Transfer of care protocol was followed      Last vitals:   Visit Vitals  BP (!) 155/95   Pulse 80   Temp 36.3 °C (97.4 °F) (Oral)   Resp 18   SpO2 99%

## 2023-05-04 NOTE — OP NOTE
[facility]  Fiberoptic Bronchoscopy  Operative Note    SUMMARY     Date of Procedure: 5/4/2023    Procedure:  Fiberoptic bronchoscopy with bronchoalveolar lavage right middle lobe and left lingula    Performed by: Brandy Hernandez MD, [facility]    Pre-Procedure Diagnosis:  Right middle lobe pulmonary nodule and reticular attenuation, linear atelectasis left lingula.  Patient with history of nocardiosis 2022.    Post-Procedure Diagnosis: Right middle lobe pulmonary nodule and reticular attenuation, linear atelectasis left lingula.  Patient with history of nocardiosis 2022.    Anesthesia:  LMA and anesthesia as per anesthesiology service.    Description of the Findings of the Procedure:   Patient was consented for the procedure with all risks and benefits of the procedure explained in detail.  Patient was given the opportunity to ask questions and all concerns were answered.  Anesthesiology service performed anesthesia and placement of LMA.  Fiberoptic bronchoscope was passed per LMA which appeared to be in good positioning.  Vocal cords appeared normal both pre and post bronchoscopy.  Trachea normal, george sharp.  All lung airways were patent to all subsegmental bronchi without extrinsic compression or abnormal mucosa.  Small amounts of clear thick secretions were encountered bilateral airways.  Bronchoalveolar lavage by quantitative method was performed right middle lobe, with good return.  Bronchoalveolar lavage was also performed left lingula with good return.  No hypotension, desaturation, or arrhythmias encountered.      Complications:  None    Estimated Blood Loss (EBL):  0 cc    OUTCOME: Patient tolerated treatment/procedure well without complication and is now ready for discharge.         Condition:  Good        Disposition:  Home    FOLLOWUP: In clinic        Brandy Hernandez MD, [facility]

## 2023-05-05 ENCOUNTER — PATIENT MESSAGE (OUTPATIENT)
Dept: TRANSPLANT | Facility: CLINIC | Age: 73
End: 2023-05-05
Payer: MEDICARE

## 2023-05-05 LAB
M AVIUM PARATB TISS QL ZN STN: NORMAL
PSYCHE PATHOLOGY RESULT: NORMAL
RHODAMINE-AURAMINE STN SPEC: NORMAL

## 2023-05-07 LAB
BACTERIA SPEC ANAEROBE+AEROBE CULT: ABNORMAL
BACTERIA SPEC ANAEROBE+AEROBE CULT: ABNORMAL
GRAM STN SPEC: ABNORMAL
GRAM STN SPEC: ABNORMAL

## 2023-05-08 ENCOUNTER — TELEPHONE (OUTPATIENT)
Dept: PULMONOLOGY | Facility: CLINIC | Age: 73
End: 2023-05-08
Payer: MEDICARE

## 2023-05-08 ENCOUNTER — HOSPITAL ENCOUNTER (OUTPATIENT)
Dept: RADIOLOGY | Facility: HOSPITAL | Age: 73
Discharge: HOME | End: 2023-05-08
Attending: INTERNAL MEDICINE
Payer: MEDICARE

## 2023-05-08 ENCOUNTER — HOSPITAL ENCOUNTER (OUTPATIENT)
Dept: TRANSFUSION MEDICINE | Facility: HOSPITAL | Age: 73
Discharge: HOME | End: 2023-05-08
Attending: INTERNAL MEDICINE
Payer: MEDICARE

## 2023-05-08 ENCOUNTER — OFFICE VISIT (OUTPATIENT)
Dept: TRANSPLANT | Facility: CLINIC | Age: 73
End: 2023-05-08
Payer: MEDICARE

## 2023-05-08 VITALS
DIASTOLIC BLOOD PRESSURE: 103 MMHG | BODY MASS INDEX: 24.73 KG/M2 | WEIGHT: 192.69 LBS | OXYGEN SATURATION: 99 % | HEIGHT: 74 IN | RESPIRATION RATE: 16 BRPM | SYSTOLIC BLOOD PRESSURE: 162 MMHG | HEART RATE: 81 BPM | TEMPERATURE: 97 F

## 2023-05-08 DIAGNOSIS — D58.2 ELEVATED HEMOGLOBIN: ICD-10-CM

## 2023-05-08 DIAGNOSIS — D63.8 ANEMIA OF CHRONIC DISEASE: ICD-10-CM

## 2023-05-08 DIAGNOSIS — Z79.60 LONG-TERM USE OF IMMUNOSUPPRESSANT MEDICATION: ICD-10-CM

## 2023-05-08 DIAGNOSIS — Z91.89 AT RISK FOR OPPORTUNISTIC INFECTIONS: ICD-10-CM

## 2023-05-08 DIAGNOSIS — D75.1 POLYCYTHEMIA: ICD-10-CM

## 2023-05-08 DIAGNOSIS — I15.0 RENOVASCULAR HYPERTENSION: ICD-10-CM

## 2023-05-08 DIAGNOSIS — A43.9 NOCARDIA INFECTION: ICD-10-CM

## 2023-05-08 DIAGNOSIS — Z94.0 KIDNEY REPLACED BY TRANSPLANT: Primary | ICD-10-CM

## 2023-05-08 PROCEDURE — 76770 US EXAM ABDO BACK WALL COMP: CPT | Mod: TC

## 2023-05-08 PROCEDURE — 99215 OFFICE O/P EST HI 40 MIN: CPT | Mod: S$PBB,,, | Performed by: NURSE PRACTITIONER

## 2023-05-08 PROCEDURE — 76776 US TRANSPLANT KIDNEY WITH DOPPLER: ICD-10-PCS | Mod: 26,,, | Performed by: RADIOLOGY

## 2023-05-08 PROCEDURE — 76770 US EXAM ABDO BACK WALL COMP: CPT | Mod: 26,,, | Performed by: RADIOLOGY

## 2023-05-08 PROCEDURE — 99999 PR PBB SHADOW E&M-EST. PATIENT-LVL V: CPT | Mod: PBBFAC,,, | Performed by: NURSE PRACTITIONER

## 2023-05-08 PROCEDURE — 99215 PR OFFICE/OUTPT VISIT, EST, LEVL V, 40-54 MIN: ICD-10-PCS | Mod: S$PBB,,, | Performed by: NURSE PRACTITIONER

## 2023-05-08 PROCEDURE — 76776 US EXAM K TRANSPL W/DOPPLER: CPT | Mod: TC

## 2023-05-08 PROCEDURE — 76776 US EXAM K TRANSPL W/DOPPLER: CPT | Mod: 26,,, | Performed by: RADIOLOGY

## 2023-05-08 PROCEDURE — 99999 PR PBB SHADOW E&M-EST. PATIENT-LVL V: ICD-10-PCS | Mod: PBBFAC,,, | Performed by: NURSE PRACTITIONER

## 2023-05-08 PROCEDURE — 99215 OFFICE O/P EST HI 40 MIN: CPT | Mod: PBBFAC,25 | Performed by: NURSE PRACTITIONER

## 2023-05-08 PROCEDURE — 99195 PHLEBOTOMY: CPT

## 2023-05-08 PROCEDURE — 76770 US RETROPERITONEAL COMPLETE: ICD-10-PCS | Mod: 26,,, | Performed by: RADIOLOGY

## 2023-05-08 NOTE — PROGRESS NOTES
Kidney Post-Transplant Assessment    Referring Physician: Alexis Zamorano  Current Nephrologist: Alexis Zamorano    ORGAN: RIGHT KIDNEY  Donor Type: donation after brain death  PHS Increased Risk: yes  Cold Ischemia: 486 mins  Induction Medications: simulect - basiliximab    Subjective:     CC:  Reassessment of renal allograft function and management of chronic immunosuppression.    HPI:  Mr. Mazariegos is a 72 y.o. 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  He has CKD stage 2 - GFR 60-89 and his  Baseline Cr 1.1-1.4, scr today 1.6  He takes prednisone and tacrolimus for maintenance immunosuppression.       Last seen by txp 11/14/22     Post Transplant Course :  - Afib: warfarin switched to eliquis. However eliquis is on hold per neurosurgery after craniotomy in Feb 2022  - CMV viremia: resolved  - Intracranial Nocardia infection:  Admitted at hospital with  left sided weakness. MRI demonstrated a right frontal 2.2cm ring enhancing lesion, intracranial abscess. He is s/p right frontal craniotomy for abscess drainage on 2/17/22. Cultures positive for Nocardia novis. On IV Ceftriaxone and bactrim.  Patient following up with ID and neurosurgery. Aspirin restarted. Eliquis still on hold.   2/25/22: hospital d/c; 2/17/22 - craniotomy for frontal brain mass; + nocardia nova, was seen by ID today    treated for Nocardia - Bactrim DS 2 tabs q8 + meropenem 1g q8 - being followed by ID and neurosurgery  MMF on HOLD due to + nocardia & CMV/leukopenia    f/u with hepatology for polycystic liver dz,       Interval HX:    Recent hospitalizations or ER visits since his previous clinic visit.    f/u with pulmonology for right lobe pulm nodules noted on CXR during hospital admit in 2/2023. S/p bronchoscopy on 5/4/23 5/4/23 AFB, fungal cx  and KOH (-), Mycobacterium and Norcardia  pending    5/4/23      Cont f/u with ID  for Nocardia--LOV 4/5/23     f/u MRI brain  4/14/23  Impression:  1.  No acute intracranial findings without residual or recurrent abscess.  2.  Right frontal lobe mild gliotic changes in the region of previous abscess, mild chronic microvascular ischemia and atrophy.    Intake Is drinking water 2L  UOP-no problems   BP  BP Readings from Last 3 Encounters:   05/08/23 (!) 162/103   05/04/23 (!) 166/95   05/02/23 132/70     Peripheral edema- yes, on Bumex  Appetite-good     Lab /diagnostic results reviewed with patient today.   All questions answered    Past Medical History:   Diagnosis Date    Atrial fibrillation     BPH (benign prostatic hypertrophy)     Chronic kidney disease, stage 3a, in kindney transplant (5/14/2021)     Chronic systolic heart failure 10/01/2021    H/O disseminated/cerebral nocardiosis     Hepatitis C virus infection cured after antiviral drug therapy     acquired through kidney transplant, treated / cured (SVR12 - 12/2021)    HTN (hypertension)     Polycystic kidney disease        Review of Systems   Constitutional:  Negative for activity change, appetite change, chills, fatigue, fever and unexpected weight change.   HENT:  Negative for congestion, facial swelling, postnasal drip, rhinorrhea, sinus pressure, sore throat and trouble swallowing.    Eyes:  Negative for pain, redness and visual disturbance.   Respiratory:  Negative for cough, chest tightness, shortness of breath and wheezing.    Cardiovascular:  Positive for palpitations and leg swelling. Negative for chest pain.        Hx Afib   Gastrointestinal:  Negative for abdominal pain, diarrhea, nausea and vomiting.   Genitourinary:  Negative for dysuria, flank pain and urgency.   Musculoskeletal:  Negative for gait problem, neck pain and neck stiffness.   Skin:  Negative for rash and wound.            Allergic/Immunologic: Positive for immunocompromised state. Negative for environmental allergies and food  "allergies.   Neurological:  Negative for dizziness, weakness, light-headedness and headaches.   Hematological:  Bruises/bleeds easily.        Eliquis--restarted on 3/3/2022   Psychiatric/Behavioral:  Negative for agitation and confusion. The patient is not nervous/anxious.      Objective:     Blood pressure (!) 162/103, pulse 81, temperature 97.3 °F (36.3 °C), temperature source Temporal, resp. rate 16, height 6' 2" (1.88 m), weight 87.4 kg (192 lb 10.9 oz), SpO2 99 %.body mass index is 24.74 kg/m².    Physical Exam  Vitals reviewed.   Constitutional:       Appearance: Normal appearance. He is well-developed.   HENT:      Head: Normocephalic.        Comments:    Eyes:      Conjunctiva/sclera: Conjunctivae normal.      Pupils: Pupils are equal, round, and reactive to light.   Cardiovascular:      Rate and Rhythm: Normal rate and regular rhythm.      Heart sounds: Normal heart sounds.   Pulmonary:      Effort: Pulmonary effort is normal.      Breath sounds: Normal breath sounds.   Chest:       Abdominal:      General: Bowel sounds are normal.      Palpations: Abdomen is soft.       Musculoskeletal:         General: Normal range of motion.        Arms:       Cervical back: Normal range of motion and neck supple.      Right lower leg: Edema present.      Left lower leg: Edema present.      Comments:  peripheral edema bilaterally +1, slightly greater on the right   Skin:     General: Skin is warm and dry.   Neurological:      Mental Status: He is alert and oriented to person, place, and time.      Motor: No abnormal muscle tone.   Psychiatric:         Behavior: Behavior normal.       Labs:  Lab Results   Component Value Date    WBC 5.12 05/02/2023    HGB 17.6 05/02/2023    HCT 55.4 (H) 05/02/2023     05/02/2023    K 4.6 05/02/2023     11/30/2022    CO2 19 (L) 05/02/2023    BUN 20.2 05/02/2023    CREATININE 1.18 05/02/2023    EGFRNONAA 44 08/01/2022    EGFRIFAFRICA 54 04/26/2022    GLUCOSE 104 05/02/2023    " CALCIUM 9.6 05/02/2023    PHOS 3.2 05/02/2023    MG 2.00 05/02/2023    ALBUMIN 3.4 05/02/2023    AST 35 (H) 05/02/2023    ALT 23 05/02/2023    UTPCR 0.17 08/25/2021    .1 (H) 01/01/2023    TACROLIMUS 7.5 02/25/2022       Lab Results   Component Value Date    EXTANC  03/07/2022      Comment:      Labs repeated to reassess K+;pt took kayexalate 30 gram daily X 2 days    EXTWBC 6.2 05/23/2022    EXTSEGS 62 05/23/2022    EXTPLATELETS 205 05/23/2022    EXTHEMOGLOBI 13.9 (A) 05/23/2022    EXTHEMATOCRI 45.5 05/23/2022    EXTCREATININ 1.41 05/23/2022    EXTSODIUM 134 (A) 05/23/2022    EXTPOTASSIUM 5.6 (A) 05/23/2022    EXTBUN 17.7 05/23/2022    EXTCO2 20 (A) 05/23/2022    EXTCALCIUM 9.4 05/23/2022    EXTPHOSPHORU 2.8 03/07/2022    EXTGLUCOSE 105 05/23/2022    EXTALBUMIN 3.5 05/23/2022    EXTAST 28 05/23/2022    EXTALT 29 05/23/2022    EXTBILITOTAL 1.0 05/23/2022       Lab Results   Component Value Date    EXTTACROLVL 7.2 04/04/2022    EXTPROTCRE 0.37 11/01/2021    EXTPTHINTACT 244.9 (H) 08/02/2021    EXTPROTEINUA NEG 02/07/2022    EXTWBCUA NONE SEEN 02/07/2022    EXTRBCUA NONE SEEN 02/07/2022       Labs were reviewed with the patient.    Assessment:     1. Kidney replaced by transplant    2. Long-term use of immunosuppressant medication    3. Renovascular hypertension    4. Anemia of chronic disease    5. At risk for opportunistic infections    6. Nocardia infection          Plan:          Pulm.  Nodules--mgmt deferred to pulmonology   Cont to f/u with cardiology as scheduled       Follow-up:   1. CKD stage: 2   2. Immunosuppression:   Prograf trough 4.3 , which is therapeutic, target 4-7.  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   - treatment  completed 2/2022, 4/14/23 MRI brain (-)     3. Allograft Function:   Continue good po hydration.    Lab Results   Component Value Date    CREATININE 1.18 05/02/2023 5/2/2023  1yr 11mo   eGFR mls/min/1.73/m2 >60       HCVAB+/NAWAF+ donor -s/p  Epclusa tx and HX polycystic liver dz--mgmt deferred to hepatology      4. Hypertension management: advise low salt diet and home BP monitoring    Afib--Eliquis as prescribed, mgmt deferred to cardiology  Lopressor, bumex, cardura , ramipril       5. Metabolic Bone Disease/Secondary Hyperparathyroidism:stable  Will monitor PTH, CA and Vit D/guidelines,   Cont Mg ox as prescribed -->MGMT deferred to general nephrology    Lab Results   Component Value Date    .1 (H) 01/01/2023    CALCIUM 9.6 05/02/2023    PHOS 3.2 05/02/2023 5/2/2023  1yr 11mo   Magnesium 1.60 - 2.60 mg/dL 2.00         6. Electrolytes:  Will monitor /guidelines   Lab Results   Component Value Date     05/02/2023    K 4.6 05/02/2023     11/30/2022    CO2 19 (L) 05/02/2023   Hyperkalemia-- kayexalate  QOD as prescribed, low k diet   -->MGMT deferred to general nephrology      7. Anemia: stable.   Polycythemia,  gets prn  therapeutic phlebotomy ---> scheduled today    No need for intervention,  Will monitor /guidelines    Lab Results   Component Value Date    WBC 5.12 05/02/2023    HGB 17.6 05/02/2023    HCT 55.4 (H) 05/02/2023    MCV 89.2 05/02/2023    PLT 84 (L) 05/02/2023       8.  Cytopenias:   will monitor as per our guidelines. Medicine list reviewed including potential causes of drug-induced cytopenias.    ANC 2600    9.Proteinuria: continue p/c ratio as per guidelines              5/2/2023  1yr 11mo   Protein, UA Negative mg/dL Trace (A)         10. CMV and BK virus infection screening:  will continue to monitor/ guidelines          11. Weight education: provided during the clinic visit   Body mass index is 24.74 kg/m².          12.Patient safety education regarding immunosuppression including prophylaxis posttransplant for CMV, PCP : Education provided about  vaccination and prevention of infections         Follow-up:   Clinic: return to transplant clinic weekly for the first month after transplant; every 2 weeks during months 2-3; then at 6-, 9-, 12-, 18-, 24-, and 36- months post-transplant to reassess for complications from immunosuppression toxicity and monitor for rejection.  Annually thereafter.    Labs: since patient remains at high risk for rejection and drug-related complications that warrant close monitoring, labs will be ordered as follows: continue twice weekly CBC, renal panel, and drug level for first month; then same labs once weekly through 3rd month post-transplant.  Urine for UA and protein/creatinine ratio monthly.  Serum BK - PCR at 1-, 3-, 6-, 9-, 12-, 18-, 24-, 36- 48-, and 60 months post-transplant.  Hepatic panel at 1-, 2-, 3-, 6-, 9-, 12-, 18-, 24-, and 36- months post-transplant.    Jessy England NP       Education:   Material provided to the patient.  Patient reminded to call with any health changes since these can be early signs of significant complications.  Also, I advised the patient to be sure any new medications or changes of old medications are discussed with either a pharmacist or physician knowledgeable with transplant to avoid rejection/drug toxicity related to significant drug interactions.    UNOS Patient Status  Functional Status: 80% - Normal activity with effort: some symptoms of disease  Physical Capacity: No Limitations

## 2023-05-08 NOTE — LETTER
May 8, 2023        Liam Ryan  [address]  [place] [address]  Phone: [phone]  Fax: [phone]             [facility]- Transplant 1st Fl  [address]  [facility] [address]  Phone: [phone]   Patient: Ronal Mazariegos   MR Number: [id number]   YOB: 1950   Date of Visit: 5/8/2023       Dear Dr. Liam Ryan    Thank you for referring Ronal Mazariegos to me for evaluation. Attached you will find relevant portions of my assessment and plan of care.    If you have questions, please do not hesitate to call me. I look forward to following Ronal Mazariegos along with you.    Sincerely,    Jessy England, NP    Enclosure    If you would like to receive this communication electronically, please contact [email or web address] or [phone] to request [employer] Link access.    [employer] Link is a tool which provides read-only access to select patient information with whom you have a relationship. Its easy to use and provides real time access to review your patients record including encounter summaries, notes, results, and demographic information.    If you feel you have received this communication in error or would no longer like to receive these types of communications, please e-mail [email or web address]

## 2023-05-08 NOTE — TELEPHONE ENCOUNTER
He underwent fiberoptic bronchoscopy with BAL RML on 05/04/23.  Cytology negative for atypia or malignancy.  AFB negative, KOH negative.  Quantitative bacterial cultures with insignificant growth of alpha strep.  No fungal or mycobacterial growth to date. Called to update patient with results.  Will schedule follow up appointment with me in 6 weeks.

## 2023-05-11 ENCOUNTER — PATIENT MESSAGE (OUTPATIENT)
Dept: TRANSPLANT | Facility: CLINIC | Age: 73
End: 2023-05-11
Payer: MEDICARE

## 2023-05-11 ENCOUNTER — LAB VISIT (OUTPATIENT)
Dept: LAB | Facility: HOSPITAL | Age: 73
End: 2023-05-11
Attending: INTERNAL MEDICINE
Payer: MEDICARE

## 2023-05-11 DIAGNOSIS — R60.9 EDEMA, UNSPECIFIED TYPE: ICD-10-CM

## 2023-05-11 DIAGNOSIS — N18.31 CHRONIC KIDNEY DISEASE (CKD) STAGE G3A/A1, MODERATELY DECREASED GLOMERULAR FILTRATION RATE (GFR) BETWEEN 45-59 ML/MIN/1.73 SQUARE METER AND ALBUMINURIA CREATININE RATIO LESS THAN 30 MG/G: ICD-10-CM

## 2023-05-11 DIAGNOSIS — E21.3 HYPERPARATHYROIDISM, UNSPECIFIED: ICD-10-CM

## 2023-05-11 DIAGNOSIS — E87.5 HYPERKALEMIA: ICD-10-CM

## 2023-05-11 DIAGNOSIS — B70.0 MEGALOBLASTIC ANEMIA DUE TO FISH TAPEWORM: ICD-10-CM

## 2023-05-11 DIAGNOSIS — D63.8 MEGALOBLASTIC ANEMIA DUE TO FISH TAPEWORM: ICD-10-CM

## 2023-05-11 DIAGNOSIS — Z94.0 KIDNEY REPLACED BY TRANSPLANT: ICD-10-CM

## 2023-05-11 DIAGNOSIS — E87.20 ACIDOSIS: Primary | ICD-10-CM

## 2023-05-11 LAB
ALBUMIN SERPL-MCNC: 3.6 G/DL (ref 3.4–4.8)
ALBUMIN/GLOB SERPL: 1.6 RATIO (ref 1.1–2)
ALP SERPL-CCNC: 98 UNIT/L (ref 40–150)
ALT SERPL-CCNC: 17 UNIT/L (ref 0–55)
AST SERPL-CCNC: 21 UNIT/L (ref 5–34)
BASOPHILS # BLD AUTO: 0.04 X10(3)/MCL
BASOPHILS NFR BLD AUTO: 0.7 %
BILIRUBIN DIRECT+TOT PNL SERPL-MCNC: 1.8 MG/DL
BUN SERPL-MCNC: 22 MG/DL (ref 8.4–25.7)
CALCIUM SERPL-MCNC: 10.2 MG/DL (ref 8.8–10)
CHLORIDE SERPL-SCNC: 106 MMOL/L (ref 98–107)
CO2 SERPL-SCNC: 29 MMOL/L (ref 23–31)
CREAT SERPL-MCNC: 1.58 MG/DL (ref 0.73–1.18)
DEPRECATED CALCIDIOL+CALCIFEROL SERPL-MC: 29.8 NG/ML (ref 30–80)
EOSINOPHIL # BLD AUTO: 0.11 X10(3)/MCL (ref 0–0.9)
EOSINOPHIL NFR BLD AUTO: 2 %
ERYTHROCYTE [DISTWIDTH] IN BLOOD BY AUTOMATED COUNT: 17.2 % (ref 11.5–17)
FERRITIN SERPL-MCNC: 79.9 NG/ML (ref 21.81–274.66)
GFR SERPLBLD CREATININE-BSD FMLA CKD-EPI: 47 MLS/MIN/1.73/M2
GLOBULIN SER-MCNC: 2.2 GM/DL (ref 2.4–3.5)
GLUCOSE SERPL-MCNC: 93 MG/DL (ref 82–115)
HCT VFR BLD AUTO: 57.5 % (ref 42–52)
HGB BLD-MCNC: 17.8 G/DL (ref 14–18)
IMM GRANULOCYTES # BLD AUTO: 0.03 X10(3)/MCL (ref 0–0.04)
IMM GRANULOCYTES NFR BLD AUTO: 0.5 %
IRON SATN MFR SERPL: 18 % (ref 20–50)
IRON SERPL-MCNC: 57 UG/DL (ref 65–175)
LYMPHOCYTES # BLD AUTO: 2.52 X10(3)/MCL (ref 0.6–4.6)
LYMPHOCYTES NFR BLD AUTO: 44.8 %
MAGNESIUM SERPL-MCNC: 2 MG/DL (ref 1.6–2.6)
MCH RBC QN AUTO: 27.8 PG (ref 27–31)
MCHC RBC AUTO-ENTMCNC: 31 G/DL (ref 33–36)
MCV RBC AUTO: 89.7 FL (ref 80–94)
MONOCYTES # BLD AUTO: 0.49 X10(3)/MCL (ref 0.1–1.3)
MONOCYTES NFR BLD AUTO: 8.7 %
NEUTROPHILS # BLD AUTO: 2.44 X10(3)/MCL (ref 2.1–9.2)
NEUTROPHILS NFR BLD AUTO: 43.3 %
NRBC BLD AUTO-RTO: 0 %
PHOSPHATE SERPL-MCNC: 3.2 MG/DL (ref 2.3–4.7)
PLATELET # BLD AUTO: 109 X10(3)/MCL (ref 130–400)
PMV BLD AUTO: 9.6 FL (ref 7.4–10.4)
POTASSIUM SERPL-SCNC: 5.4 MMOL/L (ref 3.5–5.1)
PROT SERPL-MCNC: 5.8 GM/DL (ref 5.8–7.6)
PTH-INTACT SERPL-MCNC: 214 PG/ML (ref 8.7–77)
RBC # BLD AUTO: 6.41 X10(6)/MCL (ref 4.7–6.1)
SODIUM SERPL-SCNC: 143 MMOL/L (ref 136–145)
TIBC SERPL-MCNC: 263 UG/DL (ref 69–240)
TIBC SERPL-MCNC: 320 UG/DL (ref 250–450)
TRANSFERRIN SERPL-MCNC: 291 MG/DL (ref 163–344)
WBC # SPEC AUTO: 5.63 X10(3)/MCL (ref 4.5–11.5)

## 2023-05-11 PROCEDURE — 83550 IRON BINDING TEST: CPT

## 2023-05-11 PROCEDURE — 80053 COMPREHEN METABOLIC PANEL: CPT

## 2023-05-11 PROCEDURE — 83970 ASSAY OF PARATHORMONE: CPT

## 2023-05-11 PROCEDURE — 36415 COLL VENOUS BLD VENIPUNCTURE: CPT

## 2023-05-11 PROCEDURE — 82306 VITAMIN D 25 HYDROXY: CPT

## 2023-05-11 PROCEDURE — 83735 ASSAY OF MAGNESIUM: CPT

## 2023-05-11 PROCEDURE — 84100 ASSAY OF PHOSPHORUS: CPT

## 2023-05-11 PROCEDURE — 85025 COMPLETE CBC W/AUTO DIFF WBC: CPT

## 2023-05-11 PROCEDURE — 82728 ASSAY OF FERRITIN: CPT

## 2023-05-11 RX ORDER — SODIUM POLYSTYRENE SULFONATE 4.1 MEQ/G
30 POWDER, FOR SUSPENSION ORAL; RECTAL DAILY
Qty: 453 G | Refills: 2 | Status: SHIPPED | OUTPATIENT
Start: 2023-05-11

## 2023-05-11 NOTE — PROGRESS NOTES
This set of labs were ordered by his General nephrologist, Dr. Ryan. He has an appointment with him on Wed 5/17. His f/u hematology appointment is Mon 5/29. Thanks.

## 2023-05-11 NOTE — TELEPHONE ENCOUNTER
Notified patient of Dr. Yen's review of 5/10/23 lab results via [facility].     [facility] message sent to patient:    Hi Mr. Villeda,     Dr. Yen reviewed your today's lab results. Your potassium 5.4 is elevated. She wants you to take Kayexalate 30gm daily x 2 & maintain a low potassium diet. Recheck your labs on Monday after good hydration. She wants you to follow with your general nephrologist for high potassium as well as elevated hemoglobin.  Which I see you have appointments scheduled to see Dr. Ryan on 5/17 & Dr. Wheatley on 5/29.     Thanks,     Tiara       ----- Message from Celeste Yen MD sent at 5/11/2023 10:09 AM CDT -----  High Na and Cr: needs more water intake  K: 5.4: needs more water intake. Low K diet. Kayexalate 30 gram daily X 2 . Check  lab on Monday after good hydration.  He needs to follow with general nephrologist for high K as well  Hb 17: need sphlebotomy. Hematology consult please

## 2023-05-11 NOTE — PROGRESS NOTES
High Na and Cr: needs more water intake  K: 5.4: needs more water intake. Low K diet. Kayexalate 30 gram daily X 2 . Check  lab on Monday after good hydration.  He needs to follow with general nephrologist for high K as well  Hb 17: need sphlebotomy. Hematology consult please

## 2023-05-12 NOTE — PROGRESS NOTES
Pt presented to donor room ambulatory for a therapeutic phlebotomy.  Vital signs; b/p 151/85, pulse 70, temp 96.8, hematocrit 50% and weight 194 lbs.  450 mls of whole blood was drawn from right ac vein.  Pressure dressing applied after hemostasis was achieved.  Instructions for phlebotomy after care given verbally and written.  Tolerated well.  Refreshments provided.  Pt exited dept ambulatory by himself.

## 2023-05-15 ENCOUNTER — LAB VISIT (OUTPATIENT)
Dept: LAB | Facility: HOSPITAL | Age: 73
End: 2023-05-15
Attending: INTERNAL MEDICINE
Payer: MEDICARE

## 2023-05-15 DIAGNOSIS — Z94.0 KIDNEY REPLACED BY TRANSPLANT: ICD-10-CM

## 2023-05-15 DIAGNOSIS — T86.10 COMPLICATION OF TRANSPLANTED KIDNEY, UNSPECIFIED COMPLICATION: ICD-10-CM

## 2023-05-15 LAB
ALBUMIN SERPL-MCNC: 3.6 G/DL (ref 3.4–4.8)
BASOPHILS # BLD AUTO: 0.03 X10(3)/MCL
BASOPHILS NFR BLD AUTO: 0.5 %
BUN SERPL-MCNC: 20.9 MG/DL (ref 8.4–25.7)
CALCIUM SERPL-MCNC: 9.8 MG/DL (ref 8.8–10)
CHLORIDE SERPL-SCNC: 106 MMOL/L (ref 98–107)
CO2 SERPL-SCNC: 27 MMOL/L (ref 23–31)
CREAT SERPL-MCNC: 1.27 MG/DL (ref 0.73–1.18)
EOSINOPHIL # BLD AUTO: 0.12 X10(3)/MCL (ref 0–0.9)
EOSINOPHIL NFR BLD AUTO: 2.2 %
ERYTHROCYTE [DISTWIDTH] IN BLOOD BY AUTOMATED COUNT: 17.3 % (ref 11.5–17)
GFR SERPLBLD CREATININE-BSD FMLA CKD-EPI: >60 MLS/MIN/1.73/M2
GLUCOSE SERPL-MCNC: 101 MG/DL (ref 82–115)
HCT VFR BLD AUTO: 58 % (ref 42–52)
HGB BLD-MCNC: 18.1 G/DL (ref 14–18)
IMM GRANULOCYTES # BLD AUTO: 0.02 X10(3)/MCL (ref 0–0.04)
IMM GRANULOCYTES NFR BLD AUTO: 0.4 %
LYMPHOCYTES # BLD AUTO: 2.31 X10(3)/MCL (ref 0.6–4.6)
LYMPHOCYTES NFR BLD AUTO: 42.2 %
MCH RBC QN AUTO: 27.8 PG (ref 27–31)
MCHC RBC AUTO-ENTMCNC: 31.2 G/DL (ref 33–36)
MCV RBC AUTO: 89 FL (ref 80–94)
MONOCYTES # BLD AUTO: 0.49 X10(3)/MCL (ref 0.1–1.3)
MONOCYTES NFR BLD AUTO: 9 %
NEUTROPHILS # BLD AUTO: 2.5 X10(3)/MCL (ref 2.1–9.2)
NEUTROPHILS NFR BLD AUTO: 45.7 %
NRBC BLD AUTO-RTO: 0 %
PHOSPHATE SERPL-MCNC: 3 MG/DL (ref 2.3–4.7)
PLATELET # BLD AUTO: 128 X10(3)/MCL (ref 130–400)
PMV BLD AUTO: 9.3 FL (ref 7.4–10.4)
POTASSIUM SERPL-SCNC: 4.1 MMOL/L (ref 3.5–5.1)
RBC # BLD AUTO: 6.52 X10(6)/MCL (ref 4.7–6.1)
SODIUM SERPL-SCNC: 141 MMOL/L (ref 136–145)
WBC # SPEC AUTO: 5.47 X10(3)/MCL (ref 4.5–11.5)

## 2023-05-15 PROCEDURE — 36415 COLL VENOUS BLD VENIPUNCTURE: CPT

## 2023-05-15 PROCEDURE — 80069 RENAL FUNCTION PANEL: CPT

## 2023-05-15 PROCEDURE — 85025 COMPLETE CBC W/AUTO DIFF WBC: CPT

## 2023-05-15 NOTE — PROGRESS NOTES
K and Cr improved. Pt needs to follow his K with general nephrologist.   Lease lab in 4 weeks along with cylex. Still off MF

## 2023-05-23 ENCOUNTER — PATIENT MESSAGE (OUTPATIENT)
Dept: TRANSPLANT | Facility: CLINIC | Age: 73
End: 2023-05-23
Payer: MEDICARE

## 2023-05-23 ENCOUNTER — LAB VISIT (OUTPATIENT)
Dept: LAB | Facility: HOSPITAL | Age: 73
End: 2023-05-23
Attending: INTERNAL MEDICINE
Payer: MEDICARE

## 2023-05-23 DIAGNOSIS — R60.1 ANASARCA: ICD-10-CM

## 2023-05-23 DIAGNOSIS — E21.3 HYPERPARATHYROIDISM, UNSPECIFIED: ICD-10-CM

## 2023-05-23 DIAGNOSIS — N18.31 CHRONIC KIDNEY DISEASE (CKD) STAGE G3A/A1, MODERATELY DECREASED GLOMERULAR FILTRATION RATE (GFR) BETWEEN 45-59 ML/MIN/1.73 SQUARE METER AND ALBUMINURIA CREATININE RATIO LESS THAN 30 MG/G: Primary | ICD-10-CM

## 2023-05-23 DIAGNOSIS — B70.0 MEGALOBLASTIC ANEMIA DUE TO FISH TAPEWORM: ICD-10-CM

## 2023-05-23 DIAGNOSIS — Z94.0 KIDNEY REPLACED BY TRANSPLANT: ICD-10-CM

## 2023-05-23 DIAGNOSIS — D63.8 MEGALOBLASTIC ANEMIA DUE TO FISH TAPEWORM: ICD-10-CM

## 2023-05-23 DIAGNOSIS — E87.20 ACIDOSIS: ICD-10-CM

## 2023-05-23 DIAGNOSIS — E83.42 HYPOMAGNESEMIA: ICD-10-CM

## 2023-05-23 LAB
ANION GAP SERPL CALC-SCNC: 4 MEQ/L
BUN SERPL-MCNC: 21.5 MG/DL (ref 8.4–25.7)
CALCIUM SERPL-MCNC: 9.6 MG/DL (ref 8.8–10)
CHLORIDE SERPL-SCNC: 109 MMOL/L (ref 98–107)
CO2 SERPL-SCNC: 26 MMOL/L (ref 23–31)
CREAT SERPL-MCNC: 1.43 MG/DL (ref 0.73–1.18)
CREAT/UREA NIT SERPL: 15
GFR SERPLBLD CREATININE-BSD FMLA CKD-EPI: 53 MLS/MIN/1.73/M2
GLUCOSE SERPL-MCNC: 97 MG/DL (ref 82–115)
POTASSIUM SERPL-SCNC: 5.5 MMOL/L (ref 3.5–5.1)
SODIUM SERPL-SCNC: 139 MMOL/L (ref 136–145)

## 2023-05-23 PROCEDURE — 36415 COLL VENOUS BLD VENIPUNCTURE: CPT

## 2023-05-23 PROCEDURE — 80048 BASIC METABOLIC PNL TOTAL CA: CPT

## 2023-05-23 NOTE — PROGRESS NOTES
Lab was ordered by his general nephrologist. Pt started on lokelma by him. Continue to follow with general nephrologist. Low K diet. More water intake

## 2023-05-29 ENCOUNTER — LAB VISIT (OUTPATIENT)
Dept: LAB | Facility: HOSPITAL | Age: 73
End: 2023-05-29
Payer: MEDICARE

## 2023-05-29 ENCOUNTER — OFFICE VISIT (OUTPATIENT)
Dept: HEMATOLOGY/ONCOLOGY | Facility: CLINIC | Age: 73
End: 2023-05-29
Payer: MEDICARE

## 2023-05-29 VITALS
WEIGHT: 186.75 LBS | OXYGEN SATURATION: 100 % | BODY MASS INDEX: 23.97 KG/M2 | DIASTOLIC BLOOD PRESSURE: 100 MMHG | HEIGHT: 74 IN | RESPIRATION RATE: 16 BRPM | SYSTOLIC BLOOD PRESSURE: 176 MMHG | TEMPERATURE: 98 F | HEART RATE: 85 BPM

## 2023-05-29 DIAGNOSIS — D45 POLYCYTHEMIA VERA: ICD-10-CM

## 2023-05-29 DIAGNOSIS — D75.1 POLYCYTHEMIA: ICD-10-CM

## 2023-05-29 DIAGNOSIS — D58.2 ELEVATED HEMOGLOBIN: ICD-10-CM

## 2023-05-29 LAB
BASOPHILS # BLD AUTO: 0.04 K/UL (ref 0–0.2)
BASOPHILS NFR BLD: 0.6 % (ref 0–1.9)
DIFFERENTIAL METHOD: ABNORMAL
EOSINOPHIL # BLD AUTO: 0.1 K/UL (ref 0–0.5)
EOSINOPHIL NFR BLD: 1 % (ref 0–8)
ERYTHROCYTE [DISTWIDTH] IN BLOOD BY AUTOMATED COUNT: 17.8 % (ref 11.5–14.5)
HCT VFR BLD AUTO: 61.1 % (ref 40–54)
HGB BLD-MCNC: 19.2 G/DL (ref 14–18)
IMM GRANULOCYTES # BLD AUTO: 0.01 K/UL (ref 0–0.04)
IMM GRANULOCYTES NFR BLD AUTO: 0.2 % (ref 0–0.5)
LYMPHOCYTES # BLD AUTO: 3 K/UL (ref 1–4.8)
LYMPHOCYTES NFR BLD: 47.6 % (ref 18–48)
MCH RBC QN AUTO: 28.2 PG (ref 27–31)
MCHC RBC AUTO-ENTMCNC: 31.4 G/DL (ref 32–36)
MCV RBC AUTO: 90 FL (ref 82–98)
MONOCYTES # BLD AUTO: 0.5 K/UL (ref 0.3–1)
MONOCYTES NFR BLD: 8.4 % (ref 4–15)
NEUTROPHILS # BLD AUTO: 2.6 K/UL (ref 1.8–7.7)
NEUTROPHILS NFR BLD: 42.2 % (ref 38–73)
NRBC BLD-RTO: 0 /100 WBC
PLATELET # BLD AUTO: 107 K/UL (ref 150–450)
PMV BLD AUTO: 9.7 FL (ref 9.2–12.9)
RBC # BLD AUTO: 6.82 M/UL (ref 4.6–6.2)
WBC # BLD AUTO: 6.22 K/UL (ref 3.9–12.7)

## 2023-05-29 PROCEDURE — 99999 PR PBB SHADOW E&M-EST. PATIENT-LVL V: CPT | Mod: PBBFAC,,, | Performed by: INTERNAL MEDICINE

## 2023-05-29 PROCEDURE — 81339 MPL GENE SEQ ALYS EXON 10: CPT | Performed by: INTERNAL MEDICINE

## 2023-05-29 PROCEDURE — 99214 OFFICE O/P EST MOD 30 MIN: CPT | Mod: S$PBB,,, | Performed by: INTERNAL MEDICINE

## 2023-05-29 PROCEDURE — 82668 ASSAY OF ERYTHROPOIETIN: CPT | Performed by: INTERNAL MEDICINE

## 2023-05-29 PROCEDURE — 99999 PR PBB SHADOW E&M-EST. PATIENT-LVL V: ICD-10-PCS | Mod: PBBFAC,,, | Performed by: INTERNAL MEDICINE

## 2023-05-29 PROCEDURE — 99215 OFFICE O/P EST HI 40 MIN: CPT | Mod: PBBFAC | Performed by: INTERNAL MEDICINE

## 2023-05-29 PROCEDURE — 99214 PR OFFICE/OUTPT VISIT, EST, LEVL IV, 30-39 MIN: ICD-10-PCS | Mod: S$PBB,,, | Performed by: INTERNAL MEDICINE

## 2023-05-29 PROCEDURE — 81219 CALR GENE COM VARIANTS: CPT | Performed by: INTERNAL MEDICINE

## 2023-05-29 PROCEDURE — 85025 COMPLETE CBC W/AUTO DIFF WBC: CPT | Performed by: INTERNAL MEDICINE

## 2023-05-29 RX ORDER — FLUOROURACIL 50 MG/G
CREAM TOPICAL
COMMUNITY
Start: 2023-05-17 | End: 2024-01-29

## 2023-05-29 RX ORDER — SODIUM ZIRCONIUM CYCLOSILICATE 10 G/10G
POWDER, FOR SUSPENSION ORAL
COMMUNITY
Start: 2023-05-25

## 2023-05-29 NOTE — PROGRESS NOTES
Subjective:       Patient ID: Ronal Mazariegos is a 72 y.o. male.    Chief Complaint: No chief complaint on file.    HPI     New virtual visit for new polycythemia. Referred by renal transplant with a history of ESRD secondary to polycystic kidney disease.  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    Polycythemia starts acutely in late August/early September by lab review including outside labs from [facility]. Reports no medication changes other than BP meds. Now on Bystolic and Norvasc. Also history of afib and currently on DOAC.     Also reports severe lower extremity edema started around the same time. Has mild VICTORIA. Edema currently controlled with lasix. ECHO from January 2021 bland but outside ECHO from August 2021 has global hypertrophy and rising pulmonary hypertension. 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    Has received single phlebotomy with repeat this week. Labs scheduled for tomorrow. Repeat ECHO with local cardiologist is scheduled 1/10 at [facility] Dr. Jimmy Bass. He has appt a few weeks later to review results.     ROS positive today for URI symptoms of congestion, cough, mild VICTORIA, increased fatigue.    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   Eliquis mentioned by referring MD but he has not been taking for months.  Possibly due to Bactrim, prograft, or tedizolid.   Also he now has acute COVID infection which will also decrease platelet count  Other than sore throat from COVID he is asymptomatic    5/29/2023 Return Visit  Return visit for abnormal CBC  Thrombocytopenia improving  Recurrence of polycythemia- had previously resolved after craniotomy for brain abscess. Repeat brain imaging in [place] without new lesion. Polycystic kidney disease, recent US without solid masses  Phlebotomy ordered in [place] but not performed yet. Had phlebotomy May 2 for Hgb of 18.       Review of Systems   Constitutional:  Positive for appetite change. Negative for unexpected weight change.   HENT:  Negative for mouth sores.    Eyes:  Negative for visual disturbance.   Respiratory:  Positive for cough. Negative for shortness of breath.    Cardiovascular:  Negative for chest pain.   Gastrointestinal:  Negative for abdominal pain and diarrhea.   Genitourinary:  Negative for frequency.   Musculoskeletal:  Negative for back pain.   Integumentary:  Negative for rash.   Neurological:  Negative for headaches.   Hematological:  Negative for adenopathy.   Psychiatric/Behavioral:  The patient is not nervous/anxious.        Objective:     Physical Exam  Vitals and nursing note reviewed.   Constitutional:       Appearance: He is well-developed.   HENT:      Head: Normocephalic and atraumatic.   Eyes:      General: No scleral icterus.     Conjunctiva/sclera: Conjunctivae normal.   Cardiovascular:      Rate and Rhythm: Normal rate.   Pulmonary:      Effort: Pulmonary effort is normal. No respiratory distress.   Abdominal:      General: There is no distension.      Palpations: Abdomen is soft.      Tenderness: There is no abdominal tenderness.   Musculoskeletal:         General: Normal range of motion.      Cervical back: Normal range of motion and neck supple.   Skin:      General: Skin is warm and dry.   Neurological:      Mental Status: He is alert and oriented to person, place, and time.      Cranial Nerves: No cranial nerve deficit.   Psychiatric:         Behavior: Behavior normal.       Assessment:       Problem List Items Addressed This Visit          Oncology    Polycythemia vera    Relevant Orders    MPN (JAK2 V617F)WITH REFLEX TO CALR,MPL     Other Visit Diagnoses       Elevated hemoglobin        Relevant Orders    CBC W/ AUTO DIFFERENTIAL    MPN (JAK2 V617F)WITH REFLEX TO CALR,MPL    ERYTHROPOIETIN    Polycythemia        Relevant Orders    CBC W/ AUTO DIFFERENTIAL    MPN (JAK2 V617F)WITH REFLEX TO CALR,MPL    ERYTHROPOIETIN            Plan:       Return of polycythemia that previously resolved after craniotomy for brain abscess  Thrombocytopenia improved    CBC update today  MPN and EPO today  Recommend follow through with planned phlebotomy in [place].    A total of 20 minutes was spent in pre-visit chart review, personal interpretation of labs and imaging, and medication review. Total visit time 30 minutes, >50 % counseling.

## 2023-05-30 ENCOUNTER — TELEPHONE (OUTPATIENT)
Dept: TRANSPLANT | Facility: CLINIC | Age: 73
End: 2023-05-30
Payer: MEDICARE

## 2023-05-30 ENCOUNTER — PATIENT MESSAGE (OUTPATIENT)
Dept: TRANSPLANT | Facility: CLINIC | Age: 73
End: 2023-05-30
Payer: MEDICARE

## 2023-05-30 DIAGNOSIS — D75.1 POLYCYTHEMIA: Primary | ICD-10-CM

## 2023-05-30 DIAGNOSIS — R91.8 PULMONARY NODULES: Primary | ICD-10-CM

## 2023-05-30 DIAGNOSIS — R91.1 SOLITARY PULMONARY NODULE: ICD-10-CM

## 2023-05-30 NOTE — TELEPHONE ENCOUNTER
Coordinator received a [facility] message from patient requesting assistance with getting therapeutic phlebotomy scheduled. Patient states his general nephrologist ordered it about 2 weeks ago but he's having issues with getting it scheduled.     Coordinator returned patient's call with appointment for therapeutic phlebotomy. Informed patient he's scheduled at [facility] Chemo infusion today for 3:30pm. Patient verbalized understanding.   Informed patient he may need another therapeutic phlebotomy done in 2 weeks depending on his Hct. Patient verbalized understanding.     Patient notified of location change for the therapeutic phlebotomy that's scheduled today. Location is [facility].

## 2023-05-31 ENCOUNTER — INFUSION (OUTPATIENT)
Dept: INFUSION THERAPY | Facility: HOSPITAL | Age: 73
End: 2023-05-31
Attending: FAMILY MEDICINE
Payer: MEDICARE

## 2023-05-31 VITALS
HEART RATE: 70 BPM | RESPIRATION RATE: 16 BRPM | OXYGEN SATURATION: 98 % | TEMPERATURE: 97 F | SYSTOLIC BLOOD PRESSURE: 120 MMHG | DIASTOLIC BLOOD PRESSURE: 80 MMHG

## 2023-05-31 DIAGNOSIS — D63.8 ANEMIA OF CHRONIC DISEASE: Primary | ICD-10-CM

## 2023-05-31 PROCEDURE — 99195 PHLEBOTOMY: CPT

## 2023-05-31 NOTE — NURSING
Phlebotomy performed, 1 unit of blood drawn from R AC. Pressure dressing applied to site. Pt tolerated well, no complaints. Pt had drink declined snack. VSS. NAD noted upon discharge. Pt ambulated without difficulty.

## 2023-05-31 NOTE — PLAN OF CARE
Plan of care reviewed with patient. Discussed if there are any new or ongoing concerns. Denies.    Problem: Adult Inpatient Plan of Care  Goal: Plan of Care Review  Outcome: Ongoing, Progressing  Goal: Patient-Specific Goal (Individualized)  Outcome: Ongoing, Progressing  Goal: Absence of Hospital-Acquired Illness or Injury  Outcome: Ongoing, Progressing  Intervention: Identify and Manage Fall Risk  Flowsheets (Taken 5/31/2023 0909)  Safety Promotion/Fall Prevention: in recliner, wheels locked  Goal: Optimal Comfort and Wellbeing  Outcome: Ongoing, Progressing  Intervention: Provide Person-Centered Care  Flowsheets (Taken 5/31/2023 0909)  Trust Relationship/Rapport:   care explained   questions encouraged   reassurance provided   choices provided   emotional support provided   thoughts/feelings acknowledged   empathic listening provided   questions answered

## 2023-06-01 ENCOUNTER — PATIENT MESSAGE (OUTPATIENT)
Dept: TRANSPLANT | Facility: CLINIC | Age: 73
End: 2023-06-01
Payer: MEDICARE

## 2023-06-01 LAB
EPO SERPL-ACNC: 33.4 MIU/ML (ref 2.6–18.5)
FUNGUS SPEC CULT: NORMAL

## 2023-06-05 ENCOUNTER — PATIENT MESSAGE (OUTPATIENT)
Dept: TRANSPLANT | Facility: CLINIC | Age: 73
End: 2023-06-05
Payer: MEDICARE

## 2023-06-06 LAB
MPNR  FINAL DIAGNOSIS: NORMAL
MPNR  SPECIMEN TYPE: NORMAL
MPNR RESULT: NORMAL

## 2023-06-10 ENCOUNTER — PATIENT MESSAGE (OUTPATIENT)
Dept: TRANSPLANT | Facility: CLINIC | Age: 73
End: 2023-06-10
Payer: MEDICARE

## 2023-06-12 ENCOUNTER — TELEPHONE (OUTPATIENT)
Dept: TRANSPLANT | Facility: CLINIC | Age: 73
End: 2023-06-12
Payer: MEDICARE

## 2023-06-12 ENCOUNTER — PATIENT MESSAGE (OUTPATIENT)
Dept: TRANSPLANT | Facility: CLINIC | Age: 73
End: 2023-06-12
Payer: MEDICARE

## 2023-06-12 ENCOUNTER — LAB VISIT (OUTPATIENT)
Dept: LAB | Facility: HOSPITAL | Age: 73
End: 2023-06-12
Attending: INTERNAL MEDICINE
Payer: MEDICARE

## 2023-06-12 ENCOUNTER — PATIENT MESSAGE (OUTPATIENT)
Dept: HEMATOLOGY/ONCOLOGY | Facility: CLINIC | Age: 73
End: 2023-06-12
Payer: MEDICARE

## 2023-06-12 DIAGNOSIS — T86.10 COMPLICATION OF TRANSPLANTED KIDNEY, UNSPECIFIED COMPLICATION: ICD-10-CM

## 2023-06-12 DIAGNOSIS — Z94.0 KIDNEY REPLACED BY TRANSPLANT: ICD-10-CM

## 2023-06-12 LAB
BASOPHILS # BLD AUTO: 0.01 X10(3)/MCL
BASOPHILS NFR BLD AUTO: 0.2 %
EOSINOPHIL # BLD AUTO: 0.1 X10(3)/MCL (ref 0–0.9)
EOSINOPHIL NFR BLD AUTO: 2.2 %
ERYTHROCYTE [DISTWIDTH] IN BLOOD BY AUTOMATED COUNT: 17.6 % (ref 11.5–17)
HCT VFR BLD AUTO: 56.5 % (ref 42–52)
HGB BLD-MCNC: 17.6 G/DL (ref 14–18)
IMM GRANULOCYTES # BLD AUTO: 0.01 X10(3)/MCL (ref 0–0.04)
IMM GRANULOCYTES NFR BLD AUTO: 0.2 %
LYMPHOCYTES # BLD AUTO: 1.82 X10(3)/MCL (ref 0.6–4.6)
LYMPHOCYTES NFR BLD AUTO: 39.4 %
MCH RBC QN AUTO: 28.3 PG (ref 27–31)
MCHC RBC AUTO-ENTMCNC: 31.2 G/DL (ref 33–36)
MCV RBC AUTO: 90.7 FL (ref 80–94)
MONOCYTES # BLD AUTO: 0.47 X10(3)/MCL (ref 0.1–1.3)
MONOCYTES NFR BLD AUTO: 10.2 %
NEUTROPHILS # BLD AUTO: 2.21 X10(3)/MCL (ref 2.1–9.2)
NEUTROPHILS NFR BLD AUTO: 47.8 %
NRBC BLD AUTO-RTO: 0 %
PLATELET # BLD AUTO: 99 X10(3)/MCL (ref 130–400)
PMV BLD AUTO: 10.3 FL (ref 7.4–10.4)
RBC # BLD AUTO: 6.23 X10(6)/MCL (ref 4.7–6.1)
WBC # SPEC AUTO: 4.62 X10(3)/MCL (ref 4.5–11.5)

## 2023-06-12 PROCEDURE — 36415 COLL VENOUS BLD VENIPUNCTURE: CPT

## 2023-06-12 PROCEDURE — 86352 CELL FUNCTION ASSAY W/STIM: CPT

## 2023-06-12 PROCEDURE — 85025 COMPLETE CBC W/AUTO DIFF WBC: CPT

## 2023-06-12 NOTE — PROGRESS NOTES
His hb improved but still high. He needs phlebotomy periodically  via his nephrologist and follow with hematologist for his polycythemia

## 2023-06-12 NOTE — TELEPHONE ENCOUNTER
Notified patient of Dr. Yen's review of 6/12/23 lab results. Informed patient he will need another phlebotomy. Patient scheduled for phlebotomy tomorrow 6/13/23.       ----- Message from Celeste Yen MD sent at 6/12/2023 10:06 AM CDT -----  His hb improved but still high. He needs phlebotomy periodically  via his nephrologist and follow with hematologist for his polycythemia

## 2023-06-13 ENCOUNTER — INFUSION (OUTPATIENT)
Dept: INFUSION THERAPY | Facility: HOSPITAL | Age: 73
End: 2023-06-13
Attending: FAMILY MEDICINE
Payer: MEDICARE

## 2023-06-13 VITALS
OXYGEN SATURATION: 97 % | DIASTOLIC BLOOD PRESSURE: 85 MMHG | RESPIRATION RATE: 16 BRPM | SYSTOLIC BLOOD PRESSURE: 141 MMHG | TEMPERATURE: 97 F | HEART RATE: 77 BPM

## 2023-06-13 DIAGNOSIS — D63.8 ANEMIA OF CHRONIC DISEASE: Primary | ICD-10-CM

## 2023-06-13 PROCEDURE — 99195 PHLEBOTOMY: CPT

## 2023-06-13 NOTE — PLAN OF CARE
1 unit therapeutic phlebotomy performed via right AC then discarded, pressure dressing applied.  Pt prefers orange juice upon completion of phlebotomy; refused snack.  Blood pressure 141/85 upon completion.  Pt denies any complaints of dizziness, lightheadedness, or faintness.  Patient ambulated out without difficulty.

## 2023-06-13 NOTE — PLAN OF CARE
Problem: Adult Inpatient Plan of Care  Goal: Plan of Care Review  Outcome: Ongoing, Progressing  Flowsheets (Taken 6/13/2023 1013)  Plan of Care Reviewed With: patient  Goal: Patient-Specific Goal (Individualized)  Outcome: Ongoing, Progressing  Flowsheets (Taken 6/13/2023 1013)  Anxieties, Fears or Concerns: denies  Individualized Care Needs: oj after phlebotomy finishes  Goal: Optimal Comfort and Wellbeing  Outcome: Ongoing, Progressing  Intervention: Monitor Pain and Promote Comfort  Flowsheets (Taken 6/13/2023 1013)  Pain Management Interventions:   relaxation techniques promoted   quiet environment facilitated  Intervention: Provide Person-Centered Care  Flowsheets (Taken 6/13/2023 1013)  Trust Relationship/Rapport:   care explained   reassurance provided   choices provided   thoughts/feelings acknowledged   emotional support provided   empathic listening provided   questions answered   questions encouraged

## 2023-06-16 LAB — MYCOBACTERIUM SPEC QL CULT: NORMAL

## 2023-06-21 ENCOUNTER — PATIENT MESSAGE (OUTPATIENT)
Dept: TRANSPLANT | Facility: CLINIC | Age: 73
End: 2023-06-21
Payer: MEDICARE

## 2023-06-27 ENCOUNTER — LAB VISIT (OUTPATIENT)
Dept: LAB | Facility: HOSPITAL | Age: 73
End: 2023-06-27
Attending: INTERNAL MEDICINE
Payer: MEDICARE

## 2023-06-27 DIAGNOSIS — D63.8 ANEMIA, CHRONIC DISEASE: Primary | ICD-10-CM

## 2023-06-27 LAB
BASOPHILS # BLD AUTO: 0.02 X10(3)/MCL
BASOPHILS NFR BLD AUTO: 0.4 %
EOSINOPHIL # BLD AUTO: 0.09 X10(3)/MCL (ref 0–0.9)
EOSINOPHIL NFR BLD AUTO: 1.8 %
ERYTHROCYTE [DISTWIDTH] IN BLOOD BY AUTOMATED COUNT: 15.5 % (ref 11.5–17)
HCT VFR BLD AUTO: 54.1 % (ref 42–52)
HGB BLD-MCNC: 16.9 G/DL (ref 14–18)
IMM GRANULOCYTES # BLD AUTO: 0.02 X10(3)/MCL (ref 0–0.04)
IMM GRANULOCYTES NFR BLD AUTO: 0.4 %
LYMPHOCYTES # BLD AUTO: 1.64 X10(3)/MCL (ref 0.6–4.6)
LYMPHOCYTES NFR BLD AUTO: 32.2 %
MCH RBC QN AUTO: 28.5 PG (ref 27–31)
MCHC RBC AUTO-ENTMCNC: 31.2 G/DL (ref 33–36)
MCV RBC AUTO: 91.1 FL (ref 80–94)
MONOCYTES # BLD AUTO: 0.48 X10(3)/MCL (ref 0.1–1.3)
MONOCYTES NFR BLD AUTO: 9.4 %
NEUTROPHILS # BLD AUTO: 2.84 X10(3)/MCL (ref 2.1–9.2)
NEUTROPHILS NFR BLD AUTO: 55.8 %
NRBC BLD AUTO-RTO: 0 %
PLATELET # BLD AUTO: 132 X10(3)/MCL (ref 130–400)
PMV BLD AUTO: 10 FL (ref 7.4–10.4)
RBC # BLD AUTO: 5.94 X10(6)/MCL (ref 4.7–6.1)
WBC # SPEC AUTO: 5.09 X10(3)/MCL (ref 4.5–11.5)

## 2023-06-27 PROCEDURE — 85025 COMPLETE CBC W/AUTO DIFF WBC: CPT

## 2023-06-27 PROCEDURE — 36415 COLL VENOUS BLD VENIPUNCTURE: CPT

## 2023-07-11 ENCOUNTER — PATIENT MESSAGE (OUTPATIENT)
Dept: TRANSPLANT | Facility: CLINIC | Age: 73
End: 2023-07-11
Payer: MEDICARE

## 2023-07-11 ENCOUNTER — LAB VISIT (OUTPATIENT)
Dept: LAB | Facility: HOSPITAL | Age: 73
End: 2023-07-11
Attending: INTERNAL MEDICINE
Payer: MEDICARE

## 2023-07-11 DIAGNOSIS — E83.42 HYPOMAGNESEMIA: ICD-10-CM

## 2023-07-11 DIAGNOSIS — E87.20 ACIDOSIS: ICD-10-CM

## 2023-07-11 DIAGNOSIS — D63.8 MEGALOBLASTIC ANEMIA DUE TO FISH TAPEWORM: ICD-10-CM

## 2023-07-11 DIAGNOSIS — N18.31 CHRONIC KIDNEY DISEASE (CKD) STAGE G3A/A1, MODERATELY DECREASED GLOMERULAR FILTRATION RATE (GFR) BETWEEN 45-59 ML/MIN/1.73 SQUARE METER AND ALBUMINURIA CREATININE RATIO LESS THAN 30 MG/G: Primary | ICD-10-CM

## 2023-07-11 DIAGNOSIS — E21.3 HYPERPARATHYROIDISM, UNSPECIFIED: ICD-10-CM

## 2023-07-11 DIAGNOSIS — Z94.0 KIDNEY REPLACED BY TRANSPLANT: ICD-10-CM

## 2023-07-11 DIAGNOSIS — B70.0 MEGALOBLASTIC ANEMIA DUE TO FISH TAPEWORM: ICD-10-CM

## 2023-07-11 DIAGNOSIS — I12.9 PARENCHYMAL RENAL HYPERTENSION: ICD-10-CM

## 2023-07-11 LAB
ALBUMIN SERPL-MCNC: 3.6 G/DL (ref 3.4–4.8)
ALBUMIN/GLOB SERPL: 1.6 RATIO (ref 1.1–2)
ALP SERPL-CCNC: 95 UNIT/L (ref 40–150)
ALT SERPL-CCNC: 17 UNIT/L (ref 0–55)
AST SERPL-CCNC: 20 UNIT/L (ref 5–34)
BASOPHILS # BLD AUTO: 0.03 X10(3)/MCL
BASOPHILS NFR BLD AUTO: 0.5 %
BILIRUBIN DIRECT+TOT PNL SERPL-MCNC: 1.3 MG/DL
BUN SERPL-MCNC: 18 MG/DL (ref 8.4–25.7)
CALCIUM SERPL-MCNC: 9.5 MG/DL (ref 8.8–10)
CHLORIDE SERPL-SCNC: 106 MMOL/L (ref 98–107)
CO2 SERPL-SCNC: 29 MMOL/L (ref 23–31)
CREAT SERPL-MCNC: 1.36 MG/DL (ref 0.73–1.18)
DEPRECATED CALCIDIOL+CALCIFEROL SERPL-MC: 30.8 NG/ML (ref 30–80)
EOSINOPHIL # BLD AUTO: 0.1 X10(3)/MCL (ref 0–0.9)
EOSINOPHIL NFR BLD AUTO: 1.7 %
ERYTHROCYTE [DISTWIDTH] IN BLOOD BY AUTOMATED COUNT: 14.8 % (ref 11.5–17)
GFR SERPLBLD CREATININE-BSD FMLA CKD-EPI: 56 MLS/MIN/1.73/M2
GLOBULIN SER-MCNC: 2.3 GM/DL (ref 2.4–3.5)
GLUCOSE SERPL-MCNC: 94 MG/DL (ref 82–115)
HCT VFR BLD AUTO: 52.8 % (ref 42–52)
HGB BLD-MCNC: 16.7 G/DL (ref 14–18)
IMM GRANULOCYTES # BLD AUTO: 0.02 X10(3)/MCL (ref 0–0.04)
IMM GRANULOCYTES NFR BLD AUTO: 0.3 %
IRON SATN MFR SERPL: 16 % (ref 20–50)
IRON SERPL-MCNC: 51 UG/DL (ref 65–175)
LYMPHOCYTES # BLD AUTO: 2.94 X10(3)/MCL (ref 0.6–4.6)
LYMPHOCYTES NFR BLD AUTO: 49.2 %
MAGNESIUM SERPL-MCNC: 2.1 MG/DL (ref 1.6–2.6)
MCH RBC QN AUTO: 27.5 PG (ref 27–31)
MCHC RBC AUTO-ENTMCNC: 31.6 G/DL (ref 33–36)
MCV RBC AUTO: 86.8 FL (ref 80–94)
MONOCYTES # BLD AUTO: 0.46 X10(3)/MCL (ref 0.1–1.3)
MONOCYTES NFR BLD AUTO: 7.7 %
NEUTROPHILS # BLD AUTO: 2.42 X10(3)/MCL (ref 2.1–9.2)
NEUTROPHILS NFR BLD AUTO: 40.6 %
NRBC BLD AUTO-RTO: 0 %
PHOSPHATE SERPL-MCNC: 3.2 MG/DL (ref 2.3–4.7)
PLATELET # BLD AUTO: 163 X10(3)/MCL (ref 130–400)
PMV BLD AUTO: 9.7 FL (ref 7.4–10.4)
POTASSIUM SERPL-SCNC: 4.3 MMOL/L (ref 3.5–5.1)
PROT SERPL-MCNC: 5.9 GM/DL (ref 5.8–7.6)
PTH-INTACT SERPL-MCNC: 183.2 PG/ML (ref 8.7–77)
RBC # BLD AUTO: 6.08 X10(6)/MCL (ref 4.7–6.1)
SODIUM SERPL-SCNC: 142 MMOL/L (ref 136–145)
TIBC SERPL-MCNC: 275 UG/DL (ref 69–240)
TIBC SERPL-MCNC: 326 UG/DL (ref 250–450)
TRANSFERRIN SERPL-MCNC: 296 MG/DL (ref 163–344)
WBC # SPEC AUTO: 5.97 X10(3)/MCL (ref 4.5–11.5)

## 2023-07-11 PROCEDURE — 85025 COMPLETE CBC W/AUTO DIFF WBC: CPT

## 2023-07-11 PROCEDURE — 84100 ASSAY OF PHOSPHORUS: CPT

## 2023-07-11 PROCEDURE — 36415 COLL VENOUS BLD VENIPUNCTURE: CPT

## 2023-07-11 PROCEDURE — 80053 COMPREHEN METABOLIC PANEL: CPT

## 2023-07-11 PROCEDURE — 82306 VITAMIN D 25 HYDROXY: CPT | Mod: GA

## 2023-07-11 PROCEDURE — 83970 ASSAY OF PARATHORMONE: CPT

## 2023-07-11 PROCEDURE — 83735 ASSAY OF MAGNESIUM: CPT

## 2023-07-11 PROCEDURE — 83550 IRON BINDING TEST: CPT

## 2023-07-12 ENCOUNTER — OFFICE VISIT (OUTPATIENT)
Dept: INFECTIOUS DISEASES | Facility: CLINIC | Age: 73
End: 2023-07-12
Payer: MEDICARE

## 2023-07-12 VITALS
SYSTOLIC BLOOD PRESSURE: 122 MMHG | DIASTOLIC BLOOD PRESSURE: 84 MMHG | WEIGHT: 189 LBS | OXYGEN SATURATION: 100 % | HEART RATE: 67 BPM | BODY MASS INDEX: 24.26 KG/M2 | RESPIRATION RATE: 18 BRPM | HEIGHT: 74 IN

## 2023-07-12 DIAGNOSIS — A43.9 NOCARDIA INFECTION: Primary | ICD-10-CM

## 2023-07-12 DIAGNOSIS — A43.0 NOCARDIAL PNEUMONIA: ICD-10-CM

## 2023-07-12 DIAGNOSIS — G06.0 INTRACRANIAL ABSCESS: ICD-10-CM

## 2023-07-12 DIAGNOSIS — Z91.89 AT RISK FOR OPPORTUNISTIC INFECTIONS: ICD-10-CM

## 2023-07-12 PROCEDURE — 99215 PR OFFICE/OUTPT VISIT, EST, LEVL V, 40-54 MIN: ICD-10-PCS | Mod: S$PBB,,, | Performed by: GENERAL PRACTICE

## 2023-07-12 PROCEDURE — 99999 PR PBB SHADOW E&M-EST. PATIENT-LVL V: ICD-10-PCS | Mod: PBBFAC,,, | Performed by: GENERAL PRACTICE

## 2023-07-12 PROCEDURE — 99215 OFFICE O/P EST HI 40 MIN: CPT | Mod: S$PBB,,, | Performed by: GENERAL PRACTICE

## 2023-07-12 PROCEDURE — 99215 OFFICE O/P EST HI 40 MIN: CPT | Mod: PBBFAC | Performed by: GENERAL PRACTICE

## 2023-07-12 PROCEDURE — 99999 PR PBB SHADOW E&M-EST. PATIENT-LVL V: CPT | Mod: PBBFAC,,, | Performed by: GENERAL PRACTICE

## 2023-07-12 RX ORDER — RAMIPRIL 10 MG/1
CAPSULE ORAL
COMMUNITY
Start: 2023-06-16

## 2023-07-12 RX ORDER — DOXAZOSIN 4 MG/1
4 TABLET ORAL
COMMUNITY
Start: 2023-06-07

## 2023-07-12 RX ORDER — METOPROLOL TARTRATE 50 MG/1
75 TABLET ORAL 2 TIMES DAILY
COMMUNITY
Start: 2023-06-14 | End: 2024-01-29

## 2023-07-12 NOTE — PROGRESS NOTES
Subjective:       Patient ID: Ronal Mazariegos 72 y.o.     Chief Complaint:   Chief Complaint   Patient presents with    3mnth f/u Nocardia Infection        HPI:  01/17/2023 ID follow up in [facility]:  71-year-old male with polycystic kidney sidease s/p DBD KTx 5/2021 (CMV D+/R-, HCV NAWAF+, Simulect induction, CIT ~8 hours) on tacro/pred, HCV+ donor SVR, Afib on AC, and Polycythemia vera (09/2021) presents to clinic for follow up.     Admitted 2/16-2/25 after fall at home with left-sided neurologic deficit.  Found to have right frontal mass, s/p craniotomy 2/17/22 with cultures + nocardia nova. CT C/A/P also with RML lesion suspected also due to nocardia nova. 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  TTE negative for vegetations, abnormal thickening of the aortic root unchanged from prior. Patient was discharged on IV meropenem and high dose PO bactrim (Started ~2/19).  Planned treatment typically 9-12 months.     ID clinic visits:  4/21/22 - switched meropenem to ceftriaxone 2g q12h based on sensitivities, and continued bactrim.    5/24/22 - planned to switch ceftriaxone to tedizolid.  Order for tedizolid sent to specialty pharmacy with start date 6/7/22. Continued bactrim.  6/20/22 telephone encounter - discontinued ceftriaxone. Started tedizolid 200mg daily.  Continued bactrim 2ds q8h.     On [facility] nephrology.     6/7/22 - subacute/chronic subdural hematoma.  5/5/22 CT head stable appearing extra-axial mass with mixed density, recommended MMA embolization before restarting AC for atrial fibrillation.  7/14/22 s/p right MMA raissa embolization.     Interval history:  He is compliant with his Bactrim and Tedizolid, though has to pay $3100 for next refill of Tedizolid and is has a difficult time affording this.     12/1/22 MRI:  Operative changes related to right frontal parenchymal abscess drainage.  Right superior frontal lobe enhancement less evident with  local mild gliotic changes and small encephalomalacia.  Right diffuse dural thickening and enhancement but without new fluid collection.     Seen by neurosurgery 12/5/22; resolution of hemorrhage s/p R MMA embolization; resolution of prior abscess.  Planned f/u with them and repeat MRI brain in 6 months.     12/30-1/3/23 admitted to [facility] with concern for nec fasc of left forearm s/p debridement by general surgery (based on op note no significant necrosis found).  Cultures grew Klebsiella pneumoniae R to quinolones.  ID Dr Ayo Stevenson was consulted and recommended continuing Nocardia treatment, along with 2 weeks of doxycycline.  The patient is receiving wound care, packing at home but has yet to f/u with general surgery there for delayed closure.    04/05/2023:  L arm has healed well  Stopped The Tedizolid 01/2023  for treatment of Nocardia  He was admitted 02/10 - 02/12 for increased shortness of breath and hypertensive crisis, was found to be in volume overload diuresed, symptoms improved, discharged to home on updated dose diuretic.  During his hospitalization, had a chest x-ray that had a concerning finding on the right middle lobe, the site of his previous Nocardia infection. However on presentation today he reports his shortness of breath has been significantly improved, he is back performing his activities of daily living.  He is even mowing his lawn, he reports putting a mask and protection on while doing that.  He denies any headaches, no new neurologic symptoms.  All-in-all he has been doing relatively well since stopping his antibiotic.  He is scheduled with Neurosurgery for a repeat MRI of the brain in 06/2023.    07/12/2023:  Underwent bronchoscopy 05/04 with negative culture results and no evidence of recurrence of Nocardiosis. He denies any fevers or chills and notes that he is actually significantly improved from prior and the best that he has ever felt in recent times. He is  taking walks every morning and is back to his baseline. No fevers, no chills, no shortness of breath, no cough and no night sweats or weight loss.     Past Medical History:   Diagnosis Date    Atrial fibrillation     BPH (benign prostatic hypertrophy)     Chronic kidney disease, stage 3a, in kindney transplant (5/14/2021)     Chronic systolic heart failure 10/01/2021    H/O disseminated/cerebral nocardiosis     Hepatitis C virus infection cured after antiviral drug therapy     acquired through kidney transplant, treated / cured (SVR12 - 12/2021)    HTN (hypertension)     Polycystic kidney disease         Past Surgical History:   Procedure Laterality Date    AV FISTULA PLACEMENT Left 2016    BRAIN SURGERY  Feb. 2022    Crainiotomy    BRONCHOALVEOLAR LAVAGE  5/4/2023    Procedure: LAVAGE, BRONCHOALVEOLAR;  Surgeon: Jung Hernandez Jr., MD, [facility];  Location: [facility] BRONCHOSCOPY LAB;  Service: Pulmonary;;    CATARACT EXTRACTION, BILATERAL Bilateral     CRANIOTOMY Right 02/16/2022    Procedure: CRANIOTOMY;  Surgeon: Sunday Rosa DO;  Location: [address];  Service: Neurosurgery;  Laterality: Right;  R craniotomy for abscess drainage    FLEXIBLE BRONCHOSCOPY N/A 5/4/2023    Procedure: BRONCHOSCOPY, FIBEROPTIC;  Surgeon: Jung Hernandez Jr., MD, [facility];  Location: [facility] BRONCHOSCOPY LAB;  Service: Pulmonary;  Laterality: N/A;    HERNIA REPAIR  2017    INCISION AND DRAINAGE, UPPER EXTREMITY Left 12/30/2022    Procedure: INCISION AND DRAINAGE, UPPER EXTREMITY;  Surgeon: Elijah Davis Jr., MD;  Location: [facility];  Service: General;  Laterality: Left;    KIDNEY TRANSPLANT N/A 05/04/2021    Procedure: TRANSPLANT, KIDNEY;  Surgeon: Lexy Bolden MD;  Location: [address];  Service: Transplant;  Laterality: N/A;        Social History     Socioeconomic History    Marital status:     Number of children: 1   Tobacco Use    Smoking status: Former     Packs/day: 2.00     Years: 10.00     Pack years: 20.00      "Types: Cigarettes     Start date: 1972     Quit date: 1984     Years since quittin.6    Smokeless tobacco: Never   Substance and Sexual Activity    Alcohol use: No     Comment: Pt reportsbeing a former beer drinker (2-3 cans per day) and quitting in .    Drug use: Never    Sexual activity: Not Currently     Partners: Female     Birth control/protection: None        Family History   Problem Relation Age of Onset    Heart disease Mother     Polycystic kidney disease Father     Hypertension Father     Kidney disease Father     Heart disease Father     Polycystic kidney disease Sister     Hypertension Sister     Kidney disease Sister         Review of patient's allergies indicates:  No Known Allergies     Immunization History   Administered Date(s) Administered    COVID-19 Vaccine 2021, 2021, 2021, 10/28/2022    COVID-19, MRNA, LN-S, PF (MODERNA FULL 0.5 ML DOSE) 2021, 2021    COVID-19, mRNA, LNP-S, bivalent booster, PF (Moderna Omicron) 10/28/2022    Hepatitis A, Adult 2016, 05/10/2017    Hepatitis B, Adult 2016, 2017, 2018, 2018, 2018, 2018, 10/12/2020    Influenza 2018, 2019, 10/05/2020    Influenza - Quadrivalent - High Dose - PF (65 years and older) 10/11/2022    Pneumococcal Conjugate - 13 Valent 2016    Pneumococcal Polysaccharide - 23 Valent 2020    Tdap 2016, 2022    Zoster 2014    Zoster Recombinant 2019, 2019        Review of Systems   All other systems reviewed and are negative.       Objective:      /84 (BP Location: Right arm)   Pulse 67   Resp 18   Ht 6' 2" (1.88 m)   Wt 85.7 kg (189 lb)   SpO2 100%   BMI 24.27 kg/m²      Physical Exam  Constitutional:       Appearance: Normal appearance.   HENT:      Head: Normocephalic and atraumatic.      Mouth/Throat:      Pharynx: No oropharyngeal exudate or posterior oropharyngeal erythema.   Eyes:      " Extraocular Movements: Extraocular movements intact.      Pupils: Pupils are equal, round, and reactive to light.   Cardiovascular:      Rate and Rhythm: Normal rate and regular rhythm.      Heart sounds: No murmur heard.     Comments: Left upper extremity AV fistula without significant complications.  Pulmonary:      Effort: No respiratory distress.      Breath sounds: No wheezing, rhonchi or rales.   Abdominal:      General: Bowel sounds are normal. There is no distension.      Palpations: Abdomen is soft.      Tenderness: There is no abdominal tenderness.   Musculoskeletal:         General: No swelling or tenderness.      Cervical back: Neck supple. No rigidity or tenderness.   Lymphadenopathy:      Cervical: No cervical adenopathy.   Skin:     Findings: No lesion or rash.      Comments: Left forearm wound has healed very well, no drainage, no open wounds.   Neurological:      General: No focal deficit present.      Mental Status: He is alert and oriented to person, place, and time. Mental status is at baseline.      Cranial Nerves: No cranial nerve deficit.      Motor: No weakness.   Psychiatric:         Mood and Affect: Mood normal.         Behavior: Behavior normal.        Labs: Reviewed most recent relevant labs available, notable results highlighted in this note    Imaging: Reviewed most recent relevant imaging studies available, notable results highlighted in this note    Assessment:       Problem List Items Addressed This Visit          ID    At risk for opportunistic infections    Intracranial abscess    Nocardia infection - Primary    Relevant Orders    CT Chest Without Contrast    Nocardial pneumonia            Plan:       -patient completed 11 months of therapy for Nocardia brain abscess and pulmonary infection including meropenem, ceftriaxone, to linezolid, Bactrim   -discontinued 01/2023   -completed course of therapy as well for left upper extremity necrotizing fasciitis which has healed completely    -CT chest done 04/14 with concerns of recurrence of Nocardia, bronchoscopy done 05/04 with negative cultures  -repeat CT scan of the chest to evaluate for pulmonary findings   -repeat MRI of the brain with no new findings of active nocardia infection    -Continue monitoring off suppressive antibiotics and repeat CT scan in 10/2023 6 months after prior. This could be pushed sooner should he develop any worsening in clinical condition.     No follow-ups on file.    40 minutes of total time spent on the encounter, which includes face to face time and non-face to face time preparing to see the patient (eg, review of tests), Obtaining and/or reviewing separately obtained history, Documenting clinical information in the electronic or other health record, Independently interpreting results (not separately reported) and communicating results to the patient/family/caregiver, or Care coordination (not separately reported).

## 2023-07-13 ENCOUNTER — TELEPHONE (OUTPATIENT)
Dept: INFECTIOUS DISEASES | Facility: CLINIC | Age: 73
End: 2023-07-13
Payer: MEDICARE

## 2023-07-13 NOTE — TELEPHONE ENCOUNTER
Called central scheduling to sched CT chest w/o end of sept. Scheduled at ortho hosp/ 09/26, arrival 0830, test time 0900.       Called patient and gave CT chest w/o date and time.

## 2023-07-16 PROBLEM — A43.0 NOCARDIAL PNEUMONIA: Status: ACTIVE | Noted: 2023-07-16

## 2023-07-19 ENCOUNTER — LAB VISIT (OUTPATIENT)
Dept: LAB | Facility: HOSPITAL | Age: 73
End: 2023-07-19
Attending: INTERNAL MEDICINE
Payer: MEDICARE

## 2023-07-19 DIAGNOSIS — E87.20 ACIDOSIS: ICD-10-CM

## 2023-07-19 DIAGNOSIS — I12.9 PARENCHYMAL RENAL HYPERTENSION: ICD-10-CM

## 2023-07-19 DIAGNOSIS — Z94.0 KIDNEY REPLACED BY TRANSPLANT: ICD-10-CM

## 2023-07-19 DIAGNOSIS — R60.1 ANASARCA: ICD-10-CM

## 2023-07-19 DIAGNOSIS — N18.31 CHRONIC KIDNEY DISEASE (CKD) STAGE G3A/A1, MODERATELY DECREASED GLOMERULAR FILTRATION RATE (GFR) BETWEEN 45-59 ML/MIN/1.73 SQUARE METER AND ALBUMINURIA CREATININE RATIO LESS THAN 30 MG/G: Primary | ICD-10-CM

## 2023-07-19 DIAGNOSIS — E21.3 HYPERPARATHYROIDISM, UNSPECIFIED: ICD-10-CM

## 2023-07-19 DIAGNOSIS — E83.42 HYPOMAGNESEMIA: ICD-10-CM

## 2023-07-19 LAB — FERRITIN SERPL-MCNC: 72.23 NG/ML (ref 21.81–274.66)

## 2023-07-19 PROCEDURE — 36415 COLL VENOUS BLD VENIPUNCTURE: CPT

## 2023-07-19 PROCEDURE — 82728 ASSAY OF FERRITIN: CPT

## 2023-08-07 ENCOUNTER — LAB VISIT (OUTPATIENT)
Dept: LAB | Facility: HOSPITAL | Age: 73
End: 2023-08-07
Attending: INTERNAL MEDICINE
Payer: MEDICARE

## 2023-08-07 DIAGNOSIS — N18.4 HYPERTENSIVE KIDNEY DISEASE WITH CHRONIC KIDNEY DISEASE STAGE IV: ICD-10-CM

## 2023-08-07 DIAGNOSIS — N18.31 CHRONIC KIDNEY DISEASE (CKD) STAGE G3A/A1, MODERATELY DECREASED GLOMERULAR FILTRATION RATE (GFR) BETWEEN 45-59 ML/MIN/1.73 SQUARE METER AND ALBUMINURIA CREATININE RATIO LESS THAN 30 MG/G: Primary | ICD-10-CM

## 2023-08-07 DIAGNOSIS — Z94.0 KIDNEY REPLACED BY TRANSPLANT: ICD-10-CM

## 2023-08-07 DIAGNOSIS — E21.3 HYPERPARATHYROIDISM, UNSPECIFIED: ICD-10-CM

## 2023-08-07 DIAGNOSIS — E83.42 HYPOMAGNESEMIA: ICD-10-CM

## 2023-08-07 DIAGNOSIS — D63.8 CHRONIC DISEASE ANEMIA: ICD-10-CM

## 2023-08-07 DIAGNOSIS — I12.9 HYPERTENSIVE KIDNEY DISEASE WITH CHRONIC KIDNEY DISEASE STAGE IV: ICD-10-CM

## 2023-08-07 DIAGNOSIS — E87.20 METABOLIC ACIDOSIS: ICD-10-CM

## 2023-08-07 LAB
ALBUMIN SERPL-MCNC: 3.6 G/DL (ref 3.4–4.8)
BASOPHILS # BLD AUTO: 0.03 X10(3)/MCL
BASOPHILS NFR BLD AUTO: 0.6 %
BUN SERPL-MCNC: 20 MG/DL (ref 8.4–25.7)
CALCIUM SERPL-MCNC: 9.6 MG/DL (ref 8.8–10)
CHLORIDE SERPL-SCNC: 107 MMOL/L (ref 98–107)
CO2 SERPL-SCNC: 27 MMOL/L (ref 23–31)
CREAT SERPL-MCNC: 1.25 MG/DL (ref 0.73–1.18)
EOSINOPHIL # BLD AUTO: 0.11 X10(3)/MCL (ref 0–0.9)
EOSINOPHIL NFR BLD AUTO: 2 %
ERYTHROCYTE [DISTWIDTH] IN BLOOD BY AUTOMATED COUNT: 15.9 % (ref 11.5–17)
GFR SERPLBLD CREATININE-BSD FMLA CKD-EPI: >60 MLS/MIN/1.73/M2
GLUCOSE SERPL-MCNC: 95 MG/DL (ref 82–115)
HCT VFR BLD AUTO: 57.9 % (ref 42–52)
HGB BLD-MCNC: 18.1 G/DL (ref 14–18)
IMM GRANULOCYTES # BLD AUTO: 0.01 X10(3)/MCL (ref 0–0.04)
IMM GRANULOCYTES NFR BLD AUTO: 0.2 %
LYMPHOCYTES # BLD AUTO: 2.6 X10(3)/MCL (ref 0.6–4.6)
LYMPHOCYTES NFR BLD AUTO: 48.1 %
MAGNESIUM SERPL-MCNC: 2.1 MG/DL (ref 1.6–2.6)
MCH RBC QN AUTO: 27.3 PG (ref 27–31)
MCHC RBC AUTO-ENTMCNC: 31.3 G/DL (ref 33–36)
MCV RBC AUTO: 87.2 FL (ref 80–94)
MONOCYTES # BLD AUTO: 0.5 X10(3)/MCL (ref 0.1–1.3)
MONOCYTES NFR BLD AUTO: 9.3 %
NEUTROPHILS # BLD AUTO: 2.15 X10(3)/MCL (ref 2.1–9.2)
NEUTROPHILS NFR BLD AUTO: 39.8 %
NRBC BLD AUTO-RTO: 0 %
PHOSPHATE SERPL-MCNC: 3.1 MG/DL (ref 2.3–4.7)
PLATELET # BLD AUTO: 127 X10(3)/MCL (ref 130–400)
PMV BLD AUTO: 10.2 FL (ref 7.4–10.4)
POTASSIUM SERPL-SCNC: 4.5 MMOL/L (ref 3.5–5.1)
RBC # BLD AUTO: 6.64 X10(6)/MCL (ref 4.7–6.1)
SODIUM SERPL-SCNC: 140 MMOL/L (ref 136–145)
WBC # SPEC AUTO: 5.4 X10(3)/MCL (ref 4.5–11.5)

## 2023-08-07 PROCEDURE — 85025 COMPLETE CBC W/AUTO DIFF WBC: CPT

## 2023-08-07 PROCEDURE — 80197 ASSAY OF TACROLIMUS: CPT

## 2023-08-07 PROCEDURE — 36415 COLL VENOUS BLD VENIPUNCTURE: CPT

## 2023-08-07 PROCEDURE — 80069 RENAL FUNCTION PANEL: CPT

## 2023-08-07 PROCEDURE — 83735 ASSAY OF MAGNESIUM: CPT

## 2023-08-08 DIAGNOSIS — D45 POLYCYTHEMIA VERA: Primary | ICD-10-CM

## 2023-08-08 DIAGNOSIS — D75.1 POLYCYTHEMIA: ICD-10-CM

## 2023-08-08 DIAGNOSIS — D58.2 ELEVATED HEMOGLOBIN: ICD-10-CM

## 2023-08-08 LAB — TACROLIMUS TROUGH BLD-MCNC: 4.4 NG/ML

## 2023-08-08 RX ORDER — HYDROXYUREA 500 MG/1
500 CAPSULE ORAL DAILY
Qty: 90 EACH | Refills: 2 | Status: CANCELLED | OUTPATIENT
Start: 2023-08-08 | End: 2024-08-07

## 2023-08-08 RX ORDER — HYDROXYUREA 500 MG/1
500 CAPSULE ORAL DAILY
Qty: 90 CAPSULE | Refills: 2 | Status: SHIPPED | OUTPATIENT
Start: 2023-08-08 | End: 2024-08-07

## 2023-08-08 RX ORDER — HYDROXYUREA 500 MG/1
500 CAPSULE ORAL DAILY
Qty: 90 EACH | Refills: 2 | Status: CANCELLED | OUTPATIENT
Start: 2023-08-08 | End: 2023-11-06

## 2023-08-10 ENCOUNTER — PATIENT MESSAGE (OUTPATIENT)
Dept: TRANSPLANT | Facility: CLINIC | Age: 73
End: 2023-08-10
Payer: MEDICARE

## 2023-08-11 ENCOUNTER — INFUSION (OUTPATIENT)
Dept: INFUSION THERAPY | Facility: HOSPITAL | Age: 73
End: 2023-08-11
Attending: FAMILY MEDICINE
Payer: MEDICARE

## 2023-08-11 VITALS
OXYGEN SATURATION: 99 % | TEMPERATURE: 98 F | HEART RATE: 80 BPM | RESPIRATION RATE: 18 BRPM | SYSTOLIC BLOOD PRESSURE: 136 MMHG | DIASTOLIC BLOOD PRESSURE: 88 MMHG

## 2023-08-11 DIAGNOSIS — D63.8 ANEMIA OF CHRONIC DISEASE: Primary | ICD-10-CM

## 2023-08-11 PROCEDURE — 99195 PHLEBOTOMY: CPT

## 2023-08-11 NOTE — NURSING
1 unit therapeutic phlebotomy performed via right AC then discarded, pressure dressing applied.  Pt refused juice or snack.  Blood pressure 136/88 upon completion.  Pt denies any complaints of dizziness, lightheadedness, or faintness. Patient ambulated out without difficulty.

## 2023-09-26 ENCOUNTER — HOSPITAL ENCOUNTER (OUTPATIENT)
Dept: RADIOLOGY | Facility: HOSPITAL | Age: 73
Discharge: HOME | End: 2023-09-26
Attending: GENERAL PRACTICE
Payer: MEDICARE

## 2023-09-26 DIAGNOSIS — A43.9 NOCARDIA INFECTION: ICD-10-CM

## 2023-09-26 PROCEDURE — 71250 CT THORAX DX C-: CPT | Mod: TC

## 2023-10-03 ENCOUNTER — OFFICE VISIT (OUTPATIENT)
Dept: INFECTIOUS DISEASES | Facility: CLINIC | Age: 73
End: 2023-10-03
Payer: MEDICARE

## 2023-10-03 VITALS
RESPIRATION RATE: 18 BRPM | HEIGHT: 74 IN | DIASTOLIC BLOOD PRESSURE: 90 MMHG | BODY MASS INDEX: 25.12 KG/M2 | TEMPERATURE: 98 F | SYSTOLIC BLOOD PRESSURE: 133 MMHG | WEIGHT: 195.75 LBS | HEART RATE: 74 BPM | OXYGEN SATURATION: 98 %

## 2023-10-03 DIAGNOSIS — A43.9 NOCARDIA INFECTION: ICD-10-CM

## 2023-10-03 DIAGNOSIS — Z94.0 KIDNEY REPLACED BY TRANSPLANT: ICD-10-CM

## 2023-10-03 DIAGNOSIS — A43.0 NOCARDIAL PNEUMONIA: ICD-10-CM

## 2023-10-03 DIAGNOSIS — G06.0 INTRACRANIAL ABSCESS: ICD-10-CM

## 2023-10-03 DIAGNOSIS — G06.0 BRAIN ABSCESS: Primary | ICD-10-CM

## 2023-10-03 DIAGNOSIS — Z91.89 AT RISK FOR OPPORTUNISTIC INFECTIONS: ICD-10-CM

## 2023-10-03 PROCEDURE — 99999 PR PBB SHADOW E&M-EST. PATIENT-LVL V: CPT | Mod: PBBFAC,,, | Performed by: GENERAL PRACTICE

## 2023-10-03 PROCEDURE — 99215 OFFICE O/P EST HI 40 MIN: CPT | Mod: PBBFAC | Performed by: GENERAL PRACTICE

## 2023-10-03 PROCEDURE — 99215 PR OFFICE/OUTPT VISIT, EST, LEVL V, 40-54 MIN: ICD-10-PCS | Mod: S$PBB,,, | Performed by: GENERAL PRACTICE

## 2023-10-03 PROCEDURE — 99999 PR PBB SHADOW E&M-EST. PATIENT-LVL V: ICD-10-PCS | Mod: PBBFAC,,, | Performed by: GENERAL PRACTICE

## 2023-10-03 PROCEDURE — 99215 OFFICE O/P EST HI 40 MIN: CPT | Mod: S$PBB,,, | Performed by: GENERAL PRACTICE

## 2023-10-03 NOTE — PROGRESS NOTES
Subjective:       Patient ID: Ronal Mazariegos 73 y.o.     Chief Complaint:   Chief Complaint   Patient presents with    Norcardia infection    Follow-up        HPI:  01/17/2023 ID follow up in [facility]:  71-year-old male with polycystic kidney sidease s/p DBD KTx 5/2021 (CMV D+/R-, HCV NAWAF+, Simulect induction, CIT ~8 hours) on tacro/pred, HCV+ donor SVR, Afib on AC, and Polycythemia vera (09/2021) presents to clinic for follow up.     Admitted 2/16-2/25 after fall at home with left-sided neurologic deficit.  Found to have right frontal mass, s/p craniotomy 2/17/22 with cultures + nocardia nova. CT C/A/P also with RML lesion suspected also due to nocardia nova. 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  TTE negative for vegetations, abnormal thickening of the aortic root unchanged from prior. Patient was discharged on IV meropenem and high dose PO bactrim (Started ~2/19).  Planned treatment typically 9-12 months.     ID clinic visits:  4/21/22 - switched meropenem to ceftriaxone 2g q12h based on sensitivities, and continued bactrim.    5/24/22 - planned to switch ceftriaxone to tedizolid.  Order for tedizolid sent to specialty pharmacy with start date 6/7/22. Continued bactrim.  6/20/22 telephone encounter - discontinued ceftriaxone. Started tedizolid 200mg daily.  Continued bactrim 2ds q8h.     On [facility] nephrology.     6/7/22 - subacute/chronic subdural hematoma.  5/5/22 CT head stable appearing extra-axial mass with mixed density, recommended MMA embolization before restarting AC for atrial fibrillation.  7/14/22 s/p right MMA raissa embolization.     Interval history:  He is compliant with his Bactrim and Tedizolid, though has to pay $3100 for next refill of Tedizolid and is has a difficult time affording this.     12/1/22 MRI:  Operative changes related to right frontal parenchymal abscess drainage.  Right superior frontal lobe enhancement less evident  with local mild gliotic changes and small encephalomalacia.  Right diffuse dural thickening and enhancement but without new fluid collection.     Seen by neurosurgery 12/5/22; resolution of hemorrhage s/p R MMA embolization; resolution of prior abscess.  Planned f/u with them and repeat MRI brain in 6 months.     12/30-1/3/23 admitted to [facility] with concern for nec fasc of left forearm s/p debridement by general surgery (based on op note no significant necrosis found).  Cultures grew Klebsiella pneumoniae R to quinolones.  ID Dr Ayo Stevenson was consulted and recommended continuing Nocardia treatment, along with 2 weeks of doxycycline.  The patient is receiving wound care, packing at home but has yet to f/u with general surgery there for delayed closure.    04/05/2023:  L arm has healed well  Stopped The Tedizolid 01/2023  for treatment of Nocardia  He was admitted 02/10 - 02/12 for increased shortness of breath and hypertensive crisis, was found to be in volume overload diuresed, symptoms improved, discharged to home on updated dose diuretic.  During his hospitalization, had a chest x-ray that had a concerning finding on the right middle lobe, the site of his previous Nocardia infection. However on presentation today he reports his shortness of breath has been significantly improved, he is back performing his activities of daily living.  He is even mowing his lawn, he reports putting a mask and protection on while doing that.  He denies any headaches, no new neurologic symptoms.  All-in-all he has been doing relatively well since stopping his antibiotic.  He is scheduled with Neurosurgery for a repeat MRI of the brain in 06/2023.    07/12/2023:  Underwent bronchoscopy 05/04 with negative culture results and no evidence of recurrence of Nocardiosis. He denies any fevers or chills and notes that he is actually significantly improved from prior and the best that he has ever felt in recent times. He is  taking walks every morning and is back to his baseline. No fevers, no chills, no shortness of breath, no cough and no night sweats or weight loss.     10/03/2023:  Continues to do well, off antibiotics.  No fevers, no chills, no new neurologic symptoms, no shortness of breath, no cough, continues to walk daily.  All-in-all, doing very well.  Repeated CT scan 09/26/2023 without any new infectious concerns.    Past Medical History:   Diagnosis Date    Atrial fibrillation     BPH (benign prostatic hypertrophy)     Chronic kidney disease, stage 3a, in kindney transplant (5/14/2021)     Chronic systolic heart failure 10/01/2021    H/O disseminated/cerebral nocardiosis     Hepatitis C virus infection cured after antiviral drug therapy     acquired through kidney transplant, treated / cured (SVR12 - 12/2021)    HTN (hypertension)     Polycystic kidney disease         Past Surgical History:   Procedure Laterality Date    AV FISTULA PLACEMENT Left 2016    BRAIN SURGERY  Feb. 2022    Crainiotomy    BRONCHOALVEOLAR LAVAGE  5/4/2023    Procedure: LAVAGE, BRONCHOALVEOLAR;  Surgeon: Jung Hernandez Jr., MD, [facility];  Location: [facility] BRONCHOSCOPY LAB;  Service: Pulmonary;;    CATARACT EXTRACTION, BILATERAL Bilateral     CRANIOTOMY Right 02/16/2022    Procedure: CRANIOTOMY;  Surgeon: Sunday Rosa DO;  Location: [address];  Service: Neurosurgery;  Laterality: Right;  R craniotomy for abscess drainage    FLEXIBLE BRONCHOSCOPY N/A 5/4/2023    Procedure: BRONCHOSCOPY, FIBEROPTIC;  Surgeon: Jung Hernandez Jr., MD, [facility];  Location: [facility] BRONCHOSCOPY LAB;  Service: Pulmonary;  Laterality: N/A;    HERNIA REPAIR  2017    INCISION AND DRAINAGE, UPPER EXTREMITY Left 12/30/2022    Procedure: INCISION AND DRAINAGE, UPPER EXTREMITY;  Surgeon: Elijah Davis Jr., MD;  Location: [facility];  Service: General;  Laterality: Left;    KIDNEY TRANSPLANT N/A 05/04/2021    Procedure: TRANSPLANT, KIDNEY;  Surgeon: Lexy Bolden MD;  Location:  [facility] OR 2ND FLR;  Service: Transplant;  Laterality: N/A;        Social History     Socioeconomic History    Marital status:     Number of children: 1   Tobacco Use    Smoking status: Former     Current packs/day: 0.00     Average packs/day: 2.0 packs/day for 12.0 years (24.0 ttl pk-yrs)     Types: Cigarettes     Start date: 1972     Quit date: 1984     Years since quittin.8    Smokeless tobacco: Never   Substance and Sexual Activity    Alcohol use: No     Comment: Pt reportsbeing a former beer drinker (2-3 cans per day) and quitting in .    Drug use: Never    Sexual activity: Not Currently     Partners: Female     Birth control/protection: None        Family History   Problem Relation Age of Onset    Heart disease Mother     Polycystic kidney disease Father     Hypertension Father     Kidney disease Father     Heart disease Father     Polycystic kidney disease Sister     Hypertension Sister     Kidney disease Sister         Review of patient's allergies indicates:  No Known Allergies     Immunization History   Administered Date(s) Administered    COVID-19 Vaccine 2021, 2021, 2021, 10/28/2022    COVID-19, MRNA, LN-S, PF (MODERNA FULL 0.5 ML DOSE) 2021, 2021    COVID-19, mRNA, LNP-S, bivalent booster, PF (Moderna Omicron)12 + YEARS 10/28/2022    Hepatitis A, Adult 2016, 05/10/2017    Hepatitis B, Adult 2016, 2017, 2018, 2018, 2018, 2018, 10/12/2020    Influenza 2018, 2019, 10/05/2020    Influenza - Quadrivalent - High Dose - PF (65 years and older) 10/11/2022    Pneumococcal Conjugate - 13 Valent 2016    Pneumococcal Polysaccharide - 23 Valent 2020    Tdap 2016, 2022    Zoster 2014    Zoster Recombinant 2019, 2019        Review of Systems   All other systems reviewed and are negative.         Objective:      BP (!) 133/90 (BP Location: Right arm)   Pulse 74   Temp  "97.9 °F (36.6 °C) (Oral)   Resp 18   Ht 6' 2" (1.88 m)   Wt 88.8 kg (195 lb 12.3 oz)   SpO2 98%   BMI 25.14 kg/m²      Physical Exam  Constitutional:       Appearance: Normal appearance.   HENT:      Head: Normocephalic and atraumatic.      Mouth/Throat:      Pharynx: No oropharyngeal exudate or posterior oropharyngeal erythema.   Eyes:      Extraocular Movements: Extraocular movements intact.      Pupils: Pupils are equal, round, and reactive to light.   Cardiovascular:      Rate and Rhythm: Normal rate and regular rhythm.      Heart sounds: No murmur heard.     Comments: Left upper extremity AV fistula without significant complications.  Pulmonary:      Effort: No respiratory distress.      Breath sounds: No wheezing, rhonchi or rales.   Abdominal:      General: Bowel sounds are normal. There is no distension.      Palpations: Abdomen is soft.      Tenderness: There is no abdominal tenderness.   Musculoskeletal:         General: No swelling or tenderness.      Cervical back: Neck supple. No rigidity or tenderness.   Lymphadenopathy:      Cervical: No cervical adenopathy.   Skin:     Findings: No lesion or rash.      Comments: Left forearm wound has healed very well, no drainage, no open wounds.   Neurological:      General: No focal deficit present.      Mental Status: He is alert and oriented to person, place, and time. Mental status is at baseline.      Cranial Nerves: No cranial nerve deficit.      Motor: No weakness.   Psychiatric:         Mood and Affect: Mood normal.         Behavior: Behavior normal.          Labs: Reviewed most recent relevant labs available, notable results highlighted in this note    Imaging: Reviewed most recent relevant imaging studies available, notable results highlighted in this note    Assessment:       Problem List Items Addressed This Visit          Renal/    Kidney replaced by transplant       ID    At risk for opportunistic infections    Intracranial abscess    Nocardia " infection    Nocardial pneumonia     Other Visit Diagnoses       Brain abscess    -  Primary    Relevant Orders    MRI Brain W WO Contrast    Creatinine, serum                   Plan:       -patient completed 11 months of therapy for Nocardia brain abscess and pulmonary infection including meropenem, ceftriaxone, to linezolid, Bactrim   -discontinued 01/2023   -completed course of therapy as well for left upper extremity necrotizing fasciitis which has healed completely   -CT chest done 04/14 with concerns of recurrence of Nocardia, bronchoscopy done 05/04 with negative cultures, repeated CT scan on 09/26/2023, no evidence of recurrence of Nocardia  -repeat MRI of the brain with no new findings of active nocardia infection, will repeat MRI of the brain again in 01/2023, 1 year after discontinuing antibiotic    -Continue monitoring off suppressive antibiotics   -if MRI at 1 year interval remains without any new findings, reasonable to only follow clinically at that time    Follow up in about 3 months (around 1/3/2024).    45 minutes of total time spent on the encounter, which includes face to face time and non-face to face time preparing to see the patient (eg, review of tests), Obtaining and/or reviewing separately obtained history, Documenting clinical information in the electronic or other health record, Independently interpreting results (not separately reported) and communicating results to the patient/family/caregiver, or Care coordination (not separately reported).

## 2023-10-04 ENCOUNTER — TELEPHONE (OUTPATIENT)
Dept: INFECTIOUS DISEASES | Facility: CLINIC | Age: 73
End: 2023-10-04
Payer: MEDICARE

## 2023-10-04 NOTE — TELEPHONE ENCOUNTER
Called [facility]/ Sharyn to schedule mri w and w/o in January of 2024,  MRI is 01/08/2024 at 0900, 0830 arrival time.  Patient needs to be NPO after 0400, [facility] hosp.     Called pt to give date and time of test and instructions.   No answer, left voice mail.

## 2023-10-16 PROBLEM — A43.0 NOCARDIAL PNEUMONIA: Status: RESOLVED | Noted: 2023-07-16 | Resolved: 2023-10-16

## 2023-11-06 ENCOUNTER — LAB VISIT (OUTPATIENT)
Dept: LAB | Facility: HOSPITAL | Age: 73
End: 2023-11-06
Attending: INTERNAL MEDICINE
Payer: MEDICARE

## 2023-11-06 DIAGNOSIS — Z94.0 KIDNEY REPLACED BY TRANSPLANT: ICD-10-CM

## 2023-11-06 LAB
ALBUMIN SERPL-MCNC: 3.4 G/DL (ref 3.4–4.8)
BASOPHILS # BLD AUTO: 0.04 X10(3)/MCL
BASOPHILS NFR BLD AUTO: 0.6 %
BUN SERPL-MCNC: 21.1 MG/DL (ref 8.4–25.7)
CALCIUM SERPL-MCNC: 9.4 MG/DL (ref 8.8–10)
CHLORIDE SERPL-SCNC: 106 MMOL/L (ref 98–107)
CO2 SERPL-SCNC: 26 MMOL/L (ref 23–31)
CREAT SERPL-MCNC: 1.42 MG/DL (ref 0.73–1.18)
EOSINOPHIL # BLD AUTO: 0.22 X10(3)/MCL (ref 0–0.9)
EOSINOPHIL NFR BLD AUTO: 3.4 %
ERYTHROCYTE [DISTWIDTH] IN BLOOD BY AUTOMATED COUNT: 20.1 % (ref 11.5–17)
GFR SERPLBLD CREATININE-BSD FMLA CKD-EPI: 53 MLS/MIN/1.73/M2
GLUCOSE SERPL-MCNC: 101 MG/DL (ref 82–115)
HCT VFR BLD AUTO: 55.2 % (ref 42–52)
HGB BLD-MCNC: 17.5 G/DL (ref 14–18)
IMM GRANULOCYTES # BLD AUTO: 0.02 X10(3)/MCL (ref 0–0.04)
IMM GRANULOCYTES NFR BLD AUTO: 0.3 %
LYMPHOCYTES # BLD AUTO: 1.89 X10(3)/MCL (ref 0.6–4.6)
LYMPHOCYTES NFR BLD AUTO: 29.1 %
MAGNESIUM SERPL-MCNC: 2 MG/DL (ref 1.6–2.6)
MCH RBC QN AUTO: 29 PG (ref 27–31)
MCHC RBC AUTO-ENTMCNC: 31.7 G/DL (ref 33–36)
MCV RBC AUTO: 91.5 FL (ref 80–94)
MONOCYTES # BLD AUTO: 0.71 X10(3)/MCL (ref 0.1–1.3)
MONOCYTES NFR BLD AUTO: 10.9 %
NEUTROPHILS # BLD AUTO: 3.62 X10(3)/MCL (ref 2.1–9.2)
NEUTROPHILS NFR BLD AUTO: 55.7 %
NRBC BLD AUTO-RTO: 0 %
PHOSPHATE SERPL-MCNC: 2.7 MG/DL (ref 2.3–4.7)
PLATELET # BLD AUTO: 158 X10(3)/MCL (ref 130–400)
PMV BLD AUTO: 9.3 FL (ref 7.4–10.4)
POTASSIUM SERPL-SCNC: 4.6 MMOL/L (ref 3.5–5.1)
RBC # BLD AUTO: 6.03 X10(6)/MCL (ref 4.7–6.1)
SODIUM SERPL-SCNC: 140 MMOL/L (ref 136–145)
WBC # SPEC AUTO: 6.5 X10(3)/MCL (ref 4.5–11.5)

## 2023-11-06 PROCEDURE — 85025 COMPLETE CBC W/AUTO DIFF WBC: CPT

## 2023-11-06 PROCEDURE — 36415 COLL VENOUS BLD VENIPUNCTURE: CPT

## 2023-11-06 PROCEDURE — 83735 ASSAY OF MAGNESIUM: CPT

## 2023-11-06 PROCEDURE — 80069 RENAL FUNCTION PANEL: CPT

## 2023-11-06 PROCEDURE — 80197 ASSAY OF TACROLIMUS: CPT

## 2023-11-07 LAB — TACROLIMUS TROUGH BLD-MCNC: 2.8 NG/ML

## 2023-11-08 ENCOUNTER — PATIENT MESSAGE (OUTPATIENT)
Dept: TRANSPLANT | Facility: CLINIC | Age: 73
End: 2023-11-08
Payer: MEDICARE

## 2023-11-09 DIAGNOSIS — Z94.0 KIDNEY REPLACED BY TRANSPLANT: Primary | ICD-10-CM

## 2023-11-09 RX ORDER — TACROLIMUS 0.5 MG/1
0.5 CAPSULE ORAL EVERY 12 HOURS
Qty: 60 CAPSULE | Refills: 11 | Status: SHIPPED | OUTPATIENT
Start: 2023-11-09 | End: 2023-11-20 | Stop reason: DRUGHIGH

## 2023-11-09 RX ORDER — TACROLIMUS 1 MG/1
1 CAPSULE ORAL EVERY 12 HOURS
Qty: 60 CAPSULE | Refills: 11 | Status: SHIPPED | OUTPATIENT
Start: 2023-11-09 | End: 2023-11-20 | Stop reason: DRUGHIGH

## 2023-11-09 NOTE — TELEPHONE ENCOUNTER
Notified patient of Dr. Yen's review of 11/6/23 lab results via [facility].     [facility] message sent to patient:  Romero Villeda,      Dr. Yen wants you to increase your prograf dose to 1.5mg twice daily. We'll need to recheck your prograf level next Thursday 11/16/23.      L[place]ette,     Tiara        ----- Message from Celeste Yen MD sent at 11/9/2023  8:09 AM CST -----  Please tac to 1.5 mg BID and check tac level next week

## 2023-11-16 ENCOUNTER — LAB VISIT (OUTPATIENT)
Dept: LAB | Facility: HOSPITAL | Age: 73
End: 2023-11-16
Attending: INTERNAL MEDICINE
Payer: MEDICARE

## 2023-11-16 DIAGNOSIS — Z94.0 KIDNEY REPLACED BY TRANSPLANT: ICD-10-CM

## 2023-11-16 PROCEDURE — 36415 COLL VENOUS BLD VENIPUNCTURE: CPT

## 2023-11-16 PROCEDURE — 80197 ASSAY OF TACROLIMUS: CPT

## 2023-11-17 LAB — TACROLIMUS TROUGH BLD-MCNC: 3.2 NG/ML

## 2023-11-20 ENCOUNTER — PATIENT MESSAGE (OUTPATIENT)
Dept: TRANSPLANT | Facility: CLINIC | Age: 73
End: 2023-11-20
Payer: MEDICARE

## 2023-11-20 DIAGNOSIS — Z94.0 KIDNEY REPLACED BY TRANSPLANT: Primary | ICD-10-CM

## 2023-11-20 RX ORDER — TACROLIMUS 0.5 MG/1
0.5 CAPSULE ORAL NIGHTLY
Qty: 30 CAPSULE | Refills: 11 | Status: SHIPPED | OUTPATIENT
Start: 2023-11-20 | End: 2023-11-30 | Stop reason: DRUGHIGH

## 2023-11-20 RX ORDER — TACROLIMUS 1 MG/1
CAPSULE ORAL
Qty: 90 CAPSULE | Refills: 11 | Status: SHIPPED | OUTPATIENT
Start: 2023-11-20 | End: 2023-11-30 | Stop reason: DRUGHIGH

## 2023-11-20 NOTE — TELEPHONE ENCOUNTER
Notified patient of Dr. Yen's review of 11/16/23 lab results via telephone & [facility] message     Hi Mr. Villeda,     Dr. Yen reviewed your 11/16 prograf level. The level 3.2 is still lower than it should be. She wants you to please increase your prograf dose to 2mg in the AM & 1.5mg in the PM. We'll need to recheck your level on 11/28/23.     Go ahead and take 0.5mg capsule right now since it's before 12PM.     I'll give you a call to make sure you received the message.     Thanks,     Tiara     ----- Message from Celeste Yen MD sent at 11/18/2023 12:15 PM CST -----  Tac to 2/1.5 mg if he is on 1.5 mg BID. Check tac level in a week

## 2023-11-28 ENCOUNTER — LAB VISIT (OUTPATIENT)
Dept: LAB | Facility: HOSPITAL | Age: 73
End: 2023-11-28
Attending: INTERNAL MEDICINE
Payer: MEDICARE

## 2023-11-28 DIAGNOSIS — Z94.0 KIDNEY REPLACED BY TRANSPLANT: ICD-10-CM

## 2023-11-28 PROCEDURE — 36415 COLL VENOUS BLD VENIPUNCTURE: CPT

## 2023-11-28 PROCEDURE — 80197 ASSAY OF TACROLIMUS: CPT

## 2023-11-29 LAB — TACROLIMUS TROUGH BLD-MCNC: 3.7 NG/ML

## 2023-11-30 ENCOUNTER — PATIENT MESSAGE (OUTPATIENT)
Dept: TRANSPLANT | Facility: CLINIC | Age: 73
End: 2023-11-30
Payer: MEDICARE

## 2023-11-30 DIAGNOSIS — Z94.0 KIDNEY REPLACED BY TRANSPLANT: Primary | ICD-10-CM

## 2023-12-01 RX ORDER — TACROLIMUS 1 MG/1
2 CAPSULE ORAL EVERY 12 HOURS
Qty: 120 CAPSULE | Refills: 11 | Status: SHIPPED | OUTPATIENT
Start: 2023-12-01 | End: 2024-11-30

## 2023-12-01 NOTE — TELEPHONE ENCOUNTER
Notified patient of Dr. Barcenas review of 11/28/23 lab results via [facility] message.     [facility] message sent to patient:    Romero Villeda,     Dr. Barcenas reviewed your 11/28/23 prograf result. The level 3.7 is increasing but it's still lower than it should be. He wants you to increase your prograf dose to 2mg twice daily starting with tonight's dose.     We'll recheck Prograf your level on 12/14/23.     Thanks,    Tiara       ----- Message from Carlos Barcenas MD sent at 11/29/2023 11:46 PM CST -----  Increase prograf to 2 mg PO bid , repeat a levl in 2 weeks

## 2023-12-14 ENCOUNTER — LAB VISIT (OUTPATIENT)
Dept: LAB | Facility: HOSPITAL | Age: 73
End: 2023-12-14
Attending: INTERNAL MEDICINE
Payer: MEDICARE

## 2023-12-14 DIAGNOSIS — Z94.0 KIDNEY REPLACED BY TRANSPLANT: ICD-10-CM

## 2023-12-14 PROCEDURE — 36415 COLL VENOUS BLD VENIPUNCTURE: CPT

## 2023-12-14 PROCEDURE — 80197 ASSAY OF TACROLIMUS: CPT

## 2023-12-15 LAB — TACROLIMUS TROUGH BLD-MCNC: 4.9 NG/ML

## 2023-12-27 DIAGNOSIS — N28.9 KIDNEY DISEASE: Primary | ICD-10-CM

## 2023-12-27 DIAGNOSIS — R80.9 PROTEINURIA, UNSPECIFIED TYPE: ICD-10-CM

## 2024-01-08 ENCOUNTER — HOSPITAL ENCOUNTER (OUTPATIENT)
Dept: RADIOLOGY | Facility: HOSPITAL | Age: 74
Discharge: HOME | End: 2024-01-08
Attending: GENERAL PRACTICE
Payer: MEDICARE

## 2024-01-08 DIAGNOSIS — F41.9 ANXIETY DUE TO INVASIVE PROCEDURE: Primary | ICD-10-CM

## 2024-01-08 DIAGNOSIS — G06.0 BRAIN ABSCESS: ICD-10-CM

## 2024-01-08 DIAGNOSIS — Z94.0 HISTORY OF RENAL TRANSPLANT: ICD-10-CM

## 2024-01-08 PROCEDURE — A9577 INJ MULTIHANCE: HCPCS | Performed by: GENERAL PRACTICE

## 2024-01-08 PROCEDURE — 25500020 PHARM REV CODE 255: Performed by: GENERAL PRACTICE

## 2024-01-08 PROCEDURE — 70553 MRI BRAIN STEM W/O & W/DYE: CPT | Mod: TC

## 2024-01-08 RX ORDER — PREDNISONE 5 MG/1
5 TABLET ORAL DAILY
Qty: 30 TABLET | Refills: 11 | Status: SHIPPED | OUTPATIENT
Start: 2024-01-08 | End: 2025-01-07

## 2024-01-08 RX ORDER — DIAZEPAM 2 MG/1
2 TABLET ORAL ONCE
Qty: 1 TABLET | Refills: 0 | Status: SHIPPED | OUTPATIENT
Start: 2024-01-08 | End: 2024-01-29

## 2024-01-08 RX ADMIN — GADOBENATE DIMEGLUMINE 20 ML: 529 INJECTION, SOLUTION INTRAVENOUS at 09:01

## 2024-01-29 ENCOUNTER — OFFICE VISIT (OUTPATIENT)
Dept: HEMATOLOGY/ONCOLOGY | Facility: CLINIC | Age: 74
End: 2024-01-29
Payer: MEDICARE

## 2024-01-29 ENCOUNTER — LAB VISIT (OUTPATIENT)
Dept: LAB | Facility: HOSPITAL | Age: 74
End: 2024-01-29
Payer: MEDICARE

## 2024-01-29 VITALS — HEIGHT: 74 IN | WEIGHT: 202.69 LBS | BODY MASS INDEX: 26.01 KG/M2

## 2024-01-29 DIAGNOSIS — Z79.01 LONG TERM (CURRENT) USE OF ANTICOAGULANTS: Chronic | ICD-10-CM

## 2024-01-29 DIAGNOSIS — D75.1 ERYTHROPOIETIN POLYCYTHEMIA: ICD-10-CM

## 2024-01-29 DIAGNOSIS — D58.2 ELEVATED HEMOGLOBIN: Primary | ICD-10-CM

## 2024-01-29 DIAGNOSIS — D45 POLYCYTHEMIA VERA: Primary | ICD-10-CM

## 2024-01-29 DIAGNOSIS — D75.1 POLYCYTHEMIA: Primary | ICD-10-CM

## 2024-01-29 DIAGNOSIS — D75.1 POLYCYTHEMIA: ICD-10-CM

## 2024-01-29 DIAGNOSIS — D69.6 THROMBOCYTOPENIA: ICD-10-CM

## 2024-01-29 DIAGNOSIS — D45 POLYCYTHEMIA VERA: ICD-10-CM

## 2024-01-29 LAB
ALBUMIN SERPL BCP-MCNC: 3.7 G/DL (ref 3.5–5.2)
ALP SERPL-CCNC: 83 U/L (ref 55–135)
ALT SERPL W/O P-5'-P-CCNC: 18 U/L (ref 10–44)
ANION GAP SERPL CALC-SCNC: 6 MMOL/L (ref 8–16)
AST SERPL-CCNC: 20 U/L (ref 10–40)
BASOPHILS # BLD AUTO: 0.03 K/UL (ref 0–0.2)
BASOPHILS NFR BLD: 0.5 % (ref 0–1.9)
BILIRUB SERPL-MCNC: 1.7 MG/DL (ref 0.1–1)
BUN SERPL-MCNC: 21 MG/DL (ref 8–23)
CALCIUM SERPL-MCNC: 10.2 MG/DL (ref 8.7–10.5)
CHLORIDE SERPL-SCNC: 109 MMOL/L (ref 95–110)
CO2 SERPL-SCNC: 29 MMOL/L (ref 23–29)
CREAT SERPL-MCNC: 1.3 MG/DL (ref 0.5–1.4)
DIFFERENTIAL METHOD BLD: ABNORMAL
EOSINOPHIL # BLD AUTO: 0.1 K/UL (ref 0–0.5)
EOSINOPHIL NFR BLD: 1 % (ref 0–8)
ERYTHROCYTE [DISTWIDTH] IN BLOOD BY AUTOMATED COUNT: 15.9 % (ref 11.5–14.5)
EST. GFR  (NO RACE VARIABLE): 58.7 ML/MIN/1.73 M^2
GLUCOSE SERPL-MCNC: 100 MG/DL (ref 70–110)
HCT VFR BLD AUTO: 55.4 % (ref 40–54)
HGB BLD-MCNC: 18 G/DL (ref 14–18)
IMM GRANULOCYTES # BLD AUTO: 0.01 K/UL (ref 0–0.04)
IMM GRANULOCYTES NFR BLD AUTO: 0.2 % (ref 0–0.5)
LYMPHOCYTES # BLD AUTO: 2 K/UL (ref 1–4.8)
LYMPHOCYTES NFR BLD: 34.8 % (ref 18–48)
MCH RBC QN AUTO: 31.4 PG (ref 27–31)
MCHC RBC AUTO-ENTMCNC: 32.5 G/DL (ref 32–36)
MCV RBC AUTO: 97 FL (ref 82–98)
MONOCYTES # BLD AUTO: 0.5 K/UL (ref 0.3–1)
MONOCYTES NFR BLD: 8.7 % (ref 4–15)
NEUTROPHILS # BLD AUTO: 3.2 K/UL (ref 1.8–7.7)
NEUTROPHILS NFR BLD: 54.8 % (ref 38–73)
NRBC BLD-RTO: 0 /100 WBC
PLATELET # BLD AUTO: 129 K/UL (ref 150–450)
PMV BLD AUTO: 9.6 FL (ref 9.2–12.9)
POTASSIUM SERPL-SCNC: 4.7 MMOL/L (ref 3.5–5.1)
PROT SERPL-MCNC: 6.5 G/DL (ref 6–8.4)
RBC # BLD AUTO: 5.73 M/UL (ref 4.6–6.2)
SODIUM SERPL-SCNC: 144 MMOL/L (ref 136–145)
WBC # BLD AUTO: 5.77 K/UL (ref 3.9–12.7)

## 2024-01-29 PROCEDURE — 36415 COLL VENOUS BLD VENIPUNCTURE: CPT | Performed by: INTERNAL MEDICINE

## 2024-01-29 PROCEDURE — 99999 PR PBB SHADOW E&M-EST. PATIENT-LVL IV: CPT | Mod: PBBFAC,,,

## 2024-01-29 PROCEDURE — 99214 OFFICE O/P EST MOD 30 MIN: CPT | Mod: PBBFAC

## 2024-01-29 PROCEDURE — 99215 OFFICE O/P EST HI 40 MIN: CPT | Mod: S$PBB,,,

## 2024-01-29 PROCEDURE — 85025 COMPLETE CBC W/AUTO DIFF WBC: CPT | Performed by: INTERNAL MEDICINE

## 2024-01-29 PROCEDURE — 80053 COMPREHEN METABOLIC PANEL: CPT | Performed by: INTERNAL MEDICINE

## 2024-01-29 RX ORDER — METOPROLOL TARTRATE 100 MG/1
100 TABLET ORAL 2 TIMES DAILY
COMMUNITY
Start: 2024-01-16

## 2024-01-29 NOTE — PROGRESS NOTES
Subjective:       Patient ID: Ronal Mazariegos is a 73 y.o. male.    Chief Complaint: No chief complaint on file.    HPI     New virtual visit for new polycythemia. Referred by renal transplant with a history of ESRD secondary to polycystic kidney disease.  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    Polycythemia starts acutely in late August/early September by lab review including outside labs from [facility]. Reports no medication changes other than BP meds. Now on Bystolic and Norvasc. Also history of afib and currently on DOAC.     Also reports severe lower extremity edema started around the same time. Has mild VICTORIA. Edema currently controlled with lasix. ECHO from January 2021 bland but outside ECHO from August 2021 has global hypertrophy and rising pulmonary hypertension. 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    Has received single phlebotomy with repeat this week. Labs scheduled for tomorrow. Repeat ECHO with local cardiologist is scheduled 1/10 [facility] Dr. Jimmy Bass. He has appt a few weeks later to review results.     ROS positive today for URI symptoms of congestion, cough, mild VICTORIA, increased fatigue.    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   Eliquis mentioned by referring MD but he has not been taking for months.  Possibly due to Bactrim, prograft, or tedizolid.   Also he now has acute COVID infection which will also decrease platelet count  Other than sore throat from COVID he is asymptomatic    5/29/2023 Return Visit  Return visit for abnormal CBC  Thrombocytopenia improving  Recurrence of polycythemia- had previously resolved after craniotomy for brain abscess. Repeat brain imaging in [place] without new lesion. Polycystic kidney disease, recent US without solid masses  Phlebotomy ordered in [place] but not performed yet. Had phlebotomy May 2 for Hgb of 18.       Review of Systems   Constitutional:  Negative for appetite change, diaphoresis, fever and unexpected weight change.   HENT:  Negative for mouth sores and nosebleeds.    Eyes:  Negative for visual disturbance.   Respiratory:  Negative for cough and shortness of breath.    Cardiovascular:  Negative for chest pain.   Gastrointestinal:  Negative for abdominal pain, blood in stool and diarrhea.   Genitourinary:  Negative for frequency.   Musculoskeletal:  Negative for back pain.   Integumentary:  Negative for rash.   Neurological:  Negative for headaches.   Hematological:  Negative for adenopathy. Bruises/bleeds easily.   Psychiatric/Behavioral:  The patient is not nervous/anxious.          Objective:     Physical Exam  Constitutional:       General: He is not in acute distress.     Appearance: He is well-developed.   HENT:      Head: Normocephalic and atraumatic.   Eyes:      General: No scleral icterus.     Extraocular Movements: Extraocular movements intact.      Conjunctiva/sclera: Conjunctivae normal.   Cardiovascular:      Rate and Rhythm: Normal rate and regular rhythm.      Heart sounds: Normal heart sounds.   Pulmonary:      Effort: Pulmonary effort is normal. No respiratory distress.      Breath sounds: Normal breath sounds.   Abdominal:      General: Bowel sounds are  normal. There is no distension.      Palpations: Abdomen is soft.      Tenderness: There is no abdominal tenderness.   Musculoskeletal:         General: Normal range of motion.      Cervical back: Normal range of motion and neck supple.      Right lower leg: No edema.      Left lower leg: No edema.   Skin:     General: Skin is warm and dry.      Findings: Bruising (bilateral forearms and hands) present. No rash.   Neurological:      Mental Status: He is alert and oriented to person, place, and time.      Cranial Nerves: No cranial nerve deficit.      Motor: No weakness.      Coordination: Coordination normal.   Psychiatric:         Mood and Affect: Mood normal.         Behavior: Behavior normal.         Assessment:       Problem List Items Addressed This Visit    None  Visit Diagnoses       Elevated hemoglobin    -  Primary    Relevant Orders    CT Chest Abdomen Pelvis W W/O Contrast (XPD)    Polycythemia        Relevant Orders    CT Chest Abdomen Pelvis W W/O Contrast (XPD)    Erythropoietin polycythemia        Relevant Orders    CT Chest Abdomen Pelvis W W/O Contrast (XPD)            Plan:       Return of polycythemia that previously resolved after craniotomy for brain abscess  Thrombocytopenia improved    CBC update today  MPN and EPO today  Recommend follow through with planned phlebotomy in [place].    A total of 20 minutes was spent in pre-visit chart review, personal interpretation of labs and imaging, and medication review. Total visit time 30 minutes, >50 % counseling.

## 2024-01-29 NOTE — PROGRESS NOTES
"  Subjective:       Patient ID: Ronal Mazariegos is a 73 y.o. male.    Chief Complaint: Results (Of blood work after starting new medication )    HPI     From Dr. Wheatley's note:   "New virtual visit for new polycythemia. Referred by renal transplant with a history of ESRD secondary to polycystic kidney disease.  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    Polycythemia starts acutely in late August/early September by lab review including outside labs from [facility]. Reports no medication changes other than BP meds. Now on Bystolic and Norvasc. Also history of afib and currently on DOAC.     Also reports severe lower extremity edema started around the same time. Has mild VICTORIA. Edema currently controlled with lasix. ECHO from January 2021 bland but outside ECHO from August 2021 has global hypertrophy and rising pulmonary hypertension. 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    Has received single phlebotomy with repeat this week. Labs scheduled for tomorrow. Repeat ECHO with local cardiologist is scheduled 1/10 at [facility] Dr. Jimmy Bass. He has appt a few weeks later to review results.     ROS positive today for URI symptoms of congestion, cough, mild VICTORIA, increased fatigue.    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   Eliquis mentioned by referring MD but he has not been taking for months.  Possibly due to Bactrim, prograft, or tedizolid.   Also he now has acute COVID infection which will also decrease platelet count  Other than sore throat from COVID he is asymptomatic    5/29/2023 Return Visit  Return visit for abnormal CBC  Thrombocytopenia improving  Recurrence of polycythemia- had previously resolved after craniotomy for brain abscess. Repeat brain imaging in [place] without new lesion. Polycystic kidney disease, recent US without solid masses  Phlebotomy ordered in [place] but not performed yet. Had phlebotomy May 2 for Hgb of 18."     1/29/24 Return Visit   Patient reports to clinic to discuss how the hemoglobin and hematocrit are trending since starting hydrea in June 2023. Patient reports to overall doing well. Has gained weight, but is happy at his current weight due to losing a significant amount after his transplant and neuro surgery. He reports diarrhea about once a day 2 to 3 times a week that he thinks may be related to what he eats. He reports increased bruising on his arms. Denies nose bleeds, bleeding gums, and other sources of blood loss. He was pleased that his hemoglobin had been staying less than 18, but has noted it has trended up with it being 18 today. He also notes his platelets dropped to ~117 and is now in the 120s. He denies smoking, alcohol intake, use of JORDYN medication, testosterone use. He does report sleep apnea without use of a CPAP machine.     Review of Systems   Constitutional:  Positive for unexpected weight change (weight gain). Negative for appetite change, diaphoresis and fever.   HENT:  Negative for mouth sores and nosebleeds.    Eyes:  Negative for visual disturbance.   Respiratory:  Negative for cough and shortness of breath.    Cardiovascular:  Negative for chest pain.   Gastrointestinal:  Positive for " diarrhea. Negative for abdominal pain and blood in stool.   Genitourinary:  Negative for frequency.   Musculoskeletal:  Negative for back pain.   Integumentary:  Negative for rash.        Denies pruritus.    Neurological:  Negative for headaches.   Hematological:  Negative for adenopathy. Bruises/bleeds easily.   Psychiatric/Behavioral:  The patient is not nervous/anxious.          Objective:     Physical Exam  Constitutional:       General: He is not in acute distress.     Appearance: He is well-developed.   HENT:      Head: Normocephalic and atraumatic.   Eyes:      General: No scleral icterus.     Extraocular Movements: Extraocular movements intact.      Conjunctiva/sclera: Conjunctivae normal.   Cardiovascular:      Rate and Rhythm: Normal rate and regular rhythm.      Heart sounds: Normal heart sounds.   Pulmonary:      Effort: Pulmonary effort is normal. No respiratory distress.      Breath sounds: Normal breath sounds.   Abdominal:      General: There is no distension.      Palpations: Abdomen is soft.      Tenderness: There is no abdominal tenderness.      Comments: No palpable splenomegaly    Musculoskeletal:         General: Normal range of motion.      Cervical back: Normal range of motion and neck supple.      Right lower leg: No edema.      Left lower leg: No edema.   Skin:     General: Skin is warm and dry.      Findings: Bruising (bilateral forearms) present.   Neurological:      Mental Status: He is alert and oriented to person, place, and time.      Cranial Nerves: No cranial nerve deficit.      Motor: No weakness.      Coordination: Coordination normal.      Gait: Gait normal.   Psychiatric:         Behavior: Behavior normal.         Thought Content: Thought content normal.         Assessment:/       Problem List Items Addressed This Visit          Hematology    Long term (current) use of anticoagulants--Eliquis (Chronic)     Other Visit Diagnoses       Polycythemia    -  Primary    Erythropoietin  polycythemia        Thrombocytopenia                  Plan:       Polycythemia   Previously resolved after surgical intervention for frontal lobe brain abscess. MRI brain with no acute changes on 1/8/24.   MPN panel negative on 5/29/2023.   Was completing therapeutic phlebotomy locally as needed for hemoglobin greater than 18. Started hydrea in June 2023.   Advised to hold hydrea and stop therapeutic phlebotomy as of 1/29/24 while pending repeat CT chest, abdomen, pelvis to help evaluate etiology of the elevated hgb/hct.   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 Patient is continuing ramipril 10mg as ACE inhibitors are utilized as treatment for this diagnosis. Will discuss adjusting dose with kidney transplant.   Encouraged to follow up with sleep medicine team to be re-screened for sleep apnea and fitted for a better cpap mask for increased compliance as sleep apnea can lead to secondary polycythemia.     Elevated erythropoietin   Erythropoietin on 5/29/2023: 33.4. Previously completed CT chest, abdomen, pelvis in 2/2022 without note of an erythropoietin screening tumor.    Plan to repeat CT chest, abdomen, pelvis to assess for etiology of possible erythropoietin secreting mass or renal cell carcinoma.     Thrombocytopenia   Previously thrombocytopenic with unknown etiology (possible medications with compounding viral infection. Improved without intervention.   Thrombocytopenic today and on labs on 1/10 at outside facility with results of 117. This may be secondary to hydroxyurea. Hydrea discontinued on 1/29/24.     Long term use of an anticoagulant   Continues taking eliquis for diagnosis of atrial fibrillation     Follow up  Will have CBC checked at least monthly with local providers. Follow up dependent on results of CT scan scheduled on 2/6/24.       A total of 20 minutes was spent in pre-visit chart review, personal interpretation of labs and imaging, and medication review. Total visit time 40 minutes,  >50 % counseling.

## 2024-01-30 ENCOUNTER — OFFICE VISIT (OUTPATIENT)
Dept: INFECTIOUS DISEASES | Facility: CLINIC | Age: 74
End: 2024-01-30
Payer: MEDICARE

## 2024-01-30 ENCOUNTER — PATIENT MESSAGE (OUTPATIENT)
Dept: HEMATOLOGY/ONCOLOGY | Facility: CLINIC | Age: 74
End: 2024-01-30
Payer: MEDICARE

## 2024-01-30 VITALS
TEMPERATURE: 98 F | OXYGEN SATURATION: 100 % | DIASTOLIC BLOOD PRESSURE: 80 MMHG | HEIGHT: 74 IN | SYSTOLIC BLOOD PRESSURE: 135 MMHG | HEART RATE: 65 BPM | BODY MASS INDEX: 26.31 KG/M2 | WEIGHT: 205 LBS | RESPIRATION RATE: 18 BRPM

## 2024-01-30 DIAGNOSIS — Z94.0 KIDNEY REPLACED BY TRANSPLANT: ICD-10-CM

## 2024-01-30 DIAGNOSIS — G06.0 INTRACRANIAL ABSCESS: ICD-10-CM

## 2024-01-30 DIAGNOSIS — A43.9 NOCARDIA INFECTION: Primary | ICD-10-CM

## 2024-01-30 DIAGNOSIS — D45 POLYCYTHEMIA VERA: ICD-10-CM

## 2024-01-30 DIAGNOSIS — D84.9 IMMUNOSUPPRESSED STATUS: ICD-10-CM

## 2024-01-30 PROCEDURE — 99215 OFFICE O/P EST HI 40 MIN: CPT | Mod: PBBFAC | Performed by: GENERAL PRACTICE

## 2024-01-30 PROCEDURE — 99999 PR PBB SHADOW E&M-EST. PATIENT-LVL V: CPT | Mod: PBBFAC,,, | Performed by: GENERAL PRACTICE

## 2024-01-30 PROCEDURE — 99214 OFFICE O/P EST MOD 30 MIN: CPT | Mod: S$PBB,,, | Performed by: GENERAL PRACTICE

## 2024-01-30 NOTE — PROGRESS NOTES
Subjective:       Patient ID: Ronal Mazariegos 73 y.o.     Chief Complaint:   Chief Complaint   Patient presents with    Brain abscess, Nocardia infection    Follow-up        HPI:  01/17/2023 ID follow up in [facility]:  71-year-old male with polycystic kidney sidease s/p DBD KTx 5/2021 (CMV D+/R-, HCV NAWAF+, Simulect induction, CIT ~8 hours) on tacro/pred, HCV+ donor SVR, Afib on AC, and Polycythemia vera (09/2021) presents to clinic for follow up.     Admitted 2/16-2/25 after fall at home with left-sided neurologic deficit.  Found to have right frontal mass, s/p craniotomy 2/17/22 with cultures + nocardia nova. CT C/A/P also with RML lesion suspected also due to nocardia nova. 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  TTE negative for vegetations, abnormal thickening of the aortic root unchanged from prior. Patient was discharged on IV meropenem and high dose PO bactrim (Started ~2/19).  Planned treatment typically 9-12 months.     ID clinic visits:  4/21/22 - switched meropenem to ceftriaxone 2g q12h based on sensitivities, and continued bactrim.    5/24/22 - planned to switch ceftriaxone to tedizolid.  Order for tedizolid sent to specialty pharmacy with start date 6/7/22. Continued bactrim.  6/20/22 telephone encounter - discontinued ceftriaxone. Started tedizolid 200mg daily.  Continued bactrim 2ds q8h.     On [facility] nephrology.     6/7/22 - subacute/chronic subdural hematoma.  5/5/22 CT head stable appearing extra-axial mass with mixed density, recommended MMA embolization before restarting AC for atrial fibrillation.  7/14/22 s/p right MMA raissa embolization.     Interval history:  He is compliant with his Bactrim and Tedizolid, though has to pay $3100 for next refill of Tedizolid and is has a difficult time affording this.     12/1/22 MRI:  Operative changes related to right frontal parenchymal abscess drainage.  Right superior frontal lobe enhancement  less evident with local mild gliotic changes and small encephalomalacia.  Right diffuse dural thickening and enhancement but without new fluid collection.     Seen by neurosurgery 12/5/22; resolution of hemorrhage s/p R MMA embolization; resolution of prior abscess.  Planned f/u with them and repeat MRI brain in 6 months.     12/30-1/3/23 admitted to [facility] with concern for nec fasc of left forearm s/p debridement by general surgery (based on op note no significant necrosis found).  Cultures grew Klebsiella pneumoniae R to quinolones.  ID Dr Ayo Stevenson was consulted and recommended continuing Nocardia treatment, along with 2 weeks of doxycycline.  The patient is receiving wound care, packing at home but has yet to f/u with general surgery there for delayed closure.    04/05/2023:  L arm has healed well  Stopped The Tedizolid 01/2023  for treatment of Nocardia  He was admitted 02/10 - 02/12 for increased shortness of breath and hypertensive crisis, was found to be in volume overload diuresed, symptoms improved, discharged to home on updated dose diuretic.  During his hospitalization, had a chest x-ray that had a concerning finding on the right middle lobe, the site of his previous Nocardia infection. However on presentation today he reports his shortness of breath has been significantly improved, he is back performing his activities of daily living.  He is even mowing his lawn, he reports putting a mask and protection on while doing that.  He denies any headaches, no new neurologic symptoms.  All-in-all he has been doing relatively well since stopping his antibiotic.  He is scheduled with Neurosurgery for a repeat MRI of the brain in 06/2023.    07/12/2023:  Underwent bronchoscopy 05/04 with negative culture results and no evidence of recurrence of Nocardiosis. He denies any fevers or chills and notes that he is actually significantly improved from prior and the best that he has ever felt in recent  times. He is taking walks every morning and is back to his baseline. No fevers, no chills, no shortness of breath, no cough and no night sweats or weight loss.     10/03/2023:  Continues to do well, off antibiotics.  No fevers, no chills, no new neurologic symptoms, no shortness of breath, no cough, continues to walk daily.  All-in-all, doing very well.  Repeated CT scan 09/26/2023 without any new infectious concerns.    01/30/2024:  Significantly improved.  No fevers, no chills, no new concerns.  The patient reports no no weight loss, no night sweats.  He had a repeat MRI of the brain which does not show any significant changes.  Has been treatment for Nocardia for about a year now.      Past Medical History:   Diagnosis Date    Atrial fibrillation     BPH (benign prostatic hypertrophy)     Chronic kidney disease, stage 3a, in kindney transplant (5/14/2021)     Chronic systolic heart failure 10/01/2021    H/O disseminated/cerebral nocardiosis     Hepatitis C virus infection cured after antiviral drug therapy     acquired through kidney transplant, treated / cured (SVR12 - 12/2021)    HTN (hypertension)     Polycystic kidney disease         Past Surgical History:   Procedure Laterality Date    AV FISTULA PLACEMENT Left 2016    BRAIN SURGERY  Feb. 2022    Crainiotomy    BRONCHOALVEOLAR LAVAGE  5/4/2023    Procedure: LAVAGE, BRONCHOALVEOLAR;  Surgeon: Jung Hernandez Jr., MD, [facility];  [facility];  Service: Pulmonary;;    CATARACT EXTRACTION, BILATERAL Bilateral     CRANIOTOMY Right 02/16/2022    Procedure: CRANIOTOMY;  Surgeon: Sunday Rosa DO;  Location: [facility] OR [address] [place] [address];  Service: Neurosurgery;  Laterality: Right;  R craniotomy for abscess drainage    FLEXIBLE BRONCHOSCOPY N/A 5/4/2023    Procedure: BRONCHOSCOPY, FIBEROPTIC;  Surgeon: Jung Hernandez Jr., MD, [facility];  [facility];  Service: Pulmonary;  Laterality: N/A;    HERNIA REPAIR  2017    INCISION AND DRAINAGE, UPPER  EXTREMITY Left 2022    Procedure: INCISION AND DRAINAGE, UPPER EXTREMITY;  Surgeon: Elijah Davis Jr., MD;  Location: [facility] OR;  Service: General;  Laterality: Left;    KIDNEY TRANSPLANT N/A 2021    Procedure: TRANSPLANT, KIDNEY;  Surgeon: Lexy Bolden MD;  Location: [facility] OR 2ND FLR;  Service: Transplant;  Laterality: N/A;        Social History     Socioeconomic History    Marital status:     Number of children: 1   Tobacco Use    Smoking status: Former     Current packs/day: 0.00     Average packs/day: 2.0 packs/day for 12.0 years (24.0 ttl pk-yrs)     Types: Cigarettes     Start date: 1972     Quit date: 1984     Years since quittin.1    Smokeless tobacco: Never   Substance and Sexual Activity    Alcohol use: No     Comment: Pt reportsbeing a former beer drinker (2-3 cans per day) and quitting in .    Drug use: Never    Sexual activity: Not Currently     Partners: Female     Birth control/protection: None     Social Determinants of Health     Financial Resource Strain: Low Risk  (2024)    Overall Financial Resource Strain (CARDIA)     Difficulty of Paying Living Expenses: Not hard at all   Food Insecurity: No Food Insecurity (2024)    Hunger Vital Sign     Worried About Running Out of Food in the Last Year: Never true     Ran Out of Food in the Last Year: Never true   Transportation Needs: No Transportation Needs (2024)    PRAPARE - Transportation     Lack of Transportation (Medical): No     Lack of Transportation (Non-Medical): No   Physical Activity: Unknown (2024)    Exercise Vital Sign     Days of Exercise per Week: 0 days   Stress: No Stress Concern Present (2024)    [employer] - Occupational Stress Questionnaire     Feeling of Stress : Not at all   Social Connections: Unknown (2024)    Social Connection and Isolation Panel [NHANES]     Frequency of Communication with Friends and Family: More than three  "times a week     Frequency of Social Gatherings with Friends and Family: Twice a week     Active Member of Clubs or Organizations: Yes     Attends Club or Organization Meetings: 1 to 4 times per year     Marital Status:    Housing Stability: Low Risk  (1/25/2024)    Housing Stability Vital Sign     Unable to Pay for Housing in the Last Year: No     Number of Places Lived in the Last Year: 1     Unstable Housing in the Last Year: No        Family History   Problem Relation Age of Onset    Heart disease Mother     Polycystic kidney disease Father     Hypertension Father     Kidney disease Father     Heart disease Father     Polycystic kidney disease Sister     Hypertension Sister     Kidney disease Sister         Review of patient's allergies indicates:  No Known Allergies     Immunization History   Administered Date(s) Administered    COVID-19 Vaccine 01/11/2021, 02/08/2021, 11/18/2021, 10/28/2022    COVID-19, MRNA, LN-S, PF (MODERNA FULL 0.5 ML DOSE) 01/11/2021, 02/08/2021    COVID-19, mRNA, LNP-S, bivalent booster, PF (Moderna Omicron)12 + YEARS 10/28/2022    Hepatitis A, Adult 12/07/2016, 05/10/2017    Hepatitis B, Adult 12/07/2016, 01/09/2017, 03/12/2018, 04/09/2018, 06/13/2018, 08/13/2018, 10/12/2020    Influenza 09/26/2018, 09/24/2019, 10/05/2020    Influenza - Quadrivalent - High Dose - PF (65 years and older) 10/11/2022    Pneumococcal Conjugate - 13 Valent 12/07/2016    Pneumococcal Polysaccharide - 23 Valent 05/29/2020    Tdap 12/07/2016, 02/16/2022    Zoster 02/27/2014    Zoster Recombinant 03/26/2019, 06/04/2019        Review of Systems   All other systems reviewed and are negative.         Objective:      /80 (BP Location: Left arm)   Pulse 65   Temp 97.9 °F (36.6 °C) (Oral)   Resp 18   Ht 6' 2" (1.88 m)   Wt 93 kg (205 lb)   SpO2 100%   BMI 26.32 kg/m²      Physical Exam  Constitutional:       Appearance: Normal appearance.   HENT:      Head: Normocephalic and atraumatic.      " Mouth/Throat:      Pharynx: No oropharyngeal exudate or posterior oropharyngeal erythema.   Eyes:      Extraocular Movements: Extraocular movements intact.      Pupils: Pupils are equal, round, and reactive to light.   Cardiovascular:      Rate and Rhythm: Normal rate and regular rhythm.      Heart sounds: No murmur heard.     Comments: Left upper extremity AV fistula without significant complications.  Pulmonary:      Effort: No respiratory distress.      Breath sounds: No wheezing, rhonchi or rales.   Abdominal:      General: Bowel sounds are normal. There is no distension.      Palpations: Abdomen is soft.      Tenderness: There is no abdominal tenderness.   Musculoskeletal:         General: No swelling or tenderness.      Cervical back: Neck supple. No rigidity or tenderness.   Lymphadenopathy:      Cervical: No cervical adenopathy.   Skin:     Findings: No lesion or rash.      Comments: Left forearm wound has healed very well, no drainage, no open wounds.   Neurological:      General: No focal deficit present.      Mental Status: He is alert and oriented to person, place, and time. Mental status is at baseline.      Cranial Nerves: No cranial nerve deficit.      Motor: No weakness.   Psychiatric:         Mood and Affect: Mood normal.         Behavior: Behavior normal.          Labs: Reviewed most recent relevant labs available, notable results highlighted in this note    Imaging: Reviewed most recent relevant imaging studies available, notable results highlighted in this note    Assessment:       Problem List Items Addressed This Visit          Renal/    Kidney replaced by transplant       ID    Intracranial abscess    Nocardia infection - Primary       Immunology/Multi System    Immunosuppressed status       Oncology    Polycythemia vera                Plan:       -patient completed 11 months of therapy for Nocardia brain abscess and pulmonary infection including meropenem, ceftriaxone, to linezolid, Bactrim    -discontinued 01/2023   -completed course of therapy as well for left upper extremity necrotizing fasciitis which has healed completely   -repeat CT chest as well as MRI of the brain do not show any residual signs of infection.  From an infectious disease standpoint patient has completed therapy and there should be no further need for testing therapeutics unless we have clinical change.    -he is following up Hematology to evaluate his polycythemia.    No follow-ups on file.    35 minutes of total time spent on the encounter, which includes face to face time and non-face to face time preparing to see the patient (eg, review of tests), Obtaining and/or reviewing separately obtained history, Documenting clinical information in the electronic or other health record, Independently interpreting results (not separately reported) and communicating results to the patient/family/caregiver, or Care coordination (not separately reported).

## 2024-01-31 PROBLEM — D84.9 IMMUNOSUPPRESSED STATUS: Status: ACTIVE | Noted: 2024-01-31

## 2024-02-05 ENCOUNTER — LAB VISIT (OUTPATIENT)
Dept: LAB | Facility: HOSPITAL | Age: 74
End: 2024-02-05
Attending: INTERNAL MEDICINE
Payer: MEDICARE

## 2024-02-05 DIAGNOSIS — Z94.0 KIDNEY REPLACED BY TRANSPLANT: ICD-10-CM

## 2024-02-05 LAB
ALBUMIN SERPL-MCNC: 3.7 G/DL (ref 3.4–4.8)
BASOPHILS # BLD AUTO: 0.03 X10(3)/MCL
BASOPHILS NFR BLD AUTO: 0.6 %
BUN SERPL-MCNC: 19.4 MG/DL (ref 8.4–25.7)
CALCIUM SERPL-MCNC: 9.5 MG/DL (ref 8.8–10)
CHLORIDE SERPL-SCNC: 111 MMOL/L (ref 98–107)
CO2 SERPL-SCNC: 25 MMOL/L (ref 23–31)
CREAT SERPL-MCNC: 1.38 MG/DL (ref 0.73–1.18)
EOSINOPHIL # BLD AUTO: 0.08 X10(3)/MCL (ref 0–0.9)
EOSINOPHIL NFR BLD AUTO: 1.6 %
ERYTHROCYTE [DISTWIDTH] IN BLOOD BY AUTOMATED COUNT: 15.4 % (ref 11.5–17)
GFR SERPLBLD CREATININE-BSD FMLA CKD-EPI: 55 MLS/MIN/1.73/M2
GLUCOSE SERPL-MCNC: 93 MG/DL (ref 82–115)
HCT VFR BLD AUTO: 53.9 % (ref 42–52)
HGB BLD-MCNC: 17.2 G/DL (ref 14–18)
IMM GRANULOCYTES # BLD AUTO: 0.02 X10(3)/MCL (ref 0–0.04)
IMM GRANULOCYTES NFR BLD AUTO: 0.4 %
LYMPHOCYTES # BLD AUTO: 1.95 X10(3)/MCL (ref 0.6–4.6)
LYMPHOCYTES NFR BLD AUTO: 38.9 %
MAGNESIUM SERPL-MCNC: 2.1 MG/DL (ref 1.6–2.6)
MCH RBC QN AUTO: 31.4 PG (ref 27–31)
MCHC RBC AUTO-ENTMCNC: 31.9 G/DL (ref 33–36)
MCV RBC AUTO: 98.5 FL (ref 80–94)
MONOCYTES # BLD AUTO: 0.6 X10(3)/MCL (ref 0.1–1.3)
MONOCYTES NFR BLD AUTO: 12 %
NEUTROPHILS # BLD AUTO: 2.33 X10(3)/MCL (ref 2.1–9.2)
NEUTROPHILS NFR BLD AUTO: 46.5 %
NRBC BLD AUTO-RTO: 0 %
PHOSPHATE SERPL-MCNC: 3.2 MG/DL (ref 2.3–4.7)
PLATELET # BLD AUTO: 123 X10(3)/MCL (ref 130–400)
PLATELETS.RETICULATED NFR BLD AUTO: 2.9 % (ref 0.9–11.2)
PMV BLD AUTO: 9.7 FL (ref 7.4–10.4)
POTASSIUM SERPL-SCNC: 4.7 MMOL/L (ref 3.5–5.1)
RBC # BLD AUTO: 5.47 X10(6)/MCL (ref 4.7–6.1)
SODIUM SERPL-SCNC: 141 MMOL/L (ref 136–145)
WBC # SPEC AUTO: 5.01 X10(3)/MCL (ref 4.5–11.5)

## 2024-02-05 PROCEDURE — 36415 COLL VENOUS BLD VENIPUNCTURE: CPT

## 2024-02-05 PROCEDURE — 80069 RENAL FUNCTION PANEL: CPT

## 2024-02-05 PROCEDURE — 83735 ASSAY OF MAGNESIUM: CPT

## 2024-02-05 PROCEDURE — 85025 COMPLETE CBC W/AUTO DIFF WBC: CPT

## 2024-02-05 PROCEDURE — 80197 ASSAY OF TACROLIMUS: CPT

## 2024-02-06 ENCOUNTER — HOSPITAL ENCOUNTER (OUTPATIENT)
Dept: RADIOLOGY | Facility: HOSPITAL | Age: 74
Discharge: HOME | End: 2024-02-06
Payer: MEDICARE

## 2024-02-06 DIAGNOSIS — D75.1 ERYTHROPOIETIN POLYCYTHEMIA: ICD-10-CM

## 2024-02-06 DIAGNOSIS — D75.1 POLYCYTHEMIA: ICD-10-CM

## 2024-02-06 DIAGNOSIS — D58.2 ELEVATED HEMOGLOBIN: ICD-10-CM

## 2024-02-06 LAB — TACROLIMUS TROUGH BLD-MCNC: 6.1 NG/ML

## 2024-02-06 PROCEDURE — 25500020 PHARM REV CODE 255

## 2024-02-06 PROCEDURE — 74178 CT ABD&PLV WO CNTR FLWD CNTR: CPT | Mod: TC

## 2024-02-06 RX ADMIN — IOPAMIDOL 85 ML: 755 INJECTION, SOLUTION INTRAVENOUS at 02:02

## 2024-02-09 ENCOUNTER — TELEPHONE (OUTPATIENT)
Dept: TRANSPLANT | Facility: CLINIC | Age: 74
End: 2024-02-09
Payer: MEDICARE

## 2024-02-09 ENCOUNTER — PATIENT MESSAGE (OUTPATIENT)
Dept: TRANSPLANT | Facility: CLINIC | Age: 74
End: 2024-02-09
Payer: MEDICARE

## 2024-02-09 NOTE — TELEPHONE ENCOUNTER
Patient reports he saw Hematology/ RENAN Mcadams on 1/29/24. He's having a CT scan on 2/6/24.       ----- Message from Tiara Ferrell RN sent at 2/5/2024  5:10 PM CST -----    ----- Message -----  From: Celeste Yen MD  Sent: 2/5/2024   9:32 AM CST  To: [facility] Post-Kidney Transplant Clinical    High Hb: does he get phlebotomy periodically? Does he follow with hematology?  Pending tac level

## 2024-03-05 ENCOUNTER — PATIENT MESSAGE (OUTPATIENT)
Dept: HEMATOLOGY/ONCOLOGY | Facility: CLINIC | Age: 74
End: 2024-03-05
Payer: MEDICARE

## 2024-04-03 ENCOUNTER — PATIENT MESSAGE (OUTPATIENT)
Dept: HEMATOLOGY/ONCOLOGY | Facility: CLINIC | Age: 74
End: 2024-04-03
Payer: MEDICARE

## 2024-04-18 ENCOUNTER — PATIENT MESSAGE (OUTPATIENT)
Dept: TRANSPLANT | Facility: CLINIC | Age: 74
End: 2024-04-18
Payer: MEDICARE

## 2024-05-06 ENCOUNTER — LAB VISIT (OUTPATIENT)
Dept: LAB | Facility: HOSPITAL | Age: 74
End: 2024-05-06
Attending: INTERNAL MEDICINE
Payer: MEDICARE

## 2024-05-06 DIAGNOSIS — Z94.0 KIDNEY REPLACED BY TRANSPLANT: ICD-10-CM

## 2024-05-06 LAB
ALBUMIN SERPL-MCNC: 3.5 G/DL (ref 3.4–4.8)
ALP SERPL-CCNC: 85 UNIT/L (ref 40–150)
ALT SERPL-CCNC: 17 UNIT/L (ref 0–55)
ANION GAP SERPL CALC-SCNC: 6 MEQ/L
AST SERPL-CCNC: 21 UNIT/L (ref 5–34)
BASOPHILS # BLD AUTO: 0.03 X10(3)/MCL
BASOPHILS NFR BLD AUTO: 0.5 %
BILIRUB SERPL-MCNC: 1.6 MG/DL
BILIRUBIN DIRECT+TOT PNL SERPL-MCNC: 0.6 MG/DL (ref 0–?)
BILIRUBIN DIRECT+TOT PNL SERPL-MCNC: 1 MG/DL (ref 0–0.8)
BUN SERPL-MCNC: 23 MG/DL (ref 8.4–25.7)
CALCIUM SERPL-MCNC: 9.4 MG/DL (ref 8.8–10)
CHLORIDE SERPL-SCNC: 108 MMOL/L (ref 98–107)
CO2 SERPL-SCNC: 28 MMOL/L (ref 23–31)
CREAT SERPL-MCNC: 1.35 MG/DL (ref 0.73–1.18)
CREAT/UREA NIT SERPL: 17
EOSINOPHIL # BLD AUTO: 0.17 X10(3)/MCL (ref 0–0.9)
EOSINOPHIL NFR BLD AUTO: 2.6 %
ERYTHROCYTE [DISTWIDTH] IN BLOOD BY AUTOMATED COUNT: 14.1 % (ref 11.5–17)
GFR SERPLBLD CREATININE-BSD FMLA CKD-EPI: 56 MLS/MIN/1.73/M2
GLUCOSE SERPL-MCNC: 96 MG/DL (ref 82–115)
HCT VFR BLD AUTO: 54.5 % (ref 42–52)
HGB BLD-MCNC: 17.3 G/DL (ref 14–18)
IMM GRANULOCYTES # BLD AUTO: 0.03 X10(3)/MCL (ref 0–0.04)
IMM GRANULOCYTES NFR BLD AUTO: 0.5 %
LYMPHOCYTES # BLD AUTO: 2.43 X10(3)/MCL (ref 0.6–4.6)
LYMPHOCYTES NFR BLD AUTO: 37.2 %
MAGNESIUM SERPL-MCNC: 2 MG/DL (ref 1.6–2.6)
MCH RBC QN AUTO: 29.2 PG (ref 27–31)
MCHC RBC AUTO-ENTMCNC: 31.7 G/DL (ref 33–36)
MCV RBC AUTO: 91.9 FL (ref 80–94)
MONOCYTES # BLD AUTO: 0.65 X10(3)/MCL (ref 0.1–1.3)
MONOCYTES NFR BLD AUTO: 9.9 %
NEUTROPHILS # BLD AUTO: 3.23 X10(3)/MCL (ref 2.1–9.2)
NEUTROPHILS NFR BLD AUTO: 49.3 %
NRBC BLD AUTO-RTO: 0 %
PATH REV: NORMAL
PHOSPHATE SERPL-MCNC: 3.1 MG/DL (ref 2.3–4.7)
PLATELET # BLD AUTO: 134 X10(3)/MCL (ref 130–400)
PLATELETS.RETICULATED NFR BLD AUTO: 2.9 % (ref 0.9–11.2)
PMV BLD AUTO: 9.9 FL (ref 7.4–10.4)
POTASSIUM SERPL-SCNC: 5.1 MMOL/L (ref 3.5–5.1)
PROT SERPL-MCNC: 6.1 GM/DL (ref 5.8–7.6)
RBC # BLD AUTO: 5.93 X10(6)/MCL (ref 4.7–6.1)
SODIUM SERPL-SCNC: 142 MMOL/L (ref 136–145)
WBC # SPEC AUTO: 6.54 X10(3)/MCL (ref 4.5–11.5)

## 2024-05-06 PROCEDURE — 80048 BASIC METABOLIC PNL TOTAL CA: CPT

## 2024-05-06 PROCEDURE — 83735 ASSAY OF MAGNESIUM: CPT

## 2024-05-06 PROCEDURE — 36415 COLL VENOUS BLD VENIPUNCTURE: CPT

## 2024-05-06 PROCEDURE — 87799 DETECT AGENT NOS DNA QUANT: CPT

## 2024-05-06 PROCEDURE — 80076 HEPATIC FUNCTION PANEL: CPT

## 2024-05-06 PROCEDURE — 85025 COMPLETE CBC W/AUTO DIFF WBC: CPT

## 2024-05-06 PROCEDURE — 80197 ASSAY OF TACROLIMUS: CPT

## 2024-05-06 PROCEDURE — 84100 ASSAY OF PHOSPHORUS: CPT

## 2024-05-07 LAB
BKV DNA SERPL NAA+PROBE-ACNC: NORMAL IU/ML
TACROLIMUS TROUGH BLD-MCNC: 4.8 NG/ML

## 2024-05-20 ENCOUNTER — OFFICE VISIT (OUTPATIENT)
Dept: TRANSPLANT | Facility: CLINIC | Age: 74
End: 2024-05-20
Payer: MEDICARE

## 2024-05-20 VITALS
OXYGEN SATURATION: 98 % | HEART RATE: 83 BPM | TEMPERATURE: 97 F | HEIGHT: 74 IN | BODY MASS INDEX: 26.25 KG/M2 | DIASTOLIC BLOOD PRESSURE: 83 MMHG | SYSTOLIC BLOOD PRESSURE: 138 MMHG | RESPIRATION RATE: 16 BRPM | WEIGHT: 204.56 LBS

## 2024-05-20 DIAGNOSIS — Z94.0 KIDNEY REPLACED BY TRANSPLANT: ICD-10-CM

## 2024-05-20 PROCEDURE — 99215 OFFICE O/P EST HI 40 MIN: CPT | Mod: S$PBB,,, | Performed by: INTERNAL MEDICINE

## 2024-05-20 PROCEDURE — 99999 PR PBB SHADOW E&M-EST. PATIENT-LVL IV: CPT | Mod: PBBFAC,,, | Performed by: INTERNAL MEDICINE

## 2024-05-20 PROCEDURE — 99214 OFFICE O/P EST MOD 30 MIN: CPT | Mod: PBBFAC | Performed by: INTERNAL MEDICINE

## 2024-05-20 RX ORDER — LANOLIN ALCOHOL/MO/W.PET/CERES
3 CREAM (GRAM) TOPICAL 2 TIMES DAILY
Qty: 180 TABLET | Refills: 10 | Status: SHIPPED | OUTPATIENT
Start: 2024-05-20

## 2024-05-20 RX ORDER — MYCOPHENOLIC ACID 180 MG/1
360 TABLET, DELAYED RELEASE ORAL 2 TIMES DAILY
Qty: 120 TABLET | Refills: 11 | Status: SHIPPED | OUTPATIENT
Start: 2024-05-20 | End: 2025-05-20

## 2024-05-20 NOTE — LETTER
May 20, 2024        Liam Ryan  [address]  [place] [address]  Phone: [phone]  Fax: [phone]             [facility]- Transplant 1st Fl  [address]  [facility] [address]  Phone: [phone]   Patient: Ronal Mazariegos   MR Number: [id number]   YOB: 1950   Date of Visit: 5/20/2024       Dear Dr. Liam Ryan    Thank you for referring Ronal Mazariegos to me for evaluation. Attached you will find relevant portions of my assessment and plan of care.    If you have questions, please do not hesitate to call me. I look forward to following Ronal Mazariegos along with you.    Sincerely,    Celeste Yen MD    Enclosure    If you would like to receive this communication electronically, please contact [email or web address] or [phone] to request [employer] access.    [employer] is a tool which provides read-only access to select patient information with whom you have a relationship. Its easy to use and provides real time access to review your patients record including encounter summaries, notes, results, and demographic information.    If you feel you have received this communication in error or would no longer like to receive these types of communications, please e-mail [email or web address]

## 2024-05-20 NOTE — PROGRESS NOTES
Kidney Post-Transplant Assessment    Referring Physician: Alexis Zamorano  Current Nephrologist: Alexis Zamorano    ORGAN: RIGHT KIDNEY  Donor Type: donation after brain death  PHS Increased Risk: yes  Cold Ischemia: 486 mins  Induction Medications: simulect - basiliximab    Subjective:     CC:  Reassessment of renal allograft function and management of chronic immunosuppression.    Kidney History:  Mr. Mazariegos is a 73 y.o. year old White male with ESRD secondary to polycystic kidney disease.  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 He was on coumadin prior to transplant for atrial fibrillation    Post Transplant Course :  - Afib: warfarin switched to eliquis.   - CMV viremia: resolved  - Intracranial Nocardia infection:  treated and resolved. Was off myfortic for months   - Hyperkalemia: on lokelma   - Erythrocytosis: follows with hematologist. His native and allograft US showed no mass.       Interval History: he presents at clinic alone. No complaints. He is doing fine. BP readings are in normal range on daily basis.  he denies any fever, headache, chest pain, shortness of breath, ENT symptoms, nausea/vomiting, dysuria, frequency, urgency, or pain over the allograft. He follows with general nephrologist regularly.  He is updated with all  age/gender related screening testes       All labs were reviewed with patient and addressed       Current Medication  Current Outpatient Medications   Medication Sig Dispense Refill    apixaban (ELIQUIS) 5 mg Tab Take 1 tablet (5 mg total) by mouth 2 (two) times daily. DO NOT RESTART UNTIL APPROVED BY NEUROSURGERY 60 tablet 11    doxazosin (CARDURA) 4 MG tablet Take 4 mg by mouth once daily.      ergocalciferol (VITAMIN D2) 50,000 unit Cap Take 1 capsule (50,000 Units total) by mouth every 7 days. 12 capsule 3    finasteride (PROSCAR) 5 mg tablet Take 5 mg by mouth every evening.      fish oil-omega-3 fatty acids  300-1,000 mg capsule Take 1 capsule by mouth every evening.      fluticasone propionate (FLONASE) 50 mcg/actuation nasal spray daily as needed.      LOKELMA 10 gram packet Take 10 g by mouth every other day.      magnesium oxide (MAG-OX) 400 mg (241.3 mg magnesium) tablet Take 3 tablets (1,200 mg total) by mouth 2 (two) times daily. 180 tablet 10    metoprolol tartrate (LOPRESSOR) 100 MG tablet Take 100 mg by mouth 2 (two) times daily.      multivitamin Tab Take 1 tablet by mouth once daily.      predniSONE (DELTASONE) 5 MG tablet Take 1 tablet (5 mg total) by mouth once daily. 30 tablet 11    ramipriL (ALTACE) 10 MG capsule Take 10 mg by mouth 2 (two) times daily.      tacrolimus (PROGRAF) 1 MG Cap Take 2 capsules (2 mg total) by mouth every 12 (twelve) hours. Z94.0;KIDNEY TXP ON 5/4/2021 120 capsule 11    vit A/vit C/vit E/zinc/copper (PRESERVISION AREDS ORAL) Take 1 capsule by mouth once daily.      mycophenolate sodium 180 MG TbEC Take 2 tablets (360 mg total) by mouth 2 (two) times daily. 120 tablet 11     No current facility-administered medications for this visit.         Review of Systems    Constitutional: Negative for fever, appetite change and fatigue.   Respiratory: Negative for cough, chest tightness, shortness of breath and wheezing.   Cardiovascular: Negative for chest pain, palpitations and leg swelling.   Gastrointestinal: Negative for nausea, vomiting, abdominal pain, diarrhea, constipation, blood in stool and abdominal distention.   Genitourinary: Negative for dysuria, urgency, frequency, hematuria, decreased urine volume, difficulty urinating  Neurological: Negative for dizziness, tremors, syncope, weakness, light-headedness and headaches.   Hematological: Negative for adenopathy. Does not bruise/bleed easily.   Psychiatric/Behavioral: Negative for confusion, sleep disturbance and dysphoric mood. The patient is not nervous/anxious.       Objective:     Vitals:    05/20/24 1025   BP: 138/83    Pulse: 83   Resp: 16   Temp: 97.3 °F (36.3 °C)     Resting comfortably, NAD  Neck- no JVD  Lungs- no rhonchi, no rale. Respirations - unlabored  Heart: irregular, no murmur, no rub  EXT: Edema- none  Skin- w/o rash on exposed areas  Neuro- intact cranial nerves, intact movement, AAOX3    Lab Results   Component Value Date    EXTANC  03/07/2022      Comment:      Labs repeated to reassess K+;pt took kayexalate 30 gram daily X 2 days    EXTWBC 6.2 05/23/2022    EXTSEGS 62 05/23/2022    EXTPLATELETS 205 05/23/2022    EXTHEMOGLOBI 13.9 (A) 05/23/2022    EXTHEMATOCRI 45.5 05/23/2022    EXTCREATININ 1.41 05/23/2022    EXTSODIUM 134 (A) 05/23/2022    EXTPOTASSIUM 5.6 (A) 05/23/2022    EXTBUN 17.7 05/23/2022    EXTCO2 20 (A) 05/23/2022    EXTCALCIUM 9.4 05/23/2022    EXTPHOSPHORU 2.8 03/07/2022    EXTGLUCOSE 105 05/23/2022    EXTALBUMIN 3.5 05/23/2022    EXTAST 28 05/23/2022    EXTALT 29 05/23/2022    EXTBILITOTAL 1.0 05/23/2022       Lab Results   Component Value Date    EXTTACROLVL 7.2 04/04/2022    EXTPROTCRE 0.37 11/01/2021    EXTPTHINTACT 244.9 (H) 08/02/2021    EXTPROTEINUA NEG 02/07/2022    EXTWBCUA NONE SEEN 02/07/2022    EXTRBCUA NONE SEEN 02/07/2022       Labs were reviewed with the patient    Assessment/Plan:     1. Kidney replaced by transplant            Plan  - continue lokelma   - more water intake   - check PTH and vitamin D with next lab   - follow with nephrologist and dermatologist   - hematology follow up for erythrocytosis           Mr. Mazariegos is a 73 y.o. male with:       # History of ESRD presumed secondary to PKD   - s/p DBD KTxp on 5/4/21 (CMV D+/R=, HCV NAWAF +, Simulect induction, CIT ~ 8 hours)  - Baseline Cr 1.1-1.4  - Good UOP    # Hyperkalemia  - on Lokelma     # Immunosuppression:   - Prograf 2 mg BID : Goal for tac level ~ 4-5  - Resume Myfortic 500 mg bid   - continue prednisone tapering  - will monitor closely for toxicities      # HTN:   - BPs well controlled   - continue with home  blood pressure monitoring  - low salt and healthy life discussed with the patient    # Metabolic Bone Disease/Secondary Hyperparathyroidism:  -  defer to his nephrologist  - last PTH and vitamin D in 2023 acceptable     #  Hx of Nocardia infection (brain Nocardia infection)  - resolved     # Hx of CMV PCR: resolved      # Hx of Hepatitis C from donor  - treated   and healthy life discussed with the patient      Celeste Yen MD

## 2024-06-04 ENCOUNTER — PATIENT MESSAGE (OUTPATIENT)
Dept: TRANSPLANT | Facility: CLINIC | Age: 74
End: 2024-06-04
Payer: MEDICARE

## 2024-07-09 ENCOUNTER — PATIENT MESSAGE (OUTPATIENT)
Dept: HEMATOLOGY/ONCOLOGY | Facility: CLINIC | Age: 74
End: 2024-07-09
Payer: MEDICARE

## 2024-09-21 NOTE — TELEPHONE ENCOUNTER
----- Message from Julio Cesar Gonzalez sent at 6/15/2022  8:07 AM CDT -----  Contact: Liv GRANADOS @ [phone]  Caller calling to report abnormal labs, Please return call to discuss further         The patient is a 22y Male complaining of

## 2024-11-26 DIAGNOSIS — Z94.0 KIDNEY REPLACED BY TRANSPLANT: ICD-10-CM

## 2024-11-26 RX ORDER — TACROLIMUS 1 MG/1
2 CAPSULE ORAL EVERY 12 HOURS
Qty: 120 CAPSULE | Refills: 11 | Status: CANCELLED | OUTPATIENT
Start: 2024-11-26 | End: 2025-11-26

## 2024-11-29 DIAGNOSIS — Z94.0 KIDNEY REPLACED BY TRANSPLANT: ICD-10-CM

## 2024-11-29 RX ORDER — TACROLIMUS 1 MG/1
2 CAPSULE ORAL EVERY 12 HOURS
Qty: 120 CAPSULE | Refills: 11 | Status: SHIPPED | OUTPATIENT
Start: 2024-11-29 | End: 2025-11-29

## 2024-12-26 DIAGNOSIS — Z94.0 HISTORY OF RENAL TRANSPLANT: ICD-10-CM

## 2024-12-26 RX ORDER — PREDNISONE 5 MG/1
5 TABLET ORAL DAILY
Qty: 30 TABLET | Refills: 11 | Status: CANCELLED | OUTPATIENT
Start: 2024-12-26 | End: 2025-12-26

## 2024-12-27 DIAGNOSIS — Z94.0 HISTORY OF RENAL TRANSPLANT: ICD-10-CM

## 2024-12-30 RX ORDER — PREDNISONE 5 MG/1
5 TABLET ORAL DAILY
Qty: 30 TABLET | Refills: 11 | Status: SHIPPED | OUTPATIENT
Start: 2024-12-30 | End: 2025-12-30

## 2025-02-06 NOTE — ASSESSMENT & PLAN NOTE
On amlodipine 5 mg and nebivolol at home    - Continue to hold as BP is controlled   - metoprolol tartrate for rate control while inpatient     The necessity of time spent during the encounter on this date of service was due to:   - Personally obtaining the documented history, review of systems and physical exam where appropriate   - Ordering, reviewing, and interpreting labs, imaging, and other tests where appropriate   - Reviewing consultant documentation/recommendations in addition to discussing plan of care with consultants where appropriate   - Answering questions, providing  and informing patient and/or family regarding above items and plan of care   - Documentation as above.

## 2025-03-13 DIAGNOSIS — D69.6 THROMBOCYTOPENIA: ICD-10-CM

## 2025-03-13 DIAGNOSIS — D75.1 POLYCYTHEMIA: Primary | ICD-10-CM

## 2025-03-25 ENCOUNTER — OFFICE VISIT (OUTPATIENT)
Dept: HEMATOLOGY/ONCOLOGY | Facility: CLINIC | Age: 75
End: 2025-03-25
Payer: MEDICARE

## 2025-03-25 VITALS
DIASTOLIC BLOOD PRESSURE: 91 MMHG | BODY MASS INDEX: 27.31 KG/M2 | OXYGEN SATURATION: 98 % | RESPIRATION RATE: 14 BRPM | HEART RATE: 93 BPM | SYSTOLIC BLOOD PRESSURE: 153 MMHG | HEIGHT: 74 IN | WEIGHT: 212.81 LBS | TEMPERATURE: 98 F

## 2025-03-25 DIAGNOSIS — D75.1 POLYCYTHEMIA: ICD-10-CM

## 2025-03-25 DIAGNOSIS — D69.6 THROMBOCYTOPENIA: ICD-10-CM

## 2025-03-25 DIAGNOSIS — D75.1 PTE (POST-TRANSPLANT ERYTHROCYTOSIS): ICD-10-CM

## 2025-03-25 DIAGNOSIS — G47.33 OBSTRUCTIVE SLEEP APNEA: Primary | ICD-10-CM

## 2025-03-25 LAB
BASOPHILS # BLD AUTO: 0.02 X10(3)/MCL
BASOPHILS NFR BLD AUTO: 0.3 %
EOSINOPHIL # BLD AUTO: 0.05 X10(3)/MCL (ref 0–0.9)
EOSINOPHIL NFR BLD AUTO: 0.9 %
ERYTHROCYTE [DISTWIDTH] IN BLOOD BY AUTOMATED COUNT: 15 % (ref 11.5–17)
HCT VFR BLD AUTO: 51.2 % (ref 42–52)
HGB BLD-MCNC: 16.1 G/DL (ref 14–18)
IMM GRANULOCYTES # BLD AUTO: 0 X10(3)/MCL (ref 0–0.04)
IMM GRANULOCYTES NFR BLD AUTO: 0 %
LYMPHOCYTES # BLD AUTO: 2.04 X10(3)/MCL (ref 0.6–4.6)
LYMPHOCYTES NFR BLD AUTO: 35.3 %
MCH RBC QN AUTO: 28.7 PG (ref 27–31)
MCHC RBC AUTO-ENTMCNC: 31.4 G/DL (ref 33–36)
MCV RBC AUTO: 91.3 FL (ref 80–94)
MONOCYTES # BLD AUTO: 0.44 X10(3)/MCL (ref 0.1–1.3)
MONOCYTES NFR BLD AUTO: 7.6 %
NEUTROPHILS # BLD AUTO: 3.23 X10(3)/MCL (ref 2.1–9.2)
NEUTROPHILS NFR BLD AUTO: 55.9 %
PLATELET # BLD AUTO: 159 X10(3)/MCL (ref 130–400)
PMV BLD AUTO: 10 FL (ref 7.4–10.4)
RBC # BLD AUTO: 5.61 X10(6)/MCL (ref 4.7–6.1)
WBC # BLD AUTO: 5.78 X10(3)/MCL (ref 4.5–11.5)

## 2025-03-25 PROCEDURE — 99204 OFFICE O/P NEW MOD 45 MIN: CPT | Mod: S$PBB,,, | Performed by: INTERNAL MEDICINE

## 2025-03-25 PROCEDURE — 99215 OFFICE O/P EST HI 40 MIN: CPT | Mod: PBBFAC | Performed by: INTERNAL MEDICINE

## 2025-03-25 PROCEDURE — 99999 PR PBB SHADOW E&M-EST. PATIENT-LVL V: CPT | Mod: PBBFAC,,, | Performed by: INTERNAL MEDICINE

## 2025-03-25 PROCEDURE — 85025 COMPLETE CBC W/AUTO DIFF WBC: CPT | Performed by: INTERNAL MEDICINE

## 2025-03-25 PROCEDURE — 36415 COLL VENOUS BLD VENIPUNCTURE: CPT | Performed by: INTERNAL MEDICINE

## 2025-03-25 RX ORDER — BUMETANIDE 1 MG/1
1 TABLET ORAL DAILY
COMMUNITY
Start: 2025-03-03

## 2025-03-25 NOTE — PROGRESS NOTES
Referring physician: Dr. Liam Ryan  Reason for referral: Post-transplant erythrocytosis/thrombocytopenia      Subjective:       Patient ID: Ronal Mazariegos is a 74 y.o. male.    Post-transplant Erythrocytosis--Since 2022    Work-up:  5/9/23--Epo 33.4 (high), JAK2 V617F mutation negative.     Treatment history:  Hydrea started in 2023, stopped 1/2024 due to worsening thrombocytopenia.    Current treatment plan:  ACE inhibitor  Phlebotomy PRN to keep Hgb <17g/dL and HCT <51%    Chief Complaint: Other Misc (NPH)    HPI  73 yo male patient with a history of renal transplant in 5/2021 secondary to polycystic kidney disease, worked up in the past for polycythemia, was seen by Dr. Tomasa Wheatley, in 2023 and follow-up in 2024, found to have elevated Epo level, JAK2 negative. He was started on Hydrea at some point, but then discontinued due to worsening thrombocytopenia. He also has had repeat imaging to r/o Epo-secreting tumor and CT C/A/P done 2/6/24 showed no malignancy. 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 Patient has been referred back to me for evaluation of ongoing polycythemia and thrombocytopenia. He reports that he has been getting phlebotomy 2-3X per year. HCT has been elevated, and he usually gets phlebotomy with Hgb >16g/dL. He does not really feel bad or anything when his H/H gets elevated. Patient also reports that he was diagnosed with sleep apnea and is now on CPAP nasal.     Past Medical History:   Diagnosis Date    Atrial fibrillation     BPH (benign prostatic hypertrophy)     Chronic kidney disease, stage 3a, in kindney transplant (5/14/2021)     Chronic systolic heart failure 10/01/2021    H/O disseminated/cerebral nocardiosis     Hepatitis C virus infection cured after antiviral drug therapy     acquired through kidney transplant, treated / cured (SVR12 - 12/2021)    HTN (hypertension)     Polycystic kidney  disease       Past Surgical History:   Procedure Laterality Date    AV FISTULA PLACEMENT Left     BRAIN SURGERY  2022    Crainiotomy    BRONCHOALVEOLAR LAVAGE  2023    Procedure: LAVAGE, BRONCHOALVEOLAR;  Surgeon: Jung Hernandez Jr., MD, [facility];  Location: [facility] BRONCHOSCOPY LAB;  Service: Pulmonary;;    CATARACT EXTRACTION, BILATERAL Bilateral     CRANIOTOMY Right 2022    Procedure: CRANIOTOMY;  Surgeon: Sunday Rosa DO;  Location: [address];  Service: Neurosurgery;  Laterality: Right;  R craniotomy for abscess drainage    FLEXIBLE BRONCHOSCOPY N/A 2023    Procedure: BRONCHOSCOPY, FIBEROPTIC;  Surgeon: Jung Hernandez Jr., MD, [facility];  Location: [facility] BRONCHOSCOPY LAB;  Service: Pulmonary;  Laterality: N/A;    HERNIA REPAIR  2017    INCISION AND DRAINAGE, UPPER EXTREMITY Left 2022    Procedure: INCISION AND DRAINAGE, UPPER EXTREMITY;  Surgeon: Elijah Davis Jr., MD;  [facility];  Service: General;  Laterality: Left;    KIDNEY TRANSPLANT N/A 2021    Procedure: TRANSPLANT, KIDNEY;  Surgeon: Lexy Bolden MD;  Location: [facility] OR [address] [place] [address];  Service: Transplant;  Laterality: N/A;     Family History   Problem Relation Name Age of Onset    Heart disease Mother Dina conde     Polycystic kidney disease Father Adriel     Hypertension Father Adriel     Kidney disease Father Adriel     Heart disease Father Adriel     Polycystic kidney disease Sister Suri     Hypertension Sister Suri     Kidney disease Sister Suri      Social History     Socioeconomic History    Marital status:     Number of children: 1   Tobacco Use    Smoking status: Former     Current packs/day: 0.00     Average packs/day: 2.0 packs/day for 12.0 years (24.0 ttl pk-yrs)     Types: Cigarettes     Start date: 1972     Quit date: 1984     Years since quittin.3    Smokeless tobacco: Never   Substance and Sexual Activity    Alcohol use: No     Comment: Pt reportsbeing a former  beer drinker (2-3 cans per day) and quitting in 2014.    Drug use: Never    Sexual activity: Not Currently     Partners: Female     Birth control/protection: None     Social Drivers of Health     Financial Resource Strain: Low Risk  (1/25/2024)    Overall Financial Resource Strain (CARDIA)     Difficulty of Paying Living Expenses: Not hard at all   Food Insecurity: No Food Insecurity (1/25/2024)    Hunger Vital Sign     Worried About Running Out of Food in the Last Year: Never true     Ran Out of Food in the Last Year: Never true   Transportation Needs: No Transportation Needs (1/25/2024)    PRAPARE - Transportation     Lack of Transportation (Medical): No     Lack of Transportation (Non-Medical): No   Physical Activity: Unknown (1/25/2024)    Exercise Vital Sign     Days of Exercise per Week: 0 days   Stress: No Stress Concern Present (1/25/2024)    [employer] - Occupational Stress Questionnaire     Feeling of Stress : Not at all   Housing Stability: Low Risk  (1/25/2024)    Housing Stability Vital Sign     Unable to Pay for Housing in the Last Year: No     Number of Places Lived in the Last Year: 1     Unstable Housing in the Last Year: No       Review of patient's allergies indicates:  No Known Allergies   Medications Ordered Prior to Encounter[1]   Review of Systems   Constitutional:  Negative for appetite change and unexpected weight change.   HENT:  Negative for mouth sores.    Eyes:  Negative for visual disturbance.   Respiratory:  Positive for cough. Negative for shortness of breath.    Cardiovascular:  Negative for chest pain.   Gastrointestinal:  Negative for abdominal pain and diarrhea.   Genitourinary:  Negative for frequency.   Musculoskeletal:  Negative for back pain.   Integumentary:  Negative for rash.   Neurological:  Negative for headaches.   Hematological:  Negative for adenopathy.   Psychiatric/Behavioral:  The patient is not nervous/anxious.               Vitals:     "03/25/25 1412   BP: (!) 153/91   Pulse: 93   Resp: 14   Temp: 97.8 °F (36.6 °C)   TempSrc: Oral   SpO2: 98%   Weight: 96.5 kg (212 lb 12.8 oz)   Height: 6' 2" (1.88 m)      Physical Exam    Office Visit on 03/25/2025   Component Date Value Ref Range Status    WBC 03/25/2025 5.78  4.50 - 11.50 x10(3)/mcL Final    RBC 03/25/2025 5.61  4.70 - 6.10 x10(6)/mcL Final    Hgb 03/25/2025 16.1  14.0 - 18.0 g/dL Final    Hct 03/25/2025 51.2  42.0 - 52.0 % Final    MCV 03/25/2025 91.3  80.0 - 94.0 fL Final    MCH 03/25/2025 28.7  27.0 - 31.0 pg Final    MCHC 03/25/2025 31.4 (L)  33.0 - 36.0 g/dL Final    RDW 03/25/2025 15.0  11.5 - 17.0 % Final    Platelet 03/25/2025 159  130 - 400 x10(3)/mcL Final    MPV 03/25/2025 10.0  7.4 - 10.4 fL Final    Neut % 03/25/2025 55.9  % Final    Lymph % 03/25/2025 35.3  % Final    Mono % 03/25/2025 7.6  % Final    Eos % 03/25/2025 0.9  % Final    Basophil % 03/25/2025 0.3  % Final    Imm Grans % 03/25/2025 0.0  % Final    Neut # 03/25/2025 3.23  2.1 - 9.2 x10(3)/mcL Final    Lymph # 03/25/2025 2.04  0.6 - 4.6 x10(3)/mcL Final    Mono # 03/25/2025 0.44  0.1 - 1.3 x10(3)/mcL Final    Eos # 03/25/2025 0.05  0 - 0.9 x10(3)/mcL Final    Baso # 03/25/2025 0.02  <=0.2 x10(3)/mcL Final    Imm Gran # 03/25/2025 0.00  0.00 - 0.04 x10(3)/mcL Final         Assessment:       1. Obstructive sleep apnea    2. Polycythemia    3. Thrombocytopenia    4. PTE (post-transplant erythrocytosis)         Plan:       Patient with what appears to be post-transplant erythrocytosis, s/p renal transplant in 2021 h/o PCKD. Also has ROHAN on CPAP.   Elevated Epo level and JAK2 negative in 2023, seen by Dr. Tomasa Wheatley.  He did have prior scans to r/o epo secreting malignancy which were all negative.     He was on Hydrea for a short time, but stopped due to worsening thrombocytopenia.    Patient has been getting phlebotomy PRN 2-3 X per year with a standing order from nephrology at [facility] blood bank.  Last phlebotomy was in " 12/2024.     Discussed recommendations to check CBC every 8 weeks with phlebotomy PRN to keep Hgb <17g/dL and HCT <51%.   He also has labs with PCP and nephrology often so will cancel labs here if he has any labs done close to these visits.     CBC with Hgb and HCT right at goal. Would not do phlebotomy at this time.   Will send new order to [facility] blood bank for phlebotomy PRN. Patient given parameters, and he follows his labs closely.      F/u visit in 6 months. Check Epo level on RTC.     Will also monitor platelets, he has mild thrombocytopenia, which I suspect is from his anti-rejection drugs. He is on tacrolimus and mycophenolate.       All questions answered at this time.     Ana Hathaway MD                             [1]   Current Outpatient Medications on File Prior to Visit   Medication Sig Dispense Refill    apixaban (ELIQUIS) 5 mg Tab Take 1 tablet (5 mg total) by mouth 2 (two) times daily. DO NOT RESTART UNTIL APPROVED BY NEUROSURGERY 60 tablet 11    bumetanide (BUMEX) 1 MG tablet Take 1 mg by mouth once daily.      doxazosin (CARDURA) 4 MG tablet Take 4 mg by mouth once daily.      ergocalciferol (VITAMIN D2) 50,000 unit Cap Take 1 capsule (50,000 Units total) by mouth every 7 days. 12 capsule 3    finasteride (PROSCAR) 5 mg tablet Take 5 mg by mouth every evening.      fish oil-omega-3 fatty acids 300-1,000 mg capsule Take 1 capsule by mouth every evening.      fluticasone propionate (FLONASE) 50 mcg/actuation nasal spray daily as needed.      LOKELMA 10 gram packet Take 10 g by mouth every other day.      magnesium oxide (MAG-OX) 400 mg (241.3 mg magnesium) tablet Take 3 tablets (1,200 mg total) by mouth 2 (two) times daily. 180 tablet 10    metoprolol tartrate (LOPRESSOR) 100 MG tablet Take 100 mg by mouth 2 (two) times daily.      multivitamin Tab Take 1 tablet by mouth once daily.      mycophenolate sodium 180 MG TbEC Take 2 tablets (360 mg total) by mouth 2 (two) times daily. 120 tablet 11     predniSONE (DELTASONE) 5 MG tablet Take 1 tablet (5 mg total) by mouth once daily. 30 tablet 11    ramipriL (ALTACE) 10 MG capsule Take 10 mg by mouth 2 (two) times daily.      tacrolimus (PROGRAF) 1 MG Cap Take 2 capsules (2 mg total) by mouth every 12 (twelve) hours. Z94.0;KIDNEY TXP ON 5/4/2021 120 capsule 11    vit A/vit C/vit E/zinc/copper (PRESERVISION AREDS ORAL) Take 1 capsule by mouth once daily.      [DISCONTINUED] famotidine (PEPCID) 20 MG tablet Take 1 tablet (20 mg total) by mouth every evening. 30 tablet 1    [DISCONTINUED] lisinopriL (PRINIVIL,ZESTRIL) 2.5 MG tablet Take 1 tablet (2.5 mg total) by mouth once daily. 30 tablet 11    [DISCONTINUED] valGANciclovir (VALCYTE) 450 mg Tab Take 2 tablets (900 mg total) by mouth 2 (two) times daily. 120 tablet 2     No current facility-administered medications on file prior to visit.

## 2025-03-26 DIAGNOSIS — Z94.0 KIDNEY REPLACED BY TRANSPLANT: ICD-10-CM

## 2025-03-26 DIAGNOSIS — T86.10 COMPLICATION OF TRANSPLANTED KIDNEY, UNSPECIFIED COMPLICATION: ICD-10-CM

## 2025-03-26 DIAGNOSIS — R80.9 PROTEINURIA, UNSPECIFIED TYPE: Primary | ICD-10-CM

## 2025-03-26 DIAGNOSIS — Z94.0 IMMUNOSUPPRESSIVE MANAGEMENT ENCOUNTER FOLLOWING KIDNEY TRANSPLANT: ICD-10-CM

## 2025-03-26 DIAGNOSIS — Z79.899 IMMUNOSUPPRESSIVE MANAGEMENT ENCOUNTER FOLLOWING KIDNEY TRANSPLANT: ICD-10-CM

## 2025-04-22 DIAGNOSIS — Z94.0 KIDNEY REPLACED BY TRANSPLANT: Primary | ICD-10-CM

## 2025-04-22 RX ORDER — MYCOPHENOLIC ACID 180 MG/1
360 TABLET, DELAYED RELEASE ORAL 2 TIMES DAILY
Qty: 120 TABLET | Refills: 11 | Status: SHIPPED | OUTPATIENT
Start: 2025-04-22 | End: 2026-04-22

## 2025-05-05 ENCOUNTER — RESULTS FOLLOW-UP (OUTPATIENT)
Dept: TRANSPLANT | Facility: CLINIC | Age: 75
End: 2025-05-05
Payer: MEDICARE

## 2025-05-05 ENCOUNTER — LAB VISIT (OUTPATIENT)
Dept: LAB | Facility: HOSPITAL | Age: 75
End: 2025-05-05
Attending: NURSE PRACTITIONER
Payer: MEDICARE

## 2025-05-05 DIAGNOSIS — T86.10 COMPLICATION OF TRANSPLANTED KIDNEY, UNSPECIFIED COMPLICATION: ICD-10-CM

## 2025-05-05 DIAGNOSIS — Z79.899 IMMUNOSUPPRESSIVE MANAGEMENT ENCOUNTER FOLLOWING KIDNEY TRANSPLANT: ICD-10-CM

## 2025-05-05 DIAGNOSIS — Z94.0 KIDNEY REPLACED BY TRANSPLANT: ICD-10-CM

## 2025-05-05 DIAGNOSIS — Z94.0 IMMUNOSUPPRESSIVE MANAGEMENT ENCOUNTER FOLLOWING KIDNEY TRANSPLANT: ICD-10-CM

## 2025-05-05 DIAGNOSIS — R80.9 PROTEINURIA, UNSPECIFIED TYPE: ICD-10-CM

## 2025-05-05 DIAGNOSIS — D75.1 POLYCYTHEMIA: Primary | ICD-10-CM

## 2025-05-05 LAB
ALBUMIN SERPL-MCNC: 3.6 G/DL (ref 3.4–4.8)
ALP SERPL-CCNC: 69 UNIT/L (ref 40–150)
ALT SERPL-CCNC: 16 UNIT/L (ref 0–55)
ANION GAP SERPL CALC-SCNC: 4 MEQ/L
AST SERPL-CCNC: 19 UNIT/L (ref 11–45)
BASOPHILS # BLD AUTO: 0.02 X10(3)/MCL
BASOPHILS NFR BLD AUTO: 0.4 %
BILIRUB DIRECT SERPL-MCNC: 0.5 MG/DL (ref 0–?)
BILIRUB INDIRECT SERPL-MCNC: 0.8 MG/DL (ref 0–0.8)
BILIRUB SERPL-MCNC: 1.3 MG/DL
BUN SERPL-MCNC: 19.9 MG/DL (ref 8.4–25.7)
CALCIUM SERPL-MCNC: 9.3 MG/DL (ref 8.8–10)
CHLORIDE SERPL-SCNC: 107 MMOL/L (ref 98–107)
CO2 SERPL-SCNC: 28 MMOL/L (ref 23–31)
CREAT SERPL-MCNC: 1.27 MG/DL (ref 0.72–1.25)
CREAT UR-MCNC: 80.7 MG/DL (ref 63–166)
CREAT/UREA NIT SERPL: 16
EOSINOPHIL # BLD AUTO: 0.12 X10(3)/MCL (ref 0–0.9)
EOSINOPHIL NFR BLD AUTO: 2.2 %
ERYTHROCYTE [DISTWIDTH] IN BLOOD BY AUTOMATED COUNT: 14.9 % (ref 11.5–17)
GFR SERPLBLD CREATININE-BSD FMLA CKD-EPI: 60 ML/MIN/1.73/M2
GLUCOSE SERPL-MCNC: 101 MG/DL (ref 82–115)
HCT VFR BLD AUTO: 57.4 % (ref 42–52)
HGB BLD-MCNC: 17.8 G/DL (ref 14–18)
IMM GRANULOCYTES # BLD AUTO: 0.01 X10(3)/MCL (ref 0–0.04)
IMM GRANULOCYTES NFR BLD AUTO: 0.2 %
LYMPHOCYTES # BLD AUTO: 2.25 X10(3)/MCL (ref 0.6–4.6)
LYMPHOCYTES NFR BLD AUTO: 42.1 %
MAGNESIUM SERPL-MCNC: 1.9 MG/DL (ref 1.6–2.6)
MCH RBC QN AUTO: 27.9 PG (ref 27–31)
MCHC RBC AUTO-ENTMCNC: 31 G/DL (ref 33–36)
MCV RBC AUTO: 90.1 FL (ref 80–94)
MONOCYTES # BLD AUTO: 0.44 X10(3)/MCL (ref 0.1–1.3)
MONOCYTES NFR BLD AUTO: 8.2 %
NEUTROPHILS # BLD AUTO: 2.5 X10(3)/MCL (ref 2.1–9.2)
NEUTROPHILS NFR BLD AUTO: 46.9 %
NRBC BLD AUTO-RTO: 0 %
PATH REV: NORMAL
PHOSPHATE SERPL-MCNC: 2.9 MG/DL (ref 2.3–4.7)
PLATELET # BLD AUTO: 147 X10(3)/MCL (ref 130–400)
PLATELETS.RETICULATED NFR BLD AUTO: 2.3 % (ref 0.9–11.2)
PMV BLD AUTO: 9.3 FL (ref 7.4–10.4)
POTASSIUM SERPL-SCNC: 5.1 MMOL/L (ref 3.5–5.1)
PROT SERPL-MCNC: 6.2 GM/DL (ref 5.8–7.6)
PROT UR STRIP-MCNC: 10.2 MG/DL
RBC # BLD AUTO: 6.37 X10(6)/MCL (ref 4.7–6.1)
SODIUM SERPL-SCNC: 139 MMOL/L (ref 136–145)
URINE PROTEIN/CREATININE RATIO (OLG): 0.1
WBC # BLD AUTO: 5.34 X10(3)/MCL (ref 4.5–11.5)

## 2025-05-05 PROCEDURE — 80076 HEPATIC FUNCTION PANEL: CPT

## 2025-05-05 PROCEDURE — 87799 DETECT AGENT NOS DNA QUANT: CPT

## 2025-05-05 PROCEDURE — 84156 ASSAY OF PROTEIN URINE: CPT

## 2025-05-05 PROCEDURE — 85025 COMPLETE CBC W/AUTO DIFF WBC: CPT

## 2025-05-05 PROCEDURE — 36415 COLL VENOUS BLD VENIPUNCTURE: CPT

## 2025-05-05 PROCEDURE — 83735 ASSAY OF MAGNESIUM: CPT

## 2025-05-05 PROCEDURE — 80197 ASSAY OF TACROLIMUS: CPT

## 2025-05-05 PROCEDURE — 80048 BASIC METABOLIC PNL TOTAL CA: CPT

## 2025-05-05 PROCEDURE — 84100 ASSAY OF PHOSPHORUS: CPT

## 2025-05-06 LAB
BKV DNA SERPL NAA+PROBE-ACNC: NORMAL IU/ML
TACROLIMUS TROUGH BLD-MCNC: 6 NG/ML

## 2025-05-06 NOTE — TELEPHONE ENCOUNTER
----- Message from Tiara Ferrell sent at 5/5/2025 12:19 PM CDT -----    ----- Message -----  From: Celeste Yen MD  Sent: 5/5/2025   8:48 AM CDT  To: [facility] Post-Kidney Transplant Clinical    High Hb: does she follow with hematologist?  Native and allograft kidney US to r/o renal mass  ----- Message -----  From: Lab, Background User  Sent: 5/5/2025   7:50 AM CDT  To: Celeste Yen MD

## 2025-05-07 ENCOUNTER — PATIENT MESSAGE (OUTPATIENT)
Dept: TRANSPLANT | Facility: CLINIC | Age: 75
End: 2025-05-07
Payer: MEDICARE

## 2025-05-08 NOTE — PROGRESS NOTES
Kidney Post-Transplant Assessment    Referring Physician: Alexis Zamorano  Current Nephrologist: Alexis Zamorano    ORGAN: RIGHT KIDNEY  Donor Type: donation after brain death  PHS Increased Risk: yes  Cold Ischemia: 486 mins  Induction Medications: simulect - basiliximab    Subjective:   The patient location is: home  The chief complaint leading to consultation is: post transplant follow-up and immunosuppression management    Visit type: audiovisual    Face to Face time with patient: 20 minutes of total time spent on the encounter, which includes face to face time and non-face to face time preparing to see the patient (eg, review of tests), Obtaining and/or reviewing separately obtained history, Documenting clinical information in the electronic or other health record, Independently interpreting results (not separately reported) and communicating results to the patient/family/caregiver, or Care coordination (not separately reported).     Each patient to whom he or she provides medical services by telemedicine is:  (1) informed of the relationship between the physician and patient and the respective role of any other health care provider with respect to management of the patient; and (2) notified that he or she may decline to receive medical services by telemedicine and may withdraw from such care at any time.    CC:  Reassessment of renal allograft function and management of chronic immunosuppression.    Kidney History:  Mr. Mazariegos is a 73 y.o. year old White male with ESRD secondary to polycystic kidney disease.  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 He was on coumadin prior to transplant for atrial fibrillation    Post Transplant Course :  - Afib: warfarin switched to eliquis.   - CMV viremia: resolved  - Intracranial Nocardia infection:  treated and resolved. Was off myfortic for months   - Hyperkalemia: on lokelma   - Erythrocytosis: follows with  hematologist. His native and allograft US showed no mass. (2023)      Interval History: Doing well No complaints.staying busy with hunting and fishing.  BP readings are in normal range on daily basis.  he denies any fever, headache, chest pain, shortness of breath, ENT symptoms, nausea/vomiting, dysuria, frequency, urgency, or pain over the allograft. He follows with general nephrologist regularly.  He is updated with all  age/gender related screening testes     All labs were reviewed with patient and addressed     Current Medication  Current Outpatient Medications   Medication Sig Dispense Refill    apixaban (ELIQUIS) 5 mg Tab Take 1 tablet (5 mg total) by mouth 2 (two) times daily. DO NOT RESTART UNTIL APPROVED BY NEUROSURGERY 60 tablet 11    bumetanide (BUMEX) 1 MG tablet Take 1 mg by mouth once daily.      doxazosin (CARDURA) 4 MG tablet Take 4 mg by mouth once daily.      ergocalciferol (VITAMIN D2) 50,000 unit Cap Take 1 capsule (50,000 Units total) by mouth every 7 days. 12 capsule 3    finasteride (PROSCAR) 5 mg tablet Take 5 mg by mouth every evening.      fish oil-omega-3 fatty acids 300-1,000 mg capsule Take 1 capsule by mouth every evening.      fluticasone propionate (FLONASE) 50 mcg/actuation nasal spray daily as needed.      LOKELMA 10 gram packet Take 10 g by mouth every other day.      magnesium oxide (MAG-OX) 400 mg (241.3 mg magnesium) tablet Take 3 tablets (1,200 mg total) by mouth 2 (two) times daily. 180 tablet 10    metoprolol tartrate (LOPRESSOR) 100 MG tablet Take 100 mg by mouth 2 (two) times daily.      multivitamin Tab Take 1 tablet by mouth once daily.      mycophenolate sodium 180 MG TbEC Take 2 tablets (360 mg total) by mouth 2 (two) times daily. 120 tablet 11    predniSONE (DELTASONE) 5 MG tablet Take 1 tablet (5 mg total) by mouth once daily. 30 tablet 11    ramipriL (ALTACE) 10 MG capsule Take 10 mg by mouth 2 (two) times daily.      tacrolimus (PROGRAF) 1 MG Cap Take 2 capsules (2  mg total) by mouth every 12 (twelve) hours. Z94.0;KIDNEY TXP ON 5/4/2021 120 capsule 11    vit A/vit C/vit E/zinc/copper (PRESERVISION AREDS ORAL) Take 1 capsule by mouth once daily.       No current facility-administered medications for this visit.         Review of Systems    Constitutional: Negative for fever, appetite change and fatigue.   Respiratory: Negative for cough, chest tightness, shortness of breath and wheezing.   Cardiovascular: Negative for chest pain, palpitations and leg swelling.   Gastrointestinal: Negative for nausea, vomiting, abdominal pain, diarrhea, constipation, blood in stool and abdominal distention.   Genitourinary: Negative for dysuria, urgency, frequency, hematuria, decreased urine volume, difficulty urinating  Neurological: Negative for dizziness, tremors, syncope, weakness, light-headedness and headaches.   Hematological: Negative for adenopathy. Does not bruise/bleed easily.   Psychiatric/Behavioral: Negative for confusion, sleep disturbance and dysphoric mood. The patient is not nervous/anxious.       Objective:     There were no vitals filed for this visit.      Lab Results   Component Value Date    EXTANC  03/07/2022      Comment:      Labs repeated to reassess K+;pt took kayexalate 30 gram daily X 2 days    EXTWBC 6.2 05/23/2022    EXTSEGS 62 05/23/2022    EXTPLATELETS 205 05/23/2022    EXTHEMOGLOBI 13.9 (A) 05/23/2022    EXTHEMATOCRI 45.5 05/23/2022    EXTCREATININ 1.41 05/23/2022    EXTSODIUM 134 (A) 05/23/2022    EXTPOTASSIUM 5.6 (A) 05/23/2022    EXTBUN 17.7 05/23/2022    EXTCO2 20 (A) 05/23/2022    EXTCALCIUM 9.4 05/23/2022    EXTPHOSPHORU 2.8 03/07/2022    EXTGLUCOSE 105 05/23/2022    EXTALBUMIN 3.5 05/23/2022    EXTAST 28 05/23/2022    EXTALT 29 05/23/2022    EXTBILITOTAL 1.0 05/23/2022       Lab Results   Component Value Date    EXTTACROLVL 7.2 04/04/2022    EXTPROTCRE 0.37 11/01/2021    EXTPTHINTACT 244.9 (H) 08/02/2021    EXTPROTEINUA NEG 02/07/2022    EXTWBCUA NONE  SEEN 02/07/2022    EXTRBCUA NONE SEEN 02/07/2022       Labs were reviewed with the patient    Assessment/Plan:     1. Kidney replaced by transplant    2. Renovascular hypertension    3. Long-term use of immunosuppressant medication        Plan  - continue lokelma   - continue good water intake water intake   - follow with nephrologist and dermatologist   - hematology follow up for erythrocytosis --- getting therapeutic phlebotomies  4-5x a year  -if retroperitoneal and TXP US without mass chart can be closed and patient to follow-up prn    At baseline Cr with good renal function. Following with Dr. Ryan regularly.       Mr. Mazariegos is a 74 y.o. male with:       # History of ESRD presumed secondary to PKD   - s/p DBD KTxp on 5/4/21 (CMV D+/R=, HCV NAWAF +, Simulect induction, CIT ~ 8 hours)  - Baseline Cr 1.1-1.4  - Good UOP    # Hyperkalemia  - on Lokelma     # Immunosuppression:   - Prograf 2 mg BID : Goal for tac level ~ 4-5  - Myfortic 360 mg bid   - continue prednisone tapering  - will monitor closely for toxicities      # HTN:   - BPs well controlled   - continue with home blood pressure monitoring  - low salt and healthy life discussed with the patient    # Metabolic Bone Disease/Secondary Hyperparathyroidism:  - defer to his nephrologist  - last PTH and vitamin D in 2025 acceptable     #  Hx of Nocardia infection (brain Nocardia infection)  - resolved     # Hx of CMV PCR: resolved      # Hx of Hepatitis C from donor  - treated   and healthy life discussed with the patient      Samreen Esparza NP

## 2025-05-09 ENCOUNTER — OFFICE VISIT (OUTPATIENT)
Dept: TRANSPLANT | Facility: CLINIC | Age: 75
End: 2025-05-09
Payer: MEDICARE

## 2025-05-09 DIAGNOSIS — Z79.60 LONG-TERM USE OF IMMUNOSUPPRESSANT MEDICATION: ICD-10-CM

## 2025-05-09 DIAGNOSIS — Z94.0 KIDNEY REPLACED BY TRANSPLANT: Primary | ICD-10-CM

## 2025-05-09 DIAGNOSIS — I15.0 RENOVASCULAR HYPERTENSION: ICD-10-CM

## 2025-05-09 NOTE — LETTER
May 9, 2025        Liam Ryan  [address]  [place] [address]  Phone: [phone]  Fax: [phone]             [facility]- Transplant 1st Fl  [address]  [facility] [address]  Phone: [phone]   Patient: Ronal Mazariegos   MR Number: [id number]   YOB: 1950   Date of Visit: 5/9/2025       Dear Dr. Liam Ryan    Thank you for referring Ronal Mazariegos to me for evaluation. Attached you will find relevant portions of my assessment and plan of care.    If you have questions, please do not hesitate to call me. I look forward to following Ronal Mazariegos along with you.    Sincerely,    Samreen Esparza NP    Enclosure    If you would like to receive this communication electronically, please contact [email or web address] or [phone] to request [employer] access.    [employer] is a tool which provides read-only access to select patient information with whom you have a relationship. Its easy to use and provides real time access to review your patients record including encounter summaries, notes, results, and demographic information.    If you feel you have received this communication in error or would no longer like to receive these types of communications, please e-mail [email or web address]

## 2025-05-12 ENCOUNTER — HOSPITAL ENCOUNTER (OUTPATIENT)
Dept: RADIOLOGY | Facility: HOSPITAL | Age: 75
Discharge: HOME | End: 2025-05-12
Attending: INTERNAL MEDICINE
Payer: MEDICARE

## 2025-05-12 ENCOUNTER — RESULTS FOLLOW-UP (OUTPATIENT)
Dept: TRANSPLANT | Facility: HOSPITAL | Age: 75
End: 2025-05-12

## 2025-05-12 ENCOUNTER — PATIENT MESSAGE (OUTPATIENT)
Dept: HEMATOLOGY/ONCOLOGY | Facility: CLINIC | Age: 75
End: 2025-05-12
Payer: MEDICARE

## 2025-05-12 DIAGNOSIS — D75.1 POLYCYTHEMIA: ICD-10-CM

## 2025-05-12 PROCEDURE — 76770 US EXAM ABDO BACK WALL COMP: CPT | Mod: TC

## 2025-05-12 PROCEDURE — 76776 US EXAM K TRANSPL W/DOPPLER: CPT | Mod: TC

## 2025-05-21 ENCOUNTER — LAB VISIT (OUTPATIENT)
Dept: LAB | Facility: HOSPITAL | Age: 75
End: 2025-05-21
Attending: INTERNAL MEDICINE
Payer: MEDICARE

## 2025-05-21 DIAGNOSIS — D45 POLYCYTHEMIA VERA: ICD-10-CM

## 2025-05-21 LAB
ALBUMIN SERPL-MCNC: 3.7 G/DL (ref 3.4–4.8)
ALBUMIN/GLOB SERPL: 1.2 RATIO (ref 1.1–2)
ALP SERPL-CCNC: 72 UNIT/L (ref 40–150)
ALT SERPL-CCNC: 13 UNIT/L (ref 0–55)
ANION GAP SERPL CALC-SCNC: 6 MEQ/L
AST SERPL-CCNC: 18 UNIT/L (ref 11–45)
BASOPHILS # BLD AUTO: 0.02 X10(3)/MCL
BASOPHILS NFR BLD AUTO: 0.3 %
BILIRUB SERPL-MCNC: 1.2 MG/DL
BUN SERPL-MCNC: 21.2 MG/DL (ref 8.4–25.7)
CALCIUM SERPL-MCNC: 9.4 MG/DL (ref 8.8–10)
CHLORIDE SERPL-SCNC: 106 MMOL/L (ref 98–107)
CO2 SERPL-SCNC: 28 MMOL/L (ref 23–31)
CREAT SERPL-MCNC: 1.16 MG/DL (ref 0.72–1.25)
CREAT/UREA NIT SERPL: 18
EOSINOPHIL # BLD AUTO: 0.06 X10(3)/MCL (ref 0–0.9)
EOSINOPHIL NFR BLD AUTO: 1 %
ERYTHROCYTE [DISTWIDTH] IN BLOOD BY AUTOMATED COUNT: 14.8 % (ref 11.5–17)
GFR SERPLBLD CREATININE-BSD FMLA CKD-EPI: >60 ML/MIN/1.73/M2
GLOBULIN SER-MCNC: 3.2 GM/DL (ref 2.4–3.5)
GLUCOSE SERPL-MCNC: 90 MG/DL (ref 82–115)
HCT VFR BLD AUTO: 53.5 % (ref 42–52)
HGB BLD-MCNC: 16.9 G/DL (ref 14–18)
IMM GRANULOCYTES # BLD AUTO: 0.01 X10(3)/MCL (ref 0–0.04)
IMM GRANULOCYTES NFR BLD AUTO: 0.2 %
LYMPHOCYTES # BLD AUTO: 2.08 X10(3)/MCL (ref 0.6–4.6)
LYMPHOCYTES NFR BLD AUTO: 33 %
MCH RBC QN AUTO: 28.5 PG (ref 27–31)
MCHC RBC AUTO-ENTMCNC: 31.6 G/DL (ref 33–36)
MCV RBC AUTO: 90.2 FL (ref 80–94)
MONOCYTES # BLD AUTO: 0.55 X10(3)/MCL (ref 0.1–1.3)
MONOCYTES NFR BLD AUTO: 8.7 %
NEUTROPHILS # BLD AUTO: 3.59 X10(3)/MCL (ref 2.1–9.2)
NEUTROPHILS NFR BLD AUTO: 56.8 %
PLATELET # BLD AUTO: 150 X10(3)/MCL (ref 130–400)
PMV BLD AUTO: 9.6 FL (ref 7.4–10.4)
POTASSIUM SERPL-SCNC: 4.4 MMOL/L (ref 3.5–5.1)
PROT SERPL-MCNC: 6.9 GM/DL (ref 5.8–7.6)
RBC # BLD AUTO: 5.93 X10(6)/MCL (ref 4.7–6.1)
SODIUM SERPL-SCNC: 140 MMOL/L (ref 136–145)
WBC # BLD AUTO: 6.31 X10(3)/MCL (ref 4.5–11.5)

## 2025-05-21 PROCEDURE — 85025 COMPLETE CBC W/AUTO DIFF WBC: CPT

## 2025-05-21 PROCEDURE — 36415 COLL VENOUS BLD VENIPUNCTURE: CPT

## 2025-05-21 PROCEDURE — 80053 COMPREHEN METABOLIC PANEL: CPT

## 2025-07-16 ENCOUNTER — LAB VISIT (OUTPATIENT)
Dept: LAB | Facility: HOSPITAL | Age: 75
End: 2025-07-16
Attending: INTERNAL MEDICINE
Payer: MEDICARE

## 2025-07-16 DIAGNOSIS — D75.1 POLYCYTHEMIA: ICD-10-CM

## 2025-07-16 DIAGNOSIS — D69.6 THROMBOCYTOPENIA: ICD-10-CM

## 2025-07-16 LAB
BASOPHILS # BLD AUTO: 0.01 X10(3)/MCL
BASOPHILS NFR BLD AUTO: 0.2 %
EOSINOPHIL # BLD AUTO: 0.08 X10(3)/MCL (ref 0–0.9)
EOSINOPHIL NFR BLD AUTO: 1.4 %
ERYTHROCYTE [DISTWIDTH] IN BLOOD BY AUTOMATED COUNT: 16.3 % (ref 11.5–17)
HCT VFR BLD AUTO: 52.2 % (ref 42–52)
HGB BLD-MCNC: 16.5 G/DL (ref 14–18)
IMM GRANULOCYTES # BLD AUTO: 0.01 X10(3)/MCL (ref 0–0.04)
IMM GRANULOCYTES NFR BLD AUTO: 0.2 %
LYMPHOCYTES # BLD AUTO: 1.78 X10(3)/MCL (ref 0.6–4.6)
LYMPHOCYTES NFR BLD AUTO: 30.5 %
MCH RBC QN AUTO: 27.9 PG (ref 27–31)
MCHC RBC AUTO-ENTMCNC: 31.6 G/DL (ref 33–36)
MCV RBC AUTO: 88.3 FL (ref 80–94)
MONOCYTES # BLD AUTO: 0.55 X10(3)/MCL (ref 0.1–1.3)
MONOCYTES NFR BLD AUTO: 9.4 %
NEUTROPHILS # BLD AUTO: 3.4 X10(3)/MCL (ref 2.1–9.2)
NEUTROPHILS NFR BLD AUTO: 58.3 %
PLATELET # BLD AUTO: 125 X10(3)/MCL (ref 130–400)
PMV BLD AUTO: 9.2 FL (ref 7.4–10.4)
RBC # BLD AUTO: 5.91 X10(6)/MCL (ref 4.7–6.1)
WBC # BLD AUTO: 5.83 X10(3)/MCL (ref 4.5–11.5)

## 2025-07-16 PROCEDURE — 85025 COMPLETE CBC W/AUTO DIFF WBC: CPT

## 2025-07-16 PROCEDURE — 36415 COLL VENOUS BLD VENIPUNCTURE: CPT

## 2025-08-20 ENCOUNTER — TELEPHONE (OUTPATIENT)
Dept: HEMATOLOGY/ONCOLOGY | Facility: CLINIC | Age: 75
End: 2025-08-20
Payer: MEDICARE

## (undated) DEVICE — SEE MEDLINE ITEM 154981

## (undated) DEVICE — SUT PDS BV 6-0

## (undated) DEVICE — Device

## (undated) DEVICE — CONTAINER SPECIMEN SCREW 4OZ

## (undated) DEVICE — SEE MEDLINE ITEM 146322

## (undated) DEVICE — ELECTRODE PATIENT RETURN DISP

## (undated) DEVICE — TRAY SKIN SCRUB WET PREMIUM

## (undated) DEVICE — RUBBERBAND STERILE 3X1/8IN

## (undated) DEVICE — BIT PERFORATOR LARGE

## (undated) DEVICE — BRUSH CLEANING EBUS SMALL

## (undated) DEVICE — BUR BONE CUT MICRO TPS 3X3.8MM

## (undated) DEVICE — PACK UNIVERSAL SPLIT II

## (undated) DEVICE — SET IRR URLGY 2LINE UNIV SPIKE

## (undated) DEVICE — GAUZE SPONGE 4X4 12PLY

## (undated) DEVICE — TIP SUCTION YANKAUER

## (undated) DEVICE — DIFFUSER

## (undated) DEVICE — BOWL STERILE LARGE 32OZ

## (undated) DEVICE — LMA I-GEL  5.0 ADULT LG 90+ KG

## (undated) DEVICE — MARKERS SPHERZ PASSIVE

## (undated) DEVICE — SOL NACL IRR 1000ML BTL

## (undated) DEVICE — SEE MEDLINE ITEM 156905

## (undated) DEVICE — SOLIDIFIER BOTTLE 1500CC

## (undated) DEVICE — KIT SURGICAL TURNOVER

## (undated) DEVICE — CONTAINER SPECIMEN STRL 4OZ

## (undated) DEVICE — PLUG CATHETER STERILE FOLEY

## (undated) DEVICE — COTTON BALLS 1/2IN

## (undated) DEVICE — CULTSWAB+ AMIES W/O CHARC SNG

## (undated) DEVICE — SEE MEDLINE ITEM 156900

## (undated) DEVICE — HEMOSTAT SURGICEL NU-KNIT 6X9

## (undated) DEVICE — DRAPE FULL SHEET 70X100IN

## (undated) DEVICE — GLOVE PROTEXIS HYDROGEL SZ7.5

## (undated) DEVICE — DRESSING TELFA N ADH 3X8

## (undated) DEVICE — SUT PROLENE 6-0 BV-1 30IN

## (undated) DEVICE — SOL NS 1000CC

## (undated) DEVICE — TRAP MUCOUS SPECIMEN 80CCBY BU

## (undated) DEVICE — SUT ETHILON 3-0 PS2 18 BLK

## (undated) DEVICE — ATOMIZER NASAL DRUG DEL NO SYR

## (undated) DEVICE — SUT 1 36IN PDS II VIO MONO

## (undated) DEVICE — SOL IRR SOD CHL .9% POUR

## (undated) DEVICE — DRAPE SLUSH WARMER WITH DISC

## (undated) DEVICE — SWAB CULTURETTE SINGLE

## (undated) DEVICE — ELECTRODE RED DOT EKG

## (undated) DEVICE — BENZOIN TINCTURE 0.66ML

## (undated) DEVICE — ELECTRODE REM PLYHSV RETURN 9

## (undated) DEVICE — SYR ONLY LUER LOCK 20CC

## (undated) DEVICE — TUBING AIRLIFE O2 VINYL 7FT

## (undated) DEVICE — TRAY FOLEY 16FR INFECTION CONT

## (undated) DEVICE — PUNCH AORTIC 4.0MM 6/CASE

## (undated) DEVICE — NEBULIZER INTIN UP-DRAFT II NE

## (undated) DEVICE — PUNCH AORTIC 4.8MM

## (undated) DEVICE — GLOVE PROTEXIS LTX MICRO 6.5

## (undated) DEVICE — STAPLER SKIN PROXIMATE WIDE

## (undated) DEVICE — SUT PROLENE 5-0 36IN C-1

## (undated) DEVICE — DRAIN BLAKE SIL HUBLS RND 19FR

## (undated) DEVICE — CANNULA NASAL NONFLARE TIP 7FT

## (undated) DEVICE — DRAPE SURGICAL STERI IRRG PCH

## (undated) DEVICE — GLOVE PROTEXIS NEOPRENE 7.5

## (undated) DEVICE — BURR ACORN 7.5MM

## (undated) DEVICE — SEE MEDLINE ITEM 146417

## (undated) DEVICE — APPLICATOR STRL COT 2INNR 6IN

## (undated) DEVICE — TOWEL OR DISP STRL BLUE 4/PK

## (undated) DEVICE — DRESSING SURGICAL 1/2X1/2

## (undated) DEVICE — DRESSING ADH ISLAND 3.6 X 14

## (undated) DEVICE — SUT CTD VICRYL BR CR/SH VIL

## (undated) DEVICE — SUT 4-0 12-18IN SILK BLACK

## (undated) DEVICE — SEE MEDLINE ITEM 146292

## (undated) DEVICE — MASK SURG LVL 3 EYESHIELD LOOP

## (undated) DEVICE — HEMOSTAT SURGICEL 4X8IN

## (undated) DEVICE — TAPE SURG MEDIPORE 6X72IN

## (undated) DEVICE — ADAPTER NUT NIPPLE

## (undated) DEVICE — PAD ABD 8X10 STERILE

## (undated) DEVICE — CORD BIPOLAR 12 FOOT

## (undated) DEVICE — SPONGE NEURO 1/4X1/4

## (undated) DEVICE — ROUTER TAPERED 2.3MM

## (undated) DEVICE — CARTRIDGE OIL

## (undated) DEVICE — HOOK STAY ELAS 5MM 8EA/PK

## (undated) DEVICE — SUT 2-0 12-18IN SILK

## (undated) DEVICE — SYR ONLY LEUR TIP 30CC

## (undated) DEVICE — SUT SILK 3-0 STRANDS 30IN

## (undated) DEVICE — CONNECTOR TUBING OXYGEN

## (undated) DEVICE — DURAPREP SURG SCRUB 26ML

## (undated) DEVICE — SET LEADWIRE PINCH 3 LD 50IN

## (undated) DEVICE — SPRAY MASTISOL

## (undated) DEVICE — SUT SILK 2-0 STRANDS 30IN

## (undated) DEVICE — BURR ROUTER TAPERED

## (undated) DEVICE — MARKER SKIN STND TIP BLUE BARR

## (undated) DEVICE — SUT 3-0 12-18IN SILK

## (undated) DEVICE — DRAPE OPMI STERILE

## (undated) DEVICE — GOWN ISOLATION THUMBHOOK UNIV

## (undated) DEVICE — SYR SLIP TIP 10ML SHIELD

## (undated) DEVICE — DRESSING SURGICAL 1X1

## (undated) DEVICE — CLIPPER BLADE MOD 4406 (CAREF)

## (undated) DEVICE — FOLEY BLLN 20FR 3WAY 5CC

## (undated) DEVICE — SUT 4/0 18IN NUROLON BLK B

## (undated) DEVICE — SPONGE KERLIX ANTIMIC 6X6.75IN

## (undated) DEVICE — VALVE OLYMPUS SCOPE BIOPSY

## (undated) DEVICE — WRAP STERILIZATION 45X45 DISP

## (undated) DEVICE — BLADE SURG CARBON STEEL SZ11

## (undated) DEVICE — EVACUATOR WOUND BULB 100CC

## (undated) DEVICE — KIT CANIST SUCTION 1200CC

## (undated) DEVICE — JELLY KY LUBRICATING 5G PACKET

## (undated) DEVICE — TUBING SUC MEDI-VAC CONN 6FT

## (undated) DEVICE — TUBE FRAZIER 5MM 2FT SOFT TIP

## (undated) DEVICE — DRAPE THYROID WITH ARMBOARD

## (undated) DEVICE — TOWEL OR XRAY WHITE 17X26IN

## (undated) DEVICE — VALVE OLYMPUS SCOPE SUCTION

## (undated) DEVICE — SET DECANTER MEDICHOICE

## (undated) DEVICE — DRESSING SURGICAL 1X3